# Patient Record
Sex: MALE | Race: WHITE | URBAN - METROPOLITAN AREA
[De-identification: names, ages, dates, MRNs, and addresses within clinical notes are randomized per-mention and may not be internally consistent; named-entity substitution may affect disease eponyms.]

---

## 2018-10-30 ENCOUNTER — HOSPITAL ENCOUNTER (EMERGENCY)
Age: 48
Discharge: HOME | End: 2018-10-30
Attending: EMERGENCY MEDICINE
Payer: COMMERCIAL

## 2018-10-30 ENCOUNTER — APPOINTMENT (OUTPATIENT)
Dept: GENERAL RADIOLOGY | Age: 48
End: 2018-10-30
Payer: COMMERCIAL

## 2018-10-30 VITALS
TEMPERATURE: 98.6 F | SYSTOLIC BLOOD PRESSURE: 173 MMHG | OXYGEN SATURATION: 98 % | DIASTOLIC BLOOD PRESSURE: 96 MMHG | HEART RATE: 78 BPM | RESPIRATION RATE: 20 BRPM

## 2018-10-30 DIAGNOSIS — R07.9 CHEST PAIN, UNSPECIFIED TYPE: Primary | ICD-10-CM

## 2018-10-30 DIAGNOSIS — R03.0 ELEVATED BLOOD PRESSURE READING: ICD-10-CM

## 2018-10-30 LAB
A/G RATIO: 1.4 (ref 1.1–2.2)
ALBUMIN SERPL-MCNC: 4 G/DL (ref 3.4–5)
ALP BLD-CCNC: 66 U/L (ref 40–129)
ALT SERPL-CCNC: 7 U/L (ref 10–40)
ANION GAP SERPL CALCULATED.3IONS-SCNC: 10 MMOL/L (ref 3–16)
APTT: 30 SEC (ref 26–36)
AST SERPL-CCNC: 13 U/L (ref 15–37)
BASOPHILS ABSOLUTE: 0.1 K/UL (ref 0–0.2)
BASOPHILS RELATIVE PERCENT: 0.9 %
BILIRUB SERPL-MCNC: <0.2 MG/DL (ref 0–1)
BUN BLDV-MCNC: 14 MG/DL (ref 7–20)
CALCIUM SERPL-MCNC: 9.4 MG/DL (ref 8.3–10.6)
CHLORIDE BLD-SCNC: 105 MMOL/L (ref 99–110)
CO2: 25 MMOL/L (ref 21–32)
CREAT SERPL-MCNC: 1.1 MG/DL (ref 0.9–1.3)
EKG ATRIAL RATE: 78 BPM
EKG ATRIAL RATE: 89 BPM
EKG DIAGNOSIS: NORMAL
EKG DIAGNOSIS: NORMAL
EKG P AXIS: 46 DEGREES
EKG P AXIS: 56 DEGREES
EKG P-R INTERVAL: 162 MS
EKG P-R INTERVAL: 172 MS
EKG Q-T INTERVAL: 366 MS
EKG Q-T INTERVAL: 392 MS
EKG QRS DURATION: 112 MS
EKG QRS DURATION: 112 MS
EKG QTC CALCULATION (BAZETT): 445 MS
EKG QTC CALCULATION (BAZETT): 446 MS
EKG R AXIS: 43 DEGREES
EKG R AXIS: 47 DEGREES
EKG T AXIS: 57 DEGREES
EKG T AXIS: 60 DEGREES
EKG VENTRICULAR RATE: 78 BPM
EKG VENTRICULAR RATE: 89 BPM
EOSINOPHILS ABSOLUTE: 0.1 K/UL (ref 0–0.6)
EOSINOPHILS RELATIVE PERCENT: 1.3 %
GFR AFRICAN AMERICAN: >60
GFR NON-AFRICAN AMERICAN: >60
GLOBULIN: 2.9 G/DL
GLUCOSE BLD-MCNC: 103 MG/DL (ref 70–99)
HCT VFR BLD CALC: 38.9 % (ref 40.5–52.5)
HEMOGLOBIN: 12.9 G/DL (ref 13.5–17.5)
INR BLD: 0.9 (ref 0.86–1.14)
LYMPHOCYTES ABSOLUTE: 2.8 K/UL (ref 1–5.1)
LYMPHOCYTES RELATIVE PERCENT: 35.6 %
MCH RBC QN AUTO: 28.6 PG (ref 26–34)
MCHC RBC AUTO-ENTMCNC: 33.1 G/DL (ref 31–36)
MCV RBC AUTO: 86.5 FL (ref 80–100)
MONOCYTES ABSOLUTE: 0.9 K/UL (ref 0–1.3)
MONOCYTES RELATIVE PERCENT: 11.4 %
NEUTROPHILS ABSOLUTE: 4 K/UL (ref 1.7–7.7)
NEUTROPHILS RELATIVE PERCENT: 50.8 %
PDW BLD-RTO: 15.2 % (ref 12.4–15.4)
PLATELET # BLD: 216 K/UL (ref 135–450)
PMV BLD AUTO: 8.9 FL (ref 5–10.5)
POTASSIUM SERPL-SCNC: 3.7 MMOL/L (ref 3.5–5.1)
PROTHROMBIN TIME: 10.3 SEC (ref 9.8–13)
RBC # BLD: 4.5 M/UL (ref 4.2–5.9)
SODIUM BLD-SCNC: 140 MMOL/L (ref 136–145)
TOTAL PROTEIN: 6.9 G/DL (ref 6.4–8.2)
TROPONIN: <0.01 NG/ML
TROPONIN: <0.01 NG/ML
WBC # BLD: 7.8 K/UL (ref 4–11)

## 2018-10-30 PROCEDURE — 85730 THROMBOPLASTIN TIME PARTIAL: CPT

## 2018-10-30 PROCEDURE — 6370000000 HC RX 637 (ALT 250 FOR IP): Performed by: PHYSICIAN ASSISTANT

## 2018-10-30 PROCEDURE — 6370000000 HC RX 637 (ALT 250 FOR IP): Performed by: EMERGENCY MEDICINE

## 2018-10-30 PROCEDURE — 94640 AIRWAY INHALATION TREATMENT: CPT

## 2018-10-30 PROCEDURE — 80053 COMPREHEN METABOLIC PANEL: CPT

## 2018-10-30 PROCEDURE — 93010 ELECTROCARDIOGRAM REPORT: CPT | Performed by: INTERNAL MEDICINE

## 2018-10-30 PROCEDURE — 71046 X-RAY EXAM CHEST 2 VIEWS: CPT

## 2018-10-30 PROCEDURE — 93005 ELECTROCARDIOGRAM TRACING: CPT | Performed by: EMERGENCY MEDICINE

## 2018-10-30 PROCEDURE — 94760 N-INVAS EAR/PLS OXIMETRY 1: CPT

## 2018-10-30 PROCEDURE — 85610 PROTHROMBIN TIME: CPT

## 2018-10-30 PROCEDURE — 85025 COMPLETE CBC W/AUTO DIFF WBC: CPT

## 2018-10-30 PROCEDURE — 99285 EMERGENCY DEPT VISIT HI MDM: CPT

## 2018-10-30 PROCEDURE — 94664 DEMO&/EVAL PT USE INHALER: CPT

## 2018-10-30 PROCEDURE — 84484 ASSAY OF TROPONIN QUANT: CPT

## 2018-10-30 RX ORDER — PRAZOSIN HYDROCHLORIDE 1 MG/1
2 CAPSULE ORAL ONCE
Status: COMPLETED | OUTPATIENT
Start: 2018-10-30 | End: 2018-10-30

## 2018-10-30 RX ORDER — LORATADINE 10 MG/1
10 TABLET ORAL
COMMUNITY

## 2018-10-30 RX ORDER — ISOSORBIDE MONONITRATE 30 MG/1
30 TABLET, EXTENDED RELEASE ORAL ONCE
Status: COMPLETED | OUTPATIENT
Start: 2018-10-30 | End: 2018-10-30

## 2018-10-30 RX ORDER — ASPIRIN 81 MG/1
81 TABLET, CHEWABLE ORAL
COMMUNITY
Start: 2018-04-25 | End: 2019-04-25

## 2018-10-30 RX ORDER — NICOTINE 21 MG/24HR
1 PATCH, TRANSDERMAL 24 HOURS TRANSDERMAL
COMMUNITY

## 2018-10-30 RX ORDER — CLOPIDOGREL BISULFATE 75 MG/1
TABLET ORAL
COMMUNITY
Start: 2018-09-10

## 2018-10-30 RX ORDER — ALBUTEROL SULFATE 90 UG/1
2 AEROSOL, METERED RESPIRATORY (INHALATION)
COMMUNITY
Start: 2018-02-18

## 2018-10-30 RX ORDER — CYCLOBENZAPRINE HCL 10 MG
10 TABLET ORAL 3 TIMES DAILY PRN
Qty: 15 TABLET | Refills: 0 | Status: SHIPPED | OUTPATIENT
Start: 2018-10-30

## 2018-10-30 RX ORDER — IPRATROPIUM BROMIDE AND ALBUTEROL SULFATE 2.5; .5 MG/3ML; MG/3ML
1 SOLUTION RESPIRATORY (INHALATION) ONCE
Status: COMPLETED | OUTPATIENT
Start: 2018-10-30 | End: 2018-10-30

## 2018-10-30 RX ORDER — ATORVASTATIN CALCIUM 40 MG/1
TABLET, FILM COATED ORAL
COMMUNITY
Start: 2018-09-10

## 2018-10-30 RX ORDER — NITROGLYCERIN 0.4 MG/1
0.4 TABLET SUBLINGUAL
COMMUNITY

## 2018-10-30 RX ORDER — LEVOTHYROXINE SODIUM 0.1 MG/1
100 TABLET ORAL
COMMUNITY
Start: 2018-04-16

## 2018-10-30 RX ORDER — ISOSORBIDE MONONITRATE 30 MG/1
TABLET, EXTENDED RELEASE ORAL
COMMUNITY
Start: 2018-09-10

## 2018-10-30 RX ORDER — PRAZOSIN HYDROCHLORIDE 2 MG/1
2 CAPSULE ORAL
COMMUNITY
Start: 2018-05-14

## 2018-10-30 RX ORDER — CYCLOBENZAPRINE HCL 10 MG
10 TABLET ORAL ONCE
Status: COMPLETED | OUTPATIENT
Start: 2018-10-30 | End: 2018-10-30

## 2018-10-30 RX ORDER — PALIPERIDONE 6 MG/1
6 TABLET, EXTENDED RELEASE ORAL
COMMUNITY
Start: 2018-05-14

## 2018-10-30 RX ORDER — ACETAMINOPHEN 325 MG/1
650 TABLET ORAL ONCE
Status: COMPLETED | OUTPATIENT
Start: 2018-10-30 | End: 2018-10-30

## 2018-10-30 RX ADMIN — CYCLOBENZAPRINE HYDROCHLORIDE 10 MG: 10 TABLET, FILM COATED ORAL at 05:51

## 2018-10-30 RX ADMIN — METOPROLOL TARTRATE 25 MG: 25 TABLET ORAL at 05:50

## 2018-10-30 RX ADMIN — ISOSORBIDE MONONITRATE 30 MG: 30 TABLET, EXTENDED RELEASE ORAL at 05:50

## 2018-10-30 RX ADMIN — ACETAMINOPHEN 650 MG: 325 TABLET, FILM COATED ORAL at 03:48

## 2018-10-30 RX ADMIN — IPRATROPIUM BROMIDE AND ALBUTEROL SULFATE 1 AMPULE: .5; 3 SOLUTION RESPIRATORY (INHALATION) at 03:22

## 2018-10-30 RX ADMIN — PRAZOSIN HYDROCHLORIDE 2 MG: 1 CAPSULE ORAL at 05:52

## 2018-10-30 ASSESSMENT — ENCOUNTER SYMPTOMS
NAUSEA: 0
VOMITING: 0
SHORTNESS OF BREATH: 0
COUGH: 0
ABDOMINAL PAIN: 0
RHINORRHEA: 0
DIARRHEA: 0

## 2018-10-30 ASSESSMENT — PAIN DESCRIPTION - LOCATION: LOCATION: CHEST

## 2018-10-30 ASSESSMENT — PAIN SCALES - GENERAL
PAINLEVEL_OUTOF10: 3
PAINLEVEL_OUTOF10: 9
PAINLEVEL_OUTOF10: 9

## 2018-10-30 ASSESSMENT — PAIN DESCRIPTION - PAIN TYPE: TYPE: ACUTE PAIN

## 2018-10-30 ASSESSMENT — PAIN DESCRIPTION - PROGRESSION: CLINICAL_PROGRESSION: GRADUALLY IMPROVING

## 2018-10-30 NOTE — ED PROVIDER NOTES
[address] [facility]  eMERGENCY dEPARTMENT eNCOUnter        Pt Name: Rafiq Rodney  MRN: [id number]  Reneegfkelli 1970  Date of evaluation: 10/30/2018  Provider: Jayro Colon PA-C  PCP: No primary care provider on file. ED Attending: Brandt Figueroa, [address] [place] [address]       Chief Complaint   Patient presents with    Chest Pain     began around 30 minutes ago while trying to go to sleep, feels squeezing in nature, hx of stent placement last being in feb 2018       HISTORY OF PRESENT ILLNESS   (Location/Symptom, Timing/Onset, Context/Setting, Quality, Duration, Modifying Factors, Severity)  Note limiting factors. Rafiq Rodney is a 56 y.o. male who presents to the emergency department today for evaluation for chest pain. The patient states that the pain started to the left side of her chest, radiated to his left shoulder and up into his left neck. This started a partially 30 minutes before arriving to the ED. The patient states that his artery taken 4 baby aspirin and he is are retaken 4 nitro at home without any improvement. He states that his pain is sharp, constant and is currently rated as a 9/10 although and of the room he is lying in bed comfortably watching TV. No known alleviating factors. Patient states that he does have history of coronary artery disease and he states he does have stents in last replaced in February 2018. Patient states that he has had intermittent pain since that time. Patient denies any fever or cough. He denies any abdominal pain, nausea, vomiting or diarrhea. No urinary symptoms. He denies any recent travels immobilizations or surgeries. No history of DVT or PE. No lower leg pain or swelling. He continues to smoke marijuana as well as a half pack of cigarettes per day. Positive family history.   He has history of hypertension no history of diabetes or hyperlipidemia    Although the patient states that he has not really had a pain like this before and review the patient's chart it appears that he has been to the ER multiple times for chest pain, was last seen on 10/27/2018 at [facility], cardiac workup was negative at that time. Last stress test was on 4/16/2018 and was normal at that time. Nursing Notes were all reviewed and agreed with or any disagreements were addressed  in the HPI. REVIEW OF SYSTEMS    (2-9 systems for level 4, 10 or more for level 5)     Review of Systems   Constitutional: Negative for activity change, appetite change, chills and fever. HENT: Negative for congestion and rhinorrhea. Respiratory: Negative for cough and shortness of breath. Cardiovascular: Positive for chest pain. Gastrointestinal: Negative for abdominal pain, diarrhea, nausea and vomiting. Genitourinary: Negative for difficulty urinating, dysuria and hematuria. Positives and Pertinent negatives as per HPI. Except as noted abovein the ROS, all other systems were reviewed and negative. PAST MEDICAL HISTORY   History reviewed. No pertinent past medical history. SURGICAL HISTORY   History reviewed. No pertinent surgical history.       CURRENTMEDICATIONS       Previous Medications    ALBUTEROL SULFATE  (90 BASE) MCG/ACT INHALER    Inhale 2 puffs into the lungs    ASPIRIN 81 MG CHEWABLE TABLET    Take 81 mg by mouth    ATORVASTATIN (LIPITOR) 40 MG TABLET    TAKE 1 TABLET BY MOUTH ONCE DAILY    CLOPIDOGREL (PLAVIX) 75 MG TABLET    TAKE 1 TABLET BY MOUTH ONCE DAILY    ISOSORBIDE MONONITRATE (IMDUR) 30 MG EXTENDED RELEASE TABLET    TAKE 1 TABLET BY MOUTH ONCE DAILY    LEVOTHYROXINE (SYNTHROID) 100 MCG TABLET    Take 100 mcg by mouth    LORATADINE (CLARITIN) 10 MG TABLET    Take 10 mg by mouth    METOPROLOL TARTRATE (LOPRESSOR) 25 MG TABLET    TAKE 1 TABLET BY MOUTH TWICE DAILY    NICOTINE (NICODERM CQ) 21 MG/24HR    Place 1 patch onto the skin    NITROGLYCERIN (NITROSTAT) 0.4 MG SL TABLET    Place 0.4 mg under the tongue    PALIPERIDONE (INVEGA) 6 MG EXTENDED RELEASE TABLET    Take 6 mg by mouth    PRAZOSIN (MINIPRESS) 2 MG CAPSULE    Take 2 mg by mouth         ALLERGIES     Latex; Ziprasidone hcl; Acetaminophen; Bupropion; Ketorolac tromethamine; Lithium; Olanzapine; Risperidone; Tramadol; Aspirin; and Ibuprofen    FAMILYHISTORY     History reviewed. No pertinent family history. SOCIAL HISTORY       Social History     Social History    Marital status: Single     Spouse name: N/A    Number of children: N/A    Years of education: N/A     Social History Main Topics    Smoking status: Current Every Day Smoker    Smokeless tobacco: Never Used    Alcohol use No    Drug use: Yes     Types: Marijuana    Sexual activity: Not Asked     Other Topics Concern    None     Social History Narrative    None       SCREENINGS             PHYSICAL EXAM    (up to 7 for level 4, 8 or more for level 5)     ED Triage Vitals [10/30/18 0118]   BP Temp Temp src Pulse Resp SpO2 Height Weight   (!) 155/98 98.6 °F (37 °C) -- 91 16 97 % -- --       Physical Exam   Constitutional: He is oriented to person, place, and time. He appears well-developed and well-nourished. HENT:   Head: Normocephalic and atraumatic. Right Ear: External ear normal.   Left Ear: External ear normal.   Nose: Nose normal.   Eyes: Right eye exhibits no discharge. Left eye exhibits no discharge. Neck: Normal range of motion. Neck supple. No tracheal deviation present. Cardiovascular: Normal rate, regular rhythm and normal heart sounds. No murmur heard. Pulmonary/Chest: Effort normal. No respiratory distress. He has wheezes. Scattered end expiratory wheezing throughout   Abdominal: Soft. Bowel sounds are normal. He exhibits no distension and no mass. There is no tenderness. There is no guarding. Musculoskeletal: Normal range of motion. Neurological: He is alert and oriented to person, place, and time. Skin: Skin is warm and dry.  He is not

## 2023-04-13 PROBLEM — M51.37 DDD (DEGENERATIVE DISC DISEASE), LUMBOSACRAL: Status: ACTIVE | Noted: 2023-04-13

## 2023-04-13 PROBLEM — M43.16 SPONDYLOLISTHESIS AT L4-L5 LEVEL: Status: ACTIVE | Noted: 2023-04-13

## 2023-04-13 PROBLEM — M51.379 DDD (DEGENERATIVE DISC DISEASE), LUMBOSACRAL: Status: ACTIVE | Noted: 2023-04-13

## 2023-04-13 PROBLEM — M79.605 LUMBAR PAIN WITH RADIATION DOWN BOTH LEGS: Status: ACTIVE | Noted: 2023-04-13

## 2023-04-13 PROBLEM — M79.604 LUMBAR PAIN WITH RADIATION DOWN BOTH LEGS: Status: ACTIVE | Noted: 2023-04-13

## 2023-04-13 PROBLEM — N39.42 URINARY INCONTINENCE WITHOUT SENSORY AWARENESS: Status: ACTIVE | Noted: 2023-04-13

## 2023-04-13 PROBLEM — M54.50 LUMBAR PAIN WITH RADIATION DOWN BOTH LEGS: Status: ACTIVE | Noted: 2023-04-13

## 2023-08-18 ENCOUNTER — HOSPITAL ENCOUNTER (INPATIENT)
Age: 53
LOS: 1 days | Discharge: HOME | DRG: 684 | End: 2023-08-19
Attending: INTERNAL MEDICINE | Admitting: INTERNAL MEDICINE
Payer: COMMERCIAL

## 2023-08-18 ENCOUNTER — APPOINTMENT (OUTPATIENT)
Dept: GENERAL RADIOLOGY | Age: 53
DRG: 684 | End: 2023-08-18
Payer: COMMERCIAL

## 2023-08-18 DIAGNOSIS — F33.1 MODERATE EPISODE OF RECURRENT MAJOR DEPRESSIVE DISORDER (HCC): ICD-10-CM

## 2023-08-18 DIAGNOSIS — R07.9 CHEST PAIN, UNSPECIFIED TYPE: Primary | ICD-10-CM

## 2023-08-18 DIAGNOSIS — R06.02 SHORTNESS OF BREATH: ICD-10-CM

## 2023-08-18 DIAGNOSIS — N17.9 AKI (ACUTE KIDNEY INJURY) (HCC): ICD-10-CM

## 2023-08-18 DIAGNOSIS — R77.8 ELEVATED TROPONIN: ICD-10-CM

## 2023-08-18 LAB
ALBUMIN SERPL-MCNC: 4.7 G/DL (ref 3.4–5)
ALBUMIN/GLOB SERPL: 1.7 {RATIO} (ref 1.1–2.2)
ALP SERPL-CCNC: 55 U/L (ref 40–129)
ALT SERPL-CCNC: 15 U/L (ref 10–40)
ANION GAP SERPL CALCULATED.3IONS-SCNC: 14 MMOL/L (ref 3–16)
AST SERPL-CCNC: 62 U/L (ref 15–37)
BASOPHILS # BLD: 0.1 K/UL (ref 0–0.2)
BASOPHILS NFR BLD: 1.6 %
BILIRUB SERPL-MCNC: 0.3 MG/DL (ref 0–1)
BUN SERPL-MCNC: 15 MG/DL (ref 7–20)
CALCIUM SERPL-MCNC: 10.8 MG/DL (ref 8.3–10.6)
CHLORIDE SERPL-SCNC: 104 MMOL/L (ref 99–110)
CO2 SERPL-SCNC: 21 MMOL/L (ref 21–32)
CREAT SERPL-MCNC: 1.9 MG/DL (ref 0.9–1.3)
DEPRECATED RDW RBC AUTO: 14.6 % (ref 12.4–15.4)
EOSINOPHIL # BLD: 0.1 K/UL (ref 0–0.6)
EOSINOPHIL NFR BLD: 0.9 %
GFR SERPLBLD CREATININE-BSD FMLA CKD-EPI: 42 ML/MIN/{1.73_M2}
GLUCOSE SERPL-MCNC: 88 MG/DL (ref 70–99)
HCT VFR BLD AUTO: 40.5 % (ref 40.5–52.5)
HGB BLD-MCNC: 13.9 G/DL (ref 13.5–17.5)
LYMPHOCYTES # BLD: 1.9 K/UL (ref 1–5.1)
LYMPHOCYTES NFR BLD: 27.6 %
MCH RBC QN AUTO: 31.1 PG (ref 26–34)
MCHC RBC AUTO-ENTMCNC: 34.3 G/DL (ref 31–36)
MCV RBC AUTO: 90.5 FL (ref 80–100)
MONOCYTES # BLD: 0.5 K/UL (ref 0–1.3)
MONOCYTES NFR BLD: 7.8 %
NEUTROPHILS # BLD: 4.3 K/UL (ref 1.7–7.7)
NEUTROPHILS NFR BLD: 62.1 %
NT-PROBNP SERPL-MCNC: 385 PG/ML (ref 0–124)
PLATELET # BLD AUTO: 207 K/UL (ref 135–450)
PMV BLD AUTO: 10.1 FL (ref 5–10.5)
POTASSIUM SERPL-SCNC: 4.1 MMOL/L (ref 3.5–5.1)
PROT SERPL-MCNC: 7.5 G/DL (ref 6.4–8.2)
RBC # BLD AUTO: 4.48 M/UL (ref 4.2–5.9)
REASON FOR REJECTION: NORMAL
REJECTED TEST: NORMAL
SODIUM SERPL-SCNC: 139 MMOL/L (ref 136–145)
TROPONIN, HIGH SENSITIVITY: 27 NG/L (ref 0–22)
TROPONIN, HIGH SENSITIVITY: 28 NG/L (ref 0–22)
WBC # BLD AUTO: 7 K/UL (ref 4–11)

## 2023-08-18 PROCEDURE — 6360000002 HC RX W HCPCS: Performed by: NURSE PRACTITIONER

## 2023-08-18 PROCEDURE — 6360000002 HC RX W HCPCS: Performed by: PHYSICIAN ASSISTANT

## 2023-08-18 PROCEDURE — 6370000000 HC RX 637 (ALT 250 FOR IP): Performed by: INTERNAL MEDICINE

## 2023-08-18 PROCEDURE — 96374 THER/PROPH/DIAG INJ IV PUSH: CPT

## 2023-08-18 PROCEDURE — 36415 COLL VENOUS BLD VENIPUNCTURE: CPT

## 2023-08-18 PROCEDURE — 84484 ASSAY OF TROPONIN QUANT: CPT

## 2023-08-18 PROCEDURE — 71045 X-RAY EXAM CHEST 1 VIEW: CPT

## 2023-08-18 PROCEDURE — 80053 COMPREHEN METABOLIC PANEL: CPT

## 2023-08-18 PROCEDURE — 2580000003 HC RX 258: Performed by: INTERNAL MEDICINE

## 2023-08-18 PROCEDURE — 93005 ELECTROCARDIOGRAM TRACING: CPT | Performed by: INTERNAL MEDICINE

## 2023-08-18 PROCEDURE — 6370000000 HC RX 637 (ALT 250 FOR IP): Performed by: PHYSICIAN ASSISTANT

## 2023-08-18 PROCEDURE — 96375 TX/PRO/DX INJ NEW DRUG ADDON: CPT

## 2023-08-18 PROCEDURE — 1200000000 HC SEMI PRIVATE

## 2023-08-18 PROCEDURE — 85025 COMPLETE CBC W/AUTO DIFF WBC: CPT

## 2023-08-18 PROCEDURE — 99285 EMERGENCY DEPT VISIT HI MDM: CPT

## 2023-08-18 PROCEDURE — 83880 ASSAY OF NATRIURETIC PEPTIDE: CPT

## 2023-08-18 RX ORDER — SODIUM CHLORIDE 0.9 % (FLUSH) 0.9 %
5-40 SYRINGE (ML) INJECTION EVERY 12 HOURS SCHEDULED
Status: DISCONTINUED | OUTPATIENT
Start: 2023-08-18 | End: 2023-08-19 | Stop reason: HOSPADM

## 2023-08-18 RX ORDER — MORPHINE SULFATE 4 MG/ML
4 INJECTION, SOLUTION INTRAMUSCULAR; INTRAVENOUS EVERY 4 HOURS PRN
Status: DISCONTINUED | OUTPATIENT
Start: 2023-08-18 | End: 2023-08-19

## 2023-08-18 RX ORDER — MIRTAZAPINE 15 MG/1
15 TABLET, FILM COATED ORAL NIGHTLY
COMMUNITY

## 2023-08-18 RX ORDER — ONDANSETRON 4 MG/1
4 TABLET, ORALLY DISINTEGRATING ORAL EVERY 8 HOURS PRN
Status: DISCONTINUED | OUTPATIENT
Start: 2023-08-18 | End: 2023-08-18

## 2023-08-18 RX ORDER — REGADENOSON 0.08 MG/ML
0.4 INJECTION, SOLUTION INTRAVENOUS
Status: COMPLETED | OUTPATIENT
Start: 2023-08-18 | End: 2023-08-19

## 2023-08-18 RX ORDER — ONDANSETRON 2 MG/ML
4 INJECTION INTRAMUSCULAR; INTRAVENOUS ONCE
Status: COMPLETED | OUTPATIENT
Start: 2023-08-18 | End: 2023-08-18

## 2023-08-18 RX ORDER — RISPERIDONE 1 MG/1
1 TABLET ORAL 2 TIMES DAILY
COMMUNITY

## 2023-08-18 RX ORDER — ASPIRIN 81 MG/1
81 TABLET ORAL DAILY
COMMUNITY

## 2023-08-18 RX ORDER — LEVOTHYROXINE SODIUM 0.07 MG/1
75 TABLET ORAL DAILY
Status: DISCONTINUED | OUTPATIENT
Start: 2023-08-19 | End: 2023-08-19 | Stop reason: HOSPADM

## 2023-08-18 RX ORDER — RANOLAZINE 500 MG/1
500 TABLET, EXTENDED RELEASE ORAL 2 TIMES DAILY
COMMUNITY

## 2023-08-18 RX ORDER — SODIUM CHLORIDE 0.9 % (FLUSH) 0.9 %
5-40 SYRINGE (ML) INJECTION PRN
Status: DISCONTINUED | OUTPATIENT
Start: 2023-08-18 | End: 2023-08-19 | Stop reason: HOSPADM

## 2023-08-18 RX ORDER — ENOXAPARIN SODIUM 100 MG/ML
40 INJECTION SUBCUTANEOUS DAILY
Status: DISCONTINUED | OUTPATIENT
Start: 2023-08-19 | End: 2023-08-19 | Stop reason: HOSPADM

## 2023-08-18 RX ORDER — ASPIRIN 81 MG/1
81 TABLET, CHEWABLE ORAL DAILY
Status: DISCONTINUED | OUTPATIENT
Start: 2023-08-19 | End: 2023-08-19 | Stop reason: HOSPADM

## 2023-08-18 RX ORDER — NICOTINE 21 MG/24HR
1 PATCH, TRANSDERMAL 24 HOURS TRANSDERMAL DAILY PRN
Status: DISCONTINUED | OUTPATIENT
Start: 2023-08-18 | End: 2023-08-19 | Stop reason: HOSPADM

## 2023-08-18 RX ORDER — LEVOTHYROXINE SODIUM 0.1 MG/1
100 TABLET ORAL DAILY
Status: DISCONTINUED | OUTPATIENT
Start: 2023-08-19 | End: 2023-08-18

## 2023-08-18 RX ORDER — ONDANSETRON 2 MG/ML
4 INJECTION INTRAMUSCULAR; INTRAVENOUS EVERY 6 HOURS PRN
Status: DISCONTINUED | OUTPATIENT
Start: 2023-08-18 | End: 2023-08-18

## 2023-08-18 RX ORDER — POLYETHYLENE GLYCOL 3350 17 G/17G
17 POWDER, FOR SOLUTION ORAL DAILY PRN
Status: DISCONTINUED | OUTPATIENT
Start: 2023-08-18 | End: 2023-08-19 | Stop reason: HOSPADM

## 2023-08-18 RX ORDER — ATORVASTATIN CALCIUM 40 MG/1
40 TABLET, FILM COATED ORAL NIGHTLY
Status: DISCONTINUED | OUTPATIENT
Start: 2023-08-18 | End: 2023-08-18 | Stop reason: SDUPTHER

## 2023-08-18 RX ORDER — NITROGLYCERIN 0.4 MG/1
0.4 TABLET SUBLINGUAL EVERY 5 MIN PRN
Status: DISCONTINUED | OUTPATIENT
Start: 2023-08-18 | End: 2023-08-18 | Stop reason: SDUPTHER

## 2023-08-18 RX ORDER — NITROGLYCERIN 0.4 MG/1
0.4 TABLET SUBLINGUAL EVERY 5 MIN PRN
Status: DISCONTINUED | OUTPATIENT
Start: 2023-08-18 | End: 2023-08-19 | Stop reason: HOSPADM

## 2023-08-18 RX ORDER — ATORVASTATIN CALCIUM 40 MG/1
40 TABLET, FILM COATED ORAL DAILY
Status: DISCONTINUED | OUTPATIENT
Start: 2023-08-19 | End: 2023-08-19 | Stop reason: HOSPADM

## 2023-08-18 RX ORDER — ASPIRIN 81 MG/1
243 TABLET, CHEWABLE ORAL ONCE
Status: COMPLETED | OUTPATIENT
Start: 2023-08-18 | End: 2023-08-18

## 2023-08-18 RX ORDER — ASPIRIN 81 MG/1
81 TABLET, CHEWABLE ORAL DAILY
Status: DISCONTINUED | OUTPATIENT
Start: 2023-08-19 | End: 2023-08-18 | Stop reason: SDUPTHER

## 2023-08-18 RX ORDER — FAMOTIDINE 20 MG/1
20 TABLET, FILM COATED ORAL 2 TIMES DAILY
Status: DISCONTINUED | OUTPATIENT
Start: 2023-08-19 | End: 2023-08-19 | Stop reason: HOSPADM

## 2023-08-18 RX ORDER — CLOPIDOGREL BISULFATE 75 MG/1
75 TABLET ORAL DAILY
Status: DISCONTINUED | OUTPATIENT
Start: 2023-08-19 | End: 2023-08-19 | Stop reason: HOSPADM

## 2023-08-18 RX ORDER — ISOSORBIDE MONONITRATE 30 MG/1
30 TABLET, EXTENDED RELEASE ORAL DAILY
Status: DISCONTINUED | OUTPATIENT
Start: 2023-08-19 | End: 2023-08-19 | Stop reason: HOSPADM

## 2023-08-18 RX ORDER — MORPHINE SULFATE 4 MG/ML
4 INJECTION, SOLUTION INTRAMUSCULAR; INTRAVENOUS ONCE
Status: COMPLETED | OUTPATIENT
Start: 2023-08-18 | End: 2023-08-18

## 2023-08-18 RX ORDER — SODIUM CHLORIDE 9 MG/ML
INJECTION, SOLUTION INTRAVENOUS PRN
Status: DISCONTINUED | OUTPATIENT
Start: 2023-08-18 | End: 2023-08-19 | Stop reason: HOSPADM

## 2023-08-18 RX ADMIN — NITROGLYCERIN 0.4 MG: 0.4 TABLET, ORALLY DISINTEGRATING SUBLINGUAL at 15:48

## 2023-08-18 RX ADMIN — METOPROLOL TARTRATE 25 MG: 25 TABLET, FILM COATED ORAL at 21:55

## 2023-08-18 RX ADMIN — ASPIRIN 243 MG: 81 TABLET, CHEWABLE ORAL at 15:47

## 2023-08-18 RX ADMIN — NITROGLYCERIN 0.4 MG: 0.4 TABLET, ORALLY DISINTEGRATING SUBLINGUAL at 16:07

## 2023-08-18 RX ADMIN — NITROGLYCERIN 0.4 MG: 0.4 TABLET, ORALLY DISINTEGRATING SUBLINGUAL at 16:00

## 2023-08-18 RX ADMIN — MORPHINE SULFATE 4 MG: 4 INJECTION, SOLUTION INTRAMUSCULAR; INTRAVENOUS at 16:50

## 2023-08-18 RX ADMIN — Medication 25 MG: at 22:56

## 2023-08-18 RX ADMIN — MORPHINE SULFATE 4 MG: 4 INJECTION, SOLUTION INTRAMUSCULAR; INTRAVENOUS at 21:57

## 2023-08-18 RX ADMIN — Medication 10 ML: at 21:56

## 2023-08-18 RX ADMIN — ONDANSETRON 4 MG: 2 INJECTION INTRAMUSCULAR; INTRAVENOUS at 16:49

## 2023-08-18 ASSESSMENT — PAIN - FUNCTIONAL ASSESSMENT
PAIN_FUNCTIONAL_ASSESSMENT: ACTIVITIES ARE NOT PREVENTED
PAIN_FUNCTIONAL_ASSESSMENT: 0-10
PAIN_FUNCTIONAL_ASSESSMENT: ACTIVITIES ARE NOT PREVENTED

## 2023-08-18 ASSESSMENT — ENCOUNTER SYMPTOMS
WHEEZING: 0
NAUSEA: 0
CHEST TIGHTNESS: 0
SHORTNESS OF BREATH: 1
COUGH: 1
ABDOMINAL PAIN: 0
DIARRHEA: 0
VOMITING: 0

## 2023-08-18 ASSESSMENT — PAIN SCALES - GENERAL
PAINLEVEL_OUTOF10: 9
PAINLEVEL_OUTOF10: 4
PAINLEVEL_OUTOF10: 9
PAINLEVEL_OUTOF10: 4

## 2023-08-18 ASSESSMENT — HEART SCORE
ECG: 1

## 2023-08-18 ASSESSMENT — PAIN DESCRIPTION - ONSET: ONSET: GRADUAL

## 2023-08-18 ASSESSMENT — PAIN DESCRIPTION - DESCRIPTORS
DESCRIPTORS: DISCOMFORT
DESCRIPTORS: ACHING
DESCRIPTORS: ACHING

## 2023-08-18 ASSESSMENT — PAIN DESCRIPTION - LOCATION
LOCATION: CHEST

## 2023-08-18 ASSESSMENT — PAIN DESCRIPTION - PAIN TYPE: TYPE: ACUTE PAIN

## 2023-08-18 ASSESSMENT — PAIN DESCRIPTION - FREQUENCY: FREQUENCY: INTERMITTENT

## 2023-08-18 NOTE — H&P
V2.0  History and Physical      Name:  Chaya Puga /Age/Sex: 1970  (47 y.o. male)   MRN & CSN:  [id number] & [id number] Encounter Date/Time: 2023 6:17 PM EDT   Location:  [address] [place] [address] PCP: No primary care provider on file. Hospital Day: 1    Assessment and Plan:   Chaya Puga is a 47 y.o. male with a Significant PMH as below who presented to ED with c/o chest pain    LA on CKD stage IIIb with baseline serum creatinine in  around 1.5 on review from Care Everywhere  Chest pain, rule out ACS  Polysubstance abuse/cocaine  Noncompliant with follow-up  Three-vessel CAD s/p stent placement 2019, JEAN PAUL to RCA and LAD, PCI mid LCx and possible OM1 3/2016  Hypothyroidism  Hypertension  Tobacco abuse/smoker  Marijuana abuse    Plan:     [x]Admit to Inpatient with expected LOS greater than two midnights due to medical therapy on telemetry. []Placed in Observation sta  Will Send Urine Lytes, eosinophils  calculate FeNa/Fe urea  IVF--NS @ 100 cc/hr  Will hold nephrotoxic agents  Nephrology consult  Serial Troponin's and EKG's will be obtained. Will provide/continue Aspirin, Beta-blockers and statins. Will use as needed morphine and nitroglycerine for pain relief. ECHO will be ordered to check for LV function and valvular integrity. Nuclear Stress test ordered  Cardiology consulted  Further Cardiac testing depends upon the eval by Cardiology services  Resume other home medication for comorbidities  supplemental O2 to keep SaO2> 92 %  Supportive care  Counseled for smoking cessation, will provide him with nicotine patch  Labs in AM            Comment: Please note this report has been produced using speech recognition software and may contain errors related to that system including errors in grammar, punctuation, and spelling, as well as words and phrases that may be inappropriate. If there are any questions or concerns please feel free to contact the dictating provider for clarification. Disposition:   Current Living situation: Home  Expected Disposition: ECF  Estimated D/C: in 2-3 days    Diet No diet orders on file   DVT Prophylaxis [x] Lovenox, []  Heparin, [] SCDs, [] Ambulation,  [] Eliquis, [] Xarelto, [] Coumadin   Code Status Full code   Surrogate Decision Maker/ POA      Personally reviewed Lab Studies and Imaging     Discussed management of the case with the attending  EKG interpreted personally and results normal sinus rhythm at the rate of 73 bpm    Imaging that was interpreted personally includes chest x-ray which shows normal cardiac size without any infiltrate            History from:     patient    History of Present Illness:     Chief Complaint: Chest pain  Lazarus Dillard is a 47 y.o. male with significant past medical history of hypertension, CAD, hyperlipidemia, hypothyroidism, cocaine abuse, marijuana abuse/dependence, noncompliance with follow-up and medication, CKD 3B who presented to the ED complaining of left precordial chest pain which started around noon. He describes it as sharp in nature radiating to left arm as well as to the back and lasting for hours. It was associated with some shortness of breath as well as lightheadedness and nausea but denies any diaphoresis or trauma or fever or chills or abdominal pain or hematemesis or melena or dysuria or hematuria     Review of Systems:        Pertinent positives and negatives discussed in HPI     Objective:   No intake or output data in the 24 hours ending 08/18/23 1817   Vitals:   Vitals:    08/18/23 1645 08/18/23 1700 08/18/23 1715 08/18/23 1730   BP: 131/82 (!) 136/96 128/87 (!) 106/57   Pulse: 56 89 65 75   Resp: 16 20 16 19   Temp:       TempSrc:       SpO2: 100% 98% 96% 97%   Weight:       Height:           Medications Prior to Admission     Prior to Admission medications    Medication Sig Start Date End Date Taking?  Authorizing Provider   aspirin 81 MG chewable tablet Take 81 mg by mouth 4/25/18 4/25/19

## 2023-08-19 VITALS
SYSTOLIC BLOOD PRESSURE: 119 MMHG | HEIGHT: 71 IN | WEIGHT: 181.88 LBS | BODY MASS INDEX: 25.46 KG/M2 | RESPIRATION RATE: 16 BRPM | OXYGEN SATURATION: 96 % | TEMPERATURE: 98.5 F | DIASTOLIC BLOOD PRESSURE: 79 MMHG | HEART RATE: 62 BPM

## 2023-08-19 LAB
ALBUMIN SERPL-MCNC: 4.1 G/DL (ref 3.4–5)
ALBUMIN/GLOB SERPL: 2 {RATIO} (ref 1.1–2.2)
ALP SERPL-CCNC: 51 U/L (ref 40–129)
ALT SERPL-CCNC: 10 U/L (ref 10–40)
AMPHETAMINES UR QL SCN>1000 NG/ML: ABNORMAL
ANION GAP SERPL CALCULATED.3IONS-SCNC: 9 MMOL/L (ref 3–16)
AST SERPL-CCNC: 39 U/L (ref 15–37)
BACTERIA URNS QL MICRO: NORMAL /HPF
BARBITURATES UR QL SCN>200 NG/ML: ABNORMAL
BASOPHILS # BLD: 0.2 K/UL (ref 0–0.2)
BASOPHILS NFR BLD: 2.4 %
BENZODIAZ UR QL SCN>200 NG/ML: ABNORMAL
BILIRUB SERPL-MCNC: <0.2 MG/DL (ref 0–1)
BILIRUB UR QL STRIP.AUTO: NEGATIVE
BUN SERPL-MCNC: 16 MG/DL (ref 7–20)
CALCIUM SERPL-MCNC: 10.1 MG/DL (ref 8.3–10.6)
CANNABINOIDS UR QL SCN>50 NG/ML: POSITIVE
CHLORIDE SERPL-SCNC: 106 MMOL/L (ref 99–110)
CHOLEST SERPL-MCNC: 161 MG/DL (ref 0–199)
CLARITY UR: ABNORMAL
CO2 SERPL-SCNC: 25 MMOL/L (ref 21–32)
COCAINE UR QL SCN: POSITIVE
COLOR UR: YELLOW
CREAT SERPL-MCNC: 1.8 MG/DL (ref 0.9–1.3)
CREAT UR-MCNC: 223 MG/DL (ref 39–259)
DEPRECATED RDW RBC AUTO: 14.9 % (ref 12.4–15.4)
DRUG SCREEN COMMENT UR-IMP: ABNORMAL
EKG ATRIAL RATE: 73 BPM
EKG DIAGNOSIS: NORMAL
EKG P AXIS: 70 DEGREES
EKG P-R INTERVAL: 168 MS
EKG Q-T INTERVAL: 410 MS
EKG QRS DURATION: 114 MS
EKG QTC CALCULATION (BAZETT): 451 MS
EKG R AXIS: 77 DEGREES
EKG T AXIS: 80 DEGREES
EKG VENTRICULAR RATE: 73 BPM
EOSINOPHIL # BLD: 0.1 K/UL (ref 0–0.6)
EOSINOPHIL NFR BLD: 1.7 %
EOSINOPHIL URNS QL MICRO: NORMAL
EPI CELLS #/AREA URNS AUTO: 0 /HPF (ref 0–5)
FENTANYL SCREEN, URINE: ABNORMAL
GFR SERPLBLD CREATININE-BSD FMLA CKD-EPI: 45 ML/MIN/{1.73_M2}
GLUCOSE SERPL-MCNC: 100 MG/DL (ref 70–99)
GLUCOSE UR STRIP.AUTO-MCNC: NEGATIVE MG/DL
HCT VFR BLD AUTO: 36.7 % (ref 40.5–52.5)
HDLC SERPL-MCNC: 69 MG/DL (ref 40–60)
HGB BLD-MCNC: 12.5 G/DL (ref 13.5–17.5)
HGB UR QL STRIP.AUTO: NEGATIVE
HYALINE CASTS #/AREA URNS AUTO: 1 /LPF (ref 0–8)
KETONES UR STRIP.AUTO-MCNC: NEGATIVE MG/DL
LDLC SERPL CALC-MCNC: 77 MG/DL
LEUKOCYTE ESTERASE UR QL STRIP.AUTO: NEGATIVE
LV EF: 47 %
LVEF MODALITY: NORMAL
LYMPHOCYTES # BLD: 2.3 K/UL (ref 1–5.1)
LYMPHOCYTES NFR BLD: 31.2 %
MCH RBC QN AUTO: 30.6 PG (ref 26–34)
MCHC RBC AUTO-ENTMCNC: 33.9 G/DL (ref 31–36)
MCV RBC AUTO: 90.2 FL (ref 80–100)
METHADONE UR QL SCN>300 NG/ML: ABNORMAL
MONOCYTES # BLD: 0.6 K/UL (ref 0–1.3)
MONOCYTES NFR BLD: 7.9 %
NEUTROPHILS # BLD: 4.1 K/UL (ref 1.7–7.7)
NEUTROPHILS NFR BLD: 56.8 %
NITRITE UR QL STRIP.AUTO: NEGATIVE
OPIATES UR QL SCN>300 NG/ML: POSITIVE
OXYCODONE UR QL SCN: ABNORMAL
PCP UR QL SCN>25 NG/ML: ABNORMAL
PH UR STRIP.AUTO: 5 [PH] (ref 5–8)
PH UR STRIP: 5 [PH]
PLATELET # BLD AUTO: 188 K/UL (ref 135–450)
PMV BLD AUTO: 10.3 FL (ref 5–10.5)
POTASSIUM SERPL-SCNC: 3.7 MMOL/L (ref 3.5–5.1)
PROT SERPL-MCNC: 6.2 G/DL (ref 6.4–8.2)
PROT UR STRIP.AUTO-MCNC: NEGATIVE MG/DL
RBC # BLD AUTO: 4.07 M/UL (ref 4.2–5.9)
RBC CLUMPS #/AREA URNS AUTO: 0 /HPF (ref 0–4)
SODIUM SERPL-SCNC: 140 MMOL/L (ref 136–145)
SODIUM UR-SCNC: 76 MMOL/L
SP GR UR STRIP.AUTO: 1.02 (ref 1–1.03)
TRIGL SERPL-MCNC: 77 MG/DL (ref 0–150)
UA COMPLETE W REFLEX CULTURE PNL UR: ABNORMAL
UA DIPSTICK W REFLEX MICRO PNL UR: YES
URN SPEC COLLECT METH UR: ABNORMAL
UROBILINOGEN UR STRIP-ACNC: 0.2 E.U./DL
VLDLC SERPL CALC-MCNC: 15 MG/DL
WBC # BLD AUTO: 7.2 K/UL (ref 4–11)
WBC #/AREA URNS AUTO: 1 /HPF (ref 0–5)

## 2023-08-19 PROCEDURE — 80053 COMPREHEN METABOLIC PANEL: CPT

## 2023-08-19 PROCEDURE — 6360000002 HC RX W HCPCS: Performed by: INTERNAL MEDICINE

## 2023-08-19 PROCEDURE — 87205 SMEAR GRAM STAIN: CPT

## 2023-08-19 PROCEDURE — A9502 TC99M TETROFOSMIN: HCPCS | Performed by: INTERNAL MEDICINE

## 2023-08-19 PROCEDURE — 84300 ASSAY OF URINE SODIUM: CPT

## 2023-08-19 PROCEDURE — 6370000000 HC RX 637 (ALT 250 FOR IP): Performed by: INTERNAL MEDICINE

## 2023-08-19 PROCEDURE — 85025 COMPLETE CBC W/AUTO DIFF WBC: CPT

## 2023-08-19 PROCEDURE — 80061 LIPID PANEL: CPT

## 2023-08-19 PROCEDURE — 78452 HT MUSCLE IMAGE SPECT MULT: CPT

## 2023-08-19 PROCEDURE — 93017 CV STRESS TEST TRACING ONLY: CPT

## 2023-08-19 PROCEDURE — 3430000000 HC RX DIAGNOSTIC RADIOPHARMACEUTICAL: Performed by: INTERNAL MEDICINE

## 2023-08-19 PROCEDURE — 2580000003 HC RX 258: Performed by: INTERNAL MEDICINE

## 2023-08-19 PROCEDURE — 82570 ASSAY OF URINE CREATININE: CPT

## 2023-08-19 PROCEDURE — 99222 1ST HOSP IP/OBS MODERATE 55: CPT | Performed by: INTERNAL MEDICINE

## 2023-08-19 PROCEDURE — 36415 COLL VENOUS BLD VENIPUNCTURE: CPT

## 2023-08-19 PROCEDURE — 81001 URINALYSIS AUTO W/SCOPE: CPT

## 2023-08-19 PROCEDURE — 80307 DRUG TEST PRSMV CHEM ANLYZR: CPT

## 2023-08-19 PROCEDURE — 94760 N-INVAS EAR/PLS OXIMETRY 1: CPT

## 2023-08-19 RX ORDER — LIDOCAINE 4 G/G
1 PATCH TOPICAL DAILY
Status: DISCONTINUED | OUTPATIENT
Start: 2023-08-19 | End: 2023-08-19 | Stop reason: HOSPADM

## 2023-08-19 RX ORDER — MORPHINE SULFATE 2 MG/ML
2 INJECTION, SOLUTION INTRAMUSCULAR; INTRAVENOUS EVERY 6 HOURS PRN
Status: DISCONTINUED | OUTPATIENT
Start: 2023-08-19 | End: 2023-08-19 | Stop reason: HOSPADM

## 2023-08-19 RX ORDER — ISOSORBIDE MONONITRATE 30 MG/1
30 TABLET, EXTENDED RELEASE ORAL DAILY
Qty: 30 TABLET | Refills: 0 | Status: SHIPPED | OUTPATIENT
Start: 2023-08-19

## 2023-08-19 RX ORDER — TAMSULOSIN HYDROCHLORIDE 0.4 MG/1
0.4 CAPSULE ORAL ONCE
Status: COMPLETED | OUTPATIENT
Start: 2023-08-19 | End: 2023-08-19

## 2023-08-19 RX ORDER — LEVOTHYROXINE SODIUM 0.07 MG/1
75 TABLET ORAL DAILY
Qty: 30 TABLET | Refills: 0 | Status: SHIPPED | OUTPATIENT
Start: 2023-08-19

## 2023-08-19 RX ADMIN — TAMSULOSIN HYDROCHLORIDE 0.4 MG: 0.4 CAPSULE ORAL at 15:52

## 2023-08-19 RX ADMIN — ISOSORBIDE MONONITRATE 30 MG: 30 TABLET, EXTENDED RELEASE ORAL at 13:23

## 2023-08-19 RX ADMIN — MORPHINE SULFATE 2 MG: 2 INJECTION, SOLUTION INTRAMUSCULAR; INTRAVENOUS at 09:51

## 2023-08-19 RX ADMIN — CLOPIDOGREL BISULFATE 75 MG: 75 TABLET ORAL at 13:23

## 2023-08-19 RX ADMIN — FAMOTIDINE 20 MG: 20 TABLET, FILM COATED ORAL at 13:23

## 2023-08-19 RX ADMIN — REGADENOSON 0.4 MG: 0.08 INJECTION, SOLUTION INTRAVENOUS at 11:03

## 2023-08-19 RX ADMIN — TETROFOSMIN 10 MILLICURIE: 1.38 INJECTION, POWDER, LYOPHILIZED, FOR SOLUTION INTRAVENOUS at 08:01

## 2023-08-19 RX ADMIN — ASPIRIN 81 MG: 81 TABLET, CHEWABLE ORAL at 13:23

## 2023-08-19 RX ADMIN — Medication 10 ML: at 09:51

## 2023-08-19 RX ADMIN — ATORVASTATIN CALCIUM 40 MG: 40 TABLET, FILM COATED ORAL at 13:23

## 2023-08-19 RX ADMIN — TETROFOSMIN 30 MILLICURIE: 1.38 INJECTION, POWDER, LYOPHILIZED, FOR SOLUTION INTRAVENOUS at 11:29

## 2023-08-19 ASSESSMENT — PAIN SCALES - GENERAL: PAINLEVEL_OUTOF10: 9

## 2023-08-19 NOTE — CONSULTS
[address] [facility]   [facility]  [phone]  Nephrology Consult Note          Patient ID: Cierra Becker  Referring/ Physician: Anu Larios MD      HPI/Summary:   Cierra Becker is being seen by nephrology for LA. This is a 50-year-old male with past medical history significant for hypertension, coronary disease, hyperlipidemia, hypothyroidism presented to hospital with chest pain. His UDS was positive for cocaine marijuana and other drugs. His cardio work-up was negative for acute coronary syndrome. He still complains of chest pain. No edema currently but said he had edema few days ago. Does not look like he sees a nephrologist.  Says he has to urinate but feels like he cannot empty his bladder otherwise. This been going on for a few days and has not happened to him before. His creatinine in June was 1.46. He has multiple emergency room encounters for chest pain. Blood pressures been labile  Initially on admission was 172/105  Yesterday night he was 96/58  Now is 136/75  Satting 100% on room air  He had a stress test done that showed no acute reversible ischemia. He does have a moderate-sized severe fixed inferior inferior apical defect consistent with prior MI. Plan:   - check PVR before discharge since he complained of difficulty voiding,   - counseled to avoid cocaine.   - follow up outpatient if no urine retention issues  - urine bland so further work up of LA not indicated right now. Acute on chronic kidney disease  His baseline creatinine seems to be somewhere around 1.5  His creatinine at time of consult was 1.9  Bladder scan to rule out retention  UDS is positive for cocaine which can cause acute kidney injury  His UA was overall bland    HTN  Exacerbated by cocaine use. Labile. On imdur, metop     Electrolytes   No acute issues.      Chest pain  Cardiology saw   No reversible defect on stress          [facility] would like to thank you for the opportunity to serve this patient. Please call with any questions or concerns. Lexy Bernstein MD  [facility]  [address] [facility], [address]  Fax: [phone]  Office: [phone]         CC/Reason for consult:   Reason for consult:   Chief Complaint   Patient presents with    Chest Pain     Onset noon. Radiating down left arm. Hx MI           Review of Systems:   [address] All other remaining systems are negative. Constitutional:  fever, chills, weakness, weight change, fatigue,      Skin:  rash, pruritus, hair loss, bruising, dry skin, petechiae. Head, Face, Neck   headaches, swelling,  cervical adenopathy. Respiratory: shortness of breath, cough, or wheezing  Cardiovascular: chest pain, palpitations, dizzy, edema  Gastrointestinal: nausea, vomiting, diarrhea, constipation,belly pain    Yellow skin, blood in stool  Musculoskeletal:  back pain, muscle weakness, gait problems,       joint pain or swelling. Genitourinary:  dysuria, poor urine flow, flank pain, blood in urine  Neurologic:  vertigo, TIA'S, syncope, seizures, focal weakness  Psychosocial:  insomnia, anxiety, or depression. Additional positive findings: -     PMH/SH/FH:    Medical Hx: reviewed and updated as appropriate  History reviewed. No pertinent past medical history. Surgical Hx: reviewed and updated as appropriate   has no past surgical history on file. Social Hx: reviewed and updated as appropriate  Social History     Tobacco Use    Smoking status: Every Day    Smokeless tobacco: Never   Substance Use Topics    Alcohol use: No        Family hx: reviewed and updated as appropriate  family history is not on file.     Medications:   metoprolol tartrate, 25 mg, BID  atorvastatin, 40 mg, Daily  clopidogrel, 75 mg, Daily  isosorbide mononitrate, 30 mg, Daily  sodium chloride flush, 5-40 mL, 2 times per day  famotidine, 20 mg, BID  aspirin, 81 mg, Daily  enoxaparin, 40 mg, Daily  levothyroxine, 75

## 2023-08-19 NOTE — DISCHARGE INSTRUCTIONS
Communication from Nephrology: You were seen by the kidney doctor for kidney injury   Please call to make an appointment with nephrology in 4 weeks.      [facility]  [address], [address] [address]  Phone: [phone]  Fax: [phone]

## 2023-08-19 NOTE — PROGRESS NOTES
Patient assisted to main entrance lobby via wheelchair to wait for lyft with all belongings, No IV in place, discharge paperwork provided.

## 2023-08-19 NOTE — PROGRESS NOTES
Patient running marycruz on telemetry (38) but is not sustained. Patient asleep but easily arousable and is on room air with saturation at 95%. RR 18 with chest rising and falling and no distress noted. Dr Lopez [place] informed.

## 2023-08-19 NOTE — PROGRESS NOTES
For bedside precaution orders were placed per myself based on information that I received from the inpatient admitting RN reporting that patient was scoring high on the suicide assessment. However after much communication she is now reporting to me that the patient does not have a plan and is scoring low on the suicide assessment. Therefore I discontinued the suicide precautions.

## 2023-08-19 NOTE — PLAN OF CARE
Problem: Discharge Planning  Goal: Discharge to home or other facility with appropriate resources  Outcome: Progressing     Problem: Self Harm/Suicidality  Goal: Will have no self-injury during hospital stay  Description: INTERVENTIONS:  1. Ensure constant observer at bedside with Q15M safety checks  2. Maintain a safe environment  3. Secure patient belongings  4. Ensure family/visitors adhere to safety recommendations  5. Ensure safety tray has been added to patient's diet order  6.   Every shift and PRN: Re-assess suicidal risk via Frequent Screener    Outcome: Progressing     Problem: Pain  Goal: Verbalizes/displays adequate comfort level or baseline comfort level  Outcome: Progressing

## 2023-08-19 NOTE — PROGRESS NOTES
patient scoring low as a suicide risk on assessment but denies having attempted or having any plans of attempting suicide at the moment or any time. he states that, \" im severely depressed and that is why i have slept hoping not to wake up. \" I explained to him about the suicide precautions as per protocol here including having a sitter in the room he declined saying he does not need all that but if he has any suicidal thoughts he will let us know. Charge RN notified. Hospitalist on call Crescencio Larson notified via secure message.  Orders for suicide precautions received

## 2023-08-19 NOTE — PROGRESS NOTES
New admission from ED with complaints of chest pain. Patient settled in bed, oriented to the room and the call light use. Patient AO x4, on room air. VSS. Snacks on floor given. Patient admission assessment done. Bed kept in low position with wheels locked. Call light and side table within reach.

## 2023-08-19 NOTE — CARE COORDINATION
Case Management Assessment  Initial Evaluation    Date/Time of Evaluation: 8/19/2023 4:18 PM  Assessment Completed by: Waqar Ayers RN    If patient is discharged prior to next notation, then this note serves as note for discharge by case management. Patient Name: Bessy Raygoza                   YOB: 1970  Diagnosis: Shortness of breath [R06.02]  Elevated troponin [R77.8]  LA (acute kidney injury) ([address]) [N17.9]  Moderate episode of recurrent major depressive disorder ([address]) [F33.1]  Chest pain, unspecified type [R07.9]                   Date / Time: 8/18/2023  1:20 PM    Patient Admission Status: Inpatient   Readmission Risk (Low < 19, Mod (19-27), High > 27): Readmission Risk Score: 10    Current PCP: No primary care provider on file. PCP verified by CM? Yes (couldn't remeber the name)    Chart Reviewed: Yes      History Provided by: Patient  Patient Orientation: Alert and Oriented    Patient Cognition: Alert    Hospitalization in the last 30 days (Readmission):  No    If yes, Readmission Assessment in CM Navigator will be completed. Advance Directives:      Code Status: Full Code   Patient's Primary Decision Maker is: Legal Next of Kin    Primary Decision Maker: Shy Nova - [facility] - [phone]    Secondary Decision Maker: Lili Almonte - Brother/Sister    Supplemental (Other) Decision Maker: Rayne Mcardle - Brother/Sister    Discharge Planning:    Patient lives with: Alone Type of Home: Apartment  Primary Care Giver: Self  Patient Support Systems include: Family Members   Current Financial resources: Medicare  Current community resources: None  Current services prior to admission: None            Current DME:  walker            Type of Home Care services:  None    ADLS  Prior functional level: Independent in ADLs/IADLs  Current functional level: Independent in ADLs/IADLs    No current PT/OT orders    Family can provide assistance at DC:  Yes  Would you like Case Discharge Plan?  Yes    Narendra Lemus RN  Case Management Department  Ph: [phone]

## 2023-08-19 NOTE — ACP (ADVANCE CARE PLANNING)
Advance Care Planning     Advance Care Planning Inpatient Note  [facility] Department    Today's Date: 8/19/2023  Unit: WS 4W MED SURG    Received request from IDT Member. Upon review of chart and communication with care team, patient's decision making abilities are not in question. . Patient was/were present in the room during visit. Goals of ACP Conversation:  Discuss advance care planning documents    Health Care Decision Makers:       Primary Decision Maker: Kevin Garg - Parent - [phone]    Secondary Decision Maker: uN Hansen - Brother/Sister    Supplemental (Other) Decision Maker: Bianka Saleem - Brother/Sister  Summary:  Documented Next of Kin, per patient report    Advance Care Planning Documents (Patient Wishes):  None     Assessment:  Patient lying in bed, agitated because his mother is not returning his calls. Patient is not , no children; so his mother is his next of kin healthcare decision maker, followed by his brothers. Patient did not know anyone's address or phone number, so we were unable to complete advance directives. Patient lives with a friend who is in hospice care per RN.     Interventions:  Discussed and provided education on state decision maker hierarchy  Reviewed but did not complete ACP document    Care Preferences Communicated:   No    Outcomes/Plan:  ACP Discussion: Postponed    Electronically signed by Cortney Washington, [address] [place] [address] on 8/19/2023 at 3:34 PM

## 2023-08-19 NOTE — PLAN OF CARE
Problem: Discharge Planning  Goal: Discharge to home or other facility with appropriate resources  8/19/2023 0915 by Julisa Bullock RN  Outcome: Progressing  3/00/7438 4317 by Jane Carreno RN  Outcome: Progressing     Problem: Self Harm/Suicidality  Goal: Will have no self-injury during hospital stay  Description: INTERVENTIONS:  1. Ensure constant observer at bedside with Q15M safety checks  2. Maintain a safe environment  3. Secure patient belongings  4. Ensure family/visitors adhere to safety recommendations  5. Ensure safety tray has been added to patient's diet order  6.   Every shift and PRN: Re-assess suicidal risk via Frequent Screener    8/19/2023 0915 by Julisa Bullock RN  Outcome: Progressing  1/85/4547 2428 by Jane Carreno RN  Outcome: Progressing     Problem: Pain  Goal: Verbalizes/displays adequate comfort level or baseline comfort level  8/19/2023 0915 by Julisa Bullock RN  Outcome: Progressing  8/90/4838 8079 by Jane Carreno RN  Outcome: Progressing

## 2023-08-19 NOTE — PROGRESS NOTES
4 Eyes Skin Assessment     NAME:  Jennifer Leslie  YOB: 1970  MEDICAL RECORD NUMBER:  [id number]    The patient is being assessed for  Admission    I agree that at least one RN has performed a thorough Head to Toe Skin Assessment on the patient. ALL assessment sites listed below have been assessed. Areas assessed by both nurses:    Head, Face, Ears, Shoulders, Back, Chest, Arms, Elbows, Hands, Sacrum. Buttock, Coccyx, Ischium, Legs. Feet and Heels, and Under Medical Devices         Does the Patient have a Wound?  No noted wound(s)       Jovanny Prevention initiated by RN: Yes  Wound Care Orders initiated by RN: No    Pressure Injury (Stage 3,4, Unstageable, DTI, NWPT, and Complex wounds) if present, place Wound referral order by RN under : No    New Ostomies, if present place, Ostomy referral order under : No     Nurse 1 eSignature: Electronically signed by Anel Barber RN on 1/15/35 at 3:12 AM EDT    **SHARE this note so that the co-signing nurse can place an eSignature**    Nurse 2 eSignature: Electronically signed by Tori Wade RN on 8/19/23 at 4:55 AM EDT

## 2023-08-19 NOTE — PROGRESS NOTES
Pt discharged. Discharge instructions read and given to pt. Pt wants to eat dinner before leaving. Will be leaving via lyft.

## 2023-08-21 LAB
EKG ATRIAL RATE: 73 BPM
EKG DIAGNOSIS: NORMAL
EKG P AXIS: 70 DEGREES
EKG P-R INTERVAL: 168 MS
EKG Q-T INTERVAL: 410 MS
EKG QRS DURATION: 114 MS
EKG QTC CALCULATION (BAZETT): 451 MS
EKG R AXIS: 77 DEGREES
EKG T AXIS: 80 DEGREES
EKG VENTRICULAR RATE: 73 BPM

## 2023-08-21 PROCEDURE — 93010 ELECTROCARDIOGRAM REPORT: CPT | Performed by: INTERNAL MEDICINE

## 2023-09-04 ENCOUNTER — APPOINTMENT (OUTPATIENT)
Dept: CT IMAGING | Facility: HOSPITAL | Age: 53
DRG: 552 | End: 2023-09-04
Payer: MEDICARE

## 2023-09-04 ENCOUNTER — HOSPITAL ENCOUNTER (INPATIENT)
Facility: HOSPITAL | Age: 53
LOS: 2 days | Discharge: HOME | DRG: 552 | End: 2023-09-07
Attending: EMERGENCY MEDICINE | Admitting: INTERNAL MEDICINE
Payer: MEDICARE

## 2023-09-04 ENCOUNTER — APPOINTMENT (OUTPATIENT)
Dept: GENERAL RADIOLOGY | Facility: HOSPITAL | Age: 53
DRG: 552 | End: 2023-09-04
Payer: MEDICARE

## 2023-09-04 DIAGNOSIS — R77.8 ELEVATED TROPONIN: ICD-10-CM

## 2023-09-04 DIAGNOSIS — M54.50 CHRONIC LOW BACK PAIN WITHOUT SCIATICA, UNSPECIFIED BACK PAIN LATERALITY: ICD-10-CM

## 2023-09-04 DIAGNOSIS — F19.11 HISTORY OF SUBSTANCE ABUSE: ICD-10-CM

## 2023-09-04 DIAGNOSIS — G89.29 CHRONIC LOW BACK PAIN WITHOUT SCIATICA, UNSPECIFIED BACK PAIN LATERALITY: ICD-10-CM

## 2023-09-04 DIAGNOSIS — R06.09 EXERTIONAL DYSPNEA: Primary | ICD-10-CM

## 2023-09-04 DIAGNOSIS — R29.810 FACIAL DROOP: ICD-10-CM

## 2023-09-04 LAB
BASOPHILS # BLD AUTO: 0.08 10*3/MM3 (ref 0–0.2)
BASOPHILS NFR BLD AUTO: 1.2 % (ref 0–1.5)
DEPRECATED RDW RBC AUTO: 47.9 FL (ref 37–54)
EOSINOPHIL # BLD AUTO: 0.11 10*3/MM3 (ref 0–0.4)
EOSINOPHIL NFR BLD AUTO: 1.7 % (ref 0.3–6.2)
ERYTHROCYTE [DISTWIDTH] IN BLOOD BY AUTOMATED COUNT: 13.9 % (ref 12.3–15.4)
HCT VFR BLD AUTO: 35.2 % (ref 37.5–51)
HGB BLD-MCNC: 11.4 G/DL (ref 13–17.7)
IMM GRANULOCYTES # BLD AUTO: 0.04 10*3/MM3 (ref 0–0.05)
IMM GRANULOCYTES NFR BLD AUTO: 0.6 % (ref 0–0.5)
LYMPHOCYTES # BLD AUTO: 2.29 10*3/MM3 (ref 0.7–3.1)
LYMPHOCYTES NFR BLD AUTO: 35.6 % (ref 19.6–45.3)
MCH RBC QN AUTO: 30.3 PG (ref 26.6–33)
MCHC RBC AUTO-ENTMCNC: 32.4 G/DL (ref 31.5–35.7)
MCV RBC AUTO: 93.6 FL (ref 79–97)
MONOCYTES # BLD AUTO: 0.53 10*3/MM3 (ref 0.1–0.9)
MONOCYTES NFR BLD AUTO: 8.2 % (ref 5–12)
NEUTROPHILS NFR BLD AUTO: 3.38 10*3/MM3 (ref 1.7–7)
NEUTROPHILS NFR BLD AUTO: 52.7 % (ref 42.7–76)
NRBC BLD AUTO-RTO: 0 /100 WBC (ref 0–0.2)
PLATELET # BLD AUTO: 215 10*3/MM3 (ref 140–450)
PMV BLD AUTO: 11.5 FL (ref 6–12)
RBC # BLD AUTO: 3.76 10*6/MM3 (ref 4.14–5.8)
WBC NRBC COR # BLD: 6.43 10*3/MM3 (ref 3.4–10.8)

## 2023-09-04 PROCEDURE — 83880 ASSAY OF NATRIURETIC PEPTIDE: CPT | Performed by: EMERGENCY MEDICINE

## 2023-09-04 PROCEDURE — 99285 EMERGENCY DEPT VISIT HI MDM: CPT

## 2023-09-04 PROCEDURE — 80053 COMPREHEN METABOLIC PANEL: CPT | Performed by: EMERGENCY MEDICINE

## 2023-09-04 PROCEDURE — 70450 CT HEAD/BRAIN W/O DYE: CPT

## 2023-09-04 PROCEDURE — 93010 ELECTROCARDIOGRAM REPORT: CPT | Performed by: INTERNAL MEDICINE

## 2023-09-04 PROCEDURE — 85025 COMPLETE CBC W/AUTO DIFF WBC: CPT | Performed by: EMERGENCY MEDICINE

## 2023-09-04 PROCEDURE — 84484 ASSAY OF TROPONIN QUANT: CPT | Performed by: EMERGENCY MEDICINE

## 2023-09-04 PROCEDURE — 71045 X-RAY EXAM CHEST 1 VIEW: CPT

## 2023-09-04 PROCEDURE — 93005 ELECTROCARDIOGRAM TRACING: CPT | Performed by: EMERGENCY MEDICINE

## 2023-09-04 RX ORDER — SODIUM CHLORIDE 0.9 % (FLUSH) 0.9 %
10 SYRINGE (ML) INJECTION AS NEEDED
Status: DISCONTINUED | OUTPATIENT
Start: 2023-09-04 | End: 2023-09-07 | Stop reason: HOSPADM

## 2023-09-05 ENCOUNTER — APPOINTMENT (OUTPATIENT)
Dept: MRI IMAGING | Facility: HOSPITAL | Age: 53
DRG: 552 | End: 2023-09-05
Payer: MEDICARE

## 2023-09-05 ENCOUNTER — APPOINTMENT (OUTPATIENT)
Dept: CARDIOLOGY | Facility: HOSPITAL | Age: 53
DRG: 552 | End: 2023-09-05
Payer: MEDICARE

## 2023-09-05 PROBLEM — M43.16 SPONDYLOLISTHESIS AT L4-L5 LEVEL: Status: ACTIVE | Noted: 2023-04-13

## 2023-09-05 PROBLEM — G89.29 CHRONIC PAIN: Status: ACTIVE | Noted: 2017-01-14

## 2023-09-05 PROBLEM — I25.10 ARTERIOSCLEROSIS OF CORONARY ARTERY: Chronic | Status: ACTIVE | Noted: 2017-01-14

## 2023-09-05 PROBLEM — F25.9 SCHIZOAFFECTIVE DISORDER: Status: ACTIVE | Noted: 2023-09-05

## 2023-09-05 PROBLEM — M51.37 DDD (DEGENERATIVE DISC DISEASE), LUMBOSACRAL: Status: ACTIVE | Noted: 2023-04-13

## 2023-09-05 PROBLEM — F60.2 ANTISOCIAL PERSONALITY DISORDER: Status: ACTIVE | Noted: 2017-01-13

## 2023-09-05 PROBLEM — R53.1 WEAKNESS: Status: ACTIVE | Noted: 2019-09-19

## 2023-09-05 PROBLEM — R06.09 EXERTIONAL DYSPNEA: Status: ACTIVE | Noted: 2023-09-05

## 2023-09-05 PROBLEM — I10 BENIGN ESSENTIAL HYPERTENSION: Chronic | Status: ACTIVE | Noted: 2017-01-14

## 2023-09-05 PROBLEM — F12.10 CANNABIS ABUSE: Status: ACTIVE | Noted: 2017-01-14

## 2023-09-05 PROBLEM — F19.94 SUBSTANCE INDUCED MOOD DISORDER: Status: ACTIVE | Noted: 2017-01-13

## 2023-09-05 PROBLEM — M79.604 LUMBAR PAIN WITH RADIATION DOWN BOTH LEGS: Status: ACTIVE | Noted: 2023-04-13

## 2023-09-05 PROBLEM — N39.42 URINARY INCONTINENCE WITHOUT SENSORY AWARENESS: Status: ACTIVE | Noted: 2023-04-13

## 2023-09-05 PROBLEM — I16.0 HYPERTENSIVE URGENCY: Status: ACTIVE | Noted: 2020-07-11

## 2023-09-05 PROBLEM — N17.9 AKI (ACUTE KIDNEY INJURY): Status: ACTIVE | Noted: 2023-08-18

## 2023-09-05 PROBLEM — M54.50 LUMBAR PAIN WITH RADIATION DOWN BOTH LEGS: Status: ACTIVE | Noted: 2023-04-13

## 2023-09-05 PROBLEM — I25.10 CAD S/P PERCUTANEOUS CORONARY ANGIOPLASTY: Chronic | Status: ACTIVE | Noted: 2020-02-26

## 2023-09-05 PROBLEM — Z98.61 CAD S/P PERCUTANEOUS CORONARY ANGIOPLASTY: Chronic | Status: ACTIVE | Noted: 2020-02-26

## 2023-09-05 PROBLEM — B18.2 HEP C W/O COMA, CHRONIC: Chronic | Status: ACTIVE | Noted: 2020-08-11

## 2023-09-05 PROBLEM — M79.605 LUMBAR PAIN WITH RADIATION DOWN BOTH LEGS: Status: ACTIVE | Noted: 2023-04-13

## 2023-09-05 PROBLEM — F17.213 CIGARETTE NICOTINE DEPENDENCE WITH WITHDRAWAL: Chronic | Status: ACTIVE | Noted: 2020-08-16

## 2023-09-05 PROBLEM — E03.9 HYPOTHYROIDISM: Status: ACTIVE | Noted: 2017-01-14

## 2023-09-05 PROBLEM — N18.30 CKD (CHRONIC KIDNEY DISEASE) STAGE 3, GFR 30-59 ML/MIN: Chronic | Status: ACTIVE | Noted: 2020-08-11

## 2023-09-05 PROBLEM — E78.2 MIXED HYPERLIPIDEMIA: Status: ACTIVE | Noted: 2018-04-25

## 2023-09-05 PROBLEM — R07.9 ACUTE CHEST PAIN: Status: ACTIVE | Noted: 2019-09-03

## 2023-09-05 LAB
ALBUMIN SERPL-MCNC: 4.4 G/DL (ref 3.5–5.2)
ALBUMIN/GLOB SERPL: 1.9 G/DL
ALP SERPL-CCNC: 49 U/L (ref 39–117)
ALT SERPL W P-5'-P-CCNC: 15 U/L (ref 1–41)
ANION GAP SERPL CALCULATED.3IONS-SCNC: 9.5 MMOL/L (ref 5–15)
ANION GAP SERPL CALCULATED.3IONS-SCNC: 9.8 MMOL/L (ref 5–15)
AORTIC ARCH: 3.7 CM
AORTIC DIMENSIONLESS INDEX: 0.4 (DI)
AST SERPL-CCNC: 28 U/L (ref 1–40)
BH CV ECHO LEFT VENTRICLE GLOBAL LONGITUDINAL STRAIN: -9.8 %
BH CV ECHO MEAS - AI P1/2T: 551.5 MSEC
BH CV ECHO MEAS - AO MAX PG: 12.4 MMHG
BH CV ECHO MEAS - AO MEAN PG: 7 MMHG
BH CV ECHO MEAS - AO V2 MAX: 175.9 CM/SEC
BH CV ECHO MEAS - AO V2 VTI: 32.6 CM
BH CV ECHO MEAS - AVA(I,D): 1.97 CM2
BH CV ECHO MEAS - EDV(CUBED): 126.4 ML
BH CV ECHO MEAS - EDV(MOD-SP2): 90 ML
BH CV ECHO MEAS - EDV(MOD-SP4): 86 ML
BH CV ECHO MEAS - EF(MOD-BP): 50.1 %
BH CV ECHO MEAS - EF(MOD-SP2): 45.6 %
BH CV ECHO MEAS - EF(MOD-SP4): 53.5 %
BH CV ECHO MEAS - ESV(CUBED): 39.4 ML
BH CV ECHO MEAS - ESV(MOD-SP2): 49 ML
BH CV ECHO MEAS - ESV(MOD-SP4): 40 ML
BH CV ECHO MEAS - FS: 32.2 %
BH CV ECHO MEAS - IVS/LVPW: 0.94 CM
BH CV ECHO MEAS - IVSD: 1.11 CM
BH CV ECHO MEAS - LAT PEAK E' VEL: 7.8 CM/SEC
BH CV ECHO MEAS - LV DIASTOLIC VOL/BSA (35-75): 41.7 CM2
BH CV ECHO MEAS - LV MASS(C)D: 222.3 GRAMS
BH CV ECHO MEAS - LV MAX PG: 2.6 MMHG
BH CV ECHO MEAS - LV MEAN PG: 1.53 MMHG
BH CV ECHO MEAS - LV SYSTOLIC VOL/BSA (12-30): 19.4 CM2
BH CV ECHO MEAS - LV V1 MAX: 81 CM/SEC
BH CV ECHO MEAS - LV V1 VTI: 14.6 CM
BH CV ECHO MEAS - LVIDD: 5 CM
BH CV ECHO MEAS - LVIDS: 3.4 CM
BH CV ECHO MEAS - LVOT AREA: 4.4 CM2
BH CV ECHO MEAS - LVOT DIAM: 2.37 CM
BH CV ECHO MEAS - LVPWD: 1.19 CM
BH CV ECHO MEAS - MED PEAK E' VEL: 5.2 CM/SEC
BH CV ECHO MEAS - MR MAX PG: 83.2 MMHG
BH CV ECHO MEAS - MR MAX VEL: 456.1 CM/SEC
BH CV ECHO MEAS - MV A DUR: 0.13 SEC
BH CV ECHO MEAS - MV A MAX VEL: 86.1 CM/SEC
BH CV ECHO MEAS - MV DEC SLOPE: 278.4 CM/SEC2
BH CV ECHO MEAS - MV DEC TIME: 0.21 MSEC
BH CV ECHO MEAS - MV E MAX VEL: 86.5 CM/SEC
BH CV ECHO MEAS - MV E/A: 1
BH CV ECHO MEAS - MV MAX PG: 3.3 MMHG
BH CV ECHO MEAS - MV MEAN PG: 1.87 MMHG
BH CV ECHO MEAS - MV P1/2T: 87.8 MSEC
BH CV ECHO MEAS - MV V2 VTI: 20.5 CM
BH CV ECHO MEAS - MVA(P1/2T): 2.5 CM2
BH CV ECHO MEAS - MVA(VTI): 3.1 CM2
BH CV ECHO MEAS - PA V2 MAX: 93 CM/SEC
BH CV ECHO MEAS - RV MAX PG: 1.81 MMHG
BH CV ECHO MEAS - RV V1 MAX: 67.3 CM/SEC
BH CV ECHO MEAS - RV V1 VTI: 12.6 CM
BH CV ECHO MEAS - SI(MOD-SP2): 19.9 ML/M2
BH CV ECHO MEAS - SI(MOD-SP4): 22.3 ML/M2
BH CV ECHO MEAS - SV(LVOT): 64.3 ML
BH CV ECHO MEAS - SV(MOD-SP2): 41 ML
BH CV ECHO MEAS - SV(MOD-SP4): 46 ML
BH CV ECHO MEAS - TAPSE (>1.6): 2.1 CM
BH CV ECHO MEASUREMENTS AVERAGE E/E' RATIO: 13.31
BH CV XLRA - RV BASE: 2.4 CM
BH CV XLRA - RV LENGTH: 6.5 CM
BH CV XLRA - RV MID: 1.98 CM
BH CV XLRA - TDI S': 9.6 CM/SEC
BH CV XLRA MEAS LEFT DIST CCA EDV: -14.1 CM/SEC
BH CV XLRA MEAS LEFT DIST CCA PSV: -38.4 CM/SEC
BH CV XLRA MEAS LEFT DIST ICA EDV: -20.6 CM/SEC
BH CV XLRA MEAS LEFT DIST ICA PSV: -49.2 CM/SEC
BH CV XLRA MEAS LEFT ICA/CCA RATIO: 3.16
BH CV XLRA MEAS LEFT MID ICA EDV: 23.7 CM/SEC
BH CV XLRA MEAS LEFT MID ICA PSV: 57.1 CM/SEC
BH CV XLRA MEAS LEFT PROX CCA EDV: 13.2 CM/SEC
BH CV XLRA MEAS LEFT PROX CCA PSV: 57.6 CM/SEC
BH CV XLRA MEAS LEFT PROX ECA EDV: 26.7 CM/SEC
BH CV XLRA MEAS LEFT PROX ECA PSV: 97.5 CM/SEC
BH CV XLRA MEAS LEFT PROX ICA EDV: -52 CM/SEC
BH CV XLRA MEAS LEFT PROX ICA PSV: -120.4 CM/SEC
BH CV XLRA MEAS LEFT PROX SCLA PSV: 142.7 CM/SEC
BH CV XLRA MEAS LEFT VERTEBRAL A EDV: 12.6 CM/SEC
BH CV XLRA MEAS LEFT VERTEBRAL A PSV: 34 CM/SEC
BH CV XLRA MEAS RIGHT DIST CCA EDV: 16.5 CM/SEC
BH CV XLRA MEAS RIGHT DIST CCA PSV: 50.2 CM/SEC
BH CV XLRA MEAS RIGHT DIST ICA EDV: -30.2 CM/SEC
BH CV XLRA MEAS RIGHT DIST ICA PSV: -66.6 CM/SEC
BH CV XLRA MEAS RIGHT ICA/CCA RATIO: 1.32
BH CV XLRA MEAS RIGHT MID ICA EDV: -24.7 CM/SEC
BH CV XLRA MEAS RIGHT MID ICA PSV: -57.6 CM/SEC
BH CV XLRA MEAS RIGHT PROX CCA EDV: -16.2 CM/SEC
BH CV XLRA MEAS RIGHT PROX CCA PSV: -55.8 CM/SEC
BH CV XLRA MEAS RIGHT PROX ECA EDV: -12.2 CM/SEC
BH CV XLRA MEAS RIGHT PROX ECA PSV: -62.3 CM/SEC
BH CV XLRA MEAS RIGHT PROX ICA EDV: -25.1 CM/SEC
BH CV XLRA MEAS RIGHT PROX ICA PSV: -56.8 CM/SEC
BH CV XLRA MEAS RIGHT PROX SCLA PSV: 89.7 CM/SEC
BH CV XLRA MEAS RIGHT VERTEBRAL A EDV: -7 CM/SEC
BH CV XLRA MEAS RIGHT VERTEBRAL A PSV: -29 CM/SEC
BILIRUB SERPL-MCNC: <0.2 MG/DL (ref 0–1.2)
BUN SERPL-MCNC: 19 MG/DL (ref 6–20)
BUN SERPL-MCNC: 20 MG/DL (ref 6–20)
BUN/CREAT SERPL: 13.2 (ref 7–25)
BUN/CREAT SERPL: 14.6 (ref 7–25)
CALCIUM SPEC-SCNC: 8.8 MG/DL (ref 8.6–10.5)
CALCIUM SPEC-SCNC: 9.7 MG/DL (ref 8.6–10.5)
CHLORIDE SERPL-SCNC: 105 MMOL/L (ref 98–107)
CHLORIDE SERPL-SCNC: 107 MMOL/L (ref 98–107)
CO2 SERPL-SCNC: 22.2 MMOL/L (ref 22–29)
CO2 SERPL-SCNC: 26.5 MMOL/L (ref 22–29)
CREAT SERPL-MCNC: 1.3 MG/DL (ref 0.76–1.27)
CREAT SERPL-MCNC: 1.52 MG/DL (ref 0.76–1.27)
DEPRECATED RDW RBC AUTO: 47.2 FL (ref 37–54)
EGFRCR SERPLBLD CKD-EPI 2021: 55.1 ML/MIN/1.73
EGFRCR SERPLBLD CKD-EPI 2021: 66.5 ML/MIN/1.73
ERYTHROCYTE [DISTWIDTH] IN BLOOD BY AUTOMATED COUNT: 13.8 % (ref 12.3–15.4)
GEN 5 2HR TROPONIN T REFLEX: 18 NG/L
GLOBULIN UR ELPH-MCNC: 2.3 GM/DL
GLUCOSE SERPL-MCNC: 115 MG/DL (ref 65–99)
GLUCOSE SERPL-MCNC: 119 MG/DL (ref 65–99)
HCT VFR BLD AUTO: 37.7 % (ref 37.5–51)
HGB BLD-MCNC: 12.2 G/DL (ref 13–17.7)
LEFT ATRIUM VOLUME INDEX: 26.1 ML/M2
MCH RBC QN AUTO: 30.1 PG (ref 26.6–33)
MCHC RBC AUTO-ENTMCNC: 32.4 G/DL (ref 31.5–35.7)
MCV RBC AUTO: 93.1 FL (ref 79–97)
NT-PROBNP SERPL-MCNC: 169 PG/ML (ref 0–900)
PLATELET # BLD AUTO: 236 10*3/MM3 (ref 140–450)
PMV BLD AUTO: 11.3 FL (ref 6–12)
POTASSIUM SERPL-SCNC: 3.6 MMOL/L (ref 3.5–5.2)
POTASSIUM SERPL-SCNC: 3.7 MMOL/L (ref 3.5–5.2)
PROT SERPL-MCNC: 6.7 G/DL (ref 6–8.5)
RBC # BLD AUTO: 4.05 10*6/MM3 (ref 4.14–5.8)
SINUS: 3 CM
SODIUM SERPL-SCNC: 139 MMOL/L (ref 136–145)
SODIUM SERPL-SCNC: 141 MMOL/L (ref 136–145)
STJ: 2.8 CM
TROPONIN T DELTA: -1 NG/L
TROPONIN T SERPL HS-MCNC: 19 NG/L
WBC NRBC COR # BLD: 6.49 10*3/MM3 (ref 3.4–10.8)

## 2023-09-05 PROCEDURE — 99221 1ST HOSP IP/OBS SF/LOW 40: CPT | Performed by: NURSE PRACTITIONER

## 2023-09-05 PROCEDURE — 85027 COMPLETE CBC AUTOMATED: CPT | Performed by: NURSE PRACTITIONER

## 2023-09-05 PROCEDURE — 93880 EXTRACRANIAL BILAT STUDY: CPT

## 2023-09-05 PROCEDURE — 93306 TTE W/DOPPLER COMPLETE: CPT | Performed by: INTERNAL MEDICINE

## 2023-09-05 PROCEDURE — A9577 INJ MULTIHANCE: HCPCS | Performed by: INTERNAL MEDICINE

## 2023-09-05 PROCEDURE — 84484 ASSAY OF TROPONIN QUANT: CPT | Performed by: EMERGENCY MEDICINE

## 2023-09-05 PROCEDURE — 72158 MRI LUMBAR SPINE W/O & W/DYE: CPT

## 2023-09-05 PROCEDURE — 36415 COLL VENOUS BLD VENIPUNCTURE: CPT | Performed by: NURSE PRACTITIONER

## 2023-09-05 PROCEDURE — 87040 BLOOD CULTURE FOR BACTERIA: CPT | Performed by: INTERNAL MEDICINE

## 2023-09-05 PROCEDURE — 93306 TTE W/DOPPLER COMPLETE: CPT

## 2023-09-05 PROCEDURE — 80048 BASIC METABOLIC PNL TOTAL CA: CPT | Performed by: NURSE PRACTITIONER

## 2023-09-05 PROCEDURE — 0 GADOBENATE DIMEGLUMINE 529 MG/ML SOLUTION: Performed by: INTERNAL MEDICINE

## 2023-09-05 PROCEDURE — 70551 MRI BRAIN STEM W/O DYE: CPT

## 2023-09-05 RX ORDER — ACETAMINOPHEN 500 MG
1000 TABLET ORAL ONCE
Status: COMPLETED | OUTPATIENT
Start: 2023-09-05 | End: 2023-09-05

## 2023-09-05 RX ORDER — ONDANSETRON 2 MG/ML
4 INJECTION INTRAMUSCULAR; INTRAVENOUS EVERY 6 HOURS PRN
Status: DISCONTINUED | OUTPATIENT
Start: 2023-09-05 | End: 2023-09-07 | Stop reason: HOSPADM

## 2023-09-05 RX ORDER — LEVOTHYROXINE SODIUM 0.07 MG/1
75 TABLET ORAL DAILY
Status: DISCONTINUED | OUTPATIENT
Start: 2023-09-05 | End: 2023-09-07 | Stop reason: HOSPADM

## 2023-09-05 RX ORDER — NITROGLYCERIN 0.4 MG/1
0.4 TABLET SUBLINGUAL
Status: DISCONTINUED | OUTPATIENT
Start: 2023-09-05 | End: 2023-09-07 | Stop reason: HOSPADM

## 2023-09-05 RX ORDER — AMOXICILLIN 250 MG
2 CAPSULE ORAL 2 TIMES DAILY
Status: DISCONTINUED | OUTPATIENT
Start: 2023-09-05 | End: 2023-09-07 | Stop reason: HOSPADM

## 2023-09-05 RX ORDER — ONDANSETRON 4 MG/1
4 TABLET, FILM COATED ORAL EVERY 6 HOURS PRN
Status: DISCONTINUED | OUTPATIENT
Start: 2023-09-05 | End: 2023-09-07 | Stop reason: HOSPADM

## 2023-09-05 RX ORDER — SODIUM CHLORIDE 9 MG/ML
100 INJECTION, SOLUTION INTRAVENOUS CONTINUOUS
Status: DISCONTINUED | OUTPATIENT
Start: 2023-09-05 | End: 2023-09-07 | Stop reason: HOSPADM

## 2023-09-05 RX ORDER — CALCIUM CARBONATE 500 MG/1
2 TABLET, CHEWABLE ORAL 2 TIMES DAILY PRN
Status: DISCONTINUED | OUTPATIENT
Start: 2023-09-05 | End: 2023-09-07 | Stop reason: HOSPADM

## 2023-09-05 RX ORDER — BISACODYL 5 MG/1
5 TABLET, DELAYED RELEASE ORAL DAILY PRN
Status: DISCONTINUED | OUTPATIENT
Start: 2023-09-05 | End: 2023-09-07 | Stop reason: HOSPADM

## 2023-09-05 RX ORDER — NICOTINE 21 MG/24HR
1 PATCH, TRANSDERMAL 24 HOURS TRANSDERMAL
Status: ON HOLD | COMMUNITY

## 2023-09-05 RX ORDER — MIRTAZAPINE 15 MG/1
1 TABLET, FILM COATED ORAL NIGHTLY
Status: ON HOLD | COMMUNITY

## 2023-09-05 RX ORDER — HYDRALAZINE HYDROCHLORIDE 25 MG/1
25 TABLET, FILM COATED ORAL EVERY 6 HOURS PRN
Status: DISCONTINUED | OUTPATIENT
Start: 2023-09-05 | End: 2023-09-07 | Stop reason: HOSPADM

## 2023-09-05 RX ORDER — HYDROCODONE BITARTRATE AND ACETAMINOPHEN 5; 325 MG/1; MG/1
1 TABLET ORAL EVERY 6 HOURS PRN
Status: DISCONTINUED | OUTPATIENT
Start: 2023-09-05 | End: 2023-09-07 | Stop reason: HOSPADM

## 2023-09-05 RX ORDER — NICOTINE 21 MG/24HR
1 PATCH, TRANSDERMAL 24 HOURS TRANSDERMAL
Status: DISCONTINUED | OUTPATIENT
Start: 2023-09-05 | End: 2023-09-07 | Stop reason: HOSPADM

## 2023-09-05 RX ORDER — ACETAMINOPHEN 650 MG/1
650 SUPPOSITORY RECTAL EVERY 4 HOURS PRN
Status: DISCONTINUED | OUTPATIENT
Start: 2023-09-05 | End: 2023-09-07 | Stop reason: HOSPADM

## 2023-09-05 RX ORDER — ASPIRIN 81 MG/1
1 TABLET ORAL DAILY
Status: ON HOLD | COMMUNITY

## 2023-09-05 RX ORDER — RANOLAZINE 500 MG/1
1 TABLET, EXTENDED RELEASE ORAL 2 TIMES DAILY
Status: ON HOLD | COMMUNITY

## 2023-09-05 RX ORDER — ACETAMINOPHEN 325 MG/1
650 TABLET ORAL EVERY 4 HOURS PRN
Status: DISCONTINUED | OUTPATIENT
Start: 2023-09-05 | End: 2023-09-07 | Stop reason: HOSPADM

## 2023-09-05 RX ORDER — MIRTAZAPINE 15 MG/1
15 TABLET, FILM COATED ORAL NIGHTLY
Status: DISCONTINUED | OUTPATIENT
Start: 2023-09-05 | End: 2023-09-07 | Stop reason: HOSPADM

## 2023-09-05 RX ORDER — RANOLAZINE 500 MG/1
500 TABLET, EXTENDED RELEASE ORAL 2 TIMES DAILY
Status: DISCONTINUED | OUTPATIENT
Start: 2023-09-05 | End: 2023-09-07 | Stop reason: HOSPADM

## 2023-09-05 RX ORDER — RISPERIDONE 1 MG/1
1 TABLET ORAL 2 TIMES DAILY
Status: ON HOLD | COMMUNITY
End: 2023-09-18

## 2023-09-05 RX ORDER — NITROGLYCERIN 0.4 MG/1
0.4 TABLET SUBLINGUAL
Status: ON HOLD | COMMUNITY

## 2023-09-05 RX ORDER — SODIUM CHLORIDE 0.9 % (FLUSH) 0.9 %
10 SYRINGE (ML) INJECTION EVERY 12 HOURS SCHEDULED
Status: DISCONTINUED | OUTPATIENT
Start: 2023-09-05 | End: 2023-09-07 | Stop reason: HOSPADM

## 2023-09-05 RX ORDER — LORATADINE 10 MG/1
10 TABLET ORAL
Status: ON HOLD | COMMUNITY

## 2023-09-05 RX ORDER — LEVOTHYROXINE SODIUM 0.07 MG/1
1 TABLET ORAL DAILY
Status: ON HOLD | COMMUNITY
Start: 2023-08-19

## 2023-09-05 RX ORDER — RISPERIDONE 1 MG/1
1 TABLET ORAL 2 TIMES DAILY
Status: DISCONTINUED | OUTPATIENT
Start: 2023-09-05 | End: 2023-09-07 | Stop reason: HOSPADM

## 2023-09-05 RX ORDER — SODIUM CHLORIDE 9 MG/ML
40 INJECTION, SOLUTION INTRAVENOUS AS NEEDED
Status: DISCONTINUED | OUTPATIENT
Start: 2023-09-05 | End: 2023-09-07 | Stop reason: HOSPADM

## 2023-09-05 RX ORDER — SODIUM CHLORIDE 0.9 % (FLUSH) 0.9 %
10 SYRINGE (ML) INJECTION AS NEEDED
Status: DISCONTINUED | OUTPATIENT
Start: 2023-09-05 | End: 2023-09-07 | Stop reason: HOSPADM

## 2023-09-05 RX ORDER — POLYETHYLENE GLYCOL 3350 17 G/17G
17 POWDER, FOR SOLUTION ORAL DAILY PRN
Status: DISCONTINUED | OUTPATIENT
Start: 2023-09-05 | End: 2023-09-07 | Stop reason: HOSPADM

## 2023-09-05 RX ORDER — BISACODYL 10 MG
10 SUPPOSITORY, RECTAL RECTAL DAILY PRN
Status: DISCONTINUED | OUTPATIENT
Start: 2023-09-05 | End: 2023-09-07 | Stop reason: HOSPADM

## 2023-09-05 RX ORDER — HYDROCODONE BITARTRATE AND ACETAMINOPHEN 5; 325 MG/1; MG/1
1 TABLET ORAL ONCE
Status: COMPLETED | OUTPATIENT
Start: 2023-09-05 | End: 2023-09-05

## 2023-09-05 RX ORDER — ACETAMINOPHEN 160 MG/5ML
650 SOLUTION ORAL EVERY 4 HOURS PRN
Status: DISCONTINUED | OUTPATIENT
Start: 2023-09-05 | End: 2023-09-07 | Stop reason: HOSPADM

## 2023-09-05 RX ORDER — ISOSORBIDE MONONITRATE 30 MG/1
30 TABLET, EXTENDED RELEASE ORAL DAILY
Status: DISCONTINUED | OUTPATIENT
Start: 2023-09-05 | End: 2023-09-07 | Stop reason: HOSPADM

## 2023-09-05 RX ORDER — ISOSORBIDE MONONITRATE 30 MG/1
1 TABLET, EXTENDED RELEASE ORAL DAILY
Status: ON HOLD | COMMUNITY
Start: 2023-08-19

## 2023-09-05 RX ADMIN — NICOTINE 1 PATCH: 21 PATCH, EXTENDED RELEASE TRANSDERMAL at 08:54

## 2023-09-05 RX ADMIN — ACETAMINOPHEN 1000 MG: 500 TABLET ORAL at 01:48

## 2023-09-05 RX ADMIN — SODIUM CHLORIDE 100 ML/HR: 9 INJECTION, SOLUTION INTRAVENOUS at 15:04

## 2023-09-05 RX ADMIN — SENNOSIDES AND DOCUSATE SODIUM 2 TABLET: 50; 8.6 TABLET ORAL at 08:53

## 2023-09-05 RX ADMIN — HYDROCODONE BITARTRATE AND ACETAMINOPHEN 1 TABLET: 5; 325 TABLET ORAL at 21:20

## 2023-09-05 RX ADMIN — RISPERIDONE 1 MG: 1 TABLET, FILM COATED ORAL at 21:21

## 2023-09-05 RX ADMIN — HYDROCODONE BITARTRATE AND ACETAMINOPHEN 1 TABLET: 5; 325 TABLET ORAL at 03:25

## 2023-09-05 RX ADMIN — HYDROCODONE BITARTRATE AND ACETAMINOPHEN 1 TABLET: 5; 325 TABLET ORAL at 14:06

## 2023-09-05 RX ADMIN — LEVOTHYROXINE SODIUM 75 MCG: 0.07 TABLET ORAL at 14:06

## 2023-09-05 RX ADMIN — Medication 10 ML: at 01:58

## 2023-09-05 RX ADMIN — SODIUM CHLORIDE 100 ML/HR: 9 INJECTION, SOLUTION INTRAVENOUS at 02:40

## 2023-09-05 RX ADMIN — RANOLAZINE 500 MG: 500 TABLET, FILM COATED, EXTENDED RELEASE ORAL at 21:20

## 2023-09-05 RX ADMIN — Medication 10 ML: at 08:54

## 2023-09-05 RX ADMIN — RISPERIDONE 1 MG: 1 TABLET, FILM COATED ORAL at 14:07

## 2023-09-05 RX ADMIN — ACETAMINOPHEN 650 MG: 325 TABLET, FILM COATED ORAL at 08:54

## 2023-09-05 RX ADMIN — GADOBENATE DIMEGLUMINE 18 ML: 529 INJECTION, SOLUTION INTRAVENOUS at 22:43

## 2023-09-05 RX ADMIN — RANOLAZINE 500 MG: 500 TABLET, FILM COATED, EXTENDED RELEASE ORAL at 14:06

## 2023-09-05 RX ADMIN — MIRTAZAPINE 15 MG: 15 TABLET, FILM COATED ORAL at 21:20

## 2023-09-05 RX ADMIN — METOPROLOL TARTRATE 25 MG: 25 TABLET, FILM COATED ORAL at 14:06

## 2023-09-05 RX ADMIN — ISOSORBIDE MONONITRATE 30 MG: 30 TABLET, EXTENDED RELEASE ORAL at 14:06

## 2023-09-05 NOTE — ED NOTES
"While MD Carvalho re-evaluating pt, pt reported to MD Carvalho of pain. MD Carvalho reported that he had to awaken the patient to discussed results and re-evaluate and ordered Tylenol for the pt. When this RN attempted to give ordered Tylenol, pt refused stating \"This is fucking bullshit.\" This RN asked the patient to refrain from cursing. Pt became increasingly agitated and then was initially refusing repeat blood draw for troponin. Pt de-escalated and agreeable to repeat troponin and admission.   "

## 2023-09-05 NOTE — PLAN OF CARE
Goal Outcome Evaluation:  Plan of Care Reviewed With: (P) patient        Progress: (P) no change  Outcome Evaluation: (P) Patient admitted on 9/4 for lumbar pain. Echo and carotid ultrasound done today, see results. Waiting on MRI of brain, spine, and lumbar to be completed. Neurosurgery consulted today. IV fluids continued. Urine sample obtained. No complaints at this time.

## 2023-09-05 NOTE — ED NOTES
Nursing report ED to floor  Lorenzo James  52 y.o.  male    HPI :   Chief Complaint   Patient presents with    Shortness of Breath       Admitting doctor:   Willis Canseco MD    Admitting diagnosis:   The primary encounter diagnosis was Exertional dyspnea. Diagnoses of Facial droop, Chronic low back pain without sciatica, unspecified back pain laterality, Elevated troponin, and History of substance abuse were also pertinent to this visit.    Code status:   Current Code Status       Date Active Code Status Order ID Comments User Context       9/5/2023 0142 CPR (Attempt to Resuscitate) [id number]  Comfort Holly APRN ED        Question Answer    Code Status (Patient has no pulse and is not breathing) CPR (Attempt to Resuscitate)    Medical Interventions (Patient has pulse or is breathing) Full Support                    Allergies:   Griseofulvin ultramicrosize [griseofulvin], Toradol [ketorolac tromethamine], and Tramadol    Isolation:   No active isolations    Intake and Output  No intake or output data in the 24 hours ending 09/05/23 0156    Weight:       09/04/23 2323   Weight: 86.2 kg (190 lb)       Most recent vitals:   Vitals:    09/04/23 2327 09/04/23 2331 09/05/23 0031 09/05/23 0045   BP: 155/87 157/89 163/87    Pulse:   73 80   Resp:       Temp:       TempSrc:       SpO2:   96% 96%   Weight:       Height:           Active LDAs/IV Access:   Lines, Drains & Airways       Active LDAs       Name Placement date Placement time Site Days    Peripheral IV 09/04/23 2333 Anterior;Right;Upper Arm 09/04/23 2333  Arm  less than 1                    Labs (abnormal labs have a star):   Labs Reviewed   COMPREHENSIVE METABOLIC PANEL - Abnormal; Notable for the following components:       Result Value    Glucose 115 (*)     Creatinine 1.52 (*)     eGFR 55.1 (*)     All other components within normal limits    Narrative:     GFR Normal >60  Chronic Kidney Disease <60  Kidney Failure <15     TROPONIN - Abnormal;  Notable for the following components:    HS Troponin T 19 (*)     All other components within normal limits    Narrative:     High Sensitive Troponin T Reference Range:  <10.0 ng/L- Negative Female for AMI  <15.0 ng/L- Negative Male for AMI  >=10 - Abnormal Female indicating possible myocardial injury.  >=15 - Abnormal Male indicating possible myocardial injury.   Clinicians would have to utilize clinical acumen, EKG, Troponin, and serial changes to determine if it is an Acute Myocardial Infarction or myocardial injury due to an underlying chronic condition.        CBC WITH AUTO DIFFERENTIAL - Abnormal; Notable for the following components:    RBC 3.76 (*)     Hemoglobin 11.4 (*)     Hematocrit 35.2 (*)     Immature Grans % 0.6 (*)     All other components within normal limits   BNP (IN-HOUSE) - Normal    Narrative:     Among patients with dyspnea, NT-proBNP is highly sensitive for the detection of acute congestive heart failure. In addition NT-proBNP of <300 pg/ml effectively rules out acute congestive heart failure with 99% negative predictive value.     HIGH SENSITIVITIY TROPONIN T 2HR   BASIC METABOLIC PANEL   CBC (NO DIFF)   CBC AND DIFFERENTIAL    Narrative:     The following orders were created for panel order CBC & Differential.  Procedure                               Abnormality         Status                     ---------                               -----------         ------                     CBC Auto Differential[[id number]]        Abnormal            Final result                 Please view results for these tests on the individual orders.       EKG:   ECG 12 Lead Dyspnea   Preliminary Result   HEART RATE= 82  bpm   RR Interval= 732  ms   MS Interval= 166  ms   P Horizontal Axis= 10  deg   P Front Axis= 56  deg   QRSD Interval= 116  ms   QT Interval= 395  ms   QTcB= 462  ms   QRS Axis= 61  deg   T Wave Axis= 61  deg   - ABNORMAL ECG -   Sinus rhythm   Nonspecific intraventricular conduction delay    Probable anteroseptal infarct, old   Baseline wander in lead(s) V1,V2   Electronically Signed By:    Date and Time of Study: 2023-09-04 23:29:43          Meds given in ED:   Medications   sodium chloride 0.9 % flush 10 mL (has no administration in time range)   acetaminophen (TYLENOL) tablet 1,000 mg (1,000 mg Oral Not Given 9/5/23 0148)   sodium chloride 0.9 % flush 10 mL (has no administration in time range)   sodium chloride 0.9 % flush 10 mL (has no administration in time range)   sodium chloride 0.9 % infusion 40 mL (has no administration in time range)   sennosides-docusate (PERICOLACE) 8.6-50 MG per tablet 2 tablet (has no administration in time range)     And   polyethylene glycol (MIRALAX) packet 17 g (has no administration in time range)     And   bisacodyl (DULCOLAX) EC tablet 5 mg (has no administration in time range)     And   bisacodyl (DULCOLAX) suppository 10 mg (has no administration in time range)   nitroglycerin (NITROSTAT) SL tablet 0.4 mg (has no administration in time range)   sodium chloride 0.9 % infusion (has no administration in time range)   acetaminophen (TYLENOL) tablet 650 mg (has no administration in time range)     Or   acetaminophen (TYLENOL) 160 MG/5ML solution 650 mg (has no administration in time range)     Or   acetaminophen (TYLENOL) suppository 650 mg (has no administration in time range)   ondansetron (ZOFRAN) tablet 4 mg (has no administration in time range)     Or   ondansetron (ZOFRAN) injection 4 mg (has no administration in time range)   calcium carbonate (TUMS) chewable tablet 500 mg (200 mg elemental) (has no administration in time range)       Imaging results:  CT Head Without Contrast    Result Date: 9/5/2023  No acute intracranial findings.  Radiation dose reduction techniques were utilized, including automated exposure control and exposure modulation based on body size.   This report was finalized on 9/5/2023 12:13 AM by Dr. Esperanza Graves M.D.      XR Chest 1  View    Result Date: 9/5/2023  No acute findings.  This report was finalized on 9/5/2023 12:11 AM by Dr. Esperanza Graves M.D.       Ambulatory status:   - assist x1    Social issues:   Social History     Socioeconomic History    Marital status: Single       NIH Stroke Scale:       Comfort Cui RN  09/05/23 01:56 EDT

## 2023-09-05 NOTE — CONSULTS
[facility] NEUROSURGERY CONSULT NOTE    Patient name: Lorenzo James  Referring Provider: PJ  Reason for Consultation: Back pain with right radiculopathy    Patient Care Team:  Provider, No Known as PCP - General    Chief complaint: Back pain with right lower extremity radiculopathy.    Subjective .     History of present illness:    Patient is a 52 y.o. male with complaints of acute on chronic back pain associated with right lower extremity radiculopathy.  We were consulted for further management.  Patient quite lethargic on our exam today, history not the best obtained.  He reports pain in his lower back that radiates down his posterior right thigh.  Denies loss of bowel and bladder function.    Review of Systems  Review of Systems   Genitourinary:  Negative for difficulty urinating.   Musculoskeletal:  Positive for back pain and gait problem.   Neurological:  Negative for weakness and numbness.     History  PAST MEDICAL HISTORY  History reviewed. No pertinent past medical history.    PAST SURGICAL HISTORY  History reviewed. No pertinent surgical history.    FAMILY HISTORY  History reviewed. No pertinent family history.    SOCIAL HISTORY   History of drug abuse      Allergies:  Griseofulvin ultramicrosize [griseofulvin], Toradol [ketorolac tromethamine], and Tramadol    MEDICATIONS:  Medications Prior to Admission   Medication Sig Dispense Refill Last Dose    aspirin 81 MG EC tablet Take 1 tablet by mouth Daily.   Past Month    isosorbide mononitrate (IMDUR) 30 MG 24 hr tablet Take 1 tablet by mouth Daily.   Past Month    levothyroxine (SYNTHROID, LEVOTHROID) 75 MCG tablet Take 1 tablet by mouth Daily.   Past Month    loratadine (CLARITIN) 10 MG tablet Take 1 tablet by mouth.   Past Month    metoprolol tartrate (LOPRESSOR) 25 MG tablet Take 1 tablet by mouth 2 (Two) Times a Day.   Past Month    mirtazapine (REMERON) 15 MG tablet Take 1 tablet by mouth Every Night.   Past Month    nicotine (NICODERM CQ) 21 MG/24HR  patch Place 1 patch on the skin as directed by provider.   Past Month    nitroglycerin (NITROSTAT) 0.4 MG SL tablet Place 1 tablet under the tongue.   Past Month    ranolazine (RANEXA) 500 MG 12 hr tablet Take 1 tablet by mouth 2 (Two) Times a Day.   Past Month    risperiDONE (risperDAL) 1 MG tablet Take 1 tablet by mouth 2 (Two) Times a Day.   Past Month       Objective     Results Review:  LABS:    Admission on 09/04/2023   Component Date Value Ref Range Status    QT Interval 09/04/2023 395  ms Preliminary    QTC Interval 09/04/2023 462  ms Preliminary    Glucose 09/04/2023 115 (H)  65 - 99 mg/dL Final    BUN 09/04/2023 20  6 - 20 mg/dL Final    Creatinine 09/04/2023 1.52 (H)  0.76 - 1.27 mg/dL Final    Sodium 09/04/2023 141  136 - 145 mmol/L Final    Potassium 09/04/2023 3.6  3.5 - 5.2 mmol/L Final    Chloride 09/04/2023 105  98 - 107 mmol/L Final    CO2 09/04/2023 26.5  22.0 - 29.0 mmol/L Final    Calcium 09/04/2023 9.7  8.6 - 10.5 mg/dL Final    Total Protein 09/04/2023 6.7  6.0 - 8.5 g/dL Final    Albumin 09/04/2023 4.4  3.5 - 5.2 g/dL Final    ALT (SGPT) 09/04/2023 15  1 - 41 U/L Final    AST (SGOT) 09/04/2023 28  1 - 40 U/L Final    Alkaline Phosphatase 09/04/2023 49  39 - 117 U/L Final    Total Bilirubin 09/04/2023 <0.2  0.0 - 1.2 mg/dL Final    Globulin 09/04/2023 2.3  gm/dL Final    A/G Ratio 09/04/2023 1.9  g/dL Final    BUN/Creatinine Ratio 09/04/2023 13.2  7.0 - 25.0 Final    Anion Gap 09/04/2023 9.5  5.0 - 15.0 mmol/L Final    eGFR 09/04/2023 55.1 (L)  >60.0 mL/min/1.73 Final    HS Troponin T 09/04/2023 19 (H)  <15 ng/L Final    proBNP 09/04/2023 169.0  0.0 - 900.0 pg/mL Final    WBC 09/04/2023 6.43  3.40 - 10.80 10*3/mm3 Final    RBC 09/04/2023 3.76 (L)  4.14 - 5.80 10*6/mm3 Final    Hemoglobin 09/04/2023 11.4 (L)  13.0 - 17.7 g/dL Final    Hematocrit 09/04/2023 35.2 (L)  37.5 - 51.0 % Final    MCV 09/04/2023 93.6  79.0 - 97.0 fL Final    MCH 09/04/2023 30.3  26.6 - 33.0 pg Final    MCHC 09/04/2023  32.4  31.5 - 35.7 g/dL Final    RDW 09/04/2023 13.9  12.3 - 15.4 % Final    RDW-SD 09/04/2023 47.9  37.0 - 54.0 fl Final    MPV 09/04/2023 11.5  6.0 - 12.0 fL Final    Platelets 09/04/2023 215  140 - 450 10*3/mm3 Final    Neutrophil % 09/04/2023 52.7  42.7 - 76.0 % Final    Lymphocyte % 09/04/2023 35.6  19.6 - 45.3 % Final    Monocyte % 09/04/2023 8.2  5.0 - 12.0 % Final    Eosinophil % 09/04/2023 1.7  0.3 - 6.2 % Final    Basophil % 09/04/2023 1.2  0.0 - 1.5 % Final    Immature Grans % 09/04/2023 0.6 (H)  0.0 - 0.5 % Final    Neutrophils, Absolute 09/04/2023 3.38  1.70 - 7.00 10*3/mm3 Final    Lymphocytes, Absolute 09/04/2023 2.29  0.70 - 3.10 10*3/mm3 Final    Monocytes, Absolute 09/04/2023 0.53  0.10 - 0.90 10*3/mm3 Final    Eosinophils, Absolute 09/04/2023 0.11  0.00 - 0.40 10*3/mm3 Final    Basophils, Absolute 09/04/2023 0.08  0.00 - 0.20 10*3/mm3 Final    Immature Grans, Absolute 09/04/2023 0.04  0.00 - 0.05 10*3/mm3 Final    nRBC 09/04/2023 0.0  0.0 - 0.2 /100 WBC Final    HS Troponin T 09/05/2023 18 (H)  <15 ng/L Final    Troponin T Delta 09/05/2023 -1  >=-4 - <+4 ng/L Final    Glucose 09/05/2023 119 (H)  65 - 99 mg/dL Final    BUN 09/05/2023 19  6 - 20 mg/dL Final    Creatinine 09/05/2023 1.30 (H)  0.76 - 1.27 mg/dL Final    Sodium 09/05/2023 139  136 - 145 mmol/L Final    Potassium 09/05/2023 3.7  3.5 - 5.2 mmol/L Final    Chloride 09/05/2023 107  98 - 107 mmol/L Final    CO2 09/05/2023 22.2  22.0 - 29.0 mmol/L Final    Calcium 09/05/2023 8.8  8.6 - 10.5 mg/dL Final    BUN/Creatinine Ratio 09/05/2023 14.6  7.0 - 25.0 Final    Anion Gap 09/05/2023 9.8  5.0 - 15.0 mmol/L Final    eGFR 09/05/2023 66.5  >60.0 mL/min/1.73 Final    WBC 09/05/2023 6.49  3.40 - 10.80 10*3/mm3 Final    RBC 09/05/2023 4.05 (L)  4.14 - 5.80 10*6/mm3 Final    Hemoglobin 09/05/2023 12.2 (L)  13.0 - 17.7 g/dL Final    Hematocrit 09/05/2023 37.7  37.5 - 51.0 % Final    MCV 09/05/2023 93.1  79.0 - 97.0 fL Final    MCH 09/05/2023 30.1   26.6 - 33.0 pg Final    MCHC 09/05/2023 32.4  31.5 - 35.7 g/dL Final    RDW 09/05/2023 13.8  12.3 - 15.4 % Final    RDW-SD 09/05/2023 47.2  37.0 - 54.0 fl Final    MPV 09/05/2023 11.3  6.0 - 12.0 fL Final    Platelets 09/05/2023 236  140 - 450 10*3/mm3 Final    EF(MOD-bp) 09/05/2023 50.1  % In process    LV GLOBAL STRAIN  09/05/2023 -9.8  % In process    LVIDd 09/05/2023 5.0  cm In process    LVIDs 09/05/2023 3.4  cm In process    IVSd 09/05/2023 1.11  cm In process    LVPWd 09/05/2023 1.19  cm In process    FS 09/05/2023 32.2  % In process    IVS/LVPW 09/05/2023 0.94  cm In process    ESV(cubed) 09/05/2023 39.4  ml In process    LV Sys Vol (BSA corrected) 09/05/2023 19.4  cm2 In process    EDV(cubed) 09/05/2023 126.4  ml In process    LV Ellison Vol (BSA corrected) 09/05/2023 41.7  cm2 In process    LVOT area 09/05/2023 4.4  cm2 In process    LV mass(C)d 09/05/2023 222.3  grams In process    LVOT diam 09/05/2023 2.37  cm In process    EDV(MOD-sp2) 09/05/2023 90.0  ml In process    EDV(MOD-sp4) 09/05/2023 86.0  ml In process    ESV(MOD-sp2) 09/05/2023 49.0  ml In process    ESV(MOD-sp4) 09/05/2023 40.0  ml In process    SV(MOD-sp2) 09/05/2023 41.0  ml In process    SV(MOD-sp4) 09/05/2023 46.0  ml In process    SI(MOD-sp2) 09/05/2023 19.9  ml/m2 In process    SI(MOD-sp4) 09/05/2023 22.3  ml/m2 In process    EF(MOD-sp2) 09/05/2023 45.6  % In process    EF(MOD-sp4) 09/05/2023 53.5  % In process    MV E max jose 09/05/2023 86.5  cm/sec In process    MV A max jose 09/05/2023 86.1  cm/sec In process    MV dec time 09/05/2023 0.21  msec In process    MV E/A 09/05/2023 1.00   In process    MV A dur 09/05/2023 0.13  sec In process    LA ESV Index (BP) 09/05/2023 26.1  ml/m2 In process    Med Peak E' Jose 09/05/2023 5.2  cm/sec In process    Lat Peak E' Jose 09/05/2023 7.8  cm/sec In process    Avg E/e' ratio 09/05/2023 13.31   In process    SV(LVOT) 09/05/2023 64.3  ml In process    RV Base 09/05/2023 2.40  cm In process    RV Mid  09/05/2023 1.98  cm In process    RV Length 09/05/2023 6.5  cm In process    RV S' 09/05/2023 9.6  cm/sec In process    LV V1 max 09/05/2023 81.0  cm/sec In process    LV V1 max PG 09/05/2023 2.6  mmHg In process    LV V1 mean PG 09/05/2023 1.53  mmHg In process    LV V1 VTI 09/05/2023 14.6  cm In process    Ao pk ngoc 09/05/2023 175.9  cm/sec In process    Ao max PG 09/05/2023 12.4  mmHg In process    Ao mean PG 09/05/2023 7.0  mmHg In process    Ao V2 VTI 09/05/2023 32.6  cm In process    WENDY(I,D) 09/05/2023 1.97  cm2 In process    AI P1/2t 09/05/2023 551.5  msec In process    MV max PG 09/05/2023 3.3  mmHg In process    MV mean PG 09/05/2023 1.87  mmHg In process    MV V2 VTI 09/05/2023 20.5  cm In process    MV P1/2t 09/05/2023 87.8  msec In process    MVA(P1/2t) 09/05/2023 2.5  cm2 In process    MVA(VTI) 09/05/2023 3.1  cm2 In process    MV dec slope 09/05/2023 278.4  cm/sec2 In process    MR max ngoc 09/05/2023 456.1  cm/sec In process    MR max PG 09/05/2023 83.2  mmHg In process    RV V1 max PG 09/05/2023 1.81  mmHg In process    RV V1 max 09/05/2023 67.3  cm/sec In process    RV V1 VTI 09/05/2023 12.6  cm In process    PA V2 max 09/05/2023 93.0  cm/sec In process    Sinus 09/05/2023 3.0  cm In process    STJ 09/05/2023 2.8  cm In process    TAPSE (>1.6) 09/05/2023 2.10  cm In process    Dimensionless Index 09/05/2023 0.40  (DI) In process    Aortic arch 09/05/2023 3.7  cm In process    Prox CCA PSV 09/05/2023 -55.8  cm/sec In process    Prox CCA EDV 09/05/2023 -16.2  cm/sec In process    Dist CCA PSV 09/05/2023 50.2  cm/sec In process    Dist CCA EDV 09/05/2023 16.5  cm/sec In process    Prox ICA PSV 09/05/2023 -56.8  cm/sec In process    Prox ICA EDV 09/05/2023 -25.1  cm/sec In process    Mid ICA PSV 09/05/2023 -57.6  cm/sec In process    Mid ICA EDV 09/05/2023 -24.7  cm/sec In process    Dist ICA PSV 09/05/2023 -66.6  cm/sec In process    Dist ICA EDV 09/05/2023 -30.2  cm/sec In process    Prox ECA PSV  09/05/2023 -62.3  cm/sec In process    Prox ECA EDV 09/05/2023 -12.2  cm/sec In process    Vertebral A PSV 09/05/2023 -29.0  cm/sec In process    Vertebral A EDV 09/05/2023 -7.0  cm/sec In process    Prox SCLA PSV 09/05/2023 89.7  cm/sec In process    Prox CCA PSV 09/05/2023 57.6  cm/sec In process    Prox CCA EDV 09/05/2023 13.2  cm/sec In process    Dist CCA PSV 09/05/2023 -38.4  cm/sec In process    Dist CCA EDV 09/05/2023 -14.1  cm/sec In process    Prox ICA PSV 09/05/2023 -120.4  cm/sec In process    Prox ICA EDV 09/05/2023 -52.0  cm/sec In process    Mid ICA PSV 09/05/2023 57.1  cm/sec In process    Mid ICA EDV 09/05/2023 23.7  cm/sec In process    Dist ICA PSV 09/05/2023 -49.2  cm/sec In process    Dist ICA EDV 09/05/2023 -20.6  cm/sec In process    Prox ECA PSV 09/05/2023 97.5  cm/sec In process    Prox ECA EDV 09/05/2023 26.7  cm/sec In process    Vertebral A PSV 09/05/2023 34.0  cm/sec In process    Vertebral A EDV 09/05/2023 12.6  cm/sec In process    Prox SCLA PSV 09/05/2023 142.7  cm/sec In process    ICA/CCA ratio 09/05/2023 1.32   In process    ICA/CCA ratio 09/05/2023 3.16   In process       DIAGNOSTICS:  MRI lumbar pending    Results Review:   I reviewed the patient's new clinical results.  I personally viewed and interpreted the patient's chart    Vital Signs   Temp:  [97.9 °F (36.6 °C)-98.2 °F (36.8 °C)] 97.9 °F (36.6 °C)  Heart Rate:  [73-95] 78  Resp:  [18-20] 18  BP: (142-180)/() 175/113        Assessment & Plan       Lumbar pain with radiation down both legs    Exertional dyspnea    Benign essential hypertension    CAD S/P percutaneous coronary angioplasty    CKD (chronic kidney disease) stage 3, GFR 30-59 ml/min    Urinary incontinence without sensory awareness      52-year-old male with a history of acute on chronic back pain associated with right lower extremity radiculopathy.    PLAN:     Await MRI results  Okay for physical therapy    I discussed the patient's findings and my  "recommendations with patient    During patient visit, I utilized appropriate personal protective equipment including gloves and mask.  Mask used was standard procedure mask. Appropriate PPE was worn during the entire visit.  Hand hygiene was completed before and after.     Carla Damian, APRN  09/05/23  13:02 EDT    \"Dictated utilizing Dragon dictation\".      "

## 2023-09-05 NOTE — ED NOTES
Patient arrived to ED via wheelchair.  Patient reports that two days ago he thinks he had a stroke because he had difficulty opening his right eye and his mouth drooped.  Patient says the symptoms have since resolved but he is having shortness of breath with exertion, pt also complains of nausea and a headache.   Patient AA&Ox4 at triage.

## 2023-09-05 NOTE — H&P
Patient Name:  Lorenzo James  YOB: 1970  MRN:  [id number]  Admit Date:  9/4/2023  Patient Care Team:  Provider, No Known as PCP - General      Subjective   History Present Illness     Chief Complaint   Patient presents with    Shortness of Breath       Lorenzo James is a 52 y.o. male with history of coronary artery disease, polysubstance abuse, chronic lower back pain. He was admitted to a hospital in [facility] 8/2023 and had Underwent work-up including cardiac stress test which showed a fixed scar but no reversible scar or ischemia. Seen in house by cardiology, no acute testing commended for now. Also seen in house by nephrology for mild increase in kidney function. No acute interventions recommended at this time. He has been given refills for his metoprolol and isosorbide mononitrate. He was medically stable for discharge, follow-up with PCP and cardiology as scheduled.     He has come to [place] in the past week.  He comes with a couple of complaints.  1 is that he had some difficulty in opening his right eye a couple of days ago and concern for potential right-sided facial droop.  The symptoms have subsequently resolved.    Additionally he has had increasing lower back pain with weakness of his legs as well as urinary and fecal incontinence.  He does state that he has chronic lower back pain and has had previous MRI but and acute change of his symptoms over the past week.    History of Present Illness  Review of Systems   Constitutional:  Positive for activity change. Negative for fever.   HENT: Negative.     Eyes: Negative.    Respiratory:  Positive for shortness of breath (chronic).    Cardiovascular: Negative.  Negative for chest pain.   Gastrointestinal: Negative.    Endocrine: Negative.    Genitourinary:  Positive for difficulty urinating. Negative for dysuria.   Musculoskeletal:  Positive for back pain.   Skin: Negative.    Allergic/Immunologic: Negative.    Neurological:   Positive for weakness.   Hematological: Negative.    Psychiatric/Behavioral:  Positive for decreased concentration.       Personal History     History reviewed. No pertinent past medical history.  History reviewed. No pertinent surgical history.  History reviewed. No pertinent family history.     Medications Prior to Admission   Medication Sig Dispense Refill Last Dose    aspirin 81 MG EC tablet Take 1 tablet by mouth Daily.   Past Month    isosorbide mononitrate (IMDUR) 30 MG 24 hr tablet Take 1 tablet by mouth Daily.   Past Month    levothyroxine (SYNTHROID, LEVOTHROID) 75 MCG tablet Take 1 tablet by mouth Daily.   Past Month    loratadine (CLARITIN) 10 MG tablet Take 1 tablet by mouth.   Past Month    metoprolol tartrate (LOPRESSOR) 25 MG tablet Take 1 tablet by mouth 2 (Two) Times a Day.   Past Month    mirtazapine (REMERON) 15 MG tablet Take 1 tablet by mouth Every Night.   Past Month    nicotine (NICODERM CQ) 21 MG/24HR patch Place 1 patch on the skin as directed by provider.   Past Month    nitroglycerin (NITROSTAT) 0.4 MG SL tablet Place 1 tablet under the tongue.   Past Month    ranolazine (RANEXA) 500 MG 12 hr tablet Take 1 tablet by mouth 2 (Two) Times a Day.   Past Month    risperiDONE (risperDAL) 1 MG tablet Take 1 tablet by mouth 2 (Two) Times a Day.   Past Month     Allergies:    Allergies   Allergen Reactions    Griseofulvin Ultramicrosize [Griseofulvin] Itching    Toradol [Ketorolac Tromethamine] Itching    Tramadol Itching       Objective    Objective     Vital Signs  Temp:  [97.9 °F (36.6 °C)-98.2 °F (36.8 °C)] 97.9 °F (36.6 °C)  Heart Rate:  [73-95] 95  Resp:  [18-20] 18  BP: (142-180)/() 180/106  SpO2:  [96 %-98 %] 97 %  on   ;   Device (Oxygen Therapy): room air  Body mass index is 26.5 kg/m².    Physical Exam  Vitals and nursing note reviewed.   Constitutional:       General: He is not in acute distress.     Appearance: He is well-developed. He is ill-appearing (chronically). He is not  diaphoretic.   HENT:      Head: Normocephalic and atraumatic.   Eyes:      General: No scleral icterus.     Conjunctiva/sclera: Conjunctivae normal.   Neck:      Vascular: No JVD.   Cardiovascular:      Rate and Rhythm: Normal rate and regular rhythm.      Heart sounds: Normal heart sounds. No murmur heard.  Pulmonary:      Effort: Pulmonary effort is normal. No respiratory distress.      Breath sounds: Normal breath sounds.   Abdominal:      General: Bowel sounds are normal.      Palpations: Abdomen is soft.      Tenderness: There is no abdominal tenderness.   Musculoskeletal:         General: Normal range of motion.      Cervical back: Normal range of motion and neck supple.   Skin:     General: Skin is warm and dry.   Neurological:      Mental Status: He is alert and oriented to person, place, and time.      Cranial Nerves: No cranial nerve deficit.      Motor: Weakness (in legs) present.   Psychiatric:         Behavior: Behavior normal.         Thought Content: Thought content normal.       Results Review:  I reviewed the patient's new clinical results.      Lab Results (last 24 hours)       Procedure Component Value Units Date/Time    CBC & Differential [[id number]]  (Abnormal) Collected: 09/04/23 2341    Specimen: Blood Updated: 09/04/23 2350    Narrative:      The following orders were created for panel order CBC & Differential.  Procedure                               Abnormality         Status                     ---------                               -----------         ------                     CBC Auto Differential[[id number]]        Abnormal            Final result                 Please view results for these tests on the individual orders.    Comprehensive Metabolic Panel [[id number]]  (Abnormal) Collected: 09/04/23 2341    Specimen: Blood Updated: 09/05/23 0009     Glucose 115 mg/dL      BUN 20 mg/dL      Creatinine 1.52 mg/dL      Sodium 141 mmol/L      Potassium 3.6 mmol/L      Chloride 105 mmol/L       CO2 26.5 mmol/L      Calcium 9.7 mg/dL      Total Protein 6.7 g/dL      Albumin 4.4 g/dL      ALT (SGPT) 15 U/L      AST (SGOT) 28 U/L      Alkaline Phosphatase 49 U/L      Total Bilirubin <0.2 mg/dL      Globulin 2.3 gm/dL      A/G Ratio 1.9 g/dL      BUN/Creatinine Ratio 13.2     Anion Gap 9.5 mmol/L      eGFR 55.1 mL/min/1.73     Narrative:      GFR Normal >60  Chronic Kidney Disease <60  Kidney Failure <15      High Sensitivity Troponin T [[id number]]  (Abnormal) Collected: 09/04/23 2341    Specimen: Blood Updated: 09/05/23 0009     HS Troponin T 19 ng/L     Narrative:      High Sensitive Troponin T Reference Range:  <10.0 ng/L- Negative Female for AMI  <15.0 ng/L- Negative Male for AMI  >=10 - Abnormal Female indicating possible myocardial injury.  >=15 - Abnormal Male indicating possible myocardial injury.   Clinicians would have to utilize clinical acumen, EKG, Troponin, and serial changes to determine if it is an Acute Myocardial Infarction or myocardial injury due to an underlying chronic condition.         BNP [[id number]]  (Normal) Collected: 09/04/23 2341    Specimen: Blood Updated: 09/05/23 0008     proBNP 169.0 pg/mL     Narrative:      Among patients with dyspnea, NT-proBNP is highly sensitive for the detection of acute congestive heart failure. In addition NT-proBNP of <300 pg/ml effectively rules out acute congestive heart failure with 99% negative predictive value.      CBC Auto Differential [[id number]]  (Abnormal) Collected: 09/04/23 2341    Specimen: Blood Updated: 09/04/23 2350     WBC 6.43 10*3/mm3      RBC 3.76 10*6/mm3      Hemoglobin 11.4 g/dL      Hematocrit 35.2 %      MCV 93.6 fL      MCH 30.3 pg      MCHC 32.4 g/dL      RDW 13.9 %      RDW-SD 47.9 fl      MPV 11.5 fL      Platelets 215 10*3/mm3      Neutrophil % 52.7 %      Lymphocyte % 35.6 %      Monocyte % 8.2 %      Eosinophil % 1.7 %      Basophil % 1.2 %      Immature Grans % 0.6 %      Neutrophils, Absolute 3.38 10*3/mm3       Lymphocytes, Absolute 2.29 10*3/mm3      Monocytes, Absolute 0.53 10*3/mm3      Eosinophils, Absolute 0.11 10*3/mm3      Basophils, Absolute 0.08 10*3/mm3      Immature Grans, Absolute 0.04 10*3/mm3      nRBC 0.0 /100 WBC     High Sensitivity Troponin T 2Hr [[id number]]  (Abnormal) Collected: 09/05/23 0156    Specimen: Blood Updated: 09/05/23 0223     HS Troponin T 18 ng/L      Troponin T Delta -1 ng/L     Narrative:      High Sensitive Troponin T Reference Range:  <10.0 ng/L- Negative Female for AMI  <15.0 ng/L- Negative Male for AMI  >=10 - Abnormal Female indicating possible myocardial injury.  >=15 - Abnormal Male indicating possible myocardial injury.   Clinicians would have to utilize clinical acumen, EKG, Troponin, and serial changes to determine if it is an Acute Myocardial Infarction or myocardial injury due to an underlying chronic condition.                 Imaging Results (Last 24 Hours)       Procedure Component Value Units Date/Time    CT Head Without Contrast [[id number]] Collected: 09/05/23 0011     Updated: 09/05/23 0016    Narrative:      CT OF THE HEAD WITHOUT CONTRAST     HISTORY: Right-sided facial droop.     COMPARISON: None available.     TECHNIQUE: Axial CT imaging was obtained through the brain. No IV  contrast was administered.     FINDINGS:  No acute intracranial hemorrhage is seen. There is mild atrophy. There  there is periventricular and deep white matter microangiopathic change.  There is no midline shift or mass effect. Intracranial vascular  calcifications are noted.       Impression:      No acute intracranial findings.     Radiation dose reduction techniques were utilized, including automated  exposure control and exposure modulation based on body size.        This report was finalized on 9/5/2023 12:13 AM by Dr. Esperanza Graves M.D.       XR Chest 1 View [[id number]] Collected: 09/05/23 0010     Updated: 09/05/23 0014    Narrative:      SINGLE VIEW OF THE CHEST     HISTORY:  Shortness of air     COMPARISON: None available.     FINDINGS:  Heart size is within normal limits. There is some calcification of the  aorta. No pneumothorax, pleural effusion, or acute infiltrate is seen.       Impression:      No acute findings.     This report was finalized on 9/5/2023 12:11 AM by Dr. Esperanza Graves M.D.                   ECG 12 Lead Dyspnea   Preliminary Result   HEART RATE= 82  bpm   RR Interval= 732  ms   HI Interval= 166  ms   P Horizontal Axis= 10  deg   P Front Axis= 56  deg   QRSD Interval= 116  ms   QT Interval= 395  ms   QTcB= 462  ms   QRS Axis= 61  deg   T Wave Axis= 61  deg   - ABNORMAL ECG -   Sinus rhythm   Nonspecific intraventricular conduction delay   Probable anteroseptal infarct, old   Baseline wander in lead(s) V1,V2   Electronically Signed By:    Date and Time of Study: 2023-09-04 23:29:43      SCANNED - TELEMETRY     Final Result           Assessment/Plan     Active Hospital Problems    Diagnosis  POA    **Lumbar pain with radiation down both legs [M54.50, M79.604, M79.605]  Yes    Exertional dyspnea [R06.09]  Yes    Urinary incontinence without sensory awareness [N39.42]  Yes    CKD (chronic kidney disease) stage 3, GFR 30-59 ml/min [N18.30]  Yes    CAD S/P percutaneous coronary angioplasty [I25.10, Z98.61]  Not Applicable    Benign essential hypertension [I10]  Yes         Will obtain MRI L spine and have [facility] consultation with some red flags on his history  Obtain blood cultures with his drug use.   Obtain MRI brain with possible facial droop a couple of days ago. Will obtain carotid doppler as well and 2D ECHO  Restart home agents for CAD though hold asa today  Monitor labs  I discussed the patients findings and my recommendations with patient and nursing staff.    VTE Prophylaxis - SCDs.  Code Status - Full code.       Montana Collins MD  [facility]  09/05/23  10:19 EDT

## 2023-09-05 NOTE — PAYOR COMM NOTE
"Lorenzo James (52 y.o. Male)      FORM MIGHT BE THE LAST PAGE OF REQUEST      PLEASE SEE ATTACHED FOR INPT AUTH.     PLEASE CALL   OR  [phone] WITH INPT AUTH.    THANK YOU    KALEIGH SOLANO LPN CCP    FORM MIGHT BE THE LAST PAGE OF REQUEST      DX:   **Lumbar pain with radiation down both legs [M54.50, M79.604, M79.605]       Exertional dyspnea [R06.09]       Urinary incontinence without sensory awareness [N39.42]         [facility]-( [facility])---  [address] [place] [address]       NPI   [id number]  TAX ID:  [id number]      DR MON[place] HUI- [address] [address]   PHONE   [phone]   [phone]  PROVIDER NPI [id number]     TAXONOMY CODE - SER [id number]                Date of Birth   1970    Social Security Number       Address   [place] [address]    Home Phone   [phone]    MRN   [id number]       Rastafari   None    Marital Status   Single                            Admission Date   9/4/23    Admission Type   Emergency    Admitting Provider   Montana Hui MD    Attending Provider   Montana Hui MD    Department, Room/Bed   [address], [address]/1       Discharge Date       Discharge Disposition       Discharge Destination                                 Attending Provider: Montana Hui MD    Allergies: Griseofulvin Ultramicrosize [Griseofulvin], Toradol [Ketorolac Tromethamine], Tramadol    Isolation: None   Infection: None   Code Status: CPR    Ht: 180.3 cm (71\")   Wt: 86.2 kg (190 lb)    Admission Cmt: None   Principal Problem: Lumbar pain with radiation down both legs [M54.50,M79.604,M79.605]                   Active Insurance as of 9/4/2023       Primary Coverage       Payor Plan Insurance Group Employer/Plan Group    MEDICARE MEDICARE A & B        Payor Plan Address Payor Plan Phone Number Payor Plan Fax Number Effective Dates    [address] [phone]  7/1/2020 - None Entered    [facility] [address]         Subscriber Name Subscriber Birth Date Member ID       VIOLA JAMES 1970 [id number]               Secondary Coverage       Payor Plan Insurance Group Employer/Plan Group    AUDREY [employer]        Payor Plan Address Payor Plan Phone Number Payor Plan Fax Number Effective Dates    [address]   9/4/2023 - None Entered    [facility] [id number]         Subscriber Name Subscriber Birth Date Member ID       VIOLA JAMES 1970 [id number]                     Emergency Contacts            No emergency contacts on file.              [place]: [facility] [id number]  Tax ID [id number]     History & Physical        Montana Collins MD at 09/05/23 1019              Patient Name:  Viola James  YOB: 1970  MRN:  [id number]  Admit Date:  9/4/2023  Patient Care Team:  Provider, No Known as PCP - General      Subjective  History Present Illness     Chief Complaint   Patient presents with    Shortness of Breath       Viola James is a 52 y.o. male with history of coronary artery disease, polysubstance abuse, chronic lower back pain. He was admitted to a hospital in [facility] 8/2023 and had Underwent work-up including cardiac stress test which showed a fixed scar but no reversible scar or ischemia. Seen in house by cardiology, no acute testing commended for now. Also seen in house by nephrology for mild increase in kidney function. No acute interventions recommended at this time. He has been given refills for his metoprolol and isosorbide mononitrate. He was medically stable for discharge, follow-up with PCP and cardiology as scheduled.     He has come to [place] in the past week.  He comes with a couple of complaints.  1 is that he had some difficulty in opening his right eye a couple of days ago and concern for potential right-sided facial droop.  The symptoms have subsequently  resolved.    Additionally he has had increasing lower back pain with weakness of his legs as well as urinary and fecal incontinence.  He does state that he has chronic lower back pain and has had previous MRI but and acute change of his symptoms over the past week.    History of Present Illness  Review of Systems   Constitutional:  Positive for activity change. Negative for fever.   HENT: Negative.     Eyes: Negative.    Respiratory:  Positive for shortness of breath (chronic).    Cardiovascular: Negative.  Negative for chest pain.   Gastrointestinal: Negative.    Endocrine: Negative.    Genitourinary:  Positive for difficulty urinating. Negative for dysuria.   Musculoskeletal:  Positive for back pain.   Skin: Negative.    Allergic/Immunologic: Negative.    Neurological:  Positive for weakness.   Hematological: Negative.    Psychiatric/Behavioral:  Positive for decreased concentration.       Personal History     History reviewed. No pertinent past medical history.  History reviewed. No pertinent surgical history.  History reviewed. No pertinent family history.     Medications Prior to Admission   Medication Sig Dispense Refill Last Dose    aspirin 81 MG EC tablet Take 1 tablet by mouth Daily.   Past Month    isosorbide mononitrate (IMDUR) 30 MG 24 hr tablet Take 1 tablet by mouth Daily.   Past Month    levothyroxine (SYNTHROID, LEVOTHROID) 75 MCG tablet Take 1 tablet by mouth Daily.   Past Month    loratadine (CLARITIN) 10 MG tablet Take 1 tablet by mouth.   Past Month    metoprolol tartrate (LOPRESSOR) 25 MG tablet Take 1 tablet by mouth 2 (Two) Times a Day.   Past Month    mirtazapine (REMERON) 15 MG tablet Take 1 tablet by mouth Every Night.   Past Month    nicotine (NICODERM CQ) 21 MG/24HR patch Place 1 patch on the skin as directed by provider.   Past Month    nitroglycerin (NITROSTAT) 0.4 MG SL tablet Place 1 tablet under the tongue.   Past Month    ranolazine (RANEXA) 500 MG 12 hr tablet Take 1 tablet by  mouth 2 (Two) Times a Day.   Past Month    risperiDONE (risperDAL) 1 MG tablet Take 1 tablet by mouth 2 (Two) Times a Day.   Past Month     Allergies:    Allergies   Allergen Reactions    Griseofulvin Ultramicrosize [Griseofulvin] Itching    Toradol [Ketorolac Tromethamine] Itching    Tramadol Itching       Objective   Objective     Vital Signs  Temp:  [97.9 °F (36.6 °C)-98.2 °F (36.8 °C)] 97.9 °F (36.6 °C)  Heart Rate:  [73-95] 95  Resp:  [18-20] 18  BP: (142-180)/() 180/106  SpO2:  [96 %-98 %] 97 %  on   ;   Device (Oxygen Therapy): room air  Body mass index is 26.5 kg/m².    Physical Exam  Vitals and nursing note reviewed.   Constitutional:       General: He is not in acute distress.     Appearance: He is well-developed. He is ill-appearing (chronically). He is not diaphoretic.   HENT:      Head: Normocephalic and atraumatic.   Eyes:      General: No scleral icterus.     Conjunctiva/sclera: Conjunctivae normal.   Neck:      Vascular: No JVD.   Cardiovascular:      Rate and Rhythm: Normal rate and regular rhythm.      Heart sounds: Normal heart sounds. No murmur heard.  Pulmonary:      Effort: Pulmonary effort is normal. No respiratory distress.      Breath sounds: Normal breath sounds.   Abdominal:      General: Bowel sounds are normal.      Palpations: Abdomen is soft.      Tenderness: There is no abdominal tenderness.   Musculoskeletal:         General: Normal range of motion.      Cervical back: Normal range of motion and neck supple.   Skin:     General: Skin is warm and dry.   Neurological:      Mental Status: He is alert and oriented to person, place, and time.      Cranial Nerves: No cranial nerve deficit.      Motor: Weakness (in legs) present.   Psychiatric:         Behavior: Behavior normal.         Thought Content: Thought content normal.       Results Review:  I reviewed the patient's new clinical results.      Lab Results (last 24 hours)       Procedure Component Value Units Date/Time    CBC &  Differential [[id number]]  (Abnormal) Collected: 09/04/23 2341    Specimen: Blood Updated: 09/04/23 2350    Narrative:      The following orders were created for panel order CBC & Differential.  Procedure                               Abnormality         Status                     ---------                               -----------         ------                     CBC Auto Differential[[id number]]        Abnormal            Final result                 Please view results for these tests on the individual orders.    Comprehensive Metabolic Panel [[id number]]  (Abnormal) Collected: 09/04/23 2341    Specimen: Blood Updated: 09/05/23 0009     Glucose 115 mg/dL      BUN 20 mg/dL      Creatinine 1.52 mg/dL      Sodium 141 mmol/L      Potassium 3.6 mmol/L      Chloride 105 mmol/L      CO2 26.5 mmol/L      Calcium 9.7 mg/dL      Total Protein 6.7 g/dL      Albumin 4.4 g/dL      ALT (SGPT) 15 U/L      AST (SGOT) 28 U/L      Alkaline Phosphatase 49 U/L      Total Bilirubin <0.2 mg/dL      Globulin 2.3 gm/dL      A/G Ratio 1.9 g/dL      BUN/Creatinine Ratio 13.2     Anion Gap 9.5 mmol/L      eGFR 55.1 mL/min/1.73     Narrative:      GFR Normal >60  Chronic Kidney Disease <60  Kidney Failure <15      High Sensitivity Troponin T [[id number]]  (Abnormal) Collected: 09/04/23 2341    Specimen: Blood Updated: 09/05/23 0009     HS Troponin T 19 ng/L     Narrative:      High Sensitive Troponin T Reference Range:  <10.0 ng/L- Negative Female for AMI  <15.0 ng/L- Negative Male for AMI  >=10 - Abnormal Female indicating possible myocardial injury.  >=15 - Abnormal Male indicating possible myocardial injury.   Clinicians would have to utilize clinical acumen, EKG, Troponin, and serial changes to determine if it is an Acute Myocardial Infarction or myocardial injury due to an underlying chronic condition.         BNP [[id number]]  (Normal) Collected: 09/04/23 2341    Specimen: Blood Updated: 09/05/23 0008     proBNP 169.0 pg/mL      Narrative:      Among patients with dyspnea, NT-proBNP is highly sensitive for the detection of acute congestive heart failure. In addition NT-proBNP of <300 pg/ml effectively rules out acute congestive heart failure with 99% negative predictive value.      CBC Auto Differential [[id number]]  (Abnormal) Collected: 09/04/23 2341    Specimen: Blood Updated: 09/04/23 2350     WBC 6.43 10*3/mm3      RBC 3.76 10*6/mm3      Hemoglobin 11.4 g/dL      Hematocrit 35.2 %      MCV 93.6 fL      MCH 30.3 pg      MCHC 32.4 g/dL      RDW 13.9 %      RDW-SD 47.9 fl      MPV 11.5 fL      Platelets 215 10*3/mm3      Neutrophil % 52.7 %      Lymphocyte % 35.6 %      Monocyte % 8.2 %      Eosinophil % 1.7 %      Basophil % 1.2 %      Immature Grans % 0.6 %      Neutrophils, Absolute 3.38 10*3/mm3      Lymphocytes, Absolute 2.29 10*3/mm3      Monocytes, Absolute 0.53 10*3/mm3      Eosinophils, Absolute 0.11 10*3/mm3      Basophils, Absolute 0.08 10*3/mm3      Immature Grans, Absolute 0.04 10*3/mm3      nRBC 0.0 /100 WBC     High Sensitivity Troponin T 2Hr [[id number]]  (Abnormal) Collected: 09/05/23 0156    Specimen: Blood Updated: 09/05/23 0223     HS Troponin T 18 ng/L      Troponin T Delta -1 ng/L     Narrative:      High Sensitive Troponin T Reference Range:  <10.0 ng/L- Negative Female for AMI  <15.0 ng/L- Negative Male for AMI  >=10 - Abnormal Female indicating possible myocardial injury.  >=15 - Abnormal Male indicating possible myocardial injury.   Clinicians would have to utilize clinical acumen, EKG, Troponin, and serial changes to determine if it is an Acute Myocardial Infarction or myocardial injury due to an underlying chronic condition.                 Imaging Results (Last 24 Hours)       Procedure Component Value Units Date/Time    CT Head Without Contrast [[id number]] Collected: 09/05/23 0011     Updated: 09/05/23 0016    Narrative:      CT OF THE HEAD WITHOUT CONTRAST     HISTORY: Right-sided facial droop.     COMPARISON:  None available.     TECHNIQUE: Axial CT imaging was obtained through the brain. No IV  contrast was administered.     FINDINGS:  No acute intracranial hemorrhage is seen. There is mild atrophy. There  there is periventricular and deep white matter microangiopathic change.  There is no midline shift or mass effect. Intracranial vascular  calcifications are noted.       Impression:      No acute intracranial findings.     Radiation dose reduction techniques were utilized, including automated  exposure control and exposure modulation based on body size.        This report was finalized on 9/5/2023 12:13 AM by Dr. Esperanza Graves M.D.       XR Chest 1 View [[id number]] Collected: 09/05/23 0010     Updated: 09/05/23 0014    Narrative:      SINGLE VIEW OF THE CHEST     HISTORY: Shortness of air     COMPARISON: None available.     FINDINGS:  Heart size is within normal limits. There is some calcification of the  aorta. No pneumothorax, pleural effusion, or acute infiltrate is seen.       Impression:      No acute findings.     This report was finalized on 9/5/2023 12:11 AM by Dr. Esperanza Graves M.D.                   ECG 12 Lead Dyspnea   Preliminary Result   HEART RATE= 82  bpm   RR Interval= 732  ms   KY Interval= 166  ms   P Horizontal Axis= 10  deg   P Front Axis= 56  deg   QRSD Interval= 116  ms   QT Interval= 395  ms   QTcB= 462  ms   QRS Axis= 61  deg   T Wave Axis= 61  deg   - ABNORMAL ECG -   Sinus rhythm   Nonspecific intraventricular conduction delay   Probable anteroseptal infarct, old   Baseline wander in lead(s) V1,V2   Electronically Signed By:    Date and Time of Study: 2023-09-04 23:29:43      SCANNED - TELEMETRY     Final Result           Assessment/Plan     Active Hospital Problems    Diagnosis  POA    **Lumbar pain with radiation down both legs [M54.50, M79.604, M79.605]  Yes    Exertional dyspnea [R06.09]  Yes    Urinary incontinence without sensory awareness [N39.42]  Yes    CKD (chronic kidney  "disease) stage 3, GFR 30-59 ml/min [N18.30]  Yes    CAD S/P percutaneous coronary angioplasty [I25.10, Z98.61]  Not Applicable    Benign essential hypertension [I10]  Yes         Will obtain MRI L spine and have CARA consultation with some red flags on his history  Obtain blood cultures with his drug use.   Obtain MRI brain with possible facial droop a couple of days ago. Will obtain carotid doppler as well and 2D ECHO  Restart home agents for CAD though hold asa today  Monitor labs  I discussed the patients findings and my recommendations with patient and nursing staff.    VTE Prophylaxis - SCDs.  Code Status - Full code.       Montana Collins MD  [facility]  09/05/23  10:19 EDT      Electronically signed by Montana Collins MD at 09/05/23 1025          Emergency Department Notes        Willard Gan, RN at 09/05/23 0150          While MD Carvalho re-evaluating pt, pt reported to MD Carvalho of pain. MD Carvalho reported that he had to awaken the patient to discussed results and re-evaluate and ordered Tylenol for the pt. When this RN attempted to give ordered Tylenol, pt refused stating \"This is fucking bullshit.\" This RN asked the patient to refrain from cursing. Pt became increasingly agitated and then was initially refusing repeat blood draw for troponin. Pt de-escalated and agreeable to repeat troponin and admission.     Electronically signed by Willard Gan RN at 09/05/23 0203       Comfort Cui RN at 09/05/23 0145              Srinath Carvalho MD at 09/04/23 2325           EMERGENCY DEPARTMENT ENCOUNTER    Room Number:  09/09  PCP: Provider, No Known  Historian: Patient      HPI:  Chief Complaint: Shortness of breath  A complete HPI/ROS/PMH/PSH/SH/FH are unobtainable due to: None  Context: Lorenzo James is a 52 y.o. male who presents to the ED c/o shortness of breath that is made worse by exertion that has been present now for over the past 1 week.  He " denies any associated chest pain or nausea/vomiting.  He also reports that he was having some right-sided facial drooping approximately 1 week ago that has since resolved.  He does have chronic back pain which he reports has worsened over the last 1 week as well.  He denies leg weakness, urinary retention, fecal incontinence, or groin numbness.  He reports a previous history of hypertension, coronary artery disease, as well as substance abuse and is noncompliant with medication.            PAST MEDICAL HISTORY  Active Ambulatory Problems     Diagnosis Date Noted    No Active Ambulatory Problems     Resolved Ambulatory Problems     Diagnosis Date Noted    No Resolved Ambulatory Problems     No Additional Past Medical History         PAST SURGICAL HISTORY  History reviewed. No pertinent surgical history.      FAMILY HISTORY  History reviewed. No pertinent family history.      SOCIAL HISTORY  Social History     Socioeconomic History    Marital status: Single         ALLERGIES  Griseofulvin ultramicrosize [griseofulvin], Toradol [ketorolac tromethamine], and Tramadol        REVIEW OF SYSTEMS  Review of Systems   Constitutional:  Negative for activity change, appetite change and fever.   HENT:  Negative for congestion and sore throat.    Eyes: Negative.    Respiratory:  Positive for shortness of breath. Negative for cough.    Cardiovascular:  Negative for chest pain and leg swelling.   Gastrointestinal:  Negative for abdominal pain, diarrhea and vomiting.   Endocrine: Negative.    Genitourinary:  Negative for decreased urine volume and dysuria.   Musculoskeletal:  Positive for back pain. Negative for neck pain.   Skin:  Negative for rash and wound.   Allergic/Immunologic: Negative.    Neurological:  Positive for facial asymmetry. Negative for weakness, numbness and headaches.   Hematological: Negative.    Psychiatric/Behavioral: Negative.     All other systems reviewed and are negative.         PHYSICAL EXAM  ED Triage  Vitals [09/04/23 2323]   Temp Heart Rate Resp BP SpO2   98 °F (36.7 °C) 88 20 -- 98 %      Temp src Heart Rate Source Patient Position BP Location FiO2 (%)   Tympanic Monitor -- -- --       Physical Exam  Constitutional:       General: He is not in acute distress.     Appearance: Normal appearance. He is not ill-appearing or toxic-appearing.   HENT:      Head: Normocephalic and atraumatic.   Eyes:      Extraocular Movements: Extraocular movements intact.      Pupils: Pupils are equal, round, and reactive to light.   Cardiovascular:      Rate and Rhythm: Normal rate and regular rhythm.      Heart sounds: No murmur heard.    No friction rub. No gallop.   Pulmonary:      Effort: Pulmonary effort is normal.      Breath sounds: Normal breath sounds.   Abdominal:      General: Abdomen is flat. There is no distension.      Palpations: Abdomen is soft.      Tenderness: There is no abdominal tenderness.   Musculoskeletal:         General: No swelling or tenderness. Normal range of motion.      Cervical back: Normal range of motion and neck supple.   Skin:     General: Skin is warm and dry.   Neurological:      General: No focal deficit present.      Mental Status: He is alert and oriented to person, place, and time.      Sensory: No sensory deficit.      Motor: No weakness.   Psychiatric:         Mood and Affect: Mood normal.         Behavior: Behavior normal.         Vital signs and nursing notes reviewed.          LAB RESULTS  Recent Results (from the past 24 hour(s))   ECG 12 Lead Dyspnea    Collection Time: 09/04/23 11:29 PM   Result Value Ref Range    QT Interval 395 ms    QTC Interval 462 ms   Comprehensive Metabolic Panel    Collection Time: 09/04/23 11:41 PM    Specimen: Blood   Result Value Ref Range    Glucose 115 (H) 65 - 99 mg/dL    BUN 20 6 - 20 mg/dL    Creatinine 1.52 (H) 0.76 - 1.27 mg/dL    Sodium 141 136 - 145 mmol/L    Potassium 3.6 3.5 - 5.2 mmol/L    Chloride 105 98 - 107 mmol/L    CO2 26.5 22.0 - 29.0  mmol/L    Calcium 9.7 8.6 - 10.5 mg/dL    Total Protein 6.7 6.0 - 8.5 g/dL    Albumin 4.4 3.5 - 5.2 g/dL    ALT (SGPT) 15 1 - 41 U/L    AST (SGOT) 28 1 - 40 U/L    Alkaline Phosphatase 49 39 - 117 U/L    Total Bilirubin <0.2 0.0 - 1.2 mg/dL    Globulin 2.3 gm/dL    A/G Ratio 1.9 g/dL    BUN/Creatinine Ratio 13.2 7.0 - 25.0    Anion Gap 9.5 5.0 - 15.0 mmol/L    eGFR 55.1 (L) >60.0 mL/min/1.73   High Sensitivity Troponin T    Collection Time: 09/04/23 11:41 PM    Specimen: Blood   Result Value Ref Range    HS Troponin T 19 (H) <15 ng/L   BNP    Collection Time: 09/04/23 11:41 PM    Specimen: Blood   Result Value Ref Range    proBNP 169.0 0.0 - 900.0 pg/mL   CBC Auto Differential    Collection Time: 09/04/23 11:41 PM    Specimen: Blood   Result Value Ref Range    WBC 6.43 3.40 - 10.80 10*3/mm3    RBC 3.76 (L) 4.14 - 5.80 10*6/mm3    Hemoglobin 11.4 (L) 13.0 - 17.7 g/dL    Hematocrit 35.2 (L) 37.5 - 51.0 %    MCV 93.6 79.0 - 97.0 fL    MCH 30.3 26.6 - 33.0 pg    MCHC 32.4 31.5 - 35.7 g/dL    RDW 13.9 12.3 - 15.4 %    RDW-SD 47.9 37.0 - 54.0 fl    MPV 11.5 6.0 - 12.0 fL    Platelets 215 140 - 450 10*3/mm3    Neutrophil % 52.7 42.7 - 76.0 %    Lymphocyte % 35.6 19.6 - 45.3 %    Monocyte % 8.2 5.0 - 12.0 %    Eosinophil % 1.7 0.3 - 6.2 %    Basophil % 1.2 0.0 - 1.5 %    Immature Grans % 0.6 (H) 0.0 - 0.5 %    Neutrophils, Absolute 3.38 1.70 - 7.00 10*3/mm3    Lymphocytes, Absolute 2.29 0.70 - 3.10 10*3/mm3    Monocytes, Absolute 0.53 0.10 - 0.90 10*3/mm3    Eosinophils, Absolute 0.11 0.00 - 0.40 10*3/mm3    Basophils, Absolute 0.08 0.00 - 0.20 10*3/mm3    Immature Grans, Absolute 0.04 0.00 - 0.05 10*3/mm3    nRBC 0.0 0.0 - 0.2 /100 WBC       Ordered the above labs and reviewed the results.        RADIOLOGY  CT Head Without Contrast    Result Date: 9/5/2023  CT OF THE HEAD WITHOUT CONTRAST  HISTORY: Right-sided facial droop.  COMPARISON: None available.  TECHNIQUE: Axial CT imaging was obtained through the brain. No IV  contrast was administered.  FINDINGS: No acute intracranial hemorrhage is seen. There is mild atrophy. There there is periventricular and deep white matter microangiopathic change. There is no midline shift or mass effect. Intracranial vascular calcifications are noted.      No acute intracranial findings.  Radiation dose reduction techniques were utilized, including automated exposure control and exposure modulation based on body size.   This report was finalized on 9/5/2023 12:13 AM by Dr. Esperanza Graves M.D.      XR Chest 1 View    Result Date: 9/5/2023  SINGLE VIEW OF THE CHEST  HISTORY: Shortness of air  COMPARISON: None available.  FINDINGS: Heart size is within normal limits. There is some calcification of the aorta. No pneumothorax, pleural effusion, or acute infiltrate is seen.      No acute findings.  This report was finalized on 9/5/2023 12:11 AM by Dr. Esperanza Graves M.D.       Ordered the above noted radiological studies. Reviewed by me in PACS.            PROCEDURES  Procedures    EKG independently interpreted by myself as follows:    EKG          EKG time: 2329  Rhythm/Rate: NSR, 82  P waves and ID: nml  QRS, axis: nml, nml   ST and T waves: nml     Interpreted Contemporaneously by me, independently viewed  No previous EKG available for comparison            MEDICATIONS GIVEN IN ER  Medications   sodium chloride 0.9 % flush 10 mL (has no administration in time range)                   MEDICAL DECISION MAKING, PROGRESS, and CONSULTS    All labs have been independently reviewed by me.  All radiology studies have been reviewed by me and I have also reviewed the radiology report.   EKG's independently viewed and interpreted by me.  Discussion below represents my analysis of pertinent findings related to patient's condition, differential diagnosis, treatment plan and final disposition.      Additional sources:  - Discussed/ obtained information from independent historians: History obtained from the  patient himself at bedside    - External (non-ED) record review: There are no previous inpatient or outpatient records currently available for ED review    - Chronic or social conditions impacting care: Hypertension, coronary artery disease, substance abuse    - Shared decision making: Admission decision based on shared conversations have between myself, the patient at bedside, as well as SYDNEY Schaefer for [facility].      Orders placed during this visit:  Orders Placed This Encounter   Procedures    XR Chest 1 View    CT Head Without Contrast    Comprehensive Metabolic Panel    High Sensitivity Troponin T    BNP    CBC Auto Differential    High Sensitivity Troponin T 2Hr    [facility] (on-call MD unless specified) Details    ECG 12 Lead Dyspnea    Insert Peripheral IV    Inpatient Admission    CBC & Differential             Differential diagnosis includes but is not limited to:    Differential diagnosis includes but is not limited to acute coronary syndrome, GERD/gastritis, esophagitis, COPD with an acute exacerbation, CHF with an acute exacerbation, pneumonia, pneumothorax, pulmonary embolism, TIA, acute CVA, or Bell's palsy.      Independent interpretation of labs, radiology studies, and discussions with consultants:    Head CT was independently interpreted by myself with my interpretation showing no area of intracranial hemorrhage, acute ischemia/infarct, or mass effect.        ED Course as of 09/05/23 0137   Tue Sep 05, 2023   0115 On reevaluation, the patient is resting comfortably with stable vital signs and without acute complaint.  I informed him that his work-up so far is unremarkable other than a mild elevation in his troponin.  Given his history and reported symptoms, we will admit him to the hospital today for further evaluation and treatment.  The patient is in agreement with that plan and all questions have been answered. [BM]   0133 The patient's presentation, work-up, as well as diagnosis and treatment  plan was discussed at length with SYDNEY Schaefer for [facility].  She agrees to admit the patient to the hospital today for Dr. Canseco. [BM]      ED Course User Index  [BM] Srinath Carvalho MD             DIAGNOSIS  Final diagnoses:   Exertional dyspnea   Facial droop   Chronic low back pain without sciatica, unspecified back pain laterality   Elevated troponin   History of substance abuse         DISPOSITION  ADMISSION    Discussed treatment plan and reason for admission with pt/family and admitting physician.  Pt/family voiced understanding of the plan for admission for further testing/treatment as needed.               Latest Documented Vital Signs:  As of 01:37 EDT  BP- 163/87 HR- 80 Temp- 98 °F (36.7 °C) (Tympanic) O2 sat- 96%              --    Please note that portions of this were completed with a voice recognition program.       Note Disclaimer: At [facility], we believe that sharing information builds trust and better relationships. You are receiving this note because you are receiving care at [facility] or recently visited. It is possible you will see health information before a provider has talked with you about it. This kind of information can be easy to misunderstand. To help you fully understand what it means for your health, we urge you to discuss this note with your provider.             Srinath Carvalho MD  09/05/23 0137      Electronically signed by Srinath Carvalho MD at 09/05/23 0137       Yudelka Mcintyre, RN at 09/04/23 2317          Patient arrived to ED via wheelchair.  Patient reports that two days ago he thinks he had a stroke because he had difficulty opening his right eye and his mouth drooped.  Patient says the symptoms have since resolved but he is having shortness of breath with exertion, pt also complains of nausea and a headache.   Patient AA&Ox4 at triage.     Electronically signed by Yudelka Mcintyre, RN at 09/04/23 2319       Oxygen Therapy (since  admission)       Date/Time SpO2 Device (Oxygen Therapy) Flow (L/min) Oxygen Concentration (%) ETCO2 (mmHg)    09/05/23 0731 97 room air -- -- --    09/05/23 0240 -- room air -- -- --    09/05/23 0045 96 -- -- -- --    09/05/23 0031 96 -- -- -- --    09/04/23 2323 98 room air -- -- --          Intake & Output (last 2 days)         09/03 0701 09/04 0700 09/04 0701 09/05 0700 09/05 0701 09/06 0700    Urine (mL/kg/hr)   900 (1.4)    Total Output   900    Net   -900                 Lines, Drains & Airways       Active LDAs       Name Placement date Placement time Site Days    Peripheral IV 09/04/23 2333 Anterior;Right;Upper Arm 09/04/23 2333  Arm  less than 1                  Medication Administration Report for Lorenzo James as of 09/05/23 7974     Legend:    Given Hold Not Given Due Canceled Entry Other Actions    Time Time (Time) Time Time-Action         Discontinued     Completed     Future     MAR Hold     Linked             Medications 09/04/23 09/05/23      acetaminophen (TYLENOL) tablet 650 mg  Dose: 650 mg  Freq: Every 4 Hours PRN Route: PO  PRN Reason: Mild Pain  Start: 09/05/23 0135   Admin Instructions:   If given for fever, use fever parameter: fever greater than 100.4 °F  Based on patient request - if ordered for moderate or severe pain, provider allows for administration of a medication prescribed for a lower pain scale.    Do not exceed 4 grams of acetaminophen in a 24 hr period. Max dose of 2gm for AST/ALT greater than 120 units/L.    If given for pain, use the following pain scale:   Mild Pain = Pain Score of 1-3, CPOT 1-2  Moderate Pain = Pain Score of 4-6, CPOT 3-4  Severe Pain = Pain Score of 7-10, CPOT 5-8     0854-Given              Or  acetaminophen (TYLENOL) 160 MG/5ML solution 650 mg  Dose: 650 mg  Freq: Every 4 Hours PRN Route: PO  PRN Reason: Mild Pain  Start: 09/05/23 0135   Admin Instructions:   If given for fever, use fever parameter: fever greater than 100.4 °F  Based on patient  request - if ordered for moderate or severe pain, provider allows for administration of a medication prescribed for a lower pain scale.    Do not exceed 4 grams of acetaminophen in a 24 hr period. Max dose of 2gm for AST/ALT greater than 120 units/L.    If given for pain, use the following pain scale:   Mild Pain = Pain Score of 1-3, CPOT 1-2  Moderate Pain = Pain Score of 4-6, CPOT 3-4  Severe Pain = Pain Score of 7-10, CPOT 5-8     0854-Not Given:  See Alt              Or  acetaminophen (TYLENOL) suppository 650 mg  Dose: 650 mg  Freq: Every 4 Hours PRN Route: RE  PRN Reason: Mild Pain  Start: 09/05/23 0135   Admin Instructions:   If given for fever, use fever parameter: fever greater than 100.4 °F  Based on patient request - if ordered for moderate or severe pain, provider allows for administration of a medication prescribed for a lower pain scale.    Do not exceed 4 grams of acetaminophen in a 24 hr period. Max dose of 2gm for AST/ALT greater than 120 units/L.    If given for pain, use the following pain scale:   Mild Pain = Pain Score of 1-3, CPOT 1-2  Moderate Pain = Pain Score of 4-6, CPOT 3-4  Severe Pain = Pain Score of 7-10, CPOT 5-8     0854-Not Given:  See Alt               sennosides-docusate (PERICOLACE) 8.6-50 MG per tablet 2 tablet  Dose: 2 tablet  Freq: 2 Times Daily Route: PO  Start: 09/05/23 0900   Admin Instructions:   HOLD MEDICATION IF PATIENT HAS HAD BOWEL MOVEMENT. Start bowel management regimen if patient has not had a bowel movement after 12 hours.     0853-Given     2100             And  polyethylene glycol (MIRALAX) packet 17 g  Dose: 17 g  Freq: Daily PRN Route: PO  PRN Reason: Constipation  PRN Comment: Use if senna-docusate is ineffective  Start: 09/05/23 0134   Admin Instructions:   Use if no bowel movement after 12 hours. Mix in 6-8 ounces of water.  Use 4-8 ounces of water, tea, or juice for each 17 gram dose.        And  bisacodyl (DULCOLAX) EC tablet 5 mg  Dose: 5 mg  Freq: Daily  PRN Route: PO  PRN Reason: Constipation  PRN Comment: Use if polyethylene glycol is ineffective  Start: 09/05/23 0134   Admin Instructions:   Use if no bowel movement after 12 hours.  Swallow whole. Do not crush, split, or chew tablet.        And  bisacodyl (DULCOLAX) suppository 10 mg  Dose: 10 mg  Freq: Daily PRN Route: RE  PRN Reason: Constipation  PRN Comment: Use if bisacodyl oral is ineffective  Start: 09/05/23 0134   Admin Instructions:   Use if no bowel movement after 12 hours.  Hold for diarrhea         calcium carbonate (TUMS) chewable tablet 500 mg (200 mg elemental)  Dose: 2 tablet  Freq: 2 Times Daily PRN Route: PO  PRN Reason: Heartburn  Start: 09/05/23 0136   Admin Instructions:   One tablet contains 200 mg elemental calcium.  Take with food.         hydrALAZINE (APRESOLINE) tablet 25 mg  Dose: 25 mg  Freq: Every 6 Hours PRN Route: PO  PRN Reason: High Blood Pressure  PRN Comment: SBP over 180 or DBP over 100  Start: 09/05/23 1343   Admin Instructions:   Caution: Look alike/sound alike drug alert         HYDROcodone-acetaminophen (NORCO) 5-325 MG per tablet 1 tablet  Dose: 1 tablet  Freq: Every 6 Hours PRN Route: PO  PRN Reason: Moderate Pain  Start: 09/05/23 1344   End: 09/12/23 1343   Admin Instructions:   Based on patient request - if ordered for moderate or severe pain, provider allows for administration of a medication prescribed for a lower pain scale.  [JOE]    Do not exceed 4 grams of acetaminophen in a 24 hr period. Max dose of 2gm for AST/ALT greater than 120 units/L        If given for pain, use the following pain scale:   Mild Pain = Pain Score of 1-3, CPOT 1-2  Moderate Pain = Pain Score of 4-6, CPOT 3-4  Severe Pain = Pain Score of 7-10, CPOT 5-8     1406-Given               isosorbide mononitrate (IMDUR) 24 hr tablet 30 mg  Dose: 30 mg  Freq: Daily Route: PO  Start: 09/05/23 1130   Admin Instructions:   Do not crush, or chew.     1406-Given               levothyroxine (SYNTHROID,  LEVOTHROID) tablet 75 mcg  Dose: 75 mcg  Freq: Daily Route: PO  Start: 09/05/23 1130   Admin Instructions:   Take on empty stomach.     1406-Given               metoprolol tartrate (LOPRESSOR) tablet 25 mg  Dose: 25 mg  Freq: 2 Times Daily Route: PO  Start: 09/05/23 1130   Admin Instructions:   Hold for SBP less than 100, DBP less than 60, or heart rate less than 50     1406-Given     2100              nicotine (NICODERM CQ) 21 MG/24HR patch 1 patch  Dose: 1 patch  Freq: Every 24 Hours Scheduled Route: TD  Start: 09/05/23 0900   Admin Instructions:   Apply to clean, dry, nonhairy area of skin (typically upper arm or shoulder)  Dispose of nicotine replacement therapies and their wrappers in non-hazardous pharmaceutical waste or in regular trash.     0854-Medication Applied               nitroglycerin (NITROSTAT) SL tablet 0.4 mg  Dose: 0.4 mg  Freq: Every 5 Minutes PRN Route: SL  PRN Reason: Chest Pain  PRN Comment: Only if SBP Greater Than 100  Start: 09/05/23 0135   Admin Instructions:   If Pain Unrelieved After 3 Doses Notify MD         ondansetron (ZOFRAN) tablet 4 mg  Dose: 4 mg  Freq: Every 6 Hours PRN Route: PO  PRN Reasons: Nausea,Vomiting  Start: 09/05/23 0136   Admin Instructions:   If BOTH ondansetron (ZOFRAN) and promethazine (PHENERGAN) are ordered use ondansetron first and THEN promethazine IF ondansetron is ineffective.        Or  ondansetron (ZOFRAN) injection 4 mg  Dose: 4 mg  Freq: Every 6 Hours PRN Route: IV  PRN Reasons: Nausea,Vomiting  Start: 09/05/23 0136   Admin Instructions:   If BOTH ondansetron (ZOFRAN) and promethazine (PHENERGAN) are ordered use ondansetron first and THEN promethazine IF ondansetron is ineffective.         ranolazine (RANEXA) 12 hr tablet 500 mg  Dose: 500 mg  Freq: 2 Times Daily Route: PO  Start: 09/05/23 1130   Admin Instructions:   Swallow whole; do not crush, split, or chew.     1406-Given     2100              risperiDONE (risperDAL) tablet 1 mg  Dose: 1 mg  Freq: 2  Times Daily Route: PO  Start: 09/05/23 1130   Admin Instructions:   Caution: Look alike/sound alike drug alert     1407-Given     2100              sodium chloride 0.9 % flush 10 mL  Dose: 10 mL  Freq: As Needed Route: IV  PRN Reason: Line Care  Start: 09/05/23 0134         sodium chloride 0.9 % flush 10 mL  Dose: 10 mL  Freq: Every 12 Hours Scheduled Route: IV  Start: 09/05/23 0158     0158-Given     0854-Given     2100             sodium chloride 0.9 % flush 10 mL  Dose: 10 mL  Freq: As Needed Route: IV  PRN Reason: Line Care  Start: 09/04/23 2336         sodium chloride 0.9 % infusion  Rate: 100 mL/hr Dose: 100 mL/hr  Freq: Continuous Route: IV  Start: 09/05/23 0158     0240-New Bag               sodium chloride 0.9 % infusion 40 mL  Dose: 40 mL  Freq: As Needed Route: IV  PRN Reason: Line Care  Start: 09/05/23 0134   Admin Instructions:   Following administration of an IV intermittent medication, flush line with 40mL NS at 100mL/hr.        Future Medications  Medications 09/04/23 09/05/23       mirtazapine (REMERON) tablet 15 mg  Dose: 15 mg  Freq: Nightly Route: PO  Start: 09/05/23 2100 2100              Completed Medications  Medications 09/04/23 09/05/23       acetaminophen (TYLENOL) tablet 1,000 mg  Dose: 1,000 mg  Freq: Once Route: PO  Start: 09/05/23 0155   End: 09/05/23 0148   Admin Instructions:   If given for fever, use fever parameter: fever greater than 100.4 °F  Based on patient request - if ordered for moderate or severe pain, provider allows for administration of a medication prescribed for a lower pain scale.    Do not exceed 4 grams of acetaminophen in a 24 hr period. Max dose of 2gm for AST/ALT greater than 120 units/L.    If given for pain, use the following pain scale:   Mild Pain = Pain Score of 1-3, CPOT 1-2  Moderate Pain = Pain Score of 4-6, CPOT 3-4  Severe Pain = Pain Score of 7-10, CPOT 5-8     0148-Given               HYDROcodone-acetaminophen (NORCO) 5-325 MG per tablet 1  tablet  Dose: 1 tablet  Freq: Once Route: PO  Start: 09/05/23 0415   End: 09/05/23 0325   Admin Instructions:   Based on patient request - if ordered for moderate or severe pain, provider allows for administration of a medication prescribed for a lower pain scale.  [JOE]    Do not exceed 4 grams of acetaminophen in a 24 hr period. Max dose of 2gm for AST/ALT greater than 120 units/L        If given for pain, use the following pain scale:   Mild Pain = Pain Score of 1-3, CPOT 1-2  Moderate Pain = Pain Score of 4-6, CPOT 3-4  Severe Pain = Pain Score of 7-10, CPOT 5-8     0325-Given                               Back Pain RRG Inpatient Care by Subha Layton RN       Indications Met: Reviewed on 9/5/2023 by Subha Layton RN       Created Using Review Status Review Entered   [facility] Primary Completed 9/5/2023 1348       Created By   Subha Layton RN       Criteria Set Name - Subset   Back Pain RRG Inpatient Care      Criteria Review   Back Pain RRG Inpatient Care     Overall Determination: Indications Met     Criteria:  [×] Admission is indicated for  1 or more  of the following :      [×] Acute neurologic deficit (eg, cauda equina or conus medullaris syndrome), as indicated by  1 or more  of the following :          [×] Bowel dysfunction          [×] Bladder dysfunction

## 2023-09-05 NOTE — ED PROVIDER NOTES
EMERGENCY DEPARTMENT ENCOUNTER    Room Number:  09/09  PCP: Provider, No Known  Historian: Patient      HPI:  Chief Complaint: Shortness of breath  A complete HPI/ROS/PMH/PSH/SH/FH are unobtainable due to: None  Context: Lorenzo James is a 52 y.o. male who presents to the ED c/o shortness of breath that is made worse by exertion that has been present now for over the past 1 week.  He denies any associated chest pain or nausea/vomiting.  He also reports that he was having some right-sided facial drooping approximately 1 week ago that has since resolved.  He does have chronic back pain which he reports has worsened over the last 1 week as well.  He denies leg weakness, urinary retention, fecal incontinence, or groin numbness.  He reports a previous history of hypertension, coronary artery disease, as well as substance abuse and is noncompliant with medication.            PAST MEDICAL HISTORY  Active Ambulatory Problems     Diagnosis Date Noted    No Active Ambulatory Problems     Resolved Ambulatory Problems     Diagnosis Date Noted    No Resolved Ambulatory Problems     No Additional Past Medical History         PAST SURGICAL HISTORY  History reviewed. No pertinent surgical history.      FAMILY HISTORY  History reviewed. No pertinent family history.      SOCIAL HISTORY  Social History     Socioeconomic History    Marital status: Single         ALLERGIES  Griseofulvin ultramicrosize [griseofulvin], Toradol [ketorolac tromethamine], and Tramadol        REVIEW OF SYSTEMS  Review of Systems   Constitutional:  Negative for activity change, appetite change and fever.   HENT:  Negative for congestion and sore throat.    Eyes: Negative.    Respiratory:  Positive for shortness of breath. Negative for cough.    Cardiovascular:  Negative for chest pain and leg swelling.   Gastrointestinal:  Negative for abdominal pain, diarrhea and vomiting.   Endocrine: Negative.    Genitourinary:  Negative for decreased urine volume and  dysuria.   Musculoskeletal:  Positive for back pain. Negative for neck pain.   Skin:  Negative for rash and wound.   Allergic/Immunologic: Negative.    Neurological:  Positive for facial asymmetry. Negative for weakness, numbness and headaches.   Hematological: Negative.    Psychiatric/Behavioral: Negative.     All other systems reviewed and are negative.         PHYSICAL EXAM  ED Triage Vitals [09/04/23 2323]   Temp Heart Rate Resp BP SpO2   98 °F (36.7 °C) 88 20 -- 98 %      Temp src Heart Rate Source Patient Position BP Location FiO2 (%)   Tympanic Monitor -- -- --       Physical Exam  Constitutional:       General: He is not in acute distress.     Appearance: Normal appearance. He is not ill-appearing or toxic-appearing.   HENT:      Head: Normocephalic and atraumatic.   Eyes:      Extraocular Movements: Extraocular movements intact.      Pupils: Pupils are equal, round, and reactive to light.   Cardiovascular:      Rate and Rhythm: Normal rate and regular rhythm.      Heart sounds: No murmur heard.    No friction rub. No gallop.   Pulmonary:      Effort: Pulmonary effort is normal.      Breath sounds: Normal breath sounds.   Abdominal:      General: Abdomen is flat. There is no distension.      Palpations: Abdomen is soft.      Tenderness: There is no abdominal tenderness.   Musculoskeletal:         General: No swelling or tenderness. Normal range of motion.      Cervical back: Normal range of motion and neck supple.   Skin:     General: Skin is warm and dry.   Neurological:      General: No focal deficit present.      Mental Status: He is alert and oriented to person, place, and time.      Sensory: No sensory deficit.      Motor: No weakness.   Psychiatric:         Mood and Affect: Mood normal.         Behavior: Behavior normal.         Vital signs and nursing notes reviewed.          LAB RESULTS  Recent Results (from the past 24 hour(s))   ECG 12 Lead Dyspnea    Collection Time: 09/04/23 11:29 PM   Result  Value Ref Range    QT Interval 395 ms    QTC Interval 462 ms   Comprehensive Metabolic Panel    Collection Time: 09/04/23 11:41 PM    Specimen: Blood   Result Value Ref Range    Glucose 115 (H) 65 - 99 mg/dL    BUN 20 6 - 20 mg/dL    Creatinine 1.52 (H) 0.76 - 1.27 mg/dL    Sodium 141 136 - 145 mmol/L    Potassium 3.6 3.5 - 5.2 mmol/L    Chloride 105 98 - 107 mmol/L    CO2 26.5 22.0 - 29.0 mmol/L    Calcium 9.7 8.6 - 10.5 mg/dL    Total Protein 6.7 6.0 - 8.5 g/dL    Albumin 4.4 3.5 - 5.2 g/dL    ALT (SGPT) 15 1 - 41 U/L    AST (SGOT) 28 1 - 40 U/L    Alkaline Phosphatase 49 39 - 117 U/L    Total Bilirubin <0.2 0.0 - 1.2 mg/dL    Globulin 2.3 gm/dL    A/G Ratio 1.9 g/dL    BUN/Creatinine Ratio 13.2 7.0 - 25.0    Anion Gap 9.5 5.0 - 15.0 mmol/L    eGFR 55.1 (L) >60.0 mL/min/1.73   High Sensitivity Troponin T    Collection Time: 09/04/23 11:41 PM    Specimen: Blood   Result Value Ref Range    HS Troponin T 19 (H) <15 ng/L   BNP    Collection Time: 09/04/23 11:41 PM    Specimen: Blood   Result Value Ref Range    proBNP 169.0 0.0 - 900.0 pg/mL   CBC Auto Differential    Collection Time: 09/04/23 11:41 PM    Specimen: Blood   Result Value Ref Range    WBC 6.43 3.40 - 10.80 10*3/mm3    RBC 3.76 (L) 4.14 - 5.80 10*6/mm3    Hemoglobin 11.4 (L) 13.0 - 17.7 g/dL    Hematocrit 35.2 (L) 37.5 - 51.0 %    MCV 93.6 79.0 - 97.0 fL    MCH 30.3 26.6 - 33.0 pg    MCHC 32.4 31.5 - 35.7 g/dL    RDW 13.9 12.3 - 15.4 %    RDW-SD 47.9 37.0 - 54.0 fl    MPV 11.5 6.0 - 12.0 fL    Platelets 215 140 - 450 10*3/mm3    Neutrophil % 52.7 42.7 - 76.0 %    Lymphocyte % 35.6 19.6 - 45.3 %    Monocyte % 8.2 5.0 - 12.0 %    Eosinophil % 1.7 0.3 - 6.2 %    Basophil % 1.2 0.0 - 1.5 %    Immature Grans % 0.6 (H) 0.0 - 0.5 %    Neutrophils, Absolute 3.38 1.70 - 7.00 10*3/mm3    Lymphocytes, Absolute 2.29 0.70 - 3.10 10*3/mm3    Monocytes, Absolute 0.53 0.10 - 0.90 10*3/mm3    Eosinophils, Absolute 0.11 0.00 - 0.40 10*3/mm3    Basophils, Absolute 0.08 0.00  - 0.20 10*3/mm3    Immature Grans, Absolute 0.04 0.00 - 0.05 10*3/mm3    nRBC 0.0 0.0 - 0.2 /100 WBC       Ordered the above labs and reviewed the results.        RADIOLOGY  CT Head Without Contrast    Result Date: 9/5/2023  CT OF THE HEAD WITHOUT CONTRAST  HISTORY: Right-sided facial droop.  COMPARISON: None available.  TECHNIQUE: Axial CT imaging was obtained through the brain. No IV contrast was administered.  FINDINGS: No acute intracranial hemorrhage is seen. There is mild atrophy. There there is periventricular and deep white matter microangiopathic change. There is no midline shift or mass effect. Intracranial vascular calcifications are noted.      No acute intracranial findings.  Radiation dose reduction techniques were utilized, including automated exposure control and exposure modulation based on body size.   This report was finalized on 9/5/2023 12:13 AM by Dr. Esperanza Graves M.D.      XR Chest 1 View    Result Date: 9/5/2023  SINGLE VIEW OF THE CHEST  HISTORY: Shortness of air  COMPARISON: None available.  FINDINGS: Heart size is within normal limits. There is some calcification of the aorta. No pneumothorax, pleural effusion, or acute infiltrate is seen.      No acute findings.  This report was finalized on 9/5/2023 12:11 AM by Dr. Esperanza Graves M.D.       Ordered the above noted radiological studies. Reviewed by me in PACS.            PROCEDURES  Procedures    EKG independently interpreted by myself as follows:    EKG          EKG time: 2329  Rhythm/Rate: NSR, 82  P waves and KY: nml  QRS, axis: nml, nml   ST and T waves: nml     Interpreted Contemporaneously by me, independently viewed  No previous EKG available for comparison            MEDICATIONS GIVEN IN ER  Medications   sodium chloride 0.9 % flush 10 mL (has no administration in time range)                   MEDICAL DECISION MAKING, PROGRESS, and CONSULTS    All labs have been independently reviewed by me.  All radiology studies have  been reviewed by me and I have also reviewed the radiology report.   EKG's independently viewed and interpreted by me.  Discussion below represents my analysis of pertinent findings related to patient's condition, differential diagnosis, treatment plan and final disposition.      Additional sources:  - Discussed/ obtained information from independent historians: History obtained from the patient himself at bedside    - External (non-ED) record review: There are no previous inpatient or outpatient records currently available for ED review    - Chronic or social conditions impacting care: Hypertension, coronary artery disease, substance abuse    - Shared decision making: Admission decision based on shared conversations have between myself, the patient at bedside, as well as SYDNEY Schaefer for [facility].      Orders placed during this visit:  Orders Placed This Encounter   Procedures    XR Chest 1 View    CT Head Without Contrast    Comprehensive Metabolic Panel    High Sensitivity Troponin T    BNP    CBC Auto Differential    High Sensitivity Troponin T 2Hr    [facility] (on-call MD unless specified) Details    ECG 12 Lead Dyspnea    Insert Peripheral IV    Inpatient Admission    CBC & Differential             Differential diagnosis includes but is not limited to:    Differential diagnosis includes but is not limited to acute coronary syndrome, GERD/gastritis, esophagitis, COPD with an acute exacerbation, CHF with an acute exacerbation, pneumonia, pneumothorax, pulmonary embolism, TIA, acute CVA, or Bell's palsy.      Independent interpretation of labs, radiology studies, and discussions with consultants:    Head CT was independently interpreted by myself with my interpretation showing no area of intracranial hemorrhage, acute ischemia/infarct, or mass effect.        ED Course as of 09/05/23 0137   Tue Sep 05, 2023   0115 On reevaluation, the patient is resting comfortably with stable vital signs and without acute  complaint.  I informed him that his work-up so far is unremarkable other than a mild elevation in his troponin.  Given his history and reported symptoms, we will admit him to the hospital today for further evaluation and treatment.  The patient is in agreement with that plan and all questions have been answered. [BM]   0135 The patient's presentation, work-up, as well as diagnosis and treatment plan was discussed at length with SYDNEY Schaefer for [facility].  She agrees to admit the patient to the hospital today for Dr. Canseco. [BM]      ED Course User Index  [BM] Srinath Carvalho MD             DIAGNOSIS  Final diagnoses:   Exertional dyspnea   Facial droop   Chronic low back pain without sciatica, unspecified back pain laterality   Elevated troponin   History of substance abuse         DISPOSITION  ADMISSION    Discussed treatment plan and reason for admission with pt/family and admitting physician.  Pt/family voiced understanding of the plan for admission for further testing/treatment as needed.               Latest Documented Vital Signs:  As of 01:37 EDT  BP- 163/87 HR- 80 Temp- 98 °F (36.7 °C) (Tympanic) O2 sat- 96%              --    Please note that portions of this were completed with a voice recognition program.       Note Disclaimer: At [facility], we believe that sharing information builds trust and better relationships. You are receiving this note because you are receiving care at [facility] or recently visited. It is possible you will see health information before a provider has talked with you about it. This kind of information can be easy to misunderstand. To help you fully understand what it means for your health, we urge you to discuss this note with your provider.             Srinath Carvalho MD  09/05/23 0134

## 2023-09-05 NOTE — CASE MANAGEMENT/SOCIAL WORK
Discharge Planning Assessment  [facility]     Patient Name: Lorenzo James  MRN: [id number]  Today's Date: 9/5/2023    Admit Date: 9/4/2023    Plan: plans back to [facility] at d/c   Discharge Needs Assessment       Row Name 09/05/23 1519       Living Environment    People in Home friend(s)    Current Living Arrangements hotel/motel    Potentially Unsafe Housing Conditions none    Primary Care Provided by self    Provides Primary Care For no one    Quality of Family Relationships helpful;involved    Able to Return to Prior Arrangements yes       Resource/Environmental Concerns    Resource/Environmental Concerns financial;reliable transportation       Transportation Needs    In the past 12 months, has lack of transportation kept you from medical appointments or from getting medications? yes       Transition Planning    Patient/Family Anticipates Transition to home  hotel    Patient/Family Anticipated Services at Transition none    Transportation Anticipated public transportation;health plan transportation       Discharge Needs Assessment    Equipment Currently Used at Home wheelchair    Concerns to be Addressed denies needs/concerns at this time    Equipment Needed After Discharge none    Provided Post Acute Provider List? N/A                   Discharge Plan       Row Name 09/05/23 1524       Plan    Plan plans back to [facility] at d/c    Patient/Family in Agreement with Plan yes    Plan Comments Spoke with pt. Confirmed facesheet correct. Explained role of CCP. Pt reports he recently moved to [place] from [facility] with his friend. He has a room at the [facility] ([facility]) off [address]. Pt reports he gets SSI and his mother sends him money so he will continue to stay at the [facility]. Pt has support from his friend. Pt uses a wheelchair and reports he will need transportation arranged at d/c. He has no HH or SNF history. Pt is agreeable to meds to beds. Pt agreeable to go to [facility] if needed at d/c.  CCP to follow for d/c needs.                  Continued Care and Services - Admitted Since 9/4/2023    Coordination has not been started for this encounter.       Expected Discharge Date and Time       Expected Discharge Date Expected Discharge Time    Sep 6, 2023            Demographic Summary    No documentation.                  Functional Status    No documentation.                  Psychosocial    No documentation.                  Abuse/Neglect    No documentation.                  Legal    No documentation.                  Substance Abuse    No documentation.                  Patient Forms    No documentation.                     CRISPIN Rodriges

## 2023-09-06 LAB
QT INTERVAL: 395 MS
QTC INTERVAL: 462 MS

## 2023-09-06 PROCEDURE — 99232 SBSQ HOSP IP/OBS MODERATE 35: CPT | Performed by: NURSE PRACTITIONER

## 2023-09-06 PROCEDURE — 0 HYDROCORTISONE SOD SUC (PF) 250 MG RECONSTITUTED SOLUTION: Performed by: NURSE PRACTITIONER

## 2023-09-06 RX ORDER — FAMOTIDINE 10 MG/ML
20 INJECTION, SOLUTION INTRAVENOUS EVERY 12 HOURS SCHEDULED
Status: DISCONTINUED | OUTPATIENT
Start: 2023-09-06 | End: 2023-09-07 | Stop reason: HOSPADM

## 2023-09-06 RX ORDER — ATORVASTATIN CALCIUM 20 MG/1
20 TABLET, FILM COATED ORAL NIGHTLY
Status: DISCONTINUED | OUTPATIENT
Start: 2023-09-06 | End: 2023-09-07 | Stop reason: HOSPADM

## 2023-09-06 RX ADMIN — HYDROCORTISONE SODIUM SUCCINATE 250 MG: 250 INJECTION, POWDER, FOR SOLUTION INTRAMUSCULAR; INTRAVENOUS at 17:54

## 2023-09-06 RX ADMIN — FAMOTIDINE 20 MG: 10 INJECTION INTRAVENOUS at 20:53

## 2023-09-06 RX ADMIN — SODIUM CHLORIDE 100 ML/HR: 9 INJECTION, SOLUTION INTRAVENOUS at 10:35

## 2023-09-06 RX ADMIN — RISPERIDONE 1 MG: 1 TABLET, FILM COATED ORAL at 20:33

## 2023-09-06 RX ADMIN — MIRTAZAPINE 15 MG: 15 TABLET, FILM COATED ORAL at 20:33

## 2023-09-06 RX ADMIN — LEVOTHYROXINE SODIUM 75 MCG: 0.07 TABLET ORAL at 09:19

## 2023-09-06 RX ADMIN — ISOSORBIDE MONONITRATE 30 MG: 30 TABLET, EXTENDED RELEASE ORAL at 09:19

## 2023-09-06 RX ADMIN — RANOLAZINE 500 MG: 500 TABLET, FILM COATED, EXTENDED RELEASE ORAL at 09:19

## 2023-09-06 RX ADMIN — FAMOTIDINE 20 MG: 10 INJECTION INTRAVENOUS at 12:23

## 2023-09-06 RX ADMIN — NICOTINE 1 PATCH: 21 PATCH, EXTENDED RELEASE TRANSDERMAL at 09:20

## 2023-09-06 RX ADMIN — METOPROLOL TARTRATE 25 MG: 25 TABLET, FILM COATED ORAL at 09:18

## 2023-09-06 RX ADMIN — RISPERIDONE 1 MG: 1 TABLET, FILM COATED ORAL at 09:18

## 2023-09-06 RX ADMIN — HYDROCODONE BITARTRATE AND ACETAMINOPHEN 1 TABLET: 5; 325 TABLET ORAL at 09:18

## 2023-09-06 RX ADMIN — SENNOSIDES AND DOCUSATE SODIUM 2 TABLET: 50; 8.6 TABLET ORAL at 12:24

## 2023-09-06 RX ADMIN — ATORVASTATIN CALCIUM 20 MG: 20 TABLET, FILM COATED ORAL at 20:33

## 2023-09-06 RX ADMIN — HYDROCODONE BITARTRATE AND ACETAMINOPHEN 1 TABLET: 5; 325 TABLET ORAL at 17:59

## 2023-09-06 RX ADMIN — Medication 10 ML: at 09:18

## 2023-09-06 RX ADMIN — RANOLAZINE 500 MG: 500 TABLET, FILM COATED, EXTENDED RELEASE ORAL at 20:33

## 2023-09-06 RX ADMIN — HYDROCORTISONE SODIUM SUCCINATE 250 MG: 250 INJECTION, POWDER, FOR SOLUTION INTRAMUSCULAR; INTRAVENOUS at 12:23

## 2023-09-06 RX ADMIN — Medication 10 ML: at 20:53

## 2023-09-06 NOTE — PROGRESS NOTES
LOS: 1 day   Patient Care Team:  Provider, No Known as PCP - General    Chief Complaint: Lumbar pain with right lower extremity radiculopathy    Subjective       Interval History: Denies any episodes of incontinence since admission. Moving arms and legs without difficulty. PT/OT evaluations pending. Reports pain in the R low back and down the posterior lateral aspect of the thigh. Denies any distal leg pain or calf pain.  Denies numbness or tingling.    History taken from: patient chart    Objective          Vital Signs  Temp:  [97.2 °F (36.2 °C)-97.7 °F (36.5 °C)] 97.5 °F (36.4 °C)  Heart Rate:  [64-81] 76  Resp:  [18] 18  BP: ()/() 168/96    Physical Exam:    AA&O x 3.   Follows commands readily in all four extremities.   Tender to palpation across the lumbar spine.   No motor or sensory deficits bilaterally.   DTR's 1+. Negative Hoffmans'; negative clonus bilaterally.   Negative SLR bilaterally.   Sensation intact bilaterally to light touch.      Results Review:     I reviewed the patient's new clinical results.  I reviewed the patient's new imaging results and agree with the interpretation.  Discussed with Dr. Gibbons and the patient.    LUMBAR SPINE MRI WITHOUT AND WITH GADOLINIUM 9/05/2023    I have independently reviewed the MRI images of the lumbar spine with without contrast.  The study reveals no fracture or subluxation however there are significant degenerative changes with mild 3 mm L4 and L5 anterolisthesis.  There is severe disc space narrowing at L4-5 about L5-S1.  Increased signal of the endplate toward the right at L4 without height loss.  Conus terminates at L1.  Diffuse disc bulging at L2-3 and L3-4 there is also diffuse disc bulging which is much more significant at L4-5 contributing to moderate canal stenosis and facet hypertrophy.  There is severe right neuroforaminal narrowing at L4-5 and severe bilateral neuroforaminal narrowing at L5-S1.    .  Results from last 7 days   Lab  "Units 09/05/23  1046 09/04/23  2341   WBC 10*3/mm3 6.49 6.43   HEMOGLOBIN g/dL 12.2* 11.4*   HEMATOCRIT % 37.7 35.2*   PLATELETS 10*3/mm3 236 215       Assessment & Plan       Lumbar pain with radiation down both legs    Exertional dyspnea    Benign essential hypertension    CAD S/P percutaneous coronary angioplasty    CKD (chronic kidney disease) stage 3, GFR 30-59 ml/min    Urinary incontinence without sensory awareness    Lumbar pain with palpation of the arthritic lumbar facet joints.   No findings of radiculopathy on exam. No compression of cauda equina on MRI.   Discussed the option of epidural injection with the patient who stated \"my momma told me never to have one of those.\"   Agreeable to IV steroids.   If doing better tomorrow, walking ok, recommend transitioning to oral steroid taper with outpatient PT to be provided by admitting service.   No f/u necessary with Neurosurgery unless he develops severe, intractable leg pain and leg weakness.   Call [facility] for any further questions or concerns.     Ese Soliman, SYDNEY  09/06/23  09:20 EDT        "

## 2023-09-06 NOTE — PROGRESS NOTES
Name: Lorenzo James ADMIT: 2023   : 1970  PCP: Provider, No Known    MRN: [id number] LOS: 1 days   AGE/SEX: 52 y.o. male  ROOM: [facility]/   Subjective   Chief Complaint   Patient presents with    Shortness of Breath     Had MRI  Had U/S  Had echo  Still with weakness  States has been using a wheelchair more for the past week or so         Objective   Vital Signs  Temp:  [97.2 °F (36.2 °C)-97.7 °F (36.5 °C)] 97.5 °F (36.4 °C)  Heart Rate:  [64-81] 76  Resp:  [18] 18  BP: ()/(49-96) 168/96  SpO2:  [98 %-100 %] 100 %  on   ;   Device (Oxygen Therapy): room air  Body mass index is 26.5 kg/m².    Physical Exam  HENT:      Head: Normocephalic and atraumatic.   Eyes:      General: No scleral icterus.  Cardiovascular:      Rate and Rhythm: Normal rate and regular rhythm.      Heart sounds: Normal heart sounds.   Pulmonary:      Effort: Pulmonary effort is normal. No respiratory distress.      Breath sounds: Normal breath sounds.   Abdominal:      General: There is no distension.      Palpations: Abdomen is soft.      Tenderness: There is no abdominal tenderness.   Musculoskeletal:      Cervical back: Neck supple.   Neurological:      Mental Status: He is alert.   Psychiatric:         Behavior: Behavior normal.   Chronically ill     Results Review:       I reviewed the patient's new clinical results.  Results from last 7 days   Lab Units 23  1046 23  2341   WBC 10*3/mm3 6.49 6.43   HEMOGLOBIN g/dL 12.2* 11.4*   PLATELETS 10*3/mm3 236 215     Results from last 7 days   Lab Units 23  1046 23  2341   SODIUM mmol/L 139 141   POTASSIUM mmol/L 3.7 3.6   CHLORIDE mmol/L 107 105   CO2 mmol/L 22.2 26.5   BUN mg/dL 19 20   CREATININE mg/dL 1.30* 1.52*   GLUCOSE mg/dL 119* 115*   Estimated Creatinine Clearance: 82 mL/min (A) (by C-G formula based on SCr of 1.3 mg/dL (H)).  Results from last 7 days   Lab Units 23  2341   ALBUMIN g/dL 4.4   BILIRUBIN mg/dL <0.2   ALK PHOS U/L 49   AST  (SGOT) U/L 28   ALT (SGPT) U/L 15     Results from last 7 days   Lab Units 09/05/23  1046 09/04/23  2341   CALCIUM mg/dL 8.8 9.7   ALBUMIN g/dL  --  4.4         Coag     HbA1C No results found for: HGBA1C  Infection   Results from last 7 days   Lab Units 09/05/23  1052 09/05/23  1046   BLOODCX  No growth at 24 hours No growth at 24 hours     Radiology(recent) CT Head Without Contrast    Result Date: 9/5/2023  No acute intracranial findings.  Radiation dose reduction techniques were utilized, including automated exposure control and exposure modulation based on body size.   This report was finalized on 9/5/2023 12:13 AM by Dr. Esperanza Graves M.D.      MRI Brain Without Contrast    Result Date: 9/5/2023  1. No acute intracranial abnormality.  This report was finalized on 9/5/2023 11:38 PM by Dr. Esperanza Graves M.D.      XR Chest 1 View    Result Date: 9/5/2023  No acute findings.  This report was finalized on 9/5/2023 12:11 AM by Dr. Esperanza Graves M.D.      MRI Lumbar Spine With & Without Contrast    Result Date: 9/5/2023  1. No acute fracture or subluxation identified. 2. Mild marrow abnormality involving the superior endplate of L4 is nonspecific. Modic type I change would be a consideration. 3. Degenerative changes, as noted above.  This report was finalized on 9/5/2023 11:58 PM by Dr. Esperanza Graves M.D.     HS Troponin T   Date Value Ref Range Status   09/05/2023 18 (H) <15 ng/L Final   09/04/2023 19 (H) <15 ng/L Final     No components found for: TSH;2    atorvastatin, 20 mg, Oral, Nightly  famotidine, 20 mg, Intravenous, Q12H  hydrocortisone sodium succinate, 250 mg, Intravenous, Q6H  isosorbide mononitrate, 30 mg, Oral, Daily  levothyroxine, 75 mcg, Oral, Daily  metoprolol tartrate, 25 mg, Oral, BID  mirtazapine, 15 mg, Oral, Nightly  nicotine, 1 patch, Transdermal, Q24H  ranolazine, 500 mg, Oral, BID  risperiDONE, 1 mg, Oral, BID  senna-docusate sodium, 2 tablet, Oral, BID  sodium chloride, 10  mL, Intravenous, Q12H      sodium chloride, 100 mL/hr, Last Rate: 100 mL/hr (09/06/23 1035)    Diet: Cardiac Diets; Healthy Heart (2-3 Na+); Texture: Regular Texture (IDDSI 7); Fluid Consistency: Thin (IDDSI 0)      Assessment & Plan      Active Hospital Problems    Diagnosis  POA    **Lumbar pain with radiation down both legs [M54.50, M79.604, M79.605]  Yes    Exertional dyspnea [R06.09]  Yes    Urinary incontinence without sensory awareness [N39.42]  Yes    CKD (chronic kidney disease) stage 3, GFR 30-59 ml/min [N18.30]  Yes    CAD S/P percutaneous coronary angioplasty [I25.10, Z98.61]  Not Applicable    Benign essential hypertension [I10]  Yes      Resolved Hospital Problems   No resolved problems to display.       Will obtain MRI L spine complete. [facility] following and have ordered IV Steroids  blood cultures NGTD  MRI brain negative (with possible facial droop a couple of days ago). Will obtain carotid doppler complete. 2D ECHO complete.   Restarted home agents for CAD though hold asa pending [facility] plans   Monitor labs  I discussed the patients findings and my recommendations with patient and nursing staff.     VTE Prophylaxis - SCDs.  Code Status - Full code.      DW RN      Montana Collins MD  [facility]  09/06/23  12:17 EDT

## 2023-09-06 NOTE — SIGNIFICANT NOTE
09/06/23 1323   OTHER   Discipline occupational therapist   Rehab Time/Intention   Session Not Performed other (see comments)  (pt sitting EOB , completes hi ssocks on his own and changing out his pants on his own. appears to be independent and no OT needs. pt reports no therapy needs. OT signing off. [phone])

## 2023-09-06 NOTE — SIGNIFICANT NOTE
09/06/23 0971   OTHER   Discipline physical therapist;other (see comments)  (PT spoke w RN. see OT note. will defer eval.  please re-order should mobility deficits arise.)   Therapy Assessment/Plan (PT)   Criteria for Skilled Interventions Met (PT) no problems identified which require skilled intervention

## 2023-09-07 ENCOUNTER — READMISSION MANAGEMENT (OUTPATIENT)
Dept: CALL CENTER | Facility: HOSPITAL | Age: 53
End: 2023-09-07
Payer: MEDICARE

## 2023-09-07 VITALS
OXYGEN SATURATION: 99 % | RESPIRATION RATE: 16 BRPM | HEIGHT: 71 IN | DIASTOLIC BLOOD PRESSURE: 97 MMHG | WEIGHT: 190 LBS | TEMPERATURE: 97.5 F | BODY MASS INDEX: 26.6 KG/M2 | SYSTOLIC BLOOD PRESSURE: 168 MMHG | HEART RATE: 84 BPM

## 2023-09-07 PROCEDURE — 0 HYDROCORTISONE SOD SUC (PF) 250 MG RECONSTITUTED SOLUTION: Performed by: NURSE PRACTITIONER

## 2023-09-07 RX ORDER — METHYLPREDNISOLONE 4 MG/1
TABLET ORAL
Qty: 21 TABLET | Refills: 0 | Status: ON HOLD | OUTPATIENT
Start: 2023-09-07

## 2023-09-07 RX ORDER — ATORVASTATIN CALCIUM 20 MG/1
20 TABLET, FILM COATED ORAL NIGHTLY
Qty: 30 TABLET | Refills: 0 | Status: ON HOLD | OUTPATIENT
Start: 2023-09-07

## 2023-09-07 RX ORDER — TIZANIDINE HYDROCHLORIDE 2 MG/1
2 CAPSULE, GELATIN COATED ORAL 3 TIMES DAILY PRN
Qty: 9 CAPSULE | Refills: 0 | Status: ON HOLD | OUTPATIENT
Start: 2023-09-07

## 2023-09-07 RX ADMIN — LEVOTHYROXINE SODIUM 75 MCG: 0.07 TABLET ORAL at 08:21

## 2023-09-07 RX ADMIN — METOPROLOL TARTRATE 25 MG: 25 TABLET, FILM COATED ORAL at 08:26

## 2023-09-07 RX ADMIN — HYDROCORTISONE SODIUM SUCCINATE 250 MG: 250 INJECTION, POWDER, FOR SOLUTION INTRAMUSCULAR; INTRAVENOUS at 05:00

## 2023-09-07 RX ADMIN — RANOLAZINE 500 MG: 500 TABLET, FILM COATED, EXTENDED RELEASE ORAL at 08:20

## 2023-09-07 RX ADMIN — Medication 10 ML: at 08:20

## 2023-09-07 RX ADMIN — RISPERIDONE 1 MG: 1 TABLET, FILM COATED ORAL at 08:19

## 2023-09-07 RX ADMIN — NICOTINE 1 PATCH: 21 PATCH, EXTENDED RELEASE TRANSDERMAL at 08:22

## 2023-09-07 RX ADMIN — ISOSORBIDE MONONITRATE 30 MG: 30 TABLET, EXTENDED RELEASE ORAL at 08:21

## 2023-09-07 RX ADMIN — HYDROCODONE BITARTRATE AND ACETAMINOPHEN 1 TABLET: 5; 325 TABLET ORAL at 06:00

## 2023-09-07 RX ADMIN — HYDROCORTISONE SODIUM SUCCINATE 250 MG: 250 INJECTION, POWDER, FOR SOLUTION INTRAMUSCULAR; INTRAVENOUS at 00:35

## 2023-09-07 RX ADMIN — HYDROCODONE BITARTRATE AND ACETAMINOPHEN 1 TABLET: 5; 325 TABLET ORAL at 00:35

## 2023-09-07 RX ADMIN — FAMOTIDINE 20 MG: 10 INJECTION INTRAVENOUS at 08:19

## 2023-09-07 NOTE — PLAN OF CARE
Goal Outcome Evaluation:  Plan of Care Reviewed With: patient        Progress: no change  Outcome Evaluation: pt says norco helps low back pain and he is not sure if new steroid is helping yet. Pt refused his heart monitor.

## 2023-09-07 NOTE — PLAN OF CARE
Problem: Adult Inpatient Plan of Care  Goal: Absence of Hospital-Acquired Illness or Injury  Intervention: Identify and Manage Fall Risk  Description: Perform standard risk assessment on admission using a validated tool or comprehensive approach appropriate to the patient; reassess fall risk frequently, with change in status or transfer to another level of care.  Communicate fall injury risk to interprofessional healthcare team.  Determine need for increased observation, equipment and environmental modification, such as low bed, signage and supportive, nonskid footwear.  Adjust safety measures to individual developmental age, stage and identified risk factors.  Reinforce the importance of safety and physical activity with patient and family.  Perform regular intentional rounding to assess need for position change, pain assessment and personal needs, including assistance with toileting.  Intervention: Prevent Skin Injury  Description: Perform a screening for skin injury risk, such as pressure or moisture associated skin damage on admission and at regular intervals throughout hospital stay.  Keep all areas of skin (especially folds) clean and dry.  Maintain adequate skin hydration.  Relieve and redistribute pressure and protect bony prominences; implement measures based on patient-specific risk factors.  Match turning and repositioning schedule to clinical condition.  Encourage weight shift frequently; assist with reposition if unable to complete independently.  Float heels off bed; avoid pressure on the Achilles tendon.  Keep skin free from extended contact with medical devices.  Encourage functional activity and mobility, as early as tolerated.  Use aids (e.g., slide boards, mechanical lift) during transfer.  Intervention: Prevent and Manage VTE (Venous Thromboembolism) Risk  Description: Assess for VTE (venous thromboembolism) risk.  Encourage and assist with early ambulation.  Initiate and maintain compression  or other therapy, as indicated, based on identified risk in accordance with organizational protocol and provider order.  Encourage both active and passive leg exercises while in bed, if unable to ambulate.  Intervention: Prevent Infection  Description: Maintain skin and mucous membrane integrity; promote hand, oral and pulmonary hygiene.  Optimize fluid balance, nutrition, sleep and glycemic control to maximize infection resistance.  Identify potential sources of infection early to prevent or mitigate progression of infection (e.g., wound, lines, devices).  Evaluate ongoing need for invasive devices; remove promptly when no longer indicated.  Goal: Optimal Comfort and Wellbeing  Intervention: Monitor Pain and Promote Comfort  Description: Assess pain level, treatment efficacy and patient response at regular intervals using a consistent pain scale.  Consider the presence and impact of preexisting chronic pain.  Encourage patient and caregiver involvement in pain assessment, interventions and safety measures.  Intervention: Provide Person-Centered Care  Description: Use a family-focused approach to care.  Develop trust and rapport by proactively providing information, encouraging questions, addressing concerns and offering reassurance.  Acknowledge emotional response to hospitalization.  Recognize and utilize personal coping strategies.  Honor spiritual and cultural preferences.   Goal Outcome Evaluation:

## 2023-09-07 NOTE — CASE MANAGEMENT/SOCIAL WORK
Continued Stay Note  [facility]     Patient Name: Lorenzo James  MRN: [id number]  Today's Date: 9/7/2023    Admit Date: 9/4/2023    Plan: Return to [facility] via [facility] w/c van cab   Discharge Plan       Row Name 09/07/23 1050       Plan    Plan Return to [facility] via [facility] w/c van cab    Plan Comments D/c order noted. [facility] provided w/c van cab voucher to RN. Per RN, patient cannot afford his medications. CCP spoke with CCP manager Alexandra HAN who states CCP can cover cost of meds this admission; pharmacy updated. STEPHAN ABEL                   Discharge Codes    No documentation.                 Expected Discharge Date and Time       Expected Discharge Date Expected Discharge Time    Sep 7, 2023               STEPHAN Jurado

## 2023-09-07 NOTE — DISCHARGE SUMMARY
NAME: Lorenzo James ADMIT: 2023   : 1970  PCP: Provider, No Known    MRN: [id number] LOS: 2 days   AGE/SEX: 52 y.o. male  ROOM: N442/1     Date of Admission:  2023  Date of Discharge:  2023    PCP: Provider, No Known    CHIEF COMPLAINT  Shortness of Breath      DISCHARGE DIAGNOSIS  Active Hospital Problems    Diagnosis  POA    **Lumbar pain with radiation down both legs [M54.50, M79.604, M79.605]  Yes    Exertional dyspnea [R06.09]  Yes    Urinary incontinence without sensory awareness [N39.42]  Yes    CKD (chronic kidney disease) stage 3, GFR 30-59 ml/min [N18.30]  Yes    CAD S/P percutaneous coronary angioplasty [I25.10, Z98.61]  Not Applicable    Benign essential hypertension [I10]  Yes      Resolved Hospital Problems   No resolved problems to display.       SECONDARY DIAGNOSES  History reviewed. No pertinent past medical history.    CONSULTS       HOSPITAL COURSE  Lorenzo James is a 52 y.o. male with history of coronary artery disease, polysubstance abuse, chronic lower back pain. He was admitted to a hospital in [facility] 2023 and had Underwent work-up including cardiac stress test which showed a fixed scar but no reversible scar or ischemia. Seen in house by cardiology, no acute testing commended for now. Also seen in house by nephrology for mild increase in kidney function. No acute interventions recommended at this time. He has been given refills for his metoprolol and isosorbide mononitrate. He was medically stable for discharge, follow-up with PCP and cardiology as scheduled. He has come to [facility] in the past week.  He comes with a couple of complaints.  1 is that he had some difficulty in opening his right eye a couple of days ago and concern for potential right-sided facial droop.  The symptoms have subsequently resolved. Additionally he has had increasing lower back pain with weakness of his legs. He does state that he has chronic lower back pain and has had  "previous MRI but and acute change of his symptoms over the past week.    He had MRI which was negative for acute stroke. Had MRI lumbar spine. Per [facility]:  Lumbar pain with palpation of the arthritic lumbar facet joints.   No findings of radiculopathy on exam. No compression of cauda equina on MRI.   Discussed the option of epidural injection with the patient who stated \"my momma told me never to have one of those.\"   Agreeable to IV steroids.   If doing better , walking ok, recommend transitioning to oral steroid taper with outpatient PT to be provided by admitting service.   No f/u necessary with Neurosurgery unless he develops severe, intractable leg pain and leg weakness.     He will be discharged on steroid taper and outpatient follow up. I told him I cannot prescribe him narcotics, will give 3 days of a muscle relaxer at low dose.       DIAGNOSTICS     MRI Brain Without Contrast [[id number]] Jb as Reviewed   Order Status: Completed Collected: 09/05/23 2328    Updated: 09/05/23 2341   Narrative:     BRAIN MRI WITHOUT CONTRAST     HISTORY: stroke rule out; R06.09-Other forms of dyspnea; R29.810-Facial  weakness; M54.50-Low back pain, unspecified; G89.29-Other chronic pain;  R77.8-Other specified abnormalities of plasma proteins; F19.11-Other  psychoactive substance abuse, in remission     COMPARISON: None.     FINDINGS:  Multiplanar images of the head were obtained without  gadolinium. No areas of restricted diffusion are seen to suggest acute  infarct. The ventricles are normal in size. There is no midline shift or  mass effect. There is some periventricular and deep white matter  microangiopathic change. The intracranial flow voids appear intact. Old  lacunar infarct is noted within the right centrum semiovale/corona  radiata. No abnormality is seen on gradient echo imaging. There is some  mucosal thickening noted within the ethmoid sinuses.      Impression:     1. No acute intracranial abnormality.     This " report was finalized on 9/5/2023 11:38 PM by Dr. Esperanza Graves M.D.       MRI Lumbar Spine With & Without Contrast [[id number]] Jb as Reviewed   Order Status: Completed Collected: 09/05/23 2347    Updated: 09/06/23 0001   Narrative:     LUMBAR SPINE MRI WITHOUT AND WITH GADOLINIUM     HISTORY: lower back pain with incontinence and weakness; R06.09-Other  forms of dyspnea; R29.810-Facial weakness; M54.50-Low back pain,  unspecified; G89.29-Other chronic pain; R77.8-Other specified  abnormalities of plasma proteins; F19.11-Other psychoactive substance  abuse, in remission     COMPARISON:  None.     FINDINGS:  Multiplanar images of the lumbar obtained without and with  gadolinium.  Axial images obtained from T12-S1.     No acute fracture or subluxation of the lumbar spine is seen. There is  mild anterolisthesis of L4 on L5 5, by approximately 3 mm. There is also  some minimal retrolisthesis of L5 on S1. Intervertebral disc space  narrowing is most significant at L4-L5 and L5-S1. There is some  increased signal intensity noted within the right superior endplate of  L4 on T2 weighted imaging, with perhaps some minimal decreased intensity  on the T1-weighted imaging. No accompanying height loss is seen. This  may represent some Modic type change. The remainder of the marrow signal  appears unremarkable. The conus terminates at L1. There is a simple  appearing left renal cyst. No additional follow-up is necessary.     T12-L1: There is no canal stenosis or neuroforaminal narrowing.  L1-L2: There is no canal stenosis or neuroforaminal narrowing.  L2-L3: There is diffuse disc bulge, without significant canal stenosis  or neuroforaminal narrowing.  L3-L4: There is diffuse disc bulge, which flattens the anterior aspect  of the thecal sac. There is no canal narrowing. There is no significant  neural foraminal narrowing.  L4-L5: There is diffuse disc bulge, although more significant on the  right. There is moderate canal  stenosis, which is exacerbated by facet  hypertrophy. There is severe neuroforaminal narrowing on the right and  mild to moderate narrowing on the left.  L5-S1: There is diffuse disc bulge. There is no significant canal  stenosis. There is severe bilateral neural foraminal narrowing.:      Impression:     1. No acute fracture or subluxation identified.  2. Mild marrow abnormality involving the superior endplate of L4 is  nonspecific. Modic type I change would be a consideration.  3. Degenerative changes, as noted above.     This report was finalized on 9/5/2023 11:58 PM by Dr. Esperanza Graves M.D.       Duplex Carotid Ultrasound CAR [[id number]] Jb as Reviewed   Order Status: Completed Resulted: 09/05/23 1316    Updated: 09/05/23 1317    Prox CCA PSV -55.8 cm/sec     Prox CCA EDV -16.2 cm/sec     Dist CCA PSV 50.2 cm/sec     Dist CCA EDV 16.5 cm/sec     Prox ICA PSV -56.8 cm/sec     Prox ICA EDV -25.1 cm/sec     Mid ICA PSV -57.6 cm/sec     Mid ICA EDV -24.7 cm/sec     Dist ICA PSV -66.6 cm/sec     Dist ICA EDV -30.2 cm/sec     Prox ECA PSV -62.3 cm/sec     Prox ECA EDV -12.2 cm/sec     Vertebral A PSV -29.0 cm/sec     Vertebral A EDV -7.0 cm/sec     Prox SCLA PSV 89.7 cm/sec     Prox CCA PSV 57.6 cm/sec     Prox CCA EDV 13.2 cm/sec     Dist CCA PSV -38.4 cm/sec     Dist CCA EDV -14.1 cm/sec     Prox ICA PSV -120.4 cm/sec     Prox ICA EDV -52.0 cm/sec     Mid ICA PSV 57.1 cm/sec     Mid ICA EDV 23.7 cm/sec     Dist ICA PSV -49.2 cm/sec     Dist ICA EDV -20.6 cm/sec     Prox ECA PSV 97.5 cm/sec     Prox ECA EDV 26.7 cm/sec     Vertebral A PSV 34.0 cm/sec     Vertebral A EDV 12.6 cm/sec     Prox SCLA .7 cm/sec     ICA/CCA ratio 1.32    ICA/CCA ratio 3.16   Narrative:       Right internal carotid artery demonstrates a less than 50% stenosis.    Left internal carotid artery demonstrates a less than 50% stenosis.    CT Head Without Contrast [[id number]] Jb as Reviewed   Order Status: Completed Collected:  09/05/23 0011    Updated: 09/05/23 0016   Narrative:     CT OF THE HEAD WITHOUT CONTRAST     HISTORY: Right-sided facial droop.     COMPARISON: None available.     TECHNIQUE: Axial CT imaging was obtained through the brain. No IV  contrast was administered.     FINDINGS:  No acute intracranial hemorrhage is seen. There is mild atrophy. There  there is periventricular and deep white matter microangiopathic change.  There is no midline shift or mass effect. Intracranial vascular  calcifications are noted.      Impression:     No acute intracranial findings.     Radiation dose reduction techniques were utilized, including automated  exposure control and exposure modulation based on body size.        This report was finalized on 9/5/2023 12:13 AM by Dr. Esperanza Graves M.D.       XR Chest 1 View [[id number]] Jb as Reviewed   Order Status: Completed Collected: 09/05/23 0010    Updated: 09/05/23 0014   Narrative:     SINGLE VIEW OF THE CHEST     HISTORY: Shortness of air     COMPARISON: None available.     FINDINGS:  Heart size is within normal limits. There is some calcification of the  aorta. No pneumothorax, pleural effusion, or acute infiltrate is seen.      Impression:     No acute findings.     This report was finalized on 9/5/2023 12:11 AM by Dr. Esperanza Graves M.D.      NM Cardiac Stress Test Nuclear Imaging [[id number]] Review Not Required   Order Status: Completed Collected: 08/19/23 1039    Updated: 09/05/23 0016   Narrative:     This result has an attachment that is not available.Cardiac Perfusion Imaging Demographics Patient Name       SELMA ROD Date of Study      08/19/2023         Gender              Male Patient Number     [id number]         Date of Birth       1970 Visit Number       [id number]          Age                 52 year(s) Accession Number   [id number]         Room Number         [address] [employer] [address]       [id number]           NM Technician       Jennifer Jones,                                                            [facility]   Nurse              Lizbeth Abrams,   Interpreting        [facility]                    RN                 Physician           Ankit Lopez MD Ordering Physician Alexandre Bolton   The procedure was explained in detail to the patient. Risks, complications and alternative treatments were reviewed. Written consent was obtained.ProcedureProcedure Type: Nuclear Stress Test:NM MYOCARDIAL SPECT REST EXERCISE OR RX Study location: [facility] - Nuclear Medicine Indications: Chest pain.            Hospital Status: Inpatient.Height: 71 inches Weight: 181 pounds Risk Factors The patient risk factors include:prior PCI;physical activity, Current - Every day tobacco use, treated hypercholesterolemia, treated hypertension, orally-treated diabetes mellitus, chronic lung disease, dyslipidemia and prior MI . Conclusions Summary moderate size severe fixed inferior, inferior apical defect consistent with prior MI. No evidence of ischemia Recommendation Aggressive medical therapy for coronary artery disease.Stress Protocols Resting ECG Normal sinus rhythm. Resting HR:48 bpm  Resting BP:143/96 mmHg Pre-stress physical exam: Baseline chest discomfort 8/10 noted. Heart and lung sounds unremarkable. Adult Plus BP cuff used. O2 sat 99% on room air. Hs Troponin 27/28. To proceed with Lexiscan Myoview stress test.Stress Protocol:Pharmacologic - Lexiscan's Peak HR:90 bpm                   HR/BP product:98587 Peak BP:144/87 mmHg Predicted HR: 169 bpm % of predicted HR: 53 Test duration: 1 min and 40 sec Reason for termination:Completed ECG Findings Normal response to lexiscan . Arrhythmias None Symptoms Lexiscan 0.4 mg IVP given. Baseline chest discomfort of 8/10 increased to 9/10. O2 sat 100% on room air at peak injection. Patient had a brief episode of shortness of breath that resolved in recovery. Complications Procedure  complication was none. Stress Interpretation Normal EKG response.Procedure Medications  - Lexiscan I.V. 0.4 mg.10 sec Imaging Protocols - One Day Rest                   Stress Isotope:Myoview        Isotope: Myoview Isotope dose:14.6 mCi  Isotope dose:29 mCi Date:08/19/2023 07:42  Date:08/19/2023 11:05                        Technique:   GatedPerfusion ImagesRest and Stress:Segments: 1 -Normal 2 -Fixed 3 -Reversible 4 -Partialy reversible 5 -Scar 6-Defect+-------+---------+--------+----+---------+---------+!Segment!Perfusion!Severity!Wall!Artifact !Artreason!+-------+---------+--------+----+---------+---------+Rest:Segments: 1 -Normal 2 -Fixed 3 -Reversible 4 -Partialy reversible 5 -Scar 6-Defect+-------+---------+--------+----+---------+---------+!Segment!Perfusion!Severity!Wall!Artifact !Artreason!+-------+---------+--------+----+---------+---------+Stress:Segments: 1 -Normal 2 -Fixed 3 -Reversible 4 -Partialy reversible 5 -Scar 6-Defect+-------+---------+--------+----+---------+---------+!Segment!Perfusion!Severity!Wall!Artifact !Artreason!+-------+---------+--------+----+---------+---------+Imaging Results Applied corrections - Attenuation correction applied   Summed scores   - Summed stress score: 0   - Summed rest score: 3   - Summed difference score:   0   Stress ejection   Ejection fraction:47 %   EDV :152 ml   ESV :80 ml   Stroke volume :72 ml   LV mass :171 grMedical History Signatures ------------------------------------------------------------------ Electronically signed by Ankit Lopez MD (Interpreting physician) on 08/19/2023 at 12:26 ------------------------------------------------------------------   XR CHEST PORTABLE [[id number]] Review Not Required   Order Status: Completed Collected: 08/18/23 1543    Updated: 09/05/23 0016   Narrative:     EXAMINATION:  ONE XRAY VIEW OF THE CHEST    8/18/2023 2:38 pm    COMPARISON:  None.    HISTORY:  ORDERING SYSTEM PROVIDED HISTORY: cp  TECHNOLOGIST PROVIDED  "HISTORY:  Reason for exam:->cp  Reason for Exam: chest pain    FINDINGS:  Cardiomediastinal silhouette is within normal limits of size.  Coronary  artery stents are present.  No focal consolidation, pleural effusion or  pneumothorax.  Degenerative changes of the spine.   Impression:     No acute cardiopulmonary disease     9/05/2023 1052 09/06/2023 1116 Blood Culture - Blood, Arm, Right [[id number]]   Blood from Arm, Right    Preliminary result Component Value   Blood Culture No growth at 24 hours P             09/05/2023 1046 09/05/2023 1130 Basic Metabolic Panel [[id number]]    (Abnormal)   Blood    Final result Component Value Units   Glucose 119 High  mg/dL   BUN 19 mg/dL   Creatinine 1.30 High  mg/dL   Sodium 139 mmol/L   Potassium 3.7 mmol/L   Chloride 107 mmol/L   CO2 22.2 mmol/L   Calcium 8.8 mg/dL   BUN/Creatinine Ratio 14.6    Anion Gap 9.8 mmol/L   eGFR 66.5 mL/min/1.73          09/05/2023 1046 09/05/2023 1111 CBC (No Diff) [[id number]]   (Abnormal)   Blood    Final result Component Value Units   WBC 6.49 10*3/mm3   RBC 4.05 Low  10*6/mm3   Hemoglobin 12.2 Low  g/dL   Hematocrit 37.7 %   MCV 93.1 fL   MCH 30.1 pg   MCHC 32.4 g/dL   RDW 13.8 %   RDW-SD 47.2 fl   MPV 11.3 fL   Platelets 236 10*3/mm3          09/05/2023 1046 09/06/2023 1116 Blood Culture - Blood, Arm, Left [[id number]]   Blood from Arm, Left    Preliminary result Component Value   Blood Culture No growth at 24 hours P        PHYSICAL EXAM  Objective:  Vital signs: (most recent): Blood pressure 168/97, pulse 84, temperature 97.5 °F (36.4 °C), temperature source Oral, resp. rate 16, height 180.3 cm (71\"), weight 86.2 kg (190 lb), SpO2 99 %.              Alert  nad  No resp distress  Chronically ill     CONDITION ON DISCHARGE  Stable.      DISCHARGE DISPOSITION   Home or Self Care      DISCHARGE MEDICATIONS       Your medication list        START taking these medications        Instructions Last Dose Given Next Dose Due   atorvastatin 20 MG " tablet  Commonly known as: LIPITOR      Take 1 tablet by mouth Every Night.       methylPREDNISolone 4 MG dose pack  Commonly known as: MEDROL      Take as directed on package instructions.       TiZANidine 2 MG capsule  Commonly known as: Zanaflex      Take 1 capsule by mouth 3 (Three) Times a Day As Needed for Muscle Spasms.              CONTINUE taking these medications        Instructions Last Dose Given Next Dose Due   aspirin 81 MG EC tablet      Take 1 tablet by mouth Daily.       isosorbide mononitrate 30 MG 24 hr tablet  Commonly known as: IMDUR      Take 1 tablet by mouth Daily.       levothyroxine 75 MCG tablet  Commonly known as: SYNTHROID, LEVOTHROID      Take 1 tablet by mouth Daily.       loratadine 10 MG tablet  Commonly known as: CLARITIN      Take 1 tablet by mouth.       metoprolol tartrate 25 MG tablet  Commonly known as: LOPRESSOR      Take 1 tablet by mouth 2 (Two) Times a Day.       mirtazapine 15 MG tablet  Commonly known as: REMERON      Take 1 tablet by mouth Every Night.       nicotine 21 MG/24HR patch  Commonly known as: NICODERM CQ      Place 1 patch on the skin as directed by provider.       nitroglycerin 0.4 MG SL tablet  Commonly known as: NITROSTAT      Place 1 tablet under the tongue.       ranolazine 500 MG 12 hr tablet  Commonly known as: RANEXA      Take 1 tablet by mouth 2 (Two) Times a Day.       risperiDONE 1 MG tablet  Commonly known as: risperDAL      Take 1 tablet by mouth 2 (Two) Times a Day.                 Where to Get Your Medications        These medications were sent to [facility] [place] [id number]      Hours: Monday to Friday 7 AM to 6 PM, Saturday & Sunday 8 AM to 4:30 PM (Closed 12 PM to 12:30 PM) Phone: [phone]   atorvastatin 20 MG tablet  methylPREDNISolone 4 MG dose pack  TiZANidine 2 MG capsule        No future appointments.  Additional Instructions for the Follow-ups that You Need to Schedule       Discharge  Follow-up with Specialty: PCP in 1 week, Neurosurgery as follow up, Cardiologist as follow up.   As directed      Specialty: PCP in 1 week, Neurosurgery as follow up, Cardiologist as follow up.               Follow-up Information       Provider, No Known .    Contact information:  [facility] [id number] [phone]                             TEST  RESULTS PENDING AT DISCHARGE  Pending Labs       Order Current Status    Blood Culture - Blood, Arm, Left Preliminary result    Blood Culture - Blood, Arm, Right Preliminary result               Montana Colilns MD  [facility]  09/07/23  09:48 EDT      Time: greater than 32 minutes on discharge  It was a pleasure taking care of this patient while in the hospital.

## 2023-09-07 NOTE — PAYOR COMM NOTE
"Viola James (52 y.o. Male)        Please see attached dc summary    REF#[id number]     THANK YOU    KALEIGH SOLANO LPN CCP   Date of Birth   1970    Social Security Number       Address   [place] [address]    Home Phone   [phone]    MRN   [id number]       Jainism   None    Marital Status   Single                            Admission Date   9/4/23    Admission Type   Emergency    Admitting Provider   Montana Collins MD    Attending Provider       Department, Room/Bed   [address], [address]       Discharge Date   9/7/2023    Discharge Disposition   Home or Self Care    Discharge Destination                                 Attending Provider: (none)   Allergies: Griseofulvin Ultramicrosize [Griseofulvin], Toradol [Ketorolac Tromethamine], Tramadol    Isolation: None   Infection: None   Code Status: CPR    Ht: 180.3 cm (71\")   Wt: 86.2 kg (190 lb)    Admission Cmt: None   Principal Problem: Lumbar pain with radiation down both legs [M54.50,M79.604,M79.605]                   Active Insurance as of 9/4/2023       Primary Coverage       Payor Plan Insurance Group Employer/Plan Group    MISC MEDICARE REPLACEMENT MISC MCARE REPLACEMENT        Coverage Address Coverage Phone Number Coverage Fax Number Effective Dates    [address] [phone]  9/1/2023 - None Entered    [facility] [id number]         Subscriber Name Subscriber Birth Date Member ID       VIOLA JAMES 1970 [id number]               Secondary Coverage       Payor Plan Insurance Group Employer/Plan Group    MEDICAID OUT OF STATE MISC MEDICAID OUT OF STATE        Coverage Address Coverage Phone Number Coverage Fax Number Effective Dates    [address] [address] [phone]  9/1/2023 - None Entered    [facility] [id number]         Subscriber Name Subscriber Birth Date Member ID       VIOLA JAMES 1970 [id number]                     Emergency Contacts        (Rel.) Home " Phone Work Phone Mobile Phone    Julia Saey (Mother) [phone] -- --                 Discharge Summary        Montana Collins MD at 23 0948                 NAME: Lorenzo James ADMIT: 2023   : 1970  PCP: Provider, No Known    MRN: [id number] LOS: 2 days   AGE/SEX: 52 y.o. male  ROOM: [facility]/     Date of Admission:  2023  Date of Discharge:  2023    PCP: Provider, No Known    CHIEF COMPLAINT  Shortness of Breath      DISCHARGE DIAGNOSIS  Active Hospital Problems    Diagnosis  POA    **Lumbar pain with radiation down both legs [M54.50, M79.604, M79.605]  Yes    Exertional dyspnea [R06.09]  Yes    Urinary incontinence without sensory awareness [N39.42]  Yes    CKD (chronic kidney disease) stage 3, GFR 30-59 ml/min [N18.30]  Yes    CAD S/P percutaneous coronary angioplasty [I25.10, Z98.61]  Not Applicable    Benign essential hypertension [I10]  Yes      Resolved Hospital Problems   No resolved problems to display.       SECONDARY DIAGNOSES  History reviewed. No pertinent past medical history.    CONSULTS       HOSPITAL COURSE  Lorenzo James is a 52 y.o. male with history of coronary artery disease, polysubstance abuse, chronic lower back pain. He was admitted to a hospital in [facility] 2023 and had Underwent work-up including cardiac stress test which showed a fixed scar but no reversible scar or ischemia. Seen in house by cardiology, no acute testing commended for now. Also seen in house by nephrology for mild increase in kidney function. No acute interventions recommended at this time. He has been given refills for his metoprolol and isosorbide mononitrate. He was medically stable for discharge, follow-up with PCP and cardiology as scheduled. He has come to [place] in the past week.  He comes with a couple of complaints.  1 is that he had some difficulty in opening his right eye a couple of days ago and concern for potential right-sided facial droop.  The symptoms have  "subsequently resolved. Additionally he has had increasing lower back pain with weakness of his legs. He does state that he has chronic lower back pain and has had previous MRI but and acute change of his symptoms over the past week.    He had MRI which was negative for acute stroke. Had MRI lumbar spine. Per [facility]:  Lumbar pain with palpation of the arthritic lumbar facet joints.   No findings of radiculopathy on exam. No compression of cauda equina on MRI.   Discussed the option of epidural injection with the patient who stated \"my momma told me never to have one of those.\"   Agreeable to IV steroids.   If doing better , walking ok, recommend transitioning to oral steroid taper with outpatient PT to be provided by admitting service.   No f/u necessary with Neurosurgery unless he develops severe, intractable leg pain and leg weakness.     He will be discharged on steroid taper and outpatient follow up. I told him I cannot prescribe him narcotics, will give 3 days of a muscle relaxer at low dose.       DIAGNOSTICS     MRI Brain Without Contrast [[id number]] Jb as Reviewed   Order Status: Completed Collected: 09/05/23 2328    Updated: 09/05/23 2341   Narrative:     BRAIN MRI WITHOUT CONTRAST     HISTORY: stroke rule out; R06.09-Other forms of dyspnea; R29.810-Facial  weakness; M54.50-Low back pain, unspecified; G89.29-Other chronic pain;  R77.8-Other specified abnormalities of plasma proteins; F19.11-Other  psychoactive substance abuse, in remission     COMPARISON: None.     FINDINGS:  Multiplanar images of the head were obtained without  gadolinium. No areas of restricted diffusion are seen to suggest acute  infarct. The ventricles are normal in size. There is no midline shift or  mass effect. There is some periventricular and deep white matter  microangiopathic change. The intracranial flow voids appear intact. Old  lacunar infarct is noted within the right centrum semiovale/corona  radiata. No abnormality is seen on " gradient echo imaging. There is some  mucosal thickening noted within the ethmoid sinuses.      Impression:     1. No acute intracranial abnormality.     This report was finalized on 9/5/2023 11:38 PM by Dr. Esperanza Graves M.D.       MRI Lumbar Spine With & Without Contrast [[id number]] Jb as Reviewed   Order Status: Completed Collected: 09/05/23 2347    Updated: 09/06/23 0001   Narrative:     LUMBAR SPINE MRI WITHOUT AND WITH GADOLINIUM     HISTORY: lower back pain with incontinence and weakness; R06.09-Other  forms of dyspnea; R29.810-Facial weakness; M54.50-Low back pain,  unspecified; G89.29-Other chronic pain; R77.8-Other specified  abnormalities of plasma proteins; F19.11-Other psychoactive substance  abuse, in remission     COMPARISON:  None.     FINDINGS:  Multiplanar images of the lumbar obtained without and with  gadolinium.  Axial images obtained from T12-S1.     No acute fracture or subluxation of the lumbar spine is seen. There is  mild anterolisthesis of L4 on L5 5, by approximately 3 mm. There is also  some minimal retrolisthesis of L5 on S1. Intervertebral disc space  narrowing is most significant at L4-L5 and L5-S1. There is some  increased signal intensity noted within the right superior endplate of  L4 on T2 weighted imaging, with perhaps some minimal decreased intensity  on the T1-weighted imaging. No accompanying height loss is seen. This  may represent some Modic type change. The remainder of the marrow signal  appears unremarkable. The conus terminates at L1. There is a simple  appearing left renal cyst. No additional follow-up is necessary.     T12-L1: There is no canal stenosis or neuroforaminal narrowing.  L1-L2: There is no canal stenosis or neuroforaminal narrowing.  L2-L3: There is diffuse disc bulge, without significant canal stenosis  or neuroforaminal narrowing.  L3-L4: There is diffuse disc bulge, which flattens the anterior aspect  of the thecal sac. There is no canal  narrowing. There is no significant  neural foraminal narrowing.  L4-L5: There is diffuse disc bulge, although more significant on the  right. There is moderate canal stenosis, which is exacerbated by facet  hypertrophy. There is severe neuroforaminal narrowing on the right and  mild to moderate narrowing on the left.  L5-S1: There is diffuse disc bulge. There is no significant canal  stenosis. There is severe bilateral neural foraminal narrowing.:      Impression:     1. No acute fracture or subluxation identified.  2. Mild marrow abnormality involving the superior endplate of L4 is  nonspecific. Modic type I change would be a consideration.  3. Degenerative changes, as noted above.     This report was finalized on 9/5/2023 11:58 PM by Dr. Esperanza Graves M.D.       Duplex Carotid Ultrasound CAR [[id number]] Jb as Reviewed   Order Status: Completed Resulted: 09/05/23 1316    Updated: 09/05/23 1317    Prox CCA PSV -55.8 cm/sec     Prox CCA EDV -16.2 cm/sec     Dist CCA PSV 50.2 cm/sec     Dist CCA EDV 16.5 cm/sec     Prox ICA PSV -56.8 cm/sec     Prox ICA EDV -25.1 cm/sec     Mid ICA PSV -57.6 cm/sec     Mid ICA EDV -24.7 cm/sec     Dist ICA PSV -66.6 cm/sec     Dist ICA EDV -30.2 cm/sec     Prox ECA PSV -62.3 cm/sec     Prox ECA EDV -12.2 cm/sec     Vertebral A PSV -29.0 cm/sec     Vertebral A EDV -7.0 cm/sec     Prox SCLA PSV 89.7 cm/sec     Prox CCA PSV 57.6 cm/sec     Prox CCA EDV 13.2 cm/sec     Dist CCA PSV -38.4 cm/sec     Dist CCA EDV -14.1 cm/sec     Prox ICA PSV -120.4 cm/sec     Prox ICA EDV -52.0 cm/sec     Mid ICA PSV 57.1 cm/sec     Mid ICA EDV 23.7 cm/sec     Dist ICA PSV -49.2 cm/sec     Dist ICA EDV -20.6 cm/sec     Prox ECA PSV 97.5 cm/sec     Prox ECA EDV 26.7 cm/sec     Vertebral A PSV 34.0 cm/sec     Vertebral A EDV 12.6 cm/sec     Prox SCLA .7 cm/sec     ICA/CCA ratio 1.32    ICA/CCA ratio 3.16   Narrative:       Right internal carotid artery demonstrates a less than 50% stenosis.     Left internal carotid artery demonstrates a less than 50% stenosis.    CT Head Without Contrast [[id number]] Jb as Reviewed   Order Status: Completed Collected: 09/05/23 0011    Updated: 09/05/23 0016   Narrative:     CT OF THE HEAD WITHOUT CONTRAST     HISTORY: Right-sided facial droop.     COMPARISON: None available.     TECHNIQUE: Axial CT imaging was obtained through the brain. No IV  contrast was administered.     FINDINGS:  No acute intracranial hemorrhage is seen. There is mild atrophy. There  there is periventricular and deep white matter microangiopathic change.  There is no midline shift or mass effect. Intracranial vascular  calcifications are noted.      Impression:     No acute intracranial findings.     Radiation dose reduction techniques were utilized, including automated  exposure control and exposure modulation based on body size.        This report was finalized on 9/5/2023 12:13 AM by Dr. Esperanza Graves M.D.       XR Chest 1 View [[id number]] Jb as Reviewed   Order Status: Completed Collected: 09/05/23 0010    Updated: 09/05/23 0014   Narrative:     SINGLE VIEW OF THE CHEST     HISTORY: Shortness of air     COMPARISON: None available.     FINDINGS:  Heart size is within normal limits. There is some calcification of the  aorta. No pneumothorax, pleural effusion, or acute infiltrate is seen.      Impression:     No acute findings.     This report was finalized on 9/5/2023 12:11 AM by Dr. Esperanza Graves M.D.      NM Cardiac Stress Test Nuclear Imaging [[id number]] Review Not Required   Order Status: Completed Collected: 08/19/23 1039    Updated: 09/05/23 0016   Narrative:     This result has an attachment that is not available.Cardiac Perfusion Imaging Demographics Patient Name       SELMA ROD Date of Study      08/19/2023         Gender              Male Patient Number     [id number]         Date of Birth       1970 Visit Number       [id number]          Age                 52  year(s) Accession Number   [id number]         Room Number         4[address] [employer] ID       [id number]           NM Technician       Jennifer Jones,                                                           [facility]   Nurse              Lizbeth Abrams,   Interpreting        [facility]                    RN                 Physician           Ankit Lopez MD Ordering Physician Alexandre Bolton   The procedure was explained in detail to the patient. Risks, complications and alternative treatments were reviewed. Written consent was obtained.ProcedureProcedure Type: Nuclear Stress Test:NM MYOCARDIAL SPECT REST EXERCISE OR RX Study location: [facility] - Nuclear Medicine Indications: Chest pain.            Hospital Status: Inpatient.Height: 71 inches Weight: 181 pounds Risk Factors The patient risk factors include:prior PCI;physical activity, Current - Every day tobacco use, treated hypercholesterolemia, treated hypertension, orally-treated diabetes mellitus, chronic lung disease, dyslipidemia and prior MI . Conclusions Summary moderate size severe fixed inferior, inferior apical defect consistent with prior MI. No evidence of ischemia Recommendation Aggressive medical therapy for coronary artery disease.Stress Protocols Resting ECG Normal sinus rhythm. Resting HR:48 bpm  Resting BP:143/96 mmHg Pre-stress physical exam: Baseline chest discomfort 8/10 noted. Heart and lung sounds unremarkable. Adult Plus BP cuff used. O2 sat 99% on room air. Hs Troponin 27/28. To proceed with Lexiscan Myoview stress test.Stress Protocol:Pharmacologic - Lexiscan's Peak HR:90 bpm                   HR/BP product:76949 Peak BP:144/87 mmHg Predicted HR: 169 bpm % of predicted HR: 53 Test duration: 1 min and 40 sec Reason for termination:Completed ECG Findings Normal response to lexiscan . Arrhythmias None Symptoms Lexiscan 0.4 mg IVP given. Baseline chest discomfort of 8/10  increased to 9/10. O2 sat 100% on room air at peak injection. Patient had a brief episode of shortness of breath that resolved in recovery. Complications Procedure complication was none. Stress Interpretation Normal EKG response.Procedure Medications  - Lexiscan I.V. 0.4 mg.10 sec Imaging Protocols - One Day Rest                   Stress Isotope:Myoview        Isotope: Myoview Isotope dose:14.6 mCi  Isotope dose:29 mCi Date:08/19/2023 07:42  Date:08/19/2023 11:05                        Technique:   GatedPerfusion ImagesRest and Stress:Segments: 1 -Normal 2 -Fixed 3 -Reversible 4 -Partialy reversible 5 -Scar 6-Defect+-------+---------+--------+----+---------+---------+!Segment!Perfusion!Severity!Wall!Artifact !Artreason!+-------+---------+--------+----+---------+---------+Rest:Segments: 1 -Normal 2 -Fixed 3 -Reversible 4 -Partialy reversible 5 -Scar 6-Defect+-------+---------+--------+----+---------+---------+!Segment!Perfusion!Severity!Wall!Artifact !Artreason!+-------+---------+--------+----+---------+---------+Stress:Segments: 1 -Normal 2 -Fixed 3 -Reversible 4 -Partialy reversible 5 -Scar 6-Defect+-------+---------+--------+----+---------+---------+!Segment!Perfusion!Severity!Wall!Artifact !Artreason!+-------+---------+--------+----+---------+---------+Imaging Results Applied corrections - Attenuation correction applied   Summed scores   - Summed stress score: 0   - Summed rest score: 3   - Summed difference score:   0   Stress ejection   Ejection fraction:47 %   EDV :152 ml   ESV :80 ml   Stroke volume :72 ml   LV mass :171 grMedical History Signatures ------------------------------------------------------------------ Electronically signed by Ankit Lopez MD (Interpreting physician) on 08/19/2023 at 12:26 ------------------------------------------------------------------   XR CHEST PORTABLE [[id number]] Review Not Required   Order Status: Completed Collected: 08/18/23 1543    Updated: 09/05/23 0016  "  Narrative:     EXAMINATION:  ONE XRAY VIEW OF THE CHEST    8/18/2023 2:38 pm    COMPARISON:  None.    HISTORY:  ORDERING SYSTEM PROVIDED HISTORY: cp  TECHNOLOGIST PROVIDED HISTORY:  Reason for exam:->cp  Reason for Exam: chest pain    FINDINGS:  Cardiomediastinal silhouette is within normal limits of size.  Coronary  artery stents are present.  No focal consolidation, pleural effusion or  pneumothorax.  Degenerative changes of the spine.   Impression:     No acute cardiopulmonary disease     9/05/2023 1052 09/06/2023 1116 Blood Culture - Blood, Arm, Right [[id number]]   Blood from Arm, Right    Preliminary result Component Value   Blood Culture No growth at 24 hours P             09/05/2023 1046 09/05/2023 1130 Basic Metabolic Panel [[id number]]    (Abnormal)   Blood    Final result Component Value Units   Glucose 119 High  mg/dL   BUN 19 mg/dL   Creatinine 1.30 High  mg/dL   Sodium 139 mmol/L   Potassium 3.7 mmol/L   Chloride 107 mmol/L   CO2 22.2 mmol/L   Calcium 8.8 mg/dL   BUN/Creatinine Ratio 14.6    Anion Gap 9.8 mmol/L   eGFR 66.5 mL/min/1.73          09/05/2023 1046 09/05/2023 1111 CBC (No Diff) [[id number]]   (Abnormal)   Blood    Final result Component Value Units   WBC 6.49 10*3/mm3   RBC 4.05 Low  10*6/mm3   Hemoglobin 12.2 Low  g/dL   Hematocrit 37.7 %   MCV 93.1 fL   MCH 30.1 pg   MCHC 32.4 g/dL   RDW 13.8 %   RDW-SD 47.2 fl   MPV 11.3 fL   Platelets 236 10*3/mm3          09/05/2023 1046 09/06/2023 1116 Blood Culture - Blood, Arm, Left [[id number]]   Blood from Arm, Left    Preliminary result Component Value   Blood Culture No growth at 24 hours P        PHYSICAL EXAM  Objective:  Vital signs: (most recent): Blood pressure 168/97, pulse 84, temperature 97.5 °F (36.4 °C), temperature source Oral, resp. rate 16, height 180.3 cm (71\"), weight 86.2 kg (190 lb), SpO2 99 %.              Alert  nad  No resp distress  Chronically ill     CONDITION ON DISCHARGE  Stable.      DISCHARGE DISPOSITION   Home or Self " Care      DISCHARGE MEDICATIONS       Your medication list        START taking these medications        Instructions Last Dose Given Next Dose Due   atorvastatin 20 MG tablet  Commonly known as: LIPITOR      Take 1 tablet by mouth Every Night.       methylPREDNISolone 4 MG dose pack  Commonly known as: MEDROL      Take as directed on package instructions.       TiZANidine 2 MG capsule  Commonly known as: Zanaflex      Take 1 capsule by mouth 3 (Three) Times a Day As Needed for Muscle Spasms.              CONTINUE taking these medications        Instructions Last Dose Given Next Dose Due   aspirin 81 MG EC tablet      Take 1 tablet by mouth Daily.       isosorbide mononitrate 30 MG 24 hr tablet  Commonly known as: IMDUR      Take 1 tablet by mouth Daily.       levothyroxine 75 MCG tablet  Commonly known as: SYNTHROID, LEVOTHROID      Take 1 tablet by mouth Daily.       loratadine 10 MG tablet  Commonly known as: CLARITIN      Take 1 tablet by mouth.       metoprolol tartrate 25 MG tablet  Commonly known as: LOPRESSOR      Take 1 tablet by mouth 2 (Two) Times a Day.       mirtazapine 15 MG tablet  Commonly known as: REMERON      Take 1 tablet by mouth Every Night.       nicotine 21 MG/24HR patch  Commonly known as: NICODERM CQ      Place 1 patch on the skin as directed by provider.       nitroglycerin 0.4 MG SL tablet  Commonly known as: NITROSTAT      Place 1 tablet under the tongue.       ranolazine 500 MG 12 hr tablet  Commonly known as: RANEXA      Take 1 tablet by mouth 2 (Two) Times a Day.       risperiDONE 1 MG tablet  Commonly known as: risperDAL      Take 1 tablet by mouth 2 (Two) Times a Day.                 Where to Get Your Medications        These medications were sent to [place] [address]      Hours: Monday to Friday 7 AM to 6 PM, Saturday & Sunday 8 AM to 4:30 PM (Closed 12 PM to 12:30 PM) Phone: [phone]   atorvastatin 20 MG  tablet  methylPREDNISolone 4 MG dose pack  TiZANidine 2 MG capsule        No future appointments.  Additional Instructions for the Follow-ups that You Need to Schedule       Discharge Follow-up with Specialty: PCP in 1 week, Neurosurgery as follow up, Cardiologist as follow up.   As directed      Specialty: PCP in 1 week, Neurosurgery as follow up, Cardiologist as follow up.               Follow-up Information       Provider, No Known .    Contact information:  [facility] [address]  [phone]                             TEST  RESULTS PENDING AT DISCHARGE  Pending Labs       Order Current Status    Blood Culture - Blood, Arm, Left Preliminary result    Blood Culture - Blood, Arm, Right Preliminary result               Montana Collins MD  [facility]  09/07/23  09:48 EDT      Time: greater than 32 minutes on discharge  It was a pleasure taking care of this patient while in the hospital.       Electronically signed by Montana Collins MD at 09/07/23 0952       Discharge Order (From admission, onward)       Start     Ordered    09/07/23 0833  Discharge patient  Once        Expected Discharge Date: 09/07/23   Discharge Disposition: Home or Self Care   Physician of Record for Attribution - Please select from Treatment Team: MONTANA COLLINS [[id number]]   Review needed by CMO to determine Physician of Record: No      Question Answer Comment   Physician of Record for Attribution - Please select from Treatment Team MONTANA COLLINS    Review needed by CMO to determine Physician of Record No        09/07/23 0834

## 2023-09-08 NOTE — CASE MANAGEMENT/SOCIAL WORK
Case Management Discharge Note      Final Note: Return to [facility] via ztrip w/c van cab    Provided Post Acute Provider List?: N/A    Selected Continued Care - Discharged on 9/7/2023 Admission date: 9/4/2023 - Discharge disposition: Home or Self Care      Destination    No services have been selected for the patient.                Durable Medical Equipment    No services have been selected for the patient.                Dialysis/Infusion    No services have been selected for the patient.                Home Medical Care    No services have been selected for the patient.                Therapy    No services have been selected for the patient.                Community Resources    No services have been selected for the patient.                Community & DME    No services have been selected for the patient.                    Transportation Services  Taxi: [facility]    Final Discharge Disposition Code: 01 - home or self-care

## 2023-09-08 NOTE — OUTREACH NOTE
Prep Survey      Flowsheet Row Responses   [facility] facility patient discharged from? [place]   Is LACE score < 7 ? No   Eligibility Readm Mgmt   Discharge diagnosis Lumbar pain with radiation down both legs-Urinary incontinence without sensory awareness   Does the patient have one of the following disease processes/diagnoses(primary or secondary)? Other   Does the patient have Home health ordered? No   Is there a DME ordered? No   Prep survey completed? Yes            AYAD DE LEÓN - Registered Nurse

## 2023-09-10 LAB
BACTERIA SPEC AEROBE CULT: NORMAL
BACTERIA SPEC AEROBE CULT: NORMAL

## 2023-09-12 ENCOUNTER — READMISSION MANAGEMENT (OUTPATIENT)
Dept: CALL CENTER | Facility: HOSPITAL | Age: 53
End: 2023-09-12
Payer: MEDICARE

## 2023-09-12 NOTE — OUTREACH NOTE
Medical Week 1 Survey      Flowsheet Row Responses   [facility] patient discharged from? [place]   Does the patient have one of the following disease processes/diagnoses(primary or secondary)? Other   Week 1 attempt successful? No   Unsuccessful attempts Attempt 1            Wendy Bowman Registered Nurse

## 2023-09-15 ENCOUNTER — READMISSION MANAGEMENT (OUTPATIENT)
Dept: CALL CENTER | Facility: HOSPITAL | Age: 53
End: 2023-09-15
Payer: MEDICARE

## 2023-09-15 NOTE — OUTREACH NOTE
Medical Week 1 Survey      Flowsheet Row Responses   [facility] patient discharged from? [place]   Does the patient have one of the following disease processes/diagnoses(primary or secondary)? Other   Week 1 attempt successful? No   Unsuccessful attempts Attempt 2            Peg GRIMES - Registered Nurse

## 2023-09-18 ENCOUNTER — APPOINTMENT (OUTPATIENT)
Dept: CT IMAGING | Facility: HOSPITAL | Age: 53
End: 2023-09-18
Payer: MEDICARE

## 2023-09-18 ENCOUNTER — HOSPITAL ENCOUNTER (OUTPATIENT)
Facility: HOSPITAL | Age: 53
Setting detail: OBSERVATION
Discharge: HOME | End: 2023-09-20
Attending: EMERGENCY MEDICINE | Admitting: STUDENT IN AN ORGANIZED HEALTH CARE EDUCATION/TRAINING PROGRAM
Payer: MEDICARE

## 2023-09-18 ENCOUNTER — APPOINTMENT (OUTPATIENT)
Dept: GENERAL RADIOLOGY | Facility: HOSPITAL | Age: 53
End: 2023-09-18
Payer: MEDICARE

## 2023-09-18 DIAGNOSIS — F15.10 METHAMPHETAMINE ABUSE: ICD-10-CM

## 2023-09-18 DIAGNOSIS — F17.200 SMOKER: ICD-10-CM

## 2023-09-18 DIAGNOSIS — R45.851 SUICIDAL IDEATION: ICD-10-CM

## 2023-09-18 DIAGNOSIS — N17.9 AKI (ACUTE KIDNEY INJURY): ICD-10-CM

## 2023-09-18 DIAGNOSIS — I25.118 CORONARY ARTERY DISEASE INVOLVING NATIVE CORONARY ARTERY OF NATIVE HEART WITH OTHER FORM OF ANGINA PECTORIS: ICD-10-CM

## 2023-09-18 DIAGNOSIS — E87.6 HYPOKALEMIA: ICD-10-CM

## 2023-09-18 DIAGNOSIS — I10 ESSENTIAL HYPERTENSION: ICD-10-CM

## 2023-09-18 DIAGNOSIS — E78.00 HYPERCHOLESTEREMIA: ICD-10-CM

## 2023-09-18 DIAGNOSIS — R07.9 CHEST PAIN, UNSPECIFIED TYPE: Primary | ICD-10-CM

## 2023-09-18 PROBLEM — N18.9 ACUTE ON CHRONIC RENAL FAILURE: Status: ACTIVE | Noted: 2023-08-18

## 2023-09-18 PROBLEM — Z91.199 NONCOMPLIANCE: Status: ACTIVE | Noted: 2023-09-18

## 2023-09-18 PROBLEM — I16.0 HYPERTENSIVE URGENCY: Status: RESOLVED | Noted: 2020-07-11 | Resolved: 2023-09-18

## 2023-09-18 PROBLEM — F19.10 POLYSUBSTANCE ABUSE: Status: ACTIVE | Noted: 2023-09-18

## 2023-09-18 LAB
ALBUMIN SERPL-MCNC: 4.6 G/DL (ref 3.5–5.2)
ALBUMIN/GLOB SERPL: 1.7 G/DL
ALP SERPL-CCNC: 63 U/L (ref 39–117)
ALT SERPL W P-5'-P-CCNC: 17 U/L (ref 1–41)
AMPHET+METHAMPHET UR QL: POSITIVE
ANION GAP SERPL CALCULATED.3IONS-SCNC: 12.7 MMOL/L (ref 5–15)
AST SERPL-CCNC: 68 U/L (ref 1–40)
BARBITURATES UR QL SCN: NEGATIVE
BASOPHILS # BLD AUTO: 0.06 10*3/MM3 (ref 0–0.2)
BASOPHILS NFR BLD AUTO: 0.8 % (ref 0–1.5)
BENZODIAZ UR QL SCN: NEGATIVE
BILIRUB SERPL-MCNC: 0.4 MG/DL (ref 0–1.2)
BUN SERPL-MCNC: 36 MG/DL (ref 6–20)
BUN/CREAT SERPL: 16.9 (ref 7–25)
CALCIUM SPEC-SCNC: 9.6 MG/DL (ref 8.6–10.5)
CANNABINOIDS SERPL QL: POSITIVE
CHLORIDE SERPL-SCNC: 98 MMOL/L (ref 98–107)
CO2 SERPL-SCNC: 23.3 MMOL/L (ref 22–29)
COCAINE UR QL: NEGATIVE
CREAT SERPL-MCNC: 2.13 MG/DL (ref 0.76–1.27)
DEPRECATED RDW RBC AUTO: 42.7 FL (ref 37–54)
EGFRCR SERPLBLD CKD-EPI 2021: 36.8 ML/MIN/1.73
EOSINOPHIL # BLD AUTO: 0.08 10*3/MM3 (ref 0–0.4)
EOSINOPHIL NFR BLD AUTO: 1 % (ref 0.3–6.2)
ERYTHROCYTE [DISTWIDTH] IN BLOOD BY AUTOMATED COUNT: 13.1 % (ref 12.3–15.4)
ETHANOL BLD-MCNC: <10 MG/DL (ref 0–10)
ETHANOL UR QL: <0.01 %
FENTANYL UR-MCNC: NEGATIVE NG/ML
GEN 5 2HR TROPONIN T REFLEX: 25 NG/L
GLOBULIN UR ELPH-MCNC: 2.7 GM/DL
GLUCOSE BLDC GLUCOMTR-MCNC: 106 MG/DL (ref 70–130)
GLUCOSE SERPL-MCNC: 98 MG/DL (ref 65–99)
HCT VFR BLD AUTO: 35.3 % (ref 37.5–51)
HGB BLD-MCNC: 11.7 G/DL (ref 13–17.7)
HOLD SPECIMEN: NORMAL
HOLD SPECIMEN: NORMAL
IMM GRANULOCYTES # BLD AUTO: 0.03 10*3/MM3 (ref 0–0.05)
IMM GRANULOCYTES NFR BLD AUTO: 0.4 % (ref 0–0.5)
LYMPHOCYTES # BLD AUTO: 2.08 10*3/MM3 (ref 0.7–3.1)
LYMPHOCYTES NFR BLD AUTO: 26.8 % (ref 19.6–45.3)
MCH RBC QN AUTO: 29.5 PG (ref 26.6–33)
MCHC RBC AUTO-ENTMCNC: 33.1 G/DL (ref 31.5–35.7)
MCV RBC AUTO: 88.9 FL (ref 79–97)
METHADONE UR QL SCN: NEGATIVE
MONOCYTES # BLD AUTO: 0.69 10*3/MM3 (ref 0.1–0.9)
MONOCYTES NFR BLD AUTO: 8.9 % (ref 5–12)
NEUTROPHILS NFR BLD AUTO: 4.82 10*3/MM3 (ref 1.7–7)
NEUTROPHILS NFR BLD AUTO: 62.1 % (ref 42.7–76)
NRBC BLD AUTO-RTO: 0 /100 WBC (ref 0–0.2)
OPIATES UR QL: NEGATIVE
OXYCODONE UR QL SCN: NEGATIVE
PLATELET # BLD AUTO: 211 10*3/MM3 (ref 140–450)
PMV BLD AUTO: 11.3 FL (ref 6–12)
POTASSIUM SERPL-SCNC: 3.1 MMOL/L (ref 3.5–5.2)
PROT SERPL-MCNC: 7.3 G/DL (ref 6–8.5)
QT INTERVAL: 441 MS
QTC INTERVAL: 445 MS
RBC # BLD AUTO: 3.97 10*6/MM3 (ref 4.14–5.8)
SODIUM SERPL-SCNC: 134 MMOL/L (ref 136–145)
TROPONIN T DELTA: -3 NG/L
TROPONIN T SERPL HS-MCNC: 21 NG/L
TROPONIN T SERPL HS-MCNC: 28 NG/L
WBC NRBC COR # BLD: 7.76 10*3/MM3 (ref 3.4–10.8)
WHOLE BLOOD HOLD COAG: NORMAL
WHOLE BLOOD HOLD SPECIMEN: NORMAL

## 2023-09-18 PROCEDURE — 99285 EMERGENCY DEPT VISIT HI MDM: CPT

## 2023-09-18 PROCEDURE — 84484 ASSAY OF TROPONIN QUANT: CPT | Performed by: STUDENT IN AN ORGANIZED HEALTH CARE EDUCATION/TRAINING PROGRAM

## 2023-09-18 PROCEDURE — 80307 DRUG TEST PRSMV CHEM ANLYZR: CPT | Performed by: PHYSICIAN ASSISTANT

## 2023-09-18 PROCEDURE — 80053 COMPREHEN METABOLIC PANEL: CPT | Performed by: EMERGENCY MEDICINE

## 2023-09-18 PROCEDURE — G0378 HOSPITAL OBSERVATION PER HR: HCPCS

## 2023-09-18 PROCEDURE — 36415 COLL VENOUS BLD VENIPUNCTURE: CPT

## 2023-09-18 PROCEDURE — 84484 ASSAY OF TROPONIN QUANT: CPT | Performed by: EMERGENCY MEDICINE

## 2023-09-18 PROCEDURE — 96372 THER/PROPH/DIAG INJ SC/IM: CPT

## 2023-09-18 PROCEDURE — 71045 X-RAY EXAM CHEST 1 VIEW: CPT

## 2023-09-18 PROCEDURE — 85025 COMPLETE CBC W/AUTO DIFF WBC: CPT | Performed by: EMERGENCY MEDICINE

## 2023-09-18 PROCEDURE — 99204 OFFICE O/P NEW MOD 45 MIN: CPT | Performed by: INTERNAL MEDICINE

## 2023-09-18 PROCEDURE — 93010 ELECTROCARDIOGRAM REPORT: CPT | Performed by: INTERNAL MEDICINE

## 2023-09-18 PROCEDURE — 70450 CT HEAD/BRAIN W/O DYE: CPT

## 2023-09-18 PROCEDURE — 93005 ELECTROCARDIOGRAM TRACING: CPT

## 2023-09-18 PROCEDURE — 82077 ASSAY SPEC XCP UR&BREATH IA: CPT | Performed by: PHYSICIAN ASSISTANT

## 2023-09-18 PROCEDURE — 25010000002 ENOXAPARIN PER 10 MG: Performed by: NURSE PRACTITIONER

## 2023-09-18 PROCEDURE — 94799 UNLISTED PULMONARY SVC/PX: CPT

## 2023-09-18 PROCEDURE — 93005 ELECTROCARDIOGRAM TRACING: CPT | Performed by: STUDENT IN AN ORGANIZED HEALTH CARE EDUCATION/TRAINING PROGRAM

## 2023-09-18 PROCEDURE — 82948 REAGENT STRIP/BLOOD GLUCOSE: CPT

## 2023-09-18 PROCEDURE — 93005 ELECTROCARDIOGRAM TRACING: CPT | Performed by: EMERGENCY MEDICINE

## 2023-09-18 RX ORDER — SODIUM CHLORIDE 0.9 % (FLUSH) 0.9 %
10 SYRINGE (ML) INJECTION AS NEEDED
Status: DISCONTINUED | OUTPATIENT
Start: 2023-09-18 | End: 2023-09-20 | Stop reason: HOSPADM

## 2023-09-18 RX ORDER — NITROGLYCERIN 0.4 MG/1
0.4 TABLET SUBLINGUAL
Status: DISCONTINUED | OUTPATIENT
Start: 2023-09-18 | End: 2023-09-20 | Stop reason: HOSPADM

## 2023-09-18 RX ORDER — ASPIRIN 81 MG/1
81 TABLET ORAL DAILY
Status: DISCONTINUED | OUTPATIENT
Start: 2023-09-18 | End: 2023-09-20 | Stop reason: HOSPADM

## 2023-09-18 RX ORDER — LEVOTHYROXINE SODIUM 0.07 MG/1
75 TABLET ORAL DAILY
Status: DISCONTINUED | OUTPATIENT
Start: 2023-09-18 | End: 2023-09-20 | Stop reason: HOSPADM

## 2023-09-18 RX ORDER — ONDANSETRON 2 MG/ML
4 INJECTION INTRAMUSCULAR; INTRAVENOUS EVERY 6 HOURS PRN
Status: DISCONTINUED | OUTPATIENT
Start: 2023-09-18 | End: 2023-09-20 | Stop reason: HOSPADM

## 2023-09-18 RX ORDER — ATORVASTATIN CALCIUM 20 MG/1
20 TABLET, FILM COATED ORAL NIGHTLY
Status: DISCONTINUED | OUTPATIENT
Start: 2023-09-18 | End: 2023-09-20 | Stop reason: HOSPADM

## 2023-09-18 RX ORDER — HYDROXYZINE HYDROCHLORIDE 10 MG/1
10 TABLET, FILM COATED ORAL 3 TIMES DAILY PRN
Status: DISCONTINUED | OUTPATIENT
Start: 2023-09-18 | End: 2023-09-20 | Stop reason: HOSPADM

## 2023-09-18 RX ORDER — SODIUM CHLORIDE 0.9 % (FLUSH) 0.9 %
10 SYRINGE (ML) INJECTION EVERY 12 HOURS SCHEDULED
Status: DISCONTINUED | OUTPATIENT
Start: 2023-09-18 | End: 2023-09-20 | Stop reason: HOSPADM

## 2023-09-18 RX ORDER — POLYETHYLENE GLYCOL 3350 17 G/17G
17 POWDER, FOR SOLUTION ORAL DAILY PRN
Status: DISCONTINUED | OUTPATIENT
Start: 2023-09-18 | End: 2023-09-20 | Stop reason: HOSPADM

## 2023-09-18 RX ORDER — MIRTAZAPINE 15 MG/1
15 TABLET, FILM COATED ORAL NIGHTLY
Status: DISCONTINUED | OUTPATIENT
Start: 2023-09-18 | End: 2023-09-20 | Stop reason: HOSPADM

## 2023-09-18 RX ORDER — BISACODYL 10 MG
10 SUPPOSITORY, RECTAL RECTAL DAILY PRN
Status: DISCONTINUED | OUTPATIENT
Start: 2023-09-18 | End: 2023-09-20 | Stop reason: HOSPADM

## 2023-09-18 RX ORDER — ISOSORBIDE MONONITRATE 30 MG/1
30 TABLET, EXTENDED RELEASE ORAL DAILY
Status: DISCONTINUED | OUTPATIENT
Start: 2023-09-18 | End: 2023-09-20 | Stop reason: HOSPADM

## 2023-09-18 RX ORDER — SODIUM CHLORIDE 9 MG/ML
40 INJECTION, SOLUTION INTRAVENOUS AS NEEDED
Status: DISCONTINUED | OUTPATIENT
Start: 2023-09-18 | End: 2023-09-20 | Stop reason: HOSPADM

## 2023-09-18 RX ORDER — NICOTINE 21 MG/24HR
1 PATCH, TRANSDERMAL 24 HOURS TRANSDERMAL EVERY 24 HOURS
Status: DISCONTINUED | OUTPATIENT
Start: 2023-09-18 | End: 2023-09-20 | Stop reason: HOSPADM

## 2023-09-18 RX ORDER — AMOXICILLIN 250 MG
2 CAPSULE ORAL 2 TIMES DAILY
Status: DISCONTINUED | OUTPATIENT
Start: 2023-09-18 | End: 2023-09-20 | Stop reason: HOSPADM

## 2023-09-18 RX ORDER — ACETAMINOPHEN 325 MG/1
650 TABLET ORAL EVERY 4 HOURS PRN
Status: DISCONTINUED | OUTPATIENT
Start: 2023-09-18 | End: 2023-09-20 | Stop reason: HOSPADM

## 2023-09-18 RX ORDER — RISPERIDONE 1 MG/1
1 TABLET ORAL 2 TIMES DAILY
Status: DISCONTINUED | OUTPATIENT
Start: 2023-09-18 | End: 2023-09-18

## 2023-09-18 RX ORDER — SODIUM CHLORIDE 9 MG/ML
100 INJECTION, SOLUTION INTRAVENOUS CONTINUOUS
Status: DISCONTINUED | OUTPATIENT
Start: 2023-09-18 | End: 2023-09-20

## 2023-09-18 RX ORDER — ASPIRIN 325 MG
325 TABLET ORAL ONCE
Status: COMPLETED | OUTPATIENT
Start: 2023-09-18 | End: 2023-09-18

## 2023-09-18 RX ORDER — BISACODYL 5 MG/1
5 TABLET, DELAYED RELEASE ORAL DAILY PRN
Status: DISCONTINUED | OUTPATIENT
Start: 2023-09-18 | End: 2023-09-20 | Stop reason: HOSPADM

## 2023-09-18 RX ORDER — FAMOTIDINE 20 MG/1
20 TABLET, FILM COATED ORAL
Status: DISCONTINUED | OUTPATIENT
Start: 2023-09-18 | End: 2023-09-20 | Stop reason: HOSPADM

## 2023-09-18 RX ORDER — POTASSIUM CHLORIDE 750 MG/1
40 TABLET, FILM COATED, EXTENDED RELEASE ORAL ONCE
Status: COMPLETED | OUTPATIENT
Start: 2023-09-18 | End: 2023-09-18

## 2023-09-18 RX ORDER — PRAZOSIN HYDROCHLORIDE 5 MG/1
5 CAPSULE ORAL NIGHTLY
COMMUNITY
End: 2023-09-20 | Stop reason: HOSPADM

## 2023-09-18 RX ORDER — RANOLAZINE 500 MG/1
500 TABLET, EXTENDED RELEASE ORAL 2 TIMES DAILY
Status: DISCONTINUED | OUTPATIENT
Start: 2023-09-18 | End: 2023-09-20 | Stop reason: HOSPADM

## 2023-09-18 RX ORDER — HYDROXYZINE HYDROCHLORIDE 25 MG/1
25 TABLET, FILM COATED ORAL 3 TIMES DAILY PRN
Status: DISCONTINUED | OUTPATIENT
Start: 2023-09-18 | End: 2023-09-18

## 2023-09-18 RX ORDER — ENOXAPARIN SODIUM 100 MG/ML
40 INJECTION SUBCUTANEOUS EVERY 24 HOURS
Status: DISCONTINUED | OUTPATIENT
Start: 2023-09-18 | End: 2023-09-20 | Stop reason: HOSPADM

## 2023-09-18 RX ORDER — ACETAMINOPHEN 160 MG/5ML
650 SOLUTION ORAL EVERY 4 HOURS PRN
Status: DISCONTINUED | OUTPATIENT
Start: 2023-09-18 | End: 2023-09-20 | Stop reason: HOSPADM

## 2023-09-18 RX ORDER — ACETAMINOPHEN 650 MG/1
650 SUPPOSITORY RECTAL EVERY 4 HOURS PRN
Status: DISCONTINUED | OUTPATIENT
Start: 2023-09-18 | End: 2023-09-20 | Stop reason: HOSPADM

## 2023-09-18 RX ADMIN — SODIUM CHLORIDE 1000 ML: 9 INJECTION, SOLUTION INTRAVENOUS at 07:31

## 2023-09-18 RX ADMIN — SODIUM CHLORIDE 100 ML/HR: 9 INJECTION, SOLUTION INTRAVENOUS at 13:12

## 2023-09-18 RX ADMIN — METOPROLOL TARTRATE 25 MG: 25 TABLET, FILM COATED ORAL at 20:15

## 2023-09-18 RX ADMIN — ASPIRIN 81 MG: 81 TABLET, COATED ORAL at 15:23

## 2023-09-18 RX ADMIN — Medication 10 ML: at 13:11

## 2023-09-18 RX ADMIN — LEVOTHYROXINE SODIUM 75 MCG: 0.07 TABLET ORAL at 15:22

## 2023-09-18 RX ADMIN — NITROGLYCERIN 0.4 MG: 0.4 TABLET SUBLINGUAL at 17:36

## 2023-09-18 RX ADMIN — RANOLAZINE 500 MG: 500 TABLET, FILM COATED, EXTENDED RELEASE ORAL at 20:15

## 2023-09-18 RX ADMIN — RANOLAZINE 500 MG: 500 TABLET, FILM COATED, EXTENDED RELEASE ORAL at 15:21

## 2023-09-18 RX ADMIN — ISOSORBIDE MONONITRATE 30 MG: 30 TABLET, EXTENDED RELEASE ORAL at 15:21

## 2023-09-18 RX ADMIN — NITROGLYCERIN 0.4 MG: 0.4 TABLET SUBLINGUAL at 17:45

## 2023-09-18 RX ADMIN — POTASSIUM CHLORIDE 40 MEQ: 750 TABLET, EXTENDED RELEASE ORAL at 07:29

## 2023-09-18 RX ADMIN — SENNOSIDES AND DOCUSATE SODIUM 2 TABLET: 50; 8.6 TABLET ORAL at 20:15

## 2023-09-18 RX ADMIN — ENOXAPARIN SODIUM 40 MG: 100 INJECTION SUBCUTANEOUS at 15:26

## 2023-09-18 RX ADMIN — MIRTAZAPINE 15 MG: 15 TABLET, FILM COATED ORAL at 20:15

## 2023-09-18 RX ADMIN — FAMOTIDINE 20 MG: 20 TABLET, FILM COATED ORAL at 17:32

## 2023-09-18 RX ADMIN — ASPIRIN 325 MG: 325 TABLET ORAL at 06:31

## 2023-09-18 RX ADMIN — NICOTINE 1 PATCH: 21 PATCH, EXTENDED RELEASE TRANSDERMAL at 15:24

## 2023-09-18 RX ADMIN — HYDROXYZINE HYDROCHLORIDE 10 MG: 10 TABLET ORAL at 23:30

## 2023-09-18 RX ADMIN — ATORVASTATIN CALCIUM 20 MG: 20 TABLET, FILM COATED ORAL at 20:15

## 2023-09-18 NOTE — ED NOTES
"Pt stated he has had SI for about 4 weeks. Pt denies HI. Pt stated he only \"said those things\" because he was mad. Pt stated he did not want to harm his \"old roommate and his girlfriend\". Pt states he has had SI since he has been around/living with them. Pt has changed his story on his suicidal ideation several time. Pt is not a great historian about home medications and health history.       "

## 2023-09-18 NOTE — ED PROVIDER NOTES
EMERGENCY DEPARTMENT ENCOUNTER    Room Number:  S521/1  Date of encounter:  9/18/2023  PCP: Provider, No Known  Historian: Patient  Chronic or social conditions impacting care (social determinants of health): Homelessness    HPI:  Chief Complaint: Chest pain, suicidal ideation  A complete HPI/ROS/PMH/PSH/SH/FH are unobtainable due to: Nothing    Context: Lorenzo James is a 52 y.o. male who presents to the ED c/o multiple complaints of chest pain, as well as suicidal ideation.  Patient states he has had chest pain since this morning.  His pain at this point has resolved.  He does have a history of of coronary artery disease status post stent in his RCA.  He admits he is noncompliant with medications and cardiac follow-up.  He denies any associated shortness of breath, nausea, vomiting.    In addition patient is requesting psychiatric evaluation.  He reports being recently homicidal and is suicidal this morning.  He states he has not slept in several days.  He used methamphetamines last night.    Review of prior external notes (non-ED):   Reviewed an admission from 8/18/2023.  Patient admitted to hospital in [place].  Patient had a stress test which showed no evidence of ischemia.  They recommended no further evaluation at this time.    Review of prior external test results outside of this encounter:  Reviewed a BMP from 9/5/2023.  Creatinine 1.30, potassium 3.7    PAST MEDICAL HISTORY  Active Ambulatory Problems     Diagnosis Date Noted    Cannabis abuse 01/14/2017    Chronic pain 01/14/2017    Cigarette nicotine dependence with withdrawal 08/16/2020    CKD (chronic kidney disease) stage 3, GFR 30-59 ml/min 08/11/2020    DDD (degenerative disc disease), lumbosacral 04/13/2023    Hep C w/o coma, chronic 08/11/2020    Hypothyroidism 01/14/2017    Lumbar pain with radiation down both legs 04/13/2023    Mixed hyperlipidemia 04/25/2018    Schizoaffective disorder 09/05/2023    Spondylolisthesis at L4-L5 level  04/13/2023    Substance induced mood disorder 01/13/2017    Weakness 09/19/2019    Urinary incontinence without sensory awareness 04/13/2023    Acute renal failure superimposed on chronic kidney disease 08/18/2023    Antisocial personality disorder 01/13/2017    Arteriosclerosis of coronary artery 01/14/2017     Resolved Ambulatory Problems     Diagnosis Date Noted    Hypertensive urgency 07/11/2020     Past Medical History:   Diagnosis Date    Coronary artery disease     Hypertension     Polysubstance abuse          PAST SURGICAL HISTORY  History reviewed. No pertinent surgical history.      FAMILY HISTORY  History reviewed. No pertinent family history.      SOCIAL HISTORY  Social History     Socioeconomic History    Marital status: Single         ALLERGIES  Bupropion, Griseofulvin ultramicrosize [griseofulvin], Risperidone, Toradol [ketorolac tromethamine], Tramadol, and Buspirone        REVIEW OF SYSTEMS  All systems reviewed and negative except for those discussed in HPI.       PHYSICAL EXAM    I have reviewed the triage vital signs and nursing notes.    ED Triage Vitals   Temp Heart Rate Resp BP SpO2   09/18/23 0556 09/18/23 0545 09/18/23 0546 09/18/23 0545 09/18/23 0545   98 °F (36.7 °C) 66 16 (!) 191/110 95 %      Temp src Heart Rate Source Patient Position BP Location FiO2 (%)   09/18/23 0556 09/18/23 0545 09/18/23 0557 09/18/23 0557 --   Tympanic Monitor Lying Left arm        Physical Exam  GENERAL: Alert, oriented, disheveled, not distressed  HENT: head atraumatic, no nuchal rigidity  EYES: no scleral icterus, EOMI  CV: regular rhythm, regular rate, no murmur  RESPIRATORY: normal effort, CTA  ABDOMEN: soft, nontender  MUSCULOSKELETAL: no deformity, FROM, no calf swelling or tenderness  NEURO: alert, moves all extremities, follows commands  PSYCH: Flat affect  SKIN: warm, dry        LAB RESULTS  Recent Results (from the past 24 hour(s))   ECG 12 Lead ED Triage Standing Order; Chest Pain    Collection Time:  09/18/23  5:54 AM   Result Value Ref Range    QT Interval 441 ms    QTC Interval 445 ms   Comprehensive Metabolic Panel    Collection Time: 09/18/23  6:25 AM    Specimen: Blood   Result Value Ref Range    Glucose 98 65 - 99 mg/dL    BUN 36 (H) 6 - 20 mg/dL    Creatinine 2.13 (H) 0.76 - 1.27 mg/dL    Sodium 134 (L) 136 - 145 mmol/L    Potassium 3.1 (L) 3.5 - 5.2 mmol/L    Chloride 98 98 - 107 mmol/L    CO2 23.3 22.0 - 29.0 mmol/L    Calcium 9.6 8.6 - 10.5 mg/dL    Total Protein 7.3 6.0 - 8.5 g/dL    Albumin 4.6 3.5 - 5.2 g/dL    ALT (SGPT) 17 1 - 41 U/L    AST (SGOT) 68 (H) 1 - 40 U/L    Alkaline Phosphatase 63 39 - 117 U/L    Total Bilirubin 0.4 0.0 - 1.2 mg/dL    Globulin 2.7 gm/dL    A/G Ratio 1.7 g/dL    BUN/Creatinine Ratio 16.9 7.0 - 25.0    Anion Gap 12.7 5.0 - 15.0 mmol/L    eGFR 36.8 (L) >60.0 mL/min/1.73   High Sensitivity Troponin T    Collection Time: 09/18/23  6:25 AM    Specimen: Blood   Result Value Ref Range    HS Troponin T 28 (H) <15 ng/L   Green Top (Gel)    Collection Time: 09/18/23  6:25 AM   Result Value Ref Range    Extra Tube Hold for add-ons.    Lavender Top    Collection Time: 09/18/23  6:25 AM   Result Value Ref Range    Extra Tube hold for add-on    Gold Top - SST    Collection Time: 09/18/23  6:25 AM   Result Value Ref Range    Extra Tube Hold for add-ons.    Light Blue Top    Collection Time: 09/18/23  6:25 AM   Result Value Ref Range    Extra Tube Hold for add-ons.    CBC Auto Differential    Collection Time: 09/18/23  6:25 AM    Specimen: Blood   Result Value Ref Range    WBC 7.76 3.40 - 10.80 10*3/mm3    RBC 3.97 (L) 4.14 - 5.80 10*6/mm3    Hemoglobin 11.7 (L) 13.0 - 17.7 g/dL    Hematocrit 35.3 (L) 37.5 - 51.0 %    MCV 88.9 79.0 - 97.0 fL    MCH 29.5 26.6 - 33.0 pg    MCHC 33.1 31.5 - 35.7 g/dL    RDW 13.1 12.3 - 15.4 %    RDW-SD 42.7 37.0 - 54.0 fl    MPV 11.3 6.0 - 12.0 fL    Platelets 211 140 - 450 10*3/mm3    Neutrophil % 62.1 42.7 - 76.0 %    Lymphocyte % 26.8 19.6 - 45.3 %     Monocyte % 8.9 5.0 - 12.0 %    Eosinophil % 1.0 0.3 - 6.2 %    Basophil % 0.8 0.0 - 1.5 %    Immature Grans % 0.4 0.0 - 0.5 %    Neutrophils, Absolute 4.82 1.70 - 7.00 10*3/mm3    Lymphocytes, Absolute 2.08 0.70 - 3.10 10*3/mm3    Monocytes, Absolute 0.69 0.10 - 0.90 10*3/mm3    Eosinophils, Absolute 0.08 0.00 - 0.40 10*3/mm3    Basophils, Absolute 0.06 0.00 - 0.20 10*3/mm3    Immature Grans, Absolute 0.03 0.00 - 0.05 10*3/mm3    nRBC 0.0 0.0 - 0.2 /100 WBC   Ethanol    Collection Time: 09/18/23  6:25 AM    Specimen: Blood   Result Value Ref Range    Ethanol <10 0 - 10 mg/dL    Ethanol % <0.010 %   Urine Drug Screen - Urine, Clean Catch    Collection Time: 09/18/23  7:56 AM    Specimen: Urine, Clean Catch   Result Value Ref Range    Amphet/Methamphet, Screen Positive (A) Negative    Barbiturates Screen, Urine Negative Negative    Benzodiazepine Screen, Urine Negative Negative    Cocaine Screen, Urine Negative Negative    Opiate Screen Negative Negative    THC, Screen, Urine Positive (A) Negative    Methadone Screen, Urine Negative Negative    Oxycodone Screen, Urine Negative Negative    Fentanyl, Urine Negative Negative   High Sensitivity Troponin T 2Hr    Collection Time: 09/18/23  8:47 AM    Specimen: Blood   Result Value Ref Range    HS Troponin T 25 (H) <15 ng/L    Troponin T Delta -3 >=-4 - <+4 ng/L       Ordered the above labs and independently reviewed the results.        RADIOLOGY  XR Chest 1 View    Result Date: 9/18/2023  Chest pain, anterior x3 days, methamphetamine use 9/17/2023  COMPARISON 9/4/2023  FINDINGS: Cardiac size within normal limits. No mediastinal or hilar abnormality. Lungs clear. No effusion or edema.  CONCLUSION: No active disease of the chest  This report was finalized on 9/18/2023 6:58 AM by Dr. Anuj Brewer M.D.       I ordered the above noted radiological studies. Reviewed by me and discussed with radiologist.  See dictation for official radiology interpretation.      MEDICATIONS  GIVEN IN ER    Medications   sodium chloride 0.9 % flush 10 mL (has no administration in time range)   sodium chloride 0.9 % flush 10 mL (10 mL Intravenous Given 9/18/23 1311)   sodium chloride 0.9 % flush 10 mL (has no administration in time range)   sodium chloride 0.9 % infusion 40 mL (has no administration in time range)   sennosides-docusate (PERICOLACE) 8.6-50 MG per tablet 2 tablet (2 tablets Oral Not Given 9/18/23 1313)     And   polyethylene glycol (MIRALAX) packet 17 g (has no administration in time range)     And   bisacodyl (DULCOLAX) EC tablet 5 mg (has no administration in time range)     And   bisacodyl (DULCOLAX) suppository 10 mg (has no administration in time range)   nitroglycerin (NITROSTAT) SL tablet 0.4 mg (has no administration in time range)   sodium chloride 0.9 % infusion (100 mL/hr Intravenous New Bag 9/18/23 1312)   acetaminophen (TYLENOL) tablet 650 mg (has no administration in time range)     Or   acetaminophen (TYLENOL) 160 MG/5ML oral solution 650 mg (has no administration in time range)     Or   acetaminophen (TYLENOL) suppository 650 mg (has no administration in time range)   ondansetron (ZOFRAN) injection 4 mg (has no administration in time range)   aspirin EC tablet 81 mg (has no administration in time range)   atorvastatin (LIPITOR) tablet 20 mg (has no administration in time range)   isosorbide mononitrate (IMDUR) 24 hr tablet 30 mg (has no administration in time range)   levothyroxine (SYNTHROID, LEVOTHROID) tablet 75 mcg (has no administration in time range)   metoprolol tartrate (LOPRESSOR) tablet 25 mg (has no administration in time range)   mirtazapine (REMERON) tablet 15 mg (has no administration in time range)   nicotine (NICODERM CQ) 21 MG/24HR patch 1 patch (has no administration in time range)   ranolazine (RANEXA) 12 hr tablet 500 mg (has no administration in time range)   Pharmacy to Dose enoxaparin (LOVENOX) (has no administration in time range)   famotidine  (PEPCID) tablet 20 mg (has no administration in time range)   Enoxaparin Sodium (LOVENOX) syringe 40 mg (has no administration in time range)   hydrOXYzine (ATARAX) tablet 25 mg (has no administration in time range)   aspirin tablet 325 mg (325 mg Oral Given 9/18/23 0631)   potassium chloride (K-DUR,KLOR-CON) ER tablet 40 mEq (40 mEq Oral Given 9/18/23 0729)   sodium chloride 0.9 % bolus 1,000 mL (0 mL Intravenous Stopped 9/18/23 1314)         ADDITIONAL ORDERS CONSIDERED BUT NOT ORDERED:  Nothing      PROGRESS, DATA ANALYSIS, CONSULTS, AND MEDICAL DECISION MAKING    All labs have been independently interpreted by myself.  All radiology studies have been independently interpreted by myself and discussed with radiologist dictating the report.   EKG's independently interpreted by myself.  Discussion below represents my analysis of pertinent findings related to patient's condition, differential diagnosis, treatment plan and final disposition.    I have discussed case with Dr. Ahn, emergency room physician.  He has performed his own bedside examination and agrees with treatment plan.    ED Course as of 09/18/23 1515   Mon Sep 18, 2023   0645 Patient presents with multiple issues including chest pain and suicidal ideation.  Patient has known history of drug use.  He is pain-free at this time.  He does have a history of coronary artery disease. [EE]   0650 EKG independently interpreted myself.  Time 0554.  Sinus rhythm, 61 bpm.  Normal P/LIEN.  QRS normal with left bundle branch block.  No significant ST abnormalities.  Similar to prior EKG from 9/4/2023. [EE]   0650 Chest x-ray interpreted myself shows no acute infiltrate or pneumothorax. [EE]   0703 Creatinine(!): 2.13  This was 1.3, 13 days ago [EE]   0703 Potassium(!): 3.1  Potassium ordered. [EE]   0703 HS Troponin T(!): 28  This was 18, 13 days ago [EE]   0823 BP: 153/87 [EE]   0856 Patient with multiple ongoing issues of chest pain, JA, SI, HI.    I discussed  the patient with PJ Hernandez.  He agrees to admit.  He asks that we consult cardiology as well.    I discussed the patient with Raven Powell nurse.  She will follow the patient upstairs. [EE]      ED Course User Index  [EE] Otilio Wallace PA       AS OF 15:15 EDT VITALS:    BP - 153/91  HR - 65  TEMP - 97.7 °F (36.5 °C) (Oral)  O2 SATS - 96%        DIAGNOSIS  Final diagnoses:   Chest pain, unspecified type   Coronary artery disease involving native coronary artery of native heart with other form of angina pectoris   Essential hypertension   Smoker   Hypercholesteremia   JA (acute kidney injury)   Hypokalemia   Suicidal ideation   Methamphetamine abuse         DISPOSITION  Admitted      Dictated utilizing Dragon dictation     Otilio Wallace PA  09/18/23 2984

## 2023-09-18 NOTE — ED PROVIDER NOTES
MD ATTESTATION NOTE    The CLAUDETTE and I have discussed this patient's history, physical exam, and treatment plan.  I have reviewed the documentation and personally had a face to face interaction with the patient. I affirm the documentation and agree with the treatment and plan.  The attached note describes my personal findings.      I provided a substantive portion of the care of the patient.  I personally performed the physical exam in its entirety, and below are my findings.      Brief HPI: Patient had an episode of chest pain earlier today.  Pain is gone now.  Pain did not radiate.  Denies nausea, vomiting, shortness of breath, or sweating.  Patient also complains of suicidal ideation and depression.  He has not slept much in the past few days.  He admits to using methamphetamines last night.    PHYSICAL EXAM  ED Triage Vitals   Temp Heart Rate Resp BP SpO2   09/18/23 0556 09/18/23 0545 09/18/23 0546 09/18/23 0545 09/18/23 0545   98 °F (36.7 °C) 66 16 (!) 191/110 95 %      Temp src Heart Rate Source Patient Position BP Location FiO2 (%)   09/18/23 0556 09/18/23 0545 09/18/23 0557 09/18/23 0557 --   Tympanic Monitor Lying Left arm          GENERAL: Sleeping but easily awakened.  Disheveled male.  Appears older than stated age.  Resting comfortably in no acute distress  HENT: nares patent  EYES: no scleral icterus  CV: regular rhythm, normal rate  RESPIRATORY: normal effort, clear to auscultation bilateral  ABDOMEN: soft, nontender  MUSCULOSKELETAL: Extremities are nontender  NEURO: alert, moves all extremities, follows commands  PSYCH:  calm, cooperative, admits to suicidal ideation  SKIN: warm, dry    Vital signs and nursing notes reviewed.        Plan: Obtain labs, chest x-ray, and EKG.  Patient will need to be seen by [facility] after he is medically cleared    I personally reviewed the patient's EKG and chest x-ray and agree with Otilio's interpretation.    Patient's creatinine and troponin are elevated.  He is mildly  hypokalemic and has been given oral potassium.  Patient will be admitted to the hospitalist.    ED Course as of 09/18/23 1040   Mon Sep 18, 2023   0645 Patient presents with multiple issues including chest pain and suicidal ideation.  Patient has known history of drug use.  He is pain-free at this time.  He does have a history of coronary artery disease. [EE]   0650 EKG independently interpreted myself.  Time 0554.  Sinus rhythm, 61 bpm.  Normal P/LIEN.  QRS normal with left bundle branch block.  No significant ST abnormalities.  Similar to prior EKG from 9/4/2023. [EE]   0650 Chest x-ray interpreted myself shows no acute infiltrate or pneumothorax. [EE]   0703 Creatinine(!): 2.13  This was 1.3, 13 days ago [EE]   0703 Potassium(!): 3.1  Potassium ordered. [EE]   0703 HS Troponin T(!): 28  This was 18, 13 days ago [EE]   0823 BP: 153/87 [EE]   0856 Patient with multiple ongoing issues of chest pain, JA, SI, HI.    I discussed the patient with PJ Hernandez.  He agrees to admit.  He asks that we consult cardiology as well.    I discussed the patient with Raven Powell.  She will follow the patient upstairs. [EE]      ED Course User Index  [EE] Otilio Wallace PA Holland, William D, MD  09/18/23 1040

## 2023-09-18 NOTE — ED NOTES
..Nursing report ED to floor  Lorenzo James  52 y.o.  male    HPI :   Chief Complaint   Patient presents with    Chest Pain       Admitting doctor:   Rex Solis DO    Admitting diagnosis:   The primary encounter diagnosis was Chest pain, unspecified type. Diagnoses of Coronary artery disease involving native coronary artery of native heart with other form of angina pectoris, Essential hypertension, Smoker, Hypercholesteremia, JA (acute kidney injury), Hypokalemia, Suicidal ideation, and Methamphetamine abuse were also pertinent to this visit.    Code status:   Current Code Status       Date Active Code Status Order ID Comments User Context       Prior            Allergies:   Griseofulvin ultramicrosize [griseofulvin], Toradol [ketorolac tromethamine], and Tramadol    Isolation:   No active isolations    Intake and Output  No intake or output data in the 24 hours ending 09/18/23 0912    Weight:       09/18/23  0545   Weight: 86.2 kg (190 lb)       Most recent vitals:   Vitals:    09/18/23 0731 09/18/23 0806 09/18/23 0831 09/18/23 0901   BP: (!) 189/114 153/87 123/70 130/73   BP Location:  Left arm  Left arm   Patient Position:  Lying  Lying   Pulse: 65  56    Resp:       Temp:       TempSrc:       SpO2: 94%      Weight:       Height:           Active LDAs/IV Access:   Lines, Drains & Airways       Active LDAs       Name Placement date Placement time Site Days    Peripheral IV 09/18/23 0703 Left Antecubital 09/18/23  0703  Antecubital  less than 1                    Labs (abnormal labs have a star):   Labs Reviewed   COMPREHENSIVE METABOLIC PANEL - Abnormal; Notable for the following components:       Result Value    BUN 36 (*)     Creatinine 2.13 (*)     Sodium 134 (*)     Potassium 3.1 (*)     AST (SGOT) 68 (*)     eGFR 36.8 (*)     All other components within normal limits    Narrative:     GFR Normal >60  Chronic Kidney Disease <60  Kidney Failure <15     TROPONIN - Abnormal; Notable for the following  components:    HS Troponin T 28 (*)     All other components within normal limits    Narrative:     High Sensitive Troponin T Reference Range:  <10.0 ng/L- Negative Female for AMI  <15.0 ng/L- Negative Male for AMI  >=10 - Abnormal Female indicating possible myocardial injury.  >=15 - Abnormal Male indicating possible myocardial injury.   Clinicians would have to utilize clinical acumen, EKG, Troponin, and serial changes to determine if it is an Acute Myocardial Infarction or myocardial injury due to an underlying chronic condition.        CBC WITH AUTO DIFFERENTIAL - Abnormal; Notable for the following components:    RBC 3.97 (*)     Hemoglobin 11.7 (*)     Hematocrit 35.3 (*)     All other components within normal limits   URINE DRUG SCREEN - Abnormal; Notable for the following components:    Amphet/Methamphet, Screen Positive (*)     THC, Screen, Urine Positive (*)     All other components within normal limits    Narrative:     Negative Thresholds Per Drugs Screened:    Amphetamines                 500 ng/ml  Barbiturates                 200 ng/ml  Benzodiazepines              100 ng/ml  Cocaine                      300 ng/ml  Methadone                    300 ng/ml  Opiates                      300 ng/ml  Oxycodone                    100 ng/ml  THC                           50 ng/ml  Fentanyl                       5 ng/ml      The Normal Value for all drugs tested is negative. This report includes final unconfirmed screening results to be used for medical treatment purposes only. Unconfirmed results must not be used for non-medical purposes such as employment or legal testing. Clinical consideration should be applied to any drug of abuse test, particularly when unconfirmed results are used.           RAINBOW DRAW    Narrative:     The following orders were created for panel order [place] Draw.  Procedure                               Abnormality         Status                     ---------                                -----------         ------                     Green Top (Gel)[[id number]]                                  Final result               Lavender Top[[id number]]                                     Final result               Gold Top - SST[[id number]]                                   Final result               Light Blue Top[[id number]]                                   Final result                 Please view results for these tests on the individual orders.   ETHANOL   HIGH SENSITIVITIY TROPONIN T 2HR   CBC AND DIFFERENTIAL    Narrative:     The following orders were created for panel order CBC & Differential.  Procedure                               Abnormality         Status                     ---------                               -----------         ------                     CBC Auto Differential[[id number]]        Abnormal            Final result                 Please view results for these tests on the individual orders.   GREEN TOP   LAVENDER TOP   GOLD TOP - SST   LIGHT BLUE TOP       EKG:   ECG 12 Lead ED Triage Standing Order; Chest Pain   Final Result   HEART RATE= 61  bpm   RR Interval= 984  ms   IN Interval= 156  ms   P Horizontal Axis= 30  deg   P Front Axis= 44  deg   QRSD Interval= 127  ms   QT Interval= 441  ms   QTcB= 445  ms   QRS Axis= 51  deg   T Wave Axis= 64  deg   - ABNORMAL ECG -   Sinus rhythm   Intraventricular conduction delay   When compared with ECG of 04-Sep-2023 23:29:43,   Significant rate decrease otherwise no change   Electronically Signed By: Yousif Castro ([facility]) 18-Sep-2023 07:40:27   Date and Time of Study: 2023-09-18 05:54:30          Meds given in ED:   Medications   sodium chloride 0.9 % flush 10 mL (has no administration in time range)   aspirin tablet 325 mg (325 mg Oral Given 9/18/23 0631)   potassium chloride (K-DUR,KLOR-CON) ER tablet 40 mEq (40 mEq Oral Given 9/18/23 0729)   sodium chloride 0.9 % bolus 1,000 mL (1,000 mL Intravenous New Bag 9/18/23 0731)        Imaging results:  No radiology results for the last day    Ambulatory status:   - pt able to ambulate     Social issues:   Social History     Socioeconomic History    Marital status: Single       NIH Stroke Scale:       Janie Richmond RN  09/18/23 09:12 EDT

## 2023-09-18 NOTE — H&P
Patient Name:  Lorenzo James  YOB: 1970  MRN:  [id number]  Admit Date:  9/18/2023  Patient Care Team:  Provider, No Known as PCP - General      Subjective   History Present Illness     Chief Complaint   Patient presents with    Chest Pain     History of Present Illness  Mr. James is a 52 y.o. smoker with a history of polysubstance abuse, CKD 3, CAD, hypothyroidism, hypertension, chronic low back pain and noncompliance that presents to [facility] complaining of chest pain.  The patient is currently a rather poor historian.  He is currently drowsy and difficult to keep awake on exam.  When he does wake up and speak to me, he is not making sense.  He was able to tell me his name, but then quickly falls back to sleep.   Per ED notes, the patient presented with a 3-day history of anterior chest wall pain after not taking his blood pressure medication.  He apparently reported smoking meth yesterday.  There was also some reports of depression with ongoing SI in the ED. He did have some agitation as well and was apparently seeing things.   From review of prior records, he was admitted to the hospital in [place] on 8/18 to 8/19/2023.  He presented to the hospital at that time as with chest pain and reported recent use of cocaine and marijuana.  He apparently had been without his metoprolol and nitrate in a few days.  He had a stress test which showed a fixed scar but no reversible scar or ischemia.  He was seen by cardiology as well as nephrology given some increase in baseline kidney function.  He was given refills for his medications and discharged.  He presented to this facility 9/4 for symptoms of low back pain as well as occulta and opening his right eye with possible right-sided facial droop.  He underwent an MRI of the brain that was negative for any acute stroke and had an MRI lumbar spine showed degenerative changes, but nothing acute.  He was offered an epidural but  ultimately discharged with a steroid taper as needed muscle relaxants.   ED, he was afebrile and notably hypertensive at 191/110.  He was not hypoxic.  Lab work revealed a WBC of 7.76, hemoglobin 11.7, high-sensitivity troponin 28, creatinine 2.13, sodium 134 potassium 3.1.  UDS was positive for meth and THC.  EKG was no change from prior and subsequent troponin lower than the initial.  Chest x-ray was negative for any active disease.  Cardiology was contacted in the emergency room and sitter placed with plans for access follow-up.    Review of Systems   Unable to perform ROS: Mental status change      Personal History     Past Medical History:   Diagnosis Date    Chronic pain 01/14/2017    CKD (chronic kidney disease) stage 3, GFR 30-59 ml/min 08/11/2020    Coronary artery disease     DDD (degenerative disc disease), lumbosacral 04/13/2023    Hypertension     Hypothyroidism     Polysubstance abuse      History reviewed. No pertinent surgical history.  History reviewed. No pertinent family history.     Medications Prior to Admission   Medication Sig Dispense Refill Last Dose    aspirin 81 MG EC tablet Take 1 tablet by mouth Daily.       atorvastatin (LIPITOR) 20 MG tablet Take 1 tablet by mouth Every Night. 30 tablet 0     isosorbide mononitrate (IMDUR) 30 MG 24 hr tablet Take 1 tablet by mouth Daily.       levothyroxine (SYNTHROID, LEVOTHROID) 75 MCG tablet Take 1 tablet by mouth Daily.       loratadine (CLARITIN) 10 MG tablet Take 1 tablet by mouth.       methylPREDNISolone (MEDROL) 4 MG dose pack Take as directed on package instructions. 21 tablet 0     metoprolol tartrate (LOPRESSOR) 25 MG tablet Take 1 tablet by mouth 2 (Two) Times a Day. 60 tablet 0     mirtazapine (REMERON) 15 MG tablet Take 1 tablet by mouth Every Night.       nicotine (NICODERM CQ) 21 MG/24HR patch Place 1 patch on the skin as directed by provider.       nitroglycerin (NITROSTAT) 0.4 MG SL tablet Place 1 tablet under the tongue.        "ranolazine (RANEXA) 500 MG 12 hr tablet Take 1 tablet by mouth 2 (Two) Times a Day.       risperiDONE (risperDAL) 1 MG tablet Take 1 tablet by mouth 2 (Two) Times a Day.       TiZANidine (Zanaflex) 2 MG capsule Take 1 capsule by mouth 3 (Three) Times a Day As Needed for Muscle Spasms. 9 capsule 0      Allergies:    Allergies   Allergen Reactions    Bupropion Swelling    Griseofulvin Ultramicrosize [Griseofulvin] Itching    Toradol [Ketorolac Tromethamine] Itching    Tramadol Itching    Buspirone Unknown - Low Severity, Rash and Swelling     Other reaction(s): Other - comment required   Pt denies any adverse reaction to buspar.  Pt states \"it was the aspirin, I was taking with it\". Pt recently prescribed buspar Feb 10,2012 by Dr Gillig, with no adverse side effects    Pt denies any adverse reaction to buspar.  Pt states \"it was the aspirin, I was taking with it\". Pt recently prescribed buspar Feb 10,2012 by Dr Gillig, with no adverse side effects       Objective    Objective     Vital Signs  Temp:  [97.7 °F (36.5 °C)-98 °F (36.7 °C)] 97.7 °F (36.5 °C)  Heart Rate:  [50-66] 64  Resp:  [16-18] 18  BP: (118-191)/() 139/90  SpO2:  [93 %-96 %] 96 %  on   ;   Device (Oxygen Therapy): room air  Body mass index is 26.5 kg/m².    Physical Exam  Vitals and nursing note reviewed.   Constitutional:       Appearance: He is ill-appearing. He is not toxic-appearing.   HENT:      Head: Normocephalic and atraumatic.      Right Ear: External ear normal.      Left Ear: External ear normal.      Nose: Nose normal.   Cardiovascular:      Rate and Rhythm: Normal rate and regular rhythm.      Pulses: Normal pulses.   Pulmonary:      Effort: Pulmonary effort is normal. No respiratory distress.      Comments: Diminished d/t poor inspiratory effort. On RA  Abdominal:      General: Bowel sounds are normal. There is no distension.      Palpations: Abdomen is soft.      Tenderness: There is no abdominal tenderness.   Musculoskeletal:       "   General: No swelling. Normal range of motion.      Cervical back: Normal range of motion and neck supple.   Skin:     General: Skin is warm and dry.      Findings: No bruising.      Comments: Scattered scabs/healing scabs.   Neurological:      Comments: Unable to fully assess. Overall drowsy and arouses but falls back to sleep quickly.   Psychiatric:      Comments: Withdrawn. Not cooperative, but calm.       Results Review:  I reviewed the patient's new clinical results.  I reviewed the patient's new imaging results and agree with the interpretation.  I reviewed the patient's other test results and agree with the interpretation  I personally viewed and interpreted the patient's EKG/Telemetry data    Lab Results (last 24 hours)       Procedure Component Value Units Date/Time    CBC & Differential [[id number]]  (Abnormal) Collected: 09/18/23 0625    Specimen: Blood Updated: 09/18/23 0643    Narrative:      The following orders were created for panel order CBC & Differential.  Procedure                               Abnormality         Status                     ---------                               -----------         ------                     CBC Auto Differential[[id number]]        Abnormal            Final result                 Please view results for these tests on the individual orders.    Comprehensive Metabolic Panel [[id number]]  (Abnormal) Collected: 09/18/23 0625    Specimen: Blood Updated: 09/18/23 0701     Glucose 98 mg/dL      BUN 36 mg/dL      Creatinine 2.13 mg/dL      Sodium 134 mmol/L      Potassium 3.1 mmol/L      Chloride 98 mmol/L      CO2 23.3 mmol/L      Calcium 9.6 mg/dL      Total Protein 7.3 g/dL      Albumin 4.6 g/dL      ALT (SGPT) 17 U/L      AST (SGOT) 68 U/L      Alkaline Phosphatase 63 U/L      Total Bilirubin 0.4 mg/dL      Globulin 2.7 gm/dL      A/G Ratio 1.7 g/dL      BUN/Creatinine Ratio 16.9     Anion Gap 12.7 mmol/L      eGFR 36.8 mL/min/1.73     Narrative:      GFR Normal  >60  Chronic Kidney Disease <60  Kidney Failure <15      High Sensitivity Troponin T [[id number]]  (Abnormal) Collected: 09/18/23 0625    Specimen: Blood Updated: 09/18/23 0701     HS Troponin T 28 ng/L     Narrative:      High Sensitive Troponin T Reference Range:  <10.0 ng/L- Negative Female for AMI  <15.0 ng/L- Negative Male for AMI  >=10 - Abnormal Female indicating possible myocardial injury.  >=15 - Abnormal Male indicating possible myocardial injury.   Clinicians would have to utilize clinical acumen, EKG, Troponin, and serial changes to determine if it is an Acute Myocardial Infarction or myocardial injury due to an underlying chronic condition.         CBC Auto Differential [[id number]]  (Abnormal) Collected: 09/18/23 0625    Specimen: Blood Updated: 09/18/23 0643     WBC 7.76 10*3/mm3      RBC 3.97 10*6/mm3      Hemoglobin 11.7 g/dL      Hematocrit 35.3 %      MCV 88.9 fL      MCH 29.5 pg      MCHC 33.1 g/dL      RDW 13.1 %      RDW-SD 42.7 fl      MPV 11.3 fL      Platelets 211 10*3/mm3      Neutrophil % 62.1 %      Lymphocyte % 26.8 %      Monocyte % 8.9 %      Eosinophil % 1.0 %      Basophil % 0.8 %      Immature Grans % 0.4 %      Neutrophils, Absolute 4.82 10*3/mm3      Lymphocytes, Absolute 2.08 10*3/mm3      Monocytes, Absolute 0.69 10*3/mm3      Eosinophils, Absolute 0.08 10*3/mm3      Basophils, Absolute 0.06 10*3/mm3      Immature Grans, Absolute 0.03 10*3/mm3      nRBC 0.0 /100 WBC     Ethanol [[id number]] Collected: 09/18/23 0625    Specimen: Blood Updated: 09/18/23 0710     Ethanol <10 mg/dL      Ethanol % <0.010 %     Urine Drug Screen - Urine, Clean Catch [[id number]]  (Abnormal) Collected: 09/18/23 0756    Specimen: Urine, Clean Catch Updated: 09/18/23 0906     Amphet/Methamphet, Screen Positive     Barbiturates Screen, Urine Negative     Benzodiazepine Screen, Urine Negative     Cocaine Screen, Urine Negative     Opiate Screen Negative     THC, Screen, Urine Positive     Methadone Screen,  Urine Negative     Oxycodone Screen, Urine Negative     Fentanyl, Urine Negative    Narrative:      Negative Thresholds Per Drugs Screened:    Amphetamines                 500 ng/ml  Barbiturates                 200 ng/ml  Benzodiazepines              100 ng/ml  Cocaine                      300 ng/ml  Methadone                    300 ng/ml  Opiates                      300 ng/ml  Oxycodone                    100 ng/ml  THC                           50 ng/ml  Fentanyl                       5 ng/ml      The Normal Value for all drugs tested is negative. This report includes final unconfirmed screening results to be used for medical treatment purposes only. Unconfirmed results must not be used for non-medical purposes such as employment or legal testing. Clinical consideration should be applied to any drug of abuse test, particularly when unconfirmed results are used.            High Sensitivity Troponin T 2Hr [[id number]]  (Abnormal) Collected: 09/18/23 0847    Specimen: Blood Updated: 09/18/23 0945     HS Troponin T 25 ng/L      Troponin T Delta -3 ng/L     Narrative:      High Sensitive Troponin T Reference Range:  <10.0 ng/L- Negative Female for AMI  <15.0 ng/L- Negative Male for AMI  >=10 - Abnormal Female indicating possible myocardial injury.  >=15 - Abnormal Male indicating possible myocardial injury.   Clinicians would have to utilize clinical acumen, EKG, Troponin, and serial changes to determine if it is an Acute Myocardial Infarction or myocardial injury due to an underlying chronic condition.                 Imaging Results (Last 24 Hours)       Procedure Component Value Units Date/Time    XR Chest 1 View [[id number]] Collected: 09/18/23 0656     Updated: 09/18/23 0701    Narrative:      Chest pain, anterior x3 days, methamphetamine use 9/17/2023     COMPARISON 9/4/2023     FINDINGS: Cardiac size within normal limits. No mediastinal or hilar  abnormality. Lungs clear. No effusion or edema.     CONCLUSION: No  active disease of the chest     This report was finalized on 9/18/2023 6:58 AM by Dr. Anuj Brewer M.D.               Results for orders placed during the hospital encounter of 09/04/23    Adult Transthoracic Echo Complete W/ Cont if Necessary Per Protocol    Interpretation Summary    Left ventricular systolic function is normal. Calculated left ventricular EF = 50.1%    Left ventricular wall thickness is consistent with mild concentric hypertrophy.    Left ventricular diastolic function is consistent with (grade I) impaired relaxation.    There is moderate calcification of the aortic valve.    There are myxomatous changes of the mitral valve apparatus present.    Mild to moderate mitral valve regurgitation is present.    Saline test results are negative.      ECG 12 Lead ED Triage Standing Order; Chest Pain   Final Result   HEART RATE= 61  bpm   RR Interval= 984  ms   PA Interval= 156  ms   P Horizontal Axis= 30  deg   P Front Axis= 44  deg   QRSD Interval= 127  ms   QT Interval= 441  ms   QTcB= 445  ms   QRS Axis= 51  deg   T Wave Axis= 64  deg   - ABNORMAL ECG -   Sinus rhythm   Intraventricular conduction delay   When compared with ECG of 04-Sep-2023 23:29:43,   Significant rate decrease otherwise no change   Electronically Signed By: Yousif Castro ([facility]) 18-Sep-2023 07:40:27   Date and Time of Study: 2023-09-18 05:54:30           Assessment/Plan     Active Hospital Problems    Diagnosis  POA    **Chest pain [R07.9]  Yes    Polysubstance abuse [F19.10]  Yes    Suicidal ideations [R45.851]  Not Applicable    Hypokalemia [E87.6]  Yes    Noncompliance [Z91.199]  Not Applicable    Schizoaffective disorder [F25.9]  Yes    Acute renal failure superimposed on chronic kidney disease [N17.9, N18.9]  Yes    DDD (degenerative disc disease), lumbosacral [M51.37]  Yes    Hep C w/o coma, chronic [B18.2]  Yes    CKD (chronic kidney disease) stage 3, GFR 30-59 ml/min [N18.30]  Yes    Mixed hyperlipidemia [E78.2]  Yes     Arteriosclerosis of coronary artery [I25.10]  Yes    Hypothyroidism [E03.9]  Yes    Substance induced mood disorder [F19.94]  Yes     Mr. James is a 52 y.o. male that presented to the hospital with complaints of chest pain after not taking his blood pressure medications for several days.  He also reported some depression and feelings of SI.  He has a known history of polysubstance abuse and noncompliance.    Chest pain  Known CAD with prior stenting  -Cardiology consulted in the ED.  -Appreciate their recommendations.  -Recent stress testing was negative.    -Will resume back regimen that he was to be taking at discharge here last admission.  This includes Lopressor, aspirin, statin, Imdur and Ranexa.    JA on CKD 3  Hypokalemia  -Suspect prerenal, but will obtain bladder scan to rule out any retention.  -Baseline creatinine most likely 1.3-1.5.  Currently 2.13.  -Given fluids in the emergency room and will continue for now.   -Ask nephrology to see. Given potassium 40 mEq x 1.    Suicidal ideations  Schizoaffective disorder  Polysubstance abuse  -We will continue suicide precautions with one-to-one monitoring for now.  -Consult access and psych to see.  -Resume his Risperdal he was taking on prior admission for now.  -Ongoing drug abuse remains an issue.    Degenerative disc disease  -Recently evaluated by neurosurgery.  -Given steroids and muscle relaxers at discharge.  Unclear if he completed steroids, but will hold for now.    Hypothyroidism  -Resume prior dosing.    Noncompliance  -Main issue at play and likely causing recurrent hospital stays in addition to ongoing drug abuse.    I discussed the patients findings and my recommendations with patient, nursing staff, and Dr. Solis .    VTE Prophylaxis - Lovenox 40 mg SC daily.  Code Status - Full code.       SYDNEY Singleton  [facility]  09/18/23  12:30 EDT

## 2023-09-18 NOTE — ED NOTES
"Pt stating he is seeing a \"blond boy making faces at him\" from the door. Pt is agitated.  This tech ensured there was nobody there and pt told this tech to \"stop playing games with him.\"   "

## 2023-09-18 NOTE — CONSULTS
Date of Hospital Visit: 23  Encounter Provider: Manan Hall MD  Place of Service: [facility]  Patient Name: Lorenzo James  :1970  Referral Provider: Dr Solis    Chief complaint: chest pain/suicidal ideation     History of Present Illness     Lorenzo James is a 52 year old man with a known history of coronary disease, tobacco use, noncompliance, and polysubstance abuse.    He has undergone stenting to the LAD and RCA in the past.  Details were not available.  He underwent coronary angiography in 2020 that showed patent stents in the LAD and right coronary.  He is reported to have severe diffuse disease of the circumflex, which is a small vessel and not amenable to intervention.    He was hospitalized at another hospital less than one month ago with chest pain and had a stress test which reported an inferior myocardial infarction with no evidence of ischemia.  He just had an echo here 13 days ago which showed normal left ventricular size and systolic function.    He has reportedly moved to [place].  The patient is an extremely difficult historian, falling asleep during the exam, and when waking up, unable to always give me appropriate history.  He has reportedly been hallucinating and reported suicidal ideation to the emergency room physician.  He was reportedly using methamphetamine last night and his talk screen is positive for THC and methamphetamine.    His EKG is not acute.  His first troponin was 28 and his second troponin was 25.  His blood pressure was 190/110 upon arrival.    ECHO 23      Left ventricular systolic function is normal. Calculated left ventricular EF = 50.1%    Left ventricular wall thickness is consistent with mild concentric hypertrophy.    Left ventricular diastolic function is consistent with (grade I) impaired relaxation.    There is moderate calcification of the aortic valve.    There are myxomatous changes of  the mitral valve apparatus present.    Mild to moderate mitral valve regurgitation is present.    Saline test results are negative.      Past Medical History:   Diagnosis Date    Chronic pain 01/14/2017    CKD (chronic kidney disease) stage 3, GFR 30-59 ml/min 08/11/2020    Coronary artery disease     DDD (degenerative disc disease), lumbosacral 04/13/2023    Hypertension     Hypothyroidism     Polysubstance abuse        History reviewed. No pertinent surgical history.    Prior to Admission medications    Medication Sig Start Date End Date Taking? Authorizing Provider   aspirin 81 MG EC tablet Take 1 tablet by mouth Daily.    Marce Ibrahim MD   atorvastatin (LIPITOR) 20 MG tablet Take 1 tablet by mouth Every Night. 9/7/23   Montana Collins MD   isosorbide mononitrate (IMDUR) 30 MG 24 hr tablet Take 1 tablet by mouth Daily. 8/19/23   Marce Ibrahim MD   levothyroxine (SYNTHROID, LEVOTHROID) 75 MCG tablet Take 1 tablet by mouth Daily. 8/19/23   Marce Ibrahim MD   loratadine (CLARITIN) 10 MG tablet Take 1 tablet by mouth.    Marce Ibrahim MD   methylPREDNISolone (MEDROL) 4 MG dose pack Take as directed on package instructions. 9/7/23   Montana Collins MD   metoprolol tartrate (LOPRESSOR) 25 MG tablet Take 1 tablet by mouth 2 (Two) Times a Day. 9/7/23   Montana Collins MD   mirtazapine (REMERON) 15 MG tablet Take 1 tablet by mouth Every Night.    Marce Ibrahim MD   nicotine (NICODERM CQ) 21 MG/24HR patch Place 1 patch on the skin as directed by provider.    Marce Ibrahim MD   nitroglycerin (NITROSTAT) 0.4 MG SL tablet Place 1 tablet under the tongue.    Marce Ibrahim MD   ranolazine (RANEXA) 500 MG 12 hr tablet Take 1 tablet by mouth 2 (Two) Times a Day.    Marce Ibrahim MD   risperiDONE (risperDAL) 1 MG tablet Take 1 tablet by mouth 2 (Two) Times a Day.    Marce Ibrahim MD   TiZANidine (Zanaflex) 2 MG capsule Take 1 capsule by mouth 3  "(Three) Times a Day As Needed for Muscle Spasms. 9/7/23   Montana Collins MD       Social History     Socioeconomic History    Marital status: Single       History reviewed. No pertinent family history.    Review of Systems   Unable to perform ROS: Mental status change      Objective:     Vitals:    09/18/23 0806 09/18/23 0831 09/18/23 0901 09/18/23 0931   BP: 153/87 123/70 130/73 118/65   BP Location: Left arm  Left arm    Patient Position: Lying  Lying    Pulse:  56  50   Resp:       Temp:       TempSrc:       SpO2:    93%   Weight:       Height:         Body mass index is 26.5 kg/m².  Flowsheet Rows      Flowsheet Row First Filed Value   Admission Height 180.3 cm (71\") Documented at 09/18/2023 0545   Admission Weight 86.2 kg (190 lb) Documented at 09/18/2023 0545            Physical Exam  Constitutional:       Comments: Lying flat, sleeping, rousable but confused, NAD   HENT:      Head: Normocephalic.      Nose: Nose normal.   Cardiovascular:      Rate and Rhythm: Normal rate and regular rhythm.      Pulses: Normal pulses.      Heart sounds: Normal heart sounds.      Comments: + TTP when palpating anterior chest  Pulmonary:      Effort: Pulmonary effort is normal.      Breath sounds: Normal breath sounds.   Abdominal:      Palpations: Abdomen is soft.   Skin:     General: Skin is warm and dry.               Lab Review:                Results from last 7 days   Lab Units 09/18/23  0625   SODIUM mmol/L 134*   POTASSIUM mmol/L 3.1*   CHLORIDE mmol/L 98   CO2 mmol/L 23.3   BUN mg/dL 36*   CREATININE mg/dL 2.13*   GLUCOSE mg/dL 98   CALCIUM mg/dL 9.6     Results from last 7 days   Lab Units 09/18/23  0847 09/18/23  0625   HSTROP T ng/L 25* 28*     Results from last 7 days   Lab Units 09/18/23  0625   WBC 10*3/mm3 7.76   HEMOGLOBIN g/dL 11.7*   HEMATOCRIT % 35.3*   PLATELETS 10*3/mm3 211                                       I personally viewed and interpreted the patient's EKG/Telemetry data    Assessment/Plan: "     1. Chest pain  2. JA on CKD3  3. Polysubstance abuse  4. Schizoaffective disorder  5. Suicidal ideation and hallucinations    He is an extremely poor historian.  However, he had visible significant discomfort to chest palpation during my exam.  His EKG is nonischemic.  His troponin is minimally above baseline.  It is in the indeterminate range and it is trending down.  He just had a stress test recently that was nonischemic.  He presented with severe hypertension in the setting of noncompliance with his medications.  He is known to be noncompliant with all of his usual medications and with cardiac follow-up according to review of previous notes.  For this reason, he is not a candidate for any further work-up, and honestly, I do not feel that any is indicated right now.    I recommend resuming the medications that were prescribed at discharge just a few days ago.  Otherwise, cardiology will be available to see him on an as-needed basis.

## 2023-09-18 NOTE — ED NOTES
"Pt arrives from home via EMS.    EMS states \"Pt has anterior chest wall pain x3 days after not taking his Blood pressure medication for there last three days. Pt has also smoked meth yesterday.\"    "

## 2023-09-18 NOTE — PROGRESS NOTES
"[facility] Clinical Pharmacy Services: Enoxaparin Consult    Lorenzo James has a pharmacy consult to dose prophylactic enoxaparin per SYDNEY Whitt's request.     Indication: VTE Prophylaxis  Home Anticoagulation: none    Relevant clinical data and objective history reviewed:  52 y.o. male 180.3 cm (71\") 86.2 kg (190 lb)   Body mass index is 26.5 kg/m².   Results from last 7 days   Lab Units 09/18/23  0625   PLATELETS 10*3/mm3 211     Estimated Creatinine Clearance: 50 mL/min (A) (by C-G formula based on SCr of 2.13 mg/dL (H)).    Assessment/Plan    Will start patient on 40mg subcutaneous every 24 hours, adjusted for renal function. Consult order will be discontinued but pharmacy will continue to follow.     Tamara Manjarrez, [facility]  Clinical Pharmacist    "

## 2023-09-18 NOTE — NURSING NOTE
"1420: Patient agitated towards staff, verbally aggressive. Security called. MD contacted in regards to patient increased agitation and anxiety. PRN hydroxyzine ordered.   1734: Patient complaining of \"9\"/10 chest pain. PO nitro given x 3. MD notified. STAT EKG and troponin ordered.   1740: EKG showed ST elevation, notified Dr. Solis. Recommended STAT call to cardio.   1756: Spoke with cardiologist, stated she noted no significant changes between EKG readings and did not note any new onset ST elevation.  1811: Patient complaining of R side facial numbness with slight facial droop. Mr. Solis notified. STAT CT head ordered. RRT called. Vitals remain stable and patient status otherwise unchanged.  "

## 2023-09-18 NOTE — CODE DOCUMENTATION
Patient Name:  Lorenzo James  YOB: 1970  MRN:  [id number]  Admit Date:  9/18/2023    Visit Diagnoses:     ICD-10-CM ICD-9-CM   1. Chest pain, unspecified type  R07.9 786.50   2. Coronary artery disease involving native coronary artery of native heart with other form of angina pectoris  I25.118 414.01     413.9   3. Essential hypertension  I10 401.9   4. Smoker  F17.200 305.1   5. Hypercholesteremia  E78.00 272.0   6. JA (acute kidney injury)  N17.9 584.9   7. Hypokalemia  E87.6 276.8   8. Suicidal ideation  R45.851 V62.84   9. Methamphetamine abuse  F15.10 305.70       Reason For Rapid:   c/o left sided facial numbness, left sided facial droop; chest pain despite nitro tab admin x3    RN Communicated With:   primary RN spoke with Dr. Cook and Dr. Solis    Rapid Outcome:   stay on unit    Communication From Rapid Team:    Rapid response called for neuro change - left sided facial droop that doesn't persist w/ movement, right sided facial numbness that starts at back of neck and goes up to top of head towards right eye. No focal movement deficit or visual deficits. Also called for chest pain/pressure in middle of chest that is persisting despite nitro x3. EKG 12 lead done, same picture as this AM. No concern for ongoing ischemia at this time. Vitals stable. Primary MD ordered CT head, plain. Therefore we will defer case to primary team. Pt to stay on unit.       Most Recent Vital Signs  Temp:  [97.7 °F (36.5 °C)-98 °F (36.7 °C)] 97.7 °F (36.5 °C)  Heart Rate:  [50-66] 65  Resp:  [16-18] 18  BP: (118-191)/() 153/91  SpO2:  [93 %-96 %] 96 %  on   ;   Device (Oxygen Therapy): room air    Labs:      Glucose   Date/Time Value Ref Range Status   09/18/2023 1811 106 70 - 130 mg/dL Final     No results found for: SITE, ALLENTEST, PHART, WYV2VWH, PO2ART, JNP9VYW, BASEEXCESS, B2WTDIIW, HGBBG, HCTABG, OXYHEMOGLOBI, METHHGBN, CARBOXYHGB, CO2CT, BAROMETRIC, MODALITY, FIO2  Results from last 7 days   Lab  Units 09/18/23  0625   WBC 10*3/mm3 7.76   HEMOGLOBIN g/dL 11.7*   PLATELETS 10*3/mm3 211     Results from last 7 days   Lab Units 09/18/23  0625   SODIUM mmol/L 134*   POTASSIUM mmol/L 3.1*   CHLORIDE mmol/L 98   CO2 mmol/L 23.3   BUN mg/dL 36*   CREATININE mg/dL 2.13*   GLUCOSE mg/dL 98   ALBUMIN g/dL 4.6   BILIRUBIN mg/dL 0.4   ALK PHOS U/L 63   AST (SGOT) U/L 68*   ALT (SGPT) U/L 17   Estimated Creatinine Clearance: 50 mL/min (A) (by C-G formula based on SCr of 2.13 mg/dL (H)).  Results from last 7 days   Lab Units 09/18/23  0847 09/18/23  0625   HSTROP T ng/L 25* 28*         No results found for: STREPPNEUAG LEGANTIGENUR        NIH Stroke Scale:     1a. Level of Consciousness: 0-->Alert, keenly responsive  1b. LOC Questions: 0-->Answers both questions correctly  1c. LOC Commands: 0-->Performs both tasks correctly  2. Best Gaze: 0-->Normal     4. Facial Palsy: 0-->Normal symmetrical movements  5a. Motor Arm, Left: 0-->No drift, limb holds 90 (or 45) degrees for full 10 secs  5b. Motor Arm, Right: 0-->No drift, limb holds 90 (or 45) degrees for full 10 secs  6a. Motor Leg, Left: 0-->No drift, leg holds 30 degree position for full 5 secs  6b. Motor Leg, Right: 0-->No drift, leg holds 30 degree position for full 5 secs  7. Limb Ataxia: 0-->Absent  8. Sensory: 0-->Normal, no sensory loss  9. Best Language: 0-->No aphasia, normal  10. Dysarthria: 0-->Normal  11. Extinction and Inattention (formerly Neglect): 0-->No abnormality         Please refer to full rapid documentation on summary page under Index / Code Timeline

## 2023-09-18 NOTE — CONSULTS
[facility]                                                                                             Nephrology Initial Consult Note    Patient Identification:  Name: Lorenzo James MRN: [id number]  Age: 52 y.o. : 1970  Sex: male  Date:2023    Requesting Physician: As per consult order.  Reason for Consultation: JA/CKD  Information from:patient/ family/ chart      History of Present Illness: This is a 52 y.o. year old male who is a very poor historian.  Multiple medical problems with unfortunate noncompliance, history of schizoaffective disorder, substance abuse and likely some underlying chronic kidney disease.  He awakens but does not give any meaningful history.  Very difficult to keep him awake long enough to get any meaningful history.  Sitter at bedside currently.  Recent admission at the [facility] for chest pain.  He has a history of cocaine and marijuana use and also tested positive for methamphetamines.  Recently discharged and presented to this facility.  He had hypertensive urgency on admission with a BP of 191/110.  Noncompliant with his home blood pressure medications and came here complaining of chest pain.  Chest x-ray was negative.  Recent creatinine was noted to be 1.3 on  and now up to 2.1.    The following medical history and medications personally reviewed by me:    Problem List:     Chest pain    CKD (chronic kidney disease) stage 3, GFR 30-59 ml/min    DDD (degenerative disc disease), lumbosacral    Hep C w/o coma, chronic    Hypothyroidism    Mixed hyperlipidemia    Schizoaffective disorder    Substance induced mood disorder    Acute renal failure superimposed on chronic kidney disease    Arteriosclerosis of coronary artery    Polysubstance abuse    Suicidal ideations    Hypokalemia    Noncompliance      Past Medical History:  Past Medical History:   Diagnosis  Date    Chronic pain 01/14/2017    CKD (chronic kidney disease) stage 3, GFR 30-59 ml/min 08/11/2020    Coronary artery disease     DDD (degenerative disc disease), lumbosacral 04/13/2023    Hypertension     Hypothyroidism     Polysubstance abuse        Past Surgical History:  History reviewed. No pertinent surgical history.     Home Meds:   Medications Prior to Admission   Medication Sig Dispense Refill Last Dose    aspirin 81 MG EC tablet Take 1 tablet by mouth Daily.       atorvastatin (LIPITOR) 20 MG tablet Take 1 tablet by mouth Every Night. 30 tablet 0     isosorbide mononitrate (IMDUR) 30 MG 24 hr tablet Take 1 tablet by mouth Daily.       levothyroxine (SYNTHROID, LEVOTHROID) 75 MCG tablet Take 1 tablet by mouth Daily.       loratadine (CLARITIN) 10 MG tablet Take 1 tablet by mouth.       methylPREDNISolone (MEDROL) 4 MG dose pack Take as directed on package instructions. 21 tablet 0     metoprolol tartrate (LOPRESSOR) 25 MG tablet Take 1 tablet by mouth 2 (Two) Times a Day. 60 tablet 0     mirtazapine (REMERON) 15 MG tablet Take 1 tablet by mouth Every Night.       nicotine (NICODERM CQ) 21 MG/24HR patch Place 1 patch on the skin as directed by provider.       nitroglycerin (NITROSTAT) 0.4 MG SL tablet Place 1 tablet under the tongue.       ranolazine (RANEXA) 500 MG 12 hr tablet Take 1 tablet by mouth 2 (Two) Times a Day.       risperiDONE (risperDAL) 1 MG tablet Take 1 tablet by mouth 2 (Two) Times a Day.       TiZANidine (Zanaflex) 2 MG capsule Take 1 capsule by mouth 3 (Three) Times a Day As Needed for Muscle Spasms. 9 capsule 0        Current Meds:   Current Facility-Administered Medications   Medication Dose Route Frequency Provider Last Rate Last Admin    acetaminophen (TYLENOL) tablet 650 mg  650 mg Oral Q4H PRN Bibi Whitt APRN        Or    acetaminophen (TYLENOL) 160 MG/5ML oral solution 650 mg  650 mg Oral Q4H PRN Bibi Whitt APRN        Or    acetaminophen (TYLENOL) suppository 650 mg   650 mg Rectal Q4H PRN Bibi Whitt APRN        aspirin EC tablet 81 mg  81 mg Oral Daily Bibi Whitt APRN        atorvastatin (LIPITOR) tablet 20 mg  20 mg Oral Nightly Bibi Whitt APRN        sennosides-docusate (PERICOLACE) 8.6-50 MG per tablet 2 tablet  2 tablet Oral BID Bibi Whitt APRN        And    polyethylene glycol (MIRALAX) packet 17 g  17 g Oral Daily PRN Bibi Whitt APRN        And    bisacodyl (DULCOLAX) EC tablet 5 mg  5 mg Oral Daily PRN Bibi Whitt APRN        And    bisacodyl (DULCOLAX) suppository 10 mg  10 mg Rectal Daily PRN Bibi Whitt APRN        Enoxaparin Sodium (LOVENOX) syringe 40 mg  40 mg Subcutaneous Q24H Bibi Whitt APRN        famotidine (PEPCID) tablet 20 mg  20 mg Oral BID AC Bibi Whitt APRN        isosorbide mononitrate (IMDUR) 24 hr tablet 30 mg  30 mg Oral Daily Bibi Whitt APRN        levothyroxine (SYNTHROID, LEVOTHROID) tablet 75 mcg  75 mcg Oral Daily Bibi Whitt APRN        metoprolol tartrate (LOPRESSOR) tablet 25 mg  25 mg Oral BID Bibi Whitt APRN        mirtazapine (REMERON) tablet 15 mg  15 mg Oral Nightly Bibi Whitt APRN        nicotine (NICODERM CQ) 21 MG/24HR patch 1 patch  1 patch Transdermal Q24H Bibi Whitt APRN        nitroglycerin (NITROSTAT) SL tablet 0.4 mg  0.4 mg Sublingual Q5 Min PRN Bibi Whitt APRN        ondansetron (ZOFRAN) injection 4 mg  4 mg Intravenous Q6H PRN Bibi Whitt APRN        Pharmacy to Dose enoxaparin (LOVENOX)   Does not apply Continuous PRN Bibi Whitt APRN        ranolazine (RANEXA) 12 hr tablet 500 mg  500 mg Oral BID Bibi Whitt APRN        risperiDONE (risperDAL) tablet 1 mg  1 mg Oral BID Bibi Whitt APRN        sodium chloride 0.9 % flush 10 mL  10 mL Intravenous PRN Lucio Ahn MD        sodium chloride 0.9 % flush 10 mL  10 mL Intravenous Q12H Bibi Whitt, SYDNEY        sodium chloride 0.9 % flush 10 mL  10 mL Intravenous PRN Bibi Whitt, APRN  "       sodium chloride 0.9 % infusion 40 mL  40 mL Intravenous PRN Bibi Whitt APRN        sodium chloride 0.9 % infusion  100 mL/hr Intravenous Continuous Bibi Whitt APRN           Allergies:  Allergies   Allergen Reactions    Bupropion Swelling    Griseofulvin Ultramicrosize [Griseofulvin] Itching    Toradol [Ketorolac Tromethamine] Itching    Tramadol Itching    Buspirone Unknown - Low Severity, Rash and Swelling     Other reaction(s): Other - comment required   Pt denies any adverse reaction to buspar.  Pt states \"it was the aspirin, I was taking with it\". Pt recently prescribed buspar Feb 10,2012 by Dr Gillig, with no adverse side effects    Pt denies any adverse reaction to buspar.  Pt states \"it was the aspirin, I was taking with it\". Pt recently prescribed buspar Feb 10,2012 by Dr Gillig, with no adverse side effects       Social History:   Social History     Socioeconomic History    Marital status: Single        Family History:  History reviewed. No pertinent family history.     Review of Systems: as per HPI, in addition:    Unable to obtain any meaningful review of systems due to altered mental status    Physical Exam:  Vitals:   Temp (24hrs), Av.9 °F (36.6 °C), Min:97.7 °F (36.5 °C), Max:98 °F (36.7 °C)    /90 (BP Location: Left arm, Patient Position: Lying)   Pulse 64   Temp 97.7 °F (36.5 °C) (Oral)   Resp 18   Ht 180.3 cm (71\")   Wt 86.2 kg (190 lb)   SpO2 96%   BMI 26.50 kg/m²   Intake/Output:   No intake or output data in the 24 hours ending 23 1249     Wt Readings from Last 1 Encounters:   23 0545 86.2 kg (190 lb)       Exam:    General Appearance:  Awake, alert, oriented x3, no acute distress  Chronically ill-appearing   Head and Face:  Normocephalic, atraumatic, mucus membranes moist, oropharynx clear   Eyes:  No icterus, pupils equal round and reactive to light, extraocular movements intact    ENMT: Moist mucosa, tongue symmetric    Neck: Supple  no jugular " venous distention  no thyromegaly   Pulmonary:  Respiratory effort: Normal  Auscultation of lungs: Clear bilaterally  No wheezes  No rhonchi  Good air movement, good expansion   Chest wall:  No tenderness or deformity   Cardiovascular:  Auscultation of the heart: Normal rhythm, no murmurs  No significant edema of bilateral lower extremities   Abdomen:  Abdomen: soft, non-tender, normal bowel sounds all four quadrants, no masses   Liver and spleen: no hepatosplenomegaly   Musculoskeletal: Digits and nails: normal  Normal range of motion  No joint swelling or gross deformities    Skin: Skin inspection: color normal, no visible rashes or lesions  Skin palpation: texture, turgor normal, no palpable lesions   Lymphatic:  no cervical lymphadenopathy    Psychiatric: Judgement and insight: Could not assess, confused       DATA:  Radiology and Labs:  The following labs independently reviewed by me, additional AM labs ordered  Old records independently reviewed showing CKD  The following radiologic studies independently viewed by me, findings chest x-ray no active disease  Interval notes, chart personally reviewed by me.  I have reviewed and summarized old records as detailed above  Plan of care discussed with patient and sitter at bedside who confirms that has been confused most of the day.  Seems to be corroborated by most of the other physician and nursing exams  New problems include JA on CKD, abdominal status, hypokalemia      Risk/ complexity of medical care/ medical decision making: High risk, new JA on CKD  Chronic illness with severe exacerbation or progression      Labs:   Recent Results (from the past 24 hour(s))   ECG 12 Lead ED Triage Standing Order; Chest Pain    Collection Time: 09/18/23  5:54 AM   Result Value Ref Range    QT Interval 441 ms    QTC Interval 445 ms   Comprehensive Metabolic Panel    Collection Time: 09/18/23  6:25 AM    Specimen: Blood   Result Value Ref Range    Glucose 98 65 - 99 mg/dL     BUN 36 (H) 6 - 20 mg/dL    Creatinine 2.13 (H) 0.76 - 1.27 mg/dL    Sodium 134 (L) 136 - 145 mmol/L    Potassium 3.1 (L) 3.5 - 5.2 mmol/L    Chloride 98 98 - 107 mmol/L    CO2 23.3 22.0 - 29.0 mmol/L    Calcium 9.6 8.6 - 10.5 mg/dL    Total Protein 7.3 6.0 - 8.5 g/dL    Albumin 4.6 3.5 - 5.2 g/dL    ALT (SGPT) 17 1 - 41 U/L    AST (SGOT) 68 (H) 1 - 40 U/L    Alkaline Phosphatase 63 39 - 117 U/L    Total Bilirubin 0.4 0.0 - 1.2 mg/dL    Globulin 2.7 gm/dL    A/G Ratio 1.7 g/dL    BUN/Creatinine Ratio 16.9 7.0 - 25.0    Anion Gap 12.7 5.0 - 15.0 mmol/L    eGFR 36.8 (L) >60.0 mL/min/1.73   High Sensitivity Troponin T    Collection Time: 09/18/23  6:25 AM    Specimen: Blood   Result Value Ref Range    HS Troponin T 28 (H) <15 ng/L   Green Top (Gel)    Collection Time: 09/18/23  6:25 AM   Result Value Ref Range    Extra Tube Hold for add-ons.    Lavender Top    Collection Time: 09/18/23  6:25 AM   Result Value Ref Range    Extra Tube hold for add-on    Gold Top - SST    Collection Time: 09/18/23  6:25 AM   Result Value Ref Range    Extra Tube Hold for add-ons.    Light Blue Top    Collection Time: 09/18/23  6:25 AM   Result Value Ref Range    Extra Tube Hold for add-ons.    CBC Auto Differential    Collection Time: 09/18/23  6:25 AM    Specimen: Blood   Result Value Ref Range    WBC 7.76 3.40 - 10.80 10*3/mm3    RBC 3.97 (L) 4.14 - 5.80 10*6/mm3    Hemoglobin 11.7 (L) 13.0 - 17.7 g/dL    Hematocrit 35.3 (L) 37.5 - 51.0 %    MCV 88.9 79.0 - 97.0 fL    MCH 29.5 26.6 - 33.0 pg    MCHC 33.1 31.5 - 35.7 g/dL    RDW 13.1 12.3 - 15.4 %    RDW-SD 42.7 37.0 - 54.0 fl    MPV 11.3 6.0 - 12.0 fL    Platelets 211 140 - 450 10*3/mm3    Neutrophil % 62.1 42.7 - 76.0 %    Lymphocyte % 26.8 19.6 - 45.3 %    Monocyte % 8.9 5.0 - 12.0 %    Eosinophil % 1.0 0.3 - 6.2 %    Basophil % 0.8 0.0 - 1.5 %    Immature Grans % 0.4 0.0 - 0.5 %    Neutrophils, Absolute 4.82 1.70 - 7.00 10*3/mm3    Lymphocytes, Absolute 2.08 0.70 - 3.10 10*3/mm3     Monocytes, Absolute 0.69 0.10 - 0.90 10*3/mm3    Eosinophils, Absolute 0.08 0.00 - 0.40 10*3/mm3    Basophils, Absolute 0.06 0.00 - 0.20 10*3/mm3    Immature Grans, Absolute 0.03 0.00 - 0.05 10*3/mm3    nRBC 0.0 0.0 - 0.2 /100 WBC   Ethanol    Collection Time: 09/18/23  6:25 AM    Specimen: Blood   Result Value Ref Range    Ethanol <10 0 - 10 mg/dL    Ethanol % <0.010 %   Urine Drug Screen - Urine, Clean Catch    Collection Time: 09/18/23  7:56 AM    Specimen: Urine, Clean Catch   Result Value Ref Range    Amphet/Methamphet, Screen Positive (A) Negative    Barbiturates Screen, Urine Negative Negative    Benzodiazepine Screen, Urine Negative Negative    Cocaine Screen, Urine Negative Negative    Opiate Screen Negative Negative    THC, Screen, Urine Positive (A) Negative    Methadone Screen, Urine Negative Negative    Oxycodone Screen, Urine Negative Negative    Fentanyl, Urine Negative Negative   High Sensitivity Troponin T 2Hr    Collection Time: 09/18/23  8:47 AM    Specimen: Blood   Result Value Ref Range    HS Troponin T 25 (H) <15 ng/L    Troponin T Delta -3 >=-4 - <+4 ng/L       Radiology:  Imaging Results (Last 24 Hours)       Procedure Component Value Units Date/Time    XR Chest 1 View [[id number]] Collected: 09/18/23 0656     Updated: 09/18/23 0701    Narrative:      Chest pain, anterior x3 days, methamphetamine use 9/17/2023     COMPARISON 9/4/2023     FINDINGS: Cardiac size within normal limits. No mediastinal or hilar  abnormality. Lungs clear. No effusion or edema.     CONCLUSION: No active disease of the chest     This report was finalized on 9/18/2023 6:58 AM by Dr. Anuj Brewer M.D.                    ASSESSMENT:   JA, multiple possible etiologies including prerenal related to his altered mental status, illicit drug use with hypertensive urgency likely contributing as well.  Does appear to have underlying CKD which is likely due to hypertension and substance abuse  CKD stage IIIa    Chest pain, no  plans for intervention at this time due to noncompliance    CKD (chronic kidney disease) stage 3, GFR 30-59 ml/min    DDD (degenerative disc disease), lumbosacral  Hepatitis C    Hypothyroidism    Mixed hyperlipidemia    Schizoaffective disorder    Substance induced mood disorder    Acute renal failure superimposed on chronic kidney disease    Arteriosclerosis of coronary artery    Polysubstance abuse    Suicidal ideations    Hypokalemia    Noncompliance        DISCUSSION/PLAN:   Agree with gentle IV fluids  Not sure to be compliant enough at this time for renal ultrasound but obstruction seems less likely  We will check UA and urine electrolytes  Resume home BP meds  Replace potassium orally and check magnesium in a.m.  Will follow-up a.m. labs    Continue to monitor electrolytes and volume closely, avoid IV contrast and nephrotoxic medications     I appreciate the consult request  Please send me a secure chat message if any questions regarding patient care.      Murtaza Campos MD  [facility]  Office phone number: [phone]  Answering service phone number: [phone]      9/18/2023        Dictation via Dragon dictation software

## 2023-09-19 ENCOUNTER — READMISSION MANAGEMENT (OUTPATIENT)
Dept: CALL CENTER | Facility: HOSPITAL | Age: 53
End: 2023-09-19
Payer: MEDICARE

## 2023-09-19 PROBLEM — R20.0 RIGHT ARM NUMBNESS: Status: ACTIVE | Noted: 2023-09-19

## 2023-09-19 PROBLEM — Q67.0 FACIAL ASYMMETRY: Status: ACTIVE | Noted: 2023-09-19

## 2023-09-19 LAB
ANION GAP SERPL CALCULATED.3IONS-SCNC: 10.4 MMOL/L (ref 5–15)
BASOPHILS # BLD AUTO: 0.06 10*3/MM3 (ref 0–0.2)
BASOPHILS NFR BLD AUTO: 1 % (ref 0–1.5)
BUN SERPL-MCNC: 22 MG/DL (ref 6–20)
BUN/CREAT SERPL: 14.8 (ref 7–25)
CALCIUM SPEC-SCNC: 8.7 MG/DL (ref 8.6–10.5)
CHLORIDE SERPL-SCNC: 108 MMOL/L (ref 98–107)
CO2 SERPL-SCNC: 23.6 MMOL/L (ref 22–29)
CREAT SERPL-MCNC: 1.49 MG/DL (ref 0.76–1.27)
DEPRECATED RDW RBC AUTO: 42.2 FL (ref 37–54)
EGFRCR SERPLBLD CKD-EPI 2021: 56.5 ML/MIN/1.73
EOSINOPHIL # BLD AUTO: 0.11 10*3/MM3 (ref 0–0.4)
EOSINOPHIL NFR BLD AUTO: 1.8 % (ref 0.3–6.2)
ERYTHROCYTE [DISTWIDTH] IN BLOOD BY AUTOMATED COUNT: 13 % (ref 12.3–15.4)
GLUCOSE SERPL-MCNC: 113 MG/DL (ref 65–99)
HCT VFR BLD AUTO: 34.9 % (ref 37.5–51)
HGB BLD-MCNC: 11.5 G/DL (ref 13–17.7)
IMM GRANULOCYTES # BLD AUTO: 0.02 10*3/MM3 (ref 0–0.05)
IMM GRANULOCYTES NFR BLD AUTO: 0.3 % (ref 0–0.5)
LYMPHOCYTES # BLD AUTO: 2.82 10*3/MM3 (ref 0.7–3.1)
LYMPHOCYTES NFR BLD AUTO: 47.4 % (ref 19.6–45.3)
MAGNESIUM SERPL-MCNC: 2.4 MG/DL (ref 1.6–2.6)
MCH RBC QN AUTO: 29.3 PG (ref 26.6–33)
MCHC RBC AUTO-ENTMCNC: 33 G/DL (ref 31.5–35.7)
MCV RBC AUTO: 88.8 FL (ref 79–97)
MONOCYTES # BLD AUTO: 0.54 10*3/MM3 (ref 0.1–0.9)
MONOCYTES NFR BLD AUTO: 9.1 % (ref 5–12)
NEUTROPHILS NFR BLD AUTO: 2.4 10*3/MM3 (ref 1.7–7)
NEUTROPHILS NFR BLD AUTO: 40.4 % (ref 42.7–76)
NRBC BLD AUTO-RTO: 0 /100 WBC (ref 0–0.2)
PHOSPHATE SERPL-MCNC: 2.2 MG/DL (ref 2.5–4.5)
PLATELET # BLD AUTO: 197 10*3/MM3 (ref 140–450)
PMV BLD AUTO: 11.4 FL (ref 6–12)
POTASSIUM SERPL-SCNC: 3.5 MMOL/L (ref 3.5–5.2)
QT INTERVAL: 464 MS
QTC INTERVAL: 483 MS
RBC # BLD AUTO: 3.93 10*6/MM3 (ref 4.14–5.8)
SODIUM SERPL-SCNC: 142 MMOL/L (ref 136–145)
WBC NRBC COR # BLD: 5.95 10*3/MM3 (ref 3.4–10.8)

## 2023-09-19 PROCEDURE — 94799 UNLISTED PULMONARY SVC/PX: CPT

## 2023-09-19 PROCEDURE — 97530 THERAPEUTIC ACTIVITIES: CPT

## 2023-09-19 PROCEDURE — G0378 HOSPITAL OBSERVATION PER HR: HCPCS

## 2023-09-19 PROCEDURE — 96372 THER/PROPH/DIAG INJ SC/IM: CPT

## 2023-09-19 PROCEDURE — 25010000002 ENOXAPARIN PER 10 MG: Performed by: NURSE PRACTITIONER

## 2023-09-19 PROCEDURE — 80048 BASIC METABOLIC PNL TOTAL CA: CPT | Performed by: STUDENT IN AN ORGANIZED HEALTH CARE EDUCATION/TRAINING PROGRAM

## 2023-09-19 PROCEDURE — 84100 ASSAY OF PHOSPHORUS: CPT | Performed by: STUDENT IN AN ORGANIZED HEALTH CARE EDUCATION/TRAINING PROGRAM

## 2023-09-19 PROCEDURE — 97165 OT EVAL LOW COMPLEX 30 MIN: CPT

## 2023-09-19 PROCEDURE — 85025 COMPLETE CBC W/AUTO DIFF WBC: CPT | Performed by: STUDENT IN AN ORGANIZED HEALTH CARE EDUCATION/TRAINING PROGRAM

## 2023-09-19 PROCEDURE — 90791 PSYCH DIAGNOSTIC EVALUATION: CPT | Performed by: SOCIAL WORKER

## 2023-09-19 PROCEDURE — 83735 ASSAY OF MAGNESIUM: CPT | Performed by: STUDENT IN AN ORGANIZED HEALTH CARE EDUCATION/TRAINING PROGRAM

## 2023-09-19 RX ORDER — POTASSIUM CHLORIDE 750 MG/1
40 TABLET, FILM COATED, EXTENDED RELEASE ORAL ONCE
Status: COMPLETED | OUTPATIENT
Start: 2023-09-19 | End: 2023-09-19

## 2023-09-19 RX ORDER — ALBUTEROL SULFATE 2.5 MG/3ML
2.5 SOLUTION RESPIRATORY (INHALATION) EVERY 6 HOURS PRN
Status: DISCONTINUED | OUTPATIENT
Start: 2023-09-19 | End: 2023-09-20 | Stop reason: HOSPADM

## 2023-09-19 RX ORDER — ALBUTEROL SULFATE 90 UG/1
2 AEROSOL, METERED RESPIRATORY (INHALATION) EVERY 4 HOURS PRN
COMMUNITY

## 2023-09-19 RX ADMIN — HYDROXYZINE HYDROCHLORIDE 10 MG: 10 TABLET ORAL at 12:27

## 2023-09-19 RX ADMIN — RANOLAZINE 500 MG: 500 TABLET, FILM COATED, EXTENDED RELEASE ORAL at 08:09

## 2023-09-19 RX ADMIN — METOPROLOL TARTRATE 25 MG: 25 TABLET, FILM COATED ORAL at 08:10

## 2023-09-19 RX ADMIN — ATORVASTATIN CALCIUM 20 MG: 20 TABLET, FILM COATED ORAL at 20:22

## 2023-09-19 RX ADMIN — SENNOSIDES AND DOCUSATE SODIUM 2 TABLET: 50; 8.6 TABLET ORAL at 08:10

## 2023-09-19 RX ADMIN — SENNOSIDES AND DOCUSATE SODIUM 2 TABLET: 50; 8.6 TABLET ORAL at 20:23

## 2023-09-19 RX ADMIN — ALBUTEROL SULFATE 2.5 MG: 2.5 SOLUTION RESPIRATORY (INHALATION) at 21:38

## 2023-09-19 RX ADMIN — ISOSORBIDE MONONITRATE 30 MG: 30 TABLET, EXTENDED RELEASE ORAL at 08:10

## 2023-09-19 RX ADMIN — Medication 10 ML: at 20:23

## 2023-09-19 RX ADMIN — ACETAMINOPHEN 650 MG: 325 TABLET, FILM COATED ORAL at 07:26

## 2023-09-19 RX ADMIN — MIRTAZAPINE 15 MG: 15 TABLET, FILM COATED ORAL at 20:22

## 2023-09-19 RX ADMIN — Medication 10 ML: at 08:11

## 2023-09-19 RX ADMIN — DIBASIC SODIUM PHOSPHATE, MONOBASIC POTASSIUM PHOSPHATE AND MONOBASIC SODIUM PHOSPHATE 2 TABLET: 852; 155; 130 TABLET ORAL at 17:04

## 2023-09-19 RX ADMIN — NICOTINE 1 PATCH: 21 PATCH, EXTENDED RELEASE TRANSDERMAL at 12:28

## 2023-09-19 RX ADMIN — DIBASIC SODIUM PHOSPHATE, MONOBASIC POTASSIUM PHOSPHATE AND MONOBASIC SODIUM PHOSPHATE 2 TABLET: 852; 155; 130 TABLET ORAL at 20:22

## 2023-09-19 RX ADMIN — ASPIRIN 81 MG: 81 TABLET, COATED ORAL at 08:09

## 2023-09-19 RX ADMIN — LEVOTHYROXINE SODIUM 75 MCG: 0.07 TABLET ORAL at 08:09

## 2023-09-19 RX ADMIN — RANOLAZINE 500 MG: 500 TABLET, FILM COATED, EXTENDED RELEASE ORAL at 20:22

## 2023-09-19 RX ADMIN — METOPROLOL TARTRATE 25 MG: 25 TABLET, FILM COATED ORAL at 20:23

## 2023-09-19 RX ADMIN — ENOXAPARIN SODIUM 40 MG: 100 INJECTION SUBCUTANEOUS at 12:30

## 2023-09-19 RX ADMIN — ACETAMINOPHEN 650 MG: 325 TABLET, FILM COATED ORAL at 12:33

## 2023-09-19 RX ADMIN — FAMOTIDINE 20 MG: 20 TABLET, FILM COATED ORAL at 08:10

## 2023-09-19 RX ADMIN — FAMOTIDINE 20 MG: 20 TABLET, FILM COATED ORAL at 17:04

## 2023-09-19 RX ADMIN — POTASSIUM CHLORIDE 40 MEQ: 750 TABLET, EXTENDED RELEASE ORAL at 17:04
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
## 2023-09-19 NOTE — PLAN OF CARE
Goal Outcome Evaluation:  Plan of Care Reviewed With: patient           Outcome Evaluation: Pt is a 53 yo M admitted with chest pain. Pt is homeless but has been staying at a hotel which he no longer has access to stay at. Inconsistent reports on PLOF, initially stating he is unable to walk and requires a WC for mobility but later stating he walks multiple miles a day. Today he requires spv for fxl mobility around room and nsg station using RW, spv for safety. Independent with LB dressing. Pt is easily distracted and tangential but appears to be at baseline fxn. No acute OT needs identified, will sign off.

## 2023-09-19 NOTE — THERAPY EVALUATION
Patient Name: Lorenzo James  : 1970    MRN: [id number]                              Today's Date: 2023       Admit Date: 2023    Visit Dx:     ICD-10-CM ICD-9-CM   1. Chest pain, unspecified type  R07.9 786.50   2. Coronary artery disease involving native coronary artery of native heart with other form of angina pectoris  I25.118 414.01     413.9   3. Essential hypertension  I10 401.9   4. Smoker  F17.200 305.1   5. Hypercholesteremia  E78.00 272.0   6. JA (acute kidney injury)  N17.9 584.9   7. Hypokalemia  E87.6 276.8   8. Suicidal ideation  R45.851 V62.84   9. Methamphetamine abuse  F15.10 305.70     Patient Active Problem List   Diagnosis    Cannabis abuse    Chronic pain    Cigarette nicotine dependence with withdrawal    CKD (chronic kidney disease) stage 3, GFR 30-59 ml/min    DDD (degenerative disc disease), lumbosacral    Hep C w/o coma, chronic    Hypothyroidism    Lumbar pain with radiation down both legs    Mixed hyperlipidemia    Schizoaffective disorder    Spondylolisthesis at L4-L5 level    Substance induced mood disorder    Weakness    Urinary incontinence without sensory awareness    Acute renal failure superimposed on chronic kidney disease    Antisocial personality disorder    Arteriosclerosis of coronary artery    Chest pain    Polysubstance abuse    Suicidal ideations    Hypokalemia    Noncompliance    Facial asymmetry    Right arm numbness     Past Medical History:   Diagnosis Date    Chronic pain 2017    CKD (chronic kidney disease) stage 3, GFR 30-59 ml/min 2020    Coronary artery disease     DDD (degenerative disc disease), lumbosacral 2023    Hypertension     Hypothyroidism     Polysubstance abuse      History reviewed. No pertinent surgical history.   General Information       Row Name 23 1441          OT Time and Intention    Document Type discharge evaluation/summary  -JW     Mode of Treatment occupational therapy  -JW       Row Name  09/19/23 1441          General Information    Patient Profile Reviewed yes  -     Prior Level of Function independent:;ADL's;all household mobility  intermittent use of a WC he borrowed from a friend. No longer has access  -     Existing Precautions/Restrictions fall  -     Barriers to Rehab none identified  -       Row Name 09/19/23 1441          Living Environment    People in Home alone  has been staying at a hotel  -[facility] Name 09/19/23 1441          Cognition    Orientation Status (Cognition) oriented to;person;place  -               User Key  (r) = Recorded By, (t) = Taken By, (c) = Cosigned By      Initials Name Provider Type    Debbi Robertson OT Occupational Therapist                     Mobility/ADL's       Row Name 09/19/23 1442          Bed Mobility    Bed Mobility supine-sit;sit-supine  -     Supine-Sit [place] (Bed Mobility) independent  -     Sit-Supine [place] (Bed Mobility) independent  -[facility] Name 09/19/23 1442          Transfers    Transfers sit-stand transfer  -JW       Row Name 09/19/23 1442          Sit-Stand Transfer    Sit-Stand [place] (Transfers) supervision  -     Assistive Device (Sit-Stand Transfers) walker, front-wheeled  -     Comment, (Sit-Stand Transfer) spv for safety  -       Row Name 09/19/23 1442          Functional Mobility    Functional Mobility- Ind. Level supervision required  -     Functional Mobility- Device walker, front-wheeled  -     Functional Mobility- Comment fxl ambulation around the room and nsg unit using RW with spv for safety  -[facility] Name 09/19/23 1442          Activities of Daily Living    BADL Assessment/Intervention lower body dressing  -[facility] Name 09/19/23 1442          Lower Body Dressing Assessment/Training    [place] Level (Lower Body Dressing) don;doff;socks;independent  -     Position (Lower Body Dressing) edge of bed sitting  -               User Key  (r) = Recorded By, (t) =  "Taken By, (c) = Cosigned By      Initials Name Provider Type    Debbi Robertson OT Occupational Therapist                   Obj/Interventions       Row Name 09/19/23 1443          Sensory Interventions    Comment, Sensory Intervention reports baseline numbness in R hand  -[facility] Name 09/19/23 1443          Vision Assessment/Intervention    Visual Impairment/Limitations WNL  -JW       Row Name 09/19/23 1443          Range of Motion Comprehensive    General Range of Motion no range of motion deficits identified  -[facility] Name 09/19/23 1443          Strength Comprehensive (MMT)    General Manual Muscle Testing (MMT) Assessment no strength deficits identified  -[facility] Name 09/19/23 1443          Balance    Comment, Balance no LOB with ADLs and fxl mobility using RW  -               User Key  (r) = Recorded By, (t) = Taken By, (c) = Cosigned By      Initials Name Provider Type    Debbi Robertson OT Occupational Therapist                   Goals/Plan       Row Name 09/19/23 1449          Transfer Goal 1 (OT)    Activity/Assistive Device (Transfer Goal 1, OT) transfers, all  -     [place] Level/Cues Needed (Transfer Goal 1, OT) supervision required  -     Time Frame (Transfer Goal 1, OT) short term goal (STG);2 weeks  -     Progress/Outcome (Transfer Goal 1, OT) goal met  -               User Key  (r) = Recorded By, (t) = Taken By, (c) = Cosigned By      Initials Name Provider Type    Debbi Robertson OT Occupational Therapist                   Clinical Impression       [facility] Name 09/19/23 1444          Pain Assessment    Pretreatment Pain Rating 10/10  -     Posttreatment Pain Rating 10/10  -     Pain Location - back  -     Pre/Posttreatment Pain Comment reports 10/10 back pain \"all the time\"  -       Row Name 09/19/23 1444          Plan of Care Review    Plan of Care Reviewed With patient  -     Outcome Evaluation Pt is a 51 yo M admitted with chest pain. Pt is homeless but has been " staying at a hotel which he no longer has access to stay at. Inconsistent reports on PLOF, initially stating he is unable to walk and requires a WC for mobility but later stating he walks multiple miles a day. Today he requires spv for fxl mobility around room and nsg station using RW, spv for safety. Independent with LB dressing. Pt is easily distracted and tangential but appears to be at baseline fxn. No acute OT needs identified, will sign off.  -       Row Name 09/19/23 1444          Therapy Assessment/Plan (OT)    Criteria for Skilled Therapeutic Interventions Met (OT) no problems identified which require skilled intervention  -     Therapy Frequency (OT) evaluation only  -       Row Name 09/19/23 1444          Therapy Plan Review/Discharge Plan (OT)    Anticipated Discharge Disposition (OT) --  homeless shelter  -       Row Name 09/19/23 1444          Positioning and Restraints    Pre-Treatment Position in bed  -JW     Post Treatment Position bed  -JW     In Bed notified nsg;fowlers;call light within reach;encouraged to call for assist;exit alarm on  -               User Key  (r) = Recorded By, (t) = Taken By, (c) = Cosigned By      Initials Name Provider Type    Debbi Robertson, [place] Occupational Therapist                   Outcome Measures       Row Name 09/19/23 1449          How much help from another is currently needed...    Putting on and taking off regular lower body clothing? 4  -JW     Bathing (including washing, rinsing, and drying) 4  -JW     Toileting (which includes using toilet bed pan or urinal) 4  -JW     Putting on and taking off regular upper body clothing 4  -JW     Taking care of personal grooming (such as brushing teeth) 4  -JW     Eating meals 4  -JW     AM-PAC 6 Clicks Score (OT) 24  -       Row Name 09/19/23 1449          Functional Assessment    Outcome Measure Options AM-PAC 6 Clicks Daily Activity (OT)  -               User Key  (r) = Recorded By, (t) = Taken By, (c) =  Cosigned By      Initials Name Provider Type     Debbi Hester OT Occupational Therapist                    Occupational Therapy Education       Title: PT OT SLP Therapies (In Progress)       Topic: Occupational Therapy (In Progress)       Point: ADL training (Done)       Description:   Instruct learner(s) on proper safety adaptation and remediation techniques during self care or transfers.   Instruct in proper use of assistive devices.                  Learning Progress Summary             Patient Acceptance, E, VU,NR by  at 9/19/2023 1449    Comment: role of OT, plan of care, safety                         Point: Home exercise program (Not Started)       Description:   Instruct learner(s) on appropriate technique for monitoring, assisting and/or progressing therapeutic exercises/activities.                  Learner Progress:  Not documented in this visit.              Point: Precautions (Done)       Description:   Instruct learner(s) on prescribed precautions during self-care and functional transfers.                  Learning Progress Summary             Patient Acceptance, E, VU,NR by  at 9/19/2023 1449    Comment: role of OT, plan of care, safety                         Point: Body mechanics (Done)       Description:   Instruct learner(s) on proper positioning and spine alignment during self-care, functional mobility activities and/or exercises.                  Learning Progress Summary             Patient Acceptance, E, VU,NR by  at 9/19/2023 1449    Comment: role of OT, plan of care, safety                                         User Key       Initials Effective Dates Name Provider Type [facility] 06/10/21 -  Debbi Hester OT Occupational Therapist OT                  OT Recommendation and Plan  Therapy Frequency (OT): evaluation only  Plan of Care Review  Plan of Care Reviewed With: patient  Outcome Evaluation: Pt is a 53 yo M admitted with chest pain. Pt is homeless but has been staying at a  hotel which he no longer has access to stay at. Inconsistent reports on PLOF, initially stating he is unable to walk and requires a WC for mobility but later stating he walks multiple miles a day. Today he requires spv for fxl mobility around room and nsg station using RW, spv for safety. Independent with LB dressing. Pt is easily distracted and tangential but appears to be at baseline fxn. No acute OT needs identified, will sign off.     Time Calculation:   Evaluation Complexity (OT)  Review Occupational Profile/Medical/Therapy History Complexity: brief/low complexity  Assessment, Occupational Performance/Identification of Deficit Complexity: 1-3 performance deficits  Clinical Decision Making Complexity (OT): problem focused assessment/low complexity  Overall Complexity of Evaluation (OT): low complexity     Time Calculation- OT       Row Name 09/19/23 1450             Time Calculation- OT    OT Start Time 1422  -JW      OT Stop Time 1439  -JW      OT Time Calculation (min) 17 min  -JW      Total Timed Code Minutes- OT 10 minute(s)  -JW      OT Received On 09/19/23  -         Timed Charges    17182 - OT Therapeutic Activity Minutes 10  -JW         Untimed Charges    OT Eval/Re-eval Minutes 7  -JW         Total Minutes    Timed Charges Total Minutes 10  -JW      Untimed Charges Total Minutes 7  -JW       Total Minutes 17  -JW                User Key  (r) = Recorded By, (t) = Taken By, (c) = Cosigned By      Initials Name Provider Type    Debbi Robertson OT Occupational Therapist                  Therapy Charges for Today       Code Description Service Date Service Provider Modifiers Qty    00168223514  OT THERAPEUTIC ACT EA 15 MIN 9/19/2023 Debbi Hester OT GO 1    74581241325 HC OT EVAL LOW COMPLEXITY 3 9/19/2023 Debbi Hester OT GO 1                 Debbi Hester OT  9/19/2023

## 2023-09-19 NOTE — PROGRESS NOTES
"   LOS: 0 days     Chief Complaint/ Reason for encounter: JA    Subjective   09/19/23 : No new complaints today.  Much more awake alert and conversant  Denies any chest pain shortness of breath nausea or vomiting  States improved oral intake      Medical history reviewed:  History of Present Illness    Subjective    History taken from: Patient and chart    Vital Signs  Temp:  [97.3 °F (36.3 °C)-98.1 °F (36.7 °C)] 97.7 °F (36.5 °C)  Heart Rate:  [54-67] 60  Resp:  [16] 16  BP: (107-143)/(56-94) 143/94       Wt Readings from Last 1 Encounters:   09/19/23 1230 83.7 kg (184 lb 8.4 oz)   09/18/23 0545 86.2 kg (190 lb)       Objective:  Vital signs: (most recent): Blood pressure 143/94, pulse 60, temperature 97.7 °F (36.5 °C), temperature source Oral, resp. rate 16, height 180.3 cm (71\"), weight 83.7 kg (184 lb 8.4 oz), SpO2 100 %.              Objective:  General Appearance:  Comfortable, chronically ill -appearing, in no acute distress and not in pain.  Awake, alert, oriented  HEENT: Mucous membranes moist, no injury, oropharynx clear  Lungs:  Normal effort and normal respiratory rate.  Breath sounds clear to auscultation.  No  respiratory distress.  No rales, decreased breath sounds or rhonchi.    Heart: Normal rate.  Regular rhythm.  S1, S2 normal.  No murmur.   Abdomen: Abdomen is soft.  Bowel sounds are normal, no abdominal tenderness.  There is no rebound or guarding  Extremities: Trace edema of bilateral lower extremities  Neurological: No focal motor or sensory deficits, pupils reactive  Skin:  Warm and dry.  No rash or cyanosis.       Results Review:    Intake/Output:     Intake/Output Summary (Last 24 hours) at 9/19/2023 1419  Last data filed at 9/18/2023 2330  Gross per 24 hour   Intake 1030.01 ml   Output 700 ml   Net 330.01 ml         DATA:  Radiology and Labs:  The following labs independently reviewed by me. Additional labs ordered for tomorrow a.m.  Interval notes, chart personally reviewed by me.   Old " records independently reviewed showing history of CKD  The following radiologic studies independently viewed by me, findings chest x-ray no active disease  New problems include hypokalemia, hypophosphatemia  Discussed with patient    Risk/ complexity of medical care/ medical decision making moderate complexity, renal failure and electrolyte management      Labs:   Recent Results (from the past 24 hour(s))   ECG 12 Lead Chest Pain    Collection Time: 09/18/23  5:46 PM   Result Value Ref Range    QT Interval 464 ms    QTC Interval 483 ms   High Sensitivity Troponin T    Collection Time: 09/18/23  5:52 PM    Specimen: Blood   Result Value Ref Range    HS Troponin T 21 (H) <15 ng/L   POC Glucose Once    Collection Time: 09/18/23  6:11 PM    Specimen: Blood   Result Value Ref Range    Glucose 106 70 - 130 mg/dL   Basic Metabolic Panel    Collection Time: 09/19/23  6:27 AM    Specimen: Blood   Result Value Ref Range    Glucose 113 (H) 65 - 99 mg/dL    BUN 22 (H) 6 - 20 mg/dL    Creatinine 1.49 (H) 0.76 - 1.27 mg/dL    Sodium 142 136 - 145 mmol/L    Potassium 3.5 3.5 - 5.2 mmol/L    Chloride 108 (H) 98 - 107 mmol/L    CO2 23.6 22.0 - 29.0 mmol/L    Calcium 8.7 8.6 - 10.5 mg/dL    BUN/Creatinine Ratio 14.8 7.0 - 25.0    Anion Gap 10.4 5.0 - 15.0 mmol/L    eGFR 56.5 (L) >60.0 mL/min/1.73   Phosphorus    Collection Time: 09/19/23  6:27 AM    Specimen: Blood   Result Value Ref Range    Phosphorus 2.2 (L) 2.5 - 4.5 mg/dL   Magnesium    Collection Time: 09/19/23  6:27 AM    Specimen: Blood   Result Value Ref Range    Magnesium 2.4 1.6 - 2.6 mg/dL   CBC Auto Differential    Collection Time: 09/19/23  6:27 AM    Specimen: Blood   Result Value Ref Range    WBC 5.95 3.40 - 10.80 10*3/mm3    RBC 3.93 (L) 4.14 - 5.80 10*6/mm3    Hemoglobin 11.5 (L) 13.0 - 17.7 g/dL    Hematocrit 34.9 (L) 37.5 - 51.0 %    MCV 88.8 79.0 - 97.0 fL    MCH 29.3 26.6 - 33.0 pg    MCHC 33.0 31.5 - 35.7 g/dL    RDW 13.0 12.3 - 15.4 %    RDW-SD 42.2 37.0 -  54.0 fl    MPV 11.4 6.0 - 12.0 fL    Platelets 197 140 - 450 10*3/mm3    Neutrophil % 40.4 (L) 42.7 - 76.0 %    Lymphocyte % 47.4 (H) 19.6 - 45.3 %    Monocyte % 9.1 5.0 - 12.0 %    Eosinophil % 1.8 0.3 - 6.2 %    Basophil % 1.0 0.0 - 1.5 %    Immature Grans % 0.3 0.0 - 0.5 %    Neutrophils, Absolute 2.40 1.70 - 7.00 10*3/mm3    Lymphocytes, Absolute 2.82 0.70 - 3.10 10*3/mm3    Monocytes, Absolute 0.54 0.10 - 0.90 10*3/mm3    Eosinophils, Absolute 0.11 0.00 - 0.40 10*3/mm3    Basophils, Absolute 0.06 0.00 - 0.20 10*3/mm3    Immature Grans, Absolute 0.02 0.00 - 0.05 10*3/mm3    nRBC 0.0 0.0 - 0.2 /100 WBC       Radiology:  Pertinent radiology studies were reviewed as described above      Medications have been reviewed separately in chart overview      ASSESSMENT:  JA, significant improvement overnight suggest prerenal cause  CKD stage IIIa    Chest pain, no plans for intervention at this time due to noncompliance    CKD (chronic kidney disease) stage 3, GFR 30-59 ml/min    DDD (degenerative disc disease), lumbosacral  Hepatitis C    Hypothyroidism    Mixed hyperlipidemia    Schizoaffective disorder    Substance induced mood disorder    Acute renal failure superimposed on chronic kidney disease    Arteriosclerosis of coronary artery    Polysubstance abuse    Suicidal ideations    Hypokalemia    Noncompliance           DISCUSSION/PLAN:   Renal function continues to improve, nearing baseline  We will continue IV fluids another 24 hours then discontinue  Encourage oral fluid intake  Awaiting urine studies  We will check UA and urine electrolytes  BP improved, stable on metoprolol  Replace potassium and phosphorus orally.  Will follow-up a.m. labs     Continue to monitor electrolytes and volume closely, avoid IV contrast and nephrotoxic medications   Please send me a secure chat message if any questions regarding patient care.    Murtaza Campos MD  [facility]   Office phone number:  [phone]  Answering service phone number: [phone]    09/19/23  14:19 EDT    Dictation performed using Dragon dictation EVOFEM

## 2023-09-19 NOTE — CONSULTS
Visited patient after consulting with nurses. Patient was tired so I did not stay long. Expressed the purpose of chaplains and gave an AD form to consider based on brief discussion of family.
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
## 2023-09-19 NOTE — CONSULTS
"Nutrition Services    Patient Name:  Lorenzo James  YOB: 1970  MRN: [id number]  Admit Date:  9/18/2023    Assessment Date:  09/19/23    Summary: Consult per RN Admit Screen for wt loss  Consulted per RN Admit Screen for weight loss. Pt has actually had some weight gain recently but weight has remained stable. Pt states his weight loss is usually due to lack of access to food. Pt currently tolerating Regular diet well with 100% po intake of meals and has good appetite. Noted plans for d/c to homeless shelter in [place]. Will follow per protocol.    CLINICAL NUTRITION ASSESSMENT      Reason for Assessment MST score 2+, Nurse Admission Screen     Diagnosis/Problem   Chest pain, suicidal ideations, polysubstance abuse, hypokalemia, schizoaffective d/o, ARF on CKD, DDD, Hep C, HLD, medical noncompliance   Medical/Surgical History Past Medical History:   Diagnosis Date    Chronic pain 01/14/2017    CKD (chronic kidney disease) stage 3, GFR 30-59 ml/min 08/11/2020    Coronary artery disease     DDD (degenerative disc disease), lumbosacral 04/13/2023    Hypertension     Hypothyroidism     Polysubstance abuse        History reviewed. No pertinent surgical history.     Anthropometrics        Current Height  Current Weight  BMI kg/m2 Height: 180.3 cm (71\")  Weight: 83.7 kg (184 lb 8.4 oz) (09/19/23 1230)  Body mass index is 25.74 kg/m².   Adjusted BMI (if applicable)    BMI Category Normal/Healthy (18.4 - 24.9)   Ideal Body Weight (IBW) 166 lb   Usual Body Weight (UBW) 190 lb   Weight Trend Stable   Weight History Wt Readings from Last 30 Encounters:   09/19/23 1230 83.7 kg (184 lb 8.4 oz)   09/18/23 0545 86.2 kg (190 lb)   09/05/23 1159 86.2 kg (190 lb)   09/04/23 2323 86.2 kg (190 lb)      --  Labs       Pertinent Labs    Results from last 7 days   Lab Units 09/19/23  0627 09/18/23  0625   SODIUM mmol/L 142 134*   POTASSIUM mmol/L 3.5 3.1*   CHLORIDE mmol/L 108* 98   CO2 mmol/L 23.6 23.3   BUN mg/dL " 22* 36*   CREATININE mg/dL 1.49* 2.13*   CALCIUM mg/dL 8.7 9.6   BILIRUBIN mg/dL  --  0.4   ALK PHOS U/L  --  63   ALT (SGPT) U/L  --  17   AST (SGOT) U/L  --  68*   GLUCOSE mg/dL 113* 98     Results from last 7 days   Lab Units 09/19/23  0627 09/18/23  0625   MAGNESIUM mg/dL 2.4  --    PHOSPHORUS mg/dL 2.2*  --    HEMOGLOBIN g/dL 11.5* 11.7*   HEMATOCRIT % 34.9* 35.3*   WBC 10*3/mm3 5.95 7.76   ALBUMIN g/dL  --  4.6     Results from last 7 days   Lab Units 09/19/23  0627 09/18/23  0625   PLATELETS 10*3/mm3 197 211     SARS-CoV-2, MATTHEW   Date Value Ref Range Status   08/15/2020 Negative Negative Final     No results found for: HGBA1C       Medications           Scheduled Medications aspirin, 81 mg, Oral, Daily  atorvastatin, 20 mg, Oral, Nightly  enoxaparin, 40 mg, Subcutaneous, Q24H  famotidine, 20 mg, Oral, BID AC  isosorbide mononitrate, 30 mg, Oral, Daily  levothyroxine, 75 mcg, Oral, Daily  metoprolol tartrate, 25 mg, Oral, BID  mirtazapine, 15 mg, Oral, Nightly  nicotine, 1 patch, Transdermal, Q24H  ranolazine, 500 mg, Oral, BID  senna-docusate sodium, 2 tablet, Oral, BID  sodium chloride, 10 mL, Intravenous, Q12H       Infusions Pharmacy to Dose enoxaparin (LOVENOX),   sodium chloride, 100 mL/hr, Last Rate: 100 mL/hr (09/18/23 2330)       PRN Medications   acetaminophen **OR** acetaminophen **OR** acetaminophen    senna-docusate sodium **AND** polyethylene glycol **AND** bisacodyl **AND** bisacodyl    hydrOXYzine    nitroglycerin    ondansetron    Pharmacy to Dose enoxaparin (LOVENOX)    sodium chloride    sodium chloride    sodium chloride     Physical Findings          General Findings agitated, alert, anxious, flat affect, oriented, room air   Oral/Mouth Cavity edentulous   Edema  lower extremity , 1+ (trace)   Gastrointestinal non-distended , normoactive   Skin  skin intact, bruising   Tubes/Drains/Lines none   NFPE Not indicated at this time   --  Current Nutrition Orders & Evaluation of Intake       Oral  Nutrition     Food Allergies NKFA   Current PO Diet Diet: Regular/House Diet; Texture: Regular Texture (IDDSI 7); Fluid Consistency: Thin (IDDSI 0)   Supplement n/a   PO Evaluation     % PO Intake 100%    Factors Affecting Intake: social/economic status   --  PES STATEMENT / NUTRITION DIAGNOSIS      Nutrition Dx Problem  Problem: Predicted Suboptimal Intake  Etiology: Factors Affecting Nutrition - social/economic status    Signs/Symptoms: Report of Minimal PO Intake no access to food at times     NUTRITION INTERVENTION / PLAN OF CARE      Intervention Goal(s) Maintain nutrition status, Meet estimated needs, Disease management/therapy, Maintain intake, and No significant weight loss         RD Intervention/Action Continue to monitor and Care plan reviewed   --      Prescription/Orders:       PO Diet       Supplements       Enteral Nutrition       Parenteral Nutrition    New Prescription Ordered? No changes at this time   --      Monitor/Evaluation Per protocol   Discharge Plan/Needs Pending clinical course   --    RD to follow per protocol.      Electronically signed by:  Jackelyn Nieto RD  09/19/23 14:02 EDT

## 2023-09-19 NOTE — PLAN OF CARE
Goal Outcome Evaluation:  Plan of Care Reviewed With: patient        Progress: no change  Outcome Evaluation: AOx4. Sitter at bedside overnight. IV fluids maintained. Remains on room air. Turns self. Suicide precautions maintained. Tylenol given for pain per pt request.

## 2023-09-19 NOTE — PLAN OF CARE
Goal Outcome Evaluation:  Plan of Care Reviewed With: patient        Progress: declining  Outcome Evaluation: No complaints of chest pain or new onset CVA symptoms throughout the day. Psych signed off, DCed suicide precautions. Patient neither shows nor vocalizes any further suicidal intentions. Nephrology recommending increase in water intake per patient report. K+ and phosporus replacement started. Plan to be evaluated by neurology tomorrow and will likely DC afterwards. VSS. Will continue to monitor.

## 2023-09-19 NOTE — SIGNIFICANT NOTE
09/19/23 0756   OTHER   Discipline physical therapist   Therapy Assessment/Plan (PT)   Criteria for Skilled Interventions Met (PT) no problems identified which require skilled intervention  (up sba per nsg notes, no prior functional disability per adult patient profile, and no new neuromusc problems. No current indication for acute skilled PT.  Continue to provide opportunities for supervised mobility.)

## 2023-09-19 NOTE — CASE MANAGEMENT/SOCIAL WORK
Continued Stay Note  [facility]     Patient Name: Lorenzo James  MRN: [id number]  Today's Date: 9/19/2023    Admit Date: 9/18/2023    Plan: homeless shelter at d/c   Discharge Plan       Row Name 09/19/23 1419       Plan    Plan homeless shelter at d/c    Patient/Family in Agreement with Plan yes    Plan Comments Spoke with pt bedside. Pt reports he no longer has a room  at the hotel he was staying in. Pt reports he wants to get back to the [place] area however has no family that can come get him. [facility] has no way to offer transportation that far away. Provided pt with Street tips, food resources along with housing resources. Offered to arrange homeless shelter at d/c and pt is agreeable. [facility] will need to call Homeless bed reservation line [phone] at 10:00am the morning of d/c to reserve shelter bed. Pt will need cab to shelter.                   Discharge Codes    No documentation.                       CRISPIN Rodriges

## 2023-09-19 NOTE — OUTREACH NOTE
Medical Week 1 Survey      Flowsheet Row Responses   [facility] patient discharged from? [place]   Does the patient have one of the following disease processes/diagnoses(primary or secondary)? Other   Week 1 attempt successful? No   Unsuccessful attempts Attempt 1   Revoke Readmitted            Melva BARBOSA - Registered Nurse

## 2023-09-19 NOTE — PROGRESS NOTES
Name: Lorenzo James ADMIT: 2023   : 1970  PCP: Provider, No Known    MRN: [id number] LOS: 0 days   AGE/SEX: 52 y.o. male  ROOM: [facility]     Subjective   Subjective   Resting in bed.  Sitter at bedside.  He states the psychiatrist was in this morning and states that he has situational depression.  Patient states that he is always depressed and has a history of PTSD.  He denies any current SI, but does report recent SI.  He denies any current chest pain or trouble breathing, but did have chest pain overnight during rapid response.  EKG unchanged and cardiology signed off yesterday.  He also reported some left-sided facial droop as well as right-sided facial numbness stat CT ordered and needed for acute findings.     Objective   Objective   Vital Signs  Temp:  [97.3 °F (36.3 °C)-98.1 °F (36.7 °C)] 97.7 °F (36.5 °C)  Heart Rate:  [54-67] 60  Resp:  [16-18] 16  BP: (107-153)/(56-94) 143/94  SpO2:  [96 %-100 %] 100 %  on   ;   Device (Oxygen Therapy): room air  Body mass index is 25.74 kg/m².    Physical Exam  Vitals and nursing note reviewed.   Constitutional:       Appearance: He is ill-appearing. He is not toxic-appearing.   HENT:      Head: Normocephalic and atraumatic-subtle right facial asymmetry     Right Ear: External ear normal.      Left Ear: External ear normal.      Nose: Nose normal.   Cardiovascular:      Rate and Rhythm: Normal rate and regular rhythm.      Pulses: Normal pulses.   Pulmonary:      Effort: Pulmonary effort is normal. No respiratory distress.      Comments: Diminished d/t poor inspiratory effort. On RA.  Scant wheezing in the right lower lobe anteriorly  Abdominal:      General: Bowel sounds are normal. There is no distension.      Palpations: Abdomen is soft.      Tenderness: There is no abdominal tenderness.   Musculoskeletal:         General: No swelling. Normal range of motion.      Cervical back: Normal range of motion and neck supple.   Skin:     General: Skin is  warm and dry.      Findings: No bruising.      Comments: Scattered scabs/healing scabs.   Neurological:      Comments: Alert and oriented x4, moving extremities equally on both sides.  Reports diminished sensation in the right arm  Psychiatric:      Comments: Calm and cooperative.    Results Review:       I reviewed the patient's new clinical results.  Results from last 7 days   Lab Units 09/19/23 0627 09/18/23  0625   WBC 10*3/mm3 5.95 7.76   HEMOGLOBIN g/dL 11.5* 11.7*   PLATELETS 10*3/mm3 197 211     Results from last 7 days   Lab Units 09/19/23  0627 09/18/23  0625   SODIUM mmol/L 142 134*   POTASSIUM mmol/L 3.5 3.1*   CHLORIDE mmol/L 108* 98   CO2 mmol/L 23.6 23.3   BUN mg/dL 22* 36*   CREATININE mg/dL 1.49* 2.13*   GLUCOSE mg/dL 113* 98   Estimated Creatinine Clearance: 69.4 mL/min (A) (by C-G formula based on SCr of 1.49 mg/dL (H)).  Results from last 7 days   Lab Units 09/18/23  0625   ALBUMIN g/dL 4.6   BILIRUBIN mg/dL 0.4   ALK PHOS U/L 63   AST (SGOT) U/L 68*   ALT (SGPT) U/L 17     Results from last 7 days   Lab Units 09/19/23 0627 09/18/23  0625   CALCIUM mg/dL 8.7 9.6   ALBUMIN g/dL  --  4.6   MAGNESIUM mg/dL 2.4  --    PHOSPHORUS mg/dL 2.2*  --        Glucose   Date/Time Value Ref Range Status   09/18/2023 1811 106 70 - 130 mg/dL Final       aspirin, 81 mg, Oral, Daily  atorvastatin, 20 mg, Oral, Nightly  enoxaparin, 40 mg, Subcutaneous, Q24H  famotidine, 20 mg, Oral, BID AC  isosorbide mononitrate, 30 mg, Oral, Daily  levothyroxine, 75 mcg, Oral, Daily  metoprolol tartrate, 25 mg, Oral, BID  mirtazapine, 15 mg, Oral, Nightly  nicotine, 1 patch, Transdermal, Q24H  ranolazine, 500 mg, Oral, BID  senna-docusate sodium, 2 tablet, Oral, BID  sodium chloride, 10 mL, Intravenous, Q12H      Pharmacy to Dose enoxaparin (LOVENOX),   sodium chloride, 100 mL/hr, Last Rate: 100 mL/hr (09/18/23 2330)    Diet: Regular/House Diet; Texture: Regular Texture (IDDSI 7); Fluid Consistency: Thin (IDDSI 0)        Assessment/Plan     Active Hospital Problems    Diagnosis  POA    **Chest pain [R07.9]  Yes    Facial asymmetry [Q67.0]  Not Applicable    Right arm numbness [R20.0]  Yes    Polysubstance abuse [F19.10]  Yes    Suicidal ideations [R45.851]  Not Applicable    Hypokalemia [E87.6]  Yes    Noncompliance [Z91.199]  Not Applicable    Schizoaffective disorder [F25.9]  Yes    Acute renal failure superimposed on chronic kidney disease [N17.9, N18.9]  Yes    DDD (degenerative disc disease), lumbosacral [M51.37]  Yes    Hep C w/o coma, chronic [B18.2]  Yes    CKD (chronic kidney disease) stage 3, GFR 30-59 ml/min [N18.30]  Yes    Mixed hyperlipidemia [E78.2]  Yes    Arteriosclerosis of coronary artery [I25.10]  Yes    Hypothyroidism [E03.9]  Yes    Substance induced mood disorder [F19.94]  Yes      Resolved Hospital Problems   No resolved problems to display.     Mr. James is a 52 y.o. male that presented to the hospital with complaints of chest pain after not taking his blood pressure medications for several days.  He also reported some depression and feelings of SI.  He has a known history of polysubstance abuse and noncompliance.     Chest pain  Known CAD with prior stenting  -Cardiology consulted.  -Appreciate their recommendations.  -Recent stress testing was negative.  They recommended resuming his prior medications and otherwise signed off as they do not feel that further cardiac work-up is indicated.  His hypertension and noncompliance likely contributing to presenting symptoms.  -Lopressor, aspirin, statin, Imdur and Ranexa all resumed.     JA on CKD 3  Hypokalemia  -Baseline creatinine most likely 1.3-1.5.   2.13 on admission.  -Nephrology consulted and agrees with gentle IV fluids.   -UA ordered but not yet obtained.  -Renal function improved overnight with fluids and creatinine currently 1.49.  Electrolytes stable.     Suicidal ideations  Schizoaffective disorder  Polysubstance abuse  -Initially on one-to-one  "precautions with suicide protocol.  -Psychiatry and access were consulted.  -Psychiatry feels that his precautions can be discontinued and he can be discharged at any time.  -[facility] to attempt to assist to get him back to [place] as he was here temporarily to \"help out a friend\"     Degenerative disc disease  -Recently evaluated by neurosurgery.  -Given steroids and muscle relaxers at discharge.  States that he completed steroids.     Hypothyroidism  -Continue prior dosing.     Noncompliance  -Main issue at play and likely causing recurrent hospital stays in addition to ongoing drug abuse.    Facial asymmetry  Right arm numbness  -CTH overnight with nothing acute. Symptoms seem to change daily and recent MRI negative.  -He states that his current right arm numbness and facial asymmetry new for the last week. For this reason, will ask neurology opinion, but suspicion for stroke low given recent normal imaging.   -Possible that he does have DDD in the cervical spine, but no red flags on exam at this point.  -Await neuro input and recommend outpatient follow up with providers in [place].      I discussed the patients findings and my recommendations with patient, nurse and Dr. Solis.     VTE Prophylaxis - Lovenox 40 mg SC daily.  Code Status - Full code.  Disposition- Home tomorrow if cleared by all. [facility] to hopefully assist with getting him back to [place] (has family there)- currently homeless it appears.       SYDNEY Singleton  [facility]  09/19/23  13:01 EDT  "

## 2023-09-19 NOTE — PAYOR COMM NOTE
"ErikaViola cochran (52 y.o. Male)     PLEASE SEE ATTACHED FOR OBSERVATION   AUTH    SIGIFREDO SERRANO RN/ DEPT  [facility]  [phone]  FAX  [phone]        Date of Birth   1970    Social Security Number       Address   [place] [address]    Home Phone   [phone]    MRN   [id number]       Yazidism   Other    Marital Status   Single                            Admission Date   9/18/23    Admission Type   Emergency    Admitting Provider   Rex Solis DO    Attending Provider   Rex Solis DO    Department, Room/Bed   [facility], S521/1       Discharge Date       Discharge Disposition       Discharge Destination                                 Attending Provider: Rex Solis DO    Allergies: Bupropion, Griseofulvin Ultramicrosize [Griseofulvin], Risperidone, Toradol [Ketorolac Tromethamine], Tramadol, Buspirone    Isolation: None   Infection: None   Code Status: CPR    Ht: 180.3 cm (71\")   Wt: 86.2 kg (190 lb)    Admission Cmt: None   Principal Problem: Chest pain [R07.9]                   Active Insurance as of 9/18/2023       Primary Coverage       Payor Plan Insurance Group Employer/Plan Group    MISC MEDICARE REPLACEMENT MISC MCARE REPLACEMENT        Coverage Address Coverage Phone Number Coverage Fax Number Effective Dates    [address] [address] [phone]  9/1/2023 - None Entered    [facility] [id number]         Subscriber Name Subscriber Birth Date Member ID       VIOLA HAHN 1970 [id number]               Secondary Coverage       Payor Plan Insurance Group Employer/Plan Group    MEDICAID OUT OF STATE MISC MEDICAID OUT OF STATE        Coverage Address Coverage Phone Number Coverage Fax Number Effective Dates    [address] [address] [phone]  9/1/2023 - None Entered    [facility] [id number]         Subscriber Name Subscriber Birth Date Member ID       VIOLA HAHN 1970 [id number]                     Emergency Contacts  "       (Rel.) Home Phone Work Phone Mobile Phone    Julia Seay (Mother) [phone] -- --              [place] [id number]  Tax ID [id number]     History & Physical        Bibi Whitt APRN at 09/18/23 1102       Attestation signed by Rex Solis DO at 09/18/23 1952      Brief Attending Admission Attestation   I have seen and examined the patient, performing an independent face-to-face diagnostic evaluation with plan of care reviewed and developed with SYDNEY Child.  The majority of medical decision making (>50%) for this encounter was completed by myself and discussed with this APRN.  Brief Summary Statement/HPI  52 y.o. male coronary artery disease, hypertension, CKD stage III, hypothyroidism, polysubstance abuse who presents to [facility] with complaints on new onset chest pain.  At time of examination, patient is sleeping, difficult to arouse and disoriented, unable to provide further information surrounding HPI.  History obtained via collateral from ED provider and chart review.  Discussed with Otilio Wallace, patient presenting with complaint of chest pain of 1 day duration, midsternal, constant and progressive.  Also, endorsing suicidal ideation.  Of note, patient found to have elevated troponin on admission, coupled with JA on CKD and electrolyte abnormalities.  He has history of RCA stent in the past, recently underwent a stress test at a hospital in [place] during recent admission in August 2023, which was negative for ischemia.  Cardiology and access/psychiatry consulted.  Patient is being admitted for further evaluation and management.  Remainder of detailed HPI is as noted above and has been reviewed by me for completeness.  Attending Physical Exam  Temp:  [97.7 °F (36.5 °C)-98 °F (36.7 °C)] 97.7 °F (36.5 °C)  Heart Rate:  [50-66] 66  Resp:  [16-18] 16  BP: (107-191)/() 107/56  Const: ill appearing, somnolent, disoriented  CV: Regular rate, regular  rhythm  RESP: FIO2 21, clear to auscultation B/L, normal effort  GI: soft, non-tender, non-distended  MSK: No gross deformities appreciated, no tenderness, no edema  Skin: Warm, dry. No rashes  Neuro: Limited in setting of patient's mental status and inability to cooperate with examination, spontaneously moves all extremities  Results Review:  I reviewed the patient's new clinical results.  I reviewed the patient's new imaging results and agree with the interpretation.  I reviewed the patient's other test results and agree with the interpretation  I personally viewed and interpreted the patient's EKG/Telemetry data  Assessment/Plan  52 y.o. male coronary artery disease, hypertension, CKD stage III, hypothyroidism, polysubstance abuse who presents to [facility] with complaints on new onset chest pain.      Chest pain  CAD   - s/p prior RCA stent placement, recent stress test in August 2023 negative for ischemia.  - Troponin elevated and trending upward. EKG showing sinus rhythm and IVCD.  Chest x-ray negative for acute cardiopulmonary abnormalities.  - Continue aspirin, statin, metoprolol, Imdur and Ranexa.  - Consult cardiology.  Follow their plans and recommendations, greatly appreciate their help.  - Monitor on telemetry.    Polysubstance abuse  Suicidal ideation  Schizoaffective disorder  - UDS positive for amphetamine/methamphetamine, THC.  - Order 1:1 sitter, consult access center and psychiatry. Closely monitor.  - Was going to resume home Risperdal, patient states this medication previously stopped due to allergy, so discontinuing.  - PRN medications for symptom control.    JA on CKD stage 3A  - Etiology likely multifactorial in setting of hypovolemia, substance abuse, uncontrolled hypertension.  - Creatinine 2.13 on admission, baseline appears to be around 1.3 to 1.5 on average.  - Order IVF for volume resuscitation.  - Consult Nephrology, Follow their plans and recommendations, greatly  appreciate their help.    Hypokalemia  - Potassium low on initial labs, replete per electrolyte protocol. Follow up repeat testing to guide further management.     Anemia  - Hemoglobin low on most recent labs. No evidence of overt blood loss. No indications for acute intervention at this time.    - Order repeat CBC in AM for reassessment. Continue to monitor, transfuse for hemoglobin <7.      Active Hospital Problems    Diagnosis  POA    **Chest pain [R07.9]  Yes    Polysubstance abuse [F19.10]  Yes    Suicidal ideations [R45.851]  Not Applicable    Hypokalemia [E87.6]  Yes    Noncompliance [Z91.199]  Not Applicable    Schizoaffective disorder [F25.9]  Yes    Acute renal failure superimposed on chronic kidney disease [N17.9, N18.9]  Yes    DDD (degenerative disc disease), lumbosacral [M51.37]  Yes    Hep C w/o coma, chronic [B18.2]  Yes    CKD (chronic kidney disease) stage 3, GFR 30-59 ml/min [N18.30]  Yes    Mixed hyperlipidemia [E78.2]  Yes    Arteriosclerosis of coronary artery [I25.10]  Yes    Hypothyroidism [E03.9]  Yes    Substance induced mood disorder [F19.94]  Yes      Resolved Hospital Problems   No resolved problems to display.     See above section for further detailed assessment and plan developed with APRN which I have reviewed.  Electronically signed by Rex Solis DO, 9/18/2023                        Patient Name:  Lorenzo James  YOB: 1970  MRN:  [id number]  Admit Date:  9/18/2023  Patient Care Team:  Provider, No Known as PCP - General      Subjective   History Present Illness     Chief Complaint   Patient presents with    Chest Pain     History of Present Illness  Mr. James is a 52 y.o. smoker with a history of polysubstance abuse, CKD 3, CAD, hypothyroidism, hypertension, chronic low back pain and noncompliance that presents to [facility] complaining of chest pain.  The patient is currently a rather poor historian.  He is currently drowsy and difficult to  keep awake on exam.  When he does wake up and speak to me, he is not making sense.  He was able to tell me his name, but then quickly falls back to sleep.   Per ED notes, the patient presented with a 3-day history of anterior chest wall pain after not taking his blood pressure medication.  He apparently reported smoking meth yesterday.  There was also some reports of depression with ongoing SI in the ED. He did have some agitation as well and was apparently seeing things.   From review of prior records, he was admitted to the hospital in [place] on 8/18 to 8/19/2023.  He presented to the hospital at that time as with chest pain and reported recent use of cocaine and marijuana.  He apparently had been without his metoprolol and nitrate in a few days.  He had a stress test which showed a fixed scar but no reversible scar or ischemia.  He was seen by cardiology as well as nephrology given some increase in baseline kidney function.  He was given refills for his medications and discharged.  He presented to this facility 9/4 for symptoms of low back pain as well as occulta and opening his right eye with possible right-sided facial droop.  He underwent an MRI of the brain that was negative for any acute stroke and had an MRI lumbar spine showed degenerative changes, but nothing acute.  He was offered an epidural but ultimately discharged with a steroid taper as needed muscle relaxants.   ED, he was afebrile and notably hypertensive at 191/110.  He was not hypoxic.  Lab work revealed a WBC of 7.76, hemoglobin 11.7, high-sensitivity troponin 28, creatinine 2.13, sodium 134 potassium 3.1.  UDS was positive for meth and THC.  EKG was no change from prior and subsequent troponin lower than the initial.  Chest x-ray was negative for any active disease.  Cardiology was contacted in the emergency room and sitter placed with plans for access follow-up.    Review of Systems   Unable to perform ROS: Mental status change      Personal  History     Past Medical History:   Diagnosis Date    Chronic pain 01/14/2017    CKD (chronic kidney disease) stage 3, GFR 30-59 ml/min 08/11/2020    Coronary artery disease     DDD (degenerative disc disease), lumbosacral 04/13/2023    Hypertension     Hypothyroidism     Polysubstance abuse      History reviewed. No pertinent surgical history.  History reviewed. No pertinent family history.     Medications Prior to Admission   Medication Sig Dispense Refill Last Dose    aspirin 81 MG EC tablet Take 1 tablet by mouth Daily.       atorvastatin (LIPITOR) 20 MG tablet Take 1 tablet by mouth Every Night. 30 tablet 0     isosorbide mononitrate (IMDUR) 30 MG 24 hr tablet Take 1 tablet by mouth Daily.       levothyroxine (SYNTHROID, LEVOTHROID) 75 MCG tablet Take 1 tablet by mouth Daily.       loratadine (CLARITIN) 10 MG tablet Take 1 tablet by mouth.       methylPREDNISolone (MEDROL) 4 MG dose pack Take as directed on package instructions. 21 tablet 0     metoprolol tartrate (LOPRESSOR) 25 MG tablet Take 1 tablet by mouth 2 (Two) Times a Day. 60 tablet 0     mirtazapine (REMERON) 15 MG tablet Take 1 tablet by mouth Every Night.       nicotine (NICODERM CQ) 21 MG/24HR patch Place 1 patch on the skin as directed by provider.       nitroglycerin (NITROSTAT) 0.4 MG SL tablet Place 1 tablet under the tongue.       ranolazine (RANEXA) 500 MG 12 hr tablet Take 1 tablet by mouth 2 (Two) Times a Day.       risperiDONE (risperDAL) 1 MG tablet Take 1 tablet by mouth 2 (Two) Times a Day.       TiZANidine (Zanaflex) 2 MG capsule Take 1 capsule by mouth 3 (Three) Times a Day As Needed for Muscle Spasms. 9 capsule 0      Allergies:    Allergies   Allergen Reactions    Bupropion Swelling    Griseofulvin Ultramicrosize [Griseofulvin] Itching    Toradol [Ketorolac Tromethamine] Itching    Tramadol Itching    Buspirone Unknown - Low Severity, Rash and Swelling     Other reaction(s): Other - comment required   Pt denies any adverse reaction  "to buspar.  Pt states \"it was the aspirin, I was taking with it\". Pt recently prescribed buspar Feb 10,2012 by Dr Gillig, with no adverse side effects    Pt denies any adverse reaction to buspar.  Pt states \"it was the aspirin, I was taking with it\". Pt recently prescribed buspar Feb 10,2012 by Dr Gillig, with no adverse side effects       Objective    Objective     Vital Signs  Temp:  [97.7 °F (36.5 °C)-98 °F (36.7 °C)] 97.7 °F (36.5 °C)  Heart Rate:  [50-66] 64  Resp:  [16-18] 18  BP: (118-191)/() 139/90  SpO2:  [93 %-96 %] 96 %  on   ;   Device (Oxygen Therapy): room air  Body mass index is 26.5 kg/m².    Physical Exam  Vitals and nursing note reviewed.   Constitutional:       Appearance: He is ill-appearing. He is not toxic-appearing.   HENT:      Head: Normocephalic and atraumatic.      Right Ear: External ear normal.      Left Ear: External ear normal.      Nose: Nose normal.   Cardiovascular:      Rate and Rhythm: Normal rate and regular rhythm.      Pulses: Normal pulses.   Pulmonary:      Effort: Pulmonary effort is normal. No respiratory distress.      Comments: Diminished d/t poor inspiratory effort. On RA  Abdominal:      General: Bowel sounds are normal. There is no distension.      Palpations: Abdomen is soft.      Tenderness: There is no abdominal tenderness.   Musculoskeletal:         General: No swelling. Normal range of motion.      Cervical back: Normal range of motion and neck supple.   Skin:     General: Skin is warm and dry.      Findings: No bruising.      Comments: Scattered scabs/healing scabs.   Neurological:      Comments: Unable to fully assess. Overall drowsy and arouses but falls back to sleep quickly.   Psychiatric:      Comments: Withdrawn. Not cooperative, but calm.       Results Review:  I reviewed the patient's new clinical results.  I reviewed the patient's new imaging results and agree with the interpretation.  I reviewed the patient's other test results and agree with the " interpretation  I personally viewed and interpreted the patient's EKG/Telemetry data    Lab Results (last 24 hours)       Procedure Component Value Units Date/Time    CBC & Differential [[id number]]  (Abnormal) Collected: 09/18/23 0625    Specimen: Blood Updated: 09/18/23 0643    Narrative:      The following orders were created for panel order CBC & Differential.  Procedure                               Abnormality         Status                     ---------                               -----------         ------                     CBC Auto Differential[[id number]]        Abnormal            Final result                 Please view results for these tests on the individual orders.    Comprehensive Metabolic Panel [[id number]]  (Abnormal) Collected: 09/18/23 0625    Specimen: Blood Updated: 09/18/23 0701     Glucose 98 mg/dL      BUN 36 mg/dL      Creatinine 2.13 mg/dL      Sodium 134 mmol/L      Potassium 3.1 mmol/L      Chloride 98 mmol/L      CO2 23.3 mmol/L      Calcium 9.6 mg/dL      Total Protein 7.3 g/dL      Albumin 4.6 g/dL      ALT (SGPT) 17 U/L      AST (SGOT) 68 U/L      Alkaline Phosphatase 63 U/L      Total Bilirubin 0.4 mg/dL      Globulin 2.7 gm/dL      A/G Ratio 1.7 g/dL      BUN/Creatinine Ratio 16.9     Anion Gap 12.7 mmol/L      eGFR 36.8 mL/min/1.73     Narrative:      GFR Normal >60  Chronic Kidney Disease <60  Kidney Failure <15      High Sensitivity Troponin T [[id number]]  (Abnormal) Collected: 09/18/23 0625    Specimen: Blood Updated: 09/18/23 0701     HS Troponin T 28 ng/L     Narrative:      High Sensitive Troponin T Reference Range:  <10.0 ng/L- Negative Female for AMI  <15.0 ng/L- Negative Male for AMI  >=10 - Abnormal Female indicating possible myocardial injury.  >=15 - Abnormal Male indicating possible myocardial injury.   Clinicians would have to utilize clinical acumen, EKG, Troponin, and serial changes to determine if it is an Acute Myocardial Infarction or myocardial injury due to  an underlying chronic condition.         CBC Auto Differential [[id number]]  (Abnormal) Collected: 09/18/23 0625    Specimen: Blood Updated: 09/18/23 0643     WBC 7.76 10*3/mm3      RBC 3.97 10*6/mm3      Hemoglobin 11.7 g/dL      Hematocrit 35.3 %      MCV 88.9 fL      MCH 29.5 pg      MCHC 33.1 g/dL      RDW 13.1 %      RDW-SD 42.7 fl      MPV 11.3 fL      Platelets 211 10*3/mm3      Neutrophil % 62.1 %      Lymphocyte % 26.8 %      Monocyte % 8.9 %      Eosinophil % 1.0 %      Basophil % 0.8 %      Immature Grans % 0.4 %      Neutrophils, Absolute 4.82 10*3/mm3      Lymphocytes, Absolute 2.08 10*3/mm3      Monocytes, Absolute 0.69 10*3/mm3      Eosinophils, Absolute 0.08 10*3/mm3      Basophils, Absolute 0.06 10*3/mm3      Immature Grans, Absolute 0.03 10*3/mm3      nRBC 0.0 /100 WBC     Ethanol [[id number]] Collected: 09/18/23 0625    Specimen: Blood Updated: 09/18/23 0710     Ethanol <10 mg/dL      Ethanol % <0.010 %     Urine Drug Screen - Urine, Clean Catch [[id number]]  (Abnormal) Collected: 09/18/23 0756    Specimen: Urine, Clean Catch Updated: 09/18/23 0906     Amphet/Methamphet, Screen Positive     Barbiturates Screen, Urine Negative     Benzodiazepine Screen, Urine Negative     Cocaine Screen, Urine Negative     Opiate Screen Negative     THC, Screen, Urine Positive     Methadone Screen, Urine Negative     Oxycodone Screen, Urine Negative     Fentanyl, Urine Negative    Narrative:      Negative Thresholds Per Drugs Screened:    Amphetamines                 500 ng/ml  Barbiturates                 200 ng/ml  Benzodiazepines              100 ng/ml  Cocaine                      300 ng/ml  Methadone                    300 ng/ml  Opiates                      300 ng/ml  Oxycodone                    100 ng/ml  THC                           50 ng/ml  Fentanyl                       5 ng/ml      The Normal Value for all drugs tested is negative. This report includes final unconfirmed screening results to be used for  medical treatment purposes only. Unconfirmed results must not be used for non-medical purposes such as employment or legal testing. Clinical consideration should be applied to any drug of abuse test, particularly when unconfirmed results are used.            High Sensitivity Troponin T 2Hr [[id number]]  (Abnormal) Collected: 09/18/23 0847    Specimen: Blood Updated: 09/18/23 0945     HS Troponin T 25 ng/L      Troponin T Delta -3 ng/L     Narrative:      High Sensitive Troponin T Reference Range:  <10.0 ng/L- Negative Female for AMI  <15.0 ng/L- Negative Male for AMI  >=10 - Abnormal Female indicating possible myocardial injury.  >=15 - Abnormal Male indicating possible myocardial injury.   Clinicians would have to utilize clinical acumen, EKG, Troponin, and serial changes to determine if it is an Acute Myocardial Infarction or myocardial injury due to an underlying chronic condition.                 Imaging Results (Last 24 Hours)       Procedure Component Value Units Date/Time    XR Chest 1 View [[id number]] Collected: 09/18/23 0656     Updated: 09/18/23 0701    Narrative:      Chest pain, anterior x3 days, methamphetamine use 9/17/2023     COMPARISON 9/4/2023     FINDINGS: Cardiac size within normal limits. No mediastinal or hilar  abnormality. Lungs clear. No effusion or edema.     CONCLUSION: No active disease of the chest     This report was finalized on 9/18/2023 6:58 AM by Dr. Anuj Brewer M.D.               Results for orders placed during the hospital encounter of 09/04/23    Adult Transthoracic Echo Complete W/ Cont if Necessary Per Protocol    Interpretation Summary    Left ventricular systolic function is normal. Calculated left ventricular EF = 50.1%    Left ventricular wall thickness is consistent with mild concentric hypertrophy.    Left ventricular diastolic function is consistent with (grade I) impaired relaxation.    There is moderate calcification of the aortic valve.    There are myxomatous  changes of the mitral valve apparatus present.    Mild to moderate mitral valve regurgitation is present.    Saline test results are negative.      ECG 12 Lead ED Triage Standing Order; Chest Pain   Final Result   HEART RATE= 61  bpm   RR Interval= 984  ms   ME Interval= 156  ms   P Horizontal Axis= 30  deg   P Front Axis= 44  deg   QRSD Interval= 127  ms   QT Interval= 441  ms   QTcB= 445  ms   QRS Axis= 51  deg   T Wave Axis= 64  deg   - ABNORMAL ECG -   Sinus rhythm   Intraventricular conduction delay   When compared with ECG of 04-Sep-2023 23:29:43,   Significant rate decrease otherwise no change   Electronically Signed By: Yousif Castor ([facility]) 18-Sep-2023 07:40:27   Date and Time of Study: 2023-09-18 05:54:30           Assessment/Plan     Active Hospital Problems    Diagnosis  POA    **Chest pain [R07.9]  Yes    Polysubstance abuse [F19.10]  Yes    Suicidal ideations [R45.851]  Not Applicable    Hypokalemia [E87.6]  Yes    Noncompliance [Z91.199]  Not Applicable    Schizoaffective disorder [F25.9]  Yes    Acute renal failure superimposed on chronic kidney disease [N17.9, N18.9]  Yes    DDD (degenerative disc disease), lumbosacral [M51.37]  Yes    Hep C w/o coma, chronic [B18.2]  Yes    CKD (chronic kidney disease) stage 3, GFR 30-59 ml/min [N18.30]  Yes    Mixed hyperlipidemia [E78.2]  Yes    Arteriosclerosis of coronary artery [I25.10]  Yes    Hypothyroidism [E03.9]  Yes    Substance induced mood disorder [F19.94]  Yes     Mr. James is a 52 y.o. male that presented to the hospital with complaints of chest pain after not taking his blood pressure medications for several days.  He also reported some depression and feelings of SI.  He has a known history of polysubstance abuse and noncompliance.    Chest pain  Known CAD with prior stenting  -Cardiology consulted in the ED.  -Appreciate their recommendations.  -Recent stress testing was negative.    -Will resume back regimen that he was to be taking at  discharge here last admission.  This includes Lopressor, aspirin, statin, Imdur and Ranexa.    JA on CKD 3  Hypokalemia  -Suspect prerenal, but will obtain bladder scan to rule out any retention.  -Baseline creatinine most likely 1.3-1.5.  Currently 2.13.  -Given fluids in the emergency room and will continue for now.   -Ask nephrology to see. Given potassium 40 mEq x 1.    Suicidal ideations  Schizoaffective disorder  Polysubstance abuse  -We will continue suicide precautions with one-to-one monitoring for now.  -Consult access and psych to see.  -Resume his Risperdal he was taking on prior admission for now.  -Ongoing drug abuse remains an issue.    Degenerative disc disease  -Recently evaluated by neurosurgery.  -Given steroids and muscle relaxers at discharge.  Unclear if he completed steroids, but will hold for now.    Hypothyroidism  -Resume prior dosing.    Noncompliance  -Main issue at play and likely causing recurrent hospital stays in addition to ongoing drug abuse.    I discussed the patients findings and my recommendations with patient, nursing staff, and Dr. Solis .    VTE Prophylaxis - Lovenox 40 mg SC daily.  Code Status - Full code.       SYDNEY Singleton  [facility]  09/18/23  12:30 EDT      Electronically signed by Rex Solis DO at 09/18/23 1952          Emergency Department Notes        Janie Richmond RN at 09/18/23 0912          ..Nursing report ED to floor  Lorenzo James  52 y.o.  male    HPI :   Chief Complaint   Patient presents with    Chest Pain       Admitting doctor:   Rex Solis DO    Admitting diagnosis:   The primary encounter diagnosis was Chest pain, unspecified type. Diagnoses of Coronary artery disease involving native coronary artery of native heart with other form of angina pectoris, Essential hypertension, Smoker, Hypercholesteremia, JA (acute kidney injury), Hypokalemia, Suicidal ideation, and Methamphetamine abuse were also pertinent to  this visit.    Code status:   Current Code Status       Date Active Code Status Order ID Comments User Context       Prior            Allergies:   Griseofulvin ultramicrosize [griseofulvin], Toradol [ketorolac tromethamine], and Tramadol    Isolation:   No active isolations    Intake and Output  No intake or output data in the 24 hours ending 09/18/23 0912    Weight:       09/18/23  0545   Weight: 86.2 kg (190 lb)       Most recent vitals:   Vitals:    09/18/23 0731 09/18/23 0806 09/18/23 0831 09/18/23 0901   BP: (!) 189/114 153/87 123/70 130/73   BP Location:  Left arm  Left arm   Patient Position:  Lying  Lying   Pulse: 66  57    Resp:       Temp:       TempSrc:       SpO2: 94%      Weight:       Height:           Active LDAs/IV Access:   Lines, Drains & Airways       Active LDAs       Name Placement date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 alcohol.  Patient states he would smoke marijuana every day if he could.  Patient states he tried meth the other day but did not like it and he has also tried heroin once and did not like it.  Patient states his sleep is \"erratic\".  The patient states he sees things but also states \"I have bad eyes and sometimes I think I see things that are not there because of that.\"  Patient stated \"I am clairvoyant\" and I hear my father's " voice inside my head.  Patient's appetite is good.  Patient states he is hoping to go back to [place] and possibly live with his older brother.  Patient states if he cannot live there he would stay at a shelter as he feels they have a good system of shelters in [place].  Patient states his mom and older brother are his support system.  Patient does not have any children.    Patient will be seen by the psychiatrist today and Access will follow.    Electronically signed by Andre Kerr LCSW at 23 0931       Murtaza Campos MD at 23 1248        Consult Orders    1. Inpatient Nephrology Consult [[id number]] ordered by Bibi Whitt APRN at 23 1229                                                                                                                 [facility]                                                                                             Nephrology Initial Consult Note    Patient Identification:  Name: Lorenzo James MRN: [id number]  Age: 52 y.o. : 1970  Sex: male  Date:2023    Requesting Physician: As per consult order.  Reason for Consultation: JA/CKD  Information from:patient/ family/ chart      History of Present Illness: This is a 52 y.o. year old male who is a very poor historian.  Multiple medical problems with unfortunate noncompliance, history of schizoaffective disorder, substance abuse and likely some underlying chronic kidney disease.  He awakens but does not give any meaningful history.  Very difficult to keep him awake long enough to get any meaningful history.  Sitter at bedside currently.  Recent admission at the [facility] for chest pain.  He has a history of cocaine and marijuana use and also tested positive for methamphetamines.  Recently discharged and presented to this facility.  He had hypertensive urgency on admission with a BP of 191/110.  Noncompliant with his home blood pressure medications and  came here complaining of chest pain.  Chest x-ray was negative.  Recent creatinine was noted to be 1.3 on 9/5 and now up to 2.1.    The following medical history and medications personally reviewed by me:    Problem List:     Chest pain    CKD (chronic kidney disease) stage 3, GFR 30-59 ml/min    DDD (degenerative disc disease), lumbosacral    Hep C w/o coma, chronic    Hypothyroidism    Mixed hyperlipidemia    Schizoaffective disorder    Substance induced mood disorder    Acute renal failure superimposed on chronic kidney disease    Arteriosclerosis of coronary artery    Polysubstance abuse    Suicidal ideations    Hypokalemia    Noncompliance      Past Medical History:  Past Medical History:   Diagnosis Date    Chronic pain 01/14/2017    CKD (chronic kidney disease) stage 3, GFR 30-59 ml/min 08/11/2020    Coronary artery disease     DDD (degenerative disc disease), lumbosacral 04/13/2023    Hypertension     Hypothyroidism     Polysubstance abuse        Past Surgical History:  History reviewed. No pertinent surgical history.     Home Meds:   Medications Prior to Admission   Medication Sig Dispense Refill Last Dose    aspirin 81 MG EC tablet Take 1 tablet by mouth Daily.       atorvastatin (LIPITOR) 20 MG tablet Take 1 tablet by mouth Every Night. 30 tablet 0     isosorbide mononitrate (IMDUR) 30 MG 24 hr tablet Take 1 tablet by mouth Daily.       levothyroxine (SYNTHROID, LEVOTHROID) 75 MCG tablet Take 1 tablet by mouth Daily.       loratadine (CLARITIN) 10 MG tablet Take 1 tablet by mouth.       methylPREDNISolone (MEDROL) 4 MG dose pack Take as directed on package instructions. 21 tablet 0     metoprolol tartrate (LOPRESSOR) 25 MG tablet Take 1 tablet by mouth 2 (Two) Times a Day. 60 tablet 0     mirtazapine (REMERON) 15 MG tablet Take 1 tablet by mouth Every Night.       nicotine (NICODERM CQ) 21 MG/24HR patch Place 1 patch on the skin as directed by provider.       nitroglycerin (NITROSTAT) 0.4 MG SL tablet  Place 1 tablet under the tongue.       ranolazine (RANEXA) 500 MG 12 hr tablet Take 1 tablet by mouth 2 (Two) Times a Day.       risperiDONE (risperDAL) 1 MG tablet Take 1 tablet by mouth 2 (Two) Times a Day.       TiZANidine (Zanaflex) 2 MG capsule Take 1 capsule by mouth 3 (Three) Times a Day As Needed for Muscle Spasms. 9 capsule 0        Current Meds:   Current Facility-Administered Medications   Medication Dose Route Frequency Provider Last Rate Last Admin    acetaminophen (TYLENOL) tablet 650 mg  650 mg Oral Q4H PRN Bibi Whitt APRN        Or    acetaminophen (TYLENOL) 160 MG/5ML oral solution 650 mg  650 mg Oral Q4H PRN Bibi Whitt APRN        Or    acetaminophen (TYLENOL) suppository 650 mg  650 mg Rectal Q4H PRN Bibi Whitt APRN        aspirin EC tablet 81 mg  81 mg Oral Daily Bibi Whitt APRN        atorvastatin (LIPITOR) tablet 20 mg  20 mg Oral Nightly Bibi Whitt APRN        sennosides-docusate (PERICOLACE) 8.6-50 MG per tablet 2 tablet  2 tablet Oral BID Bibi Whitt APRN        And    polyethylene glycol (MIRALAX) packet 17 g  17 g Oral Daily PRN Bibi Whitt APRN        And    bisacodyl (DULCOLAX) EC tablet 5 mg  5 mg Oral Daily PRN Bibi Whitt APRN        And    bisacodyl (DULCOLAX) suppository 10 mg  10 mg Rectal Daily PRN Bibi Whitt APRN        Enoxaparin Sodium (LOVENOX) syringe 40 mg  40 mg Subcutaneous Q24H Bibi Whitt APRN        famotidine (PEPCID) tablet 20 mg  20 mg Oral BID AC Bibi Whitt APRN        isosorbide mononitrate (IMDUR) 24 hr tablet 30 mg  30 mg Oral Daily Bibi Whitt APRN        levothyroxine (SYNTHROID, LEVOTHROID) tablet 75 mcg  75 mcg Oral Daily Bibi Whitt APRN        metoprolol tartrate (LOPRESSOR) tablet 25 mg  25 mg Oral BID Bibi Whitt APRN        mirtazapine (REMERON) tablet 15 mg  15 mg Oral Nightly Bibi Whitt APRN        nicotine (NICODERM CQ) 21 MG/24HR patch 1 patch  1 patch Transdermal Q24H Bibi Whitt  "SYDNEY PADILLA        nitroglycerin (NITROSTAT) SL tablet 0.4 mg  0.4 mg Sublingual Q5 Min PRN Bibi Whitt APRN        ondansetron (ZOFRAN) injection 4 mg  4 mg Intravenous Q6H PRN Bibi Whitt APRN        Pharmacy to Dose enoxaparin (LOVENOX)   Does not apply Continuous PRN Bibi Whitt APRN        ranolazine (RANEXA) 12 hr tablet 500 mg  500 mg Oral BID Bibi Whitt APRN        risperiDONE (risperDAL) tablet 1 mg  1 mg Oral BID Bibi Whitt APRN        sodium chloride 0.9 % flush 10 mL  10 mL Intravenous PRN Lucio Ahn MD        sodium chloride 0.9 % flush 10 mL  10 mL Intravenous Q12H Bibi Whitt APRN        sodium chloride 0.9 % flush 10 mL  10 mL Intravenous PRN Bibi Whitt APRN        sodium chloride 0.9 % infusion 40 mL  40 mL Intravenous PRN Bibi Whitt APRN        sodium chloride 0.9 % infusion  100 mL/hr Intravenous Continuous Bibi Whitt APRN           Allergies:  Allergies   Allergen Reactions    Bupropion Swelling    Griseofulvin Ultramicrosize [Griseofulvin] Itching    Toradol [Ketorolac Tromethamine] Itching    Tramadol Itching    Buspirone Unknown - Low Severity, Rash and Swelling     Other reaction(s): Other - comment required   Pt denies any adverse reaction to buspar.  Pt states \"it was the aspirin, I was taking with it\". Pt recently prescribed buspar Feb 10,2012 by Dr Gillig, with no adverse side effects    Pt denies any adverse reaction to buspar.  Pt states \"it was the aspirin, I was taking with it\". Pt recently prescribed buspar Feb 10,2012 by Dr Gillig, with no adverse side effects       Social History:   Social History     Socioeconomic History    Marital status: Single        Family History:  History reviewed. No pertinent family history.     Review of Systems: as per HPI, in addition:    Unable to obtain any meaningful review of systems due to altered mental status    Physical Exam:  Vitals:   Temp (24hrs), Av.9 °F (36.6 °C), Min:97.7 °F (36.5 °C), " "Max:98 °F (36.7 °C)    /90 (BP Location: Left arm, Patient Position: Lying)   Pulse 64   Temp 97.7 °F (36.5 °C) (Oral)   Resp 18   Ht 180.3 cm (71\")   Wt 86.2 kg (190 lb)   SpO2 96%   BMI 26.50 kg/m²   Intake/Output:   No intake or output data in the 24 hours ending 09/18/23 1249     Wt Readings from Last 1 Encounters:   09/18/23 0545 86.2 kg (190 lb)       Exam:    General Appearance:  Awake, alert, oriented x3, no acute distress  Chronically ill-appearing   Head and Face:  Normocephalic, atraumatic, mucus membranes moist, oropharynx clear   Eyes:  No icterus, pupils equal round and reactive to light, extraocular movements intact    ENMT: Moist mucosa, tongue symmetric    Neck: Supple  no jugular venous distention  no thyromegaly   Pulmonary:  Respiratory effort: Normal  Auscultation of lungs: Clear bilaterally  No wheezes  No rhonchi  Good air movement, good expansion   Chest wall:  No tenderness or deformity   Cardiovascular:  Auscultation of the heart: Normal rhythm, no murmurs  No significant edema of bilateral lower extremities   Abdomen:  Abdomen: soft, non-tender, normal bowel sounds all four quadrants, no masses   Liver and spleen: no hepatosplenomegaly   Musculoskeletal: Digits and nails: normal  Normal range of motion  No joint swelling or gross deformities    Skin: Skin inspection: color normal, no visible rashes or lesions  Skin palpation: texture, turgor normal, no palpable lesions   Lymphatic:  no cervical lymphadenopathy    Psychiatric: Judgement and insight: Could not assess, confused       DATA:  Radiology and Labs:  The following labs independently reviewed by me, additional AM labs ordered  Old records independently reviewed showing CKD  The following radiologic studies independently viewed by me, findings chest x-ray no active disease  Interval notes, chart personally reviewed by me.  I have reviewed and summarized old records as detailed above  Plan of care discussed with patient " and sitter at bedside who confirms that has been confused most of the day.  Seems to be corroborated by most of the other physician and nursing exams  New problems include JA on CKD, abdominal status, hypokalemia      Risk/ complexity of medical care/ medical decision making: High risk, new JA on CKD  Chronic illness with severe exacerbation or progression      Labs:   Recent Results (from the past 24 hour(s))   ECG 12 Lead ED Triage Standing Order; Chest Pain    Collection Time: 09/18/23  5:54 AM   Result Value Ref Range    QT Interval 441 ms    QTC Interval 445 ms   Comprehensive Metabolic Panel    Collection Time: 09/18/23  6:25 AM    Specimen: Blood   Result Value Ref Range    Glucose 98 65 - 99 mg/dL    BUN 36 (H) 6 - 20 mg/dL    Creatinine 2.13 (H) 0.76 - 1.27 mg/dL    Sodium 134 (L) 136 - 145 mmol/L    Potassium 3.1 (L) 3.5 - 5.2 mmol/L    Chloride 98 98 - 107 mmol/L    CO2 23.3 22.0 - 29.0 mmol/L    Calcium 9.6 8.6 - 10.5 mg/dL    Total Protein 7.3 6.0 - 8.5 g/dL    Albumin 4.6 3.5 - 5.2 g/dL    ALT (SGPT) 17 1 - 41 U/L    AST (SGOT) 68 (H) 1 - 40 U/L    Alkaline Phosphatase 63 39 - 117 U/L    Total Bilirubin 0.4 0.0 - 1.2 mg/dL    Globulin 2.7 gm/dL    A/G Ratio 1.7 g/dL    BUN/Creatinine Ratio 16.9 7.0 - 25.0    Anion Gap 12.7 5.0 - 15.0 mmol/L    eGFR 36.8 (L) >60.0 mL/min/1.73   High Sensitivity Troponin T    Collection Time: 09/18/23  6:25 AM    Specimen: Blood   Result Value Ref Range    HS Troponin T 28 (H) <15 ng/L   Green Top (Gel)    Collection Time: 09/18/23  6:25 AM   Result Value Ref Range    Extra Tube Hold for add-ons.    Lavender Top    Collection Time: 09/18/23  6:25 AM   Result Value Ref Range    Extra Tube hold for add-on    Gold Top - SST    Collection Time: 09/18/23  6:25 AM   Result Value Ref Range    Extra Tube Hold for add-ons.    Light Blue Top    Collection Time: 09/18/23  6:25 AM   Result Value Ref Range    Extra Tube Hold for add-ons.    CBC Auto Differential    Collection Time:  09/18/23  6:25 AM    Specimen: Blood   Result Value Ref Range    WBC 7.76 3.40 - 10.80 10*3/mm3    RBC 3.97 (L) 4.14 - 5.80 10*6/mm3    Hemoglobin 11.7 (L) 13.0 - 17.7 g/dL    Hematocrit 35.3 (L) 37.5 - 51.0 %    MCV 88.9 79.0 - 97.0 fL    MCH 29.5 26.6 - 33.0 pg    MCHC 33.1 31.5 - 35.7 g/dL    RDW 13.1 12.3 - 15.4 %    RDW-SD 42.7 37.0 - 54.0 fl    MPV 11.3 6.0 - 12.0 fL    Platelets 211 140 - 450 10*3/mm3    Neutrophil % 62.1 42.7 - 76.0 %    Lymphocyte % 26.8 19.6 - 45.3 %    Monocyte % 8.9 5.0 - 12.0 %    Eosinophil % 1.0 0.3 - 6.2 %    Basophil % 0.8 0.0 - 1.5 %    Immature Grans % 0.4 0.0 - 0.5 %    Neutrophils, Absolute 4.82 1.70 - 7.00 10*3/mm3    Lymphocytes, Absolute 2.08 0.70 - 3.10 10*3/mm3    Monocytes, Absolute 0.69 0.10 - 0.90 10*3/mm3    Eosinophils, Absolute 0.08 0.00 - 0.40 10*3/mm3    Basophils, Absolute 0.06 0.00 - 0.20 10*3/mm3    Immature Grans, Absolute 0.03 0.00 - 0.05 10*3/mm3    nRBC 0.0 0.0 - 0.2 /100 WBC   Ethanol    Collection Time: 09/18/23  6:25 AM    Specimen: Blood   Result Value Ref Range    Ethanol <10 0 - 10 mg/dL    Ethanol % <0.010 %   Urine Drug Screen - Urine, Clean Catch    Collection Time: 09/18/23  7:56 AM    Specimen: Urine, Clean Catch   Result Value Ref Range    Amphet/Methamphet, Screen Positive (A) Negative    Barbiturates Screen, Urine Negative Negative    Benzodiazepine Screen, Urine Negative Negative    Cocaine Screen, Urine Negative Negative    Opiate Screen Negative Negative    THC, Screen, Urine Positive (A) Negative    Methadone Screen, Urine Negative Negative    Oxycodone Screen, Urine Negative Negative    Fentanyl, Urine Negative Negative   High Sensitivity Troponin T 2Hr    Collection Time: 09/18/23  8:47 AM    Specimen: Blood   Result Value Ref Range    HS Troponin T 25 (H) <15 ng/L    Troponin T Delta -3 >=-4 - <+4 ng/L       Radiology:  Imaging Results (Last 24 Hours)       Procedure Component Value Units Date/Time    XR Chest 1 View [[id number]]  Collected: 09/18/23 0656     Updated: 09/18/23 0701    Narrative:      Chest pain, anterior x3 days, methamphetamine use 9/17/2023     COMPARISON 9/4/2023     FINDINGS: Cardiac size within normal limits. No mediastinal or hilar  abnormality. Lungs clear. No effusion or edema.     CONCLUSION: No active disease of the chest     This report was finalized on 9/18/2023 6:58 AM by Dr. Anuj Brewer M.D.                    ASSESSMENT:   JA, multiple possible etiologies including prerenal related to his altered mental status, illicit drug use with hypertensive urgency likely contributing as well.  Does appear to have underlying CKD which is likely due to hypertension and substance abuse  CKD stage IIIa    Chest pain, no plans for intervention at this time due to noncompliance    CKD (chronic kidney disease) stage 3, GFR 30-59 ml/min    DDD (degenerative disc disease), lumbosacral  Hepatitis C    Hypothyroidism    Mixed hyperlipidemia    Schizoaffective disorder    Substance induced mood disorder    Acute renal failure superimposed on chronic kidney disease    Arteriosclerosis of coronary artery    Polysubstance abuse    Suicidal ideations    Hypokalemia    Noncompliance        DISCUSSION/PLAN:   Agree with gentle IV fluids  Not sure to be compliant enough at this time for renal ultrasound but obstruction seems less likely  We will check UA and urine electrolytes  Resume home BP meds  Replace potassium orally and check magnesium in a.m.  Will follow-up a.m. labs    Continue to monitor electrolytes and volume closely, avoid IV contrast and nephrotoxic medications     I appreciate the consult request  Please send me a secure chat message if any questions regarding patient care.      Murtaza Campos MD  [facility]  Office phone number: [phone]  Answering service phone number: [phone]      9/18/2023        Dictation via Dragon dictation software      Electronically signed by Murtaza Campos,  MD at 23 1255       Manan Hall MD at 23 0909        Consult Orders    1. Cardiology (on-call MD unless specified) [[id number]] ordered by Otilio Wallace PA at 23 0856                 Date of Hospital Visit: 23  Encounter Provider: Manan Hall MD  Place of Service: [facility] CARDIOLOGY  Patient Name: Lorenzo James  :1970  Referral Provider: Dr Solis    Chief complaint: chest pain/suicidal ideation     History of Present Illness     Lorenzo James is a 52 year old man with a known history of coronary disease, tobacco use, noncompliance, and polysubstance abuse.    He has undergone stenting to the LAD and RCA in the past.  Details were not available.  He underwent coronary angiography in 2020 that showed patent stents in the LAD and right coronary.  He is reported to have severe diffuse disease of the circumflex, which is a small vessel and not amenable to intervention.    He was hospitalized at another hospital less than one month ago with chest pain and had a stress test which reported an inferior myocardial infarction with no evidence of ischemia.  He just had an echo here 13 days ago which showed normal left ventricular size and systolic function.    He has reportedly moved to [place].  The patient is an extremely difficult historian, falling asleep during the exam, and when waking up, unable to always give me appropriate history.  He has reportedly been hallucinating and reported suicidal ideation to the emergency room physician.  He was reportedly using methamphetamine last night and his talk screen is positive for THC and methamphetamine.    His EKG is not acute.  His first troponin was 28 and his second troponin was 25.  His blood pressure was 190/110 upon arrival.    ECHO 23      Left ventricular systolic function is normal. Calculated left ventricular EF = 50.1%    Left ventricular wall thickness is consistent with mild  concentric hypertrophy.    Left ventricular diastolic function is consistent with (grade I) impaired relaxation.    There is moderate calcification of the aortic valve.    There are myxomatous changes of the mitral valve apparatus present.    Mild to moderate mitral valve regurgitation is present.    Saline test results are negative.      Past Medical History:   Diagnosis Date    Chronic pain 01/14/2017    CKD (chronic kidney disease) stage 3, GFR 30-59 ml/min 08/11/2020    Coronary artery disease     DDD (degenerative disc disease), lumbosacral 04/13/2023    Hypertension     Hypothyroidism     Polysubstance abuse        History reviewed. No pertinent surgical history.    Prior to Admission medications    Medication Sig Start Date End Date Taking? Authorizing Provider   aspirin 81 MG EC tablet Take 1 tablet by mouth Daily.    Marce Ibrahim MD   atorvastatin (LIPITOR) 20 MG tablet Take 1 tablet by mouth Every Night. 9/7/23   Montana Collins MD   isosorbide mononitrate (IMDUR) 30 MG 24 hr tablet Take 1 tablet by mouth Daily. 8/19/23   Marce Ibrahim MD   levothyroxine (SYNTHROID, LEVOTHROID) 75 MCG tablet Take 1 tablet by mouth Daily. 8/19/23   Marce Ibrahim MD   loratadine (CLARITIN) 10 MG tablet Take 1 tablet by mouth.    Marce Ibrahim MD   methylPREDNISolone (MEDROL) 4 MG dose pack Take as directed on package instructions. 9/7/23   Montana Collins MD   metoprolol tartrate (LOPRESSOR) 25 MG tablet Take 1 tablet by mouth 2 (Two) Times a Day. 9/7/23   Montana Collins MD   mirtazapine (REMERON) 15 MG tablet Take 1 tablet by mouth Every Night.    Marce Ibrahim MD   nicotine (NICODERM CQ) 21 MG/24HR patch Place 1 patch on the skin as directed by provider.    Marce Irbahim MD   nitroglycerin (NITROSTAT) 0.4 MG SL tablet Place 1 tablet under the tongue.    Marce Ibrahim MD   ranolazine (RANEXA) 500 MG 12 hr tablet Take 1 tablet by mouth 2 (Two) Times a  "Day.    Provider, MD Marce   risperiDONE (risperDAL) 1 MG tablet Take 1 tablet by mouth 2 (Two) Times a Day.    Provider, MD Marce   TiZANidine (Zanaflex) 2 MG capsule Take 1 capsule by mouth 3 (Three) Times a Day As Needed for Muscle Spasms. 9/7/23   Montana Collins MD       Social History     Socioeconomic History    Marital status: Single       History reviewed. No pertinent family history.    Review of Systems   Unable to perform ROS: Mental status change      Objective:     Vitals:    09/18/23 0806 09/18/23 0831 09/18/23 0901 09/18/23 0931   BP: 153/87 123/70 130/73 118/65   BP Location: Left arm  Left arm    Patient Position: Lying  Lying    Pulse:  56  50   Resp:       Temp:       TempSrc:       SpO2:    93%   Weight:       Height:         Body mass index is 26.5 kg/m².  Flowsheet Rows      Flowsheet Row First Filed Value   Admission Height 180.3 cm (71\") Documented at 09/18/2023 0545   Admission Weight 86.2 kg (190 lb) Documented at 09/18/2023 0545            Physical Exam  Constitutional:       Comments: Lying flat, sleeping, rousable but confused, NAD   HENT:      Head: Normocephalic.      Nose: Nose normal.   Cardiovascular:      Rate and Rhythm: Normal rate and regular rhythm.      Pulses: Normal pulses.      Heart sounds: Normal heart sounds.      Comments: + TTP when palpating anterior chest  Pulmonary:      Effort: Pulmonary effort is normal.      Breath sounds: Normal breath sounds.   Abdominal:      Palpations: Abdomen is soft.   Skin:     General: Skin is warm and dry.               Lab Review:                Results from last 7 days   Lab Units 09/18/23  0625   SODIUM mmol/L 134*   POTASSIUM mmol/L 3.1*   CHLORIDE mmol/L 98   CO2 mmol/L 23.3   BUN mg/dL 36*   CREATININE mg/dL 2.13*   GLUCOSE mg/dL 98   CALCIUM mg/dL 9.6     Results from last 7 days   Lab Units 09/18/23  0847 09/18/23  0625   HSTROP T ng/L 25* 28*     Results from last 7 days   Lab Units 09/18/23  0625   WBC " 10*3/mm3 7.76   HEMOGLOBIN g/dL 11.7*   HEMATOCRIT % 35.3*   PLATELETS 10*3/mm3 211                                       I personally viewed and interpreted the patient's EKG/Telemetry data    Assessment/Plan:     1. Chest pain  2. JA on CKD3  3. Polysubstance abuse  4. Schizoaffective disorder  5. Suicidal ideation and hallucinations    He is an extremely poor historian.  However, he had visible significant discomfort to chest palpation during my exam.  His EKG is nonischemic.  His troponin is minimally above baseline.  It is in the indeterminate range and it is trending down.  He just had a stress test recently that was nonischemic.  He presented with severe hypertension in the setting of noncompliance with his medications.  He is known to be noncompliant with all of his usual medications and with cardiac follow-up according to review of previous notes.  For this reason, he is not a candidate for any further work-up, and honestly, I do not feel that any is indicated right now.    I recommend resuming the medications that were prescribed at discharge just a few days ago.  Otherwise, cardiology will be available to see him on an as-needed basis.                  Electronically signed by Manan Hall MD at 09/18/23 7939

## 2023-09-20 VITALS
SYSTOLIC BLOOD PRESSURE: 162 MMHG | HEIGHT: 71 IN | OXYGEN SATURATION: 99 % | BODY MASS INDEX: 26.48 KG/M2 | WEIGHT: 189.15 LBS | RESPIRATION RATE: 16 BRPM | DIASTOLIC BLOOD PRESSURE: 92 MMHG | TEMPERATURE: 97.5 F | HEART RATE: 68 BPM

## 2023-09-20 LAB
ALBUMIN SERPL-MCNC: 3.8 G/DL (ref 3.5–5.2)
ANION GAP SERPL CALCULATED.3IONS-SCNC: 7.9 MMOL/L (ref 5–15)
BASOPHILS # BLD AUTO: 0.07 10*3/MM3 (ref 0–0.2)
BASOPHILS NFR BLD AUTO: 1.1 % (ref 0–1.5)
BUN SERPL-MCNC: 15 MG/DL (ref 6–20)
BUN/CREAT SERPL: 10.3 (ref 7–25)
CALCIUM SPEC-SCNC: 8.7 MG/DL (ref 8.6–10.5)
CHLORIDE SERPL-SCNC: 109 MMOL/L (ref 98–107)
CO2 SERPL-SCNC: 23.1 MMOL/L (ref 22–29)
CREAT SERPL-MCNC: 1.46 MG/DL (ref 0.76–1.27)
DEPRECATED RDW RBC AUTO: 45 FL (ref 37–54)
EGFRCR SERPLBLD CKD-EPI 2021: 57.9 ML/MIN/1.73
EOSINOPHIL # BLD AUTO: 0.08 10*3/MM3 (ref 0–0.4)
EOSINOPHIL NFR BLD AUTO: 1.3 % (ref 0.3–6.2)
ERYTHROCYTE [DISTWIDTH] IN BLOOD BY AUTOMATED COUNT: 13.4 % (ref 12.3–15.4)
GLUCOSE SERPL-MCNC: 103 MG/DL (ref 65–99)
HCT VFR BLD AUTO: 37.5 % (ref 37.5–51)
HGB BLD-MCNC: 12.1 G/DL (ref 13–17.7)
IMM GRANULOCYTES # BLD AUTO: 0.01 10*3/MM3 (ref 0–0.05)
IMM GRANULOCYTES NFR BLD AUTO: 0.2 % (ref 0–0.5)
LYMPHOCYTES # BLD AUTO: 2.5 10*3/MM3 (ref 0.7–3.1)
LYMPHOCYTES NFR BLD AUTO: 40.9 % (ref 19.6–45.3)
MAGNESIUM SERPL-MCNC: 2.1 MG/DL (ref 1.6–2.6)
MCH RBC QN AUTO: 29.4 PG (ref 26.6–33)
MCHC RBC AUTO-ENTMCNC: 32.3 G/DL (ref 31.5–35.7)
MCV RBC AUTO: 91.2 FL (ref 79–97)
MONOCYTES # BLD AUTO: 0.43 10*3/MM3 (ref 0.1–0.9)
MONOCYTES NFR BLD AUTO: 7 % (ref 5–12)
NEUTROPHILS NFR BLD AUTO: 3.02 10*3/MM3 (ref 1.7–7)
NEUTROPHILS NFR BLD AUTO: 49.5 % (ref 42.7–76)
NRBC BLD AUTO-RTO: 0 /100 WBC (ref 0–0.2)
PHOSPHATE SERPL-MCNC: 2.6 MG/DL (ref 2.5–4.5)
PLATELET # BLD AUTO: 208 10*3/MM3 (ref 140–450)
PMV BLD AUTO: 11.5 FL (ref 6–12)
POTASSIUM SERPL-SCNC: 3.5 MMOL/L (ref 3.5–5.2)
RBC # BLD AUTO: 4.11 10*6/MM3 (ref 4.14–5.8)
SODIUM SERPL-SCNC: 140 MMOL/L (ref 136–145)
WBC NRBC COR # BLD: 6.11 10*3/MM3 (ref 3.4–10.8)

## 2023-09-20 PROCEDURE — G0378 HOSPITAL OBSERVATION PER HR: HCPCS

## 2023-09-20 PROCEDURE — 83735 ASSAY OF MAGNESIUM: CPT | Performed by: STUDENT IN AN ORGANIZED HEALTH CARE EDUCATION/TRAINING PROGRAM

## 2023-09-20 PROCEDURE — 99204 OFFICE O/P NEW MOD 45 MIN: CPT | Performed by: PSYCHIATRY & NEUROLOGY

## 2023-09-20 PROCEDURE — 85025 COMPLETE CBC W/AUTO DIFF WBC: CPT | Performed by: STUDENT IN AN ORGANIZED HEALTH CARE EDUCATION/TRAINING PROGRAM

## 2023-09-20 PROCEDURE — 80069 RENAL FUNCTION PANEL: CPT | Performed by: INTERNAL MEDICINE

## 2023-09-20 RX ORDER — RANOLAZINE 500 MG/1
500 TABLET, EXTENDED RELEASE ORAL 2 TIMES DAILY
Qty: 30 TABLET | Refills: 0 | Status: SHIPPED | OUTPATIENT
Start: 2023-09-20

## 2023-09-20 RX ORDER — ISOSORBIDE MONONITRATE 30 MG/1
30 TABLET, EXTENDED RELEASE ORAL DAILY
Qty: 15 TABLET | Refills: 0 | Status: SHIPPED | OUTPATIENT
Start: 2023-09-20

## 2023-09-20 RX ORDER — LEVOTHYROXINE SODIUM 0.07 MG/1
75 TABLET ORAL DAILY
Qty: 15 TABLET | Refills: 0 | Status: SHIPPED | OUTPATIENT
Start: 2023-09-20

## 2023-09-20 RX ADMIN — FAMOTIDINE 20 MG: 20 TABLET, FILM COATED ORAL at 16:45

## 2023-09-20 RX ADMIN — DIBASIC SODIUM PHOSPHATE, MONOBASIC POTASSIUM PHOSPHATE AND MONOBASIC SODIUM PHOSPHATE 2 TABLET: 852; 155; 130 TABLET ORAL at 13:06

## 2023-09-20 RX ADMIN — HYDROXYZINE HYDROCHLORIDE 10 MG: 10 TABLET ORAL at 05:48

## 2023-09-20 RX ADMIN — RANOLAZINE 500 MG: 500 TABLET, FILM COATED, EXTENDED RELEASE ORAL at 09:49

## 2023-09-20 RX ADMIN — ASPIRIN 81 MG: 81 TABLET, COATED ORAL at 09:49

## 2023-09-20 RX ADMIN — DIBASIC SODIUM PHOSPHATE, MONOBASIC POTASSIUM PHOSPHATE AND MONOBASIC SODIUM PHOSPHATE 2 TABLET: 852; 155; 130 TABLET ORAL at 09:49

## 2023-09-20 RX ADMIN — LEVOTHYROXINE SODIUM 75 MCG: 0.07 TABLET ORAL at 09:49

## 2023-09-20 RX ADMIN — NICOTINE 1 PATCH: 21 PATCH, EXTENDED RELEASE TRANSDERMAL at 13:08

## 2023-09-20 RX ADMIN — METOPROLOL TARTRATE 25 MG: 25 TABLET, FILM COATED ORAL at 09:49

## 2023-09-20 RX ADMIN — ACETAMINOPHEN 650 MG: 325 TABLET, FILM COATED ORAL at 01:47

## 2023-09-20 RX ADMIN — ISOSORBIDE MONONITRATE 30 MG: 30 TABLET, EXTENDED RELEASE ORAL at 09:49

## 2023-09-20 RX ADMIN — ACETAMINOPHEN 650 MG: 325 TABLET, FILM COATED ORAL at 11:19

## 2023-09-20 RX ADMIN — Medication 10 ML: at 09:51

## 2023-09-20 RX ADMIN — FAMOTIDINE 20 MG: 20 TABLET, FILM COATED ORAL at 05:48

## 2023-09-20 NOTE — NURSING NOTE
"Patient refusing second set of vitals. Told PCA \"I'm not doing that, get out of my room\". This RN educated the patient on the purpose of obtaining more vital signs and explained the benefits of cooperating with care. Patient states, \"I don't care. I have the right to refuse\". This RN will continue to round per hospital policy.  "

## 2023-09-20 NOTE — PROGRESS NOTES
Discharge Planning Assessment  [facility]     Patient Name: Lorenzo James  MRN: [id number]  Today's Date: 9/20/2023    Admit Date: 9/18/2023    Plan: Home with friend   Discharge Needs Assessment    No documentation.                  Discharge Plan       Row Name 09/20/23 1204       Plan    Plan Home with friend    Final Note Patient has discharge orders today. Call placed to the shelter reservation line [phone] and was informed no beds available. [facility] and [facility] has a  first come first serve for a bed at 1500. Spoke with patient at bedside he stated he is now going to stay with his friend address [address] [place] [address]. ZTrip will be arranged. Jessica BAUER                  Continued Care and Services - Admitted Since 9/18/2023    Coordination has not been started for this encounter.       Expected Discharge Date and Time       Expected Discharge Date Expected Discharge Time    Sep 20, 2023            Demographic Summary    No documentation.                  Functional Status    No documentation.                  Psychosocial    No documentation.                  Abuse/Neglect    No documentation.                  Legal    No documentation.                  Substance Abuse    No documentation.                  Patient Forms    No documentation.                     Jeanie Nicole, RN
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
## 2023-09-20 NOTE — PLAN OF CARE
Goal Outcome Evaluation:           Progress: improving  Pt alert and oriented, VSS and his discharging home today.

## 2023-09-20 NOTE — CONSULTS
"Neurology Consult Note    Consult Date: 9/20/2023    Referring MD: Rex Solis DO    Reason for Consult I have been asked to see the patient in neurological consultation to render advice and opinion regarding facial droop, left-sided weakness    Lorenzo James is a 52 y.o. male with CKD, chronic pain, CAD, hypertension, drug abuse who came to the hospital with chest pain and complained of associated facial weakness and left-sided weakness.  The symptoms appear to have resolved.  Patient has had MRI of the brain for similar symptoms in the past which showed no acute changes.    Past Medical History:   Diagnosis Date    Chronic pain 01/14/2017    CKD (chronic kidney disease) stage 3, GFR 30-59 ml/min 08/11/2020    Coronary artery disease     DDD (degenerative disc disease), lumbosacral 04/13/2023    Hypertension     Hypothyroidism     Polysubstance abuse        Exam  BP (!) 172/126 (BP Location: Right arm, Patient Position: Lying)   Pulse 76   Temp 97.5 °F (36.4 °C) (Oral)   Resp 16   Ht 180.3 cm (71\")   Wt 85.8 kg (189 lb 2.5 oz)   SpO2 99%   BMI 26.38 kg/m²   Gen: NAD, vitals reviewed  MS: oriented x3, recent/remote memory intact, normal attention/concentration, language intact, no neglect.  CN: visual acuity grossly normal, PERRL, EOMI, no facial droop, no dysarthria  Motor: 5/5 throughout upper and lower extremities, normal tone    DATA:    Lab Results   Component Value Date    GLUCOSE 103 (H) 09/20/2023    CALCIUM 8.7 09/20/2023     09/20/2023    K 3.5 09/20/2023    CO2 23.1 09/20/2023     (H) 09/20/2023    BUN 15 09/20/2023    CREATININE 1.46 (H) 09/20/2023    BCR 10.3 09/20/2023    ANIONGAP 7.9 09/20/2023     Lab Results   Component Value Date    WBC 6.11 09/20/2023    HGB 12.1 (L) 09/20/2023    HCT 37.5 09/20/2023    MCV 91.2 09/20/2023     09/20/2023       Lab review: Creatinine 1.5, hemoglobin 12.1    Imaging review: I personally reviewed his head CT performed on admission as " well as his MRI brain performed 9/5.  Head CT showed no acute changes.  Recent brain MRI shows a chronic right frontal infarct.  Radiology reports reviewed.    Diagnoses:  Left sided weakness  Chronic stroke    Comment: Suspected residual neurologic symptoms from chronic infarct noted on MRI    PLAN:  No further inpatient neurologic workup needed. Ok for discharge from a neurology standpoint.

## 2023-09-20 NOTE — DISCHARGE SUMMARY
[facility]    Date of Admission: 9/18/2023  Date of Discharge:  9/20/2023    PCP: Provider, No Known    Discharge Diagnosis:   Active Hospital Problems    Diagnosis  POA    **Chest pain [R07.9]  Yes    Facial asymmetry [Q67.0]  Not Applicable    Right arm numbness [R20.0]  Yes    Polysubstance abuse [F19.10]  Yes    Suicidal ideations [R45.851]  Not Applicable    Hypokalemia [E87.6]  Yes    Noncompliance [Z91.199]  Not Applicable    Schizoaffective disorder [F25.9]  Yes    Acute renal failure superimposed on chronic kidney disease [N17.9, N18.9]  Yes    DDD (degenerative disc disease), lumbosacral [M51.37]  Yes    Hep C w/o coma, chronic [B18.2]  Yes    CKD (chronic kidney disease) stage 3, GFR 30-59 ml/min [N18.30]  Yes    Mixed hyperlipidemia [E78.2]  Yes    Arteriosclerosis of coronary artery [I25.10]  Yes    Hypothyroidism [E03.9]  Yes    Substance induced mood disorder [F19.94]  Yes      Resolved Hospital Problems   No resolved problems to display.     Procedures Performed  none     Consults  Inpatient consult to Neurology  Inpatient consult to Nephrology  Inpatient consult to Cardiology  Inpatient consult to Psychiatry    Hospital Course  Please see history and physical for details. Patient is a 52 y.o. male that presented to the hospital with complaints of chest pain after not taking his blood pressure medications for several days.  He also reported some depression and feelings of SI.  He has a known history of polysubstance abuse and noncompliance.    The patient was seen in consultation by cardiology given his reports of chest pain.  Recent stress testing was noted to be negative and they recommended resuming his prior medications, but otherwise signed off as they did not feel that further cardiac work-up was indicated at this time.  They felt that his hypertension and noncompliance were likely contributing to his presenting symptoms.  He was resumed on  his aspirin, statin, Lopressor, Imdur and Ranexa.  He did report some intermittent chest pains during the hospital stay, but repeat EKG were unchanged.  Again, cardiology has signed off and did not plan for any further work-up, but recommended compliance with his medications as well as BP control.  He was given a prescription for low pressure as well as statin on his last admission here and states that he is currently without refills for his Imdur and Ranexa.  We will send a 2-week course of these medications until he can further follow-up with a provider in [place] which is where his family is.  He should comply with his medications to do so any recurrent chest pain and/or symptoms.   He did have a baseline creatinine seemingly around 1.3-1.5.  His creatinine was 2.13 on admission.  Nephrology was consulted and agreed with IV fluids.  Renal function quickly improved and there was no signs of retention.  His electrolytes have been stable and creatinine back to baseline.   He does known substance abuse issues and apparently reported SI wearing this admission.  Psychiatry as well as access were consulted and he was placed on one-to-one precautions with suicide protocol.  Psychiatry felt that he was stable for discharge as he is currently denying any SI.  He was educated on the need to abstain from illicit drugs.   He did report some right arm numbness there were also some concerns of facial asymmetry.  CT head was negative for any acute findings.  He did have a recent MRI that showed a chronic right frontal infarct.  Neurology was consulted for these reasons and felt that his symptoms were residual from his old infarct, but did not plan for any further inpatient neurological work-up.  He should continue his aspirin and statin and continue blood pressure control.   On the day of discharge, he denies any current chest pain, dyspnea, cough, fever or chills.  Overall his blood pressure has been stable with occasional  isolated highs.  Compliance and illicit drug use seem to be his biggest issues at play as well as his current social issues.  Attempting to get back to [place] where his family is as he has more support there.  He will need to obtain a PCP as well as cardiologist when he gets stabilized from a housing standpoint.  He will need repeat BMP as well as blood pressure checks in the next 1 to 2 weeks.    I discussed the patient's findings and my recommendations with patient, nursing staff, and Dr. Solis .    Condition on Discharge: Improved.     Temp:  [97.5 °F (36.4 °C)-98.2 °F (36.8 °C)] 97.5 °F (36.4 °C)  Heart Rate:  [57-76] 76  Resp:  [16] 16  BP: (140-177)/() 172/126  Body mass index is 26.38 kg/m².    Physical Exam  Vitals and nursing note reviewed.   Constitutional:       Appearance: He is ill-appearing. He is not toxic-appearing.   Cardiovascular:      Rate and Rhythm: Normal rate and regular rhythm.      Pulses: Normal pulses.   Pulmonary:      Effort: Pulmonary effort is normal. No respiratory distress.      Comments: Diminished d/t poor inspiratory effort. On RA.   Abdominal:      General: Bowel sounds are normal. There is no distension.      Palpations: Abdomen is soft.      Tenderness: There is no abdominal tenderness.   Musculoskeletal:         General: No swelling. Normal range of motion.      Cervical back: Normal range of motion and neck supple.   Skin:     General: Skin is warm and dry.      Findings: No bruising.      Comments: Scattered scabs/healing scabs.   Neurological:      Comments: Alert and oriented x4, moving extremities equally on both sides.      Comments: Calm and cooperative.       Discharge Medications        Continue These Medications        Instructions Start Date   albuterol sulfate  (90 Base) MCG/ACT inhaler  Commonly known as: PROVENTIL HFA;VENTOLIN HFA;PROAIR HFA   2 puffs, Inhalation, Every 4 Hours PRN      aspirin 81 MG EC tablet   1 tablet, Oral, Daily       atorvastatin 20 MG tablet  Commonly known as: LIPITOR   20 mg, Oral, Nightly      isosorbide mononitrate 30 MG 24 hr tablet  Commonly known as: IMDUR   30 mg, Oral, Daily      levothyroxine 75 MCG tablet  Commonly known as: SYNTHROID, LEVOTHROID   75 mcg, Oral, Daily      metoprolol tartrate 25 MG tablet  Commonly known as: LOPRESSOR   25 mg, Oral, 2 Times Daily      mirtazapine 15 MG tablet  Commonly known as: REMERON   1 tablet, Oral, Nightly      nicotine 21 MG/24HR patch  Commonly known as: NICODERM CQ   1 patch, Transdermal      nitroglycerin 0.4 MG SL tablet  Commonly known as: NITROSTAT   0.4 mg, Sublingual      ranolazine 500 MG 12 hr tablet  Commonly known as: RANEXA   500 mg, Oral, 2 Times Daily             Stop These Medications      loratadine 10 MG tablet  Commonly known as: CLARITIN     methylPREDNISolone 4 MG dose pack  Commonly known as: MEDROL     prazosin 5 MG capsule  Commonly known as: MINIPRESS     TiZANidine 2 MG capsule  Commonly known as: Zanaflex             Diet Instructions       Diet: Regular/House Diet, Cardiac Diets; Healthy Heart (2-3 Na+); Regular Texture (IDDSI 7); Thin (IDDSI 0)      Discharge Diet:  Regular/House Diet  Cardiac Diets       Cardiac Diet: Healthy Heart (2-3 Na+)    Texture: Regular Texture (IDDSI 7)    Fluid Consistency: Thin (IDDSI 0)           Activity Instructions       Activity as Tolerated             Additional Instructions for the Follow-ups that You Need to Schedule       Discharge Follow-up with PCP   As directed       Currently Documented PCP:    Provider, No Known    PCP Phone Number:    [phone]     Follow Up Details: see in 2 weeks- need repeat labs (BMP) and BP check        Discharge Follow-up with Specified Provider: Cardiology; 2 Weeks   As directed      To: Cardiology   Follow Up: 2 Weeks   Follow Up Details: obtain and see in a couple weeks in [place]               Follow-up Information       Provider, No Known .    Why: see in 2 weeks- need  repeat labs (BMP) and BP check  Contact information:  [facility] [address]  [phone]                           Test Results Pending at Discharge     SYDNEY Singleton  09/20/23  14:20 EDT    Discharge time spent greater than 30 minutes.

## 2023-09-20 NOTE — PLAN OF CARE
Patient A&O x4. No c/o chest pain or neurological symptoms this shift. No expressions of SI. One dose of PRN Tylenol administered for chronic back pain. IVF continuously infusing. Call light within reach.    Goal Outcome Evaluation:  Plan of Care Reviewed With: patient        Progress: no change        Problem: Adult Inpatient Plan of Care  Goal: Plan of Care Review  Outcome: Ongoing, Progressing  Flowsheets (Taken 9/20/2023 0434)  Progress: no change  Plan of Care Reviewed With: patient  Goal: Patient-Specific Goal (Individualized)  Outcome: Ongoing, Progressing  Goal: Absence of Hospital-Acquired Illness or Injury  Outcome: Ongoing, Progressing  Intervention: Identify and Manage Fall Risk  Description: Perform standard risk assessment on admission using a validated tool or comprehensive approach appropriate to the patient; reassess fall risk frequently, with change in status or transfer to another level of care.  Communicate fall injury risk to interprofessional healthcare team.  Determine need for increased observation, equipment and environmental modification, such as low bed, signage and supportive, nonskid footwear.  Adjust safety measures to individual developmental age, stage and identified risk factors.  Reinforce the importance of safety and physical activity with patient and family.  Perform regular intentional rounding to assess need for position change, pain assessment and personal needs, including assistance with toileting.  Recent Flowsheet Documentation  Taken 9/20/2023 0400 by Shawna Tavares, RN  Safety Promotion/Fall Prevention: safety round/check completed  Taken 9/20/2023 0217 by Shawna Tavares, RN  Safety Promotion/Fall Prevention: safety round/check completed  Taken 9/20/2023 0036 by Shawna Tavares, RN  Safety Promotion/Fall Prevention:   activity supervised   assistive device/personal items within reach   clutter free environment maintained   gait belt   fall prevention program  maintained   lighting adjusted   muscle strengthening facilitated   nonskid shoes/slippers when out of bed   room organization consistent   safety round/check completed  Taken 9/19/2023 2224 by Shawna Tavares RN  Safety Promotion/Fall Prevention: safety round/check completed  Taken 9/19/2023 2015 by Shawna Tavares RN  Safety Promotion/Fall Prevention:   activity supervised   assistive device/personal items within reach   clutter free environment maintained   fall prevention program maintained   lighting adjusted   gait belt   muscle strengthening facilitated   nonskid shoes/slippers when out of bed   room organization consistent   safety round/check completed  Intervention: Prevent Skin Injury  Description: Perform a screening for skin injury risk, such as pressure or moisture associated skin damage on admission and at regular intervals throughout hospital stay.  Keep all areas of skin (especially folds) clean and dry.  Maintain adequate skin hydration.  Relieve and redistribute pressure and protect bony prominences; implement measures based on patient-specific risk factors.  Match turning and repositioning schedule to clinical condition.  Encourage weight shift frequently; assist with reposition if unable to complete independently.  Float heels off bed; avoid pressure on the Achilles tendon.  Keep skin free from extended contact with medical devices.  Encourage functional activity and mobility, as early as tolerated.  Use aids (e.g., slide boards, mechanical lift) during transfer.  Recent Flowsheet Documentation  Taken 9/20/2023 0400 by Shawna Tavares RN  Body Position: position changed independently  Taken 9/20/2023 0217 by Shawna Tavares RN  Body Position: position changed independently  Taken 9/20/2023 0036 by Shawna Tavares RN  Body Position: position changed independently  Skin Protection: adhesive use limited  Taken 9/19/2023 2224 by Shawna Tavares RN  Body Position: position changed  independently  Taken 9/19/2023 2015 by Shawna Tavares RN  Body Position: position changed independently  Skin Protection: adhesive use limited  Intervention: Prevent and Manage VTE (Venous Thromboembolism) Risk  Description: Assess for VTE (venous thromboembolism) risk.  Encourage and assist with early ambulation.  Initiate and maintain compression or other therapy, as indicated, based on identified risk in accordance with organizational protocol and provider order.  Encourage both active and passive leg exercises while in bed, if unable to ambulate.  Recent Flowsheet Documentation  Taken 9/20/2023 0400 by Shawna Tavares RN  Activity Management: activity encouraged  Taken 9/20/2023 0217 by Shawna Tavares RN  Activity Management: activity encouraged  Taken 9/20/2023 0036 by Shawna Tavares RN  Activity Management: activity encouraged  Range of Motion: active ROM (range of motion) encouraged  Taken 9/19/2023 2224 by Shawna Tavares RN  Activity Management: activity encouraged  Taken 9/19/2023 2015 by Shawna Tavares RN  Activity Management: activity encouraged  VTE Prevention/Management: (SQ lovenox) other (see comments)  Range of Motion: active ROM (range of motion) encouraged  Intervention: Prevent Infection  Description: Maintain skin and mucous membrane integrity; promote hand, oral and pulmonary hygiene.  Optimize fluid balance, nutrition, sleep and glycemic control to maximize infection resistance.  Identify potential sources of infection early to prevent or mitigate progression of infection (e.g., wound, lines, devices).  Evaluate ongoing need for invasive devices; remove promptly when no longer indicated.  Recent Flowsheet Documentation  Taken 9/20/2023 0400 by Shawna Tavares RN  Infection Prevention: rest/sleep promoted  Taken 9/20/2023 0217 by Shawna Tavares RN  Infection Prevention: rest/sleep promoted  Taken 9/20/2023 0036 by Shawna Tavares RN  Infection Prevention:   hand hygiene  promoted   rest/sleep promoted   single patient room provided   visitors restricted/screened  Taken 9/19/2023 2224 by Shawna Tavares RN  Infection Prevention: rest/sleep promoted  Taken 9/19/2023 2015 by Shawna Tavares RN  Infection Prevention:   hand hygiene promoted   rest/sleep promoted   single patient room provided   visitors restricted/screened  Goal: Optimal Comfort and Wellbeing  Outcome: Ongoing, Progressing  Intervention: Monitor Pain and Promote Comfort  Description: Assess pain level, treatment efficacy and patient response at regular intervals using a consistent pain scale.  Consider the presence and impact of preexisting chronic pain.  Encourage patient and caregiver involvement in pain assessment, interventions and safety measures.  Recent Flowsheet Documentation  Taken 9/20/2023 0143 by Shawna Tavares RN  Pain Management Interventions: see MAR  Intervention: Provide Person-Centered Care  Description: Use a family-focused approach to care.  Develop trust and rapport by proactively providing information, encouraging questions, addressing concerns and offering reassurance.  Acknowledge emotional response to hospitalization.  Recognize and utilize personal coping strategies.  Honor spiritual and cultural preferences.  Recent Flowsheet Documentation  Taken 9/20/2023 0036 by Shawna Tavares RN  Trust Relationship/Rapport:   care explained   choices provided  Taken 9/19/2023 2015 by Shawna Tavares RN  Trust Relationship/Rapport:   care explained   choices provided   emotional support provided   empathic listening provided   questions encouraged   questions answered   reassurance provided   thoughts/feelings acknowledged  Goal: Readiness for Transition of Care  Outcome: Ongoing, Progressing

## 2023-09-20 NOTE — PROGRESS NOTES
"RENAL/KCC:     LOS: 0 days     Chief Complaint/ Reason for encounter: JA    Subjective   Chart reviewed  Lying flat in bed  No complaints  Taking some PO    Vital Signs  Temp:  [97.7 °F (36.5 °C)-98.2 °F (36.8 °C)] 98.2 °F (36.8 °C)  Heart Rate:  [60-66] 64  Resp:  [16] 16  BP: (140-147)/(77-94) 147/92       Wt Readings from Last 1 Encounters:   09/20/23 0629 85.8 kg (189 lb 2.5 oz)   09/19/23 1230 83.7 kg (184 lb 8.4 oz)   09/18/23 0545 86.2 kg (190 lb)       Objective:  Vital signs: (most recent): Blood pressure 163/86, pulse 57, temperature 97.9 °F (36.6 °C), temperature source Oral, resp. rate 16, height 180.3 cm (71\"), weight 85.8 kg (189 lb 2.5 oz), SpO2 100 %.              Objective:  General Appearance:  Comfortable, chronically ill -appearing, in no acute distress and not in pain.  Awake, alert, oriented  HEENT: Mucous membranes moist, no injury, oropharynx clear  Lungs:  Normal effort and normal respiratory rate.  Breath sounds clear to auscultation.  No  respiratory distress.  No rales, decreased breath sounds or rhonchi.    Heart: Normal rate.  Regular rhythm.  S1, S2 normal.  No murmur.   Abdomen: Abdomen is soft.  Bowel sounds are normal, no abdominal tenderness.  There is no rebound or guarding  Extremities: Trace edema of bilateral lower extremities  Neurological: No focal motor or sensory deficits, pupils reactive  Skin:  Warm and dry.  No rash or cyanosis.       Results Review:    Intake/Output:     Intake/Output Summary (Last 24 hours) at 9/20/2023 0753  Last data filed at 9/20/2023 0400  Gross per 24 hour   Intake 1720 ml   Output 1760 ml   Net -40 ml         Labs:   Recent Results (from the past 24 hour(s))   Magnesium    Collection Time: 09/20/23  6:17 AM    Specimen: Blood   Result Value Ref Range    Magnesium 2.1 1.6 - 2.6 mg/dL   Renal Function Panel    Collection Time: 09/20/23  6:17 AM    Specimen: Blood   Result Value Ref Range    Glucose 103 (H) 65 - 99 mg/dL    BUN 15 6 - 20 mg/dL    " Creatinine 1.46 (H) 0.76 - 1.27 mg/dL    Sodium 140 136 - 145 mmol/L    Potassium 3.5 3.5 - 5.2 mmol/L    Chloride 109 (H) 98 - 107 mmol/L    CO2 23.1 22.0 - 29.0 mmol/L    Calcium 8.7 8.6 - 10.5 mg/dL    Albumin 3.8 3.5 - 5.2 g/dL    Phosphorus 2.6 2.5 - 4.5 mg/dL    Anion Gap 7.9 5.0 - 15.0 mmol/L    BUN/Creatinine Ratio 10.3 7.0 - 25.0    eGFR 57.9 (L) >60.0 mL/min/1.73   CBC Auto Differential    Collection Time: 09/20/23  6:17 AM    Specimen: Blood   Result Value Ref Range    WBC 6.11 3.40 - 10.80 10*3/mm3    RBC 4.11 (L) 4.14 - 5.80 10*6/mm3    Hemoglobin 12.1 (L) 13.0 - 17.7 g/dL    Hematocrit 37.5 37.5 - 51.0 %    MCV 91.2 79.0 - 97.0 fL    MCH 29.4 26.6 - 33.0 pg    MCHC 32.3 31.5 - 35.7 g/dL    RDW 13.4 12.3 - 15.4 %    RDW-SD 45.0 37.0 - 54.0 fl    MPV 11.5 6.0 - 12.0 fL    Platelets 208 140 - 450 10*3/mm3    Neutrophil % 49.5 42.7 - 76.0 %    Lymphocyte % 40.9 19.6 - 45.3 %    Monocyte % 7.0 5.0 - 12.0 %    Eosinophil % 1.3 0.3 - 6.2 %    Basophil % 1.1 0.0 - 1.5 %    Immature Grans % 0.2 0.0 - 0.5 %    Neutrophils, Absolute 3.02 1.70 - 7.00 10*3/mm3    Lymphocytes, Absolute 2.50 0.70 - 3.10 10*3/mm3    Monocytes, Absolute 0.43 0.10 - 0.90 10*3/mm3    Eosinophils, Absolute 0.08 0.00 - 0.40 10*3/mm3    Basophils, Absolute 0.07 0.00 - 0.20 10*3/mm3    Immature Grans, Absolute 0.01 0.00 - 0.05 10*3/mm3    nRBC 0.0 0.0 - 0.2 /100 WBC       Medications have been reviewed separately in chart overview      ASSESSMENT:  JA, significant improvement overnight suggest prerenal cause  CKD stage IIIa    Chest pain, no plans for intervention at this time due to noncompliance    CKD (chronic kidney disease) stage 3, GFR 30-59 ml/min    DDD (degenerative disc disease), lumbosacral  Hepatitis C    Hypothyroidism    Mixed hyperlipidemia    Schizoaffective disorder    Substance induced mood disorder    Acute renal failure superimposed on chronic kidney disease    Arteriosclerosis of coronary artery    Polysubstance abuse     Suicidal ideations    Hypokalemia    Noncompliance           DISCUSSION/PLAN:   Cr improved to 1.46 from 1.49 with baseline 1.3  D/C IVF  Encourage oral fluid intake  BP improved, continue metoprolol  Replace potassium and phosphorus orally as needed  Will follow-up a.m. labs     Continue to monitor electrolytes and volume closely, avoid IV contrast and nephrotoxic medications       Sylvester Little MD  [facility]   Office phone number: [phone]  Answering service phone number: [phone]  09/20/23  07:53 EDT

## 2023-09-21 ENCOUNTER — READMISSION MANAGEMENT (OUTPATIENT)
Dept: CALL CENTER | Facility: HOSPITAL | Age: 53
End: 2023-09-21
Payer: MEDICARE

## 2023-09-21 NOTE — PAYOR COMM NOTE
"Viola James (52 y.o. Male)     PLEASE SEE ATTACHED FOR DC NOTICE    PT ID     [id number]       THANK YOU  SIGIFREDO SERRANO RN/ DEPT  [facility]   [phone]  [phone]        Date of Birth   1970    Social Security Number       Address   [place] [address]    Home Phone   [phone]    MRN   [id number]       Judaism   Other    Marital Status   Single                            Admission Date   9/18/23  OBSERVATION     Admission Type   Emergency    Admitting Provider   Rex Solis DO    Attending Provider       Department, Room/Bed   [facility], S521/1       Discharge Date   9/20/2023    Discharge Disposition   Home or Self Care    Discharge Destination                                 Attending Provider: (none)   Allergies: Bupropion, Griseofulvin Ultramicrosize [Griseofulvin], Risperidone, Toradol [Ketorolac Tromethamine], Tramadol, Buspirone    Isolation: None   Infection: None   Code Status: Prior    Ht: 180.3 cm (71\")   Wt: 85.8 kg (189 lb 2.5 oz)    Admission Cmt: None   Principal Problem: Chest pain [R07.9]                   Active Insurance as of 9/18/2023       Primary Coverage       Payor Plan Insurance Group Employer/Plan Group    MISC MEDICARE REPLACEMENT MISC MCARE REPLACEMENT        Coverage Address Coverage Phone Number Coverage Fax Number Effective Dates    [address] [address] [phone]  9/1/2023 - None Entered    [facility] [id number]         Subscriber Name Subscriber Birth Date Member ID       VIOLA JAMES 1970 [id number]               Secondary Coverage       Payor Plan Insurance Group Employer/Plan Group    MEDICAID OUT OF STATE MISC MEDICAID OUT OF STATE        Coverage Address Coverage Phone Number Coverage Fax Number Effective Dates    [address] [address] [phone]  9/1/2023 - None Entered    [facility] [id number]         Subscriber Name Subscriber Birth Date Member ID       VIOLA JAMES 1970 " [id number]                     Emergency Contacts        (Rel.) Home Phone Work Phone Mobile Phone    Julia Seay (Mother) [phone] -- --              [place]: [facility] [id number]  Tax ID [id number]     Discharge Summary        Bibi Whitt, APRN at 09/20/23 1413       Attestation signed by Rex Solis DO at 09/20/23 2231    I have reviewed this documentation and agree.                                  [facility]    Date of Admission: 9/18/2023  Date of Discharge:  9/20/2023    PCP: Provider, No Known    Discharge Diagnosis:   Active Hospital Problems    Diagnosis  POA    **Chest pain [R07.9]  Yes    Facial asymmetry [Q67.0]  Not Applicable    Right arm numbness [R20.0]  Yes    Polysubstance abuse [F19.10]  Yes    Suicidal ideations [R45.851]  Not Applicable    Hypokalemia [E87.6]  Yes    Noncompliance [Z91.199]  Not Applicable    Schizoaffective disorder [F25.9]  Yes    Acute renal failure superimposed on chronic kidney disease [N17.9, N18.9]  Yes    DDD (degenerative disc disease), lumbosacral [M51.37]  Yes    Hep C w/o coma, chronic [B18.2]  Yes    CKD (chronic kidney disease) stage 3, GFR 30-59 ml/min [N18.30]  Yes    Mixed hyperlipidemia [E78.2]  Yes    Arteriosclerosis of coronary artery [I25.10]  Yes    Hypothyroidism [E03.9]  Yes    Substance induced mood disorder [F19.94]  Yes      Resolved Hospital Problems   No resolved problems to display.     Procedures Performed  none     Consults  Inpatient consult to Neurology  Inpatient consult to Nephrology  Inpatient consult to Cardiology  Inpatient consult to Psychiatry    Hospital Course  Please see history and physical for details. Patient is a 52 y.o. male that presented to the hospital with complaints of chest pain after not taking his blood pressure medications for several days.  He also reported some depression and feelings of SI.  He has a known history of polysubstance abuse and noncompliance.     The patient was seen in consultation by cardiology given his reports of chest pain.  Recent stress testing was noted to be negative and they recommended resuming his prior medications, but otherwise signed off as they did not feel that further cardiac work-up was indicated at this time.  They felt that his hypertension and noncompliance were likely contributing to his presenting symptoms.  He was resumed on his aspirin, statin, Lopressor, Imdur and Ranexa.  He did report some intermittent chest pains during the hospital stay, but repeat EKG were unchanged.  Again, cardiology has signed off and did not plan for any further work-up, but recommended compliance with his medications as well as BP control.  He was given a prescription for low pressure as well as statin on his last admission here and states that he is currently without refills for his Imdur and Ranexa.  We will send a 2-week course of these medications until he can further follow-up with a provider in [place] which is where his family is.  He should comply with his medications to do so any recurrent chest pain and/or symptoms.   He did have a baseline creatinine seemingly around 1.3-1.5.  His creatinine was 2.13 on admission.  Nephrology was consulted and agreed with IV fluids.  Renal function quickly improved and there was no signs of retention.  His electrolytes have been stable and creatinine back to baseline.   He does known substance abuse issues and apparently reported SI wearing this admission.  Psychiatry as well as access were consulted and he was placed on one-to-one precautions with suicide protocol.  Psychiatry felt that he was stable for discharge as he is currently denying any SI.  He was educated on the need to abstain from illicit drugs.   He did report some right arm numbness there were also some concerns of facial asymmetry.  CT head was negative for any acute findings.  He did have a recent MRI that showed a chronic right frontal  infarct.  Neurology was consulted for these reasons and felt that his symptoms were residual from his old infarct, but did not plan for any further inpatient neurological work-up.  He should continue his aspirin and statin and continue blood pressure control.   On the day of discharge, he denies any current chest pain, dyspnea, cough, fever or chills.  Overall his blood pressure has been stable with occasional isolated highs.  Compliance and illicit drug use seem to be his biggest issues at play as well as his current social issues.  Attempting to get back to [place] where his family is as he has more support there.  He will need to obtain a PCP as well as cardiologist when he gets stabilized from a housing standpoint.  He will need repeat BMP as well as blood pressure checks in the next 1 to 2 weeks.    I discussed the patient's findings and my recommendations with patient, nursing staff, and Dr. Solis .    Condition on Discharge: Improved.     Temp:  [97.5 °F (36.4 °C)-98.2 °F (36.8 °C)] 97.5 °F (36.4 °C)  Heart Rate:  [57-76] 76  Resp:  [16] 16  BP: (140-177)/() 172/126  Body mass index is 26.38 kg/m².    Physical Exam  Vitals and nursing note reviewed.   Constitutional:       Appearance: He is ill-appearing. He is not toxic-appearing.   Cardiovascular:      Rate and Rhythm: Normal rate and regular rhythm.      Pulses: Normal pulses.   Pulmonary:      Effort: Pulmonary effort is normal. No respiratory distress.      Comments: Diminished d/t poor inspiratory effort. On RA.   Abdominal:      General: Bowel sounds are normal. There is no distension.      Palpations: Abdomen is soft.      Tenderness: There is no abdominal tenderness.   Musculoskeletal:         General: No swelling. Normal range of motion.      Cervical back: Normal range of motion and neck supple.   Skin:     General: Skin is warm and dry.      Findings: No bruising.      Comments: Scattered scabs/healing scabs.   Neurological:       Comments: Alert and oriented x4, moving extremities equally on both sides.      Comments: Calm and cooperative.       Discharge Medications        Continue These Medications        Instructions Start Date   albuterol sulfate  (90 Base) MCG/ACT inhaler  Commonly known as: PROVENTIL HFA;VENTOLIN HFA;PROAIR HFA   2 puffs, Inhalation, Every 4 Hours PRN      aspirin 81 MG EC tablet   1 tablet, Oral, Daily      atorvastatin 20 MG tablet  Commonly known as: LIPITOR   20 mg, Oral, Nightly      isosorbide mononitrate 30 MG 24 hr tablet  Commonly known as: IMDUR   30 mg, Oral, Daily      levothyroxine 75 MCG tablet  Commonly known as: SYNTHROID, LEVOTHROID   75 mcg, Oral, Daily      metoprolol tartrate 25 MG tablet  Commonly known as: LOPRESSOR   25 mg, Oral, 2 Times Daily      mirtazapine 15 MG tablet  Commonly known as: REMERON   1 tablet, Oral, Nightly      nicotine 21 MG/24HR patch  Commonly known as: NICODERM CQ   1 patch, Transdermal      nitroglycerin 0.4 MG SL tablet  Commonly known as: NITROSTAT   0.4 mg, Sublingual      ranolazine 500 MG 12 hr tablet  Commonly known as: RANEXA   500 mg, Oral, 2 Times Daily             Stop These Medications      loratadine 10 MG tablet  Commonly known as: CLARITIN     methylPREDNISolone 4 MG dose pack  Commonly known as: MEDROL     prazosin 5 MG capsule  Commonly known as: MINIPRESS     TiZANidine 2 MG capsule  Commonly known as: Zanaflex             Diet Instructions       Diet: Regular/House Diet, Cardiac Diets; Healthy Heart (2-3 Na+); Regular Texture (IDDSI 7); Thin (IDDSI 0)      Discharge Diet:  Regular/House Diet  Cardiac Diets       Cardiac Diet: Healthy Heart (2-3 Na+)    Texture: Regular Texture (IDDSI 7)    Fluid Consistency: Thin (IDDSI 0)           Activity Instructions       Activity as Tolerated             Additional Instructions for the Follow-ups that You Need to Schedule       Discharge Follow-up with PCP   As directed       Currently Documented PCP:     Provider, No Known    PCP Phone Number:    [phone]     Follow Up Details: see in 2 weeks- need repeat labs (BMP) and BP check        Discharge Follow-up with Specified Provider: Cardiology; 2 Weeks   As directed      To: Cardiology   Follow Up: 2 Weeks   Follow Up Details: obtain and see in a couple weeks in [place]               Follow-up Information       Provider, No Known .    Why: see in 2 weeks- need repeat labs (BMP) and BP check  Contact information:  [place] [address]  [phone]                           Test Results Pending at Discharge     SYDNEY Singleton  09/20/23  14:20 EDT    Discharge time spent greater than 30 minutes.    Electronically signed by Rex Solis DO at 09/20/23 9257

## 2023-09-21 NOTE — OUTREACH NOTE
Prep Survey      Flowsheet Row Responses   [facility] facility patient discharged from? [place]   Is LACE score < 7 ? No   Eligibility Not Eligible   What are the reasons patient is not eligible? Behavioral Health   Does the patient have one of the following disease processes/diagnoses(primary or secondary)? Other   Prep survey completed? Yes            Aspen DE LEÓN - Registered Nurse

## 2023-09-30 ENCOUNTER — HOSPITAL ENCOUNTER (EMERGENCY)
Facility: HOSPITAL | Age: 53
Discharge: HOME | End: 2023-09-30
Attending: EMERGENCY MEDICINE
Payer: MEDICARE

## 2023-09-30 ENCOUNTER — APPOINTMENT (OUTPATIENT)
Dept: GENERAL RADIOLOGY | Facility: HOSPITAL | Age: 53
End: 2023-09-30
Payer: MEDICARE

## 2023-09-30 VITALS
OXYGEN SATURATION: 100 % | DIASTOLIC BLOOD PRESSURE: 110 MMHG | RESPIRATION RATE: 16 BRPM | WEIGHT: 190 LBS | SYSTOLIC BLOOD PRESSURE: 183 MMHG | BODY MASS INDEX: 26.6 KG/M2 | TEMPERATURE: 98.4 F | HEART RATE: 69 BPM | HEIGHT: 71 IN

## 2023-09-30 DIAGNOSIS — R07.89 ATYPICAL CHEST PAIN: Primary | ICD-10-CM

## 2023-09-30 DIAGNOSIS — Z76.0 MEDICATION REFILL: ICD-10-CM

## 2023-09-30 DIAGNOSIS — F19.90 POLYSUBSTANCE USE DISORDER: ICD-10-CM

## 2023-09-30 LAB
ALBUMIN SERPL-MCNC: 4.1 G/DL (ref 3.5–5.2)
ALBUMIN/GLOB SERPL: 1.7 G/DL
ALP SERPL-CCNC: 63 U/L (ref 39–117)
ALT SERPL W P-5'-P-CCNC: 6 U/L (ref 1–41)
AMPHET+METHAMPHET UR QL: POSITIVE
ANION GAP SERPL CALCULATED.3IONS-SCNC: 9 MMOL/L (ref 5–15)
AST SERPL-CCNC: 19 U/L (ref 1–40)
BARBITURATES UR QL SCN: NEGATIVE
BASOPHILS # BLD AUTO: 0.07 10*3/MM3 (ref 0–0.2)
BASOPHILS NFR BLD AUTO: 1 % (ref 0–1.5)
BENZODIAZ UR QL SCN: NEGATIVE
BILIRUB SERPL-MCNC: 0.2 MG/DL (ref 0–1.2)
BUN SERPL-MCNC: 18 MG/DL (ref 6–20)
BUN/CREAT SERPL: 14.4 (ref 7–25)
CALCIUM SPEC-SCNC: 9.7 MG/DL (ref 8.6–10.5)
CANNABINOIDS SERPL QL: POSITIVE
CHLORIDE SERPL-SCNC: 104 MMOL/L (ref 98–107)
CO2 SERPL-SCNC: 27 MMOL/L (ref 22–29)
COCAINE UR QL: NEGATIVE
CREAT SERPL-MCNC: 1.25 MG/DL (ref 0.76–1.27)
DEPRECATED RDW RBC AUTO: 42.2 FL (ref 37–54)
EGFRCR SERPLBLD CKD-EPI 2021: 69.7 ML/MIN/1.73
EOSINOPHIL # BLD AUTO: 0.06 10*3/MM3 (ref 0–0.4)
EOSINOPHIL NFR BLD AUTO: 0.8 % (ref 0.3–6.2)
ERYTHROCYTE [DISTWIDTH] IN BLOOD BY AUTOMATED COUNT: 13 % (ref 12.3–15.4)
FENTANYL UR-MCNC: NEGATIVE NG/ML
GEN 5 2HR TROPONIN T REFLEX: 14 NG/L
GLOBULIN UR ELPH-MCNC: 2.4 GM/DL
GLUCOSE SERPL-MCNC: 87 MG/DL (ref 65–99)
HCT VFR BLD AUTO: 36.4 % (ref 37.5–51)
HGB BLD-MCNC: 11.8 G/DL (ref 13–17.7)
IMM GRANULOCYTES # BLD AUTO: 0.03 10*3/MM3 (ref 0–0.05)
IMM GRANULOCYTES NFR BLD AUTO: 0.4 % (ref 0–0.5)
LYMPHOCYTES # BLD AUTO: 1.79 10*3/MM3 (ref 0.7–3.1)
LYMPHOCYTES NFR BLD AUTO: 24.5 % (ref 19.6–45.3)
MCH RBC QN AUTO: 29 PG (ref 26.6–33)
MCHC RBC AUTO-ENTMCNC: 32.4 G/DL (ref 31.5–35.7)
MCV RBC AUTO: 89.4 FL (ref 79–97)
METHADONE UR QL SCN: NEGATIVE
MONOCYTES # BLD AUTO: 0.61 10*3/MM3 (ref 0.1–0.9)
MONOCYTES NFR BLD AUTO: 8.4 % (ref 5–12)
NEUTROPHILS NFR BLD AUTO: 4.74 10*3/MM3 (ref 1.7–7)
NEUTROPHILS NFR BLD AUTO: 64.9 % (ref 42.7–76)
NRBC BLD AUTO-RTO: 0 /100 WBC (ref 0–0.2)
OPIATES UR QL: NEGATIVE
OXYCODONE UR QL SCN: NEGATIVE
PLATELET # BLD AUTO: 287 10*3/MM3 (ref 140–450)
PMV BLD AUTO: 10.8 FL (ref 6–12)
POTASSIUM SERPL-SCNC: 3.8 MMOL/L (ref 3.5–5.2)
PROT SERPL-MCNC: 6.5 G/DL (ref 6–8.5)
RBC # BLD AUTO: 4.07 10*6/MM3 (ref 4.14–5.8)
SODIUM SERPL-SCNC: 140 MMOL/L (ref 136–145)
TROPONIN T DELTA: -1 NG/L
TROPONIN T SERPL HS-MCNC: 15 NG/L
WBC NRBC COR # BLD: 7.3 10*3/MM3 (ref 3.4–10.8)

## 2023-09-30 PROCEDURE — 71045 X-RAY EXAM CHEST 1 VIEW: CPT

## 2023-09-30 PROCEDURE — 80307 DRUG TEST PRSMV CHEM ANLYZR: CPT | Performed by: NURSE PRACTITIONER

## 2023-09-30 PROCEDURE — 93005 ELECTROCARDIOGRAM TRACING: CPT

## 2023-09-30 PROCEDURE — 36415 COLL VENOUS BLD VENIPUNCTURE: CPT

## 2023-09-30 PROCEDURE — 85025 COMPLETE CBC W/AUTO DIFF WBC: CPT | Performed by: NURSE PRACTITIONER

## 2023-09-30 PROCEDURE — 80053 COMPREHEN METABOLIC PANEL: CPT | Performed by: NURSE PRACTITIONER

## 2023-09-30 PROCEDURE — 99284 EMERGENCY DEPT VISIT MOD MDM: CPT

## 2023-09-30 PROCEDURE — 84484 ASSAY OF TROPONIN QUANT: CPT | Performed by: NURSE PRACTITIONER

## 2023-09-30 PROCEDURE — 93005 ELECTROCARDIOGRAM TRACING: CPT | Performed by: EMERGENCY MEDICINE

## 2023-09-30 RX ORDER — NITROGLYCERIN 0.4 MG/1
0.4 TABLET SUBLINGUAL
Qty: 25 TABLET | Refills: 0 | Status: SHIPPED | OUTPATIENT
Start: 2023-09-30

## 2023-09-30 RX ORDER — RANOLAZINE 500 MG/1
500 TABLET, EXTENDED RELEASE ORAL 2 TIMES DAILY
Qty: 30 TABLET | Refills: 0 | Status: SHIPPED | OUTPATIENT
Start: 2023-09-30

## 2023-09-30 RX ORDER — RANOLAZINE 500 MG/1
500 TABLET, EXTENDED RELEASE ORAL ONCE
Status: COMPLETED | OUTPATIENT
Start: 2023-09-30 | End: 2023-09-30

## 2023-09-30 RX ORDER — ISOSORBIDE MONONITRATE 30 MG/1
30 TABLET, EXTENDED RELEASE ORAL ONCE
Status: COMPLETED | OUTPATIENT
Start: 2023-09-30 | End: 2023-09-30

## 2023-09-30 RX ORDER — ISOSORBIDE MONONITRATE 30 MG/1
30 TABLET, EXTENDED RELEASE ORAL DAILY
Qty: 15 TABLET | Refills: 0 | Status: SHIPPED | OUTPATIENT
Start: 2023-09-30

## 2023-09-30 RX ORDER — ATORVASTATIN CALCIUM 20 MG/1
20 TABLET, FILM COATED ORAL NIGHTLY
Qty: 30 TABLET | Refills: 0 | Status: SHIPPED | OUTPATIENT
Start: 2023-09-30

## 2023-09-30 RX ORDER — SODIUM CHLORIDE 0.9 % (FLUSH) 0.9 %
10 SYRINGE (ML) INJECTION AS NEEDED
Status: DISCONTINUED | OUTPATIENT
Start: 2023-09-30 | End: 2023-09-30 | Stop reason: HOSPADM

## 2023-09-30 RX ORDER — ASPIRIN 81 MG/1
324 TABLET, CHEWABLE ORAL ONCE
Status: DISCONTINUED | OUTPATIENT
Start: 2023-09-30 | End: 2023-09-30

## 2023-09-30 RX ORDER — ACETAMINOPHEN 500 MG
1000 TABLET ORAL ONCE
Status: COMPLETED | OUTPATIENT
Start: 2023-09-30 | End: 2023-09-30

## 2023-09-30 RX ORDER — LEVOTHYROXINE SODIUM 0.07 MG/1
75 TABLET ORAL DAILY
Qty: 15 TABLET | Refills: 0 | Status: SHIPPED | OUTPATIENT
Start: 2023-09-30

## 2023-09-30 RX ADMIN — ISOSORBIDE MONONITRATE 30 MG: 30 TABLET, EXTENDED RELEASE ORAL at 11:37

## 2023-09-30 RX ADMIN — METOPROLOL TARTRATE 25 MG: 25 TABLET, FILM COATED ORAL at 11:37

## 2023-09-30 RX ADMIN — RANOLAZINE 500 MG: 500 TABLET, FILM COATED, EXTENDED RELEASE ORAL at 11:37

## 2023-09-30 RX ADMIN — ACETAMINOPHEN 1000 MG: 500 TABLET ORAL at 11:37

## 2023-09-30 NOTE — ED TRIAGE NOTES
"Pt to ED via EMS from [place] for chest pain which started this morning. Pressure like, pain radiating to left shoulder \"shooting\" down arm. 9/10-- given 324mg en route and nitro x2 (no improvement in pain)  "
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
## 2023-10-02 LAB
QT INTERVAL: 415 MS
QTC INTERVAL: 488 MS

## 2023-11-19 ENCOUNTER — APPOINTMENT (OUTPATIENT)
Dept: CT IMAGING | Age: 53
End: 2023-11-19
Payer: MEDICARE

## 2023-11-19 ENCOUNTER — HOSPITAL ENCOUNTER (OUTPATIENT)
Age: 53
Setting detail: OBSERVATION
Discharge: HOME | End: 2023-11-20
Attending: PSYCHIATRY & NEUROLOGY | Admitting: PSYCHIATRY & NEUROLOGY
Payer: MEDICARE

## 2023-11-19 ENCOUNTER — APPOINTMENT (OUTPATIENT)
Dept: GENERAL RADIOLOGY | Age: 53
End: 2023-11-19
Payer: MEDICARE

## 2023-11-19 ENCOUNTER — HOSPITAL ENCOUNTER (EMERGENCY)
Age: 53
Discharge: PSYCHIATRIC OR SUBSTANCE USE FACILITY | End: 2023-11-19
Attending: EMERGENCY MEDICINE
Payer: MEDICARE

## 2023-11-19 VITALS
TEMPERATURE: 97.9 F | SYSTOLIC BLOOD PRESSURE: 116 MMHG | RESPIRATION RATE: 16 BRPM | WEIGHT: 186.73 LBS | HEIGHT: 71 IN | BODY MASS INDEX: 26.14 KG/M2 | OXYGEN SATURATION: 97 % | HEART RATE: 85 BPM | DIASTOLIC BLOOD PRESSURE: 75 MMHG

## 2023-11-19 DIAGNOSIS — R07.9 CHEST PAIN, UNSPECIFIED TYPE: Primary | ICD-10-CM

## 2023-11-19 DIAGNOSIS — R45.851 SUICIDAL IDEATION: ICD-10-CM

## 2023-11-19 DIAGNOSIS — F39 MOOD DISORDER (HCC): ICD-10-CM

## 2023-11-19 PROBLEM — F32.A DEPRESSION, UNSPECIFIED: Status: ACTIVE | Noted: 2023-11-19

## 2023-11-19 LAB
ALBUMIN SERPL-MCNC: 4.3 G/DL (ref 3.4–5)
ALBUMIN/GLOB SERPL: 1.7 {RATIO} (ref 1.1–2.2)
ALP SERPL-CCNC: 58 U/L (ref 40–129)
ALT SERPL-CCNC: 34 U/L (ref 10–40)
AMPHETAMINES UR QL SCN>1000 NG/ML: ABNORMAL
ANION GAP SERPL CALCULATED.3IONS-SCNC: 10 MMOL/L (ref 3–16)
APAP SERPL-MCNC: <5 UG/ML (ref 10–30)
AST SERPL-CCNC: 51 U/L (ref 15–37)
BARBITURATES UR QL SCN>200 NG/ML: ABNORMAL
BASOPHILS # BLD: 0.2 K/UL (ref 0–0.2)
BASOPHILS NFR BLD: 2.4 %
BENZODIAZ UR QL SCN>200 NG/ML: ABNORMAL
BILIRUB SERPL-MCNC: <0.2 MG/DL (ref 0–1)
BUN SERPL-MCNC: 20 MG/DL (ref 7–20)
CALCIUM SERPL-MCNC: 10.2 MG/DL (ref 8.3–10.6)
CANNABINOIDS UR QL SCN>50 NG/ML: POSITIVE
CHLORIDE SERPL-SCNC: 103 MMOL/L (ref 99–110)
CO2 SERPL-SCNC: 24 MMOL/L (ref 21–32)
COCAINE UR QL SCN: ABNORMAL
CREAT SERPL-MCNC: 1.5 MG/DL (ref 0.9–1.3)
DEPRECATED RDW RBC AUTO: 14.5 % (ref 12.4–15.4)
DRUG SCREEN COMMENT UR-IMP: ABNORMAL
EKG ATRIAL RATE: 77 BPM
EKG DIAGNOSIS: NORMAL
EKG P AXIS: 62 DEGREES
EKG P-R INTERVAL: 162 MS
EKG Q-T INTERVAL: 408 MS
EKG QRS DURATION: 112 MS
EKG QTC CALCULATION (BAZETT): 461 MS
EKG R AXIS: 64 DEGREES
EKG T AXIS: 84 DEGREES
EKG VENTRICULAR RATE: 77 BPM
EOSINOPHIL # BLD: 0.1 K/UL (ref 0–0.6)
EOSINOPHIL NFR BLD: 1.5 %
ETHANOLAMINE SERPL-MCNC: NORMAL MG/DL (ref 0–0.08)
FENTANYL SCREEN, URINE: ABNORMAL
GFR SERPLBLD CREATININE-BSD FMLA CKD-EPI: 56 ML/MIN/{1.73_M2}
GLUCOSE SERPL-MCNC: 110 MG/DL (ref 70–99)
HCT VFR BLD AUTO: 34.9 % (ref 40.5–52.5)
HGB BLD-MCNC: 11.7 G/DL (ref 13.5–17.5)
LIPASE SERPL-CCNC: 62 U/L (ref 13–60)
LYMPHOCYTES # BLD: 2.8 K/UL (ref 1–5.1)
LYMPHOCYTES NFR BLD: 37.1 %
MCH RBC QN AUTO: 29.2 PG (ref 26–34)
MCHC RBC AUTO-ENTMCNC: 33.5 G/DL (ref 31–36)
MCV RBC AUTO: 87 FL (ref 80–100)
METHADONE UR QL SCN>300 NG/ML: ABNORMAL
MONOCYTES # BLD: 0.5 K/UL (ref 0–1.3)
MONOCYTES NFR BLD: 7 %
NEUTROPHILS # BLD: 3.9 K/UL (ref 1.7–7.7)
NEUTROPHILS NFR BLD: 52 %
NT-PROBNP SERPL-MCNC: 56 PG/ML (ref 0–124)
OPIATES UR QL SCN>300 NG/ML: ABNORMAL
OXYCODONE UR QL SCN: ABNORMAL
PCP UR QL SCN>25 NG/ML: ABNORMAL
PH UR STRIP: 6 [PH]
PLATELET # BLD AUTO: 243 K/UL (ref 135–450)
PMV BLD AUTO: 9.7 FL (ref 5–10.5)
POTASSIUM SERPL-SCNC: 4 MMOL/L (ref 3.5–5.1)
PROT SERPL-MCNC: 6.8 G/DL (ref 6.4–8.2)
RBC # BLD AUTO: 4.01 M/UL (ref 4.2–5.9)
SALICYLATES SERPL-MCNC: <0.3 MG/DL (ref 15–30)
SODIUM SERPL-SCNC: 137 MMOL/L (ref 136–145)
TROPONIN, HIGH SENSITIVITY: 15 NG/L (ref 0–22)
TROPONIN, HIGH SENSITIVITY: 15 NG/L (ref 0–22)
WBC # BLD AUTO: 7.5 K/UL (ref 4–11)

## 2023-11-19 PROCEDURE — 80307 DRUG TEST PRSMV CHEM ANLYZR: CPT

## 2023-11-19 PROCEDURE — 6360000002 HC RX W HCPCS: Performed by: EMERGENCY MEDICINE

## 2023-11-19 PROCEDURE — 83880 ASSAY OF NATRIURETIC PEPTIDE: CPT

## 2023-11-19 PROCEDURE — 93010 ELECTROCARDIOGRAM REPORT: CPT | Performed by: INTERNAL MEDICINE

## 2023-11-19 PROCEDURE — 71275 CT ANGIOGRAPHY CHEST: CPT

## 2023-11-19 PROCEDURE — 80053 COMPREHEN METABOLIC PANEL: CPT

## 2023-11-19 PROCEDURE — 83690 ASSAY OF LIPASE: CPT

## 2023-11-19 PROCEDURE — 82077 ASSAY SPEC XCP UR&BREATH IA: CPT

## 2023-11-19 PROCEDURE — 71045 X-RAY EXAM CHEST 1 VIEW: CPT

## 2023-11-19 PROCEDURE — 96374 THER/PROPH/DIAG INJ IV PUSH: CPT

## 2023-11-19 PROCEDURE — 6360000004 HC RX CONTRAST MEDICATION: Performed by: EMERGENCY MEDICINE

## 2023-11-19 PROCEDURE — 6370000000 HC RX 637 (ALT 250 FOR IP)

## 2023-11-19 PROCEDURE — 2580000003 HC RX 258: Performed by: EMERGENCY MEDICINE

## 2023-11-19 PROCEDURE — 74177 CT ABD & PELVIS W/CONTRAST: CPT

## 2023-11-19 PROCEDURE — 85025 COMPLETE CBC W/AUTO DIFF WBC: CPT

## 2023-11-19 PROCEDURE — 99285 EMERGENCY DEPT VISIT HI MDM: CPT

## 2023-11-19 PROCEDURE — 80143 DRUG ASSAY ACETAMINOPHEN: CPT

## 2023-11-19 PROCEDURE — 80179 DRUG ASSAY SALICYLATE: CPT

## 2023-11-19 PROCEDURE — 93005 ELECTROCARDIOGRAM TRACING: CPT | Performed by: EMERGENCY MEDICINE

## 2023-11-19 PROCEDURE — 90792 PSYCH DIAG EVAL W/MED SRVCS: CPT | Performed by: NURSE PRACTITIONER

## 2023-11-19 PROCEDURE — 96361 HYDRATE IV INFUSION ADD-ON: CPT

## 2023-11-19 PROCEDURE — 1240000000 HC EMOTIONAL WELLNESS R&B

## 2023-11-19 PROCEDURE — 84484 ASSAY OF TROPONIN QUANT: CPT

## 2023-11-19 RX ORDER — 0.9 % SODIUM CHLORIDE 0.9 %
1000 INTRAVENOUS SOLUTION INTRAVENOUS ONCE
Status: COMPLETED | OUTPATIENT
Start: 2023-11-19 | End: 2023-11-19

## 2023-11-19 RX ORDER — HALOPERIDOL 5 MG/ML
5 INJECTION INTRAMUSCULAR EVERY 4 HOURS PRN
Status: DISCONTINUED | OUTPATIENT
Start: 2023-11-19 | End: 2023-11-20 | Stop reason: HOSPADM

## 2023-11-19 RX ORDER — TRAZODONE HYDROCHLORIDE 50 MG/1
50 TABLET ORAL NIGHTLY PRN
Status: DISCONTINUED | OUTPATIENT
Start: 2023-11-19 | End: 2023-11-19

## 2023-11-19 RX ORDER — HYDROXYZINE 50 MG/1
50 TABLET, FILM COATED ORAL 3 TIMES DAILY PRN
Status: DISCONTINUED | OUTPATIENT
Start: 2023-11-19 | End: 2023-11-20 | Stop reason: HOSPADM

## 2023-11-19 RX ORDER — MORPHINE SULFATE 4 MG/ML
4 INJECTION, SOLUTION INTRAMUSCULAR; INTRAVENOUS ONCE
Status: COMPLETED | OUTPATIENT
Start: 2023-11-19 | End: 2023-11-19

## 2023-11-19 RX ORDER — DIPHENHYDRAMINE HYDROCHLORIDE 50 MG/ML
50 INJECTION INTRAMUSCULAR; INTRAVENOUS EVERY 4 HOURS PRN
Status: DISCONTINUED | OUTPATIENT
Start: 2023-11-19 | End: 2023-11-20 | Stop reason: HOSPADM

## 2023-11-19 RX ORDER — HALOPERIDOL 5 MG/1
5 TABLET ORAL EVERY 4 HOURS PRN
Status: DISCONTINUED | OUTPATIENT
Start: 2023-11-19 | End: 2023-11-20 | Stop reason: HOSPADM

## 2023-11-19 RX ORDER — MECOBALAMIN 5000 MCG
5 TABLET,DISINTEGRATING ORAL NIGHTLY
Status: DISCONTINUED | OUTPATIENT
Start: 2023-11-19 | End: 2023-11-20 | Stop reason: HOSPADM

## 2023-11-19 RX ORDER — MORPHINE SULFATE 4 MG/ML
INJECTION INTRAVENOUS
Status: DISCONTINUED
Start: 2023-11-19 | End: 2023-11-19

## 2023-11-19 RX ORDER — MAGNESIUM HYDROXIDE/ALUMINUM HYDROXICE/SIMETHICONE 120; 1200; 1200 MG/30ML; MG/30ML; MG/30ML
30 SUSPENSION ORAL EVERY 6 HOURS PRN
Status: DISCONTINUED | OUTPATIENT
Start: 2023-11-19 | End: 2023-11-20

## 2023-11-19 RX ORDER — POLYETHYLENE GLYCOL 3350 17 G
2 POWDER IN PACKET (EA) ORAL
Status: DISCONTINUED | OUTPATIENT
Start: 2023-11-19 | End: 2023-11-20

## 2023-11-19 RX ORDER — IBUPROFEN 400 MG/1
400 TABLET ORAL EVERY 6 HOURS PRN
Status: DISCONTINUED | OUTPATIENT
Start: 2023-11-19 | End: 2023-11-20 | Stop reason: HOSPADM

## 2023-11-19 RX ADMIN — HALOPERIDOL 5 MG: 5 TABLET ORAL at 20:21

## 2023-11-19 RX ADMIN — IBUPROFEN 400 MG: 400 TABLET, FILM COATED ORAL at 20:20

## 2023-11-19 RX ADMIN — IOPAMIDOL 75 ML: 755 INJECTION, SOLUTION INTRAVENOUS at 03:10

## 2023-11-19 RX ADMIN — HYDROXYZINE HYDROCHLORIDE 50 MG: 50 TABLET, FILM COATED ORAL at 20:21

## 2023-11-19 RX ADMIN — MORPHINE SULFATE 4 MG: 4 INJECTION, SOLUTION INTRAMUSCULAR; INTRAVENOUS at 03:27

## 2023-11-19 RX ADMIN — Medication 5 MG: at 20:20

## 2023-11-19 RX ADMIN — SODIUM CHLORIDE 1000 ML: 9 INJECTION, SOLUTION INTRAVENOUS at 12:39

## 2023-11-19 ASSESSMENT — PATIENT HEALTH QUESTIONNAIRE - PHQ9
SUM OF ALL RESPONSES TO PHQ QUESTIONS 1-9: 19
7. TROUBLE CONCENTRATING ON THINGS, SUCH AS READING THE NEWSPAPER OR WATCHING TELEVISION: 3
4. FEELING TIRED OR HAVING LITTLE ENERGY: 3
5. POOR APPETITE OR OVEREATING: 0
SUM OF ALL RESPONSES TO PHQ QUESTIONS 1-9: 18
6. FEELING BAD ABOUT YOURSELF - OR THAT YOU ARE A FAILURE OR HAVE LET YOURSELF OR YOUR FAMILY DOWN: 3
9. THOUGHTS THAT YOU WOULD BE BETTER OFF DEAD, OR OF HURTING YOURSELF: 1
2. FEELING DOWN, DEPRESSED OR HOPELESS: 3
10. IF YOU CHECKED OFF ANY PROBLEMS, HOW DIFFICULT HAVE THESE PROBLEMS MADE IT FOR YOU TO DO YOUR WORK, TAKE CARE OF THINGS AT HOME, OR GET ALONG WITH OTHER PEOPLE: 3
1. LITTLE INTEREST OR PLEASURE IN DOING THINGS: 3
8. MOVING OR SPEAKING SO SLOWLY THAT OTHER PEOPLE COULD HAVE NOTICED. OR THE OPPOSITE, BEING SO FIGETY OR RESTLESS THAT YOU HAVE BEEN MOVING AROUND A LOT MORE THAN USUAL: 3
SUM OF ALL RESPONSES TO PHQ QUESTIONS 1-9: 19
SUM OF ALL RESPONSES TO PHQ9 QUESTIONS 1 & 2: 6
SUM OF ALL RESPONSES TO PHQ QUESTIONS 1-9: 19

## 2023-11-19 ASSESSMENT — PAIN DESCRIPTION - ORIENTATION
ORIENTATION: LEFT
ORIENTATION: LEFT

## 2023-11-19 ASSESSMENT — PAIN SCALES - GENERAL
PAINLEVEL_OUTOF10: 5
PAINLEVEL_OUTOF10: 10
PAINLEVEL_OUTOF10: 0
PAINLEVEL_OUTOF10: 10
PAINLEVEL_OUTOF10: 3
PAINLEVEL_OUTOF10: 5
PAINLEVEL_OUTOF10: 7

## 2023-11-19 ASSESSMENT — PAIN DESCRIPTION - ONSET: ONSET: ON-GOING

## 2023-11-19 ASSESSMENT — SLEEP AND FATIGUE QUESTIONNAIRES
AVERAGE NUMBER OF SLEEP HOURS: 6
DO YOU HAVE DIFFICULTY SLEEPING: NO
DO YOU USE A SLEEP AID: YES

## 2023-11-19 ASSESSMENT — PAIN DESCRIPTION - FREQUENCY
FREQUENCY: CONTINUOUS
FREQUENCY: CONTINUOUS

## 2023-11-19 ASSESSMENT — PAIN DESCRIPTION - LOCATION
LOCATION: CHEST
LOCATION: BACK
LOCATION: CHEST
LOCATION: GENERALIZED
LOCATION: GENERALIZED

## 2023-11-19 ASSESSMENT — PAIN DESCRIPTION - PAIN TYPE: TYPE: ACUTE PAIN

## 2023-11-19 ASSESSMENT — PAIN - FUNCTIONAL ASSESSMENT
PAIN_FUNCTIONAL_ASSESSMENT: ACTIVITIES ARE NOT PREVENTED
PAIN_FUNCTIONAL_ASSESSMENT: ACTIVITIES ARE NOT PREVENTED
PAIN_FUNCTIONAL_ASSESSMENT: 0-10
PAIN_FUNCTIONAL_ASSESSMENT: ACTIVITIES ARE NOT PREVENTED

## 2023-11-19 ASSESSMENT — LIFESTYLE VARIABLES
HOW OFTEN DO YOU HAVE A DRINK CONTAINING ALCOHOL: NEVER
HOW MANY STANDARD DRINKS CONTAINING ALCOHOL DO YOU HAVE ON A TYPICAL DAY: PATIENT DOES NOT DRINK
HOW MANY STANDARD DRINKS CONTAINING ALCOHOL DO YOU HAVE ON A TYPICAL DAY: PATIENT DOES NOT DRINK
HOW OFTEN DO YOU HAVE A DRINK CONTAINING ALCOHOL: NEVER

## 2023-11-19 ASSESSMENT — PAIN DESCRIPTION - DESCRIPTORS
DESCRIPTORS: ACHING
DESCRIPTORS: ACHING;DISCOMFORT
DESCRIPTORS: ACHING
DESCRIPTORS: ACHING

## 2023-11-19 NOTE — VIRTUAL HEALTH
NP. Consult entered and message sent to provider via tidy. Lazarus Sanzgirish, was evaluated through a synchronous (real-time) audio-video encounter. The patient (and/or guardian if applicable) is aware that this is a billable service, which includes applicable co-pays. This virtual visit was conducted with patient's (and/or legal guardian's) consent. Patient identification was verified, and a caregiver was present when appropriate. The patient was located at [facility] ([address] [place] [address]): [address] [place] [address] Pl: [phone]  The provider was located at Home ([facility]/State): [address] [place] [employer]  Consult performed by: Carey Carbone LCSW  Consult ordered by: Jim Regalado MD           Total time spent on this encounter: Not billed by time    --Carey Carbone LCSW on 11/19/2023 at 7:12 AM    An electronic signature was used to authenticate this note.
yourself? No   6) Have you ever done anything, started to do anything, or prepared to do anything to end your life? No        Impression:   MDD  Suicidal Ideation    Psychiatric management: Recommend inpatient mental health admission, medication initiation and titration, safe and therapeutic environment. Plan:    Recommend inpatient psychiatric admission at appropriate care level facility, once medically cleared and stable. Recommend Ativan 2 mg Q 6 hours PRN, and Bendaryl 50 mg Q 6 hours PRN for agitation. May give IM if patient refuses. Recommend that patient have appropriate precautions in place in accordance with facility policy. Recommend suicide precautions, elopement precautions, and sitter/1:1 level of care until patient is transferred. Legal Status: Voluntary-At this time, the patient is agreeable to voluntary admission. If the patient should change his/her mind, he/she does meet criteria for involuntary commitment due to (danger to self)and should not be allowed to sign out AMA. Re-consult psychiatry if patient requests AMA discharge. R  Recommend monitoring for withdrawal with COWS/CIWA. Medical records, labs, diagnostic tests reviewed  Medical co-morbidities: Management per medical providers. Safety plan: Completed per . Patient had an opportunity to ask questions and address concerns. The patient verbalized understanding and agreed with the treatment plan as outlined above  Thank you for this consult. Please reach out via [employer] for any questions or concerns. Total time spent on this encounter: Not billed by time      --MALCOM Aguirre CNP on 11/19/2023 at 8:09 AM    An electronic signature was used to authenticate this note.

## 2023-11-19 NOTE — DISCHARGE INSTRUCTIONS
Please follow up with your cardiologist for further evaluation and treatment. If persistent or worsening symptoms, return to ED.

## 2023-11-19 NOTE — ED NOTES
Patient has activated call light requesting pain mediation. Patient describes \"sharp\" left anterior pain that he rates a \"10\".      Elston Severe, RN  11/19/23 1661

## 2023-11-19 NOTE — ED NOTES
Adilene Butler report called to [employer]. All questions answered.      Arianna Hilton RN  11/19/23 8883

## 2023-11-19 NOTE — ED NOTES
Pt called out to this RN stating that he would like to lift his voluntary hold. States that he was under the impression per telepsych that he had a voluntary hold and that he would like to go home and talk to  tomorrow. Dr Mariann Wellsuse aware that pt is requesting to leave and is going to discuss hold and POC with pt. Security called d/t pt reaction to hold this morning.       Mamadou Rodney RN  11/19/23 7372

## 2023-11-19 NOTE — ED NOTES
Pt given g.  Crackers, pudding and drink per request. Attempting to call dietary for breakfast tray but going to v mail     Golden Child, RN  11/19/23 0715

## 2023-11-19 NOTE — ED NOTES
Transport here to take patient to [facility]. Report given and belongings given to transport team. Pt in safety gown.      Mainor Olguin RN  11/19/23 8799

## 2023-11-19 NOTE — ED NOTES
Saba Estrella from tele psych states patient would benefit from voluntary admission. Pt is agreeable to this nurse for the plan. Constant observer at bedside.       Jarrell Tanner RN  11/19/23 8758

## 2023-11-19 NOTE — ED NOTES
Came upon patient with Arturo Powell RN while in room. Patient agitated and pulling monitor off. Nurse staff calming patient down and educating him on POC and why we need patients on the monitor. Denies SI or HI. States \"my dad is gone and my mom just had surgery and the holidays are coming up and I am just depressed. \" Requesting pain medication for chest pain      Oneida Loya RN  11/19/23 9663

## 2023-11-19 NOTE — ED NOTES
Pt taking all monitor leads off and throwing in the floor. Requesting to PIV to be taken out.        Mandie Gaming RN  11/19/23 0898

## 2023-11-19 NOTE — ED NOTES
Pt ripping off chest leads and BP cuff. Pt stating that he wishes to leave. Pt informed that it is in his best interest and safety to speak to telepsych about current feelings of depression.  Pt having increased agitation that \"he does not want to harm himself\"     Karie Bob RN  11/19/23 8371

## 2023-11-19 NOTE — ED NOTES
Emelina Lantigua from psych [facility] attempting to reach Dr Anthony Menjivar via perfect serve but unable to connect. Phone number given to Dr Anthony Menjivar. Psych consult in.       Letty Duncan RN  11/19/23 8390

## 2023-11-19 NOTE — ED NOTES
Pt BP slowly trending down. Pt BP cuff readjusted and placed in trendelenburg with no improvement on BP. Dr Mayo Ruelas notified. Verbal order for 1 liter bolus of NS.      Nacho Bal RN  11/19/23 0879

## 2023-11-19 NOTE — PLAN OF CARE
Problem: Behavior  Goal: Pt/Family maintain appropriate behavior and adhere to behavioral management agreement, if implemented  Description: INTERVENTIONS:  1. Assess patient/family's coping skills and  non-compliant behavior (including use of illegal substances)  2. Notify security of behavior or suspected illegal substances which indicate the need for search of the family and/or belongings  3. Encourage verbalization of thoughts and concerns in a socially appropriate manner  4. Utilize positive, consistent limit setting strategies supporting safety of patient, staff and others  5. Encourage participation in the decision making process about the behavioral management agreement  6. If a visitor's behavior poses a threat to safety call refer to organization policy. 7. Initiate consult with , Psychosocial CNS, Spiritual Care as appropriate  Outcome: Progressing     Problem: Depression  Goal: Will be euthymic at discharge  Description: INTERVENTIONS:  1. Administer medication as ordered  2. Provide emotional support via 1:1 interaction with staff  3. Encourage involvement in milieu/groups/activities  4. Monitor for social isolation  Outcome: Progressing     Problem: Anxiety  Goal: Will report anxiety at manageable levels  Description: INTERVENTIONS:  1. Administer medication as ordered  2. Teach and rehearse alternative coping skills  3.  Provide emotional support with 1:1 interaction with staff  Outcome: Progressing

## 2023-11-19 NOTE — ED TRIAGE NOTES
Pt into ED via EMS from home with c/o 10/10 L sided chest pain that radiated to L shoulder with SOB x1wk. Pt took 4 ASA, and plavix before arrival to ED.  States hx of previous MI, does not have cardiologist. A/ox4

## 2023-11-19 NOTE — ED NOTES
Pt stated to this RN and EDMD that he feels \"as if he wants to give up\" pt states that he does not have active thoughts or a plan.       Keisha Choudhury RN  11/19/23 5666

## 2023-11-20 VITALS
SYSTOLIC BLOOD PRESSURE: 107 MMHG | RESPIRATION RATE: 16 BRPM | TEMPERATURE: 97.1 F | HEART RATE: 70 BPM | WEIGHT: 186 LBS | DIASTOLIC BLOOD PRESSURE: 80 MMHG | BODY MASS INDEX: 26.04 KG/M2 | OXYGEN SATURATION: 96 % | HEIGHT: 71 IN

## 2023-11-20 PROBLEM — F11.11 OPIOID USE DISORDER, MILD, IN EARLY REMISSION (HCC): Status: ACTIVE | Noted: 2023-11-20

## 2023-11-20 PROBLEM — Z72.0 TOBACCO USE: Status: ACTIVE | Noted: 2023-11-20

## 2023-11-20 PROBLEM — F14.11 COCAINE USE DISORDER, MILD, IN EARLY REMISSION (HCC): Status: ACTIVE | Noted: 2023-11-20

## 2023-11-20 PROBLEM — F43.10 PTSD (POST-TRAUMATIC STRESS DISORDER): Status: ACTIVE | Noted: 2023-11-20

## 2023-11-20 PROBLEM — F32.A DEPRESSION, UNSPECIFIED: Status: RESOLVED | Noted: 2023-11-19 | Resolved: 2023-11-20

## 2023-11-20 PROBLEM — J44.9 CHRONIC OBSTRUCTIVE PULMONARY DISEASE (HCC): Status: ACTIVE | Noted: 2023-11-20

## 2023-11-20 PROBLEM — F33.9 MAJOR DEPRESSION, RECURRENT (HCC): Status: ACTIVE | Noted: 2023-11-20

## 2023-11-20 PROBLEM — I25.10 CORONARY ARTERY DISEASE INVOLVING NATIVE HEART WITHOUT ANGINA PECTORIS: Status: ACTIVE | Noted: 2023-11-20

## 2023-11-20 PROBLEM — F32.2 CURRENT SEVERE EPISODE OF MAJOR DEPRESSIVE DISORDER WITHOUT PSYCHOTIC FEATURES (HCC): Status: ACTIVE | Noted: 2023-11-20

## 2023-11-20 PROBLEM — F14.91 COCAINE USE DISORDER IN REMISSION: Status: ACTIVE | Noted: 2023-11-20

## 2023-11-20 PROBLEM — F33.1 MODERATE EPISODE OF RECURRENT MAJOR DEPRESSIVE DISORDER (HCC): Status: ACTIVE | Noted: 2023-11-20

## 2023-11-20 PROCEDURE — 99222 1ST HOSP IP/OBS MODERATE 55: CPT

## 2023-11-20 PROCEDURE — 6370000000 HC RX 637 (ALT 250 FOR IP): Performed by: PSYCHIATRY & NEUROLOGY

## 2023-11-20 PROCEDURE — G0378 HOSPITAL OBSERVATION PER HR: HCPCS

## 2023-11-20 PROCEDURE — 5130000000 HC BRIDGE APPOINTMENT

## 2023-11-20 PROCEDURE — 99223 1ST HOSP IP/OBS HIGH 75: CPT | Performed by: PSYCHIATRY & NEUROLOGY

## 2023-11-20 RX ORDER — RANOLAZINE 500 MG/1
500 TABLET, EXTENDED RELEASE ORAL 2 TIMES DAILY
Status: DISCONTINUED | OUTPATIENT
Start: 2023-11-20 | End: 2023-11-20 | Stop reason: HOSPADM

## 2023-11-20 RX ORDER — CLOPIDOGREL BISULFATE 75 MG/1
75 TABLET ORAL DAILY
Status: DISCONTINUED | OUTPATIENT
Start: 2023-11-20 | End: 2023-11-20 | Stop reason: HOSPADM

## 2023-11-20 RX ORDER — ISOSORBIDE MONONITRATE 30 MG/1
30 TABLET, EXTENDED RELEASE ORAL DAILY
Status: DISCONTINUED | OUTPATIENT
Start: 2023-11-20 | End: 2023-11-20 | Stop reason: HOSPADM

## 2023-11-20 RX ORDER — BENZTROPINE MESYLATE 1 MG/ML
2 INJECTION INTRAMUSCULAR; INTRAVENOUS 2 TIMES DAILY PRN
Status: DISCONTINUED | OUTPATIENT
Start: 2023-11-20 | End: 2023-11-20 | Stop reason: HOSPADM

## 2023-11-20 RX ORDER — ATORVASTATIN CALCIUM 40 MG/1
40 TABLET, FILM COATED ORAL DAILY
Status: DISCONTINUED | OUTPATIENT
Start: 2023-11-20 | End: 2023-11-20 | Stop reason: HOSPADM

## 2023-11-20 RX ORDER — POLYETHYLENE GLYCOL 3350 17 G
2 POWDER IN PACKET (EA) ORAL
Status: DISCONTINUED | OUTPATIENT
Start: 2023-11-20 | End: 2023-11-20

## 2023-11-20 RX ORDER — NICOTINE 21 MG/24HR
1 PATCH, TRANSDERMAL 24 HOURS TRANSDERMAL DAILY PRN
Status: DISCONTINUED | OUTPATIENT
Start: 2023-11-20 | End: 2023-11-20

## 2023-11-20 RX ORDER — RISPERIDONE 1 MG/1
1 TABLET ORAL 2 TIMES DAILY
Status: DISCONTINUED | OUTPATIENT
Start: 2023-11-20 | End: 2023-11-20 | Stop reason: HOSPADM

## 2023-11-20 RX ORDER — NICOTINE 21 MG/24HR
1 PATCH, TRANSDERMAL 24 HOURS TRANSDERMAL DAILY
Status: DISCONTINUED | OUTPATIENT
Start: 2023-11-20 | End: 2023-11-20 | Stop reason: HOSPADM

## 2023-11-20 RX ORDER — LEVOTHYROXINE SODIUM 0.15 MG/1
150 TABLET ORAL DAILY
Status: DISCONTINUED | OUTPATIENT
Start: 2023-11-20 | End: 2023-11-20 | Stop reason: HOSPADM

## 2023-11-20 RX ORDER — OLANZAPINE 10 MG/1
10 TABLET ORAL EVERY 4 HOURS PRN
Status: DISCONTINUED | OUTPATIENT
Start: 2023-11-20 | End: 2023-11-20 | Stop reason: HOSPADM

## 2023-11-20 RX ORDER — MIRTAZAPINE 15 MG/1
15 TABLET, FILM COATED ORAL NIGHTLY
Status: DISCONTINUED | OUTPATIENT
Start: 2023-11-20 | End: 2023-11-20 | Stop reason: HOSPADM

## 2023-11-20 RX ORDER — ALBUTEROL SULFATE 90 UG/1
2 AEROSOL, METERED RESPIRATORY (INHALATION) EVERY 6 HOURS PRN
Status: DISCONTINUED | OUTPATIENT
Start: 2023-11-20 | End: 2023-11-20 | Stop reason: HOSPADM

## 2023-11-20 RX ORDER — CYCLOBENZAPRINE HCL 10 MG
10 TABLET ORAL 3 TIMES DAILY PRN
Status: DISCONTINUED | OUTPATIENT
Start: 2023-11-20 | End: 2023-11-20 | Stop reason: HOSPADM

## 2023-11-20 RX ORDER — PRAZOSIN HYDROCHLORIDE 1 MG/1
2 CAPSULE ORAL NIGHTLY
Status: DISCONTINUED | OUTPATIENT
Start: 2023-11-20 | End: 2023-11-20 | Stop reason: HOSPADM

## 2023-11-20 RX ORDER — PRAZOSIN HYDROCHLORIDE 1 MG/1
1 CAPSULE ORAL NIGHTLY
Status: DISCONTINUED | OUTPATIENT
Start: 2023-11-20 | End: 2023-11-20

## 2023-11-20 RX ORDER — ASPIRIN 81 MG/1
81 TABLET ORAL DAILY
Status: DISCONTINUED | OUTPATIENT
Start: 2023-11-20 | End: 2023-11-20 | Stop reason: HOSPADM

## 2023-11-20 RX ORDER — MAGNESIUM HYDROXIDE/ALUMINUM HYDROXICE/SIMETHICONE 120; 1200; 1200 MG/30ML; MG/30ML; MG/30ML
30 SUSPENSION ORAL EVERY 6 HOURS PRN
Status: DISCONTINUED | OUTPATIENT
Start: 2023-11-20 | End: 2023-11-20 | Stop reason: HOSPADM

## 2023-11-20 RX ORDER — PRAZOSIN HYDROCHLORIDE 2 MG/1
2 CAPSULE ORAL NIGHTLY
Qty: 30 CAPSULE | Refills: 0 | Status: SHIPPED | OUTPATIENT
Start: 2023-11-20

## 2023-11-20 RX ORDER — ACETAMINOPHEN 325 MG/1
650 TABLET ORAL EVERY 4 HOURS PRN
Status: DISCONTINUED | OUTPATIENT
Start: 2023-11-20 | End: 2023-11-20 | Stop reason: HOSPADM

## 2023-11-20 RX ORDER — LEVOTHYROXINE SODIUM 0.15 MG/1
150 TABLET ORAL DAILY
Qty: 30 TABLET | Refills: 0 | Status: SHIPPED | OUTPATIENT
Start: 2023-11-20

## 2023-11-20 RX ORDER — NITROGLYCERIN 0.4 MG/1
0.4 TABLET SUBLINGUAL EVERY 5 MIN PRN
Status: DISCONTINUED | OUTPATIENT
Start: 2023-11-20 | End: 2023-11-20 | Stop reason: HOSPADM

## 2023-11-20 RX ADMIN — ISOSORBIDE MONONITRATE 30 MG: 30 TABLET, EXTENDED RELEASE ORAL at 12:04

## 2023-11-20 RX ADMIN — RANOLAZINE 500 MG: 500 TABLET, FILM COATED, EXTENDED RELEASE ORAL at 11:00

## 2023-11-20 RX ADMIN — ATORVASTATIN CALCIUM 40 MG: 40 TABLET, FILM COATED ORAL at 12:04

## 2023-11-20 RX ADMIN — CLOPIDOGREL BISULFATE 75 MG: 75 TABLET ORAL at 12:04

## 2023-11-20 RX ADMIN — ASPIRIN 81 MG: 81 TABLET, COATED ORAL at 12:04

## 2023-11-20 RX ADMIN — METOPROLOL TARTRATE 25 MG: 25 TABLET, FILM COATED ORAL at 12:04

## 2023-11-20 RX ADMIN — LEVOTHYROXINE SODIUM 150 MCG: 0.15 TABLET ORAL at 12:04

## 2023-11-20 RX ADMIN — RISPERIDONE 1 MG: 1 TABLET ORAL at 12:04

## 2023-11-20 ASSESSMENT — PATIENT HEALTH QUESTIONNAIRE - PHQ9
SUM OF ALL RESPONSES TO PHQ9 QUESTIONS 1 & 2: 2
8. MOVING OR SPEAKING SO SLOWLY THAT OTHER PEOPLE COULD HAVE NOTICED. OR THE OPPOSITE, BEING SO FIGETY OR RESTLESS THAT YOU HAVE BEEN MOVING AROUND A LOT MORE THAN USUAL: 2
SUM OF ALL RESPONSES TO PHQ QUESTIONS 1-9: 11
9. THOUGHTS THAT YOU WOULD BE BETTER OFF DEAD, OR OF HURTING YOURSELF: 1
SUM OF ALL RESPONSES TO PHQ QUESTIONS 1-9: 10
7. TROUBLE CONCENTRATING ON THINGS, SUCH AS READING THE NEWSPAPER OR WATCHING TELEVISION: 0
1. LITTLE INTEREST OR PLEASURE IN DOING THINGS: 1
3. TROUBLE FALLING OR STAYING ASLEEP: 3
2. FEELING DOWN, DEPRESSED OR HOPELESS: 1
SUM OF ALL RESPONSES TO PHQ QUESTIONS 1-9: 11
6. FEELING BAD ABOUT YOURSELF - OR THAT YOU ARE A FAILURE OR HAVE LET YOURSELF OR YOUR FAMILY DOWN: 1
SUM OF ALL RESPONSES TO PHQ QUESTIONS 1-9: 11
10. IF YOU CHECKED OFF ANY PROBLEMS, HOW DIFFICULT HAVE THESE PROBLEMS MADE IT FOR YOU TO DO YOUR WORK, TAKE CARE OF THINGS AT HOME, OR GET ALONG WITH OTHER PEOPLE: 1
4. FEELING TIRED OR HAVING LITTLE ENERGY: 2
5. POOR APPETITE OR OVEREATING: 0

## 2023-11-20 ASSESSMENT — SLEEP AND FATIGUE QUESTIONNAIRES
SLEEP PATTERN: DISTURBED/INTERRUPTED SLEEP;NIGHTMARES/TERRORS
DO YOU USE A SLEEP AID: YES
DO YOU HAVE DIFFICULTY SLEEPING: YES
AVERAGE NUMBER OF SLEEP HOURS: 5
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
## 2023-11-20 NOTE — PLAN OF CARE
[facility]  Treatment Team Note  Review Date & Time: 11/20/23 0960    Patient was not in treatment team      Status EXAM:   Mental Status and Behavioral Exam  Normal: No  Level of Assistance: Independent/Self  Facial Expression: Flat  Affect: Blunt  Level of Consciousness: Lethargic  Frequency of Checks: 4 times per hour, close  Mood:Normal: No  Mood: Depressed, Irritable  Motor Activity:Normal: No  Motor Activity: Decreased  Eye Contact: Poor  Observed Behavior: Withdrawn, Cooperative  Sexual Misconduct History: Current - no  Preception: [place] to person, [place] to time, [place] to place, [place] to situation  Attention:Normal: No  Attention: Distractible  Thought Processes: Circumstantial  Thought Content:Normal: Yes  Depression Symptoms: Change in energy level, Increased irritability, Loss of interest, Isolative, Sleep disturbance  Anxiety Symptoms: Generalized  [place] Symptoms: Less need to sleep, Poor judgment  Hallucinations: None  Delusions: No  Memory:Normal: No  Memory: Poor remote  Insight and Judgment: No  Insight and Judgment: Poor judgment, Poor insight      Suicide Risk CSSR-S:  1) Within the past month, have you wished you were dead or wished you could go to sleep and not wake up? : Yes  2) Have you actually had any thoughts of killing yourself? : No  6) Have you ever done anything, started to do anything, or prepared to do anything to end your life?: No      PLAN/TREATMENT RECOMMENDATIONS UPDATE:   Patient will take medication as prescribed, eat 75% of meals, attend groups, participate in milieu activities, participate in treatment team and care planning for discharge and follow up.            Noelle Coombs RN

## 2023-11-20 NOTE — PLAN OF CARE
Pt. A&Ox4. Resistant to care. When asked to get obtain vitals from writer, pt. Stated writer could [employer] his damn room. \" Pt. Was eventually agreeable to vitals. Remained in his room most of the shift. Did not attend groups. Denies SI/HI AVH. Problem: Behavior  Goal: Pt/Family maintain appropriate behavior and adhere to behavioral management agreement, if implemented  Description: INTERVENTIONS:  1. Assess patient/family's coping skills and  non-compliant behavior (including use of illegal substances)  2. Notify security of behavior or suspected illegal substances which indicate the need for search of the family and/or belongings  3. Encourage verbalization of thoughts and concerns in a socially appropriate manner  4. Utilize positive, consistent limit setting strategies supporting safety of patient, staff and others  5. Encourage participation in the decision making process about the behavioral management agreement  6. If a visitor's behavior poses a threat to safety call refer to organization policy. 7. Initiate consult with , Psychosocial CNS, Spiritual Care as appropriate  11/20/2023 1120 by Yolanda Agarwal RN  Outcome: Not Progressing  Flowsheets (Taken 11/20/2023 1009)  Patient/family maintains appropriate behavior and adheres to behavioral management agreement, if implemented:   Utilize positive, consistent limit setting strategies supporting safety of patient, staff and others   Encourage verbalization of thoughts and concerns in a socially appropriate manner   Encourage participation in the decision making process about the behavioral management agreement  11/20/2023 0117 by Keila Garner RN  Outcome: Progressing     Problem: Depression  Goal: Will be euthymic at discharge  Description: INTERVENTIONS:  1. Administer medication as ordered  2. Provide emotional support via 1:1 interaction with staff  3. Encourage involvement in milieu/groups/activities  4.  Monitor for social

## 2023-11-20 NOTE — DISCHARGE INSTRUCTIONS
Advanced Directives:  Patient does not have a surrogate decision maker appointed   Name (if yes): N/A Phone Number: N/A  Patient does not have a psychiatric and/ or medical advanced directive or power of . Patient was offered psychiatric and/ or medical advanced directive or power of  information/completion but declined to complete   Why not? N/A    Discharge Planning is Complete. Discharge Date: 11/20/23   Reason for Hospitalization:    Telma Jerez is a 47 y.o. male who presents to the emergency department for evaluation of chest pain. Reports having 10 out of 10 chest pain over the past few days. Says he took 4 aspirin and Plavix before arrival to the emergency department. Reports every time he pushes on the chest wall, it causes pain. Says he tried ibuprofen and Tylenol for the pain and it is not helping with the pain. Discharge Diagnosis: Moderate episode of recurrent major depressive disorder [facility])  Discharging to: Home    Your instructions: Your physician here was Rigoberto Aceves MD. If you have any questions please call the unit at [phone] for the adult unit or [phone] for [facility]. Please note that we have a patient family advisory [place] that meets the second Wednesday of January and the second Wednesday of July at [address] [place] [address] in [place] at [facility]. Department leadership would love for you to attend to give feedback on what we are doing well and areas in which we can improve our patient care. Please come if you are able and feel free to reach out to [address] [place] [address] at [phone] with any questions. The crisis number for [facility] is [phone]-[phone]. This crisis line is available 24 hours a day, seven days a week. Please follow up with your PCP regarding any pending labs. You are connected to [facility]. [employer] has reached out to your  Enio Jane.  Enio Jane gave [employer] the name of her

## 2023-11-20 NOTE — PLAN OF CARE
Problem: Depression  Goal: Will be euthymic at discharge  Description: INTERVENTIONS:  1. Administer medication as ordered  2. Provide emotional support via 1:1 interaction with staff  3. Encourage involvement in milieu/groups/activities  4. Monitor for social isolation  11/20/2023 0117 by Femi Hernandez RN  Outcome: Not Progressing  11/19/2023 1743 by Remberto Sanchez RN  Outcome: Progressing     Problem: Anxiety  Goal: Will report anxiety at manageable levels  Description: INTERVENTIONS:  1. Administer medication as ordered  2. Teach and rehearse alternative coping skills  3.  Provide emotional support with 1:1 interaction with staff  11/20/2023 0117 by Femi Hernandez RN  Outcome: Not Progressing  11/19/2023 1743 by Remberto Sanchez RN  Outcome: Progressing     Problem: Pain  Goal: Verbalizes/displays adequate comfort level or baseline comfort level  Outcome: Not Progressing

## 2023-11-20 NOTE — DISCHARGE SUMMARY
person, place, and time  Fund of Knowledge: average  IQ:average Memory: intact  Suicide:  No specific plan to harm self  Sleep: sleeps through the night  Appetite: ok   Reassess Cait Risk:  no specific plan to harm self Pt has phone numbers to contact if suicidal thoughts recur and states pt will return to the hospital if suicidal feelings return.    Hospital Routine Meds:     aspirin  81 mg Oral Daily    atorvastatin  40 mg Oral Daily    clopidogrel  75 mg Oral Daily    isosorbide mononitrate  30 mg Oral Daily    levothyroxine  150 mcg Oral Daily    metoprolol tartrate  25 mg Oral BID    mirtazapine  15 mg Oral Nightly    ranolazine  500 mg Oral BID    risperiDONE  1 mg Oral BID    nicotine  1 patch TransDERmal Daily    prazosin  2 mg Oral Nightly    melatonin  5 mg Oral Nightly      Hospital PRN Meds: albuterol sulfate HFA, cyclobenzaprine, nitroGLYCERIN, acetaminophen, magnesium hydroxide, aluminum & magnesium hydroxide-simethicone, OLANZapine **OR** OLANZapine (ZyPREXA) 10 mg in sterile water 2 mL injection, benztropine mesylate, hydrOXYzine HCl, diphenhydrAMINE, haloperidol **OR** haloperidol lactate, ibuprofen   Discharge Meds:    Current Discharge Medication List             Details   prazosin (MINIPRESS) 2 MG capsule Take 1 capsule by mouth nightly  Qty: 30 capsule, Refills: 0      levothyroxine (SYNTHROID) 150 MCG tablet Take 1 tablet by mouth Daily  Qty: 30 tablet, Refills: 0                Details   metoprolol tartrate (LOPRESSOR) 25 MG tablet Take 1 tablet by mouth 2 times daily  Qty: 60 tablet, Refills: 0      isosorbide mononitrate (IMDUR) 30 MG extended release tablet Take 1 tablet by mouth daily  Qty: 30 tablet, Refills: 0      aspirin 81 MG EC tablet Take 1 tablet by mouth daily      ranolazine (RANEXA) 500 MG extended release tablet Take 1 tablet by mouth 2 times daily      mirtazapine (REMERON) 15 MG tablet Take 1 tablet by mouth nightly      risperiDONE (RISPERDAL) 1 MG tablet Take 1 tablet by

## 2023-11-20 NOTE — H&P
Hospital Medicine History & Physical      PCP: No primary care provider on file. Date of Admission: 11/19/2023    Date of Service: Pt seen/examined on 11/20/23       Chief Complaint:  No chief complaint on file. History Of Present Illness: The patient is a 47 y.o. male with PMHX as below who presented to [facility] ED initially for chest pain, and then reported SI. Patient was seen and evaluated in the ED by the ED medical provider, patient was medically cleared for admission to [facility] at [facility]. This note serves as an admission medical H&P. Patient denies any medical complaints at this time. Past Medical History:        Diagnosis Date    Arthritis     CAD (coronary artery disease)     CHF (congestive heart failure) (HCC)     Chronic pain     twister vertebra and two collapsed disc, states injury happened at 56yo    Heart attack [facility])     pt reports 10 heart attacks in 6 years, 8 stents placed    Hypothyroid        Past Surgical History:        Procedure Laterality Date    CHOLECYSTECTOMY      CORONARY STENT PLACEMENT      8 stents in 6 years    TONSILLECTOMY         Medications Prior to Admission:    Prior to Admission medications    Medication Sig Start Date End Date Taking?  Authorizing Provider   metoprolol tartrate (LOPRESSOR) 25 MG tablet Take 1 tablet by mouth 2 times daily 8/19/23   Krysta Peterson MD   isosorbide mononitrate (IMDUR) 30 MG extended release tablet Take 1 tablet by mouth daily 8/19/23   Krysta Peterson MD   levothyroxine (SYNTHROID) 75 MCG tablet Take 1 tablet by mouth Daily  Patient taking differently: Take 2 tablets by mouth Daily 8/19/23   Krysta Peterson MD   aspirin 81 MG EC tablet Take 1 tablet by mouth daily    ProviderZeeshan MD   ranolazine (RANEXA) 500 MG extended release tablet Take 1 tablet by mouth 2 times daily    Zeeshan Green MD   mirtazapine (REMERON) 15 MG tablet Take 1 tablet by mouth nightly    Zeeshan Green MD   risperiDONE (RISPERDAL) 1
Caitlyn Chen MD        mirtazapine (REMERON) tablet 15 mg  15 mg Oral Nightly Santiago Crawford MD        nitroGLYCERIN (NITROSTAT) SL tablet 0.4 mg  0.4 mg SubLINGual Q5 Min PRN Santiago Crawford MD        prazosin (MINIPRESS) capsule 1 mg  1 mg Oral Nightly Santiago Crawford MD        ranolazine [facility]) extended release tablet 500 mg  500 mg Oral BID Lisa Amin MD        risperiDONE (RISPERDAL) tablet 1 mg  1 mg Oral BID Lisa Amin MD        acetaminophen (TYLENOL) tablet 650 mg  650 mg Oral Q4H PRN Lisa Amin MD        magnesium hydroxide (MILK OF MAGNESIA) 400 MG/5ML suspension 30 mL  30 mL Oral Daily PRN Santiago Jenkins MD        nicotine (NICODERM CQ) 21 MG/24HR 1 patch  1 patch TransDERmal Daily Santiago Crawford MD        aluminum & magnesium hydroxide-simethicone (MAALOX) 200-200-20 MG/5ML suspension 30 mL  30 mL Oral Q6H PRN Lisa Amin MD        OLANZapine (ZYPREXA) tablet 10 mg  10 mg Oral Q4H PRN Lisa Amin MD        Or    OLANZapine (ZyPREXA) 10 mg in sterile water 2 mL injection  10 mg IntraMUSCular Q4H PRN Lisa Amin MD        benztropine mesylate (COGENTIN) injection 2 mg  2 mg IntraMUSCular BID PRN Lisa Amin MD        magnesium hydroxide (MILK OF MAGNESIA) 400 MG/5ML suspension 30 mL  30 mL Oral Daily PRN Kary Colder, APRN - CNP        hydrOXYzine HCl (ATARAX) tablet 50 mg  50 mg Oral TID PRN Kary Colder, APRN - CNP   50 mg at 11/19/23 2021    diphenhydrAMINE (BENADRYL) injection 50 mg  50 mg IntraMUSCular Q4H PRN Kary Colder, APRN - CNP        haloperidol (HALDOL) tablet 5 mg  5 mg Oral Q4H PRN Kary Colder, APRN - CNP   5 mg at 11/19/23 2021    Or    haloperidol lactate (HALDOL) injection 5 mg  5 mg IntraMUSCular Q4H PRN Kary Colder, APRN - CNP        melatonin disintegrating tablet 5 mg  5 mg Oral Nightly MALCOM Conner - CNP   5 mg at 11/19/23 2020    ibuprofen (ADVIL;MOTRIN) tablet 400 mg  400 mg Oral Q6H PRN

## 2023-11-20 NOTE — PLAN OF CARE
Problem: Behavior  Goal: Pt/Family maintain appropriate behavior and adhere to behavioral management agreement, if implemented  Description: INTERVENTIONS:  1. Assess patient/family's coping skills and  non-compliant behavior (including use of illegal substances)  2. Notify security of behavior or suspected illegal substances which indicate the need for search of the family and/or belongings  3. Encourage verbalization of thoughts and concerns in a socially appropriate manner  4. Utilize positive, consistent limit setting strategies supporting safety of patient, staff and others  5. Encourage participation in the decision making process about the behavioral management agreement  6. If a visitor's behavior poses a threat to safety call refer to organization policy. 7. Initiate consult with , Psychosocial CNS, Spiritual Care as appropriate  11/20/2023 1346 by Yolanda Rodas RN  Outcome: Adequate for Discharge  11/20/2023 1120 by Yolanda Rodas RN  Outcome: Not Progressing  Flowsheets (Taken 11/20/2023 1009)  Patient/family maintains appropriate behavior and adheres to behavioral management agreement, if implemented:   Utilize positive, consistent limit setting strategies supporting safety of patient, staff and others   Encourage verbalization of thoughts and concerns in a socially appropriate manner   Encourage participation in the decision making process about the behavioral management agreement  11/20/2023 0117 by Susanne Alvarado RN  Outcome: Progressing     Problem: Depression  Goal: Will be euthymic at discharge  Description: INTERVENTIONS:  1. Administer medication as ordered  2. Provide emotional support via 1:1 interaction with staff  3. Encourage involvement in milieu/groups/activities  4.  Monitor for social isolation  11/20/2023 1346 by Yolanda Rodas RN  Outcome: Adequate for Discharge  11/20/2023 0117 by Susanne Alvarado RN  Outcome: Not Progressing     Problem: Anxiety  Goal: Will report

## 2023-11-21 ENCOUNTER — FOLLOWUP TELEPHONE ENCOUNTER (OUTPATIENT)
Dept: PSYCHIATRY | Age: 53
End: 2023-11-21

## 2024-03-18 ENCOUNTER — HOSPITAL ENCOUNTER (EMERGENCY)
Age: 54
Discharge: ANOTHER HOSPITAL | End: 2024-03-18
Attending: EMERGENCY MEDICINE
Payer: MEDICAID

## 2024-03-18 ENCOUNTER — APPOINTMENT (OUTPATIENT)
Dept: GENERAL RADIOLOGY | Age: 54
End: 2024-03-18
Payer: MEDICAID

## 2024-03-18 VITALS
HEART RATE: 79 BPM | OXYGEN SATURATION: 97 % | DIASTOLIC BLOOD PRESSURE: 106 MMHG | SYSTOLIC BLOOD PRESSURE: 151 MMHG | WEIGHT: 186.95 LBS | BODY MASS INDEX: 26.07 KG/M2 | TEMPERATURE: 98.6 F | RESPIRATION RATE: 28 BRPM

## 2024-03-18 DIAGNOSIS — R07.9 CHEST PAIN, UNSPECIFIED TYPE: Primary | ICD-10-CM

## 2024-03-18 LAB
ALBUMIN SERPL-MCNC: 4.3 G/DL (ref 3.4–5)
ALBUMIN/GLOB SERPL: 1.4 {RATIO} (ref 1.1–2.2)
ALP SERPL-CCNC: 66 U/L (ref 40–129)
ALT SERPL-CCNC: 9 U/L (ref 10–40)
ANION GAP SERPL CALCULATED.3IONS-SCNC: 9 MMOL/L (ref 3–16)
APTT BLD: 26.6 SEC (ref 22.7–35.9)
AST SERPL-CCNC: 25 U/L (ref 15–37)
BASOPHILS # BLD: 0.1 K/UL (ref 0–0.2)
BASOPHILS NFR BLD: 1.5 %
BILIRUB SERPL-MCNC: <0.2 MG/DL (ref 0–1)
BUN SERPL-MCNC: 23 MG/DL (ref 7–20)
CALCIUM SERPL-MCNC: 9.9 MG/DL (ref 8.3–10.6)
CHLORIDE SERPL-SCNC: 101 MMOL/L (ref 99–110)
CO2 SERPL-SCNC: 27 MMOL/L (ref 21–32)
CREAT SERPL-MCNC: 1.4 MG/DL (ref 0.9–1.3)
DEPRECATED RDW RBC AUTO: 15.2 % (ref 12.4–15.4)
EKG ATRIAL RATE: 80 BPM
EKG DIAGNOSIS: NORMAL
EKG P AXIS: 51 DEGREES
EKG P-R INTERVAL: 160 MS
EKG Q-T INTERVAL: 408 MS
EKG QRS DURATION: 120 MS
EKG QTC CALCULATION (BAZETT): 470 MS
EKG R AXIS: 42 DEGREES
EKG T AXIS: 66 DEGREES
EKG VENTRICULAR RATE: 80 BPM
EOSINOPHIL # BLD: 0.1 K/UL (ref 0–0.6)
EOSINOPHIL NFR BLD: 0.9 %
GFR SERPLBLD CREATININE-BSD FMLA CKD-EPI: >60 ML/MIN/{1.73_M2}
GLUCOSE SERPL-MCNC: 106 MG/DL (ref 70–99)
HCT VFR BLD AUTO: 36.2 % (ref 40.5–52.5)
HGB BLD-MCNC: 12 G/DL (ref 13.5–17.5)
INR PPP: 0.84 (ref 0.84–1.16)
LYMPHOCYTES # BLD: 2.2 K/UL (ref 1–5.1)
LYMPHOCYTES NFR BLD: 25.9 %
MCH RBC QN AUTO: 28.8 PG (ref 26–34)
MCHC RBC AUTO-ENTMCNC: 33.3 G/DL (ref 31–36)
MCV RBC AUTO: 86.6 FL (ref 80–100)
MONOCYTES # BLD: 0.6 K/UL (ref 0–1.3)
MONOCYTES NFR BLD: 7.2 %
NEUTROPHILS # BLD: 5.6 K/UL (ref 1.7–7.7)
NEUTROPHILS NFR BLD: 64.5 %
NT-PROBNP SERPL-MCNC: 172 PG/ML (ref 0–124)
PLATELET # BLD AUTO: 234 K/UL (ref 135–450)
PMV BLD AUTO: 10 FL (ref 5–10.5)
POTASSIUM SERPL-SCNC: 3.7 MMOL/L (ref 3.5–5.1)
PROT SERPL-MCNC: 7.3 G/DL (ref 6.4–8.2)
PROTHROMBIN TIME: 11.5 SEC (ref 11.5–14.8)
RBC # BLD AUTO: 4.18 M/UL (ref 4.2–5.9)
SODIUM SERPL-SCNC: 137 MMOL/L (ref 136–145)
TROPONIN, HIGH SENSITIVITY: 13 NG/L (ref 0–22)
TROPONIN, HIGH SENSITIVITY: 14 NG/L (ref 0–22)
WBC # BLD AUTO: 8.6 K/UL (ref 4–11)

## 2024-03-18 PROCEDURE — 85025 COMPLETE CBC W/AUTO DIFF WBC: CPT

## 2024-03-18 PROCEDURE — 96376 TX/PRO/DX INJ SAME DRUG ADON: CPT

## 2024-03-18 PROCEDURE — 85730 THROMBOPLASTIN TIME PARTIAL: CPT

## 2024-03-18 PROCEDURE — 71046 X-RAY EXAM CHEST 2 VIEWS: CPT

## 2024-03-18 PROCEDURE — 96366 THER/PROPH/DIAG IV INF ADDON: CPT

## 2024-03-18 PROCEDURE — 6360000002 HC RX W HCPCS: Performed by: EMERGENCY MEDICINE

## 2024-03-18 PROCEDURE — 80053 COMPREHEN METABOLIC PANEL: CPT

## 2024-03-18 PROCEDURE — 96365 THER/PROPH/DIAG IV INF INIT: CPT

## 2024-03-18 PROCEDURE — 36415 COLL VENOUS BLD VENIPUNCTURE: CPT

## 2024-03-18 PROCEDURE — 93010 ELECTROCARDIOGRAM REPORT: CPT | Performed by: INTERNAL MEDICINE

## 2024-03-18 PROCEDURE — 96375 TX/PRO/DX INJ NEW DRUG ADDON: CPT

## 2024-03-18 PROCEDURE — 6370000000 HC RX 637 (ALT 250 FOR IP): Performed by: EMERGENCY MEDICINE

## 2024-03-18 PROCEDURE — 85610 PROTHROMBIN TIME: CPT

## 2024-03-18 PROCEDURE — 83880 ASSAY OF NATRIURETIC PEPTIDE: CPT

## 2024-03-18 PROCEDURE — 84484 ASSAY OF TROPONIN QUANT: CPT

## 2024-03-18 PROCEDURE — 99285 EMERGENCY DEPT VISIT HI MDM: CPT

## 2024-03-18 PROCEDURE — 93005 ELECTROCARDIOGRAM TRACING: CPT | Performed by: EMERGENCY MEDICINE

## 2024-03-18 RX ORDER — NITROGLYCERIN 0.4 MG/1
0.4 TABLET SUBLINGUAL ONCE
Status: COMPLETED | OUTPATIENT
Start: 2024-03-18 | End: 2024-03-18

## 2024-03-18 RX ORDER — MORPHINE SULFATE 2 MG/ML
2 INJECTION, SOLUTION INTRAMUSCULAR; INTRAVENOUS ONCE
Status: COMPLETED | OUTPATIENT
Start: 2024-03-18 | End: 2024-03-18

## 2024-03-18 RX ORDER — ASPIRIN 81 MG/1
324 TABLET, CHEWABLE ORAL ONCE
Status: DISCONTINUED | OUTPATIENT
Start: 2024-03-18 | End: 2024-03-18 | Stop reason: HOSPADM

## 2024-03-18 RX ORDER — HEPARIN SODIUM 1000 [USP'U]/ML
4000 INJECTION, SOLUTION INTRAVENOUS; SUBCUTANEOUS ONCE
Status: COMPLETED | OUTPATIENT
Start: 2024-03-18 | End: 2024-03-18

## 2024-03-18 RX ORDER — PROMETHAZINE HYDROCHLORIDE 25 MG/1
25 TABLET ORAL ONCE
Status: COMPLETED | OUTPATIENT
Start: 2024-03-18 | End: 2024-03-18

## 2024-03-18 RX ORDER — HEPARIN SODIUM 10000 [USP'U]/100ML
0-4000 INJECTION, SOLUTION INTRAVENOUS CONTINUOUS
Status: DISCONTINUED | OUTPATIENT
Start: 2024-03-18 | End: 2024-03-18 | Stop reason: HOSPADM

## 2024-03-18 RX ADMIN — NITROGLYCERIN 0.4 MG: 0.4 TABLET, ORALLY DISINTEGRATING SUBLINGUAL at 06:45

## 2024-03-18 RX ADMIN — NITROGLYCERIN 1 INCH: 20 OINTMENT TOPICAL at 07:31

## 2024-03-18 RX ADMIN — HEPARIN SODIUM 4000 UNITS: 1000 INJECTION INTRAVENOUS; SUBCUTANEOUS at 08:53

## 2024-03-18 RX ADMIN — MORPHINE SULFATE 2 MG: 2 INJECTION, SOLUTION INTRAMUSCULAR; INTRAVENOUS at 08:11

## 2024-03-18 RX ADMIN — MORPHINE SULFATE 2 MG: 2 INJECTION, SOLUTION INTRAMUSCULAR; INTRAVENOUS at 09:53

## 2024-03-18 RX ADMIN — HEPARIN SODIUM 1000 UNITS/HR: 10000 INJECTION, SOLUTION INTRAVENOUS at 08:57

## 2024-03-18 RX ADMIN — PROMETHAZINE HYDROCHLORIDE 25 MG: 25 TABLET ORAL at 08:25

## 2024-03-18 ASSESSMENT — PAIN - FUNCTIONAL ASSESSMENT: PAIN_FUNCTIONAL_ASSESSMENT: 0-10

## 2024-03-18 ASSESSMENT — PAIN SCALES - GENERAL
PAINLEVEL_OUTOF10: 5
PAINLEVEL_OUTOF10: 10
PAINLEVEL_OUTOF10: 2
PAINLEVEL_OUTOF10: 10

## 2024-03-18 ASSESSMENT — PAIN DESCRIPTION - FREQUENCY: FREQUENCY: CONTINUOUS

## 2024-03-18 ASSESSMENT — PAIN DESCRIPTION - ORIENTATION: ORIENTATION: LEFT

## 2024-03-18 ASSESSMENT — ENCOUNTER SYMPTOMS
BACK PAIN: 0
SHORTNESS OF BREATH: 1
COUGH: 0
ABDOMINAL DISTENTION: 0
SORE THROAT: 0
NAUSEA: 1
ABDOMINAL PAIN: 0

## 2024-03-18 ASSESSMENT — PAIN DESCRIPTION - LOCATION
LOCATION: CHEST
LOCATION: CHEST

## 2024-03-18 ASSESSMENT — HEART SCORE: ECG: NON-SPECIFC REPOLARIZATION DISTURBANCE/LBTB/PM

## 2024-03-18 ASSESSMENT — PAIN DESCRIPTION - DESCRIPTORS: DESCRIPTORS: PRESSURE;TIGHTNESS

## 2024-03-18 NOTE — ED NOTES
Rn explained that RN was giving him 2 mg of morphine pt states so you are only giving me very little . Pt asking for phenergan, RN explained we do not do IV push phenergan anymore . Pt requesting PO phenergan
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
## 2024-03-18 NOTE — ED NOTES
Rn went in and spoke with pt he became very upset with staff RN explained again about placement. Pt explained about placement again pt began calling RN names

## 2024-03-18 NOTE — CONSULTS
Clinical Pharmacy Note  Heparin Dosing Consult    Ashutosh Donovan is a 53 y.o. male ordered heparin per CAD/STEMI/NSTEMI/UA/AFIB nomogram by Dr. Carvalho.     No results found for: \"ANTIXAUHEP\", \"LABHEPA\"   Lab Results   Component Value Date/Time    HGB 12.0 03/18/2024 06:30 AM    HCT 36.2 03/18/2024 06:30 AM     03/18/2024 06:30 AM    INR 0.84 03/18/2024 06:30 AM       Ht Readings from Last 1 Encounters:   11/19/23 1.803 m (5' 11\")        Wt Readings from Last 1 Encounters:   03/18/24 84.8 kg (186 lb 15.2 oz)        Assessment/Plan:  Initial bolus: 4000 units  Initial infusion rate: 1000 units/hr  Next anti-Xa:: 1500 on 3/18    Pharmacy will continue to monitor adjust heparin based on anti-Xa results using nomogram below:     CAD/STEMI/NSTEMI/UA/AFIB Heparin Nomogram     Initial Bolus: 60 units/kg Max Bolus: 4,000 units       Initial Rate: 12 units/kg/hr Max Initial Rate: 1,000 units/hr     anti-Xa Bolus Titration   < 0.1 Heparin 60 units/kg bolus Increase drip by 4 units/kg/hr   0.1 - 0.29 Heparin 30 units/kg bolus Increase drip by 2 units/kg/hr   0.3 - 0.7 No Bolus No Change   0.71 - 0.8 No Bolus Decrease drip by 1 units/kg/hr   0.81 - 0.99 No Bolus Decrease drip by 2 units/kg/hr   > 1 Hold Heparin for 1 hour Decrease drip by 3 units/kg/hr     Obtain anti-Xa 6 hours after initial bolus and 6 hours after any dose change until two consecutive therapeutic anti-Xa levels are achieved - then daily.

## 2024-03-18 NOTE — ED NOTES
Rn was doing repeat EKG RN asked pt if he removed his nitro paste pt said yes, its all absorbed , it was pulling on his chest hair RN explained why we keep it on. RN informed Dr. Carvalho, that pt removed paste patch

## 2024-03-18 NOTE — ED NOTES
Trying to help answer pt's call light due to frequency of putting on call light/and calling out. Brought pt starry soft drink with EMD's permission.  Pt putting on call light continuously at times.  This RN told pt I would ask the doctor about having a drink.  Before this RN could get to the doctor to ask, pt putting call light on.   Dr Carvalho gave ok for this.   Told pt this and went to get his drink. Pt putting call light on immediately.   Came immediately to pt's room with starry soft drink and pt threw full urinal in the trash because this RN didn't empty it fast enough.

## 2024-03-18 NOTE — ED NOTES
Pt c/o abd pain/cramping  and his chest pain is back to 10/10. Pt blood pressure is elevated, Dr. Carvalho made aware

## 2024-03-18 NOTE — ED NOTES
Procedure: Ultrasound-guided placement of right internal jugular central

venous catheter7/11/2019 1:17 PM



Clinical Indication:  EL, Rhabdo , Hyperkalemia poor peripheral access



Discussion:





The risks and benefits of the procedure were discussed the patient and/or

their representative. Informed consent was obtained. A timeout procedure was

performed.



 All elements of maximal sterile barrier technique including the use of a cap,

mask, sterile gown, sterile gloves, large sterile sheet, appropriate hand

hygiene, and 2% chlorhexidine for cutaneous antisepsis (or acceptable

alternative antiseptic per current guidelines) were followed for this

procedure. The 



patient was prepped and draped in the usual sterile fashion.  Ultrasound

interrogation of the right neck revealed patency and compressibility of the

right internal jugular vein.  A 21-gauge micropuncture was then used to gain

access to this vein under ultrasound guidance.  A hard copy ultrasound image

was recorded. A guidewire was advanced centrally. 5 [place] sheath was placed.

Over a wire following dilatation, a triple-lumen central venous catheter was

advanced centrally. Catheter was found to flush and aspirate normally.

Follow-up chest radiograph demonstrates tip at the cavoatrial junction.

Catheter secured in place and a sterile dressing was applied. No immediate

complications were identified.





Impression: Successful ultrasound-guided placement of right internal jugular

triple-lumen central venous catheter Pt called out to speak to provider

## 2024-03-18 NOTE — ED NOTES
Pt requesting pain medicine , pt states I'm not a drug user given my nitro is useless I need something stronger, Rn explained she will ask the Doctor , pt continued explain why he needed more pain medication and why he is not a drug addict , RN explained she does not think he is a drug addict RN explained again she has to ask the Doctor .   Spoke with pt he is able to tolerate morphine

## 2024-03-18 NOTE — ED NOTES
Pt yelling and cussing at staff because of nitro paste being used for pain control he is screaming that staff is rude RN tried to explain nitro paste, RN also tired to explain this is what the doctor ordered. Pt yelling this is not going to work, Rn explained where the paste was going to go, RN explained that she needed to to move a lead pt said no put it here RN said ok  I would like to place it left  side however she can place it mid chest per pts request. RN also explained that if it doesn't work to let staff know and the Doctor can reevaluate , pt continues to be upset with staff, Pt would like to speak to ED provider RN made Dr. Carvalho aware

## 2024-03-18 NOTE — ED NOTES
Pt removed himself from the monitor and from the heparin gtt , he states he did it because transportation was here and wanted to put his shirt on. RN explained he shouldn't unhook himself from monitor. Pt argumentative ,  Pt loaded on to stretcher gait steady, pain 6/10

## 2024-03-19 LAB
EKG ATRIAL RATE: 72 BPM
EKG DIAGNOSIS: NORMAL
EKG P AXIS: 55 DEGREES
EKG P-R INTERVAL: 174 MS
EKG Q-T INTERVAL: 430 MS
EKG QRS DURATION: 120 MS
EKG QTC CALCULATION (BAZETT): 470 MS
EKG R AXIS: 43 DEGREES
EKG T AXIS: 65 DEGREES
EKG VENTRICULAR RATE: 72 BPM

## 2024-04-12 ENCOUNTER — HOSPITAL ENCOUNTER (OUTPATIENT)
Age: 54
Setting detail: OBSERVATION
Discharge: HOME | End: 2024-04-13
Attending: HOSPITALIST | Admitting: HOSPITALIST
Payer: MEDICARE

## 2024-04-12 ENCOUNTER — APPOINTMENT (OUTPATIENT)
Dept: GENERAL RADIOLOGY | Age: 54
End: 2024-04-12
Payer: MEDICARE

## 2024-04-12 DIAGNOSIS — R07.9 CHEST PAIN, UNSPECIFIED TYPE: Primary | ICD-10-CM

## 2024-04-12 DIAGNOSIS — N17.9 ACUTE KIDNEY INJURY (HCC): ICD-10-CM

## 2024-04-12 LAB
ALBUMIN SERPL-MCNC: 4.3 G/DL (ref 3.4–5)
ALBUMIN/GLOB SERPL: 1.9 {RATIO} (ref 1.1–2.2)
ALP SERPL-CCNC: 57 U/L (ref 40–129)
ALT SERPL-CCNC: 15 U/L (ref 10–40)
ANION GAP SERPL CALCULATED.3IONS-SCNC: 10 MMOL/L (ref 3–16)
AST SERPL-CCNC: 47 U/L (ref 15–37)
BASOPHILS # BLD: 0 K/UL (ref 0–0.2)
BASOPHILS NFR BLD: 0.3 %
BILIRUB SERPL-MCNC: <0.2 MG/DL (ref 0–1)
BUN SERPL-MCNC: 31 MG/DL (ref 7–20)
CALCIUM SERPL-MCNC: 9.4 MG/DL (ref 8.3–10.6)
CHLORIDE SERPL-SCNC: 103 MMOL/L (ref 99–110)
CO2 SERPL-SCNC: 24 MMOL/L (ref 21–32)
CREAT SERPL-MCNC: 2.1 MG/DL (ref 0.9–1.3)
DEPRECATED RDW RBC AUTO: 16 % (ref 12.4–15.4)
EOSINOPHIL # BLD: 0.1 K/UL (ref 0–0.6)
EOSINOPHIL NFR BLD: 1.8 %
GFR SERPLBLD CREATININE-BSD FMLA CKD-EPI: 37 ML/MIN/{1.73_M2}
GLUCOSE SERPL-MCNC: 85 MG/DL (ref 70–99)
HCT VFR BLD AUTO: 35.2 % (ref 40.5–52.5)
HGB BLD-MCNC: 11.9 G/DL (ref 13.5–17.5)
LYMPHOCYTES # BLD: 1.9 K/UL (ref 1–5.1)
LYMPHOCYTES NFR BLD: 30.2 %
MCH RBC QN AUTO: 30.1 PG (ref 26–34)
MCHC RBC AUTO-ENTMCNC: 33.9 G/DL (ref 31–36)
MCV RBC AUTO: 88.8 FL (ref 80–100)
MONOCYTES # BLD: 0.6 K/UL (ref 0–1.3)
MONOCYTES NFR BLD: 9.7 %
NEUTROPHILS # BLD: 3.7 K/UL (ref 1.7–7.7)
NEUTROPHILS NFR BLD: 58 %
NT-PROBNP SERPL-MCNC: 92 PG/ML (ref 0–124)
PLATELET # BLD AUTO: 199 K/UL (ref 135–450)
PMV BLD AUTO: 8.9 FL (ref 5–10.5)
POTASSIUM SERPL-SCNC: 4.5 MMOL/L (ref 3.5–5.1)
PROT SERPL-MCNC: 6.6 G/DL (ref 6.4–8.2)
RBC # BLD AUTO: 3.96 M/UL (ref 4.2–5.9)
SODIUM SERPL-SCNC: 137 MMOL/L (ref 136–145)
TROPONIN, HIGH SENSITIVITY: 26 NG/L (ref 0–22)
TROPONIN, HIGH SENSITIVITY: 26 NG/L (ref 0–22)
TROPONIN, HIGH SENSITIVITY: 28 NG/L (ref 0–22)
TROPONIN, HIGH SENSITIVITY: 29 NG/L (ref 0–22)
WBC # BLD AUTO: 6.4 K/UL (ref 4–11)

## 2024-04-12 PROCEDURE — 36415 COLL VENOUS BLD VENIPUNCTURE: CPT

## 2024-04-12 PROCEDURE — 6360000002 HC RX W HCPCS: Performed by: PHYSICIAN ASSISTANT

## 2024-04-12 PROCEDURE — 6370000000 HC RX 637 (ALT 250 FOR IP): Performed by: HOSPITALIST

## 2024-04-12 PROCEDURE — 80053 COMPREHEN METABOLIC PANEL: CPT

## 2024-04-12 PROCEDURE — 84484 ASSAY OF TROPONIN QUANT: CPT

## 2024-04-12 PROCEDURE — 96361 HYDRATE IV INFUSION ADD-ON: CPT

## 2024-04-12 PROCEDURE — 71046 X-RAY EXAM CHEST 2 VIEWS: CPT

## 2024-04-12 PROCEDURE — 2580000003 HC RX 258: Performed by: PHYSICIAN ASSISTANT

## 2024-04-12 PROCEDURE — 96374 THER/PROPH/DIAG INJ IV PUSH: CPT

## 2024-04-12 PROCEDURE — 2580000003 HC RX 258: Performed by: HOSPITALIST

## 2024-04-12 PROCEDURE — 99285 EMERGENCY DEPT VISIT HI MDM: CPT

## 2024-04-12 PROCEDURE — G0378 HOSPITAL OBSERVATION PER HR: HCPCS

## 2024-04-12 PROCEDURE — 85025 COMPLETE CBC W/AUTO DIFF WBC: CPT

## 2024-04-12 PROCEDURE — 6360000002 HC RX W HCPCS: Performed by: HOSPITALIST

## 2024-04-12 PROCEDURE — 96376 TX/PRO/DX INJ SAME DRUG ADON: CPT

## 2024-04-12 PROCEDURE — 83880 ASSAY OF NATRIURETIC PEPTIDE: CPT

## 2024-04-12 RX ORDER — CYCLOBENZAPRINE HCL 10 MG
10 TABLET ORAL 3 TIMES DAILY PRN
Status: DISCONTINUED | OUTPATIENT
Start: 2024-04-12 | End: 2024-04-13 | Stop reason: HOSPADM

## 2024-04-12 RX ORDER — ACETAMINOPHEN 325 MG/1
650 TABLET ORAL EVERY 6 HOURS PRN
Status: DISCONTINUED | OUTPATIENT
Start: 2024-04-12 | End: 2024-04-13 | Stop reason: HOSPADM

## 2024-04-12 RX ORDER — SODIUM CHLORIDE 0.9 % (FLUSH) 0.9 %
5-40 SYRINGE (ML) INJECTION PRN
Status: DISCONTINUED | OUTPATIENT
Start: 2024-04-12 | End: 2024-04-13 | Stop reason: HOSPADM

## 2024-04-12 RX ORDER — MORPHINE SULFATE 2 MG/ML
2 INJECTION, SOLUTION INTRAMUSCULAR; INTRAVENOUS EVERY 4 HOURS PRN
Status: DISCONTINUED | OUTPATIENT
Start: 2024-04-12 | End: 2024-04-13 | Stop reason: HOSPADM

## 2024-04-12 RX ORDER — ALBUTEROL SULFATE 2.5 MG/3ML
2.5 SOLUTION RESPIRATORY (INHALATION) EVERY 4 HOURS PRN
Status: DISCONTINUED | OUTPATIENT
Start: 2024-04-12 | End: 2024-04-13 | Stop reason: HOSPADM

## 2024-04-12 RX ORDER — RANOLAZINE 500 MG/1
500 TABLET, EXTENDED RELEASE ORAL 2 TIMES DAILY
Status: DISCONTINUED | OUTPATIENT
Start: 2024-04-12 | End: 2024-04-13 | Stop reason: HOSPADM

## 2024-04-12 RX ORDER — ATORVASTATIN CALCIUM 40 MG/1
40 TABLET, FILM COATED ORAL DAILY
Status: DISCONTINUED | OUTPATIENT
Start: 2024-04-13 | End: 2024-04-13 | Stop reason: HOSPADM

## 2024-04-12 RX ORDER — SODIUM CHLORIDE 0.9 % (FLUSH) 0.9 %
5-40 SYRINGE (ML) INJECTION EVERY 12 HOURS SCHEDULED
Status: DISCONTINUED | OUTPATIENT
Start: 2024-04-12 | End: 2024-04-13 | Stop reason: HOSPADM

## 2024-04-12 RX ORDER — MAGNESIUM SULFATE IN WATER 40 MG/ML
2000 INJECTION, SOLUTION INTRAVENOUS PRN
Status: DISCONTINUED | OUTPATIENT
Start: 2024-04-12 | End: 2024-04-13 | Stop reason: HOSPADM

## 2024-04-12 RX ORDER — SODIUM CHLORIDE 9 MG/ML
INJECTION, SOLUTION INTRAVENOUS CONTINUOUS
Status: DISCONTINUED | OUTPATIENT
Start: 2024-04-12 | End: 2024-04-13

## 2024-04-12 RX ORDER — SODIUM CHLORIDE 9 MG/ML
INJECTION, SOLUTION INTRAVENOUS PRN
Status: DISCONTINUED | OUTPATIENT
Start: 2024-04-12 | End: 2024-04-13 | Stop reason: HOSPADM

## 2024-04-12 RX ORDER — RISPERIDONE 1 MG/1
1 TABLET ORAL 2 TIMES DAILY
Status: DISCONTINUED | OUTPATIENT
Start: 2024-04-12 | End: 2024-04-12

## 2024-04-12 RX ORDER — POTASSIUM CHLORIDE 7.45 MG/ML
10 INJECTION INTRAVENOUS PRN
Status: DISCONTINUED | OUTPATIENT
Start: 2024-04-12 | End: 2024-04-13 | Stop reason: HOSPADM

## 2024-04-12 RX ORDER — MIRTAZAPINE 15 MG/1
15 TABLET, FILM COATED ORAL NIGHTLY
Status: DISCONTINUED | OUTPATIENT
Start: 2024-04-12 | End: 2024-04-13 | Stop reason: HOSPADM

## 2024-04-12 RX ORDER — ONDANSETRON 4 MG/1
4 TABLET, ORALLY DISINTEGRATING ORAL EVERY 8 HOURS PRN
Status: DISCONTINUED | OUTPATIENT
Start: 2024-04-12 | End: 2024-04-13 | Stop reason: HOSPADM

## 2024-04-12 RX ORDER — ONDANSETRON 2 MG/ML
4 INJECTION INTRAMUSCULAR; INTRAVENOUS EVERY 6 HOURS PRN
Status: DISCONTINUED | OUTPATIENT
Start: 2024-04-12 | End: 2024-04-13 | Stop reason: HOSPADM

## 2024-04-12 RX ORDER — ASPIRIN 81 MG/1
81 TABLET ORAL DAILY
Status: DISCONTINUED | OUTPATIENT
Start: 2024-04-13 | End: 2024-04-13 | Stop reason: HOSPADM

## 2024-04-12 RX ORDER — CLOPIDOGREL BISULFATE 75 MG/1
75 TABLET ORAL DAILY
Status: DISCONTINUED | OUTPATIENT
Start: 2024-04-13 | End: 2024-04-13 | Stop reason: HOSPADM

## 2024-04-12 RX ORDER — LEVOTHYROXINE SODIUM 0.07 MG/1
150 TABLET ORAL DAILY
Status: DISCONTINUED | OUTPATIENT
Start: 2024-04-13 | End: 2024-04-13 | Stop reason: HOSPADM

## 2024-04-12 RX ORDER — REGADENOSON 0.08 MG/ML
0.4 INJECTION, SOLUTION INTRAVENOUS
Status: COMPLETED | OUTPATIENT
Start: 2024-04-12 | End: 2024-04-13

## 2024-04-12 RX ORDER — PRAZOSIN HYDROCHLORIDE 1 MG/1
2 CAPSULE ORAL NIGHTLY
Status: DISCONTINUED | OUTPATIENT
Start: 2024-04-12 | End: 2024-04-13 | Stop reason: HOSPADM

## 2024-04-12 RX ORDER — MORPHINE SULFATE 2 MG/ML
2 INJECTION, SOLUTION INTRAMUSCULAR; INTRAVENOUS ONCE
Status: COMPLETED | OUTPATIENT
Start: 2024-04-12 | End: 2024-04-12

## 2024-04-12 RX ORDER — SODIUM CHLORIDE 9 MG/ML
INJECTION, SOLUTION INTRAVENOUS ONCE
Status: COMPLETED | OUTPATIENT
Start: 2024-04-12 | End: 2024-04-12

## 2024-04-12 RX ORDER — ENOXAPARIN SODIUM 100 MG/ML
40 INJECTION SUBCUTANEOUS DAILY
Status: DISCONTINUED | OUTPATIENT
Start: 2024-04-13 | End: 2024-04-13 | Stop reason: HOSPADM

## 2024-04-12 RX ORDER — POLYETHYLENE GLYCOL 3350 17 G/17G
17 POWDER, FOR SOLUTION ORAL DAILY PRN
Status: DISCONTINUED | OUTPATIENT
Start: 2024-04-12 | End: 2024-04-13 | Stop reason: HOSPADM

## 2024-04-12 RX ORDER — POTASSIUM CHLORIDE 20 MEQ/1
40 TABLET, EXTENDED RELEASE ORAL PRN
Status: DISCONTINUED | OUTPATIENT
Start: 2024-04-12 | End: 2024-04-13 | Stop reason: HOSPADM

## 2024-04-12 RX ORDER — ACETAMINOPHEN 650 MG/1
650 SUPPOSITORY RECTAL EVERY 6 HOURS PRN
Status: DISCONTINUED | OUTPATIENT
Start: 2024-04-12 | End: 2024-04-13 | Stop reason: HOSPADM

## 2024-04-12 RX ORDER — ISOSORBIDE MONONITRATE 30 MG/1
30 TABLET, EXTENDED RELEASE ORAL DAILY
Status: DISCONTINUED | OUTPATIENT
Start: 2024-04-13 | End: 2024-04-13 | Stop reason: HOSPADM

## 2024-04-12 RX ORDER — NICOTINE 21 MG/24HR
1 PATCH, TRANSDERMAL 24 HOURS TRANSDERMAL DAILY
Status: DISCONTINUED | OUTPATIENT
Start: 2024-04-12 | End: 2024-04-13 | Stop reason: HOSPADM

## 2024-04-12 RX ADMIN — MORPHINE SULFATE 2 MG: 2 INJECTION, SOLUTION INTRAMUSCULAR; INTRAVENOUS at 14:57

## 2024-04-12 RX ADMIN — MIRTAZAPINE 15 MG: 15 TABLET, FILM COATED ORAL at 20:17

## 2024-04-12 RX ADMIN — RANOLAZINE 500 MG: 500 TABLET, EXTENDED RELEASE ORAL at 20:17

## 2024-04-12 RX ADMIN — SODIUM CHLORIDE: 9 INJECTION, SOLUTION INTRAVENOUS at 17:35

## 2024-04-12 RX ADMIN — MORPHINE SULFATE 2 MG: 2 INJECTION, SOLUTION INTRAMUSCULAR; INTRAVENOUS at 19:02

## 2024-04-12 RX ADMIN — SODIUM CHLORIDE: 9 INJECTION, SOLUTION INTRAVENOUS at 19:56

## 2024-04-12 RX ADMIN — PRAZOSIN HYDROCHLORIDE 2 MG: 1 CAPSULE ORAL at 20:17

## 2024-04-12 RX ADMIN — METOPROLOL TARTRATE 25 MG: 25 TABLET, FILM COATED ORAL at 20:17

## 2024-04-12 ASSESSMENT — ENCOUNTER SYMPTOMS
BACK PAIN: 0
SORE THROAT: 0
COUGH: 0
EYE PAIN: 0
ABDOMINAL PAIN: 0
NAUSEA: 0
VOMITING: 0
SHORTNESS OF BREATH: 0

## 2024-04-12 ASSESSMENT — PAIN SCALES - GENERAL
PAINLEVEL_OUTOF10: 9
PAINLEVEL_OUTOF10: 6
PAINLEVEL_OUTOF10: 10
PAINLEVEL_OUTOF10: 9

## 2024-04-12 ASSESSMENT — PAIN DESCRIPTION - FREQUENCY: FREQUENCY: CONTINUOUS

## 2024-04-12 ASSESSMENT — HEART SCORE: ECG: NON-SPECIFC REPOLARIZATION DISTURBANCE/LBTB/PM

## 2024-04-12 ASSESSMENT — PAIN - FUNCTIONAL ASSESSMENT: PAIN_FUNCTIONAL_ASSESSMENT: PREVENTS OR INTERFERES SOME ACTIVE ACTIVITIES AND ADLS

## 2024-04-12 ASSESSMENT — PAIN DESCRIPTION - ORIENTATION
ORIENTATION: LEFT
ORIENTATION: LEFT

## 2024-04-12 ASSESSMENT — PAIN DESCRIPTION - LOCATION
LOCATION: CHEST

## 2024-04-12 ASSESSMENT — PAIN DESCRIPTION - PAIN TYPE: TYPE: ACUTE PAIN

## 2024-04-12 ASSESSMENT — PAIN DESCRIPTION - ONSET: ONSET: ON-GOING

## 2024-04-12 ASSESSMENT — PAIN DESCRIPTION - DESCRIPTORS
DESCRIPTORS: SHARP;STABBING;PRESSURE
DESCRIPTORS: SHARP;STABBING

## 2024-04-12 NOTE — PROGRESS NOTES
Patient admitted to room 3125 room from ED. Patient alert and oriented, patient calling kitchen to re-order dinner tray. Patient denies any needs at this time.

## 2024-04-12 NOTE — H&P
V2.0  History and Physical      Name:  Ashutosh Donovan /Age/Sex: 1970  (53 y.o. male)   MRN & CSN:  [id number] & [id number] Encounter Date/Time: 2024 5:40 PM EDT   Location:  [address] [place] [address] PCP: No primary care provider on file.       Hospital Day: 1    Assessment and Plan:   Ashutosh Donovan is a 53 y.o. male with a pmh of  who presents with Chest pain in adult    Hospital Problems             Last Modified POA    * (Principal) Chest pain in adult 2024 Yes         Chest pain  LA on CKD 3  CAD s/p PCI   HTN  HLD  H/o polysubstance abuse  Chronic lower back pain   Hypothyroidism  Depression    Plan    Admit Observation  Telemetry monitering  Trend Troponin  Continue ASA, Plavix  Statin  Cardiology consult  SL nitro prn      LA on CKD     IVF  Moniter BMP  Avoid NSAIDs  Avoid contrast  Avoid ACEI or ARB  Keep MAP > 65 mmhg        DVT PPX : lovenox    Diet : cardiac diet  Npo past midnight    Chronic conditions :    DLP : cont statin  CAD : cont ASA, plavix , renexa, imdur  Depression : cont  risperidone    Code status   Full code          Disposition:   Current Living situation: home  Expected Disposition: home  Estimated D/C: 2 days    Diet No diet orders on file   DVT Prophylaxis [x] Lovenox, []  Heparin, [] SCDs, [] Ambulation,  [] Eliquis, [] Xarelto, [] Coumadin   Code Status Prior   Surrogate Decision Maker/ POA      Personally reviewed Lab Studies and Imaging     Discussed management of the case with  ED who recommended hospital admission    EKG interpreted personally and results : No ST elevation    Imaging that was interpreted personally includes  CXR   and results no acute pathology    Drugs that require monitoring for toxicity include  and the method of monitoring was         History from:     patient    History of Present Illness:     Chief Complaint:   Ashutosh Donovan is a 53 y.o. male with pmh of  CAD  s/p PCI , HTN , chronic back pain , who presents with c/o chest

## 2024-04-12 NOTE — ED PROVIDER NOTES
neck pain.   Skin:  Negative for rash and wound.   Neurological:  Negative for dizziness and light-headedness.       \"Positives and Pertinent negatives as per HPI\"    Physical Exam:  Physical Exam  Vitals and nursing note reviewed.   Constitutional:       Appearance: He is well-developed. He is not diaphoretic.   HENT:      Head: Normocephalic and atraumatic.      Nose: Nose normal.   Eyes:      General:         Right eye: No discharge.         Left eye: No discharge.   Cardiovascular:      Rate and Rhythm: Normal rate and regular rhythm.      Heart sounds: Normal heart sounds. No murmur heard.     No friction rub. No gallop.   Pulmonary:      Effort: Pulmonary effort is normal. No respiratory distress.      Breath sounds: Normal breath sounds. No wheezing or rales.   Chest:      Chest wall: Tenderness present.   Abdominal:      General: Bowel sounds are normal. There is no distension.      Palpations: Abdomen is soft. There is no mass.      Tenderness: There is no abdominal tenderness. There is no guarding or rebound.   Musculoskeletal:         General: Normal range of motion.      Cervical back: Normal range of motion and neck supple.   Skin:     General: Skin is warm and dry.   Neurological:      General: No focal deficit present.      Mental Status: He is alert and oriented to person, place, and time.   Psychiatric:         Behavior: Behavior normal.         MEDICAL DECISION MAKING    Vitals:    Vitals:    04/12/24 1515 04/12/24 1530 04/12/24 1545 04/12/24 1600   BP: 132/79 133/89 125/83 135/88   Pulse: 79 65 67 74   Resp: 20 16 16 19   Temp:       TempSrc:       SpO2: 97% 98% 97% 98%   Weight:           LABS:  Labs Reviewed   CBC WITH AUTO DIFFERENTIAL - Abnormal; Notable for the following components:       Result Value    RBC 3.96 (*)     Hemoglobin 11.9 (*)     Hematocrit 35.2 (*)     RDW 16.0 (*)     All other components within normal limits   COMPREHENSIVE METABOLIC PANEL - Abnormal; Notable for the  following components:    BUN 31 (*)     Creatinine 2.1 (*)     Est, Glom Filt Rate 37 (*)     AST 47 (*)     All other components within normal limits   TROPONIN - Abnormal; Notable for the following components:    Troponin, High Sensitivity 26 (*)     All other components within normal limits   TROPONIN - Abnormal; Notable for the following components:    Troponin, High Sensitivity 26 (*)     All other components within normal limits   BRAIN NATRIURETIC PEPTIDE        Remainder of labs reviewed and were negative at this time or not returned at the time of this note.    RADIOLOGY:   Non-plain film images such as CT, Ultrasound and MRI are read by the radiologist. I, Darrin Fernandez PA-C have directly visualized the radiologic plain film image(s) with the below findings:      Interpretation per the Radiologist below, if available at the time of this note:    XR CHEST (2 VW)   Final Result   No acute process.           No results found.   No results found.    MEDICAL DECISION MAKING / ED COURSE:      PROCEDURES:   Procedures    Patient was given:  Medications   morphine (PF) injection 2 mg (2 mg IntraVENous Given 4/12/24 1457)       CONSULTS: (Who and What was discussed)  None      Chronic Conditions affecting care:    has a past medical history of Arthritis, CAD (coronary artery disease), CHF (congestive heart failure) (HCC), Chronic pain, Heart attack (HCC), and Hypothyroid.     Records Reviewed (External and Source)   I personally reviewed patient medical record    CC/HPI Summary, DDx, ED Course, and Reassessment:   Emergency room course: See HPI physical exam above  Patient on exam throat is clear.  Nonerythematous no exudate.  Pupils are equal round and reactive to light extraocular movement is intact.  Cardiovascular regular rhythm, lungs are clear.  No wheeze rales or rhonchi noted.  Mild mid left upper chest wall tenderness with palpation.  Abdomen is soft nontender.  Bilateral extremities show no edema.  Full

## 2024-04-12 NOTE — ED TRIAGE NOTES
Patient presents to ED via [facility] for c/o chest pain. Patient states he was at the [employer] around noon when the chest pain started, patient took 2 nitro which helped relieve his pain from a 10/10 to a 9/10. Patient was given 324 of ASA by EMS en route. Patient reports significant cardiac hx with multiple stents and MI's. Patient reports the chest pain feels similar to his heart attacks in the past. Patient currently c/o 9/10 chest pain.

## 2024-04-13 VITALS
DIASTOLIC BLOOD PRESSURE: 81 MMHG | SYSTOLIC BLOOD PRESSURE: 128 MMHG | BODY MASS INDEX: 28.38 KG/M2 | OXYGEN SATURATION: 98 % | TEMPERATURE: 97.5 F | RESPIRATION RATE: 16 BRPM | HEART RATE: 66 BPM | WEIGHT: 203.48 LBS

## 2024-04-13 PROBLEM — E78.5 DYSLIPIDEMIA: Status: ACTIVE | Noted: 2024-04-13

## 2024-04-13 PROBLEM — I25.118 CORONARY ARTERY DISEASE OF NATIVE ARTERY OF NATIVE HEART WITH STABLE ANGINA PECTORIS (HCC): Status: ACTIVE | Noted: 2023-11-20

## 2024-04-13 PROBLEM — R07.9 CHEST PAIN: Status: RESOLVED | Noted: 2024-04-12 | Resolved: 2024-04-13

## 2024-04-13 LAB
ANION GAP SERPL CALCULATED.3IONS-SCNC: 8 MMOL/L (ref 3–16)
BASOPHILS # BLD: 0.1 K/UL (ref 0–0.2)
BASOPHILS NFR BLD: 2.2 %
BUN SERPL-MCNC: 30 MG/DL (ref 7–20)
CALCIUM SERPL-MCNC: 8.9 MG/DL (ref 8.3–10.6)
CHLORIDE SERPL-SCNC: 108 MMOL/L (ref 99–110)
CHOLEST SERPL-MCNC: 289 MG/DL (ref 0–199)
CO2 SERPL-SCNC: 24 MMOL/L (ref 21–32)
CREAT SERPL-MCNC: 2 MG/DL (ref 0.9–1.3)
DEPRECATED RDW RBC AUTO: 16 % (ref 12.4–15.4)
EOSINOPHIL # BLD: 0.1 K/UL (ref 0–0.6)
EOSINOPHIL NFR BLD: 2 %
GFR SERPLBLD CREATININE-BSD FMLA CKD-EPI: 39 ML/MIN/{1.73_M2}
GLUCOSE SERPL-MCNC: 90 MG/DL (ref 70–99)
HCT VFR BLD AUTO: 35.8 % (ref 40.5–52.5)
HDLC SERPL-MCNC: 61 MG/DL (ref 40–60)
HGB BLD-MCNC: 12.1 G/DL (ref 13.5–17.5)
LDLC SERPL CALC-MCNC: 191 MG/DL
LYMPHOCYTES # BLD: 2.4 K/UL (ref 1–5.1)
LYMPHOCYTES NFR BLD: 42.6 %
MCH RBC QN AUTO: 29.4 PG (ref 26–34)
MCHC RBC AUTO-ENTMCNC: 33.7 G/DL (ref 31–36)
MCV RBC AUTO: 87.4 FL (ref 80–100)
MONOCYTES # BLD: 0.5 K/UL (ref 0–1.3)
MONOCYTES NFR BLD: 8.2 %
NEUTROPHILS # BLD: 2.6 K/UL (ref 1.7–7.7)
NEUTROPHILS NFR BLD: 45 %
PLATELET # BLD AUTO: 220 K/UL (ref 135–450)
PMV BLD AUTO: 9.4 FL (ref 5–10.5)
POTASSIUM SERPL-SCNC: 4.5 MMOL/L (ref 3.5–5.1)
RBC # BLD AUTO: 4.1 M/UL (ref 4.2–5.9)
SODIUM SERPL-SCNC: 140 MMOL/L (ref 136–145)
TRIGL SERPL-MCNC: 183 MG/DL (ref 0–150)
VLDLC SERPL CALC-MCNC: 37 MG/DL
WBC # BLD AUTO: 5.7 K/UL (ref 4–11)

## 2024-04-13 PROCEDURE — 78452 HT MUSCLE IMAGE SPECT MULT: CPT

## 2024-04-13 PROCEDURE — 96376 TX/PRO/DX INJ SAME DRUG ADON: CPT

## 2024-04-13 PROCEDURE — 85025 COMPLETE CBC W/AUTO DIFF WBC: CPT

## 2024-04-13 PROCEDURE — A9502 TC99M TETROFOSMIN: HCPCS | Performed by: INTERNAL MEDICINE

## 2024-04-13 PROCEDURE — 6370000000 HC RX 637 (ALT 250 FOR IP): Performed by: HOSPITALIST

## 2024-04-13 PROCEDURE — 96372 THER/PROPH/DIAG INJ SC/IM: CPT

## 2024-04-13 PROCEDURE — 6360000002 HC RX W HCPCS: Performed by: HOSPITALIST

## 2024-04-13 PROCEDURE — 2580000003 HC RX 258: Performed by: HOSPITALIST

## 2024-04-13 PROCEDURE — 99223 1ST HOSP IP/OBS HIGH 75: CPT | Performed by: NURSE PRACTITIONER

## 2024-04-13 PROCEDURE — 80061 LIPID PANEL: CPT

## 2024-04-13 PROCEDURE — 93017 CV STRESS TEST TRACING ONLY: CPT

## 2024-04-13 PROCEDURE — 80048 BASIC METABOLIC PNL TOTAL CA: CPT

## 2024-04-13 PROCEDURE — 36415 COLL VENOUS BLD VENIPUNCTURE: CPT

## 2024-04-13 PROCEDURE — G0378 HOSPITAL OBSERVATION PER HR: HCPCS

## 2024-04-13 PROCEDURE — 3430000000 HC RX DIAGNOSTIC RADIOPHARMACEUTICAL: Performed by: INTERNAL MEDICINE

## 2024-04-13 PROCEDURE — 94760 N-INVAS EAR/PLS OXIMETRY 1: CPT

## 2024-04-13 PROCEDURE — 6360000002 HC RX W HCPCS: Performed by: INTERNAL MEDICINE

## 2024-04-13 PROCEDURE — 6370000000 HC RX 637 (ALT 250 FOR IP): Performed by: INTERNAL MEDICINE

## 2024-04-13 RX ORDER — MIRTAZAPINE 15 MG/1
15 TABLET, FILM COATED ORAL NIGHTLY
Qty: 60 TABLET | Refills: 3 | Status: SHIPPED | OUTPATIENT
Start: 2024-04-13

## 2024-04-13 RX ORDER — PANTOPRAZOLE SODIUM 40 MG/1
40 TABLET, DELAYED RELEASE ORAL
Qty: 60 TABLET | Refills: 0 | Status: SHIPPED | OUTPATIENT
Start: 2024-04-13

## 2024-04-13 RX ORDER — PRAZOSIN HYDROCHLORIDE 2 MG/1
2 CAPSULE ORAL NIGHTLY
Qty: 60 CAPSULE | Refills: 3 | Status: SHIPPED | OUTPATIENT
Start: 2024-04-13

## 2024-04-13 RX ORDER — RANOLAZINE 500 MG/1
500 TABLET, EXTENDED RELEASE ORAL 2 TIMES DAILY
Qty: 60 TABLET | Refills: 3 | Status: SHIPPED | OUTPATIENT
Start: 2024-04-13

## 2024-04-13 RX ORDER — ISOSORBIDE MONONITRATE 30 MG/1
30 TABLET, EXTENDED RELEASE ORAL DAILY
Qty: 30 TABLET | Refills: 3 | Status: SHIPPED | OUTPATIENT
Start: 2024-04-13

## 2024-04-13 RX ORDER — ALBUTEROL SULFATE 90 UG/1
2 AEROSOL, METERED RESPIRATORY (INHALATION) EVERY 6 HOURS PRN
Qty: 18 G | Refills: 3 | Status: SHIPPED | OUTPATIENT
Start: 2024-04-13

## 2024-04-13 RX ORDER — ASPIRIN 81 MG/1
81 TABLET ORAL DAILY
Qty: 30 TABLET | Refills: 3 | Status: SHIPPED | OUTPATIENT
Start: 2024-04-13

## 2024-04-13 RX ORDER — CLOPIDOGREL BISULFATE 75 MG/1
75 TABLET ORAL DAILY
Qty: 30 TABLET | Refills: 3 | Status: SHIPPED | OUTPATIENT
Start: 2024-04-13

## 2024-04-13 RX ORDER — NITROGLYCERIN 0.4 MG/1
0.4 TABLET SUBLINGUAL EVERY 5 MIN PRN
Qty: 25 TABLET | Refills: 3 | Status: SHIPPED | OUTPATIENT
Start: 2024-04-13

## 2024-04-13 RX ORDER — LEVOTHYROXINE SODIUM 0.15 MG/1
150 TABLET ORAL DAILY
Qty: 60 TABLET | Refills: 3 | Status: SHIPPED | OUTPATIENT
Start: 2024-04-13

## 2024-04-13 RX ORDER — CYCLOBENZAPRINE HCL 10 MG
10 TABLET ORAL 3 TIMES DAILY PRN
Qty: 30 TABLET | Refills: 1 | Status: SHIPPED | OUTPATIENT
Start: 2024-04-13

## 2024-04-13 RX ORDER — ATORVASTATIN CALCIUM 40 MG/1
40 TABLET, FILM COATED ORAL DAILY
Qty: 30 TABLET | Refills: 3 | Status: SHIPPED | OUTPATIENT
Start: 2024-04-13

## 2024-04-13 RX ORDER — CALCIUM CARBONATE 500 MG/1
1000 TABLET, CHEWABLE ORAL 3 TIMES DAILY PRN
Qty: 90 TABLET | Refills: 3 | Status: SHIPPED | OUTPATIENT
Start: 2024-04-13 | End: 2024-05-13

## 2024-04-13 RX ORDER — CALCIUM CARBONATE 500 MG/1
1000 TABLET, CHEWABLE ORAL 3 TIMES DAILY PRN
Status: DISCONTINUED | OUTPATIENT
Start: 2024-04-13 | End: 2024-04-13 | Stop reason: HOSPADM

## 2024-04-13 RX ADMIN — LEVOTHYROXINE SODIUM 150 MCG: 0.07 TABLET ORAL at 05:18

## 2024-04-13 RX ADMIN — MORPHINE SULFATE 2 MG: 2 INJECTION, SOLUTION INTRAMUSCULAR; INTRAVENOUS at 10:33

## 2024-04-13 RX ADMIN — METOPROLOL TARTRATE 25 MG: 25 TABLET, FILM COATED ORAL at 10:38

## 2024-04-13 RX ADMIN — ALUMINUM HYDROXIDE, MAGNESIUM HYDROXIDE, AND SIMETHICONE: 200; 200; 20 SUSPENSION ORAL at 17:39

## 2024-04-13 RX ADMIN — TETROFOSMIN 10 MILLICURIE: 1.38 INJECTION, POWDER, LYOPHILIZED, FOR SOLUTION INTRAVENOUS at 06:42

## 2024-04-13 RX ADMIN — RANOLAZINE 500 MG: 500 TABLET, EXTENDED RELEASE ORAL at 10:38

## 2024-04-13 RX ADMIN — REGADENOSON 0.4 MG: 0.08 INJECTION, SOLUTION INTRAVENOUS at 08:27

## 2024-04-13 RX ADMIN — MORPHINE SULFATE 2 MG: 2 INJECTION, SOLUTION INTRAMUSCULAR; INTRAVENOUS at 05:14

## 2024-04-13 RX ADMIN — ATORVASTATIN CALCIUM 40 MG: 40 TABLET, FILM COATED ORAL at 10:38

## 2024-04-13 RX ADMIN — ASPIRIN 81 MG: 81 TABLET, COATED ORAL at 10:38

## 2024-04-13 RX ADMIN — Medication 10 ML: at 10:35

## 2024-04-13 RX ADMIN — ISOSORBIDE MONONITRATE 30 MG: 30 TABLET, EXTENDED RELEASE ORAL at 10:38

## 2024-04-13 RX ADMIN — TETROFOSMIN 30 MILLICURIE: 1.38 INJECTION, POWDER, LYOPHILIZED, FOR SOLUTION INTRAVENOUS at 08:32

## 2024-04-13 RX ADMIN — MORPHINE SULFATE 2 MG: 2 INJECTION, SOLUTION INTRAMUSCULAR; INTRAVENOUS at 00:45

## 2024-04-13 RX ADMIN — ENOXAPARIN SODIUM 40 MG: 100 INJECTION SUBCUTANEOUS at 10:37

## 2024-04-13 RX ADMIN — CLOPIDOGREL BISULFATE 75 MG: 75 TABLET ORAL at 10:38

## 2024-04-13 RX ADMIN — MORPHINE SULFATE 2 MG: 2 INJECTION, SOLUTION INTRAMUSCULAR; INTRAVENOUS at 15:24

## 2024-04-13 ASSESSMENT — PAIN DESCRIPTION - DESCRIPTORS
DESCRIPTORS: STABBING
DESCRIPTORS: STABBING
DESCRIPTORS: HEAVINESS;STABBING
DESCRIPTORS: STABBING
DESCRIPTORS: STABBING;PRESSURE

## 2024-04-13 ASSESSMENT — PAIN SCALES - GENERAL
PAINLEVEL_OUTOF10: 8
PAINLEVEL_OUTOF10: 7
PAINLEVEL_OUTOF10: 4
PAINLEVEL_OUTOF10: 9
PAINLEVEL_OUTOF10: 5
PAINLEVEL_OUTOF10: 8
PAINLEVEL_OUTOF10: 9

## 2024-04-13 ASSESSMENT — PAIN DESCRIPTION - ONSET
ONSET: ON-GOING
ONSET: ON-GOING

## 2024-04-13 ASSESSMENT — PAIN DESCRIPTION - ORIENTATION
ORIENTATION: LEFT
ORIENTATION: MID
ORIENTATION: MID

## 2024-04-13 ASSESSMENT — PAIN DESCRIPTION - LOCATION
LOCATION: BACK;CHEST
LOCATION: CHEST
LOCATION: BACK;CHEST

## 2024-04-13 ASSESSMENT — PAIN DESCRIPTION - PAIN TYPE
TYPE: ACUTE PAIN
TYPE: ACUTE PAIN

## 2024-04-13 ASSESSMENT — PAIN - FUNCTIONAL ASSESSMENT
PAIN_FUNCTIONAL_ASSESSMENT: PREVENTS OR INTERFERES SOME ACTIVE ACTIVITIES AND ADLS
PAIN_FUNCTIONAL_ASSESSMENT: PREVENTS OR INTERFERES SOME ACTIVE ACTIVITIES AND ADLS

## 2024-04-13 ASSESSMENT — PAIN DESCRIPTION - FREQUENCY
FREQUENCY: CONTINUOUS
FREQUENCY: CONTINUOUS

## 2024-04-13 NOTE — PLAN OF CARE
Problem: Discharge Planning  Goal: Discharge to home or other facility with appropriate resources  Outcome: Progressing  Flowsheets (Taken 4/13/2024 1430)  Discharge to home or other facility with appropriate resources: Identify barriers to discharge with patient and caregiver     Problem: Pain  Goal: Verbalizes/displays adequate comfort level or baseline comfort level  Outcome: Progressing     Problem: Safety - Adult  Goal: Free from fall injury  Outcome: Progressing

## 2024-04-13 NOTE — PLAN OF CARE
Problem: Discharge Planning  Goal: Discharge to home or other facility with appropriate resources  Outcome: Progressing  Flowsheets (Taken 4/12/2024 2009)  Discharge to home or other facility with appropriate resources:   Identify barriers to discharge with patient and caregiver   Arrange for needed discharge resources and transportation as appropriate   Identify discharge learning needs (meds, wound care, etc)   Refer to discharge planning if patient needs post-hospital services based on physician order or complex needs related to functional status, cognitive ability or social support system     Problem: Pain  Goal: Verbalizes/displays adequate comfort level or baseline comfort level  Outcome: Progressing     Problem: Safety - Adult  Goal: Free from fall injury  Outcome: Progressing

## 2024-04-13 NOTE — PROGRESS NOTES
Pt discharged to home. Transported off unit via wheelchair. Transported in [employer] vehicle. Discharge instructions, Rx, and personal belongings given to pt. Explanation of discharge medications and instructions understood by verbal statement. No questions, comments or concerns at this time.

## 2024-04-13 NOTE — CONSULTS
[facility]  Cardiology Consult              Patient Name: Ashutosh Donovan  Date of admission: 4/12/2024  1:13 PM  Patient's age: 53 y.o., 1970  Admission Dx: Acute kidney injury (HCC) [N17.9]  Chest pain in adult [R07.9]  Chest pain, unspecified type [R07.9]    Reason for Consult:  chest pain   Requesting Physician: Adonis Cartagena MD  Code Status: Full Code   CHIEF COMPLAINT:  chest pain     History Obtained From:  patient, electronic medical record    HISTORY OF PRESENT ILLNESS:      The patient is a 53 y.o.  male who is admitted to the hospital for chest pain . Hans reports the pain is under his left ribs and worse when he get angry or upset however it did feel like when he had his previous MI.  Patient underwent stress testing today and test negative for ischemia.    He typically follows at [facility] and reports he has been compliant with medications although he is almost out of his medications.     Denytent with PMH significant for CAD s/p multivessel PCI, chronic angina, cocaine use, tobacco use, HTN, HLD, CKD stage IIIa     Past Medical History/Past Surgical History/Social History/Family History/Living Situation: Reviewed per record       Trop 26/26/28/29     Home Medications:    Prior to Admission medications    Medication Sig Start Date End Date Taking? Authorizing Provider   prazosin (MINIPRESS) 2 MG capsule Take 1 capsule by mouth nightly 11/20/23   Ash Crawford MD   levothyroxine (SYNTHROID) 150 MCG tablet Take 1 tablet by mouth Daily 11/20/23   Ash Crawford MD   metoprolol tartrate (LOPRESSOR) 25 MG tablet Take 1 tablet by mouth 2 times daily 8/19/23   Eliza Peterson MD   isosorbide mononitrate (IMDUR) 30 MG extended release tablet Take 1 tablet by mouth daily 8/19/23   Eliza Peterson MD   aspirin 81 MG EC tablet Take 1 tablet by mouth daily    ProviderZeeshan MD   ranolazine (RANEXA) 500 MG extended release tablet Take 1 tablet by mouth 2 times daily   bruising  Respiratory:   Normal excursion and expansion without use of accessory muscles   Resp Auscultation CTA  Cardiovascular:   Heart tones are crisp and normal. S1S2    No murmurs, rubs or gallops   Jugular venous pulsation Normal    Peripheral pulses are symmetrical and full  Gastrointestinal:   Soft, non-tender              No masses     Bowel sounds normal active x4  Musculoskeletal:  No muscle wasting or decreased range of motion  Extremities:   No Cyanosis or Clubbing   Lower extremity edema: No   Skin: Warm and dry  Neurological:   CN 3-12 grossly intact   No gross sensory deficits   No tremors   Psychiatric:   Alert oriented to person, place and time.   Normal insight and normal affect.      DATA:      Telemetry: SB    ECG: 3/18/24    Echo:   2023  GSH:  Summary:  Overall left ventricular ejection fraction is estimated to be 55-60%.  Left ventricular wall motion is normal.  The Aortic Valve is mildly calcified.  Trace aortic regurgitation.  Borderline concentric left ventricular hypertrophy.     Stress Testin24  Summary Normal myocardial perfusion study. Normal LV size and systolic function. Overall findings represent a low risk study        GSH:  LHC:  Cath 2023  IMPRESSION:  1) Multivessel CAD - today's culprit is large D1 prox lesion w clot.  2) LAD mid ISR lesion is DFR neg  3) Distal RCA ISR lesion is DFR neg  4) Mild ischemic CM (EF 45), EDP 20  5) Successful PCI JEAN PAUL prox D1         No intake/output data recorded.   Wt Readings from Last 7 Encounters:   24 92.3 kg (203 lb 7.8 oz)   24 84.8 kg (186 lb 15.2 oz)   23 84.7 kg (186 lb 11.7 oz)   23 82.5 kg (181 lb 14.1 oz)         IMPRESSION:        RECOMMENDATIONS:  Chest pain   Likely  non cardiac however pt also with known hx of chronic angina   Negative stress test  Troponin flat   ECG stable       2.  CAD   Hx of PCI 2023  On plavix, ASA, statin, Imdur, Lopressor, Ranexa   Continue risk factor

## 2024-04-13 NOTE — CARE COORDINATION
Discharge Need:Transport home  SW contacted by RN for assistance with transportation home.  Set up transport in roundtrip for 6:30pm .

## 2024-04-13 NOTE — DISCHARGE SUMMARY
Discharge Summary    Name:  Ashutosh Donovan /Age/Sex: 1970  (53 y.o. male)   MRN & CSN:  [id number] & [id number] Admission Date/Time: 2024  1:13 PM   Attending:  Jesus Manuel Chamberlain MD Discharging Physician: Jesus Manuel Chamberlain MD     Discharge diagnosis and plan:    Chest pain  LA on CKD 3  CAD s/p PCI   HTN  HLD  H/o polysubstance abuse  Chronic lower back pain   Hypothyroidism  Depression     Plan     Kept on observation with telemetry monitering. Trended Troponin -remained flat. Continued ASA, Plavix, Statin. Cardiology onboard - obtained stress test which was negative  for reversible perfusion defect. Cleared by cardiology team for discharge. Medication refills provided.         Discharge Exam  /81   Pulse 66   Temp 97.5 °F (36.4 °C) (Axillary)   Resp 16   Wt 92.3 kg (203 lb 7.8 oz)   SpO2 98%   BMI 28.38 kg/m²     Physical Exam  Constitutional:       General: He is not in acute distress.     Appearance: Normal appearance. He is normal weight. He is not ill-appearing, toxic-appearing or diaphoretic.   HENT:      Head: Normocephalic and atraumatic.      Right Ear: Tympanic membrane, ear canal and external ear normal. There is no impacted cerumen.      Left Ear: Tympanic membrane, ear canal and external ear normal. There is no impacted cerumen.      Nose: Nose normal. No congestion or rhinorrhea.      Mouth/Throat:      Mouth: Mucous membranes are moist.      Pharynx: No oropharyngeal exudate or posterior oropharyngeal erythema.   Eyes:      General: No scleral icterus.        Right eye: No discharge.         Left eye: No discharge.      Extraocular Movements: Extraocular movements intact.      Conjunctiva/sclera: Conjunctivae normal.      Pupils: Pupils are equal, round, and reactive to light.   Neck:      Vascular: No carotid bruit.   Cardiovascular:      Rate and Rhythm: Normal rate and regular rhythm.      Pulses: Normal pulses.      Heart sounds: Normal heart sounds. No murmur heard.

## 2024-04-13 NOTE — PROGRESS NOTES
In-Patient Progress Note    Patient:  Ashutosh Donovan 53 y.o. male MRN: [id number]     Date of Service: 4/13/2024    Hospital Day: 2      Chief complaint: had concerns including Chest Pain.      Assessment and Plan   Ashutosh Donovan, a 53 y.o. male, with a history of CAD s/p PCI , HTN , chronic back pain, was admitted on 4/12/2024 with complaints of had concerns including Chest Pain.    Assessment and plan    Chest pain  LA on CKD 3  CAD s/p PCI   HTN  HLD  H/o polysubstance abuse  Chronic lower back pain   Hypothyroidism  Depression     Plan     Continue to keep on observation  Telemetry monitering  Trend Troponin  Continue ASA, Plavix  Statin  Cardiology onboard  SL nitro prn  Stress test negative for reversible perfusion defect  Patient continues to complain of chest pain  Await cardiology recs.        LA on CKD      IVF  Moniter BMP  Avoid NSAIDs  Avoid contrast  Avoid ACEI or ARB  Keep MAP > 65 mmhg              Diet ADULT DIET; Regular   DVT Prophylaxis [] Lovenox, []  Heparin, [] SCDs, [] Ambulation,  [] Eliquis, [] Xarelto  [] Coumadin   Peptic ulcer prophylaxis -   Code Status Full Code   Disposition From / Current living situation : home  Expected Disposition: home  Estimated Date of Discharge: tomorrow bennett  Patient requires continued admission due to pending cardiac clearance   Surrogate Decision Maker/ POA -       Personally reviewed Lab Studies and Imaging       Subjective:     Chief Complaint: Chest Pain       Ashutosh Donovan is a 53 y.o. male who presents with chest pain    -continue to complain of 8-9/10 chest pain while appearing very comfortable       Review of System     Review of Systems  -negative    I have reviewed all pertinent PMHx, PSHx, FamHx, SocialHx, medications, and allergies and updated history as appropriate.    Physical Exam   VITAL SIGNS:  /81   Pulse 66   Temp 97.5 °F (36.4 °C) (Axillary)   Resp 18   Wt 92.3 kg (203 lb 7.8 oz)   SpO2 98%   BMI 28.38

## 2024-04-13 NOTE — PROGRESS NOTES
4 Eyes Skin Assessment     NAME:  Ashutosh Donovan  YOB: 1970  MEDICAL RECORD NUMBER:  [id number]    The patient is being assessed for  Admission    I agree that at least one RN has performed a thorough Head to Toe Skin Assessment on the patient. ALL assessment sites listed below have been assessed.      Areas assessed by both nurses:    Head, Face, Ears, Shoulders, Back, Chest, Arms, Elbows, Hands, Sacrum. Buttock, Coccyx, Ischium, Legs. Feet and Heels, and Under Medical Devices         Does the Patient have a Wound? No noted wound(s)       Jovanny Prevention initiated by RN: No  Wound Care Orders initiated by RN: No    Pressure Injury (Stage 3,4, Unstageable, DTI, NWPT, and Complex wounds) if present, place Wound referral order by RN under : No    New Ostomies, if present place, Ostomy referral order under : No     Nurse 1 eSignature: Electronically signed by Garrison Sheth RN on 4/13/24 at 1:02 AM EDT    **SHARE this note so that the co-signing nurse can place an eSignature**    Nurse 2 eSignature: Electronically signed by Jesusita Puga RN on 4/13/24 at 1:02 AM EDT

## 2024-05-06 ENCOUNTER — HOSPITAL ENCOUNTER (OUTPATIENT)
Age: 54
Setting detail: OBSERVATION
Discharge: HOME | End: 2024-05-07
Attending: STUDENT IN AN ORGANIZED HEALTH CARE EDUCATION/TRAINING PROGRAM | Admitting: INTERNAL MEDICINE
Payer: MEDICARE

## 2024-05-06 ENCOUNTER — APPOINTMENT (OUTPATIENT)
Dept: GENERAL RADIOLOGY | Age: 54
End: 2024-05-06
Payer: MEDICARE

## 2024-05-06 DIAGNOSIS — M43.16 SPONDYLOLISTHESIS AT L4-L5 LEVEL: ICD-10-CM

## 2024-05-06 DIAGNOSIS — I25.83 CORONARY ATHEROSCLEROSIS DUE TO LIPID RICH PLAQUE: ICD-10-CM

## 2024-05-06 DIAGNOSIS — M79.605 LUMBAR PAIN WITH RADIATION DOWN BOTH LEGS: ICD-10-CM

## 2024-05-06 DIAGNOSIS — M54.50 LUMBAR PAIN WITH RADIATION DOWN BOTH LEGS: ICD-10-CM

## 2024-05-06 DIAGNOSIS — R07.9 CHEST PAIN, UNSPECIFIED TYPE: Primary | ICD-10-CM

## 2024-05-06 DIAGNOSIS — I10 ESSENTIAL HYPERTENSION: ICD-10-CM

## 2024-05-06 DIAGNOSIS — M79.604 LUMBAR PAIN WITH RADIATION DOWN BOTH LEGS: ICD-10-CM

## 2024-05-06 DIAGNOSIS — I25.10 CORONARY ATHEROSCLEROSIS DUE TO LIPID RICH PLAQUE: ICD-10-CM

## 2024-05-06 DIAGNOSIS — M51.37 DDD (DEGENERATIVE DISC DISEASE), LUMBOSACRAL: ICD-10-CM

## 2024-05-06 PROBLEM — F10.931 ALCOHOL WITHDRAWAL DELIRIUM (HCC): Status: ACTIVE | Noted: 2024-05-06

## 2024-05-06 LAB
ALBUMIN SERPL-MCNC: 4.4 G/DL (ref 3.4–5)
ALBUMIN/GLOB SERPL: 1.8 {RATIO} (ref 1.1–2.2)
ALP SERPL-CCNC: 56 U/L (ref 40–129)
ALT SERPL-CCNC: 10 U/L (ref 10–40)
ANION GAP SERPL CALCULATED.3IONS-SCNC: 11 MMOL/L (ref 3–16)
AST SERPL-CCNC: 28 U/L (ref 15–37)
BASOPHILS # BLD: 0 K/UL (ref 0–0.2)
BASOPHILS NFR BLD: 0.7 %
BILIRUB SERPL-MCNC: <0.2 MG/DL (ref 0–1)
BUN SERPL-MCNC: 16 MG/DL (ref 7–20)
CALCIUM SERPL-MCNC: 9.7 MG/DL (ref 8.3–10.6)
CHLORIDE SERPL-SCNC: 105 MMOL/L (ref 99–110)
CO2 SERPL-SCNC: 23 MMOL/L (ref 21–32)
CREAT SERPL-MCNC: 1.6 MG/DL (ref 0.9–1.3)
DEPRECATED RDW RBC AUTO: 16 % (ref 12.4–15.4)
EOSINOPHIL # BLD: 0.1 K/UL (ref 0–0.6)
EOSINOPHIL NFR BLD: 1.2 %
GFR SERPLBLD CREATININE-BSD FMLA CKD-EPI: 51 ML/MIN/{1.73_M2}
GLUCOSE SERPL-MCNC: 78 MG/DL (ref 70–99)
HCT VFR BLD AUTO: 36.6 % (ref 40.5–52.5)
HGB BLD-MCNC: 12.3 G/DL (ref 13.5–17.5)
LYMPHOCYTES # BLD: 2.6 K/UL (ref 1–5.1)
LYMPHOCYTES NFR BLD: 41.7 %
MCH RBC QN AUTO: 29.8 PG (ref 26–34)
MCHC RBC AUTO-ENTMCNC: 33.7 G/DL (ref 31–36)
MCV RBC AUTO: 88.5 FL (ref 80–100)
MONOCYTES # BLD: 0.5 K/UL (ref 0–1.3)
MONOCYTES NFR BLD: 7.6 %
NEUTROPHILS # BLD: 3 K/UL (ref 1.7–7.7)
NEUTROPHILS NFR BLD: 48.8 %
NT-PROBNP SERPL-MCNC: 93 PG/ML (ref 0–124)
PLATELET # BLD AUTO: 182 K/UL (ref 135–450)
PMV BLD AUTO: 9.4 FL (ref 5–10.5)
POTASSIUM SERPL-SCNC: 3.9 MMOL/L (ref 3.5–5.1)
PROT SERPL-MCNC: 6.8 G/DL (ref 6.4–8.2)
RBC # BLD AUTO: 4.13 M/UL (ref 4.2–5.9)
SODIUM SERPL-SCNC: 139 MMOL/L (ref 136–145)
TROPONIN, HIGH SENSITIVITY: 28 NG/L (ref 0–22)
WBC # BLD AUTO: 6.1 K/UL (ref 4–11)

## 2024-05-06 PROCEDURE — 80053 COMPREHEN METABOLIC PANEL: CPT

## 2024-05-06 PROCEDURE — 96375 TX/PRO/DX INJ NEW DRUG ADDON: CPT

## 2024-05-06 PROCEDURE — 71046 X-RAY EXAM CHEST 2 VIEWS: CPT

## 2024-05-06 PROCEDURE — 83880 ASSAY OF NATRIURETIC PEPTIDE: CPT

## 2024-05-06 PROCEDURE — 99285 EMERGENCY DEPT VISIT HI MDM: CPT

## 2024-05-06 PROCEDURE — 85025 COMPLETE CBC W/AUTO DIFF WBC: CPT

## 2024-05-06 PROCEDURE — 84484 ASSAY OF TROPONIN QUANT: CPT

## 2024-05-06 PROCEDURE — 93005 ELECTROCARDIOGRAM TRACING: CPT | Performed by: STUDENT IN AN ORGANIZED HEALTH CARE EDUCATION/TRAINING PROGRAM

## 2024-05-06 PROCEDURE — 6360000002 HC RX W HCPCS: Performed by: STUDENT IN AN ORGANIZED HEALTH CARE EDUCATION/TRAINING PROGRAM

## 2024-05-06 PROCEDURE — 96374 THER/PROPH/DIAG INJ IV PUSH: CPT

## 2024-05-06 RX ORDER — MORPHINE SULFATE 4 MG/ML
4 INJECTION, SOLUTION INTRAMUSCULAR; INTRAVENOUS
Status: COMPLETED | OUTPATIENT
Start: 2024-05-06 | End: 2024-05-06

## 2024-05-06 RX ORDER — CLONIDINE HYDROCHLORIDE 0.1 MG/1
0.1 TABLET ORAL 3 TIMES DAILY PRN
Status: ON HOLD | COMMUNITY
Start: 2024-01-16 | End: 2024-05-07 | Stop reason: HOSPADM

## 2024-05-06 RX ORDER — METOCLOPRAMIDE HYDROCHLORIDE 5 MG/ML
10 INJECTION INTRAMUSCULAR; INTRAVENOUS ONCE
Status: COMPLETED | OUTPATIENT
Start: 2024-05-06 | End: 2024-05-06

## 2024-05-06 RX ORDER — PRAZOSIN HYDROCHLORIDE 1 MG/1
1 CAPSULE ORAL NIGHTLY
Status: ON HOLD | COMMUNITY
End: 2024-05-07 | Stop reason: HOSPADM

## 2024-05-06 RX ORDER — LISINOPRIL 20 MG/1
20 TABLET ORAL DAILY
Status: ON HOLD | COMMUNITY
Start: 2023-06-23 | End: 2024-05-07

## 2024-05-06 RX ADMIN — METOCLOPRAMIDE 10 MG: 5 INJECTION, SOLUTION INTRAMUSCULAR; INTRAVENOUS at 23:29

## 2024-05-06 RX ADMIN — MORPHINE SULFATE 4 MG: 4 INJECTION, SOLUTION INTRAMUSCULAR; INTRAVENOUS at 22:41

## 2024-05-06 ASSESSMENT — LIFESTYLE VARIABLES
HOW OFTEN DO YOU HAVE A DRINK CONTAINING ALCOHOL: NEVER
HOW MANY STANDARD DRINKS CONTAINING ALCOHOL DO YOU HAVE ON A TYPICAL DAY: PATIENT DOES NOT DRINK

## 2024-05-06 ASSESSMENT — PAIN SCALES - GENERAL
PAINLEVEL_OUTOF10: 10
PAINLEVEL_OUTOF10: 10

## 2024-05-06 ASSESSMENT — PAIN - FUNCTIONAL ASSESSMENT: PAIN_FUNCTIONAL_ASSESSMENT: 0-10

## 2024-05-07 ENCOUNTER — APPOINTMENT (OUTPATIENT)
Dept: MRI IMAGING | Age: 54
End: 2024-05-07
Payer: MEDICARE

## 2024-05-07 VITALS
SYSTOLIC BLOOD PRESSURE: 120 MMHG | WEIGHT: 198.41 LBS | TEMPERATURE: 98 F | RESPIRATION RATE: 16 BRPM | OXYGEN SATURATION: 98 % | HEIGHT: 71 IN | DIASTOLIC BLOOD PRESSURE: 72 MMHG | HEART RATE: 57 BPM | BODY MASS INDEX: 27.78 KG/M2

## 2024-05-07 PROBLEM — R07.9 CHEST PAIN: Status: ACTIVE | Noted: 2024-05-06

## 2024-05-07 LAB
DEPRECATED RDW RBC AUTO: 16.2 % (ref 12.4–15.4)
HCT VFR BLD AUTO: 39 % (ref 40.5–52.5)
HGB BLD-MCNC: 12.5 G/DL (ref 13.5–17.5)
MCH RBC QN AUTO: 29.1 PG (ref 26–34)
MCHC RBC AUTO-ENTMCNC: 32.1 G/DL (ref 31–36)
MCV RBC AUTO: 90.6 FL (ref 80–100)
PLATELET # BLD AUTO: 186 K/UL (ref 135–450)
PMV BLD AUTO: 10.3 FL (ref 5–10.5)
RBC # BLD AUTO: 4.31 M/UL (ref 4.2–5.9)
TROPONIN, HIGH SENSITIVITY: 25 NG/L (ref 0–22)
TROPONIN, HIGH SENSITIVITY: 27 NG/L (ref 0–22)
WBC # BLD AUTO: 8.1 K/UL (ref 4–11)

## 2024-05-07 PROCEDURE — 85027 COMPLETE CBC AUTOMATED: CPT

## 2024-05-07 PROCEDURE — 96372 THER/PROPH/DIAG INJ SC/IM: CPT

## 2024-05-07 PROCEDURE — 36415 COLL VENOUS BLD VENIPUNCTURE: CPT

## 2024-05-07 PROCEDURE — 6370000000 HC RX 637 (ALT 250 FOR IP): Performed by: INTERNAL MEDICINE

## 2024-05-07 PROCEDURE — 2580000003 HC RX 258: Performed by: INTERNAL MEDICINE

## 2024-05-07 PROCEDURE — G0378 HOSPITAL OBSERVATION PER HR: HCPCS

## 2024-05-07 PROCEDURE — 99223 1ST HOSP IP/OBS HIGH 75: CPT | Performed by: INTERNAL MEDICINE

## 2024-05-07 PROCEDURE — 93005 ELECTROCARDIOGRAM TRACING: CPT | Performed by: INTERNAL MEDICINE

## 2024-05-07 PROCEDURE — 97161 PT EVAL LOW COMPLEX 20 MIN: CPT

## 2024-05-07 PROCEDURE — 72148 MRI LUMBAR SPINE W/O DYE: CPT

## 2024-05-07 PROCEDURE — 96376 TX/PRO/DX INJ SAME DRUG ADON: CPT

## 2024-05-07 PROCEDURE — 6360000002 HC RX W HCPCS: Performed by: INTERNAL MEDICINE

## 2024-05-07 PROCEDURE — 84484 ASSAY OF TROPONIN QUANT: CPT

## 2024-05-07 RX ORDER — SODIUM CHLORIDE 0.9 % (FLUSH) 0.9 %
5-40 SYRINGE (ML) INJECTION PRN
Status: DISCONTINUED | OUTPATIENT
Start: 2024-05-07 | End: 2024-05-07 | Stop reason: HOSPADM

## 2024-05-07 RX ORDER — CYCLOBENZAPRINE HCL 10 MG
10 TABLET ORAL 3 TIMES DAILY PRN
Status: DISCONTINUED | OUTPATIENT
Start: 2024-05-07 | End: 2024-05-07

## 2024-05-07 RX ORDER — CLOPIDOGREL BISULFATE 75 MG/1
75 TABLET ORAL DAILY
Qty: 30 TABLET | Refills: 3 | Status: SHIPPED | OUTPATIENT
Start: 2024-05-07

## 2024-05-07 RX ORDER — ASPIRIN 81 MG/1
81 TABLET ORAL DAILY
Qty: 30 TABLET | Refills: 3 | Status: SHIPPED | OUTPATIENT
Start: 2024-05-07 | End: 2024-05-07

## 2024-05-07 RX ORDER — NITROGLYCERIN 0.4 MG/1
0.4 TABLET SUBLINGUAL EVERY 5 MIN PRN
Qty: 25 TABLET | Refills: 3 | Status: SHIPPED | OUTPATIENT
Start: 2024-05-07

## 2024-05-07 RX ORDER — ASPIRIN 81 MG/1
81 TABLET ORAL DAILY
Status: DISCONTINUED | OUTPATIENT
Start: 2024-05-07 | End: 2024-05-07 | Stop reason: HOSPADM

## 2024-05-07 RX ORDER — CYCLOBENZAPRINE HCL 10 MG
10 TABLET ORAL 3 TIMES DAILY PRN
Qty: 30 TABLET | Refills: 1 | Status: SHIPPED | OUTPATIENT
Start: 2024-05-07 | End: 2024-05-07

## 2024-05-07 RX ORDER — ASPIRIN 81 MG/1
81 TABLET, CHEWABLE ORAL DAILY
Status: DISCONTINUED | OUTPATIENT
Start: 2024-05-07 | End: 2024-05-07

## 2024-05-07 RX ORDER — ONDANSETRON 2 MG/ML
4 INJECTION INTRAMUSCULAR; INTRAVENOUS EVERY 6 HOURS PRN
Status: DISCONTINUED | OUTPATIENT
Start: 2024-05-07 | End: 2024-05-07

## 2024-05-07 RX ORDER — RANOLAZINE 500 MG/1
500 TABLET, EXTENDED RELEASE ORAL 2 TIMES DAILY
Qty: 60 TABLET | Refills: 3 | Status: SHIPPED | OUTPATIENT
Start: 2024-05-07 | End: 2024-05-07

## 2024-05-07 RX ORDER — ISOSORBIDE MONONITRATE 30 MG/1
30 TABLET, EXTENDED RELEASE ORAL DAILY
Qty: 30 TABLET | Refills: 3 | Status: SHIPPED | OUTPATIENT
Start: 2024-05-07

## 2024-05-07 RX ORDER — ATORVASTATIN CALCIUM 40 MG/1
40 TABLET, FILM COATED ORAL NIGHTLY
Status: DISCONTINUED | OUTPATIENT
Start: 2024-05-07 | End: 2024-05-07 | Stop reason: HOSPADM

## 2024-05-07 RX ORDER — ISOSORBIDE MONONITRATE 30 MG/1
30 TABLET, EXTENDED RELEASE ORAL DAILY
Status: DISCONTINUED | OUTPATIENT
Start: 2024-05-07 | End: 2024-05-07 | Stop reason: HOSPADM

## 2024-05-07 RX ORDER — ATORVASTATIN CALCIUM 40 MG/1
40 TABLET, FILM COATED ORAL DAILY
Qty: 30 TABLET | Refills: 3 | Status: SHIPPED | OUTPATIENT
Start: 2024-05-07 | End: 2024-05-07

## 2024-05-07 RX ORDER — NICOTINE 21 MG/24HR
1 PATCH, TRANSDERMAL 24 HOURS TRANSDERMAL DAILY PRN
Status: DISCONTINUED | OUTPATIENT
Start: 2024-05-07 | End: 2024-05-07 | Stop reason: HOSPADM

## 2024-05-07 RX ORDER — PRAZOSIN HYDROCHLORIDE 1 MG/1
2 CAPSULE ORAL NIGHTLY
Status: DISCONTINUED | OUTPATIENT
Start: 2024-05-07 | End: 2024-05-07

## 2024-05-07 RX ORDER — POLYETHYLENE GLYCOL 3350 17 G/17G
17 POWDER, FOR SOLUTION ORAL DAILY PRN
Status: DISCONTINUED | OUTPATIENT
Start: 2024-05-07 | End: 2024-05-07 | Stop reason: HOSPADM

## 2024-05-07 RX ORDER — LEVOTHYROXINE SODIUM 0.15 MG/1
150 TABLET ORAL DAILY
Qty: 60 TABLET | Refills: 3 | Status: SHIPPED | OUTPATIENT
Start: 2024-05-07 | End: 2024-05-07

## 2024-05-07 RX ORDER — LEVOTHYROXINE SODIUM 0.12 MG/1
125 TABLET ORAL DAILY
Status: ON HOLD | COMMUNITY
End: 2024-05-07 | Stop reason: HOSPADM

## 2024-05-07 RX ORDER — PRAZOSIN HYDROCHLORIDE 1 MG/1
1 CAPSULE ORAL NIGHTLY
Status: DISCONTINUED | OUTPATIENT
Start: 2024-05-07 | End: 2024-05-07 | Stop reason: HOSPADM

## 2024-05-07 RX ORDER — SODIUM CHLORIDE 0.9 % (FLUSH) 0.9 %
5-40 SYRINGE (ML) INJECTION EVERY 12 HOURS SCHEDULED
Status: DISCONTINUED | OUTPATIENT
Start: 2024-05-07 | End: 2024-05-07 | Stop reason: HOSPADM

## 2024-05-07 RX ORDER — ISOSORBIDE MONONITRATE 30 MG/1
30 TABLET, EXTENDED RELEASE ORAL DAILY
Status: DISCONTINUED | OUTPATIENT
Start: 2024-05-07 | End: 2024-05-07

## 2024-05-07 RX ORDER — NITROGLYCERIN 0.4 MG/1
0.4 TABLET SUBLINGUAL EVERY 5 MIN PRN
Qty: 25 TABLET | Refills: 3 | Status: SHIPPED | OUTPATIENT
Start: 2024-05-07 | End: 2024-05-07

## 2024-05-07 RX ORDER — CLOPIDOGREL BISULFATE 75 MG/1
75 TABLET ORAL DAILY
Status: DISCONTINUED | OUTPATIENT
Start: 2024-05-07 | End: 2024-05-07 | Stop reason: HOSPADM

## 2024-05-07 RX ORDER — CYCLOBENZAPRINE HCL 10 MG
10 TABLET ORAL 3 TIMES DAILY PRN
Qty: 30 TABLET | Refills: 1 | Status: SHIPPED | OUTPATIENT
Start: 2024-05-07

## 2024-05-07 RX ORDER — ALBUTEROL SULFATE 90 UG/1
2 AEROSOL, METERED RESPIRATORY (INHALATION) EVERY 6 HOURS PRN
Status: DISCONTINUED | OUTPATIENT
Start: 2024-05-07 | End: 2024-05-07

## 2024-05-07 RX ORDER — MIRTAZAPINE 15 MG/1
15 TABLET, FILM COATED ORAL NIGHTLY
Status: DISCONTINUED | OUTPATIENT
Start: 2024-05-07 | End: 2024-05-07

## 2024-05-07 RX ORDER — ATORVASTATIN CALCIUM 40 MG/1
40 TABLET, FILM COATED ORAL DAILY
Status: DISCONTINUED | OUTPATIENT
Start: 2024-05-07 | End: 2024-05-07

## 2024-05-07 RX ORDER — OXYCODONE HYDROCHLORIDE 5 MG/1
5 TABLET ORAL EVERY 8 HOURS PRN
Status: DISCONTINUED | OUTPATIENT
Start: 2024-05-07 | End: 2024-05-07 | Stop reason: HOSPADM

## 2024-05-07 RX ORDER — OXYCODONE HYDROCHLORIDE 5 MG/1
5 TABLET ORAL EVERY 8 HOURS PRN
Qty: 12 TABLET | Refills: 0 | Status: SHIPPED | OUTPATIENT
Start: 2024-05-07 | End: 2024-05-12

## 2024-05-07 RX ORDER — ACETAMINOPHEN 325 MG/1
650 TABLET ORAL EVERY 6 HOURS PRN
Status: DISCONTINUED | OUTPATIENT
Start: 2024-05-07 | End: 2024-05-07 | Stop reason: HOSPADM

## 2024-05-07 RX ORDER — RANOLAZINE 500 MG/1
500 TABLET, EXTENDED RELEASE ORAL 2 TIMES DAILY
Status: DISCONTINUED | OUTPATIENT
Start: 2024-05-07 | End: 2024-05-07

## 2024-05-07 RX ORDER — MORPHINE SULFATE 2 MG/ML
2 INJECTION, SOLUTION INTRAMUSCULAR; INTRAVENOUS EVERY 4 HOURS PRN
Status: DISCONTINUED | OUTPATIENT
Start: 2024-05-07 | End: 2024-05-07

## 2024-05-07 RX ORDER — ASPIRIN 81 MG/1
81 TABLET ORAL DAILY
Qty: 30 TABLET | Refills: 3 | Status: SHIPPED | OUTPATIENT
Start: 2024-05-07

## 2024-05-07 RX ORDER — ENOXAPARIN SODIUM 100 MG/ML
40 INJECTION SUBCUTANEOUS DAILY
Status: DISCONTINUED | OUTPATIENT
Start: 2024-05-07 | End: 2024-05-07 | Stop reason: HOSPADM

## 2024-05-07 RX ORDER — LISINOPRIL 20 MG/1
20 TABLET ORAL DAILY
Status: DISCONTINUED | OUTPATIENT
Start: 2024-05-07 | End: 2024-05-07 | Stop reason: HOSPADM

## 2024-05-07 RX ORDER — ATORVASTATIN CALCIUM 40 MG/1
40 TABLET, FILM COATED ORAL DAILY
Qty: 30 TABLET | Refills: 3 | Status: SHIPPED | OUTPATIENT
Start: 2024-05-07

## 2024-05-07 RX ORDER — LEVOTHYROXINE SODIUM 0.15 MG/1
150 TABLET ORAL DAILY
Qty: 60 TABLET | Refills: 3 | Status: SHIPPED | OUTPATIENT
Start: 2024-05-07

## 2024-05-07 RX ORDER — PRAZOSIN HYDROCHLORIDE 2 MG/1
2 CAPSULE ORAL NIGHTLY
Qty: 60 CAPSULE | Refills: 3 | Status: SHIPPED | OUTPATIENT
Start: 2024-05-07 | End: 2024-05-07

## 2024-05-07 RX ORDER — LISINOPRIL 20 MG/1
20 TABLET ORAL DAILY
Qty: 30 TABLET | Refills: 3 | Status: SHIPPED | OUTPATIENT
Start: 2024-05-07

## 2024-05-07 RX ORDER — ALBUTEROL SULFATE 90 UG/1
2 AEROSOL, METERED RESPIRATORY (INHALATION) EVERY 6 HOURS PRN
Qty: 18 G | Refills: 3 | Status: SHIPPED | OUTPATIENT
Start: 2024-05-07 | End: 2024-05-07

## 2024-05-07 RX ORDER — ACETAMINOPHEN 650 MG/1
650 SUPPOSITORY RECTAL EVERY 6 HOURS PRN
Status: DISCONTINUED | OUTPATIENT
Start: 2024-05-07 | End: 2024-05-07 | Stop reason: HOSPADM

## 2024-05-07 RX ORDER — RANOLAZINE 500 MG/1
500 TABLET, EXTENDED RELEASE ORAL 2 TIMES DAILY
Qty: 60 TABLET | Refills: 3 | Status: SHIPPED | OUTPATIENT
Start: 2024-05-07

## 2024-05-07 RX ORDER — PRAZOSIN HYDROCHLORIDE 2 MG/1
2 CAPSULE ORAL NIGHTLY
Qty: 60 CAPSULE | Refills: 3 | Status: SHIPPED | OUTPATIENT
Start: 2024-05-07

## 2024-05-07 RX ORDER — ISOSORBIDE MONONITRATE 30 MG/1
30 TABLET, EXTENDED RELEASE ORAL DAILY
Qty: 30 TABLET | Refills: 3 | Status: SHIPPED | OUTPATIENT
Start: 2024-05-07 | End: 2024-05-07

## 2024-05-07 RX ORDER — CLOPIDOGREL BISULFATE 75 MG/1
75 TABLET ORAL DAILY
Qty: 30 TABLET | Refills: 3 | Status: SHIPPED | OUTPATIENT
Start: 2024-05-07 | End: 2024-05-07

## 2024-05-07 RX ORDER — ALBUTEROL SULFATE 90 UG/1
2 AEROSOL, METERED RESPIRATORY (INHALATION) EVERY 6 HOURS PRN
Qty: 18 G | Refills: 3 | Status: SHIPPED | OUTPATIENT
Start: 2024-05-07

## 2024-05-07 RX ORDER — POTASSIUM CHLORIDE 7.45 MG/ML
10 INJECTION INTRAVENOUS PRN
Status: DISCONTINUED | OUTPATIENT
Start: 2024-05-07 | End: 2024-05-07 | Stop reason: HOSPADM

## 2024-05-07 RX ORDER — ONDANSETRON 4 MG/1
4 TABLET, ORALLY DISINTEGRATING ORAL EVERY 8 HOURS PRN
Status: DISCONTINUED | OUTPATIENT
Start: 2024-05-07 | End: 2024-05-07

## 2024-05-07 RX ORDER — MAGNESIUM SULFATE IN WATER 40 MG/ML
2000 INJECTION, SOLUTION INTRAVENOUS PRN
Status: DISCONTINUED | OUTPATIENT
Start: 2024-05-07 | End: 2024-05-07 | Stop reason: HOSPADM

## 2024-05-07 RX ORDER — POTASSIUM CHLORIDE 20 MEQ/1
40 TABLET, EXTENDED RELEASE ORAL PRN
Status: DISCONTINUED | OUTPATIENT
Start: 2024-05-07 | End: 2024-05-07 | Stop reason: HOSPADM

## 2024-05-07 RX ORDER — PANTOPRAZOLE SODIUM 40 MG/1
40 TABLET, DELAYED RELEASE ORAL
Qty: 60 TABLET | Refills: 0 | Status: SHIPPED | OUTPATIENT
Start: 2024-05-07 | End: 2024-05-07

## 2024-05-07 RX ORDER — PANTOPRAZOLE SODIUM 40 MG/1
40 TABLET, DELAYED RELEASE ORAL
Status: DISCONTINUED | OUTPATIENT
Start: 2024-05-07 | End: 2024-05-07

## 2024-05-07 RX ORDER — SODIUM CHLORIDE 9 MG/ML
INJECTION, SOLUTION INTRAVENOUS PRN
Status: DISCONTINUED | OUTPATIENT
Start: 2024-05-07 | End: 2024-05-07 | Stop reason: HOSPADM

## 2024-05-07 RX ORDER — PANTOPRAZOLE SODIUM 40 MG/1
40 TABLET, DELAYED RELEASE ORAL
Qty: 60 TABLET | Refills: 0 | Status: SHIPPED | OUTPATIENT
Start: 2024-05-07

## 2024-05-07 RX ORDER — LISINOPRIL 20 MG/1
20 TABLET ORAL DAILY
Qty: 30 TABLET | Refills: 3 | Status: SHIPPED | OUTPATIENT
Start: 2024-05-07 | End: 2024-05-07

## 2024-05-07 RX ADMIN — ENOXAPARIN SODIUM 40 MG: 100 INJECTION SUBCUTANEOUS at 09:19

## 2024-05-07 RX ADMIN — SODIUM CHLORIDE, PRESERVATIVE FREE 10 ML: 5 INJECTION INTRAVENOUS at 09:20

## 2024-05-07 RX ADMIN — CLOPIDOGREL BISULFATE 75 MG: 75 TABLET ORAL at 09:19

## 2024-05-07 RX ADMIN — PRAZOSIN HYDROCHLORIDE 1 MG: 1 CAPSULE ORAL at 02:41

## 2024-05-07 RX ADMIN — LEVOTHYROXINE SODIUM 150 MCG: 0.12 TABLET ORAL at 05:54

## 2024-05-07 RX ADMIN — MORPHINE SULFATE 2 MG: 2 INJECTION, SOLUTION INTRAMUSCULAR; INTRAVENOUS at 10:48

## 2024-05-07 RX ADMIN — ASPIRIN 81 MG: 81 TABLET, COATED ORAL at 09:19

## 2024-05-07 RX ADMIN — MORPHINE SULFATE 2 MG: 2 INJECTION, SOLUTION INTRAMUSCULAR; INTRAVENOUS at 05:54

## 2024-05-07 RX ADMIN — MORPHINE SULFATE 2 MG: 2 INJECTION, SOLUTION INTRAMUSCULAR; INTRAVENOUS at 01:53

## 2024-05-07 RX ADMIN — ATORVASTATIN CALCIUM 40 MG: 40 TABLET, FILM COATED ORAL at 01:53

## 2024-05-07 RX ADMIN — LISINOPRIL 20 MG: 20 TABLET ORAL at 09:18

## 2024-05-07 RX ADMIN — ISOSORBIDE MONONITRATE 30 MG: 30 TABLET, EXTENDED RELEASE ORAL at 09:19

## 2024-05-07 ASSESSMENT — PAIN SCALES - GENERAL
PAINLEVEL_OUTOF10: 8
PAINLEVEL_OUTOF10: 8
PAINLEVEL_OUTOF10: 10
PAINLEVEL_OUTOF10: 9
PAINLEVEL_OUTOF10: 8

## 2024-05-07 ASSESSMENT — PAIN DESCRIPTION - LOCATION
LOCATION: BACK;CHEST
LOCATION: CHEST
LOCATION: CHEST;BACK
LOCATION: CHEST
LOCATION: BACK

## 2024-05-07 ASSESSMENT — PAIN DESCRIPTION - ORIENTATION
ORIENTATION: ANTERIOR
ORIENTATION: ANTERIOR

## 2024-05-07 ASSESSMENT — PAIN DESCRIPTION - PAIN TYPE: TYPE: CHRONIC PAIN

## 2024-05-07 ASSESSMENT — PAIN DESCRIPTION - DESCRIPTORS
DESCRIPTORS: ACHING
DESCRIPTORS: DISCOMFORT

## 2024-05-07 NOTE — PLAN OF CARE
Problem: Discharge Planning  Goal: Discharge to home or other facility with appropriate resources  Outcome: Progressing    Discharge to home or other facility with appropriate resources:   Identify barriers to discharge with patient and caregiver   Identify discharge learning needs (meds, wound care, etc)   Refer to discharge planning if patient needs post-hospital services based on physician order or complex needs related to functional status, cognitive ability or social support system   Arrange for needed discharge resources and transportation as appropriate   Arrange for interpreters to assist at discharge as needed     Problem: Pain  Goal: Verbalizes/displays adequate comfort level or baseline comfort level  Outcome: Progressing  Verbalizes/displays adequate comfort level or baseline comfort level: Encourage patient to monitor pain and request assistance     Problem: Chronic Conditions and Co-morbidities  Goal: Patient's chronic conditions and co-morbidity symptoms are monitored and maintained or improved  Outcome: Progressing
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
## 2024-05-07 NOTE — CARE COORDINATION
Case Management Assessment  Initial Evaluation    Date/Time of Evaluation: 5/7/2024 2:37 PM  Assessment Completed by: Jemma Otero RN    If patient is discharged prior to next notation, then this note serves as note for discharge by case management.    Patient Name: Ashutosh Donovan                   YOB: 1970  Diagnosis: Alcohol withdrawal delirium (HCC) [F10.931]  Essential hypertension [I10]  Coronary atherosclerosis due to lipid rich plaque [I25.10, I25.83]  Chest pain, unspecified type [R07.9]                   Date / Time: 5/6/2024  9:30 PM    Patient Admission Status: Observation   Readmission Risk (Low < 19, Mod (19-27), High > 27): Readmission Risk Score: 10.7    Current PCP: No primary care provider on file.  PCP verified by CM? (P) No    Chart Reviewed: Yes      History Provided by: Patient  Patient Orientation: Alert and Oriented, Person, Place, Situation    Patient Cognition: Alert    Hospitalization in the last 30 days (Readmission):  No    If yes, Readmission Assessment in CM Navigator will be completed.    Advance Directives:      Code Status: Full Code   Patient's Primary Decision Maker is: Named in Scanned ACP Document    Primary Decision Maker: Zion RoweKaylin - [facility] - [phone]    Primary Decision Maker: Juanjose Donovan - Brother/Sister    Secondary Decision Maker: Sagar Donovan - Brother/Sister    Discharge Planning:    Patient lives with: (P) Family Members (brother) Type of Home: (P)  (motel)  Primary Care Giver: Self  Patient Support Systems include: Family Members   Current Financial resources: (P) Medicaid, Medicare  Current community resources: (P) None  Current services prior to admission: (P) None            Current DME:              Type of Home Care services:  (P) None    ADLS  Prior functional level: (P) Independent in ADLs/IADLs  Current functional level: (P) Assistance with the following:, Mobility (therapy recommends 4WW at DC)    PT AM-PAC: 23

## 2024-05-07 NOTE — H&P
Reported on 5/6/2024 4/13/24   Jesus Manuel Chamberlain MD   albuterol sulfate HFA (PROVENTIL;VENTOLIN;PROAIR) 108 (90 Base) MCG/ACT inhaler Inhale 2 puffs into the lungs every 6 hours as needed for Shortness of Breath or Wheezing  Patient not taking: Reported on 5/6/2024 4/13/24   Jesus Manuel Chamberlain MD   pantoprazole (PROTONIX) 40 MG tablet Take 1 tablet by mouth every morning (before breakfast)  Patient not taking: Reported on 5/6/2024 4/13/24   Jesus Manuel Chamberlain MD   calcium carbonate (TUMS) 500 MG chewable tablet Take 2 tablets by mouth 3 times daily as needed for Heartburn  Patient not taking: Reported on 5/6/2024 4/13/24 5/13/24  Jesus Manuel Chamberlain MD   nicotine (NICODERM CQ) 21 MG/24HR Place 1 patch onto the skin daily as needed    Provider, MD Zeeshan       Labs: Personally reviewed and interpreted for clinical significance.   Recent Labs     05/06/24 2217   WBC 6.1   HGB 12.3*   HCT 36.6*        Recent Labs     05/06/24  2217      K 3.9      CO2 23   BUN 16   CREATININE 1.6*   CALCIUM 9.7     Recent Labs     05/06/24  2217 05/06/24  2332 05/07/24  0155   PROBNP 93  --   --    TROPHS 28* 27* 25*     No results for input(s): \"LABA1C\" in the last 72 hours.  Recent Labs     05/06/24  2217   AST 28   ALT 10   BILITOT <0.2   ALKPHOS 56     No results for input(s): \"INR\", \"LACTA\", \"TSH\" in the last 72 hours.     Ivon White MD

## 2024-05-07 NOTE — CARE COORDINATION
Discharge Planning Assessment:     Patient admitted as Observation/OPIB with an anticipated short hospitalization length of stay. Chart reviewed and it appears that patient has minimal needs for discharge at this time. Discussed with patient’s nurse and requested that case management be notified if discharge needs are identified.     *Case management will continue to follow progress and update discharge plan as needed.
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
## 2024-05-07 NOTE — PROGRESS NOTES
Hospitalist Progress Note      PCP: No primary care provider on file.    Date of Admission: 5/6/2024    LOS: 0    Chief Complaint:   Chief Complaint   Patient presents with    Chest Pain     Pt brought by [facility] from home. Pt states that he has had 11 Mis and 9 stents in the past. Pt states CP in left chest into left arm and left jaw pain. Pt took 3 nitro and 324 aspirin at home today. EMS en route gave 1 more nitro        Case Summary:   53-year-old gentleman with history of CAD, hypertension, stage III CKD, hypothyroidism who is noncompliant with his cardiac medications and has not been taking them for over the last month presenting with some chest pain/pressure.  Patient was seen for atypical chest pain by cardiology and no further workup recommended.  He was initiated on home cardiac medication with symptom improvement.  Of note he had last left heart cath with nonobstructive disease 12/2023.  He had a normal stress test 4/2024 with a EF of 55%.  Patient had been out of his medication for approximately a month.      Active Hospital Problems    Diagnosis Date Noted    Chest pain [R07.9] 05/06/2024    Dyslipidemia [E78.5] 04/13/2024    Chronic obstructive pulmonary disease (HCC) [J44.9] 11/20/2023    Coronary artery disease of native artery of native heart with stable angina pectoris (HCC) [I25.118] 11/20/2023    Major depression, recurrent (HCC) [F33.9] 11/20/2023    DDD (degenerative disc disease), lumbosacral [M51.37] 04/13/2023         Principal Problem:    Chest pain  Active Problems:    DDD (degenerative disc disease), lumbosacral    Major depression, recurrent (HCC)    Coronary artery disease of native artery of native heart with stable angina pectoris (HCC)    Chronic obstructive pulmonary disease (HCC)    Dyslipidemia  Resolved Problems:    * No resolved hospital problems. *  Chest pain improved.  Troponin elevation not consistent with ACS as per cardiology but likely due to hypertension 
  [facility] - Inpatient Rehabilitation Department   Phone: [phone]    Physical Therapy    [x] Initial Evaluation            [] Daily Treatment Note         [x] Discharge Summary      Patient: Ashutosh Donovan   : 1970   MRN: [id number]   Date of Service:  2024  Admitting Diagnosis: Chest pain  Current Admission Summary: The pt was admitted with chest pain and increased BP.  Found to have ran out of his BP medications.  Cardiology ordered his medications to be restarted.  MRI of lumbar spine showed severe L4-5 foraminal narrowing on the R and moderate narrowing on the L at L5-S1.    Past Medical History:  has a past medical history of Arthritis, CAD (coronary artery disease), CHF (congestive heart failure) ([facility]), Chronic pain, Heart attack (HCC), and Hypothyroid.  Past Surgical History:  has a past surgical history that includes Coronary stent placement; Cholecystectomy; and Tonsillectomy.  Discharge Recommendations: Ashutosh Donovan scored a 23/24 on the AM-PAC short mobility form.  At this time, no further PT is recommended upon discharge due to the pt's wishes.  He would benefit from HHPT or OP PT but he wishes to follow-up with his spine surgeon first.  Recommend patient returns to prior setting with prior services.      DME Required For Discharge: rollator (4 wheel walker) -The pt requires frequent seated rest breaks due to severe lumbar stenosis which causes his legs to give out and him to fall.  He has a decreased standing and ambulation tolerance.   Precautions/Restrictions:  no fall risk in chart  Weight Bearing Restrictions: no restrictions      Pre-Admission Information   Lives With: family, brother and sister in law     Type of Home: hotel, The pt and his family lost their housing at the beginning of this month and moved into a hotel temporarily.  Home Layout: one level  Home Access: elevator  Bathroom Layout: tub/shower unit  Toilet Height: standard height  Home 
Assessment and VS complete. See flowsheet. Pt A&O. Pt c/o chest pain 10/10. EKG obtained and serial troponins ordered. Sent message to Hospitalist- see new order for Morphine. Pt states he does have numbness to BLE, which is intermittent and chronic. Denies pain elsewhere, except chronic back pain. POC discussed at length. Pt denies further needs. Call light explained and in reach.    
CLINICAL PHARMACY NOTE: MEDS TO BEDS    Total # of Prescriptions Filled:  14   The following medications were delivered to the patient:  PANTOPRAZOLE SODIUM 40MG TBEC  ASPIRIN LOW DOSE 81MG TBEC  NITROLOGYCERIN 0.4MG SUBL  CYCLOBENZAPRINE HCL 10MG TABS  ISOSORBIDE MONONITRATE ER 30 TB24  METOPROLOL TARTRATE 25MG TABS  PRAZOSIN HCL 2MG CAPS  LISINOPRIL 20MG TABS  LEVOTHYROXINE SODIUM 150MCG TABS  RANOLAZINE ER 500MG TB12  OXYCODONE HCL 5MG TABS  ALBUTEROL SULFATE  AERS  ATORVASTATIN CALCIUM 40MG TABS  CLOPIDOGREL BISULFATE 75MG TABS    Additional Documentation: All CASTELAN picked up=signed  [facility]  
(FLEXERIL) 10 MG tablet Take 1 tablet by mouth 3 times daily as needed for Muscle spasms (Patient not taking: Reported on 5/6/2024) 30 tablet 1    albuterol sulfate HFA (PROVENTIL;VENTOLIN;PROAIR) 108 (90 Base) MCG/ACT inhaler Inhale 2 puffs into the lungs every 6 hours as needed for Shortness of Breath or Wheezing (Patient not taking: Reported on 5/6/2024) 18 g 3    pantoprazole (PROTONIX) 40 MG tablet Take 1 tablet by mouth every morning (before breakfast) (Patient not taking: Reported on 5/6/2024) 60 tablet 0    calcium carbonate (TUMS) 500 MG chewable tablet Take 2 tablets by mouth 3 times daily as needed for Heartburn (Patient not taking: Reported on 5/6/2024) 90 tablet 3    nicotine (NICODERM CQ) 21 MG/24HR Place 1 patch onto the skin daily as needed         Patient is no longer taking albuterol, atorvastatin, calcium carbonate, Flexeril, Imdur, Synthroid, Lopressor, mirtazapine, pantoprazole, or ranolazine: patient has been out of all of these for about a month and is not able to get refills right now.    Of note, patient did take the prescriptions and aspirin he hasn't run out of yet today prior to ED arrival.    Timing of last doses updated.    Thank you,  Domonique Galeano, DANNYhT

## 2024-05-07 NOTE — ED NOTES
ED TO INPATIENT SBAR HANDOFF    Patient Name: Ashutosh Donovan   :  1970  53 y.o.   MRN:  [id number]  Preferred Name  Ashutosh  ED Room #:  ED-0006/06  Family/Caregiver Present no   Restraints no   Sitter no   Sepsis Risk Score Sepsis Risk Score: 0.92    Situation  Code Status: Prior No additional code details.    Allergies: Latex, Ziprasidone hcl, Zofran [ondansetron hcl], Acetaminophen, Bupropion, Ketorolac tromethamine, Lithium, Olanzapine, Risperidone, Tramadol, Aspirin, and Ibuprofen  Weight: Patient Vitals for the past 96 hrs (Last 3 readings):   Weight   24 2138 83.9 kg (185 lb)     Arrived from: home  Chief Complaint:   Chief Complaint   Patient presents with    Chest Pain     Pt brought by [facility] from home. Pt states that he has had 11 Mis and 9 stents in the past. Pt states CP in left chest into left arm and left jaw pain. Pt took 3 nitro and 324 aspirin at home today. EMS en route gave 1 more nitro      Hospital Problem/Diagnosis:  Active Problems:    * No active hospital problems. *  Resolved Problems:    * No resolved hospital problems. *    Imaging:   XR CHEST (2 VW)   Final Result   Stable chronic changes with no acute abnormality seen           Abnormal labs:   Abnormal Labs Reviewed   CBC WITH AUTO DIFFERENTIAL - Abnormal; Notable for the following components:       Result Value    RBC 4.13 (*)     Hemoglobin 12.3 (*)     Hematocrit 36.6 (*)     RDW 16.0 (*)     All other components within normal limits   COMPREHENSIVE METABOLIC PANEL W/ REFLEX TO MG FOR LOW K - Abnormal; Notable for the following components:    Creatinine 1.6 (*)     Est, Glom Filt Rate 51 (*)     All other components within normal limits   TROPONIN - Abnormal; Notable for the following components:    Troponin, High Sensitivity 28 (*)     All other components within normal limits     Critical values: no     Abnormal Assessment Findings: n/a    Background  History:   Past Medical History:   Diagnosis Date

## 2024-05-07 NOTE — ED NOTES
How does patient ambulate?   []Low Fall Risk (ambulates by themselves without support)  []Stand by assist   []Contact Guard   []Front wheel walker  []Wheelchair   []Steady  []Bed bound  []History of Lower Extremity Amputation  [x]Unknown, did not assess in the emergency department   How does patient take pills?  []Whole with Water  []Crushed in applesauce  []Crushed in pudding  []Other  [x]Unknown no oral medications were given in the ED  Is patient alert?   [x]Alert  []Drowsy but responds to voice  []Doesn't respond to voice but responds to painful stimuli  []Unresponsive  Is patient oriented?   [x]To person  [x]To place  [x]To time  [x]To situation  []Confused  []Agitated  [x]Follows commands  If patient is disoriented or from a Skill Nursing Facility has family been notified of admission?   []Yes   [x]No  Patient belongings?   []Cell phone  []Wallet   []Dentures  [x]Clothing  Any specific patient or family belongings/needs/dynamics?   N/a  Miscellaneous comments/pending orders?  N/a     If there are any additional questions please reach out to the Emergency Department.

## 2024-05-07 NOTE — CONSULTS
[facility]  Cardiology Note  [phone]      Chief Complaint   Patient presents with    Chest Pain     Pt brought by [facility] from home. Pt states that he has had 11 Mis and 9 stents in the past. Pt states CP in left chest into left arm and left jaw pain. Pt took 3 nitro and 324 aspirin at home today. EMS en route gave 1 more nitro         History of Present Illness:  Ashutosh Donovan is a 53 y.o. patient PMHx   CAD s/p PCI to mLAD (2 layers), D1, mLCx, mRCA,  of PDA with L-R collaterals, HTN, CKD-IIIa, tobacco abuse, EtOH abuse, Hx of opioid abuse who presented to the hospital with complaints of chest pain.    Patient admitted for same at [facility] 4/2024 where he underwent stress testing with normal perfusion    Patient reports stabbing chest pain for approximately 36 hours that has only been relieved by pain medications. He received nitro in the ED and he feels that was not helpful. He ran out of several medications approximately 1 month ago and feels like his CP has been recurring on and off since that time. He lives a predominantly sedentary lifestyle but does not feel his CP changes even when he exerts himself minimally.     Past Medical History:   has a past medical history of Arthritis, CAD (coronary artery disease), CHF (congestive heart failure) (HCC), Chronic pain, Heart attack (HCC), and Hypothyroid.    Surgical History:   has a past surgical history that includes Coronary stent placement; Cholecystectomy; and Tonsillectomy.     Social History:   reports that he has been smoking cigarettes. He has never used smokeless tobacco. He reports current drug use. Drug: Marijuana (Weed). He reports that he does not drink alcohol.     Family History:  History reviewed. No pertinent family history.    Home Medications:  Were reviewed and are listed in nursing record. and/or listed below  Prior to Admission medications    Medication Sig Start Date End Date Taking? Authorizing Provider

## 2024-05-08 LAB
EKG ATRIAL RATE: 55 BPM
EKG ATRIAL RATE: 64 BPM
EKG DIAGNOSIS: NORMAL
EKG DIAGNOSIS: NORMAL
EKG P AXIS: 53 DEGREES
EKG P AXIS: 74 DEGREES
EKG P-R INTERVAL: 178 MS
EKG P-R INTERVAL: 200 MS
EKG Q-T INTERVAL: 418 MS
EKG Q-T INTERVAL: 458 MS
EKG QRS DURATION: 120 MS
EKG QRS DURATION: 128 MS
EKG QTC CALCULATION (BAZETT): 431 MS
EKG QTC CALCULATION (BAZETT): 438 MS
EKG R AXIS: 43 DEGREES
EKG R AXIS: 76 DEGREES
EKG T AXIS: 62 DEGREES
EKG T AXIS: 73 DEGREES
EKG VENTRICULAR RATE: 55 BPM
EKG VENTRICULAR RATE: 64 BPM

## 2024-05-08 PROCEDURE — 93010 ELECTROCARDIOGRAM REPORT: CPT | Performed by: INTERNAL MEDICINE

## 2024-05-09 ENCOUNTER — APPOINTMENT (OUTPATIENT)
Dept: CT IMAGING | Age: 54
End: 2024-05-09
Payer: MEDICARE

## 2024-05-09 ENCOUNTER — HOSPITAL ENCOUNTER (OUTPATIENT)
Age: 54
Setting detail: OBSERVATION
Discharge: HOME | End: 2024-05-11
Attending: EMERGENCY MEDICINE | Admitting: INTERNAL MEDICINE
Payer: MEDICARE

## 2024-05-09 ENCOUNTER — APPOINTMENT (OUTPATIENT)
Dept: GENERAL RADIOLOGY | Age: 54
End: 2024-05-09
Payer: MEDICARE

## 2024-05-09 DIAGNOSIS — R51.9 HEADACHE, UNSPECIFIED HEADACHE TYPE: ICD-10-CM

## 2024-05-09 DIAGNOSIS — R07.89 CHEST DISCOMFORT: ICD-10-CM

## 2024-05-09 DIAGNOSIS — R20.0 BILATERAL HAND NUMBNESS: ICD-10-CM

## 2024-05-09 DIAGNOSIS — N18.9 CHRONIC KIDNEY DISEASE, UNSPECIFIED CKD STAGE: ICD-10-CM

## 2024-05-09 DIAGNOSIS — R79.89 ELEVATED TROPONIN: ICD-10-CM

## 2024-05-09 DIAGNOSIS — R20.0 RIGHT FACIAL NUMBNESS: Primary | ICD-10-CM

## 2024-05-09 DIAGNOSIS — I67.9 CEREBRAL VASCULAR DISEASE: ICD-10-CM

## 2024-05-09 PROBLEM — G45.9 TIA (TRANSIENT ISCHEMIC ATTACK): Status: ACTIVE | Noted: 2024-05-09

## 2024-05-09 LAB
ALBUMIN SERPL-MCNC: 4.5 G/DL (ref 3.4–5)
ALBUMIN/GLOB SERPL: 1.5 {RATIO} (ref 1.1–2.2)
ALP SERPL-CCNC: 62 U/L (ref 40–129)
ALT SERPL-CCNC: 11 U/L (ref 10–40)
ANION GAP SERPL CALCULATED.3IONS-SCNC: 13 MMOL/L (ref 3–16)
AST SERPL-CCNC: 31 U/L (ref 15–37)
BASOPHILS # BLD: 0.1 K/UL (ref 0–0.2)
BASOPHILS NFR BLD: 1.3 %
BILIRUB SERPL-MCNC: <0.2 MG/DL (ref 0–1)
BUN SERPL-MCNC: 13 MG/DL (ref 7–20)
CALCIUM SERPL-MCNC: 10.1 MG/DL (ref 8.3–10.6)
CHLORIDE SERPL-SCNC: 106 MMOL/L (ref 99–110)
CO2 SERPL-SCNC: 23 MMOL/L (ref 21–32)
CREAT SERPL-MCNC: 2 MG/DL (ref 0.9–1.3)
DEPRECATED RDW RBC AUTO: 16.1 % (ref 12.4–15.4)
EOSINOPHIL # BLD: 0.1 K/UL (ref 0–0.6)
EOSINOPHIL NFR BLD: 1.5 %
GFR SERPLBLD CREATININE-BSD FMLA CKD-EPI: 39 ML/MIN/{1.73_M2}
GLUCOSE BLD-MCNC: 92 MG/DL (ref 70–99)
GLUCOSE BLD-MCNC: 93 MG/DL (ref 70–99)
GLUCOSE SERPL-MCNC: 90 MG/DL (ref 70–99)
HCT VFR BLD AUTO: 40.5 % (ref 40.5–52.5)
HGB BLD-MCNC: 13.5 G/DL (ref 13.5–17.5)
INR PPP: 0.87 (ref 0.85–1.15)
LYMPHOCYTES # BLD: 2.1 K/UL (ref 1–5.1)
LYMPHOCYTES NFR BLD: 31 %
MCH RBC QN AUTO: 29.7 PG (ref 26–34)
MCHC RBC AUTO-ENTMCNC: 33.4 G/DL (ref 31–36)
MCV RBC AUTO: 88.8 FL (ref 80–100)
MONOCYTES # BLD: 0.6 K/UL (ref 0–1.3)
MONOCYTES NFR BLD: 8.4 %
NEUTROPHILS # BLD: 3.9 K/UL (ref 1.7–7.7)
NEUTROPHILS NFR BLD: 57.8 %
PERFORMED ON: NORMAL
PERFORMED ON: NORMAL
PLATELET # BLD AUTO: 200 K/UL (ref 135–450)
PMV BLD AUTO: 9.2 FL (ref 5–10.5)
POTASSIUM SERPL-SCNC: 4.4 MMOL/L (ref 3.5–5.1)
PROT SERPL-MCNC: 7.6 G/DL (ref 6.4–8.2)
PROTHROMBIN TIME: 12 SEC (ref 11.9–14.9)
RBC # BLD AUTO: 4.56 M/UL (ref 4.2–5.9)
SODIUM SERPL-SCNC: 142 MMOL/L (ref 136–145)
TROPONIN, HIGH SENSITIVITY: 39 NG/L (ref 0–22)
TROPONIN, HIGH SENSITIVITY: 39 NG/L (ref 0–22)
WBC # BLD AUTO: 6.8 K/UL (ref 4–11)

## 2024-05-09 PROCEDURE — 2580000003 HC RX 258: Performed by: INTERNAL MEDICINE

## 2024-05-09 PROCEDURE — 70498 CT ANGIOGRAPHY NECK: CPT

## 2024-05-09 PROCEDURE — 93005 ELECTROCARDIOGRAM TRACING: CPT | Performed by: EMERGENCY MEDICINE

## 2024-05-09 PROCEDURE — G0378 HOSPITAL OBSERVATION PER HR: HCPCS

## 2024-05-09 PROCEDURE — 6370000000 HC RX 637 (ALT 250 FOR IP): Performed by: INTERNAL MEDICINE

## 2024-05-09 PROCEDURE — 6370000000 HC RX 637 (ALT 250 FOR IP): Performed by: EMERGENCY MEDICINE

## 2024-05-09 PROCEDURE — 80053 COMPREHEN METABOLIC PANEL: CPT

## 2024-05-09 PROCEDURE — 36415 COLL VENOUS BLD VENIPUNCTURE: CPT

## 2024-05-09 PROCEDURE — 85610 PROTHROMBIN TIME: CPT

## 2024-05-09 PROCEDURE — 70450 CT HEAD/BRAIN W/O DYE: CPT

## 2024-05-09 PROCEDURE — 84484 ASSAY OF TROPONIN QUANT: CPT

## 2024-05-09 PROCEDURE — 99285 EMERGENCY DEPT VISIT HI MDM: CPT

## 2024-05-09 PROCEDURE — 71045 X-RAY EXAM CHEST 1 VIEW: CPT

## 2024-05-09 PROCEDURE — 85025 COMPLETE CBC W/AUTO DIFF WBC: CPT

## 2024-05-09 PROCEDURE — 6360000004 HC RX CONTRAST MEDICATION: Performed by: EMERGENCY MEDICINE

## 2024-05-09 RX ORDER — SODIUM CHLORIDE 0.9 % (FLUSH) 0.9 %
5-40 SYRINGE (ML) INJECTION EVERY 12 HOURS SCHEDULED
Status: DISCONTINUED | OUTPATIENT
Start: 2024-05-09 | End: 2024-05-11 | Stop reason: HOSPADM

## 2024-05-09 RX ORDER — POLYETHYLENE GLYCOL 3350 17 G/17G
17 POWDER, FOR SOLUTION ORAL DAILY PRN
Status: DISCONTINUED | OUTPATIENT
Start: 2024-05-09 | End: 2024-05-11 | Stop reason: HOSPADM

## 2024-05-09 RX ORDER — LEVOTHYROXINE SODIUM 0.15 MG/1
150 TABLET ORAL DAILY
Status: DISCONTINUED | OUTPATIENT
Start: 2024-05-10 | End: 2024-05-11 | Stop reason: HOSPADM

## 2024-05-09 RX ORDER — ENOXAPARIN SODIUM 100 MG/ML
40 INJECTION SUBCUTANEOUS DAILY
Status: DISCONTINUED | OUTPATIENT
Start: 2024-05-10 | End: 2024-05-11 | Stop reason: HOSPADM

## 2024-05-09 RX ORDER — ATORVASTATIN CALCIUM 40 MG/1
40 TABLET, FILM COATED ORAL DAILY
Status: DISCONTINUED | OUTPATIENT
Start: 2024-05-10 | End: 2024-05-11 | Stop reason: HOSPADM

## 2024-05-09 RX ORDER — CYCLOBENZAPRINE HCL 10 MG
10 TABLET ORAL 3 TIMES DAILY PRN
Status: DISCONTINUED | OUTPATIENT
Start: 2024-05-09 | End: 2024-05-11 | Stop reason: HOSPADM

## 2024-05-09 RX ORDER — PANTOPRAZOLE SODIUM 40 MG/1
40 TABLET, DELAYED RELEASE ORAL
Status: DISCONTINUED | OUTPATIENT
Start: 2024-05-10 | End: 2024-05-11 | Stop reason: HOSPADM

## 2024-05-09 RX ORDER — ASPIRIN 81 MG/1
81 TABLET ORAL DAILY
Status: DISCONTINUED | OUTPATIENT
Start: 2024-05-10 | End: 2024-05-11 | Stop reason: HOSPADM

## 2024-05-09 RX ORDER — OXYCODONE HYDROCHLORIDE AND ACETAMINOPHEN 5; 325 MG/1; MG/1
1 TABLET ORAL ONCE
Status: COMPLETED | OUTPATIENT
Start: 2024-05-09 | End: 2024-05-09

## 2024-05-09 RX ORDER — ONDANSETRON 2 MG/ML
4 INJECTION INTRAMUSCULAR; INTRAVENOUS EVERY 6 HOURS PRN
Status: DISCONTINUED | OUTPATIENT
Start: 2024-05-09 | End: 2024-05-09

## 2024-05-09 RX ORDER — ISOSORBIDE MONONITRATE 30 MG/1
30 TABLET, EXTENDED RELEASE ORAL DAILY
Status: DISCONTINUED | OUTPATIENT
Start: 2024-05-10 | End: 2024-05-11 | Stop reason: HOSPADM

## 2024-05-09 RX ORDER — SODIUM CHLORIDE 9 MG/ML
INJECTION, SOLUTION INTRAVENOUS PRN
Status: DISCONTINUED | OUTPATIENT
Start: 2024-05-09 | End: 2024-05-11 | Stop reason: HOSPADM

## 2024-05-09 RX ORDER — METOCLOPRAMIDE HYDROCHLORIDE 5 MG/ML
10 INJECTION INTRAMUSCULAR; INTRAVENOUS ONCE
Status: DISCONTINUED | OUTPATIENT
Start: 2024-05-09 | End: 2024-05-09

## 2024-05-09 RX ORDER — SODIUM CHLORIDE 0.9 % (FLUSH) 0.9 %
5-40 SYRINGE (ML) INJECTION PRN
Status: DISCONTINUED | OUTPATIENT
Start: 2024-05-09 | End: 2024-05-11 | Stop reason: HOSPADM

## 2024-05-09 RX ORDER — CLOPIDOGREL BISULFATE 75 MG/1
75 TABLET ORAL DAILY
Status: DISCONTINUED | OUTPATIENT
Start: 2024-05-10 | End: 2024-05-11 | Stop reason: HOSPADM

## 2024-05-09 RX ORDER — LISINOPRIL 20 MG/1
20 TABLET ORAL DAILY
Status: DISCONTINUED | OUTPATIENT
Start: 2024-05-10 | End: 2024-05-11 | Stop reason: HOSPADM

## 2024-05-09 RX ORDER — OXYCODONE HYDROCHLORIDE 5 MG/1
5 TABLET ORAL EVERY 8 HOURS PRN
Status: DISCONTINUED | OUTPATIENT
Start: 2024-05-09 | End: 2024-05-11 | Stop reason: HOSPADM

## 2024-05-09 RX ORDER — ONDANSETRON 4 MG/1
4 TABLET, ORALLY DISINTEGRATING ORAL EVERY 8 HOURS PRN
Status: DISCONTINUED | OUTPATIENT
Start: 2024-05-09 | End: 2024-05-09

## 2024-05-09 RX ORDER — PRAZOSIN HYDROCHLORIDE 1 MG/1
2 CAPSULE ORAL NIGHTLY
Status: DISCONTINUED | OUTPATIENT
Start: 2024-05-09 | End: 2024-05-11 | Stop reason: HOSPADM

## 2024-05-09 RX ADMIN — IOPAMIDOL 75 ML: 755 INJECTION, SOLUTION INTRAVENOUS at 14:46

## 2024-05-09 RX ADMIN — SODIUM CHLORIDE, PRESERVATIVE FREE 10 ML: 5 INJECTION INTRAVENOUS at 23:41

## 2024-05-09 RX ADMIN — CYCLOBENZAPRINE 10 MG: 10 TABLET, FILM COATED ORAL at 23:41

## 2024-05-09 RX ADMIN — PRAZOSIN HYDROCHLORIDE 2 MG: 1 CAPSULE ORAL at 23:41

## 2024-05-09 RX ADMIN — OXYCODONE HYDROCHLORIDE AND ACETAMINOPHEN 1 TABLET: 5; 325 TABLET ORAL at 17:11

## 2024-05-09 RX ADMIN — OXYCODONE HYDROCHLORIDE 5 MG: 5 TABLET ORAL at 23:41

## 2024-05-09 RX ADMIN — METOPROLOL TARTRATE 25 MG: 25 TABLET, FILM COATED ORAL at 23:41

## 2024-05-09 ASSESSMENT — PAIN DESCRIPTION - LOCATION
LOCATION: BACK;NECK
LOCATION: BACK;NECK

## 2024-05-09 ASSESSMENT — PAIN SCALES - GENERAL
PAINLEVEL_OUTOF10: 9
PAINLEVEL_OUTOF10: 10
PAINLEVEL_OUTOF10: 9
PAINLEVEL_OUTOF10: 3

## 2024-05-09 ASSESSMENT — PAIN DESCRIPTION - PAIN TYPE: TYPE: CHRONIC PAIN;ACUTE PAIN

## 2024-05-09 NOTE — ED PROVIDER NOTES
[facility] Emergency Department      Pt Name: Ashutosh Donovan  MRN: [id number]  Birthdate 1970  Date of evaluation: 5/9/2024  Provider: ISH CORTES MD  CHIEF COMPLAINT  Chief Complaint   Patient presents with    Numbness     Talking to  in car when both of his hands went numb then the rt side of his face, states the back of his head hurts, started around 2pm     HPI  Ashutosh Donovan is a 53 y.o. male who presents because of concern for a stroke.  He was talking with his  in the car when both of his hands started to feel numb.  Subsequently the right side of his face felt numb.  He has a headache involving the back right part of his head.  He denies any injury.  He denies any focal weakness.  He has never had symptoms like this before.  Symptoms started about 30 minutes prior to arrival.  His  came with him for the initial part of the assessment.   The patient also started developing a burning type left sided chest discomfort since the other symptoms started.  He does have a history of MI and cardiac stents.    REVIEW OF SYSTEMS:  No fever, chest pain, no abdominal pain Pertinent positives and negatives as per the HPI.  All other pertinent review of systems reviewed and negative.  Nursing notes reviewed.    PAST MEDICAL HISTORY  Past Medical History:   Diagnosis Date    Arthritis     CAD (coronary artery disease)     CHF (congestive heart failure) ([facility])     Chronic pain     twister vertebra and two collapsed disc, states injury happened at 49yo    Heart attack ([facility])     pt reports 10 heart attacks in 6 years, 8 stents placed    Hypothyroid      SURGICAL HISTORY  Past Surgical History:   Procedure Laterality Date    CHOLECYSTECTOMY      CORONARY STENT PLACEMENT      8 stents in 6 years    TONSILLECTOMY       MEDICATIONS:  No current facility-administered medications on file prior to encounter.     Current Outpatient Medications on File Prior to Encounter  despite my efforts to edit errors.)       Jaycee Osorio MD  05/10/24 0142

## 2024-05-09 NOTE — ED NOTES
ED TO INPATIENT SBAR HANDOFF    Patient Name: Ashutosh Donovan   :  1970  53 y.o.   MRN:  [id number]  Preferred Name  Adriel   ED Room #:  ED-0027/27  Family/Caregiver Present no   Restraints no   Sitter no   Sepsis Risk Score Sepsis Risk Score: 0.69    Situation  Code Status: Prior No additional code details.    Allergies: Latex, Ziprasidone hcl, Zofran [ondansetron hcl], Acetaminophen, Bupropion, Ketorolac tromethamine, Lithium, Olanzapine, Risperidone, Tramadol, Aspirin, and Ibuprofen  Weight: Patient Vitals for the past 96 hrs (Last 3 readings):   Weight   24 1426 93.4 kg (206 lb)     Arrived from: home  Chief Complaint:   Chief Complaint   Patient presents with    Numbness     Talking to  in car when both of his hands went numb then the rt side of his face, states the back of his head hurts, started around 2pm     Hospital Problem/Diagnosis:  Principal Problem:    TIA (transient ischemic attack)  Resolved Problems:    * No resolved hospital problems. *    Imaging:   XR CHEST PORTABLE   Final Result   No acute cardiopulmonary process.         CTA HEAD NECK W CONTRAST   Final Result   Occlusion of mid to distal V4 segment of the right vertebral artery.      80% stenosis in the mid V4 segment of the left vertebral artery.      Moderate stenosis in P3 segment of the left PCA.      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      60% stenosis at the origin of left internal carotid artery.      80% stenosis at the origin of the left vertebral artery.         CT HEAD WO CONTRAST   Final Result   1.  No evidence of acute intracranial hemorrhage.      2.  There is a small area of loss of gray-white matter differentiation in the   right anterior frontal lobe which appears new since  comparison, favored   nonacute.  Correlate with clinical symptoms.      Discussed with Dr. Jaycee Osorio by Dr. Chand at 2:59 pm on 2024           Abnormal labs:   Abnormal Labs  Reviewed   CBC WITH AUTO DIFFERENTIAL - Abnormal; Notable for the following components:       Result Value    RDW 16.1 (*)     All other components within normal limits   COMPREHENSIVE METABOLIC PANEL W/ REFLEX TO MG FOR LOW K - Abnormal; Notable for the following components:    Creatinine 2.0 (*)     Est, Glom Filt Rate 39 (*)     All other components within normal limits   TROPONIN - Abnormal; Notable for the following components:    Troponin, High Sensitivity 39 (*)     All other components within normal limits   TROPONIN - Abnormal; Notable for the following components:    Troponin, High Sensitivity 39 (*)     All other components within normal limits     Critical values: no     Abnormal Assessment Findings:     Background  History:   Past Medical History:   Diagnosis Date    Arthritis     CAD (coronary artery disease)     CHF (congestive heart failure) ([facility])     Chronic pain     twister vertebra and two collapsed disc, states injury happened at 47yo    Heart attack ([facility])     pt reports 10 heart attacks in 6 years, 8 stents placed    Hypothyroid        Assessment    Vitals/MEWS: MEWS Score: 1  Level of Consciousness: Alert (0)   Vitals:    05/09/24 1716 05/09/24 1729 05/09/24 1745 05/09/24 1814   BP: 132/84 130/79 (!) 107/57 (!) 115/53   Pulse: 63 58 60 62   Resp:       Temp:       TempSrc:       SpO2: 100% 99% 99% 98%   Weight:       Height:         FiO2 (%):   O2 Flow Rate: O2 Device: None (Room air)    Cardiac Rhythm:    Pain Assessment:  [] Verbal [] Haque Baker Scale  Pain Scale: Pain Assessment  Pain Level: 10  Last documented pain score (0-10 scale) Pain Level: 10  Last documented pain medication administered:   Mental Status: oriented, alert, coherent, logical, thought processes intact, and able to concentrate and follow conversation  Orientation Level:    NIH Score: Total: 2  C-SSRS:    Bedside swallow: 3 oz Water Swallow Screen: Pass  Mitchell Coma Scale (GCS): Mitchell Coma Scale  Eye Opening:

## 2024-05-09 NOTE — DISCHARGE SUMMARY
Hospital Medicine Discharge Summary    Patient ID: Ashutosh Donovan      Patient's PCP: No primary care provider on file.    Admit Date: 5/6/2024     Discharge Date: 5/7/2024      Admitting Provider: Ivon White MD     Discharge Provider: Lisa Culp MD     Discharge Diagnoses:       Active Hospital Problems    Diagnosis     Chest pain [R07.9]     Dyslipidemia [E78.5]     Chronic obstructive pulmonary disease (HCC) [J44.9]     Coronary artery disease of native artery of native heart with stable angina pectoris (HCC) [I25.118]     Major depression, recurrent (HCC) [F33.9]     DDD (degenerative disc disease), lumbosacral [M51.37]        The patient was seen and examined on day of discharge and this discharge summary is in conjunction with any daily progress note from day of discharge.    Hospital Course:   The patient is a 53-year-old gentleman with history of CAD, hypertension, stage III CKD, hypothyroidism who is noncompliant with his cardiac medications and has not been taking them for over the last month presenting with some chest pain/pressure. Patient was seen for atypical chest pain by cardiology and no further workup recommended. He was initiated on home cardiac medication with symptom improvement. Of note he had last left heart cath with nonobstructive disease 12/2023. He had a normal stress test 4/2024 with a EF of 55%. Patient had been out of his medication for approximately a month.     Chest pain improved.  Troponin elevation not consistent with ACS as per cardiology but likely due to hypertension due to medication noncompliance.  Patient ran out of several medications a month ago.  BP improved with reinstating his outpatient meds.  No cardiac workup recommended.  Will continue aspirin, Plavix, Lipitor, Toprol, lisinopril and Imdur.  Outpatient follow-up with PCP in 1 to 2 weeks      Physical Exam Performed:     /72   Pulse 57   Temp 98 °F (36.7 °C) (Temporal)

## 2024-05-09 NOTE — ED NOTES
Pt yelling out saying \"hello, hello\" while this RN was in another room with a pt. This RN went into pts room and kindly asked pt to not yell out and that  I was in another room with a patient at that moment. Pt stated he wanted to door all the open since he is claustrophobic, this RN explained that I shut the door for privacy as he was using the urinal at the time. This RN opening the door wide open per pts request.

## 2024-05-09 NOTE — H&P
Hospital Medicine History & Physical      PCP: No primary care provider on file.    Date of Admission: 5/9/2024    Date of Service: Pt seen/examined on 5/9/2024 and  Placed in Observation.    Chief Complaint:    Chief Complaint   Patient presents with    Numbness     Talking to  in car when both of his hands went numb then the rt side of his face, states the back of his head hurts, started around 2pm         History Of Present Illness:    The patient is a 53 y.o. male with history of hypertension, stage III CKD, hypothyroidism, history of CVA who is noncompliant with medication and was discharged 2 days ago following chest pressure found to have atypical chest pain with accelerated hypertension.  At the time he was evaluated by cardiology and no further workup indicated.  He resents today with headache and associated right facial numbness and associated dizziness with nausea but no vomiting.  He also reports numbness to bilateral hands.  CT head with no acute pathology  CT angiogram of the head and neck noted for left vertebral artery stenosis and left internal carotid artery stenosis  EKG sinus rhythm  Chest x-ray clear lung fields      Past Medical History:        Diagnosis Date    Arthritis     CAD (coronary artery disease)     CHF (congestive heart failure) (HCC)     Chronic pain     twister vertebra and two collapsed disc, states injury happened at 49yo    Heart attack ([facility])     pt reports 10 heart attacks in 6 years, 8 stents placed    Hypothyroid        Past Surgical History:        Procedure Laterality Date    CHOLECYSTECTOMY      CORONARY STENT PLACEMENT      8 stents in 6 years    TONSILLECTOMY         Medications Prior to Admission:    Prior to Admission medications    Medication Sig Start Date End Date Taking? Authorizing Provider   oxyCODONE (ROXICODONE) 5 MG immediate release tablet Take 1 tablet by mouth every 8 hours as needed for Pain for up to 5 days. Max Daily Amount: 15 mg

## 2024-05-09 NOTE — CARE COORDINATION
Discharge Planning Note:    CM received a call from this patient informing that he was told at CT that he would be getting a 4ww and it would be delivered to his home. CM completed Chart review to verify that therapy made recommendations for 4WW.  CM obtained DME order for the 4WW and forwarded to Juli at [facility].  Juli will contact pt at [phone] to schedule delivery of 4WW.       Electronically signed by Jemma Otero RN on 5/9/2024 at 1:17 PM

## 2024-05-10 ENCOUNTER — APPOINTMENT (OUTPATIENT)
Dept: MRI IMAGING | Age: 54
End: 2024-05-10
Payer: MEDICARE

## 2024-05-10 ENCOUNTER — APPOINTMENT (OUTPATIENT)
Age: 54
End: 2024-05-10
Attending: INTERNAL MEDICINE
Payer: MEDICARE

## 2024-05-10 LAB
CHOLEST SERPL-MCNC: 232 MG/DL (ref 0–199)
DEPRECATED RDW RBC AUTO: 16.1 % (ref 12.4–15.4)
ECHO AO ASC DIAM: 2.7 CM
ECHO AO ASCENDING AORTA INDEX: 1.28 CM/M2
ECHO AO ROOT DIAM: 2.9 CM
ECHO AO ROOT INDEX: 1.37 CM/M2
ECHO AV AREA PEAK VELOCITY: 1.9 CM2
ECHO AV AREA VTI: 2.3 CM2
ECHO AV AREA/BSA PEAK VELOCITY: 0.9 CM2/M2
ECHO AV AREA/BSA VTI: 1.1 CM2/M2
ECHO AV CUSP MM: 1.9 CM
ECHO AV MEAN GRADIENT: 9 MMHG
ECHO AV MEAN VELOCITY: 1.4 M/S
ECHO AV PEAK GRADIENT: 20 MMHG
ECHO AV PEAK VELOCITY: 2.2 M/S
ECHO AV VELOCITY RATIO: 0.5
ECHO AV VTI: 42.8 CM
ECHO BSA: 2.14 M2
ECHO IVC PROX: 1.7 CM
ECHO LA AREA 2C: 23 CM2
ECHO LA AREA 4C: 22.6 CM2
ECHO LA MAJOR AXIS: 5.8 CM
ECHO LA MINOR AXIS: 6 CM
ECHO LA VOL BP: 71 ML (ref 18–58)
ECHO LA VOL MOD A2C: 71 ML (ref 18–58)
ECHO LA VOL MOD A4C: 70 ML (ref 18–58)
ECHO LA VOL/BSA BIPLANE: 34 ML/M2 (ref 16–34)
ECHO LA VOLUME INDEX MOD A2C: 34 ML/M2 (ref 16–34)
ECHO LA VOLUME INDEX MOD A4C: 33 ML/M2 (ref 16–34)
ECHO LV E' LATERAL VELOCITY: 6 CM/S
ECHO LV E' SEPTAL VELOCITY: 5 CM/S
ECHO LV EDV A2C: 99 ML
ECHO LV EDV A4C: 81 ML
ECHO LV EDV INDEX A4C: 38 ML/M2
ECHO LV EDV NDEX A2C: 47 ML/M2
ECHO LV EJECTION FRACTION A2C: 60 %
ECHO LV EJECTION FRACTION A4C: 62 %
ECHO LV EJECTION FRACTION BIPLANE: 61 % (ref 55–100)
ECHO LV ESV A2C: 39 ML
ECHO LV ESV A4C: 31 ML
ECHO LV ESV INDEX A2C: 18 ML/M2
ECHO LV ESV INDEX A4C: 15 ML/M2
ECHO LV FRACTIONAL SHORTENING: 33 % (ref 28–44)
ECHO LV INTERNAL DIMENSION DIASTOLE INDEX: 2.18 CM/M2
ECHO LV INTERNAL DIMENSION DIASTOLIC: 4.6 CM (ref 4.2–5.9)
ECHO LV INTERNAL DIMENSION SYSTOLIC INDEX: 1.47 CM/M2
ECHO LV INTERNAL DIMENSION SYSTOLIC: 3.1 CM
ECHO LV IVSD: 1.5 CM (ref 0.6–1)
ECHO LV MASS 2D: 284.8 G (ref 88–224)
ECHO LV MASS INDEX 2D: 135 G/M2 (ref 49–115)
ECHO LV POSTERIOR WALL DIASTOLIC: 1.5 CM (ref 0.6–1)
ECHO LV RELATIVE WALL THICKNESS RATIO: 0.65
ECHO LVOT AREA: 3.8 CM2
ECHO LVOT AV VTI INDEX: 0.6
ECHO LVOT DIAM: 2.2 CM
ECHO LVOT MEAN GRADIENT: 3 MMHG
ECHO LVOT PEAK GRADIENT: 5 MMHG
ECHO LVOT PEAK VELOCITY: 1.1 M/S
ECHO LVOT STROKE VOLUME INDEX: 45.9 ML/M2
ECHO LVOT SV: 96.9 ML
ECHO LVOT VTI: 25.5 CM
ECHO MV A VELOCITY: 0.72 M/S
ECHO MV E VELOCITY: 1.43 M/S
ECHO MV E/A RATIO: 1.99
ECHO MV E/E' LATERAL: 23.83
ECHO MV E/E' RATIO (AVERAGED): 26.22
ECHO MV E/E' SEPTAL: 28.6
ECHO PV ACCELERATION TIME (AT): 162 MS
ECHO PV MAX VELOCITY: 0.9 M/S
ECHO PV PEAK GRADIENT: 3 MMHG
ECHO RA AREA 4C: 12 CM2
ECHO RA END SYSTOLIC VOLUME APICAL 4 CHAMBER INDEX BSA: 13 ML/M2
ECHO RA VOLUME: 27 ML
ECHO RV BASAL DIMENSION: 2.9 CM
ECHO RV FREE WALL PEAK S': 12 CM/S
ECHO RV LONGITUDINAL DIMENSION: 6.8 CM
ECHO RV MID DIMENSION: 2.4 CM
ECHO RV TAPSE: 2.5 CM (ref 1.7–?)
EKG ATRIAL RATE: 62 BPM
EKG DIAGNOSIS: NORMAL
EKG P AXIS: 54 DEGREES
EKG P-R INTERVAL: 192 MS
EKG Q-T INTERVAL: 436 MS
EKG QRS DURATION: 118 MS
EKG QTC CALCULATION (BAZETT): 442 MS
EKG R AXIS: 48 DEGREES
EKG T AXIS: 71 DEGREES
EKG VENTRICULAR RATE: 62 BPM
EST. AVERAGE GLUCOSE BLD GHB EST-MCNC: 119.8 MG/DL
HBA1C MFR BLD: 5.8 %
HCT VFR BLD AUTO: 38.1 % (ref 40.5–52.5)
HDLC SERPL-MCNC: 57 MG/DL (ref 40–60)
HGB BLD-MCNC: 12.4 G/DL (ref 13.5–17.5)
LDLC SERPL CALC-MCNC: 125 MG/DL
MCH RBC QN AUTO: 28.8 PG (ref 26–34)
MCHC RBC AUTO-ENTMCNC: 32.5 G/DL (ref 31–36)
MCV RBC AUTO: 88.5 FL (ref 80–100)
PLATELET # BLD AUTO: 178 K/UL (ref 135–450)
PMV BLD AUTO: 9.6 FL (ref 5–10.5)
RBC # BLD AUTO: 4.31 M/UL (ref 4.2–5.9)
TRIGL SERPL-MCNC: 251 MG/DL (ref 0–150)
VLDLC SERPL CALC-MCNC: 50 MG/DL
WBC # BLD AUTO: 5.4 K/UL (ref 4–11)

## 2024-05-10 PROCEDURE — 85027 COMPLETE CBC AUTOMATED: CPT

## 2024-05-10 PROCEDURE — 6370000000 HC RX 637 (ALT 250 FOR IP)

## 2024-05-10 PROCEDURE — 93005 ELECTROCARDIOGRAM TRACING: CPT | Performed by: INTERNAL MEDICINE

## 2024-05-10 PROCEDURE — 6370000000 HC RX 637 (ALT 250 FOR IP): Performed by: INTERNAL MEDICINE

## 2024-05-10 PROCEDURE — 97162 PT EVAL MOD COMPLEX 30 MIN: CPT

## 2024-05-10 PROCEDURE — 92526 ORAL FUNCTION THERAPY: CPT

## 2024-05-10 PROCEDURE — 70551 MRI BRAIN STEM W/O DYE: CPT

## 2024-05-10 PROCEDURE — G0378 HOSPITAL OBSERVATION PER HR: HCPCS

## 2024-05-10 PROCEDURE — 2580000003 HC RX 258: Performed by: INTERNAL MEDICINE

## 2024-05-10 PROCEDURE — 92610 EVALUATE SWALLOWING FUNCTION: CPT

## 2024-05-10 PROCEDURE — 96374 THER/PROPH/DIAG INJ IV PUSH: CPT

## 2024-05-10 PROCEDURE — 93010 ELECTROCARDIOGRAM REPORT: CPT | Performed by: INTERNAL MEDICINE

## 2024-05-10 PROCEDURE — 97116 GAIT TRAINING THERAPY: CPT

## 2024-05-10 PROCEDURE — 93306 TTE W/DOPPLER COMPLETE: CPT

## 2024-05-10 PROCEDURE — 80061 LIPID PANEL: CPT

## 2024-05-10 PROCEDURE — 93306 TTE W/DOPPLER COMPLETE: CPT | Performed by: INTERNAL MEDICINE

## 2024-05-10 PROCEDURE — 83036 HEMOGLOBIN GLYCOSYLATED A1C: CPT

## 2024-05-10 PROCEDURE — 36415 COLL VENOUS BLD VENIPUNCTURE: CPT

## 2024-05-10 PROCEDURE — 97165 OT EVAL LOW COMPLEX 30 MIN: CPT

## 2024-05-10 PROCEDURE — APPSS60 APP SPLIT SHARED TIME 46-60 MINUTES

## 2024-05-10 PROCEDURE — APPNB30 APP NON BILLABLE TIME 0-30 MINS

## 2024-05-10 PROCEDURE — 72141 MRI NECK SPINE W/O DYE: CPT

## 2024-05-10 PROCEDURE — 6360000002 HC RX W HCPCS: Performed by: NURSE PRACTITIONER

## 2024-05-10 RX ORDER — MORPHINE SULFATE 2 MG/ML
2 INJECTION, SOLUTION INTRAMUSCULAR; INTRAVENOUS ONCE
Status: COMPLETED | OUTPATIENT
Start: 2024-05-11 | End: 2024-05-10

## 2024-05-10 RX ORDER — NITROGLYCERIN 0.4 MG/1
TABLET SUBLINGUAL
Status: COMPLETED
Start: 2024-05-10 | End: 2024-05-10

## 2024-05-10 RX ORDER — NITROGLYCERIN 0.4 MG/1
0.4 TABLET SUBLINGUAL EVERY 5 MIN PRN
Status: CANCELLED | OUTPATIENT
Start: 2024-05-10

## 2024-05-10 RX ORDER — NITROGLYCERIN 0.4 MG/1
0.4 TABLET SUBLINGUAL EVERY 5 MIN PRN
Status: DISCONTINUED | OUTPATIENT
Start: 2024-05-10 | End: 2024-05-11 | Stop reason: HOSPADM

## 2024-05-10 RX ADMIN — OXYCODONE HYDROCHLORIDE 5 MG: 5 TABLET ORAL at 16:34

## 2024-05-10 RX ADMIN — LEVOTHYROXINE SODIUM 150 MCG: 0.15 TABLET ORAL at 05:33

## 2024-05-10 RX ADMIN — CLOPIDOGREL BISULFATE 75 MG: 75 TABLET ORAL at 08:35

## 2024-05-10 RX ADMIN — NITROGLYCERIN: 0.4 TABLET, ORALLY DISINTEGRATING SUBLINGUAL at 23:30

## 2024-05-10 RX ADMIN — CYCLOBENZAPRINE 10 MG: 10 TABLET, FILM COATED ORAL at 20:05

## 2024-05-10 RX ADMIN — METOPROLOL TARTRATE 25 MG: 25 TABLET, FILM COATED ORAL at 20:05

## 2024-05-10 RX ADMIN — ISOSORBIDE MONONITRATE 30 MG: 30 TABLET, EXTENDED RELEASE ORAL at 09:44

## 2024-05-10 RX ADMIN — LISINOPRIL 20 MG: 20 TABLET ORAL at 09:44

## 2024-05-10 RX ADMIN — ASPIRIN 81 MG: 81 TABLET, COATED ORAL at 08:35

## 2024-05-10 RX ADMIN — PANTOPRAZOLE SODIUM 40 MG: 40 TABLET, DELAYED RELEASE ORAL at 05:33

## 2024-05-10 RX ADMIN — OXYCODONE HYDROCHLORIDE 5 MG: 5 TABLET ORAL at 08:35

## 2024-05-10 RX ADMIN — SODIUM CHLORIDE, PRESERVATIVE FREE 10 ML: 5 INJECTION INTRAVENOUS at 20:05

## 2024-05-10 RX ADMIN — METOPROLOL TARTRATE 25 MG: 25 TABLET, FILM COATED ORAL at 09:44

## 2024-05-10 RX ADMIN — MORPHINE SULFATE 2 MG: 2 INJECTION, SOLUTION INTRAMUSCULAR; INTRAVENOUS at 23:39

## 2024-05-10 RX ADMIN — ATORVASTATIN CALCIUM 40 MG: 40 TABLET, FILM COATED ORAL at 08:35

## 2024-05-10 ASSESSMENT — PAIN DESCRIPTION - LOCATION
LOCATION: BACK
LOCATION: BACK
LOCATION: CHEST
LOCATION: BACK
LOCATION: BACK

## 2024-05-10 ASSESSMENT — PAIN SCALES - GENERAL
PAINLEVEL_OUTOF10: 7
PAINLEVEL_OUTOF10: 9
PAINLEVEL_OUTOF10: 9
PAINLEVEL_OUTOF10: 6
PAINLEVEL_OUTOF10: 10
PAINLEVEL_OUTOF10: 9
PAINLEVEL_OUTOF10: 8

## 2024-05-10 ASSESSMENT — PAIN DESCRIPTION - DESCRIPTORS
DESCRIPTORS: ACHING
DESCRIPTORS: ACHING

## 2024-05-10 ASSESSMENT — PAIN SCALES - WONG BAKER
WONGBAKER_NUMERICALRESPONSE: HURTS EVEN MORE
WONGBAKER_NUMERICALRESPONSE: HURTS EVEN MORE
WONGBAKER_NUMERICALRESPONSE: HURTS WHOLE LOT

## 2024-05-10 ASSESSMENT — PAIN DESCRIPTION - ORIENTATION
ORIENTATION: LEFT
ORIENTATION: LOWER

## 2024-05-10 NOTE — CARE COORDINATION
Case Management Note:    Patient admitted as Observation with an anticipated short hospitalization length of stay. Chart reviewed and it appears that patient has minimal needs for discharge at this time. Medical staff to inform case management team if discharge needs are identified.      Case management will continue to follow progress and update discharge plan as needed.      PT/OT evals still pending. Will await evals prior to discussing discharge plans with pt. Will follow up once therapy evals are complete and recommendations are rendered.        Electronically signed by JAYLEEN Lozoya on 5/10/2024 at 8:38 AM

## 2024-05-10 NOTE — PROGRESS NOTES
functionally tolerate recommended diet with no overt clinical s/s of aspiration   If clinical s/s of aspiration/penetration continue to be noted, Pt will participate in Modified Barium Swallow Study (MBS)     Dysphagia Therapeutic Intervention:  Diet Tolerance Monitoring , Patient/Family Education     Discharge Recommendations: Discharge recommendations to be determined pending ongoing follow-up during acute care stay    Patient Positioning: Upright in bed     Current Diet Level (prior to evaluation): Regular texture diet  Thin liquids      Respiratory Status:   [x]Room Air   []O2 via nasal cannula  []Previously intubated:   Total days intubated:  Date extubated:    []Other:    Dentition:  []Adequate  []Dentures   []Missing Many Teeth  [x]Edentulous  []Other:    Baseline Vocal Quality:  []Normal  []Dysphonic   []Aphonic   []Hoarse  []Wet  []Weak  [x]Other:harsh/gravely     Volitional Cough:  Not Elicited     Volitional Swallow:   []Absent   []Delayed     [x]Adequate     []Required use of drink     Oral Mechanism Exam:  [x]WFL []Mild   [] Moderate  []Severe  []To be assessed  Impaired:   []Left side      []Right side    []Labial ROM/Coordination    []Labial Symmetry   []Lingual ROM/Coordination   []Lingual Symmetry  []Gag  []Other:     Oral Phase: []WFL [x]Mild   [] Moderate  []Severe  []To be assessed   [x]Impaired/Prolonged Mastication:   []Oral Holding:   []Spillage Left:   []Spillage Right:  []Pocketing Left:   []Pocketing Right:   [x]Decreased Anterior to Posterior Transit:   []Suspected Premature Bolus Loss:   []Lingual/Palatal Residue:   []Other:     Pharyngeal Phase: []WFL [x]Mild   [] Moderate  []Severe  []To be assessed   [x]Delayed Swallow:   []Suspected Pharyngeal Pooling:   []Decreased Laryngeal Elevation:   []Absent Swallow:  []Wet Vocal Quality:   []Throat Clearing-Immediate:   []Throat Clearing-Delayed:   []Cough-Immediate:   []Cough-Delayed:  []Change in Vital Signs:  [x]Suspected Delayed Pharyngeal  Clearing:  [x]Other: use of 1-2 swallows     Eating Assistance:  [x]Independent  []Setup or clean-up assistance   [] Supervision or touching assistance   [] Partial or moderate assistance   [] Substantial or maximal assistance  [] Dependent     EDUCATION:   Provided education regarding role of SLP, results of assessment, recommendations and general speech pathology plan of care.   [x] Pt verbalized understanding and agreement   [] Pt requires ongoing learning   [] No evidence of comprehension     If patient discharges prior to next visit, this note will serve as discharge.     Treatment time:  Timed Code Treatment Minutes: 0  Total Treatment time: 23    Electronically signed by:   Luba Corona M.A., CCC-SLP [id number]  Speech-Language Pathologist

## 2024-05-10 NOTE — PROGRESS NOTES
This nurse alerted by monitor tech that tele leads were showing not connected to this pt. This nurse attempted to go in and change/replace leads. Pt was alerted to the situation and when this nurse touched pt's gown to visualize leads and stickers, pt swung his left arm out and said in a raised voice- \"don't touch me!\" This nurse asked the pt not to swing at staff, to which he replied that he could check his own leads, he knew how to do that. Pt was able to confirm that leads were, indeed, attached to his person. Pt let this nurse check the tele box and make sure the connection was tight as this was also a concern. Tele able to then be observed in working order and this nurse left patients room with no other issues.

## 2024-05-10 NOTE — PROGRESS NOTES
[facility] - Inpatient Rehabilitation Department   Phone: [phone]    Physical Therapy    [x] Initial Evaluation            [] Daily Treatment Note         [] Discharge Summary      Patient: Ashutosh Donovan   : 1970   MRN: [id number]   Date of Service:  5/10/2024  Admitting Diagnosis: TIA (transient ischemic attack)  Current Admission Summary:  53-year-old gentleman with history of hypertension, CAD, hypothyroidism, stage III CKD noncompliant with medications was recently discharged from hospital for atypical chest pain and returned with complaints of right facial numbness and bilateral hand numbness.  Patient was admitted for probable TIA.    CT of head -    1.No evidence of acute intracranial hemorrhage.   2.  There is a small area of loss of gray-white matter differentiation in the    right anterior frontal lobe which appears new since  comparison, favorednonacute.  Correlate with clinical symptoms.  MRI of Lumbar spine  from  (previous admission)   1. Severe spinal canal stenosis, severe right and mild left neural foraminal narrowing at L4-L5 secondary to grade 1 anterolisthesis, disc bulge, facet arthropathy and thickening of the ligamentum flavum.  2. Moderate left and mild right neural foraminal narrowing at L5-S1.  3. Mild spinal canal stenosis and mild right neural foraminal narrowing at  L3-L4.  4. Single enlarged nerve root within the ventral aspect of the thecal sac  from L2-L3 to L4-L5.  This is of uncertain etiology and may be related to the  severe spinal canal stenosis at L4-L5.  A nerve sheath tumor is considered  less likely.  Postcontrast MR imaging is recommended for further evaluation.    Past Medical History:  has a past medical history of Arthritis, CAD (coronary artery disease), CHF (congestive heart failure) (HCC), Chronic pain, Heart attack (HCC), and Hypothyroid.  Past Surgical History:  has a past surgical history that includes Coronary stent placement;

## 2024-05-10 NOTE — PROGRESS NOTES
Pt unable to verify all medications or verify when  his last dose was outside of this hospital. Pharmacy notified

## 2024-05-10 NOTE — PROGRESS NOTES
This nurse entered room and woke pt verbally, letting him know that I had the synthroid to give him, and that blood pressure would also need to be taken. Pt stated he did not want this nurse to touch him or take his blood pressure, and would like to speak to the charge nurse. Charge nurse called to room.

## 2024-05-10 NOTE — PROGRESS NOTES
Physical Therapy  Attempted to see Pt. Who was reports \"My head hurts to bad to do that.\"  Pt. Asking for pain medication from RN which this PT relayed to the RN.  Pt. Encouraged to participate but Pt. Became quickly frustrated and said, \"You can come back later\".  Will reattempt as time permits.  Rosie Zaidi, PT, [id number]

## 2024-05-10 NOTE — PROGRESS NOTES
[facility] - Inpatient Rehabilitation Department   Phone: [phone]    Occupational Therapy    [x] Initial Evaluation            [] Daily Treatment Note         [] Discharge Summary      Patient: Ashutosh Donovan   : 1970   MRN: [id number]   Date of Service:  5/10/2024    Admitting Diagnosis:  TIA (transient ischemic attack)  Current Admission Summary: 53-year-old gentleman with history of hypertension, CAD, hypothyroidism, stage III CKD noncompliant with medications was recently discharged from hospital for atypical chest pain and returned with complaints of right facial numbness and bilateral hand numbness.  Patient was admitted for probable TIA.    Past Medical History:  has a past medical history of Arthritis, CAD (coronary artery disease), CHF (congestive heart failure) (HCC), Chronic pain, Heart attack (HCC), and Hypothyroid.  Past Surgical History:  has a past surgical history that includes Coronary stent placement; Cholecystectomy; and Tonsillectomy.    Discharge Recommendations: Ashutosh Donovan scored a 21/ on the AM-PAC ADL Inpatient form.  At this time, no further OT is recommended upon discharge due to anticipate return to baseline at d/c.  Recommend patient returns to prior setting with prior services.       DME Required For Discharge:  FWW vs SPC    Precautions/Restrictions: no restrictions  Weight Bearing Restrictions: no restrictions  [] Right Upper Extremity  [] Left Upper Extremity [] Right Lower Extremity  [] Left Lower Extremity     Required Braces/Orthotics: no braces required   [] Right  [] Left  Positional Restrictions:no positional restrictions    Pre-Admission Information   Lives With:  brother and brother's girlfriend     Type of Home: hotel  Home Layout: one level  Home Access: level entry  Bathroom Layout: tub/shower unit  Bathroom Equipment:  none  Toilet Height: standard height  Home Equipment: no prior equipment  Transfer Assistance: Independent without  maintains balance at SBA/supervision without use of UE support  Dynamic Standing Balance: fair: maintains balance at CGA without use of UE support  Comments:    Other Therapeutic Interventions    Functional Outcomes  AM-PAC Inpatient Daily Activity Raw Score: 21                                    Cognition  Overall Cognitive Status: WFL  Orientation:    oriented to person  Command Following:   WFL     Education  Barriers To Learning: none  Patient Education: patient educated on OT role and benefits, plan of care, transfer training, discharge recommendations  Learning Assessment:  patient verbalizes understanding, would benefit from continued reinforcement    Assessment  Activity Tolerance: fair, pt reports dizziness seated EOB, /73, in stance /64, post ambulation /73  Impairments Requiring Therapeutic Intervention: decreased functional mobility, decreased ADL status, decreased balance  Prognosis: good  Clinical Assessment: The patient is a 53 y.o. male who presents below their baseline level of function due to above deficits, associated with TIA (transient ischemic attack). Typically, pt is IND with ADLs/IADLs and mobility. Currently, pt is SBA-CGA. Continued OT indicated in order to promote return to PLOF   Safety Interventions: patient left in bed, bed alarm in place, call light within reach, gait belt, and nurse notified    Plan  Frequency: 1-2 x/per week  Current Treatment Recommendations: strengthening, balance training, functional mobility training, transfer training, patient/caregiver education, and ADL/self-care training    Goals  Patient Goals: to return home   Short Term Goals:  Time Frame: discharge  Patient will complete lower body ADL at modified independent   Patient will complete toileting at modified independent   Patient will complete functional transfers at modified independent   Patient will complete functional mobility at modified independent     Above goals reviewed on

## 2024-05-10 NOTE — CONSULTS
In patient Neurology consult        [facility] Neurology      MD Ashutosh Ashley  1970    Date of Service: 5/10/2024    Referring Physician: Lisa Culp MD      Reason for the consult and CC: Acute right facial numbness and bilateral upper extremity numbness    HPI:   The patient is a 53 y.o.  years old male with past medical history of hypertension, hypothyroidism, schizoaffective disorder, remote stroke, CKD, noncompliance and other medical problems comes to the hospital with acute right facial numbness and bilateral upper extremity numbness.  Degree severe duration persistent.  Patient reports yesterday he started having posterior headache along with neck pain.  During this time patient also reported having some dizziness.  Patient came to the emergency room given concern for possible stroke.  In the emergency room CT head showed no acute intracranial abnormality.  CTA head and neck showed right vertebral artery occlusion, severe left vertebral artery stenosis, intracranial stenosis, moderate left ICA stenosis, and left PICA saccular aneurysm.  Stroke team was consulted and patient was deemed not a TNK candidate.  Patient is admitted to the hospital for further evaluation.  Today he reports bilateral hand numbness.  He also reports neck pain and posterior headache.  Chart review shows patient was recently seen few days ago for uncontrolled hypertension.  On last admission, patient had MRI lumbar spine which showed severe spinal canal stenosis.  Other review of systems unremarkable.       Constitutional:   Vitals:    05/10/24 0511 05/10/24 0830 05/10/24 0930 05/10/24 1138   BP:  (!) 105/57 121/75 (!) 100/58   Pulse:  77  66   Resp:  17     Temp:    97.6 °F (36.4 °C)   TempSrc:    Oral   SpO2:  95%  94%   Weight: 91.3 kg (201 lb 3.2 oz)      Height:             I personally reviewed and updated social history, past medical history, medications, allergy, surgical history, and  Major surgery/procedure with associated risk factors:    [] Prescription drug management  ----------------------------------------------------------------------  [x] High (any 2)  [] Moderate (any 1)    C. Data (any 2 for high and any 1 for moderate)  [] Discussed management of the case with:    [x] Imaging personally reviewed and interpreted, includes:    [x] Data Review (any 3)  [] Collateral history obtained from:    [x] All available Consultant notes from yesterday/today were reviewed  [x] All current labs were reviewed and interpreted for clinical significance   [x] Appropriate follow-up labs were ordered    Data: reviewed   LABS:   Lab Results   Component Value Date/Time     05/09/2024 02:39 PM    K 4.4 05/09/2024 02:39 PM     05/09/2024 02:39 PM    CO2 23 05/09/2024 02:39 PM    BUN 13 05/09/2024 02:39 PM    CREATININE 2.0 05/09/2024 02:39 PM    GFRAA >60 10/30/2018 03:05 AM    GFRAA >60 08/05/2011 08:45 AM    LABGLOM 39 05/09/2024 02:39 PM    LABGLOM 39 04/13/2024 05:50 AM    GLUCOSE 90 05/09/2024 02:39 PM    CALCIUM 10.1 05/09/2024 02:39 PM     Lab Results   Component Value Date/Time    WBC 5.4 05/10/2024 05:27 AM    RBC 4.31 05/10/2024 05:27 AM    HGB 12.4 05/10/2024 05:27 AM    HCT 38.1 05/10/2024 05:27 AM    MCV 88.5 05/10/2024 05:27 AM    RDW 16.1 05/10/2024 05:27 AM     05/10/2024 05:27 AM     Lab Results   Component Value Date    INR 0.87 05/09/2024    PROTIME 12.0 05/09/2024       Neuroimaging was independently reviewed by myself and discussed results with the patient  Reviewed notes from different physicians including H&P and ED notes.  Reviewed lab and blood testing    Impression:     Acute left facial numbness.  Rule out new TIA/CVA.  MRI brain ordered  Acute bilateral upper extremity weakness.  Will check MRI C-spine without contrast to rule out possibility of cervical myelopathy or radiculopathy patient recently had MRI L-spine which showed severe spinal canal

## 2024-05-10 NOTE — PROGRESS NOTES
Shift assessment completed. Routine vitals stable. Scheduled medications given. Patient is awake, alert and oriented. Respirations are easy and unlabored. Patient does not appear to be in distress, resting comfortably at this time. Call light within reach. Went over plan for the day with patient, MRI and Echo need completed and PT/OT eval. Therapy already tried to work with patient but he declined and told them to come back later. PRN pain medication given but patient would not specify where the pain was.  Patient refused Lovenox shot and head to toe assessment

## 2024-05-10 NOTE — PROGRESS NOTES
Hospitalist Progress Note      PCP: No primary care provider on file.    Date of Admission: 5/9/2024    LOS: 0    Chief Complaint:   Chief Complaint   Patient presents with    Numbness     Talking to  in car when both of his hands went numb then the rt side of his face, states the back of his head hurts, started around 2pm       Case Summary:   53-year-old gentleman with history of hypertension, CAD, hypothyroidism, stage III CKD noncompliant with medications was recently discharged from hospital for atypical chest pain and returned with complaints of right facial numbness and bilateral hand numbness.  Patient was admitted for probable TIA.        Active Hospital Problems    Diagnosis Date Noted    TIA (transient ischemic attack) [G45.9] 05/09/2024    Dyslipidemia [E78.5] 04/13/2024    Chronic obstructive pulmonary disease (HCC) [J44.9] 11/20/2023    Major depression, recurrent (HCC) [F33.9] 11/20/2023         Principal Problem:    TIA (transient ischemic attack): Patient with right facial numbness and bilateral hand numbness.  Symptoms incongruent.  This seems to be an element of chronic symptomatology.  Head imaging was unremarkable.  - Await echocardiogram  - MRI brain  - Neurology consult  - Continue aspirin and statin  - PT OT and speech pathology evaluation.  Discharge planning      Active Problems:    Major depression, recurrent (HCC): Stable    Chronic obstructive pulmonary disease (HCC): Without exacerbation.  Continue current inhalers    CAD: No chest pains or shortness of breath.  Continue antianginal therapy with aspirin, statin, clopidogrel, isosorbide, metoprolol and lisinopril    Hypertension: Stable on lisinopril, metoprolol, prazosin    Dyslipidemia: On statin        Medications:  Reviewed  Infusion Medications    sodium chloride       Scheduled Medications    aspirin  81 mg Oral Daily    atorvastatin  40 mg Oral Daily    clopidogrel  75 mg Oral Daily    isosorbide mononitrate  30

## 2024-05-10 NOTE — ED NOTES
Patient alert and oriented.  Patient transferred to floor by [facility].  Skin appropriate for ethnicity, dry and intact.  No signs of acute distress noted at this time. Regular respiratory pattern, normal respiratory depth, unlabored respirations.

## 2024-05-10 NOTE — PROGRESS NOTES
Pt requesting his synthroid. This nurse stated that it was a medication that was on his orders to be given by 7 am. Pt became upset and stated he needed to take the synthroid before he eats, and that his dr told him it was to be taken 4 hours before a meal. This nurse attempted to inform pt that we do not typically give it that early when pt stated \"You all just think you know more than me\". This nurse informed pt that an attempt would be evans to give it as early as I could do so.

## 2024-05-11 VITALS
BODY MASS INDEX: 28.4 KG/M2 | OXYGEN SATURATION: 96 % | HEART RATE: 67 BPM | TEMPERATURE: 98.1 F | SYSTOLIC BLOOD PRESSURE: 104 MMHG | WEIGHT: 202.82 LBS | DIASTOLIC BLOOD PRESSURE: 62 MMHG | HEIGHT: 71 IN | RESPIRATION RATE: 14 BRPM

## 2024-05-11 PROBLEM — R20.0 RIGHT FACIAL NUMBNESS: Status: ACTIVE | Noted: 2024-05-11

## 2024-05-11 LAB
ANION GAP SERPL CALCULATED.3IONS-SCNC: 10 MMOL/L (ref 3–16)
BUN SERPL-MCNC: 16 MG/DL (ref 7–20)
CALCIUM SERPL-MCNC: 9.7 MG/DL (ref 8.3–10.6)
CHLORIDE SERPL-SCNC: 105 MMOL/L (ref 99–110)
CO2 SERPL-SCNC: 23 MMOL/L (ref 21–32)
CREAT SERPL-MCNC: 1.8 MG/DL (ref 0.9–1.3)
EKG ATRIAL RATE: 60 BPM
EKG DIAGNOSIS: NORMAL
EKG P AXIS: 47 DEGREES
EKG P-R INTERVAL: 192 MS
EKG Q-T INTERVAL: 446 MS
EKG QRS DURATION: 124 MS
EKG QTC CALCULATION (BAZETT): 446 MS
EKG R AXIS: 38 DEGREES
EKG T AXIS: 58 DEGREES
EKG VENTRICULAR RATE: 60 BPM
GFR SERPLBLD CREATININE-BSD FMLA CKD-EPI: 45 ML/MIN/{1.73_M2}
GLUCOSE SERPL-MCNC: 96 MG/DL (ref 70–99)
POTASSIUM SERPL-SCNC: 4.1 MMOL/L (ref 3.5–5.1)
SODIUM SERPL-SCNC: 138 MMOL/L (ref 136–145)
TROPONIN, HIGH SENSITIVITY: 28 NG/L (ref 0–22)

## 2024-05-11 PROCEDURE — 96372 THER/PROPH/DIAG INJ SC/IM: CPT

## 2024-05-11 PROCEDURE — 6360000002 HC RX W HCPCS: Performed by: INTERNAL MEDICINE

## 2024-05-11 PROCEDURE — G0378 HOSPITAL OBSERVATION PER HR: HCPCS

## 2024-05-11 PROCEDURE — 6370000000 HC RX 637 (ALT 250 FOR IP): Performed by: INTERNAL MEDICINE

## 2024-05-11 PROCEDURE — 80048 BASIC METABOLIC PNL TOTAL CA: CPT

## 2024-05-11 PROCEDURE — 96375 TX/PRO/DX INJ NEW DRUG ADDON: CPT

## 2024-05-11 PROCEDURE — 2580000003 HC RX 258: Performed by: INTERNAL MEDICINE

## 2024-05-11 PROCEDURE — 6360000002 HC RX W HCPCS: Performed by: NURSE PRACTITIONER

## 2024-05-11 PROCEDURE — 93010 ELECTROCARDIOGRAM REPORT: CPT | Performed by: INTERNAL MEDICINE

## 2024-05-11 PROCEDURE — 84484 ASSAY OF TROPONIN QUANT: CPT

## 2024-05-11 PROCEDURE — 96376 TX/PRO/DX INJ SAME DRUG ADON: CPT

## 2024-05-11 PROCEDURE — 36415 COLL VENOUS BLD VENIPUNCTURE: CPT

## 2024-05-11 RX ORDER — PROCHLORPERAZINE EDISYLATE 5 MG/ML
10 INJECTION INTRAMUSCULAR; INTRAVENOUS EVERY 6 HOURS PRN
Status: DISCONTINUED | OUTPATIENT
Start: 2024-05-11 | End: 2024-05-11 | Stop reason: HOSPADM

## 2024-05-11 RX ADMIN — ISOSORBIDE MONONITRATE 30 MG: 30 TABLET, EXTENDED RELEASE ORAL at 09:14

## 2024-05-11 RX ADMIN — METOPROLOL TARTRATE 25 MG: 25 TABLET, FILM COATED ORAL at 09:14

## 2024-05-11 RX ADMIN — ASPIRIN 81 MG: 81 TABLET, COATED ORAL at 09:14

## 2024-05-11 RX ADMIN — ENOXAPARIN SODIUM 40 MG: 100 INJECTION SUBCUTANEOUS at 09:15

## 2024-05-11 RX ADMIN — ATORVASTATIN CALCIUM 40 MG: 40 TABLET, FILM COATED ORAL at 09:14

## 2024-05-11 RX ADMIN — OXYCODONE HYDROCHLORIDE 5 MG: 5 TABLET ORAL at 00:36

## 2024-05-11 RX ADMIN — OXYCODONE HYDROCHLORIDE 5 MG: 5 TABLET ORAL at 09:14

## 2024-05-11 RX ADMIN — CLOPIDOGREL BISULFATE 75 MG: 75 TABLET ORAL at 09:14

## 2024-05-11 RX ADMIN — PROCHLORPERAZINE EDISYLATE 10 MG: 5 INJECTION INTRAMUSCULAR; INTRAVENOUS at 09:14

## 2024-05-11 RX ADMIN — PRAZOSIN HYDROCHLORIDE 2 MG: 1 CAPSULE ORAL at 00:14

## 2024-05-11 RX ADMIN — LISINOPRIL 20 MG: 20 TABLET ORAL at 09:14

## 2024-05-11 RX ADMIN — SODIUM CHLORIDE, PRESERVATIVE FREE 10 ML: 5 INJECTION INTRAVENOUS at 09:15

## 2024-05-11 RX ADMIN — PROCHLORPERAZINE EDISYLATE 10 MG: 5 INJECTION INTRAMUSCULAR; INTRAVENOUS at 02:21

## 2024-05-11 RX ADMIN — LEVOTHYROXINE SODIUM 150 MCG: 0.15 TABLET ORAL at 06:33

## 2024-05-11 RX ADMIN — PANTOPRAZOLE SODIUM 40 MG: 40 TABLET, DELAYED RELEASE ORAL at 06:33

## 2024-05-11 ASSESSMENT — PAIN DESCRIPTION - ORIENTATION
ORIENTATION: LOWER
ORIENTATION: LOWER

## 2024-05-11 ASSESSMENT — PAIN SCALES - GENERAL
PAINLEVEL_OUTOF10: 8
PAINLEVEL_OUTOF10: 9
PAINLEVEL_OUTOF10: 10

## 2024-05-11 ASSESSMENT — PAIN DESCRIPTION - DESCRIPTORS
DESCRIPTORS: ACHING
DESCRIPTORS: ACHING

## 2024-05-11 ASSESSMENT — PAIN DESCRIPTION - LOCATION
LOCATION: BACK

## 2024-05-11 NOTE — CARE COORDINATION
Discharge Planning:     (CM) attempted to see pt to provide the IMM letter to the pt. The pt had already been d/c.      Electronically signed by JAYLEEN Gilmore on 5/11/2024 at 11:36 AM

## 2024-05-11 NOTE — PROGRESS NOTES
Ashutosh Donovan  Neurology Follow-up  [facility] Neurology    Date of Service: 5/11/2024    Subjective:   CC: Follow up today regarding:     Events noted. Chart and lab reviewed.       ROS : A 10-12 system review obtained and updated today and is unremarkable except as mentioned  in my interval history.     Past medical history, social history, medication and family history reviewed.       Objective:  Exam:   Constitutional:   Vitals:    05/11/24 0106 05/11/24 0632 05/11/24 0824 05/11/24 0845   BP:  117/71  104/62   Pulse:  64  67   Resp: 16 18  19   Temp:  98 °F (36.7 °C)  98.1 °F (36.7 °C)   TempSrc:  Oral  Oral   SpO2:  97%  96%   Weight:   92 kg (202 lb 13.2 oz)    Height:         General appearance:  Normal development and appear in no acute distress.   Mental Status:   Oriented to person, place, problem, and time.    Memory: Good immediate recall.  Intact remote memory  Normal attention span and concentration.  Language: intact naming, repeating and fluency   Good fund of Knowledge.   Cranial Nerves:   II:   Pupils: equal, round, reactive to light  III,IV,VI: Extra Ocular Movements are intact. No nystagmus  V: Facial sensation is intact  VII: Facial strength and movements: intact and symmetric  XII: Tongue movements are normal  Musculoskeletal: 5/5 in all 4 extremities.   Tone: Normal tone.   Reflexes: Symmetric 2+ in both arms and legs.  Coordination: no pronator drift, no dysmetria with FNF  Sensation: normal.  Gait/Posture: steady gait    MDM:      A. Problems (any 1)    High:    [] Acute/Chronic Illness/injury posing threat to life or bodily function:    [] Severe exacerbation of chronic illness:      Moderate:    []     1 or more chronic illness with exacerbation, progression or side effect of treatment or  []     2 or more stable chronic illnesses or  []     1 acute illness with systemic symptoms     ---------------------------------------------------------------------  B. Risk of Treatment (any

## 2024-05-11 NOTE — DISCHARGE SUMMARY
Hospital Medicine Discharge Summary    Patient ID: Ashutosh Donovan      Patient's PCP: No primary care provider on file.    Admit Date: 5/9/2024     Discharge Date:   5/11/2024     Admitting Provider: Lisa Culp MD     Discharge Provider: Lisa Culp MD     Discharge Diagnoses:       Active Hospital Problems    Diagnosis     Right facial numbness [R20.0]     TIA (transient ischemic attack) [G45.9]     Dyslipidemia [E78.5]     Chronic obstructive pulmonary disease (HCC) [J44.9]     Major depression, recurrent (HCC) [F33.9]        The patient was seen and examined on day of discharge and this discharge summary is in conjunction with any daily progress note from day of discharge.    Hospital Course:   The patient is a 53-year-old gentleman with history of hypertension, CAD, hypothyroidism, stage III CKD noncompliant with medications was recently discharged from hospital for atypical chest pain and returned with complaints of right facial numbness and bilateral hand numbness.  Patient was admitted for probable TIA.         TIA (transient ischemic attack): Patient with right facial numbness and bilateral hand numbness.  Symptoms incongruent.  This seems to be an element of chronic symptomatology.  Head imaging was unremarkable.  Echocardiogram with EF of 60 to 65% and no wall motion abnormality with grade 3 diastolic dysfunction.  MRI brain with no acute pathology but noted for remote frontal stroke.  MRI cervical spine with degenerative disease and will need outpatient neurosurgery referral at discharge  Continue aspirin, Plavix and statin     Active Problems:    Major depression, recurrent (HCC): Stable    Chronic obstructive pulmonary disease (HCC): Without exacerbation.  Continue current inhalers    CAD: No chest pains or shortness of breath.  Continue antianginal therapy with aspirin, statin, clopidogrel, isosorbide, metoprolol and lisinopril    Hypertension: Stable on  pain with radiation down both legs; DDD (degenerative disc disease), lumbosacral      aspirin 81 MG EC tablet Take 1 tablet by mouth daily  Qty: 30 tablet, Refills: 3      isosorbide mononitrate (IMDUR) 30 MG extended release tablet Take 1 tablet by mouth daily  Qty: 30 tablet, Refills: 3      nitroGLYCERIN (NITROSTAT) 0.4 MG SL tablet Place 1 tablet under the tongue every 5 minutes as needed for Chest pain  Qty: 25 tablet, Refills: 3      ranolazine (RANEXA) 500 MG extended release tablet Take 1 tablet by mouth 2 times daily  Qty: 60 tablet, Refills: 3      albuterol sulfate HFA (PROVENTIL;VENTOLIN;PROAIR) 108 (90 Base) MCG/ACT inhaler Inhale 2 puffs into the lungs every 6 hours as needed for Shortness of Breath or Wheezing  Qty: 18 g, Refills: 3      atorvastatin (LIPITOR) 40 MG tablet Take 1 tablet by mouth daily  Qty: 30 tablet, Refills: 3      lisinopril (PRINIVIL;ZESTRIL) 20 MG tablet Take 1 tablet by mouth daily  Qty: 30 tablet, Refills: 3      prazosin (MINIPRESS) 2 MG capsule Take 1 capsule by mouth nightly  Qty: 60 capsule, Refills: 3      metoprolol tartrate (LOPRESSOR) 25 MG tablet Take 1 tablet by mouth 2 times daily  Qty: 60 tablet, Refills: 3      clopidogrel (PLAVIX) 75 MG tablet Take 1 tablet by mouth daily  Qty: 30 tablet, Refills: 3      cyclobenzaprine (FLEXERIL) 10 MG tablet Take 1 tablet by mouth 3 times daily as needed for Muscle spasms  Qty: 30 tablet, Refills: 1      levothyroxine (SYNTHROID) 150 MCG tablet Take 1 tablet by mouth Daily  Qty: 60 tablet, Refills: 3      pantoprazole (PROTONIX) 40 MG tablet Take 1 tablet by mouth every morning (before breakfast)  Qty: 60 tablet, Refills: 0             Time Spent on discharge: 31 minutes in the examination, evaluation, counseling and review of medications and discharge plan.      Signed:    Lisa Culp MD   5/11/2024      Thank you No primary care provider on file. for the opportunity to be involved in this patient's care. If you have

## 2024-05-11 NOTE — PROGRESS NOTES
Patient called out complaining of 9/10 chest pain and diaphoresis. This RN put in for a stat EKG and notified the provider. Provider to bedside to assess patient, see MAR for orders.

## 2024-05-11 NOTE — PROGRESS NOTES
Data- discharge order received, pt verbalized agreement to discharge, disposition to previous residence, no needs for HHC/DME.     Action- discharge instructions prepared and given to Ashutosh Donovan, pt verbalized understanding. Medication information packet given r/t NEW and/or CHANGED prescriptions emphasizing name/purpose/side effects, pt verbalized understanding. Discharge instruction summary: Diet- Regular, Activity- Independent, Primary Care Physician as follows: No primary care provider on file. None f/u appointment ***, immunizations reviewed and ***, prescription medications filled ***. Inpatient surgical procedure precautions reviewed: *** CHF Education reviewed. Pt/ Family has had a total of 60 minutes CHF education this admission encounter.     1. WEIGHT: Admit Weight - Scale: 93.4 kg (206 lb) (05/09/24 1426)        Today  Weight - Scale: 92 kg (202 lb 13.2 oz) (05/11/24 0824)       2. O2 SAT.: SpO2: 96 % (05/11/24 0845)    Response- Pt belongings gathered, IV removed. Disposition is home (no HHC/DME needs), transported with ***, taken to lobby via w/c w/ ***, no complications.

## 2024-05-11 NOTE — PLAN OF CARE
Problem: Pain  Goal: Verbalizes/displays adequate comfort level or baseline comfort level  Outcome: Adequate for Discharge     Problem: Discharge Planning  Goal: Discharge to home or other facility with appropriate resources  Outcome: Adequate for Discharge     Problem: Chronic Conditions and Co-morbidities  Goal: Patient's chronic conditions and co-morbidity symptoms are monitored and maintained or improved  Outcome: Adequate for Discharge     
  Problem: Pain  Goal: Verbalizes/displays adequate comfort level or baseline comfort level  Outcome: Progressing  Flowsheets (Taken 5/10/2024 4830)  Verbalizes/displays adequate comfort level or baseline comfort level: Encourage patient to monitor pain and request assistance     Problem: Discharge Planning  Goal: Discharge to home or other facility with appropriate resources  Outcome: Progressing     
19

## 2024-08-23 ENCOUNTER — HOSPITAL ENCOUNTER (EMERGENCY)
Age: 54
Discharge: ANOTHER HOSPITAL | End: 2024-08-23
Attending: EMERGENCY MEDICINE
Payer: MEDICARE

## 2024-08-23 ENCOUNTER — HOSPITAL ENCOUNTER (INPATIENT)
Age: 54
LOS: 1 days | Discharge: HOME | End: 2024-08-24
Attending: PSYCHIATRY & NEUROLOGY | Admitting: PSYCHIATRY & NEUROLOGY
Payer: MEDICARE

## 2024-08-23 ENCOUNTER — APPOINTMENT (OUTPATIENT)
Dept: GENERAL RADIOLOGY | Age: 54
End: 2024-08-23
Payer: MEDICARE

## 2024-08-23 VITALS
HEART RATE: 83 BPM | TEMPERATURE: 97.9 F | RESPIRATION RATE: 12 BRPM | SYSTOLIC BLOOD PRESSURE: 148 MMHG | OXYGEN SATURATION: 98 % | DIASTOLIC BLOOD PRESSURE: 88 MMHG

## 2024-08-23 DIAGNOSIS — R07.9 CHEST PAIN, UNSPECIFIED TYPE: Primary | ICD-10-CM

## 2024-08-23 PROBLEM — F33.9 MAJOR DEPRESSIVE DISORDER, RECURRENT (HCC): Status: ACTIVE | Noted: 2024-08-23

## 2024-08-23 PROBLEM — Z86.59 H/O MAJOR DEPRESSION: Status: ACTIVE | Noted: 2024-08-23

## 2024-08-23 LAB
ALBUMIN SERPL-MCNC: 4.3 G/DL (ref 3.4–5)
ALBUMIN/GLOB SERPL: 1.8 {RATIO} (ref 1.1–2.2)
ALP SERPL-CCNC: 94 U/L (ref 40–129)
ALT SERPL-CCNC: 6 U/L (ref 10–40)
AMPHETAMINES UR QL SCN>1000 NG/ML: ABNORMAL
ANION GAP SERPL CALCULATED.3IONS-SCNC: 13 MMOL/L (ref 3–16)
APAP SERPL-MCNC: <5 UG/ML (ref 10–30)
AST SERPL-CCNC: 19 U/L (ref 15–37)
BARBITURATES UR QL SCN>200 NG/ML: ABNORMAL
BASOPHILS # BLD: 0.1 K/UL (ref 0–0.2)
BASOPHILS NFR BLD: 1.2 %
BENZODIAZ UR QL SCN>200 NG/ML: ABNORMAL
BILIRUB SERPL-MCNC: <0.2 MG/DL (ref 0–1)
BUN SERPL-MCNC: 29 MG/DL (ref 7–20)
CALCIUM SERPL-MCNC: 9.8 MG/DL (ref 8.3–10.6)
CANNABINOIDS UR QL SCN>50 NG/ML: POSITIVE
CHLORIDE SERPL-SCNC: 104 MMOL/L (ref 99–110)
CO2 SERPL-SCNC: 24 MMOL/L (ref 21–32)
COCAINE UR QL SCN: POSITIVE
CREAT SERPL-MCNC: 1.8 MG/DL (ref 0.9–1.3)
DEPRECATED RDW RBC AUTO: 14.5 % (ref 12.4–15.4)
DRUG SCREEN COMMENT UR-IMP: ABNORMAL
EKG ATRIAL RATE: 80 BPM
EKG DIAGNOSIS: NORMAL
EKG P AXIS: 64 DEGREES
EKG P-R INTERVAL: 180 MS
EKG Q-T INTERVAL: 414 MS
EKG QRS DURATION: 120 MS
EKG QTC CALCULATION (BAZETT): 477 MS
EKG R AXIS: 49 DEGREES
EKG T AXIS: 62 DEGREES
EKG VENTRICULAR RATE: 80 BPM
EOSINOPHIL # BLD: 0.1 K/UL (ref 0–0.6)
EOSINOPHIL NFR BLD: 0.8 %
ETHANOLAMINE SERPL-MCNC: 10 MG/DL (ref 0–0.08)
FENTANYL SCREEN, URINE: ABNORMAL
GFR SERPLBLD CREATININE-BSD FMLA CKD-EPI: 45 ML/MIN/{1.73_M2}
GLUCOSE SERPL-MCNC: 128 MG/DL (ref 70–99)
HCT VFR BLD AUTO: 34.4 % (ref 40.5–52.5)
HGB BLD-MCNC: 11.5 G/DL (ref 13.5–17.5)
LIPASE SERPL-CCNC: 58 U/L (ref 13–60)
LYMPHOCYTES # BLD: 2.1 K/UL (ref 1–5.1)
LYMPHOCYTES NFR BLD: 25.1 %
MCH RBC QN AUTO: 28.2 PG (ref 26–34)
MCHC RBC AUTO-ENTMCNC: 33.6 G/DL (ref 31–36)
MCV RBC AUTO: 83.9 FL (ref 80–100)
METHADONE UR QL SCN>300 NG/ML: ABNORMAL
MONOCYTES # BLD: 0.7 K/UL (ref 0–1.3)
MONOCYTES NFR BLD: 8.6 %
NEUTROPHILS # BLD: 5.3 K/UL (ref 1.7–7.7)
NEUTROPHILS NFR BLD: 64.3 %
NT-PROBNP SERPL-MCNC: 83 PG/ML (ref 0–124)
OPIATES UR QL SCN>300 NG/ML: ABNORMAL
OXYCODONE UR QL SCN: ABNORMAL
PCP UR QL SCN>25 NG/ML: ABNORMAL
PH UR STRIP: 5 [PH]
PLATELET # BLD AUTO: 165 K/UL (ref 135–450)
PMV BLD AUTO: 9.4 FL (ref 5–10.5)
POTASSIUM SERPL-SCNC: 3.7 MMOL/L (ref 3.5–5.1)
PROT SERPL-MCNC: 6.7 G/DL (ref 6.4–8.2)
RBC # BLD AUTO: 4.1 M/UL (ref 4.2–5.9)
SALICYLATES SERPL-MCNC: 0.5 MG/DL (ref 15–30)
SODIUM SERPL-SCNC: 141 MMOL/L (ref 136–145)
TROPONIN, HIGH SENSITIVITY: 15 NG/L (ref 0–22)
TROPONIN, HIGH SENSITIVITY: 18 NG/L (ref 0–22)
WBC # BLD AUTO: 8.3 K/UL (ref 4–11)

## 2024-08-23 PROCEDURE — 99285 EMERGENCY DEPT VISIT HI MDM: CPT

## 2024-08-23 PROCEDURE — 83880 ASSAY OF NATRIURETIC PEPTIDE: CPT

## 2024-08-23 PROCEDURE — 82077 ASSAY SPEC XCP UR&BREATH IA: CPT

## 2024-08-23 PROCEDURE — 80143 DRUG ASSAY ACETAMINOPHEN: CPT

## 2024-08-23 PROCEDURE — 6370000000 HC RX 637 (ALT 250 FOR IP): Performed by: NURSE PRACTITIONER

## 2024-08-23 PROCEDURE — 83690 ASSAY OF LIPASE: CPT

## 2024-08-23 PROCEDURE — 93010 ELECTROCARDIOGRAM REPORT: CPT | Performed by: INTERNAL MEDICINE

## 2024-08-23 PROCEDURE — 1240000000 HC EMOTIONAL WELLNESS R&B

## 2024-08-23 PROCEDURE — 6360000002 HC RX W HCPCS: Performed by: EMERGENCY MEDICINE

## 2024-08-23 PROCEDURE — 80053 COMPREHEN METABOLIC PANEL: CPT

## 2024-08-23 PROCEDURE — 85025 COMPLETE CBC W/AUTO DIFF WBC: CPT

## 2024-08-23 PROCEDURE — 96374 THER/PROPH/DIAG INJ IV PUSH: CPT

## 2024-08-23 PROCEDURE — 71045 X-RAY EXAM CHEST 1 VIEW: CPT

## 2024-08-23 PROCEDURE — 6370000000 HC RX 637 (ALT 250 FOR IP)

## 2024-08-23 PROCEDURE — 84484 ASSAY OF TROPONIN QUANT: CPT

## 2024-08-23 PROCEDURE — 93005 ELECTROCARDIOGRAM TRACING: CPT | Performed by: EMERGENCY MEDICINE

## 2024-08-23 PROCEDURE — 90791 PSYCH DIAGNOSTIC EVALUATION: CPT | Performed by: SOCIAL WORKER

## 2024-08-23 PROCEDURE — 80179 DRUG ASSAY SALICYLATE: CPT

## 2024-08-23 PROCEDURE — 80307 DRUG TEST PRSMV CHEM ANLYZR: CPT

## 2024-08-23 RX ORDER — ASPIRIN 81 MG/1
81 TABLET ORAL DAILY
Status: DISCONTINUED | OUTPATIENT
Start: 2024-08-23 | End: 2024-08-24 | Stop reason: HOSPADM

## 2024-08-23 RX ORDER — CLOPIDOGREL BISULFATE 75 MG/1
75 TABLET ORAL DAILY
Status: DISCONTINUED | OUTPATIENT
Start: 2024-08-23 | End: 2024-08-24 | Stop reason: HOSPADM

## 2024-08-23 RX ORDER — RANOLAZINE 500 MG/1
500 TABLET, EXTENDED RELEASE ORAL 2 TIMES DAILY
Status: DISCONTINUED | OUTPATIENT
Start: 2024-08-23 | End: 2024-08-24 | Stop reason: HOSPADM

## 2024-08-23 RX ORDER — POLYETHYLENE GLYCOL 3350 17 G
2 POWDER IN PACKET (EA) ORAL
Status: DISCONTINUED | OUTPATIENT
Start: 2024-08-23 | End: 2024-08-24 | Stop reason: HOSPADM

## 2024-08-23 RX ORDER — CYCLOBENZAPRINE HCL 10 MG
10 TABLET ORAL 3 TIMES DAILY PRN
Status: DISCONTINUED | OUTPATIENT
Start: 2024-08-23 | End: 2024-08-23

## 2024-08-23 RX ORDER — HALOPERIDOL 5 MG/1
5 TABLET ORAL EVERY 4 HOURS PRN
Status: DISCONTINUED | OUTPATIENT
Start: 2024-08-23 | End: 2024-08-24 | Stop reason: HOSPADM

## 2024-08-23 RX ORDER — HALOPERIDOL 5 MG/ML
5 INJECTION INTRAMUSCULAR EVERY 4 HOURS PRN
Status: DISCONTINUED | OUTPATIENT
Start: 2024-08-23 | End: 2024-08-24 | Stop reason: HOSPADM

## 2024-08-23 RX ORDER — LIDOCAINE 4 G/G
1 PATCH TOPICAL ONCE
Status: COMPLETED | OUTPATIENT
Start: 2024-08-24 | End: 2024-08-24

## 2024-08-23 RX ORDER — ALBUTEROL SULFATE 90 UG/1
2 AEROSOL, METERED RESPIRATORY (INHALATION) EVERY 6 HOURS PRN
Status: DISCONTINUED | OUTPATIENT
Start: 2024-08-23 | End: 2024-08-24 | Stop reason: HOSPADM

## 2024-08-23 RX ORDER — FLUPHENAZINE HYDROCHLORIDE 5 MG/1
5 TABLET ORAL NIGHTLY
COMMUNITY

## 2024-08-23 RX ORDER — DIPHENHYDRAMINE HYDROCHLORIDE 50 MG/ML
50 INJECTION INTRAMUSCULAR; INTRAVENOUS EVERY 4 HOURS PRN
Status: DISCONTINUED | OUTPATIENT
Start: 2024-08-23 | End: 2024-08-24 | Stop reason: HOSPADM

## 2024-08-23 RX ORDER — ISOSORBIDE MONONITRATE 30 MG/1
60 TABLET, EXTENDED RELEASE ORAL DAILY
Status: DISCONTINUED | OUTPATIENT
Start: 2024-08-23 | End: 2024-08-24 | Stop reason: HOSPADM

## 2024-08-23 RX ORDER — DULOXETIN HYDROCHLORIDE 60 MG/1
60 CAPSULE, DELAYED RELEASE ORAL DAILY
Status: DISCONTINUED | OUTPATIENT
Start: 2024-08-23 | End: 2024-08-24 | Stop reason: HOSPADM

## 2024-08-23 RX ORDER — AMLODIPINE BESYLATE 5 MG/1
5 TABLET ORAL DAILY
COMMUNITY

## 2024-08-23 RX ORDER — DULOXETIN HYDROCHLORIDE 60 MG/1
60 CAPSULE, DELAYED RELEASE ORAL DAILY
COMMUNITY

## 2024-08-23 RX ORDER — LEVOTHYROXINE SODIUM 150 UG/1
150 TABLET ORAL DAILY
Status: DISCONTINUED | OUTPATIENT
Start: 2024-08-23 | End: 2024-08-24 | Stop reason: HOSPADM

## 2024-08-23 RX ORDER — ATORVASTATIN CALCIUM 40 MG/1
80 TABLET, FILM COATED ORAL DAILY
Status: DISCONTINUED | OUTPATIENT
Start: 2024-08-23 | End: 2024-08-24 | Stop reason: HOSPADM

## 2024-08-23 RX ORDER — MAGNESIUM HYDROXIDE/ALUMINUM HYDROXICE/SIMETHICONE 120; 1200; 1200 MG/30ML; MG/30ML; MG/30ML
30 SUSPENSION ORAL EVERY 6 HOURS PRN
Status: DISCONTINUED | OUTPATIENT
Start: 2024-08-23 | End: 2024-08-24 | Stop reason: HOSPADM

## 2024-08-23 RX ORDER — METOPROLOL TARTRATE 25 MG/1
25 TABLET, FILM COATED ORAL 2 TIMES DAILY
Status: DISCONTINUED | OUTPATIENT
Start: 2024-08-23 | End: 2024-08-24 | Stop reason: HOSPADM

## 2024-08-23 RX ORDER — TRAZODONE HYDROCHLORIDE 50 MG/1
50 TABLET, FILM COATED ORAL NIGHTLY PRN
Status: DISCONTINUED | OUTPATIENT
Start: 2024-08-23 | End: 2024-08-24 | Stop reason: HOSPADM

## 2024-08-23 RX ORDER — LISINOPRIL 20 MG/1
20 TABLET ORAL DAILY
Status: DISCONTINUED | OUTPATIENT
Start: 2024-08-23 | End: 2024-08-24 | Stop reason: HOSPADM

## 2024-08-23 RX ORDER — HYDROXYZINE HYDROCHLORIDE 50 MG/1
50 TABLET, FILM COATED ORAL 3 TIMES DAILY PRN
Status: DISCONTINUED | OUTPATIENT
Start: 2024-08-23 | End: 2024-08-24 | Stop reason: HOSPADM

## 2024-08-23 RX ORDER — FLUPHENAZINE HYDROCHLORIDE 5 MG/1
5 TABLET ORAL NIGHTLY
Status: DISCONTINUED | OUTPATIENT
Start: 2024-08-23 | End: 2024-08-24 | Stop reason: HOSPADM

## 2024-08-23 RX ORDER — AMLODIPINE BESYLATE 5 MG/1
5 TABLET ORAL DAILY
Status: DISCONTINUED | OUTPATIENT
Start: 2024-08-23 | End: 2024-08-24 | Stop reason: HOSPADM

## 2024-08-23 RX ORDER — PROCHLORPERAZINE EDISYLATE 5 MG/ML
10 INJECTION INTRAMUSCULAR; INTRAVENOUS EVERY 6 HOURS PRN
Status: DISCONTINUED | OUTPATIENT
Start: 2024-08-23 | End: 2024-08-23 | Stop reason: HOSPADM

## 2024-08-23 RX ADMIN — ASPIRIN 81 MG: 81 TABLET, COATED ORAL at 16:46

## 2024-08-23 RX ADMIN — FLUPHENAZINE HYDROCHLORIDE 5 MG: 5 TABLET ORAL at 21:18

## 2024-08-23 RX ADMIN — ATORVASTATIN CALCIUM 80 MG: 40 TABLET, FILM COATED ORAL at 21:18

## 2024-08-23 RX ADMIN — AMLODIPINE BESYLATE 5 MG: 5 TABLET ORAL at 16:46

## 2024-08-23 RX ADMIN — PROCHLORPERAZINE EDISYLATE 10 MG: 5 INJECTION INTRAMUSCULAR; INTRAVENOUS at 02:27

## 2024-08-23 RX ADMIN — CLOPIDOGREL BISULFATE 75 MG: 75 TABLET ORAL at 16:59

## 2024-08-23 RX ADMIN — METOPROLOL TARTRATE 25 MG: 25 TABLET, FILM COATED ORAL at 21:18

## 2024-08-23 RX ADMIN — ISOSORBIDE MONONITRATE 60 MG: 30 TABLET, EXTENDED RELEASE ORAL at 16:46

## 2024-08-23 RX ADMIN — TRAZODONE HYDROCHLORIDE 50 MG: 50 TABLET ORAL at 23:38

## 2024-08-23 RX ADMIN — RANOLAZINE 500 MG: 500 TABLET, FILM COATED, EXTENDED RELEASE ORAL at 21:18

## 2024-08-23 RX ADMIN — DULOXETINE HYDROCHLORIDE 60 MG: 60 CAPSULE, DELAYED RELEASE ORAL at 21:18

## 2024-08-23 ASSESSMENT — PAIN DESCRIPTION - LOCATION
LOCATION: BACK
LOCATION: GENERALIZED
LOCATION: CHEST
LOCATION: CHEST

## 2024-08-23 ASSESSMENT — SLEEP AND FATIGUE QUESTIONNAIRES
DO YOU USE A SLEEP AID: NO
SLEEP PATTERN: RESTLESSNESS;INSOMNIA
SLEEP PATTERN: UNABLE TO ASSESS
DO YOU HAVE DIFFICULTY SLEEPING: YES
AVERAGE NUMBER OF SLEEP HOURS: 5
DO YOU HAVE DIFFICULTY SLEEPING: YES
DO YOU USE A SLEEP AID: NO

## 2024-08-23 ASSESSMENT — PATIENT HEALTH QUESTIONNAIRE - PHQ9
SUM OF ALL RESPONSES TO PHQ QUESTIONS 1-9: 3
SUM OF ALL RESPONSES TO PHQ QUESTIONS 1-9: 3
SUM OF ALL RESPONSES TO PHQ9 QUESTIONS 1 & 2: 3
1. LITTLE INTEREST OR PLEASURE IN DOING THINGS: MORE THAN HALF THE DAYS
SUM OF ALL RESPONSES TO PHQ QUESTIONS 1-9: 3
SUM OF ALL RESPONSES TO PHQ QUESTIONS 1-9: 3
2. FEELING DOWN, DEPRESSED OR HOPELESS: SEVERAL DAYS

## 2024-08-23 ASSESSMENT — PAIN SCALES - GENERAL
PAINLEVEL_OUTOF10: 3
PAINLEVEL_OUTOF10: 0
PAINLEVEL_OUTOF10: 9
PAINLEVEL_OUTOF10: 4
PAINLEVEL_OUTOF10: 8

## 2024-08-23 ASSESSMENT — PAIN DESCRIPTION - FREQUENCY: FREQUENCY: CONTINUOUS

## 2024-08-23 ASSESSMENT — LIFESTYLE VARIABLES
HOW MANY STANDARD DRINKS CONTAINING ALCOHOL DO YOU HAVE ON A TYPICAL DAY: PATIENT DOES NOT DRINK
HOW OFTEN DO YOU HAVE A DRINK CONTAINING ALCOHOL: NEVER
HOW MANY STANDARD DRINKS CONTAINING ALCOHOL DO YOU HAVE ON A TYPICAL DAY: PATIENT DOES NOT DRINK
HOW OFTEN DO YOU HAVE A DRINK CONTAINING ALCOHOL: NEVER

## 2024-08-23 ASSESSMENT — PAIN - FUNCTIONAL ASSESSMENT: PAIN_FUNCTIONAL_ASSESSMENT: ACTIVITIES ARE NOT PREVENTED

## 2024-08-23 ASSESSMENT — PAIN DESCRIPTION - ORIENTATION
ORIENTATION: LOWER
ORIENTATION: MID
ORIENTATION: MID

## 2024-08-23 ASSESSMENT — PAIN DESCRIPTION - PAIN TYPE
TYPE: CHRONIC PAIN
TYPE: ACUTE PAIN

## 2024-08-23 ASSESSMENT — PAIN DESCRIPTION - DESCRIPTORS
DESCRIPTORS: DISCOMFORT;ACHING
DESCRIPTORS: DISCOMFORT

## 2024-08-23 ASSESSMENT — PAIN DESCRIPTION - ONSET: ONSET: ON-GOING
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
## 2024-08-23 NOTE — PROGRESS NOTES
4 Eyes Skin Assessment     The patient is being assessed for  Admission    I agree that 2 RN's have performed a thorough Head to Toe Skin Assessment on the patient. ALL assessment sites listed below have been assessed.       Areas assessed for pressure by both nurses: Julio Martinez RN; Muriel Bell RN  [x]   Head, Face, and Ears   [x]   Shoulders, Back, and Chest  [x]   Arms, Elbows, and Hands   [x]   Coccyx, Sacrum, and Ischum  [x]   Legs, Feet, and Heels    Scattered bruising bilateral arms; small abrasions right knee; small abrasion R hand      Nurse 1 eSignature: Electronically signed by Julio Martinez RN (Peters) on 8/23/24 at 7:04 PM EDT    **SHARE this note so that the co-signing nurse is able to place an eSignature**    Nurse 2 eSignature: {Esignature:[id number]}

## 2024-08-23 NOTE — PROGRESS NOTES
Pt arrived on unit at 1144 via transport and security.  Pt went through security check point.  Irritable but cooperative upon arrive.  Tour of unit, unit policies, and hydration/nutrition provided to patient.

## 2024-08-23 NOTE — PROGRESS NOTES
[facility] [place]  Admission Note     Admission Type:   Admission Type: Involuntary    Reason for admission:  Reason for Admission: SI, depression      Addictive Behavior:   Addictive Behavior  In the Past 3 Months, Have You Felt or Has Someone Told You That You Have a Problem With  : None    Medical Problems:   Past Medical History:   Diagnosis Date    Arthritis     CAD (coronary artery disease)     CHF (congestive heart failure) ([facility])     Chronic pain     twister vertebra and two collapsed disc, states injury happened at 49yo    Heart attack ([facility])     pt reports 10 heart attacks in 6 years, 8 stents placed    Hypothyroid        Status EXAM:  Mental Status and Behavioral Exam  Normal: No  Level of Assistance: Independent/Self  Facial Expression: Worried, Sad  Affect: Inappropriate  Level of Consciousness: Alert  Frequency of Checks: 4 times per hour, close  Mood:Normal: No  Mood: Depressed, Irritable, Sad  Motor Activity:Normal: Yes  Eye Contact: Good  Observed Behavior: Cooperative, Guarded, Threatening (threatening to a particular peer)  Sexual Misconduct History: Current - no  Preception: [place]osh[place] to person, [place] to time, [place] to place, [place] to situation  Attention:Normal: Yes  Thought Processes: Circumstantial  Thought Content:Normal: Yes  Depression Symptoms: Change in energy level, Increased irritability, Feelings of helplessness  Anxiety Symptoms: Generalized  Flora Symptoms: No problems reported or observed.  Hallucinations: None  Delusions: No  Memory:Normal: Yes  Insight and Judgment: Yes    Tobacco Screening:  Practical Counseling, on admission, rafa X, if applicable and completed (first 3 are required if patient doesn't refuse):            ( ) Recognizing danger situations (included triggers and roadblocks)                    ( ) Coping skills (new ways to manage stress,relaxation techniques, changing routine, distraction)                                                           ( )

## 2024-08-23 NOTE — ED NOTES
Transfer Center Handoff for Behavioral Health Transfers      Patient's Current Location: [facility] EMERGENCY DEPARTMENT     Chief Complaint   Patient presents with    Chest Pain     Pt presents to the ED via EMS with c/o midsternal chest pain that radiates to his back.        Current or History of Violent Behavior: No    Currently in Restraints Now or During this Encounter: No  (Specify if Agitation or self harm is noted in ED?)  If yes, please describe behaviors requiring restraint:             Medical Clearance Documented and Verified in the Chart: Yes    Allergies   Allergen Reactions    Latex Itching    Ziprasidone Hcl Other (See Comments)     Pt reports seizures with Geodon    Zofran [Ondansetron Hcl] Swelling    Acetaminophen Other (See Comments)     Pt has hep c    Bupropion Swelling    Ketorolac Tromethamine Nausea Only    Lithium     Olanzapine Swelling    Risperidone Swelling    Tramadol     Aspirin Nausea Only and Rash    Ibuprofen Nausea Only and Rash        Can Patient Tolerate Lying Flat: Yes    Able to Perform ADLs:  Yes  (Specify if able to ambulate or uses any mobility devices such as cane or walker)  Activity:  Independent  Level of Assistance:  Independent  Assistive Device:  None  Miscellaneous Devices:  None    LABS    CBC:   Lab Results   Component Value Date/Time    WBC 8.3 08/23/2024 02:24 AM    RBC 4.10 08/23/2024 02:24 AM    HGB 11.5 08/23/2024 02:24 AM    HCT 34.4 08/23/2024 02:24 AM    MCV 83.9 08/23/2024 02:24 AM    MCH 28.2 08/23/2024 02:24 AM    MCHC 33.6 08/23/2024 02:24 AM    RDW 14.5 08/23/2024 02:24 AM     08/23/2024 02:24 AM    MPV 9.4 08/23/2024 02:24 AM     CMP:   Lab Results   Component Value Date/Time     08/23/2024 02:24 AM    K 3.7 08/23/2024 02:24 AM     08/23/2024 02:24 AM    CO2 24 08/23/2024 02:24 AM    BUN 29 08/23/2024 02:24 AM    CREATININE 1.8 08/23/2024 02:24 AM    GFRAA >60 10/30/2018 03:05 AM    GFRAA >60 08/05/2011 08:45 AM     receiving care for the above psychiatric illness.     Home Medications  Does Patient Have Medications to Bring to the Facility: Yes  Medications Prior to Admission:   Prior to Admission Medications   Prescriptions Last Dose Informant Patient Reported? Taking?   albuterol sulfate HFA (PROVENTIL;VENTOLIN;PROAIR) 108 (90 Base) MCG/ACT inhaler   No No   Sig: Inhale 2 puffs into the lungs every 6 hours as needed for Shortness of Breath or Wheezing   aspirin 81 MG EC tablet   No No   Sig: Take 1 tablet by mouth daily   atorvastatin (LIPITOR) 40 MG tablet   No No   Sig: Take 1 tablet by mouth daily   clopidogrel (PLAVIX) 75 MG tablet   No No   Sig: Take 1 tablet by mouth daily   cyclobenzaprine (FLEXERIL) 10 MG tablet   No No   Sig: Take 1 tablet by mouth 3 times daily as needed for Muscle spasms   isosorbide mononitrate (IMDUR) 30 MG extended release tablet   No No   Sig: Take 1 tablet by mouth daily   levothyroxine (SYNTHROID) 150 MCG tablet   No No   Sig: Take 1 tablet by mouth Daily   lisinopril (PRINIVIL;ZESTRIL) 20 MG tablet   No No   Sig: Take 1 tablet by mouth daily   metoprolol tartrate (LOPRESSOR) 25 MG tablet   No No   Sig: Take 1 tablet by mouth 2 times daily   nitroGLYCERIN (NITROSTAT) 0.4 MG SL tablet   No No   Sig: Place 1 tablet under the tongue every 5 minutes as needed for Chest pain   pantoprazole (PROTONIX) 40 MG tablet   No No   Sig: Take 1 tablet by mouth every morning (before breakfast)   prazosin (MINIPRESS) 2 MG capsule   No No   Sig: Take 1 capsule by mouth nightly   ranolazine (RANEXA) 500 MG extended release tablet   No No   Sig: Take 1 tablet by mouth 2 times daily      Facility-Administered Medications: None          Additional Information  Isolation:  None    HIV Positive: unknown    Oxygen Use: No    Any Legal Issues (Current or Past): unknown    Does Patient have POA or Guardian: No    Current Living Situation:  Homeless, living with brother occasionally    Roommate Appropriate: Yes    Is

## 2024-08-23 NOTE — ED NOTES
Upon discharge patient states \"I just want to die.\" When questioned. Pt stated he doesn't have a plan for suicide, but just doesn't have a reason to live anymore. Denies any homicidal ideation. Dr. Larson made aware. Tele psych will be consulted.
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
## 2024-08-23 NOTE — TRANSFER CENTER NOTE
Transfer Center Handoff for Behavioral Health Transfers      Patient's Current Location: [facility] EMERGENCY DEPARTMENT     Chief Complaint   Patient presents with    Chest Pain     Pt presents to the ED via EMS with c/o midsternal chest pain that radiates to his back.        Current or History of Violent Behavior: No    Currently in Restraints Now or During this Encounter: No  (Specify if Agitation or self harm is noted in ED?)  If yes, please describe behaviors requiring restraint:             Medical Clearance Documented and Verified in the Chart: Yes    Allergies   Allergen Reactions    Latex Itching    Ziprasidone Hcl Other (See Comments)     Pt reports seizures with Geodon    Zofran [Ondansetron Hcl] Swelling    Acetaminophen Other (See Comments)     Pt has hep c    Bupropion Swelling    Ketorolac Tromethamine Nausea Only    Lithium     Olanzapine Swelling    Risperidone Swelling    Tramadol     Aspirin Nausea Only and Rash    Ibuprofen Nausea Only and Rash        Can Patient Tolerate Lying Flat: Yes    Able to Perform ADLs:  Yes  (Specify if able to ambulate or uses any mobility devices such as cane or walker)  Activity:    Level of Assistance:    Assistive Device:    Miscellaneous Devices:      LABS    CBC:   Lab Results   Component Value Date/Time    WBC 8.3 08/23/2024 02:24 AM    RBC 4.10 08/23/2024 02:24 AM    HGB 11.5 08/23/2024 02:24 AM    HCT 34.4 08/23/2024 02:24 AM    MCV 83.9 08/23/2024 02:24 AM    MCH 28.2 08/23/2024 02:24 AM    MCHC 33.6 08/23/2024 02:24 AM    RDW 14.5 08/23/2024 02:24 AM     08/23/2024 02:24 AM    MPV 9.4 08/23/2024 02:24 AM     CMP:   Lab Results   Component Value Date/Time     08/23/2024 02:24 AM    K 3.7 08/23/2024 02:24 AM     08/23/2024 02:24 AM    CO2 24 08/23/2024 02:24 AM    BUN 29 08/23/2024 02:24 AM    CREATININE 1.8 08/23/2024 02:24 AM    GFRAA >60 10/30/2018 03:05 AM    GFRAA >60 08/05/2011 08:45 AM    AGRATIO 1.8 08/23/2024 02:24  Bring to the Facility: Yes  Medications Prior to Admission:   Prior to Admission Medications   Prescriptions Last Dose Informant Patient Reported? Taking?   albuterol sulfate HFA (PROVENTIL;VENTOLIN;PROAIR) 108 (90 Base) MCG/ACT inhaler   No No   Sig: Inhale 2 puffs into the lungs every 6 hours as needed for Shortness of Breath or Wheezing   aspirin 81 MG EC tablet   No No   Sig: Take 1 tablet by mouth daily   atorvastatin (LIPITOR) 40 MG tablet   No No   Sig: Take 1 tablet by mouth daily   clopidogrel (PLAVIX) 75 MG tablet   No No   Sig: Take 1 tablet by mouth daily   cyclobenzaprine (FLEXERIL) 10 MG tablet   No No   Sig: Take 1 tablet by mouth 3 times daily as needed for Muscle spasms   isosorbide mononitrate (IMDUR) 30 MG extended release tablet   No No   Sig: Take 1 tablet by mouth daily   levothyroxine (SYNTHROID) 150 MCG tablet   No No   Sig: Take 1 tablet by mouth Daily   lisinopril (PRINIVIL;ZESTRIL) 20 MG tablet   No No   Sig: Take 1 tablet by mouth daily   metoprolol tartrate (LOPRESSOR) 25 MG tablet   No No   Sig: Take 1 tablet by mouth 2 times daily   nitroGLYCERIN (NITROSTAT) 0.4 MG SL tablet   No No   Sig: Place 1 tablet under the tongue every 5 minutes as needed for Chest pain   pantoprazole (PROTONIX) 40 MG tablet   No No   Sig: Take 1 tablet by mouth every morning (before breakfast)   prazosin (MINIPRESS) 2 MG capsule   No No   Sig: Take 1 capsule by mouth nightly   ranolazine (RANEXA) 500 MG extended release tablet   No No   Sig: Take 1 tablet by mouth 2 times daily      Facility-Administered Medications: None          Additional Information  Isolation:      HIV Positive: no    Oxygen Use: No    Any Legal Issues (Current or Past): no    Does Patient have POA or Guardian: No    Current Living Situation:  Homeless    Roommate Appropriate: Yes    Is this a special case or have any other considerations:  No  (pregnancy, autism, developmental disabled, etc.)    Does the patient have confirmed or
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
## 2024-08-23 NOTE — PROGRESS NOTES
CSSR-S Assessment completed with patient who then scored low risk.     Provider LANRE Bruno  was notified of low score, via In-person at 1159.      Were suicide precautions ordered: YES  Suicide precautions were ordered per protocol due to a high CSSRS completed prior to admission. Pt reports only having a wish to die, denies having a plan or intent.  Precautions discontinued per provider.      Completed by: Julio Martinez RN

## 2024-08-24 ENCOUNTER — APPOINTMENT (OUTPATIENT)
Dept: GENERAL RADIOLOGY | Age: 54
End: 2024-08-24
Attending: PSYCHIATRY & NEUROLOGY
Payer: MEDICARE

## 2024-08-24 VITALS
WEIGHT: 214 LBS | RESPIRATION RATE: 16 BRPM | BODY MASS INDEX: 29.96 KG/M2 | DIASTOLIC BLOOD PRESSURE: 94 MMHG | TEMPERATURE: 97.5 F | SYSTOLIC BLOOD PRESSURE: 145 MMHG | HEIGHT: 71 IN | HEART RATE: 70 BPM | OXYGEN SATURATION: 98 %

## 2024-08-24 LAB
ANION GAP SERPL CALCULATED.3IONS-SCNC: 8 MMOL/L (ref 3–16)
BASOPHILS # BLD: 0.1 K/UL (ref 0–0.2)
BASOPHILS NFR BLD: 1.5 %
BUN SERPL-MCNC: 17 MG/DL (ref 7–20)
CALCIUM SERPL-MCNC: 9.4 MG/DL (ref 8.3–10.6)
CHLORIDE SERPL-SCNC: 104 MMOL/L (ref 99–110)
CO2 SERPL-SCNC: 24 MMOL/L (ref 21–32)
CREAT SERPL-MCNC: 1.2 MG/DL (ref 0.9–1.3)
DEPRECATED RDW RBC AUTO: 14.9 % (ref 12.4–15.4)
EKG ATRIAL RATE: 62 BPM
EKG DIAGNOSIS: NORMAL
EKG P AXIS: 62 DEGREES
EKG P-R INTERVAL: 176 MS
EKG Q-T INTERVAL: 436 MS
EKG QRS DURATION: 118 MS
EKG QTC CALCULATION (BAZETT): 442 MS
EKG R AXIS: 64 DEGREES
EKG T AXIS: 34 DEGREES
EKG VENTRICULAR RATE: 62 BPM
EOSINOPHIL # BLD: 0.1 K/UL (ref 0–0.6)
EOSINOPHIL NFR BLD: 1.9 %
GFR SERPLBLD CREATININE-BSD FMLA CKD-EPI: 72 ML/MIN/{1.73_M2}
GLUCOSE SERPL-MCNC: 103 MG/DL (ref 70–99)
HCT VFR BLD AUTO: 37.2 % (ref 40.5–52.5)
HGB BLD-MCNC: 12.1 G/DL (ref 13.5–17.5)
LYMPHOCYTES # BLD: 2 K/UL (ref 1–5.1)
LYMPHOCYTES NFR BLD: 30.1 %
MCH RBC QN AUTO: 27.8 PG (ref 26–34)
MCHC RBC AUTO-ENTMCNC: 32.6 G/DL (ref 31–36)
MCV RBC AUTO: 85.2 FL (ref 80–100)
MONOCYTES # BLD: 0.7 K/UL (ref 0–1.3)
MONOCYTES NFR BLD: 10 %
NEUTROPHILS # BLD: 3.7 K/UL (ref 1.7–7.7)
NEUTROPHILS NFR BLD: 56.5 %
PLATELET # BLD AUTO: 167 K/UL (ref 135–450)
PMV BLD AUTO: 8.9 FL (ref 5–10.5)
POTASSIUM SERPL-SCNC: 4 MMOL/L (ref 3.5–5.1)
RBC # BLD AUTO: 4.37 M/UL (ref 4.2–5.9)
SODIUM SERPL-SCNC: 136 MMOL/L (ref 136–145)
TROPONIN, HIGH SENSITIVITY: 12 NG/L (ref 0–22)
TROPONIN, HIGH SENSITIVITY: 12 NG/L (ref 0–22)
WBC # BLD AUTO: 6.5 K/UL (ref 4–11)

## 2024-08-24 PROCEDURE — 93005 ELECTROCARDIOGRAM TRACING: CPT

## 2024-08-24 PROCEDURE — 99223 1ST HOSP IP/OBS HIGH 75: CPT

## 2024-08-24 PROCEDURE — 6370000000 HC RX 637 (ALT 250 FOR IP)

## 2024-08-24 PROCEDURE — 36415 COLL VENOUS BLD VENIPUNCTURE: CPT

## 2024-08-24 PROCEDURE — 84484 ASSAY OF TROPONIN QUANT: CPT

## 2024-08-24 PROCEDURE — 93010 ELECTROCARDIOGRAM REPORT: CPT | Performed by: INTERNAL MEDICINE

## 2024-08-24 PROCEDURE — 6370000000 HC RX 637 (ALT 250 FOR IP): Performed by: NURSE PRACTITIONER

## 2024-08-24 PROCEDURE — 80048 BASIC METABOLIC PNL TOTAL CA: CPT

## 2024-08-24 PROCEDURE — 5130000000 HC BRIDGE APPOINTMENT

## 2024-08-24 PROCEDURE — 85025 COMPLETE CBC W/AUTO DIFF WBC: CPT

## 2024-08-24 PROCEDURE — 71045 X-RAY EXAM CHEST 1 VIEW: CPT

## 2024-08-24 RX ORDER — NICOTINE 21 MG/24HR
1 PATCH, TRANSDERMAL 24 HOURS TRANSDERMAL DAILY
Status: DISCONTINUED | OUTPATIENT
Start: 2024-08-24 | End: 2024-08-24 | Stop reason: HOSPADM

## 2024-08-24 RX ADMIN — ASPIRIN 81 MG: 81 TABLET, COATED ORAL at 08:29

## 2024-08-24 RX ADMIN — RANOLAZINE 500 MG: 500 TABLET, FILM COATED, EXTENDED RELEASE ORAL at 08:30

## 2024-08-24 RX ADMIN — AMLODIPINE BESYLATE 5 MG: 5 TABLET ORAL at 08:29

## 2024-08-24 RX ADMIN — HYDROXYZINE HYDROCHLORIDE 50 MG: 50 TABLET, FILM COATED ORAL at 09:54

## 2024-08-24 RX ADMIN — METOPROLOL TARTRATE 25 MG: 25 TABLET, FILM COATED ORAL at 08:29

## 2024-08-24 RX ADMIN — LEVOTHYROXINE SODIUM 150 MCG: 0.15 TABLET ORAL at 08:29

## 2024-08-24 RX ADMIN — ISOSORBIDE MONONITRATE 60 MG: 30 TABLET, EXTENDED RELEASE ORAL at 08:29

## 2024-08-24 RX ADMIN — DULOXETINE HYDROCHLORIDE 60 MG: 60 CAPSULE, DELAYED RELEASE ORAL at 08:29

## 2024-08-24 RX ADMIN — CLOPIDOGREL BISULFATE 75 MG: 75 TABLET ORAL at 08:29

## 2024-08-24 ASSESSMENT — PAIN SCALES - GENERAL
PAINLEVEL_OUTOF10: 0
PAINLEVEL_OUTOF10: 0

## 2024-08-24 NOTE — PLAN OF CARE
Pt A/Ox4, has been mostly isolative to room but did come out to the day room and ate his dinner. Pt was friendly to writer but was verbally aggressive to another pt- redirected with no other issues. Pt was compliant with scheduled medications and denied SI/HI/AVH. Pt did state he is feeling sad/depressed and feels like he has nothing to live for. Pt wants to get ahold of his CW through [facility] ([facility]) tomorrow and try to come up with some type of placement for when he is discharged.     Pt c/o lower back pain and trouble sleeping at 2330-- PRN trazodone and lidocaine patch given at 2338 with effectiveness.        Problem: Chronic Conditions and Co-morbidities  Goal: Patient's chronic conditions and co-morbidity symptoms are monitored and maintained or improved  Outcome: Progressing     Problem: Anxiety  Goal: Will report anxiety at manageable levels  Description: INTERVENTIONS:  1. Administer medication as ordered  2. Teach and rehearse alternative coping skills  3. Provide emotional support with 1:1 interaction with staff  Outcome: Progressing     Problem: Decision Making  Goal: Pt/Family able to effectively weigh alternatives and participate in decision making related to treatment and care  Description: INTERVENTIONS:  1. Determine when there are differences between patient's view, family's view, and healthcare provider's view of condition  2. Facilitate patient and family articulation of goals for care  3. Help patient and family identify pros/cons of alternative solutions  4. Provide information as requested by patient/family  5. Respect patient/family right to receive or not to receive information  6. Serve as a liaison between patient and family and health care team  7. Initiate Consults from Ethics, Palliative Care or initiate Family Care Conference as is appropriate  Outcome: Progressing     Problem: Behavior  Goal: Pt/Family maintain appropriate behavior and adhere to behavioral management agreement, if  patient/family identify possible irrational spiritual/cultural beliefs and values.  3. Explore possibilities of making amends & reconciliation with self, others, and/or a greater power.  4. Guide patient/family in identifying painful feelings of guilt.  5. Help patient/famiy explore and identify spiritual beliefs, cultural understandings or values that may help or hinder letting go of issue.  6. Help patient/family explore feelings of anger, bitterness, resentment.  7. Help patient/family identify and examine the situation in which these feelings are experienced.  8. Help patient/family identify destructive displacement of feelings onto other individuals.  9. Invite use of sacraments/rituals/ceremonies as appropriate (e.g. - confession, anointing, smudging).  10. Refer patient/family to formal counseling and/or to [facility] community for further support work.  Outcome: Progressing     Problem: Self Harm/Suicidality  Goal: Will have no self-injury during hospital stay  Description: INTERVENTIONS:  1.  Ensure constant observer at bedside with Q15M safety checks  2.  Maintain a safe environment  3.  Secure patient belongings  4.  Ensure family/visitors adhere to safety recommendations  5.  Ensure safety tray has been added to patient's diet order  6.  Every shift and PRN: Re-assess suicidal risk via Frequent Screener  Outcome: Progressing     Problem: Risk for Elopement  Goal: Patient will not exit the unit/facility without proper excort  Outcome: Progressing     Problem: Pain  Goal: Verbalizes/displays adequate comfort level or baseline comfort level  Outcome: Progressing

## 2024-08-24 NOTE — BH NOTE
[facility] [place]  Discharge Note    Pt discharged with followings belongings:   Dental Appliances: None  Vision - Corrective Lenses: Other (Comment)  Hearing Aid: None  Jewelry: None  Body Piercings Removed: N/A  Clothing: Pants, Shirt, Socks  Other Valuables: Other (Comment) (black leather wallet)   Valuables returned to patient. Patient educated on aftercare instructions: yes  I.Patient verbalize understanding of AVS:  yes.    Status EXAM upon discharge:  Mental Status and Behavioral Exam  Normal: No  Level of Assistance: Independent/Self  Facial Expression: Sad  Affect: Blunt  Level of Consciousness: Alert  Frequency of Checks: 4 times per hour, close  Mood:Normal: No  Mood: Depressed, Labile, Irritable  Motor Activity:Normal: Yes  Eye Contact: Fair  Observed Behavior: Guarded, Withdrawn, Cooperative  Sexual Misconduct History: Current - no  Preception: [place] to person, [place] to time, [place] to place, [place] to situation  Attention:Normal: Yes  Thought Processes: Circumstantial  Thought Content:Normal: Yes  Depression Symptoms: Change in energy level, Isolative, Increased irritability  Anxiety Symptoms: Generalized  Flora Symptoms: Poor judgment  Hallucinations: None (Pt denies)  Delusions: No  Memory:Normal: Yes  Insight and Judgment: No  Insight and Judgment: Poor judgment    Tobacco Screening:  Practical Counseling, on admission, rafa X, if applicable and completed (first 3 are required if patient doesn't refuse)x:            ( ) Recognizing danger situations (included triggers and roadblocks)                    ( ) Coping skills (new ways to manage stress,relaxation techniques, changing routine, distraction)                                                           ( ) Basic information about quitting (benefits of quitting, techniques in how to quit, available resources  ( ) [facility]

## 2024-08-24 NOTE — BH NOTE
Bridge Appointment completed: Reviewed Discharge Instructions with patient.    Patient verbalizes understanding and agreement with the discharge plan using the teachback method.         Vaccinations (rafa X if applicable and completed):  ( ) Patient states already received influenza vaccine elsewhere  ( ) Patient received influenza vaccine during this hospitalization  ( ) Patient refused influenza vaccine at this time  ( x) Not offered
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
## 2024-08-24 NOTE — H&P
SYSTEMS:     ***  Constitutional: Negative for fever   HENT: Negative for sore throat   Eyes: Negative for redness   Respiratory: Negative  for dyspnea, cough   Cardiovascular: Negative for chest pain   Gastrointestinal: Negative for vomiting, diarrhea   Genitourinary: Negative for hematuria   Musculoskeletal: Negative for arthralgias   Skin: Negative for rash   Neurological: Negative for syncope    Hematological: Negative for easy bruising/bleeding   Psychiatric/Behavorial: Per psychiatry team evaluation     PHYSICAL EXAM:    BP (!) 145/94   Pulse 70   Temp 97.5 °F (36.4 °C) (Oral)   Resp 16   Ht 1.803 m (5' 11\")   Wt 97.1 kg (214 lb)   SpO2 98%   BMI 29.85 kg/m²   ***  Gen: No distress. Alert.   Eyes: PERRL. No sclera icterus. No conjunctival injection.   ENT: No discharge. Pharynx clear.   Neck: No JVD.  No Carotid Bruit. Trachea midline.  Resp: No accessory muscle use. No crackles. No wheezes. No rhonchi.   CV: Regular rate. Regular rhythm. No murmur.  No rub. No edema.   GI: Non-tender. Non-distended. Normal bowel sounds.   Skin: Warm and dry. No nodule on exposed extremities. No rash on exposed extremities.   M/S: No cyanosis. No joint deformity. No clubbing.   Neuro: Awake. No focal neurologic deficit on exam.  Cranial nerves II through XII intact.  Patient is able to ambulate without difficulty.  Psych: Per psychiatry team evaluation     [unfilled]     U/A:    Lab Results   Component Value Date/Time    COLORU Yellow 08/19/2023 10:10 AM    WBCUA 1 08/19/2023 10:10 AM    RBCUA 0 08/19/2023 10:10 AM    BACTERIA None Seen 08/19/2023 10:10 AM    CLARITYU CLOUDY 08/19/2023 10:10 AM    LEUKOCYTESUR Negative 08/19/2023 10:10 AM    BLOODU Negative 08/19/2023 10:10 AM    GLUCOSEU Negative 08/19/2023 10:10 AM    GLUCOSEU NEGATIVE 08/05/2011 10:22 AM       CULTURES  ***    EKG:  I have reviewed the EKG with the following interpretation:   ***    RADIOLOGY  ***    Pertinent previous results reviewed    ***    ASSESSMENT/PLAN:  ***  - per psychiatry team          Pt has no medical complaints at this time. They were informed that should a medical concern arise during their admission they may have [facility] contact us. ***

## 2024-08-24 NOTE — PLAN OF CARE
Problem: Chronic Conditions and Co-morbidities  Goal: Patient's chronic conditions and co-morbidity symptoms are monitored and maintained or improved  8/24/2024 1357 by Angi Noble RN  Outcome: Completed  8/24/2024 0144 by Alberta Case RN  Outcome: Progressing     Problem: Anxiety  Goal: Will report anxiety at manageable levels  Description: INTERVENTIONS:  1. Administer medication as ordered  2. Teach and rehearse alternative coping skills  3. Provide emotional support with 1:1 interaction with staff  8/24/2024 1357 by Angi Noble RN  Outcome: Completed  8/24/2024 0144 by Alberta Case RN  Outcome: Progressing     Problem: Decision Making  Goal: Pt/Family able to effectively weigh alternatives and participate in decision making related to treatment and care  Description: INTERVENTIONS:  1. Determine when there are differences between patient's view, family's view, and healthcare provider's view of condition  2. Facilitate patient and family articulation of goals for care  3. Help patient and family identify pros/cons of alternative solutions  4. Provide information as requested by patient/family  5. Respect patient/family right to receive or not to receive information  6. Serve as a liaison between patient and family and health care team  7. Initiate Consults from Ethics, Palliative Care or initiate Family Care Conference as is appropriate  8/24/2024 1357 by Anig Noble RN  Outcome: Completed  8/24/2024 0144 by Alberta Case RN  Outcome: Progressing     Problem: Behavior  Goal: Pt/Family maintain appropriate behavior and adhere to behavioral management agreement, if implemented  Description: INTERVENTIONS:  1. Assess patient/family's coping skills and  non-compliant behavior (including use of illegal substances)  2. Notify security of behavior or suspected illegal substances which indicate the need for search of the family and/or belongings  3. Encourage verbalization of  making amends & reconciliation with self, others, and/or a greater power.  4. Guide patient/family in identifying painful feelings of guilt.  5. Help patient/famiy explore and identify spiritual beliefs, cultural understandings or values that may help or hinder letting go of issue.  6. Help patient/family explore feelings of anger, bitterness, resentment.  7. Help patient/family identify and examine the situation in which these feelings are experienced.  8. Help patient/family identify destructive displacement of feelings onto other individuals.  9. Invite use of sacraments/rituals/ceremonies as appropriate (e.g. - confession, anointing, smudging).  10. Refer patient/family to formal counseling and/or to bobo community for further support work.  8/24/2024 1357 by Angi Noble RN  Outcome: Completed  8/24/2024 0144 by Alberta Case RN  Outcome: Progressing     Problem: Self Harm/Suicidality  Goal: Will have no self-injury during hospital stay  Description: INTERVENTIONS:  1.  Ensure constant observer at bedside with Q15M safety checks  2.  Maintain a safe environment  3.  Secure patient belongings  4.  Ensure family/visitors adhere to safety recommendations  5.  Ensure safety tray has been added to patient's diet order  6.  Every shift and PRN: Re-assess suicidal risk via Frequent Screener    8/24/2024 1357 by Angi Noble RN  Outcome: Completed  8/24/2024 0144 by Alberta Case RN  Outcome: Progressing     Problem: Risk for Elopement  Goal: Patient will not exit the unit/facility without proper excort  8/24/2024 1357 by Angi Noble RN  Outcome: Completed  8/24/2024 0144 by Alberta Case RN  Outcome: Progressing     Problem: Pain  Goal: Verbalizes/displays adequate comfort level or baseline comfort level  8/24/2024 1357 by Angi Noble RN  Outcome: Completed  8/24/2024 0144 by Alberta Case RN  Outcome: Progressing

## 2024-08-24 NOTE — BH NOTE
Pt c/o chest pain. Pt vs WNL at this time. DR. Salguero made aware, awaiting response. Brittany NP made aware ordered for STAT EKG and Troponin. EKG and Lab made aware of stat orders.

## 2024-08-24 NOTE — BH NOTE
Pt resting in room. EKG completed and Troponin drawn. Pt states chest pain is improving. N.O received from Dr. Salguero.

## 2024-08-24 NOTE — PROGRESS NOTES
Behavioral Services  Medicare Certification Upon Admission    I certify that this patient's inpatient psychiatric hospital admission is medically necessary for:    [x] (1) Treatment which could reasonably be expected to improve this patient's condition,       [x] (2) Or for diagnostic study;     AND     [x](2) The inpatient psychiatric services are provided while the individual is under the care of a physician and are included in the individualized plan of care.    Estimated length of stay/service 2-5 days    Plan for post-hospital care outpatient support    Electronically signed by MALCOM Forrest CNP on 8/24/2024 at 9:47 AM
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
## 2024-08-24 NOTE — BH NOTE
Pt up to nurses station and states PRN atarax was effective. Clothes given to pt per policy and pt request.
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
## 2024-08-26 ENCOUNTER — FOLLOWUP TELEPHONE ENCOUNTER (OUTPATIENT)
Dept: PSYCHIATRY | Age: 54
End: 2024-08-26

## 2024-08-27 ENCOUNTER — FOLLOWUP TELEPHONE ENCOUNTER (OUTPATIENT)
Dept: PSYCHIATRY | Age: 54
End: 2024-08-27

## 2024-08-28 ENCOUNTER — FOLLOWUP TELEPHONE ENCOUNTER (OUTPATIENT)
Dept: PSYCHIATRY | Age: 54
End: 2024-08-28

## 2024-11-10 ENCOUNTER — APPOINTMENT (OUTPATIENT)
Dept: CT IMAGING | Age: 54
DRG: 074 | End: 2024-11-10
Payer: MEDICARE

## 2024-11-10 ENCOUNTER — HOSPITAL ENCOUNTER (INPATIENT)
Age: 54
LOS: 1 days | Discharge: ANOTHER HOSPITAL | DRG: 074 | End: 2024-11-12
Attending: EMERGENCY MEDICINE | Admitting: HOSPITALIST
Payer: MEDICARE

## 2024-11-10 ENCOUNTER — APPOINTMENT (OUTPATIENT)
Dept: GENERAL RADIOLOGY | Age: 54
DRG: 074 | End: 2024-11-10
Payer: MEDICARE

## 2024-11-10 DIAGNOSIS — R07.9 ACUTE CHEST PAIN: Primary | ICD-10-CM

## 2024-11-10 DIAGNOSIS — Z59.00 HOMELESSNESS: ICD-10-CM

## 2024-11-10 DIAGNOSIS — R20.0 LEFT SIDED NUMBNESS: ICD-10-CM

## 2024-11-10 DIAGNOSIS — I63.411 CEREBROVASCULAR ACCIDENT (CVA) DUE TO EMBOLISM OF RIGHT MIDDLE CEREBRAL ARTERY (HCC): ICD-10-CM

## 2024-11-10 LAB
ALBUMIN SERPL-MCNC: 4.4 G/DL (ref 3.4–5)
ALBUMIN/GLOB SERPL: 1.8 {RATIO} (ref 1.1–2.2)
ALP SERPL-CCNC: 82 U/L (ref 40–129)
ALT SERPL-CCNC: 7 U/L (ref 10–40)
ANION GAP SERPL CALCULATED.3IONS-SCNC: 10 MMOL/L (ref 3–16)
AST SERPL-CCNC: 20 U/L (ref 15–37)
BACTERIA URNS QL MICRO: NORMAL /HPF
BASOPHILS # BLD: 0.1 K/UL (ref 0–0.2)
BASOPHILS NFR BLD: 1 %
BILIRUB SERPL-MCNC: <0.2 MG/DL (ref 0–1)
BILIRUB UR QL STRIP.AUTO: NEGATIVE
BUN SERPL-MCNC: 18 MG/DL (ref 7–20)
CALCIUM SERPL-MCNC: 9.8 MG/DL (ref 8.3–10.6)
CHLORIDE SERPL-SCNC: 104 MMOL/L (ref 99–110)
CLARITY UR: CLEAR
CO2 SERPL-SCNC: 26 MMOL/L (ref 21–32)
COLOR UR: YELLOW
CREAT SERPL-MCNC: 1.6 MG/DL (ref 0.9–1.3)
DEPRECATED RDW RBC AUTO: 18.1 % (ref 12.4–15.4)
EOSINOPHIL # BLD: 0.1 K/UL (ref 0–0.6)
EOSINOPHIL NFR BLD: 1 %
EPI CELLS #/AREA URNS AUTO: 0 /HPF (ref 0–5)
GFR SERPLBLD CREATININE-BSD FMLA CKD-EPI: 51 ML/MIN/{1.73_M2}
GLUCOSE BLD-MCNC: 118 MG/DL (ref 70–99)
GLUCOSE SERPL-MCNC: 122 MG/DL (ref 70–99)
GLUCOSE UR STRIP.AUTO-MCNC: NEGATIVE MG/DL
HCT VFR BLD AUTO: 38.6 % (ref 40.5–52.5)
HGB BLD-MCNC: 13.2 G/DL (ref 13.5–17.5)
HGB UR QL STRIP.AUTO: NEGATIVE
HYALINE CASTS #/AREA URNS AUTO: 1 /LPF (ref 0–8)
INR PPP: 0.86 (ref 0.85–1.15)
KETONES UR STRIP.AUTO-MCNC: ABNORMAL MG/DL
LEUKOCYTE ESTERASE UR QL STRIP.AUTO: NEGATIVE
LYMPHOCYTES # BLD: 2.5 K/UL (ref 1–5.1)
LYMPHOCYTES NFR BLD: 29.2 %
MCH RBC QN AUTO: 28.6 PG (ref 26–34)
MCHC RBC AUTO-ENTMCNC: 34.2 G/DL (ref 31–36)
MCV RBC AUTO: 83.8 FL (ref 80–100)
MONOCYTES # BLD: 0.8 K/UL (ref 0–1.3)
MONOCYTES NFR BLD: 9.5 %
NEUTROPHILS # BLD: 5 K/UL (ref 1.7–7.7)
NEUTROPHILS NFR BLD: 59.3 %
NITRITE UR QL STRIP.AUTO: NEGATIVE
NT-PROBNP SERPL-MCNC: 96 PG/ML (ref 0–124)
PERFORMED ON: ABNORMAL
PH UR STRIP.AUTO: 6 [PH] (ref 5–8)
PLATELET # BLD AUTO: 217 K/UL (ref 135–450)
PMV BLD AUTO: 8.8 FL (ref 5–10.5)
POTASSIUM SERPL-SCNC: 3.6 MMOL/L (ref 3.5–5.1)
PROT SERPL-MCNC: 6.8 G/DL (ref 6.4–8.2)
PROT UR STRIP.AUTO-MCNC: ABNORMAL MG/DL
PROTHROMBIN TIME: 12 SEC (ref 11.9–14.9)
RBC # BLD AUTO: 4.61 M/UL (ref 4.2–5.9)
RBC CLUMPS #/AREA URNS AUTO: 1 /HPF (ref 0–4)
SODIUM SERPL-SCNC: 140 MMOL/L (ref 136–145)
SP GR UR STRIP.AUTO: 1.06 (ref 1–1.03)
TROPONIN, HIGH SENSITIVITY: 12 NG/L (ref 0–22)
UA COMPLETE W REFLEX CULTURE PNL UR: ABNORMAL
UA DIPSTICK W REFLEX MICRO PNL UR: YES
URN SPEC COLLECT METH UR: ABNORMAL
UROBILINOGEN UR STRIP-ACNC: 1 E.U./DL
WBC # BLD AUTO: 8.4 K/UL (ref 4–11)
WBC #/AREA URNS AUTO: 2 /HPF (ref 0–5)

## 2024-11-10 PROCEDURE — 80053 COMPREHEN METABOLIC PANEL: CPT

## 2024-11-10 PROCEDURE — 81001 URINALYSIS AUTO W/SCOPE: CPT

## 2024-11-10 PROCEDURE — 99285 EMERGENCY DEPT VISIT HI MDM: CPT

## 2024-11-10 PROCEDURE — 85025 COMPLETE CBC W/AUTO DIFF WBC: CPT

## 2024-11-10 PROCEDURE — 93005 ELECTROCARDIOGRAM TRACING: CPT | Performed by: EMERGENCY MEDICINE

## 2024-11-10 PROCEDURE — 70450 CT HEAD/BRAIN W/O DYE: CPT

## 2024-11-10 PROCEDURE — 85610 PROTHROMBIN TIME: CPT

## 2024-11-10 PROCEDURE — 83880 ASSAY OF NATRIURETIC PEPTIDE: CPT

## 2024-11-10 PROCEDURE — 70496 CT ANGIOGRAPHY HEAD: CPT

## 2024-11-10 PROCEDURE — 6360000004 HC RX CONTRAST MEDICATION: Performed by: EMERGENCY MEDICINE

## 2024-11-10 PROCEDURE — 4A03X5D MEASUREMENT OF ARTERIAL FLOW, INTRACRANIAL, EXTERNAL APPROACH: ICD-10-PCS | Performed by: FAMILY MEDICINE

## 2024-11-10 PROCEDURE — 71045 X-RAY EXAM CHEST 1 VIEW: CPT

## 2024-11-10 PROCEDURE — 84484 ASSAY OF TROPONIN QUANT: CPT

## 2024-11-10 PROCEDURE — 36415 COLL VENOUS BLD VENIPUNCTURE: CPT

## 2024-11-10 PROCEDURE — 6370000000 HC RX 637 (ALT 250 FOR IP): Performed by: EMERGENCY MEDICINE

## 2024-11-10 RX ORDER — OXYCODONE HYDROCHLORIDE 5 MG/1
5 TABLET ORAL ONCE
Status: COMPLETED | OUTPATIENT
Start: 2024-11-10 | End: 2024-11-10

## 2024-11-10 RX ORDER — IOPAMIDOL 755 MG/ML
75 INJECTION, SOLUTION INTRAVASCULAR
Status: COMPLETED | OUTPATIENT
Start: 2024-11-10 | End: 2024-11-10

## 2024-11-10 RX ADMIN — OXYCODONE HYDROCHLORIDE 5 MG: 5 TABLET ORAL at 23:26

## 2024-11-10 RX ADMIN — NITROGLYCERIN 1 INCH: 20 OINTMENT TOPICAL at 22:09

## 2024-11-10 RX ADMIN — IOPAMIDOL 75 ML: 755 INJECTION, SOLUTION INTRAVENOUS at 22:02

## 2024-11-10 SDOH — ECONOMIC STABILITY - HOUSING INSECURITY: HOMELESSNESS UNSPECIFIED: Z59.00

## 2024-11-11 ENCOUNTER — APPOINTMENT (OUTPATIENT)
Dept: MRI IMAGING | Age: 54
DRG: 074 | End: 2024-11-11
Payer: MEDICARE

## 2024-11-11 ENCOUNTER — APPOINTMENT (OUTPATIENT)
Age: 54
DRG: 074 | End: 2024-11-11
Attending: FAMILY MEDICINE
Payer: MEDICARE

## 2024-11-11 PROBLEM — I63.9 CVA (CEREBRAL VASCULAR ACCIDENT) (HCC): Status: ACTIVE | Noted: 2024-11-11

## 2024-11-11 PROBLEM — N18.9 CHRONIC KIDNEY DISEASE: Status: ACTIVE | Noted: 2024-11-11

## 2024-11-11 PROBLEM — I63.9 ACUTE CVA (CEREBROVASCULAR ACCIDENT) (HCC): Status: ACTIVE | Noted: 2024-11-11

## 2024-11-11 PROBLEM — I10 HTN (HYPERTENSION): Status: ACTIVE | Noted: 2024-11-11

## 2024-11-11 LAB
AMPHETAMINES UR QL SCN>1000 NG/ML: ABNORMAL
BARBITURATES UR QL SCN>200 NG/ML: ABNORMAL
BENZODIAZ UR QL SCN>200 NG/ML: ABNORMAL
CANNABINOIDS UR QL SCN>50 NG/ML: POSITIVE
COCAINE UR QL SCN: ABNORMAL
DRUG SCREEN COMMENT UR-IMP: ABNORMAL
ECHO AO ROOT DIAM: 3.3 CM
ECHO AO ROOT INDEX: 1.55 CM/M2
ECHO AV AREA PEAK VELOCITY: 1.8 CM2
ECHO AV AREA VTI: 1.7 CM2
ECHO AV AREA/BSA PEAK VELOCITY: 0.8 CM2/M2
ECHO AV AREA/BSA VTI: 0.8 CM2/M2
ECHO AV MEAN GRADIENT: 11 MMHG
ECHO AV MEAN VELOCITY: 1.6 M/S
ECHO AV PEAK GRADIENT: 20 MMHG
ECHO AV PEAK VELOCITY: 2.3 M/S
ECHO AV VELOCITY RATIO: 0.43
ECHO AV VTI: 53.8 CM
ECHO BSA: 2.15 M2
ECHO LA DIAMETER INDEX: 1.46 CM/M2
ECHO LA DIAMETER: 3.1 CM
ECHO LA TO AORTIC ROOT RATIO: 0.94
ECHO LV EF PHYSICIAN: 60 %
ECHO LV FRACTIONAL SHORTENING: 36 % (ref 28–44)
ECHO LV INTERNAL DIMENSION DIASTOLE INDEX: 2.21 CM/M2
ECHO LV INTERNAL DIMENSION DIASTOLIC: 4.7 CM (ref 4.2–5.9)
ECHO LV INTERNAL DIMENSION SYSTOLIC INDEX: 1.41 CM/M2
ECHO LV INTERNAL DIMENSION SYSTOLIC: 3 CM
ECHO LV IVSD: 1.3 CM (ref 0.6–1)
ECHO LV MASS 2D: 237.9 G (ref 88–224)
ECHO LV MASS INDEX 2D: 111.7 G/M2 (ref 49–115)
ECHO LV POSTERIOR WALL DIASTOLIC: 1.3 CM (ref 0.6–1)
ECHO LV RELATIVE WALL THICKNESS RATIO: 0.55
ECHO LVOT AREA: 3.8 CM2
ECHO LVOT AV VTI INDEX: 0.44
ECHO LVOT DIAM: 2.2 CM
ECHO LVOT MEAN GRADIENT: 2 MMHG
ECHO LVOT PEAK GRADIENT: 4 MMHG
ECHO LVOT PEAK VELOCITY: 1 M/S
ECHO LVOT STROKE VOLUME INDEX: 41.9 ML/M2
ECHO LVOT SV: 89.3 ML
ECHO LVOT VTI: 23.5 CM
ECHO RV INTERNAL DIMENSION: 3.2 CM
ECHO RV TAPSE: 2 CM (ref 1.7–?)
EKG ATRIAL RATE: 77 BPM
EKG DIAGNOSIS: NORMAL
EKG P AXIS: 50 DEGREES
EKG P-R INTERVAL: 166 MS
EKG Q-T INTERVAL: 414 MS
EKG QRS DURATION: 122 MS
EKG QTC CALCULATION (BAZETT): 468 MS
EKG R AXIS: 55 DEGREES
EKG T AXIS: 72 DEGREES
EKG VENTRICULAR RATE: 77 BPM
FENTANYL SCREEN, URINE: ABNORMAL
METHADONE UR QL SCN>300 NG/ML: ABNORMAL
OPIATES UR QL SCN>300 NG/ML: POSITIVE
OXYCODONE UR QL SCN: POSITIVE
PCP UR QL SCN>25 NG/ML: ABNORMAL
PH UR STRIP: 6 [PH]
TROPONIN, HIGH SENSITIVITY: 12 NG/L (ref 0–22)

## 2024-11-11 PROCEDURE — 97530 THERAPEUTIC ACTIVITIES: CPT

## 2024-11-11 PROCEDURE — 93325 DOPPLER ECHO COLOR FLOW MAPG: CPT | Performed by: INTERNAL MEDICINE

## 2024-11-11 PROCEDURE — 97162 PT EVAL MOD COMPLEX 30 MIN: CPT

## 2024-11-11 PROCEDURE — 97116 GAIT TRAINING THERAPY: CPT

## 2024-11-11 PROCEDURE — 93321 DOPPLER ECHO F-UP/LMTD STD: CPT | Performed by: INTERNAL MEDICINE

## 2024-11-11 PROCEDURE — 97166 OT EVAL MOD COMPLEX 45 MIN: CPT

## 2024-11-11 PROCEDURE — 93308 TTE F-UP OR LMTD: CPT | Performed by: INTERNAL MEDICINE

## 2024-11-11 PROCEDURE — 96376 TX/PRO/DX INJ SAME DRUG ADON: CPT

## 2024-11-11 PROCEDURE — 6370000000 HC RX 637 (ALT 250 FOR IP): Performed by: HOSPITALIST

## 2024-11-11 PROCEDURE — 80307 DRUG TEST PRSMV CHEM ANLYZR: CPT

## 2024-11-11 PROCEDURE — 93010 ELECTROCARDIOGRAM REPORT: CPT | Performed by: INTERNAL MEDICINE

## 2024-11-11 PROCEDURE — 93308 TTE F-UP OR LMTD: CPT

## 2024-11-11 PROCEDURE — 99223 1ST HOSP IP/OBS HIGH 75: CPT | Performed by: INTERNAL MEDICINE

## 2024-11-11 PROCEDURE — 94760 N-INVAS EAR/PLS OXIMETRY 1: CPT

## 2024-11-11 PROCEDURE — 96375 TX/PRO/DX INJ NEW DRUG ADDON: CPT

## 2024-11-11 PROCEDURE — G0378 HOSPITAL OBSERVATION PER HR: HCPCS

## 2024-11-11 PROCEDURE — 92610 EVALUATE SWALLOWING FUNCTION: CPT

## 2024-11-11 PROCEDURE — 6360000002 HC RX W HCPCS: Performed by: HOSPITALIST

## 2024-11-11 PROCEDURE — 96372 THER/PROPH/DIAG INJ SC/IM: CPT

## 2024-11-11 PROCEDURE — 70551 MRI BRAIN STEM W/O DYE: CPT

## 2024-11-11 PROCEDURE — 96374 THER/PROPH/DIAG INJ IV PUSH: CPT

## 2024-11-11 PROCEDURE — 2580000003 HC RX 258: Performed by: HOSPITALIST

## 2024-11-11 RX ORDER — SODIUM CHLORIDE 0.9 % (FLUSH) 0.9 %
5-40 SYRINGE (ML) INJECTION EVERY 12 HOURS SCHEDULED
Status: DISCONTINUED | OUTPATIENT
Start: 2024-11-11 | End: 2024-11-12 | Stop reason: HOSPADM

## 2024-11-11 RX ORDER — PROCHLORPERAZINE EDISYLATE 5 MG/ML
10 INJECTION INTRAMUSCULAR; INTRAVENOUS EVERY 6 HOURS PRN
Status: DISCONTINUED | OUTPATIENT
Start: 2024-11-11 | End: 2024-11-12 | Stop reason: HOSPADM

## 2024-11-11 RX ORDER — ASPIRIN 300 MG/1
300 SUPPOSITORY RECTAL DAILY
Status: DISCONTINUED | OUTPATIENT
Start: 2024-11-11 | End: 2024-11-12 | Stop reason: HOSPADM

## 2024-11-11 RX ORDER — ATORVASTATIN CALCIUM 80 MG/1
80 TABLET, FILM COATED ORAL NIGHTLY
Status: DISCONTINUED | OUTPATIENT
Start: 2024-11-11 | End: 2024-11-12

## 2024-11-11 RX ORDER — CARVEDILOL 3.12 MG/1
3.12 TABLET ORAL 2 TIMES DAILY WITH MEALS
Status: ON HOLD | COMMUNITY
End: 2024-11-24

## 2024-11-11 RX ORDER — ASPIRIN 81 MG/1
81 TABLET ORAL DAILY
Status: DISCONTINUED | OUTPATIENT
Start: 2024-11-11 | End: 2024-11-11

## 2024-11-11 RX ORDER — ASPIRIN 81 MG/1
81 TABLET, CHEWABLE ORAL DAILY
Status: DISCONTINUED | OUTPATIENT
Start: 2024-11-11 | End: 2024-11-12 | Stop reason: HOSPADM

## 2024-11-11 RX ORDER — LORAZEPAM 1 MG/1
1 TABLET ORAL
Status: DISCONTINUED | OUTPATIENT
Start: 2024-11-11 | End: 2024-11-12

## 2024-11-11 RX ORDER — HALOPERIDOL 5 MG/ML
5 INJECTION INTRAMUSCULAR EVERY 6 HOURS PRN
Status: DISCONTINUED | OUTPATIENT
Start: 2024-11-11 | End: 2024-11-12 | Stop reason: HOSPADM

## 2024-11-11 RX ORDER — PANTOPRAZOLE SODIUM 40 MG/1
40 TABLET, DELAYED RELEASE ORAL 2 TIMES DAILY
COMMUNITY
End: 2024-11-11

## 2024-11-11 RX ORDER — CLOPIDOGREL BISULFATE 75 MG/1
75 TABLET ORAL DAILY
Status: DISCONTINUED | OUTPATIENT
Start: 2024-11-11 | End: 2024-11-12 | Stop reason: HOSPADM

## 2024-11-11 RX ORDER — AMIODARONE HYDROCHLORIDE 200 MG/1
200 TABLET ORAL DAILY
COMMUNITY
End: 2024-11-11

## 2024-11-11 RX ORDER — LORATADINE 10 MG/1
10 TABLET ORAL DAILY
COMMUNITY

## 2024-11-11 RX ORDER — FLUTICASONE PROPIONATE 50 MCG
2 SPRAY, SUSPENSION (ML) NASAL DAILY PRN
COMMUNITY

## 2024-11-11 RX ORDER — ONDANSETRON 2 MG/ML
4 INJECTION INTRAMUSCULAR; INTRAVENOUS EVERY 6 HOURS PRN
Status: DISCONTINUED | OUTPATIENT
Start: 2024-11-11 | End: 2024-11-11

## 2024-11-11 RX ORDER — ATORVASTATIN CALCIUM 80 MG/1
80 TABLET, FILM COATED ORAL NIGHTLY
Status: DISCONTINUED | OUTPATIENT
Start: 2024-11-11 | End: 2024-11-12 | Stop reason: HOSPADM

## 2024-11-11 RX ORDER — TAMSULOSIN HYDROCHLORIDE 0.4 MG/1
0.4 CAPSULE ORAL DAILY
COMMUNITY

## 2024-11-11 RX ORDER — NICOTINE 21 MG/24HR
1 PATCH, TRANSDERMAL 24 HOURS TRANSDERMAL EVERY 24 HOURS
COMMUNITY

## 2024-11-11 RX ORDER — LIDOCAINE 4 G/G
1 PATCH TOPICAL DAILY
COMMUNITY

## 2024-11-11 RX ORDER — METHOCARBAMOL 750 MG/1
750 TABLET, FILM COATED ORAL 3 TIMES DAILY PRN
Status: ON HOLD | COMMUNITY
End: 2024-11-13

## 2024-11-11 RX ORDER — LABETALOL HYDROCHLORIDE 5 MG/ML
10 INJECTION, SOLUTION INTRAVENOUS EVERY 4 HOURS PRN
Status: DISCONTINUED | OUTPATIENT
Start: 2024-11-11 | End: 2024-11-12 | Stop reason: HOSPADM

## 2024-11-11 RX ORDER — SODIUM CHLORIDE 9 MG/ML
INJECTION, SOLUTION INTRAVENOUS PRN
Status: DISCONTINUED | OUTPATIENT
Start: 2024-11-11 | End: 2024-11-12 | Stop reason: HOSPADM

## 2024-11-11 RX ORDER — MORPHINE SULFATE 2 MG/ML
2 INJECTION, SOLUTION INTRAMUSCULAR; INTRAVENOUS
Status: DISCONTINUED | OUTPATIENT
Start: 2024-11-11 | End: 2024-11-12

## 2024-11-11 RX ORDER — ALPRAZOLAM 0.25 MG/1
0.25 TABLET ORAL 2 TIMES DAILY PRN
COMMUNITY

## 2024-11-11 RX ORDER — ONDANSETRON 4 MG/1
4 TABLET, ORALLY DISINTEGRATING ORAL EVERY 8 HOURS PRN
Status: DISCONTINUED | OUTPATIENT
Start: 2024-11-11 | End: 2024-11-11

## 2024-11-11 RX ORDER — ERGOCALCIFEROL 1.25 MG/1
50000 CAPSULE ORAL WEEKLY
COMMUNITY

## 2024-11-11 RX ORDER — POLYETHYLENE GLYCOL 3350 17 G/17G
17 POWDER, FOR SOLUTION ORAL DAILY PRN
Status: DISCONTINUED | OUTPATIENT
Start: 2024-11-11 | End: 2024-11-12 | Stop reason: HOSPADM

## 2024-11-11 RX ORDER — PROMETHAZINE HYDROCHLORIDE 25 MG/ML
6.25 INJECTION, SOLUTION INTRAMUSCULAR; INTRAVENOUS EVERY 6 HOURS PRN
Status: CANCELLED | OUTPATIENT
Start: 2024-11-11

## 2024-11-11 RX ORDER — NITROGLYCERIN 0.4 MG/1
0.4 TABLET SUBLINGUAL EVERY 5 MIN PRN
Status: ON HOLD | COMMUNITY
End: 2024-11-24

## 2024-11-11 RX ORDER — ACETAMINOPHEN 500 MG
500 TABLET ORAL EVERY 6 HOURS PRN
Status: ON HOLD | COMMUNITY
End: 2024-11-13

## 2024-11-11 RX ORDER — SODIUM CHLORIDE 0.9 % (FLUSH) 0.9 %
5-40 SYRINGE (ML) INJECTION PRN
Status: DISCONTINUED | OUTPATIENT
Start: 2024-11-11 | End: 2024-11-12 | Stop reason: HOSPADM

## 2024-11-11 RX ORDER — MORPHINE SULFATE 4 MG/ML
4 INJECTION, SOLUTION INTRAMUSCULAR; INTRAVENOUS
Status: DISCONTINUED | OUTPATIENT
Start: 2024-11-11 | End: 2024-11-12

## 2024-11-11 RX ORDER — ENOXAPARIN SODIUM 100 MG/ML
40 INJECTION SUBCUTANEOUS DAILY
Status: DISCONTINUED | OUTPATIENT
Start: 2024-11-11 | End: 2024-11-12 | Stop reason: HOSPADM

## 2024-11-11 RX ADMIN — MORPHINE SULFATE 2 MG: 2 INJECTION, SOLUTION INTRAMUSCULAR; INTRAVENOUS at 21:06

## 2024-11-11 RX ADMIN — ASPIRIN 81 MG: 81 TABLET, CHEWABLE ORAL at 08:01

## 2024-11-11 RX ADMIN — MORPHINE SULFATE 2 MG: 2 INJECTION, SOLUTION INTRAMUSCULAR; INTRAVENOUS at 12:35

## 2024-11-11 RX ADMIN — MORPHINE SULFATE 2 MG: 2 INJECTION, SOLUTION INTRAMUSCULAR; INTRAVENOUS at 15:09

## 2024-11-11 RX ADMIN — ENOXAPARIN SODIUM 40 MG: 100 INJECTION SUBCUTANEOUS at 08:01

## 2024-11-11 RX ADMIN — MORPHINE SULFATE 2 MG: 2 INJECTION, SOLUTION INTRAMUSCULAR; INTRAVENOUS at 10:05

## 2024-11-11 RX ADMIN — MORPHINE SULFATE 4 MG: 4 INJECTION, SOLUTION INTRAMUSCULAR; INTRAVENOUS at 02:24

## 2024-11-11 RX ADMIN — MORPHINE SULFATE 2 MG: 2 INJECTION, SOLUTION INTRAMUSCULAR; INTRAVENOUS at 23:12

## 2024-11-11 RX ADMIN — MORPHINE SULFATE 2 MG: 2 INJECTION, SOLUTION INTRAMUSCULAR; INTRAVENOUS at 08:01

## 2024-11-11 RX ADMIN — ATORVASTATIN CALCIUM 80 MG: 80 TABLET, FILM COATED ORAL at 02:24

## 2024-11-11 RX ADMIN — MORPHINE SULFATE 2 MG: 2 INJECTION, SOLUTION INTRAMUSCULAR; INTRAVENOUS at 05:14

## 2024-11-11 RX ADMIN — ATORVASTATIN CALCIUM 80 MG: 80 TABLET, FILM COATED ORAL at 21:06

## 2024-11-11 RX ADMIN — PROCHLORPERAZINE EDISYLATE 10 MG: 5 INJECTION INTRAMUSCULAR; INTRAVENOUS at 18:52

## 2024-11-11 RX ADMIN — CLOPIDOGREL BISULFATE 75 MG: 75 TABLET ORAL at 08:01

## 2024-11-11 RX ADMIN — MORPHINE SULFATE 2 MG: 2 INJECTION, SOLUTION INTRAMUSCULAR; INTRAVENOUS at 18:41

## 2024-11-11 RX ADMIN — SODIUM CHLORIDE, PRESERVATIVE FREE 10 ML: 5 INJECTION INTRAVENOUS at 21:09

## 2024-11-11 RX ADMIN — SODIUM CHLORIDE, PRESERVATIVE FREE 10 ML: 5 INJECTION INTRAVENOUS at 08:05

## 2024-11-11 ASSESSMENT — PAIN SCALES - WONG BAKER
WONGBAKER_NUMERICALRESPONSE: NO HURT

## 2024-11-11 ASSESSMENT — PAIN SCALES - GENERAL
PAINLEVEL_OUTOF10: 6
PAINLEVEL_OUTOF10: 6
PAINLEVEL_OUTOF10: 9
PAINLEVEL_OUTOF10: 6

## 2024-11-11 ASSESSMENT — PAIN DESCRIPTION - DESCRIPTORS
DESCRIPTORS: ACHING
DESCRIPTORS: ACHING

## 2024-11-11 ASSESSMENT — PAIN DESCRIPTION - ORIENTATION: ORIENTATION: RIGHT;LEFT

## 2024-11-11 ASSESSMENT — PAIN DESCRIPTION - LOCATION
LOCATION: CHEST
LOCATION: CHEST;BACK

## 2024-11-11 ASSESSMENT — PAIN - FUNCTIONAL ASSESSMENT: PAIN_FUNCTIONAL_ASSESSMENT: ACTIVITIES ARE NOT PREVENTED

## 2024-11-11 NOTE — CONSULTS
Cardiovascular Consultation     Attending Physician: Niurka Henao MD    PATIENT: Ashutosh Donovan  : 1970  MRN: [id number]    Reason for Consultation:   Chief Complaint   Patient presents with    Chest Pain     Chest pain for about an hour before calling ems, left sided sharp. Pt has hx of MI and 10 stents placed. Pt took 3 nitro and 324 Asprin at home        History of present illness:   Mr. Ashutosh Donovan is a 53 y.o. male patient, with history of multivessel PCI, presented to ER with sharp chest pain.   Mr. Donovan states that he has followed with a nurse practitioner in Cardiology at [facility] and \"ran out of medication\" between follow ups. He states that he has been off for two days \"and could have complications from that\".   Sharp left sided chest pains were not exertional or responsive to Nitro.   When reviewing testing, he asks multiple times if he needs further testing, referencing ten stents.   Denies palpitations, lightheadedness, or syncope and is without shortness of breath, PND, orthopnea, or LE edema.     Medical History:      Diagnosis Date    Arthritis     CAD (coronary artery disease)     CHF (congestive heart failure) ([facility])     Chronic pain     twister vertebra and two collapsed disc, states injury happened at 49yo    Heart attack ([facility])     pt reports 10 heart attacks in 6 years, 8 stents placed    Hypothyroid        Surgical History:      Procedure Laterality Date    CHOLECYSTECTOMY      CORONARY STENT PLACEMENT      8 stents in 6 years    TONSILLECTOMY         Social History:  Social History     Socioeconomic History    Marital status: Single     Spouse name: Not on file    Number of children: 0    Years of education: 11    Highest education level: Not on file   Occupational History    Not on file   Tobacco Use    Smoking status: Every Day     Current packs/day: 0.50     Types: Cigarettes    Smokeless tobacco: Never   Vaping Use    Vaping status: Never Used  circumflex, and RCA stents    of the RPDA   Normal LV function   Normal LVEDP   Normal systemic pressures     11/10/24 CTA Head & Neck  IMPRESSION:  1. Hemodynamically significant focal stenosis at the origin of the left  vertebral artery.  2. At the proximal left V4 segment, there appears to be severe focal  stenosis. The right V4 segment is diminutive with its distal portion nearly  nonvisible. It should be noted however, that there is fetal origin of the  left PCA and on the right there is a robust posterior communicating artery,  which is larger in caliber than the P1 segment.  3. Otherwise, no significant stenosis or large vessel occlusion of the head  or neck.    11/11/2024  MRI Brain  No acute intracranial abnormality.     Stable mild chronic microvascular disease within the periventricular white   matter.     11/10/24 ECG  Normal sinus rhythm  Non-specific intra-ventricular conduction delay  No significant change from prior     11/11/2024  ECHO:     Left Ventricle: Normal left ventricular systolic function with a   visually estimated EF of 60 - 65%. Left ventricle size is normal.   Increased wall thickness. Normal wall motion.     Right Ventricle: Right ventricle size is normal. Normal systolic   function. TAPSE is normal. TAPSE is 2.0 cm.     11/10/2024  HS troponin 12, 12    Impression/Recommendations    Mr. Ashutosh Donovan is a 53 y.o. male patient, established with [facility]:     Chest pain  CAD: History of recurrent LAD PCI, PCI-D1, mid LCx, mid-distal RCA; RPDA  with L-->R collaterals  Hypertension  CKD  Tobacco  Opioid abuse   Medication noncompliance   Polysubstance   Depression      Reviewed negative workup with normal cardiac enzymes, ECG and echo with Mr. Donovan.   Stressed the importance of compliance with treatment plan and follow up. He has been in multiple health systems frequently this year, following coronary angiogram in May 2024 with patent stents.   Resume home

## 2024-11-11 NOTE — PLAN OF CARE
Problem: Chronic Conditions and Co-morbidities  Goal: Patient's chronic conditions and co-morbidity symptoms are monitored and maintained or improved  11/11/2024 1432 by Odessa Glover RN  Outcome: Progressing  11/11/2024 0303 by Khatiwada, Swastika, RN  Outcome: Progressing     Problem: Discharge Planning  Goal: Discharge to home or other facility with appropriate resources  11/11/2024 1432 by Odessa Glover RN  Outcome: Progressing  11/11/2024 0303 by Khatiwada, Swastika, RN  Outcome: Progressing     Problem: Safety - Adult  Goal: Free from fall injury  11/11/2024 1432 by Odessa Glover RN  Outcome: Progressing  11/11/2024 0303 by Khatiwada, Swastika, RN  Outcome: Progressing     Problem: ABCDS Injury Assessment  Goal: Absence of physical injury  11/11/2024 1432 by Odessa Glover RN  Outcome: Progressing  11/11/2024 0303 by Khatiwada, Swastika, RN  Outcome: Progressing

## 2024-11-11 NOTE — PROGRESS NOTES
[facility]: [facility] 4 MED SURG  DYSPHAGIA BEDSIDE SWALLOW EVALUATION     Patient: Ashutosh Donovan   : 1970   MRN: [id number]      Evaluation Date: 2024     Admitting Diagnosis:   Homelessness [Z59.00]  Acute chest pain [R07.9]  Left sided numbness [R20.0]  Acute CVA (cerebrovascular accident) (HCC) [I63.9]  Cerebrovascular accident (CVA) due to embolism of right middle cerebral artery (HCC) [I63.411]   has a past medical history of Arthritis, CAD (coronary artery disease), CHF (congestive heart failure) (HCC), Chronic pain, Heart attack (HCC), and Hypothyroid.   has a past surgical history that includes Coronary stent placement; Cholecystectomy; and Tonsillectomy.  Allergies: medication allergies noted  Dysphagia History including instrumental assessment:seen during recent admission within the [facility] with rec for regular/thin 5/10/2024  GI history: h/o gastroparesis; EGD 2018: LA Grade C reflux esophagitis. Benign-appearing esophageal stenosis. Dilated. Acute gastritis. Biopsied. Non-bleeding gastric ulcers with no stigmata of bleeding. Esophageal ulcers. Duodenitis.   ENT history: none on record  Baseline/Prior Level of Function: reports living with brother with hope to get own place; regular/thin at baseline    Onset Date: 11/10/2024        Reason for referral: SLP evaluation orders received due to CVA protocol                         CURRENT ENCOUNTER DIAGNOSTICS/COURSE OF ADMISSION     11/10/2024 admitted with c/o chest pain and L numbness. MD ADMISSION H&P HPI: \"Ashutosh Donovan is a 53 y.o. male with pmh of tobacco abuse; CKD stage IIIa; emphysema; multiple CAD history of about 10 stents; hypertension; and congestive heart failure who presents with chest pain and numbness of the left side that started about an hour and a half prior to presentation.  Associated with his symptoms is weakness of the left side\"   Consults noted: Neurology, Cardiology    Most Recent

## 2024-11-11 NOTE — PROGRESS NOTES
4 Eyes Skin Assessment     NAME:  Ashutosh Donovan  YOB: 1970  MEDICAL RECORD NUMBER:  [id number]    The patient is being assessed for  Admission    I agree that at least one RN has performed a thorough Head to Toe Skin Assessment on the patient. ALL assessment sites listed below have been assessed.      Areas assessed by both nurses:    Head, Face, Ears, Shoulders, Back, Chest, Arms, Elbows, Hands, Sacrum. Buttock, Coccyx, Ischium, Legs. Feet and Heels, and Under Medical Devices         Does the Patient have a Wound? No noted wound(s)       Jovanny Prevention initiated by RN: No  Wound Care Orders initiated by RN: No    Pressure Injury (Stage 3,4, Unstageable, DTI, NWPT, and Complex wounds) if present, place Wound referral order by RN under : No    New Ostomies, if present place, Ostomy referral order under : No     Nurse 1 eSignature: Electronically signed by KHATIWADA,SWASTIKA, RN on 11/11/24 at 3:09 AM EST    **SHARE this note so that the co-signing nurse can place an eSignature**    Nurse 2 eSignature: Electronically signed by Sarina Pelaez RN on 11/11/24 at 6:14 AM EST

## 2024-11-11 NOTE — PLAN OF CARE
Charge Nurse let supervisor know patient wanted to talk with Supervisor, pt then also called  asking to speak with supervisor. Nursing Supervisor to room, Pt  unhappy that PCA asks what his needs are when he puts on the call light. RN sup attempt to explain why that is appropriate for her position, pt keeps interrupting Sup and wont let sup talk. Pt does not believe I am the house Supervisor and called  with me at bedside, pt aware if he will not let PCA in room there could be a delay in his call light being answered and needs being met. RN sup. Also asked pt to stop coming out to desk. Pt keeps talking and is not redirectable. Pt agreeable to not have PCA in room, denies any other needs at this time, RN sup spoke with Charge RN, believe pt psych disorder seems heightened at this time, suggest notifying  MD, may need sedation..Electronically signed by Dayanara Cheema RN on 11/11/2024 at 5:29 PM

## 2024-11-11 NOTE — ED NOTES
Pt requesting pain mediations. Pt states he would like to be admitted for echo, pt states he's not homeless but he wouldn't mind being admitted and staying. Pt has a multiple hospital visits recently for similar symptoms

## 2024-11-11 NOTE — PROGRESS NOTES
Patient admitted from ED to bed no. 4105. Patients orientx4. VSS. Bedside NIHS completed score:1. Bedside table and call light in reach and explained about use of call light.bed is locked and in lowest position.bed alarm on.

## 2024-11-11 NOTE — H&P
V2.0  History and Physical      Name:  Ashutosh Donovan /Age/Sex: 1970  (53 y.o. male)   MRN & CSN:  [id number] & [id number] Encounter Date/Time: 2024 12:23 AM EST   Location:   PCP: No primary care provider on file.       Hospital Day: 2    Assessment and Plan:   Ashutosh Donovan is a 53 y.o. male with a pmh of tobacco abuse; CKD stage IIIa; emphysema; multiple CAD history of about 10 stents; hypertension; and congestive heart failure who presents with chest pain and CVA (cerebral vascular accident) ([facility])    Hospital Problems             Last Modified POA    * (Principal) CVA (cerebral vascular accident) ([facility]) 2024 Yes    Chest pain at rest 2024 Yes    HTN (hypertension) 2024 Yes    Acute CVA (cerebrovascular accident) ([facility]) 2024 Yes       Plan:  1.Serial NINDS NIH Scale ordered  2.Lipid profile and A1c ordered.  3.Physical therapy, and occupational therapy to work with patient.  N.p.o. until bedside swallow eval.  4.Daily aspirin and Plavix continue.  5.High intensity statin ordered  6.Permissive hypertension.  Control blood pressure with labetalol for systolic blood pressure of more than 220 or diastolic blood pressure of more than 120.  7.MRI of head ordered  8.Echocardiogram ordered.  Morphine for pain  CKD stage IIIa-stable.  Trend BMP  Tobacco abuse-counseled  Emphysema-mild wheezes not hypoxia        Advance care planning:  Advanced care planning was discussed with patient for a total of 16 minutes.  Full code, DNR CC, DNR CCA, power of  and living will were discussed.  Patient elected to be a full code.  Disposition:   Current Living situation: Home  Expected Disposition: Home  Estimated D/C: 2 days    Diet Diet NPO   DVT Prophylaxis [] Lovenox, []  Heparin, [] SCDs, [] Ambulation,  [] Eliquis, [] Xarelto, [] Coumadin   Code Status Full code   Surrogate Decision Maker/ POA Mother and brother Evin     Personally reviewed Lab Studies and Imaging  Cigarettes    Smokeless tobacco: Never   Vaping Use    Vaping status: Never Used   Substance and Sexual Activity    Alcohol use: No    Drug use: Yes     Types: Marijuana (Weed), Cocaine     Comment: medical MJ - daily    Sexual activity: Yes     Partners: Female     Social Determinants of Health     Food Insecurity: Food Insecurity Present (10/17/2024)    Received from [facility]    Hunger Vital Sign     Worried About Running Out of Food in the Last Year: Sometimes true     Ran Out of Food in the Last Year: Sometimes true   Transportation Needs: No Transportation Needs (10/17/2024)    Received from [facility]    PRAPARE - Transportation     Lack of Transportation (Medical): No     Lack of Transportation (Non-Medical): No   Recent Concern: Transportation Needs - Unmet Transportation Needs (8/23/2024)    PRAPARE - Transportation     Lack of Transportation (Medical): Yes     Lack of Transportation (Non-Medical): Yes    Social Connections ([facility] HRSN)    Received from [facility]    Abuse Screen   Housing Stability: High Risk (10/17/2024)    Received from [facility]    Housing Stability Vital Sign     Unable to Pay for Housing in the Last Year: No     Number of Times Moved in the Last Year: 2     Homeless in the Last Year: Patient unable to answer       Medications:   Medications:    Infusions:   PRN Meds:     Labs      CBC:   Recent Labs     11/10/24  2142   WBC 8.4   HGB 13.2*        BMP:    Recent Labs     11/10/24  2142      K 3.6      CO2 26   BUN 18   CREATININE 1.6*   GLUCOSE 122*     Hepatic:   Recent Labs     11/10/24  2142   AST 20   ALT 7*   BILITOT <0.2   ALKPHOS 82     Lipids:   Lab Results   Component Value Date/Time    CHOL 232 05/10/2024 05:27 AM    HDL 57 05/10/2024 05:27 AM    TRIG 251 05/10/2024 05:27 AM     Hemoglobin A1C:   Lab Results   Component Value Date/Time    LABA1C 5.8 05/10/2024 05:27 AM     TSH: No results found for: \"TSH\"  Troponin: No results found for:

## 2024-11-11 NOTE — ED PROVIDER NOTES
[facility] EMERGENCY DEPARTMENT  EMERGENCY DEPARTMENT ENCOUNTER      Pt Name: Ashutosh Donovan  MRN: [id number]  Birthdate 1970  Date of evaluation: 11/10/2024  Provider: DMITRY HERNANDEZ DO    CHIEF COMPLAINT  Chief Complaint   Patient presents with    Chest Pain     Chest pain for about an hour before calling ems, left sided sharp. Pt has hx of MI and 10 stents placed. Pt took 3 nitro and 324 Asprin at home          This patient is at risk for a communicable infection.  Therefore, personal protection equipment consisting of a mask was worn for the exam.    HPI  Ashutosh Donovan is a 53 y.o. male who presents with chest pain that started 1 hour prior to arrival.  He describes the same pain that he has when he has his heart attacks.  He states he has had 14 heart attacks in the past.  Had stent placements.  He also developed numbness of the left side of his body from his face to his toes that started at the same time.  He is homeless.  He denies weakness.  He has had shortness of breath.  He states this feels like a heart attack.  But he also is concerned about the numbness on the left side of his body.  He states he had a chest pain of 10/10 and took a nitroglycerin for it and went to 9/10.  He states he currently having 9/10 chest pain.  He describes as a sharp pressure sensation.  He denies fevers or chills.  He denies cough.  Denies other complaints.  ?  REVIEW OF SYSTEMS  All systems negative except as noted in the HPI.  Reviewed Nurses' notes and concur.    No LMP for male patient.    PAST MEDICAL HISTORY  Past Medical History:   Diagnosis Date    Arthritis     CAD (coronary artery disease)     CHF (congestive heart failure) (HCC)     Chronic pain     twister vertebra and two collapsed disc, states injury happened at 47yo    Heart attack (HCC)     pt reports 10 heart attacks in 6 years, 8 stents placed    Hypothyroid        FAMILY HISTORY  No family history on file.    SOCIAL  Comments)     Pt has hep c    Bupropion Swelling    Ketorolac Tromethamine Nausea Only    Lithium     Olanzapine Swelling    Risperidone Swelling    Tramadol     Aspirin Nausea Only and Rash    Ibuprofen Nausea Only and Rash       Tetanus vaccination status reviewed: tetanus re-vaccination not indicated.      PHYSICAL EXAM  VITAL SIGNS: /87   Pulse 79   Temp 98.6 °F (37 °C) (Oral)   Resp 20   Wt 92.3 kg (203 lb 7.8 oz)   SpO2 97%   BMI 28.38 kg/m²   Constitutional: Well-developed, well-nourished, appears normal, nontoxic, activity: Resting comfortably on the cart, no obvious pain, speaking in full sentences, does not appear ill or toxic.  There is no slurred speech.  Patient is not confused.  HENT: Normocephalic, Atraumatic, Bilateral external ears normal, TM's were normal, Mucus membranes are moist and oropharynx is patent and clear, No oral exudates, Nose normal.  Eyes: PERRLA, EOMI, Conjunctiva normal, No discharge. No scleral icterus.  Neck: Normal range of motion, No tenderness, Supple.  Lymphatic: No lymphadenopathy noted.  Cardiovascular: Normal heart rate, Normal rhythm, no murmurs, no gallops, no rubs.  Thorax & Lungs: Normal breath sounds, no respiratory distress, no wheezing, no rales, no rhonchi  Abdomen: Soft, Nontender, No hepatosplenomegaly, No masses, No pulsatile masses, No distension, normal bowel sounds  Skin: Warm, Dry  Back: No tenderness, Full range of motion, No scoliosis.  Extremities: No edema, No tenderness, No cyanosis, No clubbing. No amputations, capillary refill less than 2 seconds.  Musculoskeletal: Good range of motion in all major joints, No tenderness to palpation, no major deformities noted.  Neurologic: Alert & oriented x 3, CN II through XII are intact, normal motor function, normal sensory function, no focal deficits noted, no clonus, no tremors.  NIH stroke scale is 1 for numbness on the left side of his body from his face to his foot  Psychiatric: Affect normal,

## 2024-11-11 NOTE — PROGRESS NOTES
Mr. Donovan has been increasingly combative requring security to come by.  Order haldol 5mg q6h PRN for agitation.  ECG yesterday with QTc less than 500.

## 2024-11-11 NOTE — PROGRESS NOTES
Occupational Therapy  Facility/Department: [address] MED SURG  Occupational Therapy Initial Assessment    Name: Ashutosh Donovan  : 1970  MRN: [id number]  Date of Service: 2024    Discharge Recommendations:  Home with assist PRN  OT Equipment Recommendations  Equipment Needed: Yes  Mobility Devices: ADL Assistive Devices  ADL Assistive Devices: Shower Chair with back    Ashutosh Donovan scored a 19/24 on the AM-[facility] ADL Inpatient form. Current research shows that an AM-PAC score of 18 or greater is typically associated with a discharge to the patient's home setting.     If patient discharges prior to next session this note will serve as a discharge summary.  Please see below for the latest assessment towards goals.         Patient Diagnosis(es): The primary encounter diagnosis was Acute chest pain. Diagnoses of Left sided numbness, Homelessness, and Cerebrovascular accident (CVA) due to embolism of right middle cerebral artery (HCC) were also pertinent to this visit.  Past Medical History:  has a past medical history of Arthritis, CAD (coronary artery disease), CHF (congestive heart failure) (HCC), Chronic pain, Heart attack (HCC), and Hypothyroid.  Past Surgical History:  has a past surgical history that includes Coronary stent placement; Cholecystectomy; and Tonsillectomy.    Treatment Diagnosis: impaired ADL status, L UE numbness      Assessment  Performance deficits / Impairments: Decreased functional mobility ;Decreased ADL status;Decreased ROM;Decreased strength;Decreased endurance;Decreased sensation;Decreased safe awareness;Decreased cognition;Decreased high-level IADLs;Decreased balance  Assessment: Pt is a 53 y.o. male admitted with chest pain, L-sided numbness, r/o CVA. At baseline, pt lives in a second floor apartment with brother and brother's girlfriend. Pt reports independent ADLs and fxl mobility using rollator, brother has been managing most homemaking recently. Pt currently  functioning below baseline d/t the above deficits, today requiring SBA fxl transfers, CGA fxl mobility with RW community distances in hallway, CGA fxl mobility no AD shorter distances in room. Pt impulsive with fxl mobility. Pt SBA partial LB dressing, and anticipate pt would require SBA for full ADL. Pt c/o L-sided numbness and was unable to localize light touch to L UE. L UE strength grossly 4-/5, but using functionally during ADLs. L UE coordination normal without ataxia. R hand with previous injury to fifth digit currently in splint. Will cont to see on acute to address the above limitations and maximize pt's safety and independence. Anticipate pt will be safe to return home with assist prn at d/c. Do not anticipate need for home OT.  Treatment Diagnosis: impaired ADL status, L UE numbness  Prognosis: Good  Decision Making: Medium Complexity  History: see above  REQUIRES OT FOLLOW-UP: Yes  Activity Tolerance  Activity Tolerance: Patient limited by fatigue;Patient limited by pain;Patient Tolerated treatment well     Plan  Occupational Therapy Plan  Times Per Week: 3-5  Current Treatment Recommendations: Strengthening, ROM, Balance training, Functional mobility training, Endurance training, Neuromuscular re-education, Self-Care / ADL, Safety education & training, Equipment evaluation, education, & procurement    Restrictions  Restrictions/Precautions  Restrictions/Precautions: Fall Risk    Subjective  General  Chart Reviewed: Yes  Additional Pertinent Hx: Per Anderson Burkett MD's H&P: \"Ashutosh Donovan is a 53 y.o. male with pmh of tobacco abuse; CKD stage IIIa; emphysema; multiple CAD history of about 10 stents; hypertension; and congestive heart failure who presents with chest pain and numbness of the left side that started about an hour and a half prior to presentation.  Associated with his symptoms is weakness of the left side.\"  Family / Caregiver Present: No  Referring Practitioner: Anderson Burkett

## 2024-11-11 NOTE — CONSULTS
aspirin  300 mg Rectal Daily    atorvastatin  80 mg Oral Nightly    clopidogrel  75 mg Oral Daily    atorvastatin  80 mg Oral Nightly     Medications Prior to Admission:   nitroGLYCERIN (NITROSTAT) 0.4 MG SL tablet, Place 1 tablet under the tongue every 5 minutes as needed for Chest pain up to max of 3 total doses. If no relief after 1 dose, call 911.  loratadine (CLARITIN) 10 MG tablet, Take 1 tablet by mouth daily  lidocaine 4 % external patch, Place 1 patch onto the skin daily  tamsulosin (FLOMAX) 0.4 MG capsule, Take 1 capsule by mouth daily  nicotine (NICODERM CQ) 21 MG/24HR, Place 1 patch onto the skin every 24 hours  carvedilol (COREG) 3.125 MG tablet, Take 1 tablet by mouth 2 times daily (with meals)  ergocalciferol (ERGOCALCIFEROL) 1.25 MG (33289 UT) capsule, Take 1 capsule by mouth once a week  fluticasone (FLONASE) 50 MCG/ACT nasal spray, 2 sprays by Each Nostril route daily as needed for Rhinitis  ALPRAZolam (XANAX) 0.25 MG tablet, Take 1 tablet by mouth 2 times daily as needed for Sleep or Anxiety. Max Daily Amount: 0.5 mg  methocarbamol (ROBAXIN) 750 MG tablet, Take 1 tablet by mouth 3 times daily as needed (Muscle Spasm)  omeprazole (PRILOSEC) 20 MG delayed release capsule, Take 2 capsules by mouth daily  acetaminophen (TYLENOL) 500 MG tablet, Take 1 tablet by mouth every 6 hours as needed for Pain  amLODIPine (NORVASC) 5 MG tablet, Take 1 tablet by mouth daily  DULoxetine (CYMBALTA) 60 MG extended release capsule, Take 1 capsule by mouth daily  fluPHENAZine HCl (PROLIXIN) 5 MG tablet, Take 1 tablet by mouth at bedtime  aspirin 81 MG EC tablet, Take 1 tablet by mouth daily  isosorbide mononitrate (IMDUR) 30 MG extended release tablet, Take 1 tablet by mouth daily (Patient taking differently: Take 2 tablets by mouth daily)  ranolazine (RANEXA) 500 MG extended release tablet, Take 1 tablet by mouth 2 times daily  albuterol sulfate HFA (PROVENTIL;VENTOLIN;PROAIR) 108 (90 Base) MCG/ACT inhaler, Inhale 2  puffs into the lungs every 6 hours as needed for Shortness of Breath or Wheezing  atorvastatin (LIPITOR) 40 MG tablet, Take 1 tablet by mouth daily (Patient taking differently: Take 2 tablets by mouth daily)  prazosin (MINIPRESS) 2 MG capsule, Take 1 capsule by mouth nightly (Patient taking differently: Take 5 mg by mouth nightly)  clopidogrel (PLAVIX) 75 MG tablet, Take 1 tablet by mouth daily  levothyroxine (SYNTHROID) 150 MCG tablet, Take 1 tablet by mouth Daily (Patient taking differently: Take 175 mcg by mouth Daily)    Allergies   Allergen Reactions    Latex Itching    Ziprasidone Hcl Other (See Comments)     Pt reports seizures with Geodon    Zofran [Ondansetron Hcl] Swelling    Acetaminophen Other (See Comments)     Pt has hep c    Bupropion Swelling    Ketorolac Tromethamine Nausea Only    Lithium     Olanzapine Swelling    Risperidone Swelling    Tramadol     Aspirin Nausea Only and Rash    Ibuprofen Nausea Only and Rash     No family history on file.    Social History     Tobacco Use   Smoking Status Every Day    Current packs/day: 0.50    Types: Cigarettes   Smokeless Tobacco Never     Social History     Substance and Sexual Activity   Drug Use Yes    Types: Marijuana (Weed), Cocaine    Comment: medical MJ - daily     Social History     Substance and Sexual Activity   Alcohol Use No     ROS  Constitutional- No weight loss or fevers  Eyes- No diplopia. No photophobia.  Ears/nose/throat- No dysphagia. No Dysarthria  Cardiovascular- No palpitations. No chest pain  Respiratory- No dyspnea. No Cough  Gastrointestinal- No Abdominal pain. No Vomiting.  Genitourinary- No incontinence. No urinary retention  Musculoskeletal- No myalgia. No arthralgia  Skin- No rash. No easy bruising.  Psychiatric- No depression. No anxiety  Endocrine- No diabetes. No thyroid issues.  Hematologic- No bleeding difficulty. No fatigue  Neurologic- No weakness. No Headache.    Exam  Blood pressure (!) 146/88, pulse 62, temperature (!)

## 2024-11-11 NOTE — CARE COORDINATION
Discharge Planning:  Attempt to meet with pt to complete initial assessment.  Pt was off the floor for testing.  SW will attempt again later as time provides.  JAYLEEN [facility]  [phone]  Electronically signed by JAYLEEN Camacho on 11/11/2024 at 12:20 PM

## 2024-11-11 NOTE — PLAN OF CARE
STROKE TEAM PLAN OF CARE    Name:  Ashutosh Donovan  : 1970  MRN:  [id number]  Today's Date: 11/10/2024    Stroke team contacted at: [id number]  History obtained from: emergency physician    History     Ashutosh Donovan is a 53 y.o. male who presented with chest pain and endorses left sided numbness.    Briefly, patient presented with chest pain but began endorsing complete numbness on the left side of his body. NIH 1 for his numbness on ED physician's exam. His LKW was one hour prior to presentation ~    CTH without acute ischemia and CTA without LVO on my read. It was read as having significant focal stenosis of L VA origin and L V4 segment with L fetal PCA and robust R Pcomm.    Imaging     CT HEAD WO CONTRAST   Final Result   Normal examination for age.         XR CHEST PORTABLE    (Results Pending)   CTA HEAD NECK W CONTRAST    (Results Pending)     Acute Ischemic Stroke Clinical Decision Making     (1) Intravenous thrombolysis:  The patient is not a candidate for IV thrombolysis based on:  Symptoms nondisabling    (2) Angiography / Thrombectomy:  The patient does not have evidence of a proximal large vessel occlusion on CTA, and therefore is not an EVT candidate. Discussed vertebral artery stenoses with Dr. Rodriguez. Nothing acutely to do about these stenoses as his symptoms are still non-disabling and there is no intervention for VA stenosis.    For any additional questions regarding acute stroke care, please feel free to contact the  Stroke Team at [phone].     Nacho Recio MD   Stroke Team   [phone]  11/10/2024 10:49 PM

## 2024-11-11 NOTE — CARE COORDINATION
CM met pt in room. CM introduced self and pt requested to get \"referral for respite care\". CM informed will talk to provider and they can decide/put in the referral. Pt was then feeling sick and starting throwing up. CM got pt's nurse and informed will touch base later, when pt is feeling better.    Electronically signed by Dominic Larson on 11/11/2024 at 3:22 PM

## 2024-11-12 ENCOUNTER — APPOINTMENT (OUTPATIENT)
Dept: MRI IMAGING | Age: 54
DRG: 074 | End: 2024-11-12
Payer: MEDICARE

## 2024-11-12 ENCOUNTER — HOSPITAL ENCOUNTER (INPATIENT)
Age: 54
LOS: 12 days | Discharge: HOME | DRG: 551 | End: 2024-11-24
Attending: HOSPITALIST | Admitting: HOSPITALIST
Payer: MEDICARE

## 2024-11-12 VITALS
SYSTOLIC BLOOD PRESSURE: 157 MMHG | RESPIRATION RATE: 18 BRPM | DIASTOLIC BLOOD PRESSURE: 85 MMHG | WEIGHT: 205.91 LBS | HEIGHT: 71 IN | BODY MASS INDEX: 28.83 KG/M2 | HEART RATE: 66 BPM | OXYGEN SATURATION: 97 % | TEMPERATURE: 97.8 F

## 2024-11-12 DIAGNOSIS — Z98.890 S/P SPINAL SURGERY: Primary | ICD-10-CM

## 2024-11-12 PROBLEM — G83.4 CAUDA EQUINA COMPRESSION (HCC): Status: ACTIVE | Noted: 2024-11-12

## 2024-11-12 PROBLEM — R20.0 LOWER EXTREMITY NUMBNESS: Status: ACTIVE | Noted: 2024-11-12

## 2024-11-12 LAB
ANION GAP SERPL CALCULATED.3IONS-SCNC: 8 MMOL/L (ref 3–16)
BUN SERPL-MCNC: <2 MG/DL (ref 7–20)
CALCIUM SERPL-MCNC: 9.9 MG/DL (ref 8.3–10.6)
CHLORIDE SERPL-SCNC: 104 MMOL/L (ref 99–110)
CHOLEST SERPL-MCNC: 165 MG/DL (ref 0–199)
CO2 SERPL-SCNC: 25 MMOL/L (ref 21–32)
CREAT SERPL-MCNC: 1.2 MG/DL (ref 0.9–1.3)
DEPRECATED RDW RBC AUTO: 18.2 % (ref 12.4–15.4)
EST. AVERAGE GLUCOSE BLD GHB EST-MCNC: 125.5 MG/DL
GFR SERPLBLD CREATININE-BSD FMLA CKD-EPI: 72 ML/MIN/{1.73_M2}
GLUCOSE SERPL-MCNC: 90 MG/DL (ref 70–99)
HBA1C MFR BLD: 6 %
HCT VFR BLD AUTO: 40.3 % (ref 40.5–52.5)
HDLC SERPL-MCNC: 8 MG/DL (ref 40–60)
HGB BLD-MCNC: 13.3 G/DL (ref 13.5–17.5)
LDLC SERPL CALC-MCNC: 136 MG/DL
MCH RBC QN AUTO: 28 PG (ref 26–34)
MCHC RBC AUTO-ENTMCNC: 32.9 G/DL (ref 31–36)
MCV RBC AUTO: 85 FL (ref 80–100)
PLATELET # BLD AUTO: 213 K/UL (ref 135–450)
PMV BLD AUTO: 9.4 FL (ref 5–10.5)
POTASSIUM SERPL-SCNC: 4 MMOL/L (ref 3.5–5.1)
RBC # BLD AUTO: 4.74 M/UL (ref 4.2–5.9)
SODIUM SERPL-SCNC: 137 MMOL/L (ref 136–145)
TRIGL SERPL-MCNC: 107 MG/DL (ref 0–150)
VLDLC SERPL CALC-MCNC: 21 MG/DL
WBC # BLD AUTO: 6.8 K/UL (ref 4–11)

## 2024-11-12 PROCEDURE — 2060000000 HC ICU INTERMEDIATE R&B

## 2024-11-12 PROCEDURE — 1200000000 HC SEMI PRIVATE

## 2024-11-12 PROCEDURE — 94760 N-INVAS EAR/PLS OXIMETRY 1: CPT

## 2024-11-12 PROCEDURE — 72141 MRI NECK SPINE W/O DYE: CPT

## 2024-11-12 PROCEDURE — G0378 HOSPITAL OBSERVATION PER HR: HCPCS

## 2024-11-12 PROCEDURE — 92526 ORAL FUNCTION THERAPY: CPT

## 2024-11-12 PROCEDURE — 6360000002 HC RX W HCPCS: Performed by: INTERNAL MEDICINE

## 2024-11-12 PROCEDURE — 96372 THER/PROPH/DIAG INJ SC/IM: CPT

## 2024-11-12 PROCEDURE — 6370000000 HC RX 637 (ALT 250 FOR IP): Performed by: FAMILY MEDICINE

## 2024-11-12 PROCEDURE — 2580000003 HC RX 258: Performed by: INTERNAL MEDICINE

## 2024-11-12 PROCEDURE — 6370000000 HC RX 637 (ALT 250 FOR IP): Performed by: INTERNAL MEDICINE

## 2024-11-12 PROCEDURE — 80061 LIPID PANEL: CPT

## 2024-11-12 PROCEDURE — 93005 ELECTROCARDIOGRAM TRACING: CPT | Performed by: HOSPITALIST

## 2024-11-12 PROCEDURE — 83036 HEMOGLOBIN GLYCOSYLATED A1C: CPT

## 2024-11-12 PROCEDURE — 2580000003 HC RX 258: Performed by: HOSPITALIST

## 2024-11-12 PROCEDURE — 80048 BASIC METABOLIC PNL TOTAL CA: CPT

## 2024-11-12 PROCEDURE — 6370000000 HC RX 637 (ALT 250 FOR IP): Performed by: HOSPITALIST

## 2024-11-12 PROCEDURE — 6360000002 HC RX W HCPCS: Performed by: HOSPITALIST

## 2024-11-12 PROCEDURE — 96376 TX/PRO/DX INJ SAME DRUG ADON: CPT

## 2024-11-12 PROCEDURE — 72148 MRI LUMBAR SPINE W/O DYE: CPT

## 2024-11-12 PROCEDURE — 85027 COMPLETE CBC AUTOMATED: CPT

## 2024-11-12 PROCEDURE — 6360000002 HC RX W HCPCS: Performed by: FAMILY MEDICINE

## 2024-11-12 PROCEDURE — 36415 COLL VENOUS BLD VENIPUNCTURE: CPT

## 2024-11-12 RX ORDER — POTASSIUM CHLORIDE 7.45 MG/ML
10 INJECTION INTRAVENOUS PRN
Status: DISCONTINUED | OUTPATIENT
Start: 2024-11-12 | End: 2024-11-24 | Stop reason: HOSPADM

## 2024-11-12 RX ORDER — MORPHINE SULFATE 4 MG/ML
4 INJECTION, SOLUTION INTRAMUSCULAR; INTRAVENOUS
Status: DISCONTINUED | OUTPATIENT
Start: 2024-11-12 | End: 2024-11-12 | Stop reason: HOSPADM

## 2024-11-12 RX ORDER — OXYCODONE HYDROCHLORIDE 5 MG/1
5 TABLET ORAL EVERY 4 HOURS PRN
Status: DISCONTINUED | OUTPATIENT
Start: 2024-11-12 | End: 2024-11-15

## 2024-11-12 RX ORDER — CYCLOBENZAPRINE HCL 5 MG
5 TABLET ORAL PRN
COMMUNITY

## 2024-11-12 RX ORDER — SODIUM CHLORIDE 0.9 % (FLUSH) 0.9 %
5-40 SYRINGE (ML) INJECTION EVERY 12 HOURS SCHEDULED
Status: DISCONTINUED | OUTPATIENT
Start: 2024-11-12 | End: 2024-11-24 | Stop reason: HOSPADM

## 2024-11-12 RX ORDER — HYDROMORPHONE HYDROCHLORIDE 1 MG/ML
0.25 INJECTION, SOLUTION INTRAMUSCULAR; INTRAVENOUS; SUBCUTANEOUS
Status: DISCONTINUED | OUTPATIENT
Start: 2024-11-12 | End: 2024-11-14

## 2024-11-12 RX ORDER — OXYCODONE HYDROCHLORIDE 5 MG/1
10 TABLET ORAL EVERY 4 HOURS PRN
Status: DISCONTINUED | OUTPATIENT
Start: 2024-11-12 | End: 2024-11-15

## 2024-11-12 RX ORDER — LORAZEPAM 1 MG/1
1 TABLET ORAL
Status: COMPLETED | OUTPATIENT
Start: 2024-11-11 | End: 2024-11-12

## 2024-11-12 RX ORDER — AMLODIPINE BESYLATE 5 MG/1
5 TABLET ORAL DAILY
Status: DISCONTINUED | OUTPATIENT
Start: 2024-11-12 | End: 2024-11-12 | Stop reason: HOSPADM

## 2024-11-12 RX ORDER — LEVOTHYROXINE SODIUM 175 UG/1
175 TABLET ORAL DAILY
COMMUNITY

## 2024-11-12 RX ORDER — MAGNESIUM SULFATE IN WATER 40 MG/ML
2000 INJECTION, SOLUTION INTRAVENOUS PRN
Status: DISCONTINUED | OUTPATIENT
Start: 2024-11-12 | End: 2024-11-24 | Stop reason: HOSPADM

## 2024-11-12 RX ORDER — POLYETHYLENE GLYCOL 3350 17 G/17G
17 POWDER, FOR SOLUTION ORAL DAILY PRN
Status: DISCONTINUED | OUTPATIENT
Start: 2024-11-12 | End: 2024-11-24 | Stop reason: HOSPADM

## 2024-11-12 RX ORDER — MORPHINE SULFATE 2 MG/ML
2 INJECTION, SOLUTION INTRAMUSCULAR; INTRAVENOUS
Status: DISCONTINUED | OUTPATIENT
Start: 2024-11-12 | End: 2024-11-12 | Stop reason: HOSPADM

## 2024-11-12 RX ORDER — SODIUM CHLORIDE 0.9 % (FLUSH) 0.9 %
5-40 SYRINGE (ML) INJECTION PRN
Status: DISCONTINUED | OUTPATIENT
Start: 2024-11-12 | End: 2024-11-24 | Stop reason: HOSPADM

## 2024-11-12 RX ORDER — ACETAMINOPHEN 650 MG/1
650 SUPPOSITORY RECTAL EVERY 6 HOURS PRN
Status: DISCONTINUED | OUTPATIENT
Start: 2024-11-12 | End: 2024-11-14

## 2024-11-12 RX ORDER — PRAZOSIN HYDROCHLORIDE 5 MG/1
5 CAPSULE ORAL NIGHTLY
COMMUNITY

## 2024-11-12 RX ORDER — POTASSIUM CHLORIDE 1500 MG/1
40 TABLET, EXTENDED RELEASE ORAL PRN
Status: DISCONTINUED | OUTPATIENT
Start: 2024-11-12 | End: 2024-11-24 | Stop reason: HOSPADM

## 2024-11-12 RX ORDER — HYDROMORPHONE HYDROCHLORIDE 1 MG/ML
0.5 INJECTION, SOLUTION INTRAMUSCULAR; INTRAVENOUS; SUBCUTANEOUS
Status: DISCONTINUED | OUTPATIENT
Start: 2024-11-12 | End: 2024-11-14

## 2024-11-12 RX ORDER — FLUPHENAZINE HYDROCHLORIDE 5 MG/1
5 TABLET ORAL NIGHTLY
Status: DISCONTINUED | OUTPATIENT
Start: 2024-11-12 | End: 2024-11-12 | Stop reason: HOSPADM

## 2024-11-12 RX ORDER — ACETAMINOPHEN 325 MG/1
650 TABLET ORAL EVERY 6 HOURS PRN
Status: DISCONTINUED | OUTPATIENT
Start: 2024-11-12 | End: 2024-11-14

## 2024-11-12 RX ORDER — SODIUM CHLORIDE 9 MG/ML
INJECTION, SOLUTION INTRAVENOUS CONTINUOUS
Status: ACTIVE | OUTPATIENT
Start: 2024-11-12 | End: 2024-11-13

## 2024-11-12 RX ORDER — ATORVASTATIN CALCIUM 80 MG/1
80 TABLET, FILM COATED ORAL DAILY
Status: ON HOLD | COMMUNITY
End: 2024-11-24

## 2024-11-12 RX ORDER — SODIUM CHLORIDE 9 MG/ML
INJECTION, SOLUTION INTRAVENOUS PRN
Status: DISCONTINUED | OUTPATIENT
Start: 2024-11-12 | End: 2024-11-24 | Stop reason: HOSPADM

## 2024-11-12 RX ORDER — ONDANSETRON 2 MG/ML
4 INJECTION INTRAMUSCULAR; INTRAVENOUS EVERY 6 HOURS PRN
Status: DISCONTINUED | OUTPATIENT
Start: 2024-11-12 | End: 2024-11-12

## 2024-11-12 RX ORDER — ISOSORBIDE MONONITRATE 60 MG/1
60 TABLET, EXTENDED RELEASE ORAL DAILY
Status: ON HOLD | COMMUNITY
End: 2024-11-24 | Stop reason: HOSPADM

## 2024-11-12 RX ORDER — PRAZOSIN HYDROCHLORIDE 5 MG/1
5 CAPSULE ORAL NIGHTLY
Status: DISCONTINUED | OUTPATIENT
Start: 2024-11-12 | End: 2024-11-12 | Stop reason: HOSPADM

## 2024-11-12 RX ORDER — ISOSORBIDE MONONITRATE 60 MG/1
60 TABLET, EXTENDED RELEASE ORAL DAILY
Status: DISCONTINUED | OUTPATIENT
Start: 2024-11-12 | End: 2024-11-12 | Stop reason: HOSPADM

## 2024-11-12 RX ORDER — ONDANSETRON 4 MG/1
4 TABLET, ORALLY DISINTEGRATING ORAL EVERY 8 HOURS PRN
Status: DISCONTINUED | OUTPATIENT
Start: 2024-11-12 | End: 2024-11-12

## 2024-11-12 RX ORDER — RANOLAZINE 500 MG/1
500 TABLET, EXTENDED RELEASE ORAL 2 TIMES DAILY
Status: DISCONTINUED | OUTPATIENT
Start: 2024-11-12 | End: 2024-11-12 | Stop reason: HOSPADM

## 2024-11-12 RX ORDER — DULOXETIN HYDROCHLORIDE 60 MG/1
60 CAPSULE, DELAYED RELEASE ORAL DAILY
Status: DISCONTINUED | OUTPATIENT
Start: 2024-11-12 | End: 2024-11-12 | Stop reason: HOSPADM

## 2024-11-12 RX ORDER — CARVEDILOL 3.12 MG/1
3.12 TABLET ORAL 2 TIMES DAILY WITH MEALS
Status: DISCONTINUED | OUTPATIENT
Start: 2024-11-12 | End: 2024-11-12 | Stop reason: HOSPADM

## 2024-11-12 RX ADMIN — ENOXAPARIN SODIUM 40 MG: 100 INJECTION SUBCUTANEOUS at 07:43

## 2024-11-12 RX ADMIN — CARVEDILOL 3.12 MG: 3.12 TABLET, FILM COATED ORAL at 12:44

## 2024-11-12 RX ADMIN — MORPHINE SULFATE 2 MG: 2 INJECTION, SOLUTION INTRAMUSCULAR; INTRAVENOUS at 10:50

## 2024-11-12 RX ADMIN — SODIUM CHLORIDE, PRESERVATIVE FREE 10 ML: 5 INJECTION INTRAVENOUS at 07:44

## 2024-11-12 RX ADMIN — MORPHINE SULFATE 2 MG: 2 INJECTION, SOLUTION INTRAMUSCULAR; INTRAVENOUS at 15:01

## 2024-11-12 RX ADMIN — CARVEDILOL 3.12 MG: 3.12 TABLET, FILM COATED ORAL at 16:54

## 2024-11-12 RX ADMIN — CLOPIDOGREL BISULFATE 75 MG: 75 TABLET ORAL at 07:44

## 2024-11-12 RX ADMIN — RANOLAZINE 500 MG: 500 TABLET, FILM COATED, EXTENDED RELEASE ORAL at 12:44

## 2024-11-12 RX ADMIN — ASPIRIN 81 MG: 81 TABLET, CHEWABLE ORAL at 07:44

## 2024-11-12 RX ADMIN — MORPHINE SULFATE 2 MG: 2 INJECTION, SOLUTION INTRAMUSCULAR; INTRAVENOUS at 02:37

## 2024-11-12 RX ADMIN — OXYCODONE 10 MG: 5 TABLET ORAL at 23:43

## 2024-11-12 RX ADMIN — LORAZEPAM 1 MG: 1 TABLET ORAL at 10:50

## 2024-11-12 RX ADMIN — LEVOTHYROXINE SODIUM 175 MCG: 0.12 TABLET ORAL at 12:43

## 2024-11-12 RX ADMIN — MORPHINE SULFATE 2 MG: 2 INJECTION, SOLUTION INTRAMUSCULAR; INTRAVENOUS at 18:16

## 2024-11-12 RX ADMIN — HYDROMORPHONE HYDROCHLORIDE 0.5 MG: 1 INJECTION, SOLUTION INTRAMUSCULAR; INTRAVENOUS; SUBCUTANEOUS at 22:17

## 2024-11-12 RX ADMIN — SODIUM CHLORIDE: 9 INJECTION, SOLUTION INTRAVENOUS at 22:23

## 2024-11-12 RX ADMIN — AMLODIPINE BESYLATE 5 MG: 5 TABLET ORAL at 12:43

## 2024-11-12 RX ADMIN — MORPHINE SULFATE 2 MG: 2 INJECTION, SOLUTION INTRAMUSCULAR; INTRAVENOUS at 07:43

## 2024-11-12 RX ADMIN — DULOXETINE HYDROCHLORIDE 60 MG: 60 CAPSULE, DELAYED RELEASE ORAL at 12:45

## 2024-11-12 RX ADMIN — ISOSORBIDE MONONITRATE 60 MG: 60 TABLET, EXTENDED RELEASE ORAL at 12:45

## 2024-11-12 RX ADMIN — SODIUM CHLORIDE, PRESERVATIVE FREE 10 ML: 5 INJECTION INTRAVENOUS at 22:19

## 2024-11-12 ASSESSMENT — PAIN SCALES - GENERAL
PAINLEVEL_OUTOF10: 9
PAINLEVEL_OUTOF10: 8
PAINLEVEL_OUTOF10: 4
PAINLEVEL_OUTOF10: 7
PAINLEVEL_OUTOF10: 8
PAINLEVEL_OUTOF10: 9
PAINLEVEL_OUTOF10: 8
PAINLEVEL_OUTOF10: 6
PAINLEVEL_OUTOF10: 8
PAINLEVEL_OUTOF10: 9
PAINLEVEL_OUTOF10: 6

## 2024-11-12 ASSESSMENT — PAIN DESCRIPTION - PAIN TYPE
TYPE: ACUTE PAIN
TYPE: ACUTE PAIN

## 2024-11-12 ASSESSMENT — PAIN DESCRIPTION - DESCRIPTORS
DESCRIPTORS: ACHING
DESCRIPTORS: PRESSURE;STABBING
DESCRIPTORS: STABBING;ACHING
DESCRIPTORS: ACHING;STABBING

## 2024-11-12 ASSESSMENT — PAIN SCALES - WONG BAKER: WONGBAKER_NUMERICALRESPONSE: NO HURT

## 2024-11-12 ASSESSMENT — PAIN - FUNCTIONAL ASSESSMENT
PAIN_FUNCTIONAL_ASSESSMENT: ACTIVITIES ARE NOT PREVENTED

## 2024-11-12 ASSESSMENT — PAIN DESCRIPTION - LOCATION
LOCATION: CHEST
LOCATION: CHEST
LOCATION: CHEST;BACK
LOCATION: CHEST

## 2024-11-12 ASSESSMENT — PAIN DESCRIPTION - ORIENTATION
ORIENTATION: LEFT
ORIENTATION: MID;POSTERIOR
ORIENTATION: LEFT
ORIENTATION: RIGHT;LEFT

## 2024-11-12 ASSESSMENT — PAIN DESCRIPTION - FREQUENCY
FREQUENCY: CONTINUOUS
FREQUENCY: CONTINUOUS

## 2024-11-12 ASSESSMENT — PAIN DESCRIPTION - ONSET
ONSET: ON-GOING
ONSET: ON-GOING

## 2024-11-12 NOTE — PLAN OF CARE
Problem: Chronic Conditions and Co-morbidities  Goal: Patient's chronic conditions and co-morbidity symptoms are monitored and maintained or improved  11/12/2024 0936 by Janessa Mckinney RN  Outcome: Progressing  11/11/2024 2302 by Dante Acosta RN  Outcome: Progressing  Flowsheets (Taken 11/11/2024 2035)  Care Plan - Patient's Chronic Conditions and Co-Morbidity Symptoms are Monitored and Maintained or Improved:   Monitor and assess patient's chronic conditions and comorbid symptoms for stability, deterioration, or improvement   Collaborate with multidisciplinary team to address chronic and comorbid conditions and prevent exacerbation or deterioration     Problem: Discharge Planning  Goal: Discharge to home or other facility with appropriate resources  11/12/2024 0936 by Janessa Mckinney RN  Outcome: Progressing  11/11/2024 2302 by Dante Acosta RN  Outcome: Progressing  Flowsheets (Taken 11/11/2024 2035)  Discharge to home or other facility with appropriate resources:   Identify barriers to discharge with patient and caregiver   Arrange for needed discharge resources and transportation as appropriate   Identify discharge learning needs (meds, wound care, etc)     Problem: Safety - Adult  Goal: Free from fall injury  11/12/2024 0936 by Janessa Mckinney RN  Outcome: Progressing  11/11/2024 2302 by Dante Acosta RN  Outcome: Progressing     Problem: ABCDS Injury Assessment  Goal: Absence of physical injury  11/12/2024 0936 by Janessa Mckinney RN  Outcome: Progressing  11/11/2024 2302 by Dante Acosta RN  Outcome: Progressing     Problem: Pain  Goal: Verbalizes/displays adequate comfort level or baseline comfort level  Outcome: Progressing

## 2024-11-12 NOTE — PROGRESS NOTES
Called [facility] for Myrtle SHELBY bennett. Unable to speak with someone, left message for call back.  Electronically signed by Otilia Day RN on 11/12/2024 at 4:23 PM

## 2024-11-12 NOTE — PROGRESS NOTES
Pt transferring  to [facility] room 0504. Call report to nurse Clover CASTELAN. Answered all questions. Per nurse request IV in place. Cervical Aspen collar in place per  and neurosurgery.   Transfer time set 6pm today. Pt is dressed and belongings are packed.     Call pt mom but unable to get hold of her. No answer. Notify the patient as well.

## 2024-11-12 NOTE — PLAN OF CARE
Problem: Chronic Conditions and Co-morbidities  Goal: Patient's chronic conditions and co-morbidity symptoms are monitored and maintained or improved  11/11/2024 2302 by Dante Acosta RN  Outcome: Progressing  Flowsheets (Taken 11/11/2024 2035)  Care Plan - Patient's Chronic Conditions and Co-Morbidity Symptoms are Monitored and Maintained or Improved:   Monitor and assess patient's chronic conditions and comorbid symptoms for stability, deterioration, or improvement   Collaborate with multidisciplinary team to address chronic and comorbid conditions and prevent exacerbation or deterioration  11/11/2024 1432 by Odessa Glover RN  Outcome: Progressing     Problem: Discharge Planning  Goal: Discharge to home or other facility with appropriate resources  11/11/2024 2302 by Dante Acosta RN  Outcome: Progressing  Flowsheets (Taken 11/11/2024 2035)  Discharge to home or other facility with appropriate resources:   Identify barriers to discharge with patient and caregiver   Arrange for needed discharge resources and transportation as appropriate   Identify discharge learning needs (meds, wound care, etc)  11/11/2024 1432 by Odessa Glover RN  Outcome: Progressing     Problem: Safety - Adult  Goal: Free from fall injury  11/11/2024 2302 by Dante Acosta, RN  Outcome: Progressing  11/11/2024 1432 by Odessa Glover RN  Outcome: Progressing     Problem: ABCDS Injury Assessment  Goal: Absence of physical injury  11/11/2024 2302 by Dante Acosta, RN  Outcome: Progressing  11/11/2024 1432 by Odessa Glover RN  Outcome: Progressing

## 2024-11-12 NOTE — DISCHARGE SUMMARY
internal carotid, anterior cerebral, or middle cerebral arteries. No aneurysm. POSTERIOR CIRCULATION: At the proximal left V4 segment, there is localized patchy atherosclerotic calcification which may result in severe focal stenosis.  The right V4 segment is diminutive with its distal portion nearly nonvisible.  It should be noted however that there is fetal origin of the left PCA.  On the right there is a robust posterior communicating artery, which is larger in caliber than the P1 segment.  Otherwise, no significant stenosis of the vertebral, basilar, or posterior cerebral arteries. No aneurysm. OTHER: No dural venous sinus thrombosis on this non-dedicated study.     1. Hemodynamically significant focal stenosis at the origin of the left vertebral artery. 2. At the proximal left V4 segment, there appears to be severe focal stenosis. The right V4 segment is diminutive with its distal portion nearly nonvisible. It should be noted however, that there is fetal origin of the left PCA and on the right there is a robust posterior communicating artery, which is larger in caliber than the P1 segment. 3. Otherwise, no significant stenosis or large vessel occlusion of the head or neck. This scan was analyzed using [employer]. contact LVO. Identification of suspected findings is not for diagnostic use beyond notification. Viz LVO is limited to analysis of imaging data and should not be used in-lieu of full patient evaluation or relied upon to make or confirm diagnosis.     XR CHEST PORTABLE    Result Date: 11/10/2024  EXAMINATION: ONE XRAY VIEW OF THE CHEST 11/10/2024 10:04 pm COMPARISON: 08/24/2024 HISTORY: ORDERING SYSTEM PROVIDED HISTORY: pain TECHNOLOGIST PROVIDED HISTORY: Reason for exam:->pain Reason for Exam: chest pain FINDINGS: The heart is normal.  The pulmonary vessels are normal.  The lungs are mildly hyperinflated.  No consolidation or effusion is seen.  There are old healed rib fractures inferiorly on the left which  11/12/2024 04:16 AM    HDL 8 11/12/2024 04:16 AM    TRIG 107 11/12/2024 04:16 AM     Hemoglobin A1C:   Lab Results   Component Value Date/Time    LABA1C 6.0 11/12/2024 04:16 AM     TSH: No results found for: \"TSH\"  Troponin: No results found for: \"TROPONINT\"  Lactic Acid: No results for input(s): \"LACTA\" in the last 72 hours.  BNP:   Recent Labs     11/10/24  2142   PROBNP 96     UA:  Lab Results   Component Value Date/Time    NITRU Negative 11/10/2024 11:27 PM    COLORU Yellow 11/10/2024 11:27 PM    PHUR 6.0 11/11/2024 12:55 PM    PHUR 6.0 11/19/2023 10:46 AM    WBCUA 2 11/10/2024 11:27 PM    RBCUA 1 11/10/2024 11:27 PM    BACTERIA None Seen 11/10/2024 11:27 PM    CLARITYU Clear 11/10/2024 11:27 PM    LEUKOCYTESUR Negative 11/10/2024 11:27 PM    UROBILINOGEN 1.0 11/10/2024 11:27 PM    BILIRUBINUR Negative 11/10/2024 11:27 PM    BLOODU Negative 11/10/2024 11:27 PM    GLUCOSEU Negative 11/10/2024 11:27 PM    GLUCOSEU NEGATIVE 08/05/2011 10:22 AM    KETUA TRACE 11/10/2024 11:27 PM     Urine Cultures: No results found for: \"LABURIN\"  Blood Cultures: No results found for: \"BC\"  No results found for: \"BLOODCULT2\"  Organism: No results found for: \"ORG\"    Time Spent Discharging patient 45 minutes    Electronically signed by Niurka Henao MD on 11/12/2024 at 4:16 PM

## 2024-11-12 NOTE — CARE COORDINATION
SW attempted to meet with patient, however, he was away for testing. MARYELLEN will try again later if time permits.     Respectfully submitted,    Geno CLEMENS, JORGE  [facility]   [phone]    Electronically signed by JYALEEN Akhtar, LSW on 11/12/2024 at 12:32 PM

## 2024-11-12 NOTE — PROGRESS NOTES
Pt returned from MRI. Back in the room. Laying down in bed. Call light within reach. No needs at this time.

## 2024-11-12 NOTE — PROGRESS NOTES
seen.  11/10/2024 CT Head: IMPRESSION: Normal examination for age.  11/10/2024 CTA Head/Neck: IMPRESSION: 1. Hemodynamically significant focal stenosis at the origin of the left vertebral artery. 2. At the proximal left V4 segment, there appears to be severe focal stenosis. The right V4 segment is diminutive with its distal portion nearly nonvisible. It should be noted however, that there is fetal origin of the left PCA and on the right there is a robust posterior communicating artery,  which is larger in caliber than the P1 segment. 3. Otherwise, no significant stenosis or large vessel occlusion of the head or neck.  11/11/2024 MRI Brain: IMPRESSION: No acute intracranial abnormality. Stable mild chronic microvascular disease within the periventricular white matter.    Current Diet Level : Regular texture, Thin liquids      Respiratory Status: Room Air     Reason for initial referral: SLP evaluation orders received due to CVA protocol     SUBJECTIVE     General: Accepted and tolerated tx at bedside. Seen with lunch.    Comments regarding diet toleration:   Patient: denies concerns/complaints  Family: not present  Staff: denies concerns    TREATMENT SUMMARY / IMPRESSIONS     Diagnosis: Oropharyngeal Dysphagia   oropharyngeal swallow appears grossly WF   Patient alert and cooperative; tangential tendencies noted as well as tendencies to hyperfocus on concepts/ideas at times; patient verbally responsive and follows dx; oriented to self, place, and situation. Patient denies motor speech or voice changes as well as receptive/expressive/cognitive language changes at this time   No overt signs/symptoms of penetration/aspiration.   Continue current diet.    Medical Or Cognitive/Behavioral Factors Which Can Exacerbate:   h/o gastroparesis  Comorbidities    Dysphagia Prognosis: good  Barriers to progress: co-morbidities    RECOMMENDATIONS     Recommended Diet and Intervention 11/12/2024:  Diet Solids Recommendation:  Regular  by:    Anahy Cintron MA, CCC-SLP, #[id number]  Speech-Language Pathologist  Portable phone: [phone]   11/12/24  12:40 PM

## 2024-11-12 NOTE — CARE COORDINATION
Discharge order noted. MARYELLEN advised of transfer to [facility] in the works. We can't order DME or home care until home is the final destination. MARYELLEN spoke to bedside nurse and she is aware.     Respectfully submitted,    Geno CLEMENS, JORGE  [facility]   [phone]    Electronically signed by JAYLEEN Akhtar, SPENSERW on 11/12/2024 at 4:22 PM

## 2024-11-12 NOTE — PROGRESS NOTES
[facility] Hospitalist Transfer accept note  Transfer center PS received  Case reviewed with ER physician  Reason for Transfer:  Ashutosh Donovan 53 y.o. male  - Ashutosh Donovan is a 53 y.o. male with pmh of tobacco abuse; CKD stage IIIa; emphysema; multiple CAD history of about 10 stents; hypertension; and congestive heart failure who presents with chest pain and numbness of the lower extremity . Trop were ok. Cardiology did not recommend further work up     MRI brain was ok. Cervical MRI showed Mild reversal the cervical curvature from C4 through C6.  MRI lumbar spine showed : severe thecal sac stenosis at L4-5 with crowding of the cauda equina.  Thickened and redundant nerve root within the cauda equina similar to prior exam.  This may be due to the stenosis at L4-5 or less likely a nerve sheath tumor.    Of note he has a combative personality and hx of cocaine abuse . He received his ASA and plavix this morning     - Recommendations: transfer to [facility] for NS eval for possible cauda equina syndrome      Patient has been accepted for transfer to [facility].   Once patient arrive please page ON CALL HOSPITALIST so patient can be seen.   If unable to reach physician on [employer] please call hospitalist phone (#[phone])     PCP: No primary care provider on file.     Thanks  Bobby Omalley MD  Hospitalist

## 2024-11-12 NOTE — PROGRESS NOTES
no suprapubic tenderness  Musculoskeletal: No edema  Skin: warm, dry  Neuro: Alert.  Psych: Mood appropriate.         Medications:   Medications:    amLODIPine  5 mg Oral Daily    carvedilol  3.125 mg Oral BID WC    DULoxetine  60 mg Oral Daily    fluPHENAZine HCl  5 mg Oral Nightly    isosorbide mononitrate  60 mg Oral Daily    levothyroxine  175 mcg Oral Daily    prazosin  5 mg Oral Nightly    ranolazine  500 mg Oral BID    sodium chloride flush  5-40 mL IntraVENous 2 times per day    enoxaparin  40 mg SubCUTAneous Daily    aspirin  81 mg Oral Daily    Or    aspirin  300 mg Rectal Daily    atorvastatin  80 mg Oral Nightly    clopidogrel  75 mg Oral Daily    atorvastatin  80 mg Oral Nightly      Infusions:    sodium chloride       PRN Meds: morphine, 2 mg, Q3H PRN   Or  morphine, 4 mg, Q3H PRN  sodium chloride flush, 5-40 mL, PRN  sodium chloride, , PRN  polyethylene glycol, 17 g, Daily PRN  perflutren lipid microspheres, 1.5 mL, ONCE PRN  labetalol, 10 mg, Q4H PRN  prochlorperazine, 10 mg, Q6H PRN  haloperidol lactate, 5 mg, Q6H PRN        Labs and Imaging   MRI CERVICAL SPINE WO CONTRAST    Result Date: 11/12/2024  EXAMINATION: MRI OF THE CERVICAL SPINE WITHOUT CONTRAST 11/12/2024 11:00 am TECHNIQUE: Multiplanar multisequence MRI of the cervical spine was performed without the administration of intravenous contrast. COMPARISON: 05/10/2024 HISTORY: ORDERING SYSTEM PROVIDED HISTORY: Neck pain, left sided weakness and numbness. Bowel and bladder change reported TECHNOLOGIST PROVIDED HISTORY: Reason for exam:->Neck pain, left sided weakness and numbness. Bowel and bladder change reported Reason for Exam: Neck pain, left sided weakness and numbness. Bowel and bladder change reported Initial evaluation FINDINGS: BONES/ALIGNMENT: There is mild reversal of the cervical curvature at C4, C5, and C6.  There is mild anterior subluxation of C3 on C4, and of C4 on C5. There is disc space narrowing and osteophytes at C3-4,  11/10/2024 11:27 PM    BILIRUBINUR Negative 11/10/2024 11:27 PM    BLOODU Negative 11/10/2024 11:27 PM    GLUCOSEU Negative 11/10/2024 11:27 PM    GLUCOSEU NEGATIVE 08/05/2011 10:22 AM    KETUA TRACE 11/10/2024 11:27 PM     Urine Cultures: No results found for: \"LABURIN\"  Blood Cultures: No results found for: \"BC\"  No results found for: \"BLOODCULT2\"  Organism: No results found for: \"ORG\"      Electronically signed by Niurka Henao MD on 11/12/2024 at 2:06 PM

## 2024-11-12 NOTE — PROGRESS NOTES
Physical Therapy  Attempt Note  Ashutosh Donovan    The pt is currently out of the room at MRI. Will attempt back later this afternoon.    If pt. is D/C'd prior to next visit please refer back to last daily progress note for D/C status.  Electronically signed by Jacki Sun, PT [id number] on 11/12/2024 at 11:41 AM

## 2024-11-13 ENCOUNTER — APPOINTMENT (OUTPATIENT)
Dept: CT IMAGING | Age: 54
DRG: 551 | End: 2024-11-13
Attending: HOSPITALIST
Payer: MEDICARE

## 2024-11-13 ENCOUNTER — APPOINTMENT (OUTPATIENT)
Dept: GENERAL RADIOLOGY | Age: 54
DRG: 551 | End: 2024-11-13
Attending: HOSPITALIST
Payer: MEDICARE

## 2024-11-13 ENCOUNTER — APPOINTMENT (OUTPATIENT)
Dept: MRI IMAGING | Age: 54
DRG: 551 | End: 2024-11-13
Attending: HOSPITALIST
Payer: MEDICARE

## 2024-11-13 LAB
ABO + RH BLD: NORMAL
ANION GAP SERPL CALCULATED.3IONS-SCNC: 7 MMOL/L (ref 3–16)
APTT BLD: 25.7 SEC (ref 22.1–36.4)
BASOPHILS # BLD: 0.1 K/UL (ref 0–0.2)
BASOPHILS NFR BLD: 1.1 %
BLD GP AB SCN SERPL QL: NORMAL
BUN SERPL-MCNC: 15 MG/DL (ref 7–20)
CALCIUM SERPL-MCNC: 9.6 MG/DL (ref 8.3–10.6)
CHLORIDE SERPL-SCNC: 104 MMOL/L (ref 99–110)
CO2 SERPL-SCNC: 27 MMOL/L (ref 21–32)
CREAT SERPL-MCNC: 1.3 MG/DL (ref 0.9–1.3)
DEPRECATED RDW RBC AUTO: 17.9 % (ref 12.4–15.4)
EKG ATRIAL RATE: 59 BPM
EKG DIAGNOSIS: NORMAL
EKG P AXIS: 39 DEGREES
EKG P-R INTERVAL: 174 MS
EKG Q-T INTERVAL: 466 MS
EKG QRS DURATION: 120 MS
EKG QTC CALCULATION (BAZETT): 461 MS
EKG R AXIS: 38 DEGREES
EKG T AXIS: 65 DEGREES
EKG VENTRICULAR RATE: 59 BPM
EOSINOPHIL # BLD: 0.1 K/UL (ref 0–0.6)
EOSINOPHIL NFR BLD: 1.2 %
GFR SERPLBLD CREATININE-BSD FMLA CKD-EPI: 66 ML/MIN/{1.73_M2}
GLUCOSE SERPL-MCNC: 93 MG/DL (ref 70–99)
HCT VFR BLD AUTO: 38.5 % (ref 40.5–52.5)
HGB BLD-MCNC: 12.5 G/DL (ref 13.5–17.5)
INR PPP: 0.9 (ref 0.85–1.15)
LYMPHOCYTES # BLD: 2.4 K/UL (ref 1–5.1)
LYMPHOCYTES NFR BLD: 39.1 %
MAGNESIUM SERPL-MCNC: 2.07 MG/DL (ref 1.8–2.4)
MCH RBC QN AUTO: 27.8 PG (ref 26–34)
MCHC RBC AUTO-ENTMCNC: 32.4 G/DL (ref 31–36)
MCV RBC AUTO: 85.9 FL (ref 80–100)
MONOCYTES # BLD: 0.6 K/UL (ref 0–1.3)
MONOCYTES NFR BLD: 10.4 %
NEUTROPHILS # BLD: 3 K/UL (ref 1.7–7.7)
NEUTROPHILS NFR BLD: 48.2 %
PLATELET # BLD AUTO: 214 K/UL (ref 135–450)
PMV BLD AUTO: 9.3 FL (ref 5–10.5)
POTASSIUM SERPL-SCNC: 4.2 MMOL/L (ref 3.5–5.1)
PROTHROMBIN TIME: 12.4 SEC (ref 11.9–14.9)
RBC # BLD AUTO: 4.48 M/UL (ref 4.2–5.9)
SODIUM SERPL-SCNC: 138 MMOL/L (ref 136–145)
TROPONIN, HIGH SENSITIVITY: 15 NG/L (ref 0–22)
WBC # BLD AUTO: 6.2 K/UL (ref 4–11)

## 2024-11-13 PROCEDURE — 83735 ASSAY OF MAGNESIUM: CPT

## 2024-11-13 PROCEDURE — 85610 PROTHROMBIN TIME: CPT

## 2024-11-13 PROCEDURE — 72120 X-RAY BEND ONLY L-S SPINE: CPT

## 2024-11-13 PROCEDURE — 86901 BLOOD TYPING SEROLOGIC RH(D): CPT

## 2024-11-13 PROCEDURE — 86900 BLOOD TYPING SEROLOGIC ABO: CPT

## 2024-11-13 PROCEDURE — 6370000000 HC RX 637 (ALT 250 FOR IP): Performed by: INTERNAL MEDICINE

## 2024-11-13 PROCEDURE — 80048 BASIC METABOLIC PNL TOTAL CA: CPT

## 2024-11-13 PROCEDURE — 85025 COMPLETE CBC W/AUTO DIFF WBC: CPT

## 2024-11-13 PROCEDURE — 99222 1ST HOSP IP/OBS MODERATE 55: CPT | Performed by: NURSE PRACTITIONER

## 2024-11-13 PROCEDURE — 84484 ASSAY OF TROPONIN QUANT: CPT

## 2024-11-13 PROCEDURE — 6360000002 HC RX W HCPCS: Performed by: INTERNAL MEDICINE

## 2024-11-13 PROCEDURE — 6370000000 HC RX 637 (ALT 250 FOR IP): Performed by: NURSE PRACTITIONER

## 2024-11-13 PROCEDURE — 2580000003 HC RX 258: Performed by: INTERNAL MEDICINE

## 2024-11-13 PROCEDURE — 93010 ELECTROCARDIOGRAM REPORT: CPT | Performed by: INTERNAL MEDICINE

## 2024-11-13 PROCEDURE — 72125 CT NECK SPINE W/O DYE: CPT

## 2024-11-13 PROCEDURE — 72131 CT LUMBAR SPINE W/O DYE: CPT

## 2024-11-13 PROCEDURE — 6360000002 HC RX W HCPCS: Performed by: NURSE PRACTITIONER

## 2024-11-13 PROCEDURE — 2060000000 HC ICU INTERMEDIATE R&B

## 2024-11-13 PROCEDURE — 36415 COLL VENOUS BLD VENIPUNCTURE: CPT

## 2024-11-13 PROCEDURE — 72141 MRI NECK SPINE W/O DYE: CPT

## 2024-11-13 PROCEDURE — 85730 THROMBOPLASTIN TIME PARTIAL: CPT

## 2024-11-13 PROCEDURE — 86850 RBC ANTIBODY SCREEN: CPT

## 2024-11-13 RX ORDER — DULOXETIN HYDROCHLORIDE 60 MG/1
60 CAPSULE, DELAYED RELEASE ORAL DAILY
Status: DISCONTINUED | OUTPATIENT
Start: 2024-11-13 | End: 2024-11-24 | Stop reason: HOSPADM

## 2024-11-13 RX ORDER — PRAZOSIN HYDROCHLORIDE 5 MG/1
5 CAPSULE ORAL NIGHTLY
Status: DISCONTINUED | OUTPATIENT
Start: 2024-11-13 | End: 2024-11-24 | Stop reason: HOSPADM

## 2024-11-13 RX ORDER — TAMSULOSIN HYDROCHLORIDE 0.4 MG/1
0.4 CAPSULE ORAL DAILY
Status: DISCONTINUED | OUTPATIENT
Start: 2024-11-13 | End: 2024-11-24 | Stop reason: HOSPADM

## 2024-11-13 RX ORDER — PANTOPRAZOLE SODIUM 40 MG/1
40 TABLET, DELAYED RELEASE ORAL
Status: DISCONTINUED | OUTPATIENT
Start: 2024-11-13 | End: 2024-11-19

## 2024-11-13 RX ORDER — LIDOCAINE 4 G/G
1 PATCH TOPICAL DAILY
Status: DISCONTINUED | OUTPATIENT
Start: 2024-11-13 | End: 2024-11-24 | Stop reason: HOSPADM

## 2024-11-13 RX ORDER — NICOTINE 21 MG/24HR
1 PATCH, TRANSDERMAL 24 HOURS TRANSDERMAL DAILY
Status: DISCONTINUED | OUTPATIENT
Start: 2024-11-13 | End: 2024-11-13

## 2024-11-13 RX ORDER — CARVEDILOL 3.12 MG/1
3.12 TABLET ORAL 2 TIMES DAILY WITH MEALS
Status: DISCONTINUED | OUTPATIENT
Start: 2024-11-13 | End: 2024-11-24 | Stop reason: HOSPADM

## 2024-11-13 RX ORDER — ALPRAZOLAM 0.25 MG/1
0.25 TABLET ORAL 2 TIMES DAILY PRN
Status: DISCONTINUED | OUTPATIENT
Start: 2024-11-13 | End: 2024-11-24 | Stop reason: HOSPADM

## 2024-11-13 RX ORDER — CYCLOBENZAPRINE HCL 10 MG
5 TABLET ORAL EVERY 8 HOURS PRN
Status: DISCONTINUED | OUTPATIENT
Start: 2024-11-13 | End: 2024-11-13

## 2024-11-13 RX ORDER — LORAZEPAM 2 MG/ML
1 INJECTION INTRAMUSCULAR
Status: COMPLETED | OUTPATIENT
Start: 2024-11-13 | End: 2024-11-13

## 2024-11-13 RX ORDER — ATORVASTATIN CALCIUM 80 MG/1
80 TABLET, FILM COATED ORAL DAILY
Status: DISCONTINUED | OUTPATIENT
Start: 2024-11-13 | End: 2024-11-24 | Stop reason: HOSPADM

## 2024-11-13 RX ORDER — ISOSORBIDE MONONITRATE 60 MG/1
60 TABLET, EXTENDED RELEASE ORAL DAILY
Status: DISCONTINUED | OUTPATIENT
Start: 2024-11-13 | End: 2024-11-24 | Stop reason: HOSPADM

## 2024-11-13 RX ORDER — AMLODIPINE BESYLATE 5 MG/1
5 TABLET ORAL DAILY
Status: DISCONTINUED | OUTPATIENT
Start: 2024-11-13 | End: 2024-11-24 | Stop reason: HOSPADM

## 2024-11-13 RX ORDER — CETIRIZINE HYDROCHLORIDE 10 MG/1
10 TABLET ORAL DAILY
Status: DISCONTINUED | OUTPATIENT
Start: 2024-11-13 | End: 2024-11-24 | Stop reason: HOSPADM

## 2024-11-13 RX ORDER — LEVOTHYROXINE SODIUM 175 UG/1
175 TABLET ORAL DAILY
Status: DISCONTINUED | OUTPATIENT
Start: 2024-11-13 | End: 2024-11-24 | Stop reason: HOSPADM

## 2024-11-13 RX ORDER — RANOLAZINE 500 MG/1
500 TABLET, EXTENDED RELEASE ORAL 2 TIMES DAILY
Status: DISCONTINUED | OUTPATIENT
Start: 2024-11-13 | End: 2024-11-24 | Stop reason: HOSPADM

## 2024-11-13 RX ORDER — FLUTICASONE PROPIONATE 50 MCG
2 SPRAY, SUSPENSION (ML) NASAL DAILY PRN
Status: DISCONTINUED | OUTPATIENT
Start: 2024-11-13 | End: 2024-11-24 | Stop reason: HOSPADM

## 2024-11-13 RX ORDER — PROCHLORPERAZINE EDISYLATE 5 MG/ML
10 INJECTION INTRAMUSCULAR; INTRAVENOUS
Status: COMPLETED | OUTPATIENT
Start: 2024-11-13 | End: 2024-11-13

## 2024-11-13 RX ORDER — NICOTINE 21 MG/24HR
1 PATCH, TRANSDERMAL 24 HOURS TRANSDERMAL DAILY
Status: DISCONTINUED | OUTPATIENT
Start: 2024-11-13 | End: 2024-11-22

## 2024-11-13 RX ORDER — FLUPHENAZINE HYDROCHLORIDE 2.5 MG/1
5 TABLET ORAL NIGHTLY
Status: DISCONTINUED | OUTPATIENT
Start: 2024-11-13 | End: 2024-11-24 | Stop reason: HOSPADM

## 2024-11-13 RX ORDER — ALBUTEROL SULFATE 0.83 MG/ML
2.5 SOLUTION RESPIRATORY (INHALATION) EVERY 6 HOURS PRN
Status: DISCONTINUED | OUTPATIENT
Start: 2024-11-13 | End: 2024-11-24 | Stop reason: HOSPADM

## 2024-11-13 RX ADMIN — HYDROMORPHONE HYDROCHLORIDE 0.5 MG: 1 INJECTION, SOLUTION INTRAMUSCULAR; INTRAVENOUS; SUBCUTANEOUS at 08:35

## 2024-11-13 RX ADMIN — TAMSULOSIN HYDROCHLORIDE 0.4 MG: 0.4 CAPSULE ORAL at 08:30

## 2024-11-13 RX ADMIN — RANOLAZINE 500 MG: 500 TABLET, EXTENDED RELEASE ORAL at 08:30

## 2024-11-13 RX ADMIN — LEVOTHYROXINE SODIUM 175 MCG: 0.17 TABLET ORAL at 05:26

## 2024-11-13 RX ADMIN — CARVEDILOL 3.12 MG: 3.12 TABLET, FILM COATED ORAL at 08:30

## 2024-11-13 RX ADMIN — TIZANIDINE 4 MG: 4 TABLET ORAL at 21:49

## 2024-11-13 RX ADMIN — AMLODIPINE BESYLATE 5 MG: 5 TABLET ORAL at 08:30

## 2024-11-13 RX ADMIN — DULOXETINE HYDROCHLORIDE 60 MG: 60 CAPSULE, DELAYED RELEASE ORAL at 08:30

## 2024-11-13 RX ADMIN — ATORVASTATIN CALCIUM 80 MG: 80 TABLET, FILM COATED ORAL at 08:30

## 2024-11-13 RX ADMIN — RANOLAZINE 500 MG: 500 TABLET, EXTENDED RELEASE ORAL at 21:49

## 2024-11-13 RX ADMIN — SODIUM CHLORIDE, PRESERVATIVE FREE 10 ML: 5 INJECTION INTRAVENOUS at 21:49

## 2024-11-13 RX ADMIN — LORAZEPAM 1 MG: 2 INJECTION INTRAMUSCULAR; INTRAVENOUS at 20:31

## 2024-11-13 RX ADMIN — OXYCODONE 10 MG: 5 TABLET ORAL at 09:53

## 2024-11-13 RX ADMIN — SODIUM CHLORIDE, PRESERVATIVE FREE 10 ML: 5 INJECTION INTRAVENOUS at 08:11

## 2024-11-13 RX ADMIN — HYDROMORPHONE HYDROCHLORIDE 0.5 MG: 1 INJECTION, SOLUTION INTRAMUSCULAR; INTRAVENOUS; SUBCUTANEOUS at 05:26

## 2024-11-13 RX ADMIN — FLUPHENAZINE HYDROCHLORIDE 5 MG: 2.5 TABLET, FILM COATED ORAL at 21:48

## 2024-11-13 RX ADMIN — HYDROMORPHONE HYDROCHLORIDE 0.5 MG: 1 INJECTION, SOLUTION INTRAMUSCULAR; INTRAVENOUS; SUBCUTANEOUS at 02:16

## 2024-11-13 RX ADMIN — RANOLAZINE 500 MG: 500 TABLET, EXTENDED RELEASE ORAL at 02:17

## 2024-11-13 RX ADMIN — ISOSORBIDE MONONITRATE 60 MG: 60 TABLET, EXTENDED RELEASE ORAL at 08:30

## 2024-11-13 RX ADMIN — PROCHLORPERAZINE EDISYLATE 10 MG: 5 INJECTION INTRAMUSCULAR; INTRAVENOUS at 08:10

## 2024-11-13 RX ADMIN — CETIRIZINE HYDROCHLORIDE 10 MG: 10 TABLET, FILM COATED ORAL at 08:30

## 2024-11-13 RX ADMIN — OXYCODONE 10 MG: 5 TABLET ORAL at 04:17

## 2024-11-13 RX ADMIN — PANTOPRAZOLE SODIUM 40 MG: 40 TABLET, DELAYED RELEASE ORAL at 05:26

## 2024-11-13 RX ADMIN — FLUPHENAZINE HYDROCHLORIDE 5 MG: 2.5 TABLET, FILM COATED ORAL at 02:17

## 2024-11-13 RX ADMIN — PRAZOSIN HYDROCHLORIDE 5 MG: 5 CAPSULE ORAL at 21:49

## 2024-11-13 RX ADMIN — HYDROMORPHONE HYDROCHLORIDE 0.5 MG: 1 INJECTION, SOLUTION INTRAMUSCULAR; INTRAVENOUS; SUBCUTANEOUS at 21:47

## 2024-11-13 RX ADMIN — OXYCODONE 10 MG: 5 TABLET ORAL at 17:15

## 2024-11-13 RX ADMIN — HYDROMORPHONE HYDROCHLORIDE 0.5 MG: 1 INJECTION, SOLUTION INTRAMUSCULAR; INTRAVENOUS; SUBCUTANEOUS at 18:40

## 2024-11-13 RX ADMIN — CARVEDILOL 3.12 MG: 3.12 TABLET, FILM COATED ORAL at 17:16

## 2024-11-13 RX ADMIN — TIZANIDINE 4 MG: 4 TABLET ORAL at 09:54

## 2024-11-13 RX ADMIN — SODIUM CHLORIDE: 9 INJECTION, SOLUTION INTRAVENOUS at 10:57

## 2024-11-13 RX ADMIN — HYDROMORPHONE HYDROCHLORIDE 0.5 MG: 1 INJECTION, SOLUTION INTRAMUSCULAR; INTRAVENOUS; SUBCUTANEOUS at 13:33

## 2024-11-13 ASSESSMENT — PAIN SCALES - GENERAL
PAINLEVEL_OUTOF10: 10
PAINLEVEL_OUTOF10: 9
PAINLEVEL_OUTOF10: 9
PAINLEVEL_OUTOF10: 6
PAINLEVEL_OUTOF10: 7
PAINLEVEL_OUTOF10: 0
PAINLEVEL_OUTOF10: 9
PAINLEVEL_OUTOF10: 10
PAINLEVEL_OUTOF10: 9
PAINLEVEL_OUTOF10: 8
PAINLEVEL_OUTOF10: 9
PAINLEVEL_OUTOF10: 8
PAINLEVEL_OUTOF10: 9

## 2024-11-13 ASSESSMENT — PAIN DESCRIPTION - ORIENTATION
ORIENTATION: MID;RIGHT
ORIENTATION: POSTERIOR
ORIENTATION: MID;RIGHT
ORIENTATION: RIGHT;MID
ORIENTATION: LOWER;RIGHT;POSTERIOR
ORIENTATION: MID;POSTERIOR
ORIENTATION: RIGHT;MID
ORIENTATION: RIGHT;POSTERIOR
ORIENTATION: POSTERIOR
ORIENTATION: POSTERIOR

## 2024-11-13 ASSESSMENT — PAIN DESCRIPTION - FREQUENCY
FREQUENCY: CONTINUOUS

## 2024-11-13 ASSESSMENT — PAIN DESCRIPTION - ONSET
ONSET: ON-GOING

## 2024-11-13 ASSESSMENT — PAIN DESCRIPTION - LOCATION
LOCATION: BACK;CHEST;HAND
LOCATION: BACK
LOCATION: BACK;CHEST
LOCATION: BACK
LOCATION: BACK;CHEST
LOCATION: ARM;BACK
LOCATION: BACK;CHEST
LOCATION: BACK
LOCATION: BACK;CHEST

## 2024-11-13 ASSESSMENT — PAIN DESCRIPTION - DESCRIPTORS
DESCRIPTORS: DISCOMFORT;ACHING
DESCRIPTORS: ACHING
DESCRIPTORS: ACHING;STABBING
DESCRIPTORS: ACHING;SQUEEZING;STABBING
DESCRIPTORS: ACHING;STABBING
DESCRIPTORS: DISCOMFORT;ACHING
DESCRIPTORS: ACHING
DESCRIPTORS: STABBING;ACHING
DESCRIPTORS: ACHING;SQUEEZING
DESCRIPTORS: ACHING;STABBING
DESCRIPTORS: DISCOMFORT;ACHING

## 2024-11-13 ASSESSMENT — PAIN DESCRIPTION - PAIN TYPE
TYPE: ACUTE PAIN

## 2024-11-13 ASSESSMENT — PAIN - FUNCTIONAL ASSESSMENT

## 2024-11-13 NOTE — CONSULTS
NEUROSURGERY CONSULT NOTE    ANIBAL MCDONALD  [id number]   1970 11/13/2024    Requesting physician: Tamika Magaña MD    Reason for consultation: Lumbar back pain with myelopathy and radiculopathy    History of present illness: Patient is a 53 y.o. male  has a past medical history of Arthritis, CAD (coronary artery disease), CHF (congestive heart failure) ([facility]), Chronic pain, Heart attack (HCC), and Hypothyroid. Patient presented on 11/13/2024 to [facility] with chest pain that started 1 hour prior to arrival. Patient stated the pain was similar to when he had his heart attacks. Patient also endorsed numbness to the left side of his body from his face to his toes. Cardia w/u showed normal cardiac enzymes, ECG and echo. Neurologic w/u showed MRI brain with no acute cause, but there could be recrudescence with previous right hemispheric injury. Patient does have a history of severe spinal canal stenosis in cervical and lumbar spine, so MRI scans were repeated to evaluate for worsening stenosis, and patient was transferred to [facility] for neurosurgery evaluation.    ROS:   GENERAL:  Denies fever or recent illness. Denies weight changes   EYES:  Denies vision change or diplopia  EARS:  Denies hearing loss  CARDIAC:  Denies chest pain  RESPIRATORY:  Denies shortness of breath  SKIN:  Denies rash or lesions   HEM:  Denies excessive bruising  PSYCH:  Denies anxiety or depression  NEURO: Endorses pain radiating into both legs and numbness in both legs intermittently. Denies headache, numbness or tingling or lateralizing weakness elsewhere  :  Denies urinary difficulty  GI: Denies nausea, vomiting, diarrhea or constipation  MUSCULOSKELETAL: Endorses right hand pain 2/2 mildly displaced fracture base proximal phalanx fifth digit     Allergies   Allergen Reactions    Latex Itching    Ziprasidone Hcl Other (See Comments)     Pt reports seizures with Geodon    Zofran [Ondansetron Hcl] Swelling    Acetaminophen Other  voice  IX: Palate movement equal bilaterally  XI: Shoulder shrug equal bilaterally  XII: Tongue midline    Musculoskeletal:   Gait: Not tested   Assist devices: None   Tone: Normal  Motor strength: Exam limited 2/2 right hand pain 2/2 mildly displaced fracture base proximal phalanx fifth digit    Right  Left    Right  Left    Deltoid  5 5  Hip Flex  5 5   Biceps  5 5  Knee Extensors  5 5   Triceps  5 5  Knee Flexors  5 5   Wrist Ext  5 5  Ankle Dorsiflex.  5 5   Wrist Flex  5 5  Ankle Plantarflex.  5 5   Handgrip  Defer 5  Ext Jitendra Longus  5 5   Thumb Ext  5 5         Radiological Findings:  CT HEAD WO CONTRAST  Result Date: 11/10/2024  Normal examination for age.      CTA HEAD NECK W CONTRAST  Result Date: 11/10/2024  1. Hemodynamically significant focal stenosis at the origin of the left vertebral artery.  2. At the proximal left V4 segment, there appears to be severe focal stenosis. The right V4 segment is diminutive with its distal portion nearly nonvisible. It should be noted however, that there is fetal origin of the left PCA and on the right there is a robust posterior communicating artery, which is larger in caliber than the P1 segment.  3. Otherwise, no significant stenosis or large vessel occlusion of the head or neck.     MRI brain without contrast  Result Date: 11/11/2024  No acute intracranial abnormality. Stable mild chronic microvascular disease within the periventricular white matter.     MRI CERVICAL SPINE WO CONTRAST  Result Date: 11/12/2024  1. Motion artifact degrades the images.   2. Mild reversal the cervical curvature from C4 through C6.  Anterior subluxation of C3 on C4, and C4 on C5.  3. Disc and osteophytes result in stenosis of the thecal sac and narrowing of the neural foramina throughout the cervical region as discussed above.     MRI LUMBAR SPINE WO CONTRAST  Result Date: 11/12/2024  1. Grade 1 anterolisthesis of L4 on L5.  2. L4-L5: Disc bulge, facet and ligament flavum hypertrophy

## 2024-11-13 NOTE — H&P
V2.0  History and Physical      Name:  Ahsutosh Donovan /Age/Sex: 1970  (53 y.o. male)   MRN & CSN:  [id number] & [id number] Encounter Date/Time: 2024 1:53 AM EST   Location:  [address] [place] [address] PCP: No primary care provider on file.       Hospital Day: 2    Assessment and Plan:   Ashutosh Donovan is a 53 y.o. male with PMH of CAD s/p multiple stents, HTN, HLD, hypothyroid, chronic LBP who presents with Lower extremity numbness    Left sided numbness and altered sensation  Bowel/bladder insensitivity  Potential cauda equina compression  Subulxation in cervical spine  - brain MRI reassuring  - MRI demonstrating the abnormalities per above  - consulted neurosurgery:  recommend              -hold all blood thinners              - place in cervical collar     Chest pain:  - seen by cardiology:  no further workup given normal troponins, ECG and recent Echo and reassuring coronary angiogram in May 2024     CAD hx:  - cont lipitor, coreg, ASA, plavix, Imdur, ranexa- hold DAPT     Hypothyroidism:  cont synthroid  Mood d/o:  cont fluphenazine    Hospital Problems             Last Modified POA    * (Principal) Lower extremity numbness 2024 Yes       Disposition:   Current Living situation: home  Expected Disposition: home  Estimated D/C: TBD    Diet Diet NPO   DVT Prophylaxis [] Lovenox, []  Heparin, [] SCDs, [] Ambulation,  [] Eliquis, [] Xarelto, [] Coumadin   Code Status Full Code   Surrogate Decision Maker/ POA NA     Personally reviewed Lab Studies and Imaging       History from:     patient    History of Present Illness:     Chief Complaint: weakness BLLE    Ashutosh Donovan is a 53 y.o. male admitted for chest pain but subsequently c/p chronic LBP, BLLE weakness , falls    W/u reveals cauda equina and hence this transfer     Review of Systems:    Constitutional: Negative.    HENT: Negative.     Eyes: Negative.    Respiratory: Negative.     Cardiovascular: Negative.    Gastrointestinal:  (11/12/2024)    Housing Stability Vital Sign     Unable to Pay for Housing in the Last Year: Yes     Number of Times Moved in the Last Year: 8     Homeless in the Last Year: No       Medications:   Medications:    nicotine  1 patch TransDERmal Daily    tamsulosin  0.4 mg Oral Daily    ranolazine  500 mg Oral BID    prazosin  5 mg Oral Nightly    pantoprazole  40 mg Oral QAM AC    cetirizine  10 mg Oral Daily    lidocaine  1 patch TransDERmal Daily    levothyroxine  175 mcg Oral Daily    isosorbide mononitrate  60 mg Oral Daily    fluPHENAZine HCl  5 mg Oral Nightly    DULoxetine  60 mg Oral Daily    carvedilol  3.125 mg Oral BID WC    atorvastatin  80 mg Oral Daily    amLODIPine  5 mg Oral Daily    sodium chloride flush  5-40 mL IntraVENous 2 times per day      Infusions:    sodium chloride      sodium chloride 75 mL/hr at 11/12/24 2223     PRN Meds: fluticasone, 2 spray, Daily PRN  cyclobenzaprine, 5 mg, PRN  ALPRAZolam, 0.25 mg, BID PRN  albuterol sulfate HFA, 2 puff, Q6H PRN  sodium chloride flush, 5-40 mL, PRN  sodium chloride, , PRN  potassium chloride, 40 mEq, PRN   Or  potassium alternative oral replacement, 40 mEq, PRN   Or  potassium chloride, 10 mEq, PRN  magnesium sulfate, 2,000 mg, PRN  polyethylene glycol, 17 g, Daily PRN  acetaminophen, 650 mg, Q6H PRN   Or  acetaminophen, 650 mg, Q6H PRN  HYDROmorphone, 0.25 mg, Q3H PRN   Or  HYDROmorphone, 0.5 mg, Q3H PRN  oxyCODONE, 5 mg, Q4H PRN   Or  oxyCODONE, 10 mg, Q4H PRN        Labs      CBC:   Recent Labs     11/10/24  2142 11/12/24  0416   WBC 8.4 6.8   HGB 13.2* 13.3*    213     BMP:    Recent Labs     11/10/24  2142 11/12/24  0416    137   K 3.6 4.0    104   CO2 26 25   BUN 18 <2*   CREATININE 1.6* 1.2   GLUCOSE 122* 90     Hepatic:   Recent Labs     11/10/24  2142   AST 20   ALT 7*   BILITOT <0.2   ALKPHOS 82     Lipids:   Lab Results   Component Value Date/Time    CHOL 165 11/12/2024 04:16 AM    HDL 8 11/12/2024 04:16 AM    TRIG 107

## 2024-11-13 NOTE — PLAN OF CARE
Brief note:  Currently, c/o 8/10 low back pain. States he has radicular pain in the RLE but no numbness or tingling. Denies sciatic/radicular pains in the LLE but does endorse having numbness. Reports subjective weakness in both legs and states he had one urinary incontinent episode, the night before going to [facility]. Reports feeling like his groin is numb but endorses he can feel light touch. Denies urinary retention or fecal incontinence. Has a c collar on and endorses having some right shoulder pain with intermittent numbness and tingling.     PLAN:  - Check basic labs including BMP, CBC, PT/INR, aPTT, type and screen, magnesium  - Keep NPO  - Neuro checks Q4H  - Hold all AC and antiplatelet medications  - SCD's for DVT prophylaxis    Formal consultation note to follow later this morning. Please call with questions, concerns or changes in neurologic exam.     PHYSICAL EXAM:  Vitals:    11/12/24 2217 11/12/24 2247 11/12/24 2341 11/13/24 0013   BP:   127/74    Pulse:   71    Resp: 18 18 18 18   Temp:   98.7 °F (37.1 °C)    TempSrc:   Axillary    SpO2:   96%    Weight:       Height:             General: Alert, no distress, well-nourished  Neurologic  Mental status:   orientation to person, place, time, situation   Attention intact as able to attend well to the exam     Language fluent in conversation   Comprehension intact; follows simple commands    Cranial nerves:   CN2: Visual fields full w/o extinction on confrontational testing   CN 3,4,6: Pupils equal and reactive to light, extraocular muscles intact  CN5: Facial sensation symmetric   CN7: Face symmetric  CN8: Hearing symmetric to spoken voice  CN9: Palate elevated symmetrically  CN11: Traps full strength on shoulder shrug  CN12: Tongue midline with protrusion    Motor Exam:   R  L    Deltoid 5  5   Biceps 5 5   Triceps 5 5   Wrist extension  5 5   Interossei 5 5      R  L    Hip flexion  5  4   Hip extension  5 4   Knee flexion  5 4   Knee  extension  5 4   Ankle dorsiflexion  5 4   Ankle plantar flexion  5 4     Deep tendon reflexes:    R  L    Biceps  3  3   Brachioradialis  3 3   Patellar  1 1   Clemens's  Absent Absent   Clonus Absent Absent   Toes Mute Mute     Sensory: reduced sensation to light touch in the LLE with sensory extinction. Full sensation to light touch int he RLE. Full sensation to light touch in BUE's.   Cerebellar/coordination: finger nose finger normal without ataxia  Tone: normal in all 4 extremities  Gait: Deferred for safety    OTHER SYSTEMS:  Cardiovascular: Warm, appears well perfused   Respiratory: Easy, non-labored respiratory pattern   Abdominal: Abdomen is without distention   Extremities: Upper and lower extremities are atraumatic in appearance without deformity. No swelling or erythema.

## 2024-11-13 NOTE — PROGRESS NOTES
Pt reported loss of sensation to the face, no facial drooping, no slurred speech. Messaged NeuroCritical Care regarding pt status, was informed to monitor for any further changes.

## 2024-11-13 NOTE — CONSULTS
Comprehensive Nutrition Assessment    RECOMMENDATIONS:  PO Diet: Continue Regular;Low Sodium  Nutrition Supplement: Begin Ensure Max bid  Nutrition Education: No recommendation at this time     NUTRITION ASSESSMENT:   Nutritional summary & status: Consult for untintentional wt loss; ONS;Poor intake/Appetite 5 or more days. Pt admitted with c/o's chest pain. Pt with reports of poor appetite. Pt currently on a Regular;Low Sodium diet with meal intake at % since admit. EMR showing no weight loss in past year. Pt off unit during at attempted encounter. Will order Ensure supplement bid for added nutrition. Will monitor intake adequacy and need for additional nutrition interventions.   Admission // PMH: Lower extremity numbness//CAD, CHF, Ch pain, hypothyroid    MALNUTRITION ASSESSMENT  Context of Malnutrition: Chronic Illness   Malnutrition Status: At risk for malnutrition  Findings of the 6 clinical characteristics of malnutrition (Minimum of 2 out of 6 clinical characteristics is required to make the diagnosis of moderate or severe Protein Calorie Malnutrition based on AND/ASPEN Guidelines):  Energy Intake:  Mild decrease in energy intake  Weight Loss:  No weight loss     Body Fat Loss:  Unable to assess (pt off unit)     Muscle Mass Loss:  Unable to assess      NUTRITION DIAGNOSIS   Inadequate oral intake, in context of social or environmental circumstances (pt is homeless) related to inadequate protein-energy intake as evidenced by poor intake prior to admission    Nutrition Monitoring and Evaluation:   Food/Nutrient Intake Outcomes:  Food and Nutrient Intake, Supplement Intake  Physical Signs/Symptoms Outcomes:  Biochemical Data, Nutrition Focused Physical Findings, Weight     OBJECTIVE DATA: Significant to nutrition assessment  Nutrition Related Findings: No BM noted. No edema. Labs reviewed.  Wounds: None  Nutrition Goals: PO intake 75% or greater, by next RD assessment     CURRENT NUTRITION

## 2024-11-13 NOTE — CARE COORDINATION
Case Management Assessment  Initial Evaluation    Date/Time of Evaluation: 11/13/2024 4:48 PM  Assessment Completed by: Maddison Scott    If patient is discharged prior to next notation, then this note serves as note for discharge by case management.    Patient Name: Ashutosh Donovan                   YOB: 1970  Diagnosis: Lower extremity numbness [R20.0]                   Date / Time: 11/12/2024  7:24 PM    Patient Admission Status: Inpatient   Readmission Risk (Low < 19, Mod (19-27), High > 27): Readmission Risk Score: 18.2    Current PCP: No primary care provider on file.  PCP verified by CM? No    Chart Reviewed: Yes      History Provided by: Patient, Medical Record  Patient Orientation: Alert and Oriented    Patient Cognition: Alert    Hospitalization in the last 30 days (Readmission):  No    If yes, Readmission Assessment in CM Navigator will be completed.    Advance Directives:      Code Status: Full Code   Patient's Primary Decision Maker is: Legal Next of Kin    Primary Decision Maker: Kaylin Wood - [facility] - [phone]    Primary Decision Maker: Juanjose Donovan - Brother/Sister    Secondary Decision Maker: Sagar Donovan - Brother/Sister    Discharge Planning:    Patient lives with: Family Members Type of Home: Homeless (Lives with brother or girlfriend)  Primary Care Giver: Self  Patient Support Systems include: Spouse/Significant Other, Family Members   Current Financial resources: Other (Comment) (had MCR & PATTIE this AM- now listed as self pay)  Current community resources: None  Current services prior to admission: Transportation            Current DME:              Type of Home Care services:  Aide Services, Nursing Services    ADLS  Prior functional level: Independent in ADLs/IADLs  Current functional level: Independent in ADLs/IADLs    PT AM-PAC:   /24  OT AM-PAC:   /24    Family can provide assistance at DC: Yes  Would you like Case Management to discuss the

## 2024-11-13 NOTE — ACP (ADVANCE CARE PLANNING)
Advance Care Planning     Advance Care Planning Inpatient Note  [facility] Department    Today's Date: 11/13/2024  Unit: [facility] 4 PCU    Received request from IDT Member.  Upon review of chart and communication with care team, patient's decision making abilities are not in question.. Patient was/were present in the room during visit.    Goals of ACP Conversation:  Discuss advance care planning documents    Health Care Decision Makers:       Primary Decision Maker: Zion RoweKaylin - [facility] - [phone]    Primary Decision Maker: Juanjose Donovan - Brother/Sister    Secondary Decision Maker: Sagar Donovan - Brother/Sister  Summary:  No Decision Maker named by patient at this time    Advance Care Planning Documents (Patient Wishes):  Healthcare Power of /Advance Directive Appointment of Health Care Agent     Assessment:  Pt feeling sleepy during conversation. Pt was okay for this  to come back but will let RN call .  gave pt a blank copy of Advanced Directives. Will follow up when pt is ready.    Outcomes/Plan:  ACP Discussion: Postponed    Electronically signed by Chaplain Brian on 11/13/2024 at 10:59 AM

## 2024-11-13 NOTE — PROGRESS NOTES
4 Eyes Skin Assessment     NAME:  Ashutosh Donovan  YOB: 1970  MEDICAL RECORD NUMBER:  [id number]    The patient is being assessed for  Admission    I agree that at least one RN has performed a thorough Head to Toe Skin Assessment on the patient. ALL assessment sites listed below have been assessed.      Areas assessed by both nurses:    Head, Face, Ears, Shoulders, Back, Chest, Arms, Elbows, Hands, Sacrum. Buttock, Coccyx, Ischium, and Legs. Feet and Heels        Scattered abrasion, L forearm, L Knee, chest.   L fingers wrap from previous injury.   Does the Patient have a Wound? No noted wound(s)       Jovanny Prevention initiated by RN: Yes  Wound Care Orders initiated by RN: No    Pressure Injury (Stage 3,4, Unstageable, DTI, NWPT, and Complex wounds) if present, place Wound referral order by RN under : No    New Ostomies, if present place, Ostomy referral order under : No     Nurse 1 eSignature: Electronically signed by Alexander Rasheed RN on 11/13/24 at 2:32 AM EST    **SHARE this note so that the co-signing nurse can place an eSignature**    Nurse 2 eSignature: Electronically signed by HAROLDO GARCIA RN on 11/13/24 at 2:49 AM EST

## 2024-11-13 NOTE — PROGRESS NOTES
Otherwise, no significant stenosis or large vessel occlusion of the head or neck. This scan was analyzed using Viz.ai contact LVO. Identification of suspected findings is not for diagnostic use beyond notification. Viz LVO is limited to analysis of imaging data and should not be used in-lieu of full patient evaluation or relied upon to make or confirm diagnosis.     XR CHEST PORTABLE    Result Date: 11/10/2024  EXAMINATION: ONE XRAY VIEW OF THE CHEST 11/10/2024 10:04 pm COMPARISON: 08/24/2024 HISTORY: ORDERING SYSTEM PROVIDED HISTORY: pain TECHNOLOGIST PROVIDED HISTORY: Reason for exam:->pain Reason for Exam: chest pain FINDINGS: The heart is normal.  The pulmonary vessels are normal.  The lungs are mildly hyperinflated.  No consolidation or effusion is seen.  There are old healed rib fractures inferiorly on the left which are unchanged.     Stable chronic changes with no acute abnormality seen.     CT HEAD WO CONTRAST    Result Date: 11/10/2024  EXAMINATION: CT OF THE HEAD WITHOUT CONTRAST  11/10/2024 9:56 pm TECHNIQUE: CT of the head was performed without the administration of intravenous contrast. Automated exposure control, iterative reconstruction, and/or weight based adjustment of the mA/kV was utilized to reduce the radiation dose to as low as reasonably achievable. COMPARISON: Cerebral MRI without contrast dated 05/10/2024. Cerebral CT without contrast dated 05/09/2024. HISTORY: ORDERING SYSTEM PROVIDED HISTORY: Stroke Symptoms TECHNOLOGIST PROVIDED HISTORY: Has a \"code stroke\" or \"stroke alert\" been called?->Yes Reason for exam:->Stroke Symptoms Decision Support Exception - unselect if not a suspected or confirmed emergency medical condition->Emergency Medical Condition (MA) Reason for Exam: stroke symptoms lt sided numbness FINDINGS: BRAIN/VENTRICLES: There is no acute intracranial hemorrhage, mass effect or midline shift.  No abnormal extra-axial fluid collection.  The gray-white differentiation is

## 2024-11-13 NOTE — PLAN OF CARE
Problem: Chronic Conditions and Co-morbidities  Goal: Patient's chronic conditions and co-morbidity symptoms are monitored and maintained or improved  Outcome: Progressing  Flowsheets (Taken 11/13/2024 0010)  Care Plan - Patient's Chronic Conditions and Co-Morbidity Symptoms are Monitored and Maintained or Improved:   Monitor and assess patient's chronic conditions and comorbid symptoms for stability, deterioration, or improvement   Collaborate with multidisciplinary team to address chronic and comorbid conditions and prevent exacerbation or deterioration     Problem: Discharge Planning  Goal: Discharge to home or other facility with appropriate resources  Outcome: Progressing  Flowsheets (Taken 11/13/2024 0010)  Discharge to home or other facility with appropriate resources:   Identify barriers to discharge with patient and caregiver   Arrange for needed discharge resources and transportation as appropriate     Problem: Pain  Goal: Verbalizes/displays adequate comfort level or baseline comfort level  Outcome: Progressing  Flowsheets (Taken 11/13/2024 0010)  Verbalizes/displays adequate comfort level or baseline comfort level:   Encourage patient to monitor pain and request assistance   Assess pain using appropriate pain scale   Administer analgesics based on type and severity of pain and evaluate response  Note: Pt reported chest pain, provided prn meds, pt satisfied.      Problem: Skin/Tissue Integrity  Goal: Absence of new skin breakdown  Description: 1.  Monitor for areas of redness and/or skin breakdown  2.  Assess vascular access sites hourly  3.  Every 4-6 hours minimum:  Change oxygen saturation probe site  4.  Every 4-6 hours:  If on nasal continuous positive airway pressure, respiratory therapy assess nares and determine need for appliance change or resting period.  Outcome: Progressing     Problem: ABCDS Injury Assessment  Goal: Absence of physical injury  Outcome: Progressing  Flowsheets (Taken

## 2024-11-14 LAB — ABO + RH BLD: NORMAL

## 2024-11-14 PROCEDURE — 99231 SBSQ HOSP IP/OBS SF/LOW 25: CPT | Performed by: NURSE PRACTITIONER

## 2024-11-14 PROCEDURE — 6370000000 HC RX 637 (ALT 250 FOR IP): Performed by: NURSE PRACTITIONER

## 2024-11-14 PROCEDURE — 6370000000 HC RX 637 (ALT 250 FOR IP): Performed by: INTERNAL MEDICINE

## 2024-11-14 PROCEDURE — 2060000000 HC ICU INTERMEDIATE R&B

## 2024-11-14 PROCEDURE — 6360000002 HC RX W HCPCS: Performed by: INTERNAL MEDICINE

## 2024-11-14 PROCEDURE — 2580000003 HC RX 258: Performed by: INTERNAL MEDICINE

## 2024-11-14 PROCEDURE — 6360000002 HC RX W HCPCS: Performed by: NURSE PRACTITIONER

## 2024-11-14 RX ORDER — ACETAMINOPHEN 325 MG/1
650 TABLET ORAL EVERY 8 HOURS SCHEDULED
Status: DISCONTINUED | OUTPATIENT
Start: 2024-11-14 | End: 2024-11-24 | Stop reason: HOSPADM

## 2024-11-14 RX ORDER — CALCIUM CARBONATE 500 MG/1
500 TABLET, CHEWABLE ORAL 3 TIMES DAILY PRN
Status: DISCONTINUED | OUTPATIENT
Start: 2024-11-14 | End: 2024-11-24 | Stop reason: HOSPADM

## 2024-11-14 RX ORDER — PROCHLORPERAZINE EDISYLATE 5 MG/ML
10 INJECTION INTRAMUSCULAR; INTRAVENOUS
Status: COMPLETED | OUTPATIENT
Start: 2024-11-14 | End: 2024-11-14

## 2024-11-14 RX ORDER — ACETAMINOPHEN 325 MG/1
650 TABLET ORAL EVERY 12 HOURS PRN
Status: DISCONTINUED | OUTPATIENT
Start: 2024-11-14 | End: 2024-11-24 | Stop reason: HOSPADM

## 2024-11-14 RX ORDER — HYDROMORPHONE HYDROCHLORIDE 1 MG/ML
0.5 INJECTION, SOLUTION INTRAMUSCULAR; INTRAVENOUS; SUBCUTANEOUS
Status: DISCONTINUED | OUTPATIENT
Start: 2024-11-14 | End: 2024-11-15

## 2024-11-14 RX ORDER — HYDROMORPHONE HYDROCHLORIDE 1 MG/ML
0.25 INJECTION, SOLUTION INTRAMUSCULAR; INTRAVENOUS; SUBCUTANEOUS
Status: DISCONTINUED | OUTPATIENT
Start: 2024-11-14 | End: 2024-11-15

## 2024-11-14 RX ADMIN — FLUPHENAZINE HYDROCHLORIDE 5 MG: 2.5 TABLET, FILM COATED ORAL at 21:26

## 2024-11-14 RX ADMIN — LEVOTHYROXINE SODIUM 175 MCG: 0.17 TABLET ORAL at 05:56

## 2024-11-14 RX ADMIN — RANOLAZINE 500 MG: 500 TABLET, EXTENDED RELEASE ORAL at 21:02

## 2024-11-14 RX ADMIN — DULOXETINE HYDROCHLORIDE 60 MG: 60 CAPSULE, DELAYED RELEASE ORAL at 08:27

## 2024-11-14 RX ADMIN — TIZANIDINE 4 MG: 4 TABLET ORAL at 12:49

## 2024-11-14 RX ADMIN — ISOSORBIDE MONONITRATE 60 MG: 60 TABLET, EXTENDED RELEASE ORAL at 08:28

## 2024-11-14 RX ADMIN — CARVEDILOL 3.12 MG: 3.12 TABLET, FILM COATED ORAL at 08:27

## 2024-11-14 RX ADMIN — HYDROMORPHONE HYDROCHLORIDE 0.5 MG: 1 INJECTION, SOLUTION INTRAMUSCULAR; INTRAVENOUS; SUBCUTANEOUS at 21:09

## 2024-11-14 RX ADMIN — HYDROMORPHONE HYDROCHLORIDE 0.5 MG: 1 INJECTION, SOLUTION INTRAMUSCULAR; INTRAVENOUS; SUBCUTANEOUS at 18:00

## 2024-11-14 RX ADMIN — OXYCODONE 10 MG: 5 TABLET ORAL at 19:17

## 2024-11-14 RX ADMIN — ANTACID TABLETS 500 MG: 500 TABLET, CHEWABLE ORAL at 22:17

## 2024-11-14 RX ADMIN — CARVEDILOL 3.12 MG: 3.12 TABLET, FILM COATED ORAL at 18:00

## 2024-11-14 RX ADMIN — ATORVASTATIN CALCIUM 80 MG: 80 TABLET, FILM COATED ORAL at 08:28

## 2024-11-14 RX ADMIN — OXYCODONE 10 MG: 5 TABLET ORAL at 23:32

## 2024-11-14 RX ADMIN — OXYCODONE 10 MG: 5 TABLET ORAL at 08:54

## 2024-11-14 RX ADMIN — PANTOPRAZOLE SODIUM 40 MG: 40 TABLET, DELAYED RELEASE ORAL at 05:56

## 2024-11-14 RX ADMIN — HYDROMORPHONE HYDROCHLORIDE 0.5 MG: 1 INJECTION, SOLUTION INTRAMUSCULAR; INTRAVENOUS; SUBCUTANEOUS at 03:28

## 2024-11-14 RX ADMIN — PRAZOSIN HYDROCHLORIDE 5 MG: 5 CAPSULE ORAL at 21:02

## 2024-11-14 RX ADMIN — TIZANIDINE 4 MG: 4 TABLET ORAL at 08:28

## 2024-11-14 RX ADMIN — TAMSULOSIN HYDROCHLORIDE 0.4 MG: 0.4 CAPSULE ORAL at 08:27

## 2024-11-14 RX ADMIN — RANOLAZINE 500 MG: 500 TABLET, EXTENDED RELEASE ORAL at 08:27

## 2024-11-14 RX ADMIN — SODIUM CHLORIDE, PRESERVATIVE FREE 10 ML: 5 INJECTION INTRAVENOUS at 21:30

## 2024-11-14 RX ADMIN — PROCHLORPERAZINE EDISYLATE 10 MG: 5 INJECTION INTRAMUSCULAR; INTRAVENOUS at 23:57

## 2024-11-14 RX ADMIN — AMLODIPINE BESYLATE 5 MG: 5 TABLET ORAL at 08:27

## 2024-11-14 RX ADMIN — TIZANIDINE 4 MG: 4 TABLET ORAL at 21:02

## 2024-11-14 RX ADMIN — CETIRIZINE HYDROCHLORIDE 10 MG: 10 TABLET, FILM COATED ORAL at 08:27

## 2024-11-14 RX ADMIN — HYDROMORPHONE HYDROCHLORIDE 0.5 MG: 1 INJECTION, SOLUTION INTRAMUSCULAR; INTRAVENOUS; SUBCUTANEOUS at 12:49

## 2024-11-14 ASSESSMENT — PAIN DESCRIPTION - ORIENTATION
ORIENTATION: LOWER;LEFT
ORIENTATION: MID;LEFT
ORIENTATION: LOWER;LEFT
ORIENTATION: MID;LEFT
ORIENTATION: MID;LEFT
ORIENTATION: LEFT;MID
ORIENTATION: MID;POSTERIOR
ORIENTATION: MID;LEFT

## 2024-11-14 ASSESSMENT — PAIN SCALES - GENERAL
PAINLEVEL_OUTOF10: 0
PAINLEVEL_OUTOF10: 8
PAINLEVEL_OUTOF10: 9
PAINLEVEL_OUTOF10: 10
PAINLEVEL_OUTOF10: 9
PAINLEVEL_OUTOF10: 8
PAINLEVEL_OUTOF10: 9
PAINLEVEL_OUTOF10: 9
PAINLEVEL_OUTOF10: 6
PAINLEVEL_OUTOF10: 9
PAINLEVEL_OUTOF10: 10
PAINLEVEL_OUTOF10: 6

## 2024-11-14 ASSESSMENT — PAIN DESCRIPTION - PAIN TYPE
TYPE: ACUTE PAIN

## 2024-11-14 ASSESSMENT — PAIN DESCRIPTION - DIRECTION
RADIATING_TOWARDS: LEFT LEG
RADIATING_TOWARDS: LEFT LEG

## 2024-11-14 ASSESSMENT — PAIN DESCRIPTION - DESCRIPTORS
DESCRIPTORS: ACHING;DISCOMFORT;SHARP;STABBING
DESCRIPTORS: THROBBING;NUMBNESS
DESCRIPTORS: ACHING;DISCOMFORT;STABBING;SHARP
DESCRIPTORS: ACHING;THROBBING;SHOOTING
DESCRIPTORS: ACHING;SHARP
DESCRIPTORS: ACHING;STABBING
DESCRIPTORS: NUMBNESS;THROBBING
DESCRIPTORS: ACHING;DISCOMFORT;SHARP

## 2024-11-14 ASSESSMENT — PAIN DESCRIPTION - FREQUENCY
FREQUENCY: CONTINUOUS

## 2024-11-14 ASSESSMENT — PAIN DESCRIPTION - LOCATION
LOCATION: BACK;CHEST
LOCATION: BACK;CHEST
LOCATION: BACK;CHEST;NECK
LOCATION: BACK;CHEST
LOCATION: CHEST;NECK;BACK
LOCATION: BACK;NECK;CHEST
LOCATION: ARM;BACK
LOCATION: BACK;ARM

## 2024-11-14 ASSESSMENT — PAIN DESCRIPTION - ONSET
ONSET: ON-GOING

## 2024-11-14 ASSESSMENT — PAIN - FUNCTIONAL ASSESSMENT
PAIN_FUNCTIONAL_ASSESSMENT: ACTIVITIES ARE NOT PREVENTED
PAIN_FUNCTIONAL_ASSESSMENT: PREVENTS OR INTERFERES SOME ACTIVE ACTIVITIES AND ADLS
PAIN_FUNCTIONAL_ASSESSMENT: ACTIVITIES ARE NOT PREVENTED
PAIN_FUNCTIONAL_ASSESSMENT: PREVENTS OR INTERFERES SOME ACTIVE ACTIVITIES AND ADLS

## 2024-11-14 NOTE — PLAN OF CARE
Problem: Skin/Tissue Integrity  Goal: Absence of new skin breakdown  Description: 1.  Monitor for areas of redness and/or skin breakdown  2.  Assess vascular access sites hourly  3.  Every 4-6 hours minimum:  Change oxygen saturation probe site  4.  Every 4-6 hours:  If on nasal continuous positive airway pressure, respiratory therapy assess nares and determine need for appliance change or resting period.  Outcome: Progressing  Note: Jovanny scale completed, patient up to chair and ambulated 150 feet in [place].      Problem: ABCDS Injury Assessment  Goal: Absence of physical injury  Outcome: Progressing  Flowsheets (Taken 11/13/2024 1946)  Absence of Physical Injury: Implement safety measures based on patient assessment  Note: Encouraged pt to call out and use call light when wanting to get up.      Problem: Safety - Adult  Goal: Free from fall injury  Outcome: Progressing

## 2024-11-14 NOTE — PROGRESS NOTES
V2.0    [facility] Progress Note      Name:  Ashutosh Donovan /Age/Sex: 1970  (53 y.o. male)   MRN & CSN:  [id number] & [id number] Encounter Date/Time: 2024 9:07 AM EST   Location:  [address] [place] [address] PCP: No primary care provider on file.     Attending:Navid Barone MD       Hospital Day: 3    HPI: Patient 53-year-old male with history of multivessel PCI who presented to [facility] with chest pain.  After admission patient mentioned some numbness in his face arm and radiating through the left lower leg.  Occasionally possible bowel movement without realizing it    Assessment and Recommendations     Hospital course:    Ashutosh Donovan is a 53 y.o. male with pmh of coronary who presents with Lower extremity numbness      Plan:     Cervical Stenosis   Lumbar Myelopathy and Radiculopathy   -Presented with Left-sided numbness with mild weakness, -History of bowel ,bladder issues over the last few weeks  -Neurosurgery consulted  -MRI cervical spine and lumbar spine showed severe spinal stenosis (MRI cervical spine was degraded by motion artifacts, but showed reversal of spinal curvature from C4-C6 with anterior subluxation of C3 on C4 and C4 on C5 resulting in stenosis of thecal sac and narrowing.  MRI lumbar spine showed stenosis L4-L5 with thickening redundant nerve root within the cauda equina, crowding of cauda equina)  -MRI brain unremarkable  - Seen by NSGY: requested  redo of MRI Cervical scan as motion artifact degraded the recent images. Done. Pendng NSGY attending review  -Multimodal pain control strategy, including with narcotics: PO and  parenteral, monitoring for side effects sec to narcotics  - Muscle relaxants  - Continue cervical collar for now   - PT/OT eval    Coronary artery disease with history of stent  -Initially presented with chest pain to [facility].  This occurred the setting of missing his medications, cardiac meds were resumed at the outside hospital.  Cardiology saw the  WITHOUT CONTRAST  11/11/2024 11:55 am TECHNIQUE: Multiplanar multisequence MRI of the brain was performed without the administration of intravenous contrast. COMPARISON: 05/10/2020 HISTORY: ORDERING SYSTEM PROVIDED HISTORY: acute cva TECHNOLOGIST PROVIDED HISTORY: Reason for exam:->acute cva What is the sedation requirement?->None Decision Support Exception - unselect if not a suspected or confirmed emergency medical condition->Emergency Medical Condition (MA) Reason for Exam: acute cva FINDINGS: INTRACRANIAL STRUCTURES/VENTRICLES: There is no acute infarct. No mass effect or midline shift. No evidence of an acute intracranial hemorrhage.  The ventricles and sulci are normal in size and configuration.  The sellar/suprasellar regions appear unremarkable.  The normal signal voids within the major intracranial vessels appear maintained. Mild chronic microvascular disease is noted within the periventricular white matter. ORBITS: The visualized portion of the orbits demonstrate no acute abnormality. SINUSES: The visualized paranasal sinuses and mastoid air cells demonstrate no acute abnormality. BONES/SOFT TISSUES: The bone marrow signal intensity appears normal. The soft tissues demonstrate no acute abnormality.     No acute intracranial abnormality. Stable mild chronic microvascular disease within the periventricular white matter.     Echo (TTE) limited (PRN contrast/bubble/strain/3D)    Result Date: 11/11/2024    Left Ventricle: Normal left ventricular systolic function with a visually estimated EF of 60 - 65%. Left ventricle size is normal. Increased wall thickness. Normal wall motion.   Right Ventricle: Right ventricle size is normal. Normal systolic function. TAPSE is normal. TAPSE is 2.0 cm.     CTA HEAD NECK W CONTRAST    Result Date: 11/10/2024  EXAMINATION: CTA OF THE HEAD AND NECK WITH CONTRAST 11/10/2024 10:03 pm: TECHNIQUE: CTA of the head and neck was performed with the administration of intravenous

## 2024-11-14 NOTE — ACP (ADVANCE CARE PLANNING)
Advance Care Planning     Advance Care Planning Inpatient Note  [facility] Department    Today's Date: 11/14/2024  Unit: [facility] 4 PCU    Received request from IDT Member.  Upon review of chart and communication with care team, patient's decision making abilities are not in question.. Patient was/were present in the room during visit.    Goals of ACP Conversation:  Discuss advance care planning documents    Health Care Decision Makers:       Primary Decision Maker: Kaylin Wood - Parent - [phone]    Secondary Decision Maker: MilliganNarcisa - Girlfriend  Summary:  Updated Healthcare Decision Maker    Advance Care Planning Documents (Patient Wishes):  Healthcare Power of /Advance Directive Appointment of Health Care Agent  Living Will/Advance Directive     Assessment:  Follow up visit to discuss Advanced Directives. Pt wants his mother, Kaylin to be the primary decision maker, his girlfriend, Narcisa to be his first alternative decision maker, and younger brother to be his second alternative decision maker. Pt was not able to provide information needed to fill out Advanced Directives.  gave pt a blank paper to write out phone numbers and addresses of decision makers. Pt will request assistance when ready.  prayed with pt at bedside as requested.    Interventions:  Provided education on documents for clarity and greater understanding  Reviewed but did not complete ACP document      Outcomes/Plan:  Pt will ask nurse to put in a new consult when ready.    Electronically signed by Chaplain Brian on 11/14/2024 at 11:55 AM

## 2024-11-14 NOTE — PLAN OF CARE
Problem: Chronic Conditions and Co-morbidities  Goal: Patient's chronic conditions and co-morbidity symptoms are monitored and maintained or improved  Outcome: Progressing     Problem: Skin/Tissue Integrity  Goal: Absence of new skin breakdown  Description: 1.  Monitor for areas of redness and/or skin breakdown  2.  Assess vascular access sites hourly  3.  Every 4-6 hours minimum:  Change oxygen saturation probe site  4.  Every 4-6 hours:  If on nasal continuous positive airway pressure, respiratory therapy assess nares and determine need for appliance change or resting period.  11/13/2024 1946 by Andreea Cervantes RN  Outcome: Progressing  Note: Jovanny scale completed, patient up to chair and ambulated 150 feet in [place].      Problem: ABCDS Injury Assessment  Goal: Absence of physical injury  Outcome: Progressing  Flowsheets (Taken 11/13/2024 1946)  Absence of Physical Injury: Implement safety measures based on patient assessment  Note: Encouraged pt to call out and use call light when wanting to get up.
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
## 2024-11-14 NOTE — PROGRESS NOTES
instability.     MRI CERVICAL SPINE WO CONTRAST  Result Date: 11/13/2024  Reversal of the normal cervical spinal lordosis, centered at C5. Mild anterolisthesis of C3 on C4 and of C4 on C5. Multilevel degenerative disc disease and hypertrophic changes throughout the cervical spine. At the C3-C4 level, findings contribute to severe acquired central canal and bilateral foraminal stenosis. Mild effacement of the cord at this level. Other findings as described above.       Labs:  Recent Labs     11/13/24  0525   WBC 6.2   HGB 12.5*   HCT 38.5*          Recent Labs     11/13/24  0525      K 4.2      CO2 27   BUN 15   CREATININE 1.3   GLUCOSE 93   CALCIUM 9.6   MG 2.07       Recent Labs     11/13/24 0525   PROTIME 12.4   INR 0.90   APTT 25.7       Patient Active Problem List    Diagnosis Date Noted    Cauda equina compression ([facility]) 11/12/2024    Lower extremity numbness 11/12/2024    CVA (cerebral vascular accident) ([facility]) 11/11/2024    HTN (hypertension) 11/11/2024    Acute CVA (cerebrovascular accident) ([facility]) 11/11/2024    Chronic kidney disease 11/11/2024    Major depressive disorder, recurrent ([facility]) 08/23/2024    H/O major depression 08/23/2024    Right facial numbness 05/11/2024    TIA (transient ischemic attack) 05/09/2024    Chest pain at rest 05/06/2024    Dyslipidemia 04/13/2024    Moderate episode of recurrent major depressive disorder ([facility]) 11/20/2023    PTSD (post-traumatic stress disorder) 11/20/2023    Cocaine use disorder in remission 11/20/2023    Opioid use disorder, mild, in early remission ([facility]) 11/20/2023    Major depression, recurrent ([facility]) 11/20/2023    Coronary artery disease of native artery of native heart with stable angina pectoris ([facility]) 11/20/2023    Tobacco use 11/20/2023    Chronic obstructive pulmonary disease ([facility]) 11/20/2023    Current severe episode of major depressive disorder without psychotic features ([facility]) 11/20/2023    LA (acute kidney injury) ([facility]) 08/18/2023     Spondylolisthesis at L4-L5 level 04/13/2023    Urinary incontinence without sensory awareness 04/13/2023    Lumbar pain with radiation down both legs 04/13/2023    DDD (degenerative disc disease), lumbosacral 04/13/2023     Assessment:  Patient is a 53 y.o. male w/some right shoulder pain with intermittent numbness and tingling, radicular pain in the RLE but no numbness or tingling, LLE numbness, feeling like his groin is numb but endorses he can feel light touch, and subjective weakness in both legs. Denies urinary retention or fecal incontinence, but states he had one urinary incontinent episode, the night before going to [facility].      Plan:  Neurologic exams frequency: Q4H  For change in exam MUST contact neurosurgery team along with critical care or primary team  Cervical Stenosis:  - Cervical collar when ambulating  - Further recs after Dr. Ocampo reviews imaging.  Lumbar Myelopathy and Radiculopathy:  - Measure all urine output and check PVR  - Zanaflex TID for spasms  - Pain control managed by medical team  Advance diet / activity per primary team  Will follow inpatient.  Please call with any questions or decline in neurological status     DISPO: Remain inpatient from neurosurgery standpoint. Dispo timing to be determined by primary team once patient is medically stable for discharge.     Patient was discussed with Dr. Ocampo who agrees with above assessment and plan.     Electronically signed by: MALCOM May CNP, MALCOM-CNP, 11/14/2024 4:24 PM  [phone]    I spent 25 minutes in the care of this patient.  Over 50% of that time was in face-to-face counseling regarding disease process, diagnostic testing, preventative measures, and answering patient and family questions

## 2024-11-15 PROCEDURE — 6360000002 HC RX W HCPCS

## 2024-11-15 PROCEDURE — 36415 COLL VENOUS BLD VENIPUNCTURE: CPT

## 2024-11-15 PROCEDURE — 6370000000 HC RX 637 (ALT 250 FOR IP): Performed by: NURSE PRACTITIONER

## 2024-11-15 PROCEDURE — 97530 THERAPEUTIC ACTIVITIES: CPT

## 2024-11-15 PROCEDURE — 86701 HIV-1ANTIBODY: CPT

## 2024-11-15 PROCEDURE — 2580000003 HC RX 258: Performed by: INTERNAL MEDICINE

## 2024-11-15 PROCEDURE — 86803 HEPATITIS C AB TEST: CPT

## 2024-11-15 PROCEDURE — 6360000002 HC RX W HCPCS: Performed by: INTERNAL MEDICINE

## 2024-11-15 PROCEDURE — 6370000000 HC RX 637 (ALT 250 FOR IP): Performed by: INTERNAL MEDICINE

## 2024-11-15 PROCEDURE — 97116 GAIT TRAINING THERAPY: CPT

## 2024-11-15 PROCEDURE — 97166 OT EVAL MOD COMPLEX 45 MIN: CPT

## 2024-11-15 PROCEDURE — 99231 SBSQ HOSP IP/OBS SF/LOW 25: CPT | Performed by: NURSE PRACTITIONER

## 2024-11-15 PROCEDURE — 6370000000 HC RX 637 (ALT 250 FOR IP)

## 2024-11-15 PROCEDURE — 97162 PT EVAL MOD COMPLEX 30 MIN: CPT

## 2024-11-15 PROCEDURE — 87390 HIV-1 AG IA: CPT

## 2024-11-15 PROCEDURE — 86702 HIV-2 ANTIBODY: CPT

## 2024-11-15 PROCEDURE — 2060000000 HC ICU INTERMEDIATE R&B

## 2024-11-15 RX ORDER — OXYCODONE HYDROCHLORIDE 5 MG/1
10 TABLET ORAL EVERY 4 HOURS PRN
Status: DISCONTINUED | OUTPATIENT
Start: 2024-11-15 | End: 2024-11-17

## 2024-11-15 RX ORDER — OXYCODONE HYDROCHLORIDE 15 MG/1
15 TABLET ORAL EVERY 4 HOURS PRN
Status: DISCONTINUED | OUTPATIENT
Start: 2024-11-15 | End: 2024-11-17

## 2024-11-15 RX ORDER — PROCHLORPERAZINE EDISYLATE 5 MG/ML
10 INJECTION INTRAMUSCULAR; INTRAVENOUS EVERY 6 HOURS PRN
Status: DISCONTINUED | OUTPATIENT
Start: 2024-11-15 | End: 2024-11-24 | Stop reason: HOSPADM

## 2024-11-15 RX ORDER — HYDROMORPHONE HYDROCHLORIDE 1 MG/ML
0.5 INJECTION, SOLUTION INTRAMUSCULAR; INTRAVENOUS; SUBCUTANEOUS EVERY 6 HOURS PRN
Status: DISCONTINUED | OUTPATIENT
Start: 2024-11-15 | End: 2024-11-22

## 2024-11-15 RX ADMIN — CARVEDILOL 3.12 MG: 3.12 TABLET, FILM COATED ORAL at 16:18

## 2024-11-15 RX ADMIN — RANOLAZINE 500 MG: 500 TABLET, EXTENDED RELEASE ORAL at 08:49

## 2024-11-15 RX ADMIN — TIZANIDINE 4 MG: 4 TABLET ORAL at 08:50

## 2024-11-15 RX ADMIN — ISOSORBIDE MONONITRATE 60 MG: 60 TABLET, EXTENDED RELEASE ORAL at 08:49

## 2024-11-15 RX ADMIN — CETIRIZINE HYDROCHLORIDE 10 MG: 10 TABLET, FILM COATED ORAL at 08:49

## 2024-11-15 RX ADMIN — PRAZOSIN HYDROCHLORIDE 5 MG: 5 CAPSULE ORAL at 21:23

## 2024-11-15 RX ADMIN — HYDROMORPHONE HYDROCHLORIDE 0.5 MG: 1 INJECTION, SOLUTION INTRAMUSCULAR; INTRAVENOUS; SUBCUTANEOUS at 12:15

## 2024-11-15 RX ADMIN — TIZANIDINE 4 MG: 4 TABLET ORAL at 21:23

## 2024-11-15 RX ADMIN — HYDROMORPHONE HYDROCHLORIDE 0.5 MG: 1 INJECTION, SOLUTION INTRAMUSCULAR; INTRAVENOUS; SUBCUTANEOUS at 18:22

## 2024-11-15 RX ADMIN — RANOLAZINE 500 MG: 500 TABLET, EXTENDED RELEASE ORAL at 21:22

## 2024-11-15 RX ADMIN — OXYCODONE 10 MG: 5 TABLET ORAL at 03:31

## 2024-11-15 RX ADMIN — PROCHLORPERAZINE EDISYLATE 10 MG: 5 INJECTION INTRAMUSCULAR; INTRAVENOUS at 17:38

## 2024-11-15 RX ADMIN — FLUPHENAZINE HYDROCHLORIDE 5 MG: 2.5 TABLET, FILM COATED ORAL at 21:23

## 2024-11-15 RX ADMIN — FLUTICASONE PROPIONATE 2 SPRAY: 50 SPRAY, METERED NASAL at 09:57

## 2024-11-15 RX ADMIN — HYDROMORPHONE HYDROCHLORIDE 0.5 MG: 1 INJECTION, SOLUTION INTRAMUSCULAR; INTRAVENOUS; SUBCUTANEOUS at 09:03

## 2024-11-15 RX ADMIN — SODIUM CHLORIDE, PRESERVATIVE FREE 10 ML: 5 INJECTION INTRAVENOUS at 21:23

## 2024-11-15 RX ADMIN — ATORVASTATIN CALCIUM 80 MG: 80 TABLET, FILM COATED ORAL at 08:49

## 2024-11-15 RX ADMIN — AMLODIPINE BESYLATE 5 MG: 5 TABLET ORAL at 08:50

## 2024-11-15 RX ADMIN — TAMSULOSIN HYDROCHLORIDE 0.4 MG: 0.4 CAPSULE ORAL at 08:49

## 2024-11-15 RX ADMIN — OXYCODONE 15 MG: 15 TABLET ORAL at 14:25

## 2024-11-15 RX ADMIN — PANTOPRAZOLE SODIUM 40 MG: 40 TABLET, DELAYED RELEASE ORAL at 06:03

## 2024-11-15 RX ADMIN — OXYCODONE 10 MG: 5 TABLET ORAL at 10:30

## 2024-11-15 RX ADMIN — CARVEDILOL 3.12 MG: 3.12 TABLET, FILM COATED ORAL at 08:49

## 2024-11-15 RX ADMIN — HYDROMORPHONE HYDROCHLORIDE 0.5 MG: 1 INJECTION, SOLUTION INTRAMUSCULAR; INTRAVENOUS; SUBCUTANEOUS at 00:53

## 2024-11-15 RX ADMIN — OXYCODONE 15 MG: 15 TABLET ORAL at 21:22

## 2024-11-15 RX ADMIN — LEVOTHYROXINE SODIUM 175 MCG: 0.17 TABLET ORAL at 06:03

## 2024-11-15 RX ADMIN — TIZANIDINE 4 MG: 4 TABLET ORAL at 16:18

## 2024-11-15 RX ADMIN — DULOXETINE HYDROCHLORIDE 60 MG: 60 CAPSULE, DELAYED RELEASE ORAL at 08:49

## 2024-11-15 RX ADMIN — HYDROMORPHONE HYDROCHLORIDE 0.5 MG: 1 INJECTION, SOLUTION INTRAMUSCULAR; INTRAVENOUS; SUBCUTANEOUS at 06:10

## 2024-11-15 RX ADMIN — SODIUM CHLORIDE, PRESERVATIVE FREE 10 ML: 5 INJECTION INTRAVENOUS at 08:49

## 2024-11-15 ASSESSMENT — PAIN SCALES - GENERAL
PAINLEVEL_OUTOF10: 9
PAINLEVEL_OUTOF10: 5
PAINLEVEL_OUTOF10: 0
PAINLEVEL_OUTOF10: 8
PAINLEVEL_OUTOF10: 10
PAINLEVEL_OUTOF10: 9
PAINLEVEL_OUTOF10: 6
PAINLEVEL_OUTOF10: 9
PAINLEVEL_OUTOF10: 9
PAINLEVEL_OUTOF10: 8
PAINLEVEL_OUTOF10: 9
PAINLEVEL_OUTOF10: 9
PAINLEVEL_OUTOF10: 1
PAINLEVEL_OUTOF10: 8
PAINLEVEL_OUTOF10: 3
PAINLEVEL_OUTOF10: 2

## 2024-11-15 ASSESSMENT — PAIN DESCRIPTION - ONSET
ONSET: ON-GOING

## 2024-11-15 ASSESSMENT — PAIN DESCRIPTION - LOCATION
LOCATION: BACK
LOCATION: BACK;NECK
LOCATION: BACK
LOCATION: BACK;NECK
LOCATION: BACK
LOCATION: BACK;NECK

## 2024-11-15 ASSESSMENT — PAIN - FUNCTIONAL ASSESSMENT
PAIN_FUNCTIONAL_ASSESSMENT: ACTIVITIES ARE NOT PREVENTED

## 2024-11-15 ASSESSMENT — PAIN DESCRIPTION - DESCRIPTORS
DESCRIPTORS: ACHING
DESCRIPTORS: ACHING
DESCRIPTORS: THROBBING;SHARP
DESCRIPTORS: ACHING
DESCRIPTORS: ACHING
DESCRIPTORS: SHARP
DESCRIPTORS: ACHING
DESCRIPTORS: ACHING
DESCRIPTORS: SHARP;THROBBING
DESCRIPTORS: ACHING
DESCRIPTORS: SHARP
DESCRIPTORS: SHARP;THROBBING

## 2024-11-15 ASSESSMENT — PAIN DESCRIPTION - FREQUENCY
FREQUENCY: CONTINUOUS

## 2024-11-15 ASSESSMENT — PAIN DESCRIPTION - ORIENTATION
ORIENTATION: LOWER;MID
ORIENTATION: RIGHT;LEFT
ORIENTATION: LOWER
ORIENTATION: RIGHT;LEFT
ORIENTATION: MID;LOWER
ORIENTATION: LOWER;MID
ORIENTATION: RIGHT;LEFT
ORIENTATION: LOWER;MID

## 2024-11-15 ASSESSMENT — PAIN DESCRIPTION - PAIN TYPE
TYPE: ACUTE PAIN

## 2024-11-15 NOTE — PROGRESS NOTES
NEUROSURGERY PROGRESS NOTE    11/15/2024 9:40 AM                               Ashutosh Donovan                      LOS: 3 days       Subjective:  No acute events overnight. Patient remains neurologically stable, but continues to c/o back pain.    Physical Exam:  Patient seen and examined    Vitals:    11/15/24 0401   BP:    Pulse: 69   Resp: 16   Temp:    SpO2:      GCS:  4 - Opens eyes on own  5 - Alert and oriented  6 - Follows simple motor commands  General: Well developed. Alert and cooperative in no acute distress.   HENT: atraumatic, neck supple  Eyes: Optic discs: Not tested  Pulmonary: unlabored respiratory effort  Cardiovascular:  Warm well perfused. No peripheral edema  Gastrointestinal: abdomen soft, NT, ND     Neurological:  Mental Status: Awake, alert, oriented x 4, speech clear and appropriate  Attention: Intact  Language: No aphasia or dysarthria noted  Sensation: Intact to all extremities to light touch  Coordination: Intact     Cranial Nerves:  II: Visual acuity not tested, denies new visual changes / diplopia  III, IV, VI: PERRL, 3 mm bilaterally, EOMI, no nystagmus noted  V: Facial sensation intact bilaterally to touch  VII: Face symmetric  VIII: Hearing intact bilaterally to spoken voice  IX: Palate movement equal bilaterally  XI: Shoulder shrug equal bilaterally  XII: Tongue midline     Musculoskeletal:   Gait: Not tested   Assist devices: None   Tone: Normal  Motor strength: Exam limited 2/2 right hand pain 2/2 mildly displaced fracture base proximal phalanx fifth digit     Right  Left      Right  Left    Deltoid  5 5   Hip Flex  5 5   Biceps  5 5   Knee Extensors  5 5   Triceps  5 5   Knee Flexors  5 5   Wrist Ext  5 5   Ankle Dorsiflex.  5 5   Wrist Flex  5 5   Ankle Plantarflex.  5 5   Handgrip  Defer 5   Ext Jitendra Longus  5 5   Thumb Ext  5 5              Positive Clemens's Reflex in left hand    Radiological Findings:  CT HEAD WO CONTRAST  Result Date: 11/10/2024  Normal examination

## 2024-11-15 NOTE — PROGRESS NOTES
Physical Therapy  Facility/Department: [address]  Physical Therapy Initial Assessment and Treatment    Name: Ashutosh Donovan  : 1970  MRN: [id number]  Date of Service: 11/15/2024    Discharge Recommendations:  Subacute/Skilled Nursing Facility (vs 24 hr assist/home PT pending progress)   PT Equipment Recommendations  Equipment Needed: No      Patient Diagnosis(es): LE numbness  Past Medical History:  has a past medical history of Arthritis, CAD (coronary artery disease), CHF (congestive heart failure) ([facility]), Chronic pain, Heart attack ([facility]), and Hypothyroid.  Past Surgical History:  has a past surgical history that includes Coronary stent placement; Cholecystectomy; and Tonsillectomy.    Assessment  Body Structures, Functions, Activity Limitations Requiring Skilled Therapeutic Intervention: Decreased functional mobility ;Decreased strength;Decreased safe awareness;Decreased endurance;Decreased balance;Increased pain  Assessment: Pt with decreased independent mobility from reported baseline function.  Pt reports normally independent with mobility with rollator however admits to minimal activity 2/2 back pain.  Currently needing supervision for bed mobility, CGA for transfers and CGA for ambulation with RW.  Pt c/o LE weakness and feeling like his legs were going to give out.  Pt with history of multiple falls.  At risk for further falls.  Not safe to ambulate alone. Rec cont skilled PT to maximize mobilitly and independence.  If pt would return home, rec 24 hr direct assist and home PT  Treatment Diagnosis: impaired functional moblity 2/2 decreased LE strength  Decision Making: Medium Complexity  Requires PT Follow-Up: Yes    Plan  Physical Therapy Plan  General Plan:  (2-5)  Current Treatment Recommendations: Balance training, Functional mobility training, Gait training, Stair training, Patient/Caregiver education & training, Safety education & training  Safety Devices  Type of Devices: Left in bed, Bed

## 2024-11-15 NOTE — CARE COORDINATION
CM spoke with pt at bedside- he is agreeable to SNF placement at [place]. Top preference is [facility], but pt also agreeable to [facility] or [facility] if denied by . Pt accepted to both [facility]. Wound need [facility] pre-cert.    Thank you  Cat Tyler CASTELAN, BSN, CM  PCU   [phone]

## 2024-11-15 NOTE — PLAN OF CARE
Problem: Chronic Conditions and Co-morbidities  Goal: Patient's chronic conditions and co-morbidity symptoms are monitored and maintained or improved  Outcome: Progressing  Flowsheets (Taken 11/15/2024 0453)  Care Plan - Patient's Chronic Conditions and Co-Morbidity Symptoms are Monitored and Maintained or Improved:   Monitor and assess patient's chronic conditions and comorbid symptoms for stability, deterioration, or improvement   Collaborate with multidisciplinary team to address chronic and comorbid conditions and prevent exacerbation or deterioration   Update acute care plan with appropriate goals if chronic or comorbid symptoms are exacerbated and prevent overall improvement and discharge     Problem: Discharge Planning  Goal: Discharge to home or other facility with appropriate resources  Outcome: Progressing  Flowsheets (Taken 11/15/2024 0453)  Discharge to home or other facility with appropriate resources:   Identify barriers to discharge with patient and caregiver   Identify discharge learning needs (meds, wound care, etc)   Arrange for needed discharge resources and transportation as appropriate     Problem: Pain  Goal: Verbalizes/displays adequate comfort level or baseline comfort level  Outcome: Progressing  Flowsheets (Taken 11/15/2024 0453)  Verbalizes/displays adequate comfort level or baseline comfort level:   Encourage patient to monitor pain and request assistance   Consider cultural and social influences on pain and pain management   Administer analgesics based on type and severity of pain and evaluate response   Assess pain using appropriate pain scale   Implement non-pharmacological measures as appropriate and evaluate response   Notify Licensed Independent Practitioner if interventions unsuccessful or patient reports new pain     Problem: Skin/Tissue Integrity  Goal: Absence of new skin breakdown  Description: 1.  Monitor for areas of redness and/or skin breakdown  2.  Assess vascular access

## 2024-11-15 NOTE — PLAN OF CARE
Problem: Chronic Conditions and Co-morbidities  Goal: Patient's chronic conditions and co-morbidity symptoms are monitored and maintained or improved  11/15/2024 0932 by Gena Cornell RN  Outcome: Progressing  Flowsheets (Taken 11/15/2024 0932)  Care Plan - Patient's Chronic Conditions and Co-Morbidity Symptoms are Monitored and Maintained or Improved:   Monitor and assess patient's chronic conditions and comorbid symptoms for stability, deterioration, or improvement   Collaborate with multidisciplinary team to address chronic and comorbid conditions and prevent exacerbation or deterioration     Problem: Discharge Planning  Goal: Discharge to home or other facility with appropriate resources  11/15/2024 0932 by Gena Cornell RN  Outcome: Progressing  Flowsheets (Taken 11/15/2024 0932)  Discharge to home or other facility with appropriate resources:   Identify barriers to discharge with patient and caregiver   Arrange for needed discharge resources and transportation as appropriate   Identify discharge learning needs (meds, wound care, etc)     Problem: Pain  Goal: Verbalizes/displays adequate comfort level or baseline comfort level  11/15/2024 0932 by Gena Cornell RN  Outcome: Progressing  Flowsheets (Taken 11/15/2024 0932)  Verbalizes/displays adequate comfort level or baseline comfort level:   Encourage patient to monitor pain and request assistance   Assess pain using appropriate pain scale     Problem: Skin/Tissue Integrity  Goal: Absence of new skin breakdown  Description: 1.  Monitor for areas of redness and/or skin breakdown  2.  Assess vascular access sites hourly  3.  Every 4-6 hours minimum:  Change oxygen saturation probe site  4.  Every 4-6 hours:  If on nasal continuous positive airway pressure, respiratory therapy assess nares and determine need for appliance change or resting period.  11/15/2024 0932 by Gena Cornell, RN  Outcome: Progressing     Problem: ABCDS Injury

## 2024-11-15 NOTE — PROGRESS NOTES
Occupational Therapy  Facility/Department: [address]  Occupational Therapy Initial Assessment and Treatment    Name: Ashutosh Donovan  : 1970  MRN: [id number]  Date of Service: 11/15/2024    Discharge Recommendations:  Subacute/Skilled Nursing Facility  OT Equipment Recommendations  Equipment Needed: No  Other: defer to next level of care     Past Medical History:  has a past medical history of Arthritis, CAD (coronary artery disease), CHF (congestive heart failure) ([facility]), Chronic pain, Heart attack ([facility]), and Hypothyroid.  Past Surgical History:  has a past surgical history that includes Coronary stent placement; Cholecystectomy; and Tonsillectomy.    Treatment Diagnosis: Impaired ADL status; Impaired functional mobility      Assessment  Performance deficits / Impairments: Decreased functional mobility ;Decreased ADL status;Decreased strength;Decreased safe awareness;Decreased cognition;Decreased endurance;Decreased sensation;Decreased balance;Decreased high-level IADLs;Decreased coordination  Assessment: Pt from home with brother and reportedly independent with ADLs and IADLs at baseline. Pt functioning below baseline as he is CGA for transfers and functional mobility. Pt was able to complete LB dressing with SBA and beverage management IND this date. Pt c/o dizziness during session, BP WNL, and pain which limited treatment. Pt educated in compensatory strategies to complete ADLs to avoid breaking spinal precautions. Pt would benefit from additional OT to improve activity tolerance and strength. Pt is a fall risk. Recommend SNF upon discharge, pending progress with acute therapy, to maximize safety. Anticipated surgery next week. Will continue to follow per POC  Treatment Diagnosis: Impaired ADL status; Impaired functional mobility  Prognosis: Good  Decision Making: Medium Complexity  REQUIRES OT FOLLOW-UP: Yes  Activity Tolerance  Activity Tolerance: Patient limited by fatigue;Patient limited by  Independent  Homemaking Assistance: Needs assistance (brother manages most homemaking. Pt hasn't been able to complete laundry recently.)  Ambulation Assistance: Independent (w/rollator)  Transfer Assistance: Independent  Active : No  Mode of Transportation: Bus  Occupation: On disability  Leisure & Hobbies: go to see girlfriend  Additional Comments: Reports fairly sedentary 2/2 back pain.  Has been hurting for a few years.  Reports pain located in lower back.  Radiates down back of legs and then around front on lower legs to feet.   Reports increased pain with standing/walking.  Decreased with sitting, pain medicine.   Reports L arm numbness last few days - constant.  Also notes weakness in LUE.    Objective  Treatment included functional transfer training, ADL's and pt. education.          Safety Devices  Type of Devices: Left in bed;Bed alarm in place;Gait belt;Nurse notified;Call light within reach (Pt declined sitting up in chair)        AROM: Generally decreased, functional (R hand- recent fracture in 4th digit, pt in splint, otherwise RUE WFL)  Strength: Generally decreased, functional (RUE: WFL; LUE: decreased strength throughout, 4-)  Coordination: Within functional limits  Sensation: Impaired (Pt reports numbness in LUE)  ADL  Feeding: Independent;Beverage management  Feeding Skilled Clinical Factors: Pt drinking water during session  Grooming: Based on clinical judgement;Stand by assistance  Grooming Skilled Clinical Factors: Pt rubbing face with L hand during session; ROM WFL  LE Dressing: Stand by assistance  LE Dressing Skilled Clinical Factors: Pt able to don/doff socks using figure 4 method while seated.  Toileting:  (Pt denied need)  Functional Mobility: Contact guard assistance  Functional Mobility Skilled Clinical Factors: Pt walked from EOB into hallway using RW with CGA. Pt then walked back over to EOB with CGA using RW  Additional Comments: Pt declined all other ADLs  Skin Care: Bath

## 2024-11-15 NOTE — PROGRESS NOTES
Hospital Medicine Progress Note      Date of Admission: 11/12/2024  Hospital Day: 4    Chief Admission Complaint: Lower extremity numbness     Subjective:      Seen and examined at bedside today.  States he continues to have back pain but he feels more drowsy with Dilaudid.  Hemodynamically stable      Presenting Admission History:       Patient 53-year-old male with history of multivessel PCI who presented to [facility] with chest pain. After admission patient mentioned some numbness in his face arm and radiating through the left lower leg. Occasionally possible bowel movement without realizing it     Assessment/Plan:    Cervical stenosis  Lumbar myelopathy and radiculopathy  MRI reveals severe cervical spinal stenosis with anterior subluxation of C3 on C4 and C4 on C5, L4-L5 stenosis with thickening redundant nerve root within the cauda equina, crowding of cauda equina.    MRI brain unremarkable  Neurosurgery consulted, plan for surgical intervention and or after 11/19  Pain management adjusted with increase in oxy and decreasing Dilaudid, continue muscle relaxant  Continue cervical collar  PT/OT evaluated and recommend SNF    Coronary artery disease s/p stent  Hypertension  Presented to [facility] ER with chest pain and was evaluated by cardiology with no further workup recommended.  Aspirin and Plavix held for possible neurosurgery procedure since 11/12  Continue Coreg 3.125, Imdur 60, Ranexa 500 twice daily, amlodipine 5    Hypothyroidism, Synthroid  Mood disorder, continue fluphenazine, prazosin.  Last QTc 461  BPH, on Flomax    Physical Exam Performed:      General appearance:  No apparent distress  Respiratory:  Normal respiratory effort.   Cardiovascular:  Regular rate and rhythm.  Abdomen:  Soft, non-tender, non-distended.  Musculoskeletal:  No edema  Neurologic: Range of motion decreased due to pain  Psychiatric:  Alert and oriented    /63   Pulse 69   Temp 97.6 °F (36.4 °C) (Oral)   Resp 16    Ordered or Continued:  Other -   [] Decision to De-escalate Care this DOS due to change in treatment goals:  [] Decision to Escalate Care To:   [x] Major Surgery/Procedure (any 1):   Elective with patient risk factors including Procedure Type and Risk Factors: Neurosurgical intervention  Emergent Procedure Type:    ----------------------------------------------------------------------  C. Data (any 2)  [x] Data Review (3+ points)  [x] Consultant Note reviewed with note date, specialty, and summary (1 point each) neurosurgery  [x] All current labs were reviewed and interpreted for clinical significance   [x] Studies Reviewed (1 point each): MRI spine  [] Collateral history obtained including from who and why needed (Max 1 point):  [] Independent (Danika Conley MD) Interpretation of tests (any 1)  [] Rhythm Strip (Telemetry) personally reviewed and interpreted as documented above    [] Imaging personally reviewed and interpreted, includes:    [x] Discussion (any 1)  [x] Discussed the discharge plan in detail with case management  including timing/barriers to discharge, need for support services and/or placement decision   [] Discussed management of the case with:        Medications:  Personally reviewed in detail in conjunction w/ labs as documented for evidence of drug toxicity.     Infusion Medications    sodium chloride       Scheduled Medications    acetaminophen  650 mg Oral 3 times per day    tamsulosin  0.4 mg Oral Daily    ranolazine  500 mg Oral BID    prazosin  5 mg Oral Nightly    pantoprazole  40 mg Oral QAM AC    cetirizine  10 mg Oral Daily    lidocaine  1 patch TransDERmal Daily    levothyroxine  175 mcg Oral Daily    isosorbide mononitrate  60 mg Oral Daily    fluPHENAZine HCl  5 mg Oral Nightly    DULoxetine  60 mg Oral Daily    carvedilol  3.125 mg Oral BID WC    atorvastatin  80 mg Oral Daily    amLODIPine  5 mg Oral Daily    nicotine  1 patch TransDERmal Daily    tiZANidine  4 mg Oral TID

## 2024-11-16 LAB
HIV 1+2 AB+HIV1 P24 AG SERPL QL IA: NORMAL
HIV 2 AB SERPL QL IA: NORMAL
HIV1 AB SERPL QL IA: NORMAL
HIV1 P24 AG SERPL QL IA: NORMAL

## 2024-11-16 PROCEDURE — 6370000000 HC RX 637 (ALT 250 FOR IP): Performed by: INTERNAL MEDICINE

## 2024-11-16 PROCEDURE — 6360000002 HC RX W HCPCS

## 2024-11-16 PROCEDURE — 99231 SBSQ HOSP IP/OBS SF/LOW 25: CPT | Performed by: NURSE PRACTITIONER

## 2024-11-16 PROCEDURE — 2060000000 HC ICU INTERMEDIATE R&B

## 2024-11-16 PROCEDURE — 6370000000 HC RX 637 (ALT 250 FOR IP): Performed by: NURSE PRACTITIONER

## 2024-11-16 PROCEDURE — 2580000003 HC RX 258: Performed by: INTERNAL MEDICINE

## 2024-11-16 PROCEDURE — 6370000000 HC RX 637 (ALT 250 FOR IP)

## 2024-11-16 RX ADMIN — PRAZOSIN HYDROCHLORIDE 5 MG: 5 CAPSULE ORAL at 21:09

## 2024-11-16 RX ADMIN — PROCHLORPERAZINE EDISYLATE 10 MG: 5 INJECTION INTRAMUSCULAR; INTRAVENOUS at 02:12

## 2024-11-16 RX ADMIN — TIZANIDINE 4 MG: 4 TABLET ORAL at 14:43

## 2024-11-16 RX ADMIN — FLUPHENAZINE HYDROCHLORIDE 5 MG: 2.5 TABLET, FILM COATED ORAL at 21:09

## 2024-11-16 RX ADMIN — TIZANIDINE 4 MG: 4 TABLET ORAL at 21:08

## 2024-11-16 RX ADMIN — RANOLAZINE 500 MG: 500 TABLET, EXTENDED RELEASE ORAL at 09:00

## 2024-11-16 RX ADMIN — OXYCODONE 15 MG: 15 TABLET ORAL at 13:01

## 2024-11-16 RX ADMIN — SODIUM CHLORIDE, PRESERVATIVE FREE 10 ML: 5 INJECTION INTRAVENOUS at 19:47

## 2024-11-16 RX ADMIN — CARVEDILOL 3.12 MG: 3.12 TABLET, FILM COATED ORAL at 09:00

## 2024-11-16 RX ADMIN — HYDROMORPHONE HYDROCHLORIDE 0.5 MG: 1 INJECTION, SOLUTION INTRAMUSCULAR; INTRAVENOUS; SUBCUTANEOUS at 19:46

## 2024-11-16 RX ADMIN — AMLODIPINE BESYLATE 5 MG: 5 TABLET ORAL at 09:00

## 2024-11-16 RX ADMIN — CETIRIZINE HYDROCHLORIDE 10 MG: 10 TABLET, FILM COATED ORAL at 09:00

## 2024-11-16 RX ADMIN — OXYCODONE 15 MG: 15 TABLET ORAL at 23:36

## 2024-11-16 RX ADMIN — OXYCODONE 15 MG: 15 TABLET ORAL at 17:04

## 2024-11-16 RX ADMIN — HYDROMORPHONE HYDROCHLORIDE 0.5 MG: 1 INJECTION, SOLUTION INTRAMUSCULAR; INTRAVENOUS; SUBCUTANEOUS at 00:25

## 2024-11-16 RX ADMIN — HYDROMORPHONE HYDROCHLORIDE 0.5 MG: 1 INJECTION, SOLUTION INTRAMUSCULAR; INTRAVENOUS; SUBCUTANEOUS at 11:27

## 2024-11-16 RX ADMIN — TIZANIDINE 4 MG: 4 TABLET ORAL at 09:00

## 2024-11-16 RX ADMIN — LEVOTHYROXINE SODIUM 175 MCG: 0.17 TABLET ORAL at 06:34

## 2024-11-16 RX ADMIN — TAMSULOSIN HYDROCHLORIDE 0.4 MG: 0.4 CAPSULE ORAL at 09:00

## 2024-11-16 RX ADMIN — DULOXETINE HYDROCHLORIDE 60 MG: 60 CAPSULE, DELAYED RELEASE ORAL at 09:00

## 2024-11-16 RX ADMIN — PANTOPRAZOLE SODIUM 40 MG: 40 TABLET, DELAYED RELEASE ORAL at 06:34

## 2024-11-16 RX ADMIN — ISOSORBIDE MONONITRATE 60 MG: 60 TABLET, EXTENDED RELEASE ORAL at 09:00

## 2024-11-16 RX ADMIN — ATORVASTATIN CALCIUM 80 MG: 80 TABLET, FILM COATED ORAL at 09:00

## 2024-11-16 RX ADMIN — RANOLAZINE 500 MG: 500 TABLET, EXTENDED RELEASE ORAL at 21:08

## 2024-11-16 RX ADMIN — CARVEDILOL 3.12 MG: 3.12 TABLET, FILM COATED ORAL at 17:16

## 2024-11-16 ASSESSMENT — PAIN SCALES - GENERAL
PAINLEVEL_OUTOF10: 9
PAINLEVEL_OUTOF10: 6
PAINLEVEL_OUTOF10: 7
PAINLEVEL_OUTOF10: 9
PAINLEVEL_OUTOF10: 5
PAINLEVEL_OUTOF10: 0
PAINLEVEL_OUTOF10: 9
PAINLEVEL_OUTOF10: 7
PAINLEVEL_OUTOF10: 7
PAINLEVEL_OUTOF10: 9
PAINLEVEL_OUTOF10: 9
PAINLEVEL_OUTOF10: 7

## 2024-11-16 ASSESSMENT — PAIN DESCRIPTION - LOCATION
LOCATION: BACK;CHEST
LOCATION: SHOULDER
LOCATION: BACK
LOCATION: SHOULDER
LOCATION: BACK
LOCATION: SHOULDER
LOCATION: BACK

## 2024-11-16 ASSESSMENT — PAIN DESCRIPTION - ORIENTATION
ORIENTATION: MID;LOWER
ORIENTATION: RIGHT;LEFT
ORIENTATION: LOWER
ORIENTATION: LOWER
ORIENTATION: RIGHT
ORIENTATION: LOWER
ORIENTATION: RIGHT;LEFT
ORIENTATION: LOWER

## 2024-11-16 ASSESSMENT — PAIN - FUNCTIONAL ASSESSMENT
PAIN_FUNCTIONAL_ASSESSMENT: ACTIVITIES ARE NOT PREVENTED

## 2024-11-16 ASSESSMENT — PAIN DESCRIPTION - FREQUENCY
FREQUENCY: CONTINUOUS

## 2024-11-16 ASSESSMENT — PAIN DESCRIPTION - ONSET
ONSET: ON-GOING

## 2024-11-16 ASSESSMENT — PAIN DESCRIPTION - PAIN TYPE
TYPE: ACUTE PAIN

## 2024-11-16 ASSESSMENT — PAIN DESCRIPTION - DESCRIPTORS
DESCRIPTORS: ACHING
DESCRIPTORS: ACHING
DESCRIPTORS: STABBING
DESCRIPTORS: ACHING
DESCRIPTORS: ACHING
DESCRIPTORS: STABBING
DESCRIPTORS: ACHING
DESCRIPTORS: ACHING

## 2024-11-16 ASSESSMENT — PAIN SCALES - WONG BAKER: WONGBAKER_NUMERICALRESPONSE: NO HURT

## 2024-11-16 NOTE — PLAN OF CARE
Problem: Chronic Conditions and Co-morbidities  Goal: Patient's chronic conditions and co-morbidity symptoms are monitored and maintained or improved  11/15/2024 1956 by Jamia Lopez RN  Outcome: Progressing  Flowsheets (Taken 11/15/2024 1956)  Care Plan - Patient's Chronic Conditions and Co-Morbidity Symptoms are Monitored and Maintained or Improved: Monitor and assess patient's chronic conditions and comorbid symptoms for stability, deterioration, or improvement  11/15/2024 0932 by Gena Cornell, RN  Outcome: Progressing  Flowsheets  Taken 11/15/2024 0932  Care Plan - Patient's Chronic Conditions and Co-Morbidity Symptoms are Monitored and Maintained or Improved:   Monitor and assess patient's chronic conditions and comorbid symptoms for stability, deterioration, or improvement   Collaborate with multidisciplinary team to address chronic and comorbid conditions and prevent exacerbation or deterioration  Taken 11/15/2024 0800  Care Plan - Patient's Chronic Conditions and Co-Morbidity Symptoms are Monitored and Maintained or Improved: Monitor and assess patient's chronic conditions and comorbid symptoms for stability, deterioration, or improvement     Problem: Discharge Planning  Goal: Discharge to home or other facility with appropriate resources  11/15/2024 1956 by Jamia Lopez RN  Outcome: Progressing  Flowsheets (Taken 11/15/2024 1956)  Discharge to home or other facility with appropriate resources:   Identify barriers to discharge with patient and caregiver   Arrange for needed discharge resources and transportation as appropriate   Identify discharge learning needs (meds, wound care, etc)   Arrange for interpreters to assist at discharge as needed  11/15/2024 0932 by Gena Cornell, RN  Outcome: Progressing  Flowsheets  Taken 11/15/2024 0932  Discharge to home or other facility with appropriate resources:   Identify barriers to discharge with patient and caregiver   Arrange for needed discharge  resources and transportation as appropriate   Identify discharge learning needs (meds, wound care, etc)  Taken 11/15/2024 0800  Discharge to home or other facility with appropriate resources: Identify barriers to discharge with patient and caregiver     Problem: Pain  Goal: Verbalizes/displays adequate comfort level or baseline comfort level  11/15/2024 1956 by Jamia Lopez RN  Outcome: Progressing  Flowsheets (Taken 11/15/2024 1956)  Verbalizes/displays adequate comfort level or baseline comfort level:   Encourage patient to monitor pain and request assistance   Assess pain using appropriate pain scale   Administer analgesics based on type and severity of pain and evaluate response   Implement non-pharmacological measures as appropriate and evaluate response  11/15/2024 0932 by Gena Cornell RN  Outcome: Progressing  Flowsheets (Taken 11/15/2024 0932)  Verbalizes/displays adequate comfort level or baseline comfort level:   Encourage patient to monitor pain and request assistance   Assess pain using appropriate pain scale     Problem: Skin/Tissue Integrity  Goal: Absence of new skin breakdown  Description: 1.  Monitor for areas of redness and/or skin breakdown  2.  Assess vascular access sites hourly  3.  Every 4-6 hours minimum:  Change oxygen saturation probe site  4.  Every 4-6 hours:  If on nasal continuous positive airway pressure, respiratory therapy assess nares and determine need for appliance change or resting period.  11/15/2024 1956 by Jamia Lopez RN  Outcome: Progressing  11/15/2024 0932 by Gena Cornell RN  Outcome: Progressing     Problem: ABCDS Injury Assessment  Goal: Absence of physical injury  11/15/2024 1956 by Jamia Lopez RN  Outcome: Progressing  Flowsheets (Taken 11/15/2024 0933 by Gena Cornell, RN)  Absence of Physical Injury: Implement safety measures based on patient assessment  11/15/2024 0932 by Gena Cornell, RN  Outcome: Progressing  Flowsheets (Taken

## 2024-11-16 NOTE — PROGRESS NOTES
NEUROSURGERY PROGRESS NOTE    11/16/2024 7:36 AM                               Ashutosh Donovan                      LOS: 4 days       Subjective:  No acute events overnight. Patient resting in bed upon entering the room. Patient remains neurologically stable    Physical Exam:  Patient seen and examined    Vitals:    11/16/24 0331   BP: (!) 164/98   Pulse: (!) 125   Resp: 18   Temp:    SpO2: 93%     GCS:  4 - Opens eyes on own  5 - Alert and oriented  6 - Follows simple motor commands  General: Well developed. Alert and cooperative in no acute distress.   HENT: atraumatic, neck supple  Eyes: Optic discs: Not tested  Pulmonary: unlabored respiratory effort  Cardiovascular:  Warm well perfused. No peripheral edema  Gastrointestinal: abdomen soft, NT, ND     Neurological:  Mental Status: Awakens easily, alert, oriented x 4, speech clear and appropriate  Attention: Intact  Language: No aphasia or dysarthria noted  Sensation: Intact to all extremities to light touch  Coordination: Intact     Cranial Nerves:  II: Visual acuity not tested, denies new visual changes / diplopia  III, IV, VI: PERRL, 3 mm bilaterally, EOMI, no nystagmus noted  V: Facial sensation intact bilaterally to touch  VII: Face symmetric  VIII: Hearing intact bilaterally to spoken voice  IX: Palate movement equal bilaterally  XI: Shoulder shrug equal bilaterally  XII: Tongue midline     Musculoskeletal:   Gait: Not tested   Assist devices: None   Tone: Normal  Motor strength: Exam limited 2/2 right hand pain 2/2 mildly displaced fracture base proximal phalanx fifth digit     Right  Left      Right  Left    Deltoid  5 5   Hip Flex  5 5   Biceps  5 5   Knee Extensors  5 5   Triceps  5 5   Knee Flexors  5 5   Wrist Ext  5 5   Ankle Dorsiflex.  5 5   Wrist Flex  5 5   Ankle Plantarflex.  5 5   Handgrip  Defer 5   Ext Jitendra Longus  5 5   Thumb Ext  5 5              Positive Clemens's Reflex in left hand    Radiological Findings:  CT HEAD WO

## 2024-11-16 NOTE — PLAN OF CARE
Problem: Chronic Conditions and Co-morbidities  Goal: Patient's chronic conditions and co-morbidity symptoms are monitored and maintained or improved  Outcome: Progressing  Flowsheets (Taken 11/15/2024 1956 by Jamia Lopez, RN)  Care Plan - Patient's Chronic Conditions and Co-Morbidity Symptoms are Monitored and Maintained or Improved: Monitor and assess patient's chronic conditions and comorbid symptoms for stability, deterioration, or improvement     Problem: Discharge Planning  Goal: Discharge to home or other facility with appropriate resources  Outcome: Progressing  Flowsheets (Taken 11/15/2024 1956 by Jamia Lopez, RN)  Discharge to home or other facility with appropriate resources:   Identify barriers to discharge with patient and caregiver   Arrange for needed discharge resources and transportation as appropriate   Identify discharge learning needs (meds, wound care, etc)   Arrange for interpreters to assist at discharge as needed     Problem: Pain  Goal: Verbalizes/displays adequate comfort level or baseline comfort level  Outcome: Progressing  Flowsheets (Taken 11/15/2024 1956 by Jamia Lopez, RN)  Verbalizes/displays adequate comfort level or baseline comfort level:   Encourage patient to monitor pain and request assistance   Assess pain using appropriate pain scale   Administer analgesics based on type and severity of pain and evaluate response   Implement non-pharmacological measures as appropriate and evaluate response     Problem: ABCDS Injury Assessment  Goal: Absence of physical injury  Outcome: Progressing  Flowsheets (Taken 11/15/2024 0933 by Gena Cornell, RN)  Absence of Physical Injury: Implement safety measures based on patient assessment     Problem: Safety - Adult  Goal: Free from fall injury  Outcome: Progressing  Flowsheets (Taken 11/15/2024 1956 by Jamia Lopez, RN)  Free From Fall Injury: Instruct family/caregiver on patient safety     Problem: Risk for Elopement  Goal:

## 2024-11-16 NOTE — PROGRESS NOTES
Hospital Medicine Progress Note      Date of Admission: 11/12/2024  Hospital Day: 5    Chief Admission Complaint: Lower extremity numbness     Subjective:    No acute events reported overnight  Seen and examined at bedside today.  States pain is controlled with current regimen      Presenting Admission History:       Patient 53-year-old male with history of multivessel PCI who presented to [facility] with chest pain. After admission patient mentioned some numbness in his face arm and radiating through the left lower leg. Occasionally possible bowel movement without realizing it     Assessment/Plan:    Cervical stenosis  Lumbar myelopathy and radiculopathy  MRI reveals severe cervical spinal stenosis with anterior subluxation of C3 on C4 and C4 on C5, L4-L5 stenosis with thickening redundant nerve root within the cauda equina, crowding of cauda equina.    MRI brain unremarkable  Neurosurgery consulted, plan for surgical intervention after 11/19  Pain management with oxy, Dilaudid and Zanaflex  Continue cervical collar when ambulating  PT/OT evaluated and recommend SNF    Coronary artery disease s/p stent  Hypertension  Presented to [facility] ER with chest pain and was evaluated by cardiology with no further workup recommended.  Aspirin and Plavix held for possible neurosurgery procedure since 11/12  Continue Coreg 3.125, Imdur 60, Ranexa 500 twice daily, amlodipine 5    Hypothyroidism, Synthroid  Mood disorder, continue fluphenazine, prazosin.  Last QTc 461  BPH, on Flomax    Physical Exam Performed:      General appearance:  No apparent distress  Respiratory:  Normal respiratory effort.   Cardiovascular:  Regular rate and rhythm.  Abdomen:  Soft, non-tender, non-distended.  Musculoskeletal:  No edema  Neurologic: Range of motion decreased due to pain  Psychiatric:  Alert and oriented    BP (!) 164/98   Pulse (!) 125   Temp 97.3 °F (36.3 °C) (Oral)   Resp 18   Ht 1.803 m (5' 11\")   Wt 99.7 kg (219 lb 12.8 oz)  De-escalate Care this DOS due to change in treatment goals:  [] Decision to Escalate Care To:   [x] Major Surgery/Procedure (any 1):   Elective with patient risk factors including Procedure Type and Risk Factors: Neurosurgical intervention  Emergent Procedure Type:    ----------------------------------------------------------------------  C. Data (any 2)  [x] Data Review (3+ points)  [x] Consultant Note reviewed with note date, specialty, and summary (1 point each) neurosurgery  [x] All current labs were reviewed and interpreted for clinical significance   [x] Studies Reviewed (1 point each): MRI spine  [] Collateral history obtained including from who and why needed (Max 1 point):  [] Independent (Danika Conley MD) Interpretation of tests (any 1)  [] Rhythm Strip (Telemetry) personally reviewed and interpreted as documented above    [] Imaging personally reviewed and interpreted, includes:    [x] Discussion (any 1)  [x] Discussed the discharge plan in detail with case management  including timing/barriers to discharge, need for support services and/or placement decision   [] Discussed management of the case with:        Medications:  Personally reviewed in detail in conjunction w/ labs as documented for evidence of drug toxicity.     Infusion Medications    sodium chloride       Scheduled Medications    acetaminophen  650 mg Oral 3 times per day    tamsulosin  0.4 mg Oral Daily    ranolazine  500 mg Oral BID    prazosin  5 mg Oral Nightly    pantoprazole  40 mg Oral QAM AC    cetirizine  10 mg Oral Daily    lidocaine  1 patch TransDERmal Daily    levothyroxine  175 mcg Oral Daily    isosorbide mononitrate  60 mg Oral Daily    fluPHENAZine HCl  5 mg Oral Nightly    DULoxetine  60 mg Oral Daily    carvedilol  3.125 mg Oral BID WC    atorvastatin  80 mg Oral Daily    amLODIPine  5 mg Oral Daily    nicotine  1 patch TransDERmal Daily    tiZANidine  4 mg Oral TID    sodium chloride flush  5-40 mL IntraVENous 2

## 2024-11-17 ENCOUNTER — APPOINTMENT (OUTPATIENT)
Dept: GENERAL RADIOLOGY | Age: 54
DRG: 551 | End: 2024-11-17
Attending: HOSPITALIST
Payer: MEDICARE

## 2024-11-17 LAB
AMORPH SED URNS QL MICRO: ABNORMAL /HPF
ANION GAP SERPL CALCULATED.3IONS-SCNC: 9 MMOL/L (ref 3–16)
BACTERIA URNS QL MICRO: ABNORMAL /HPF
BASOPHILS # BLD: 0 K/UL (ref 0–0.2)
BASOPHILS # BLD: 0.1 K/UL (ref 0–0.2)
BASOPHILS NFR BLD: 0.3 %
BASOPHILS NFR BLD: 0.4 %
BILIRUB UR QL STRIP.AUTO: NEGATIVE
BUN SERPL-MCNC: 23 MG/DL (ref 7–20)
CALCIUM SERPL-MCNC: 9.9 MG/DL (ref 8.3–10.6)
CHLORIDE SERPL-SCNC: 102 MMOL/L (ref 99–110)
CLARITY UR: CLEAR
CO2 SERPL-SCNC: 26 MMOL/L (ref 21–32)
COLOR UR: YELLOW
CREAT SERPL-MCNC: 1.3 MG/DL (ref 0.9–1.3)
DEPRECATED RDW RBC AUTO: 18.1 % (ref 12.4–15.4)
DEPRECATED RDW RBC AUTO: 18.2 % (ref 12.4–15.4)
EOSINOPHIL # BLD: 0.1 K/UL (ref 0–0.6)
EOSINOPHIL # BLD: 0.1 K/UL (ref 0–0.6)
EOSINOPHIL NFR BLD: 0.3 %
EOSINOPHIL NFR BLD: 0.5 %
EPI CELLS #/AREA URNS HPF: ABNORMAL /HPF (ref 0–5)
FLUAV RNA RESP QL NAA+PROBE: NOT DETECTED
FLUAV RNA UPPER RESP QL NAA+PROBE: NEGATIVE
FLUBV AG NPH QL: NEGATIVE
FLUBV RNA RESP QL NAA+PROBE: NOT DETECTED
GFR SERPLBLD CREATININE-BSD FMLA CKD-EPI: 66 ML/MIN/{1.73_M2}
GLUCOSE SERPL-MCNC: 107 MG/DL (ref 70–99)
GLUCOSE UR STRIP.AUTO-MCNC: NEGATIVE MG/DL
HCT VFR BLD AUTO: 33 % (ref 40.5–52.5)
HCT VFR BLD AUTO: 33.6 % (ref 40.5–52.5)
HGB BLD-MCNC: 10.7 G/DL (ref 13.5–17.5)
HGB BLD-MCNC: 10.9 G/DL (ref 13.5–17.5)
HGB UR QL STRIP.AUTO: NEGATIVE
KETONES UR STRIP.AUTO-MCNC: NEGATIVE MG/DL
LACTATE BLDV-SCNC: 0.3 MMOL/L (ref 0.4–1.9)
LEUKOCYTE ESTERASE UR QL STRIP.AUTO: NEGATIVE
LYMPHOCYTES # BLD: 1.3 K/UL (ref 1–5.1)
LYMPHOCYTES # BLD: 1.3 K/UL (ref 1–5.1)
LYMPHOCYTES NFR BLD: 7.8 %
LYMPHOCYTES NFR BLD: 8 %
MCH RBC QN AUTO: 27.8 PG (ref 26–34)
MCH RBC QN AUTO: 27.8 PG (ref 26–34)
MCHC RBC AUTO-ENTMCNC: 32.5 G/DL (ref 31–36)
MCHC RBC AUTO-ENTMCNC: 32.6 G/DL (ref 31–36)
MCV RBC AUTO: 85.5 FL (ref 80–100)
MCV RBC AUTO: 85.5 FL (ref 80–100)
MONOCYTES # BLD: 1.1 K/UL (ref 0–1.3)
MONOCYTES # BLD: 1.4 K/UL (ref 0–1.3)
MONOCYTES NFR BLD: 6.9 %
MONOCYTES NFR BLD: 8.6 %
NEUTROPHILS # BLD: 13.5 K/UL (ref 1.7–7.7)
NEUTROPHILS # BLD: 13.6 K/UL (ref 1.7–7.7)
NEUTROPHILS NFR BLD: 82.7 %
NEUTROPHILS NFR BLD: 84.5 %
NITRITE UR QL STRIP.AUTO: NEGATIVE
PH UR STRIP.AUTO: 6 [PH] (ref 5–8)
PLATELET # BLD AUTO: 161 K/UL (ref 135–450)
PLATELET # BLD AUTO: 168 K/UL (ref 135–450)
PMV BLD AUTO: 9.2 FL (ref 5–10.5)
PMV BLD AUTO: 9.6 FL (ref 5–10.5)
POTASSIUM SERPL-SCNC: 4.2 MMOL/L (ref 3.5–5.1)
PROT UR STRIP.AUTO-MCNC: ABNORMAL MG/DL
RBC # BLD AUTO: 3.87 M/UL (ref 4.2–5.9)
RBC # BLD AUTO: 3.93 M/UL (ref 4.2–5.9)
RBC #/AREA URNS HPF: ABNORMAL /HPF (ref 0–4)
SARS-COV-2 RNA RESP QL NAA+PROBE: NOT DETECTED
SODIUM SERPL-SCNC: 137 MMOL/L (ref 136–145)
SP GR UR STRIP.AUTO: 1.02 (ref 1–1.03)
UA COMPLETE W REFLEX CULTURE PNL UR: ABNORMAL
UA DIPSTICK W REFLEX MICRO PNL UR: YES
URN SPEC COLLECT METH UR: ABNORMAL
UROBILINOGEN UR STRIP-ACNC: 0.2 E.U./DL
WBC # BLD AUTO: 16.1 K/UL (ref 4–11)
WBC # BLD AUTO: 16.4 K/UL (ref 4–11)
WBC #/AREA URNS HPF: ABNORMAL /HPF (ref 0–5)

## 2024-11-17 PROCEDURE — 6360000002 HC RX W HCPCS

## 2024-11-17 PROCEDURE — 6370000000 HC RX 637 (ALT 250 FOR IP): Performed by: NURSE PRACTITIONER

## 2024-11-17 PROCEDURE — 85025 COMPLETE CBC W/AUTO DIFF WBC: CPT

## 2024-11-17 PROCEDURE — 6370000000 HC RX 637 (ALT 250 FOR IP): Performed by: INTERNAL MEDICINE

## 2024-11-17 PROCEDURE — 71045 X-RAY EXAM CHEST 1 VIEW: CPT

## 2024-11-17 PROCEDURE — 83605 ASSAY OF LACTIC ACID: CPT

## 2024-11-17 PROCEDURE — 2580000003 HC RX 258: Performed by: NURSE PRACTITIONER

## 2024-11-17 PROCEDURE — 80048 BASIC METABOLIC PNL TOTAL CA: CPT

## 2024-11-17 PROCEDURE — 2580000003 HC RX 258: Performed by: INTERNAL MEDICINE

## 2024-11-17 PROCEDURE — 2060000000 HC ICU INTERMEDIATE R&B

## 2024-11-17 PROCEDURE — 87040 BLOOD CULTURE FOR BACTERIA: CPT

## 2024-11-17 PROCEDURE — 87636 SARSCOV2 & INF A&B AMP PRB: CPT

## 2024-11-17 PROCEDURE — 87804 INFLUENZA ASSAY W/OPTIC: CPT

## 2024-11-17 PROCEDURE — 6370000000 HC RX 637 (ALT 250 FOR IP)

## 2024-11-17 PROCEDURE — 81001 URINALYSIS AUTO W/SCOPE: CPT

## 2024-11-17 PROCEDURE — 36415 COLL VENOUS BLD VENIPUNCTURE: CPT

## 2024-11-17 PROCEDURE — 99231 SBSQ HOSP IP/OBS SF/LOW 25: CPT | Performed by: NURSE PRACTITIONER

## 2024-11-17 RX ORDER — OXYCODONE HYDROCHLORIDE 5 MG/1
10 TABLET ORAL EVERY 4 HOURS PRN
Status: DISCONTINUED | OUTPATIENT
Start: 2024-11-17 | End: 2024-11-24 | Stop reason: HOSPADM

## 2024-11-17 RX ORDER — OXYCODONE HYDROCHLORIDE 5 MG/1
5 TABLET ORAL EVERY 4 HOURS PRN
Status: DISCONTINUED | OUTPATIENT
Start: 2024-11-17 | End: 2024-11-24 | Stop reason: HOSPADM

## 2024-11-17 RX ORDER — ENOXAPARIN SODIUM 100 MG/ML
40 INJECTION SUBCUTANEOUS DAILY
Status: DISCONTINUED | OUTPATIENT
Start: 2024-11-17 | End: 2024-11-24 | Stop reason: HOSPADM

## 2024-11-17 RX ORDER — 0.9 % SODIUM CHLORIDE 0.9 %
30 INTRAVENOUS SOLUTION INTRAVENOUS ONCE
Status: DISCONTINUED | OUTPATIENT
Start: 2024-11-17 | End: 2024-11-18 | Stop reason: ALTCHOICE

## 2024-11-17 RX ADMIN — HYDROMORPHONE HYDROCHLORIDE 0.5 MG: 1 INJECTION, SOLUTION INTRAMUSCULAR; INTRAVENOUS; SUBCUTANEOUS at 23:13

## 2024-11-17 RX ADMIN — OXYCODONE 15 MG: 15 TABLET ORAL at 14:41

## 2024-11-17 RX ADMIN — TIZANIDINE 4 MG: 4 TABLET ORAL at 14:29

## 2024-11-17 RX ADMIN — OXYCODONE 15 MG: 15 TABLET ORAL at 09:40

## 2024-11-17 RX ADMIN — ENOXAPARIN SODIUM 40 MG: 100 INJECTION SUBCUTANEOUS at 09:39

## 2024-11-17 RX ADMIN — TIZANIDINE 4 MG: 4 TABLET ORAL at 09:04

## 2024-11-17 RX ADMIN — CARVEDILOL 3.12 MG: 3.12 TABLET, FILM COATED ORAL at 17:44

## 2024-11-17 RX ADMIN — PANTOPRAZOLE SODIUM 40 MG: 40 TABLET, DELAYED RELEASE ORAL at 06:27

## 2024-11-17 RX ADMIN — TAMSULOSIN HYDROCHLORIDE 0.4 MG: 0.4 CAPSULE ORAL at 09:03

## 2024-11-17 RX ADMIN — ISOSORBIDE MONONITRATE 60 MG: 60 TABLET, EXTENDED RELEASE ORAL at 09:03

## 2024-11-17 RX ADMIN — LEVOTHYROXINE SODIUM 175 MCG: 0.17 TABLET ORAL at 06:27

## 2024-11-17 RX ADMIN — OXYCODONE 15 MG: 15 TABLET ORAL at 04:12

## 2024-11-17 RX ADMIN — RANOLAZINE 500 MG: 500 TABLET, EXTENDED RELEASE ORAL at 09:03

## 2024-11-17 RX ADMIN — SODIUM CHLORIDE, PRESERVATIVE FREE 10 ML: 5 INJECTION INTRAVENOUS at 09:11

## 2024-11-17 RX ADMIN — CETIRIZINE HYDROCHLORIDE 10 MG: 10 TABLET, FILM COATED ORAL at 09:04

## 2024-11-17 RX ADMIN — DULOXETINE HYDROCHLORIDE 60 MG: 60 CAPSULE, DELAYED RELEASE ORAL at 09:03

## 2024-11-17 RX ADMIN — CARVEDILOL 3.12 MG: 3.12 TABLET, FILM COATED ORAL at 09:04

## 2024-11-17 RX ADMIN — TIZANIDINE 4 MG: 4 TABLET ORAL at 22:03

## 2024-11-17 RX ADMIN — ALPRAZOLAM 0.25 MG: 0.25 TABLET ORAL at 23:14

## 2024-11-17 RX ADMIN — AMLODIPINE BESYLATE 5 MG: 5 TABLET ORAL at 09:03

## 2024-11-17 RX ADMIN — HYDROMORPHONE HYDROCHLORIDE 0.5 MG: 1 INJECTION, SOLUTION INTRAMUSCULAR; INTRAVENOUS; SUBCUTANEOUS at 02:16

## 2024-11-17 RX ADMIN — ATORVASTATIN CALCIUM 80 MG: 80 TABLET, FILM COATED ORAL at 09:04

## 2024-11-17 RX ADMIN — RANOLAZINE 500 MG: 500 TABLET, EXTENDED RELEASE ORAL at 22:03

## 2024-11-17 ASSESSMENT — PAIN DESCRIPTION - ORIENTATION
ORIENTATION: LOWER;RIGHT
ORIENTATION: RIGHT;LOWER
ORIENTATION: RIGHT;LOWER
ORIENTATION: LOWER
ORIENTATION: LOWER;RIGHT
ORIENTATION: LOWER
ORIENTATION: LOWER
ORIENTATION: RIGHT;LOWER
ORIENTATION: LOWER
ORIENTATION: RIGHT;LOWER

## 2024-11-17 ASSESSMENT — PAIN DESCRIPTION - PAIN TYPE
TYPE: ACUTE PAIN

## 2024-11-17 ASSESSMENT — PAIN DESCRIPTION - LOCATION
LOCATION: BACK;NECK
LOCATION: BACK
LOCATION: BACK;SHOULDER
LOCATION: BACK
LOCATION: BACK
LOCATION: BACK;SHOULDER
LOCATION: BACK;SHOULDER
LOCATION: BACK
LOCATION: BACK
LOCATION: BACK;SHOULDER

## 2024-11-17 ASSESSMENT — PAIN DESCRIPTION - ONSET
ONSET: ON-GOING

## 2024-11-17 ASSESSMENT — PAIN SCALES - GENERAL
PAINLEVEL_OUTOF10: 10
PAINLEVEL_OUTOF10: 6
PAINLEVEL_OUTOF10: 5
PAINLEVEL_OUTOF10: 6
PAINLEVEL_OUTOF10: 10
PAINLEVEL_OUTOF10: 8
PAINLEVEL_OUTOF10: 8
PAINLEVEL_OUTOF10: 6
PAINLEVEL_OUTOF10: 7
PAINLEVEL_OUTOF10: 4
PAINLEVEL_OUTOF10: 6
PAINLEVEL_OUTOF10: 9
PAINLEVEL_OUTOF10: 7
PAINLEVEL_OUTOF10: 6
PAINLEVEL_OUTOF10: 8
PAINLEVEL_OUTOF10: 5
PAINLEVEL_OUTOF10: 6
PAINLEVEL_OUTOF10: 0

## 2024-11-17 ASSESSMENT — PAIN DESCRIPTION - FREQUENCY
FREQUENCY: CONTINUOUS

## 2024-11-17 ASSESSMENT — PAIN - FUNCTIONAL ASSESSMENT
PAIN_FUNCTIONAL_ASSESSMENT: ACTIVITIES ARE NOT PREVENTED

## 2024-11-17 ASSESSMENT — PAIN DESCRIPTION - DESCRIPTORS
DESCRIPTORS: ACHING
DESCRIPTORS: ACHING
DESCRIPTORS: STABBING
DESCRIPTORS: ACHING
DESCRIPTORS: STABBING
DESCRIPTORS: ACHING
DESCRIPTORS: STABBING
DESCRIPTORS: ACHING

## 2024-11-17 ASSESSMENT — PAIN SCALES - WONG BAKER: WONGBAKER_NUMERICALRESPONSE: NO HURT

## 2024-11-17 NOTE — PLAN OF CARE
Problem: Pain  Goal: Verbalizes/displays adequate comfort level or baseline comfort level  11/17/2024 0119 by Amarilys Burns  Outcome: Progressing  Flowsheets (Taken 11/17/2024 0119)  Verbalizes/displays adequate comfort level or baseline comfort level:   Administer analgesics based on type and severity of pain and evaluate response   Consider cultural and social influences on pain and pain management   Encourage patient to monitor pain and request assistance  Note: Pt verbalizes pain level and descriptors. PRN meds are given based on severity.     Problem: Skin/Tissue Integrity  Goal: Absence of new skin breakdown  Description: 1.  Monitor for areas of redness and/or skin breakdown  2.  Assess vascular access sites hourly  3.  Every 4-6 hours minimum:  Change oxygen saturation probe site  4.  Every 4-6 hours:  If on nasal continuous positive airway pressure, respiratory therapy assess nares and determine need for appliance change or resting period.  Outcome: Progressing  Note: No new skin breakdown       Problem: ABCDS Injury Assessment  Goal: Absence of physical injury  11/17/2024 0119 by Amarilys Burns  Outcome: Progressing  Flowsheets (Taken 11/17/2024 0119)  Absence of Physical Injury: Implement safety measures based on patient assessment  Note: Bed alarm is on for fall risk     Problem: Safety - Adult  Goal: Free from fall injury  11/17/2024 0119 by Amarilys Burns  Outcome: Progressing  Flowsheets (Taken 11/17/2024 0119)  Free From Fall Injury: Instruct family/caregiver on patient safety  Note: Pt educated on bed alarm and using call light for help. Call light is within reach.

## 2024-11-17 NOTE — PROGRESS NOTES
On arrival from PCU pt Aox self stating he doesn't know month/year, believes he is at [facility], and does not know why he is in the hospital. O2 88%, /93, , and Temp 99.9. Right pupil reaction to light sluggish. 2L O2 via NC applied, O2 93%. Coreg admin. Yael BARAHONA notified and evaled pt at bedside.

## 2024-11-17 NOTE — PROGRESS NOTES
Hospital Medicine Progress Note      Date of Admission: 11/12/2024  Hospital Day: 6    Chief Admission Complaint: Lower extremity numbness     Subjective:    No acute events reported overnight  Seen and examined at bedside today.  States pain is in good control      Presenting Admission History:       Patient 53-year-old male with history of multivessel PCI who presented to [facility] with chest pain. After admission patient mentioned some numbness in his face arm and radiating through the left lower leg. Occasionally possible bowel movement without realizing it     Assessment/Plan:    Cervical stenosis  Lumbar myelopathy and radiculopathy  MRI reveals severe cervical spinal stenosis with anterior subluxation of C3 on C4 and C4 on C5, L4-L5 stenosis with thickening redundant nerve root within the cauda equina, crowding of cauda equina.    MRI brain unremarkable  Neurosurgery consulted, plan for surgical intervention after 11/19  Discussed with neurosurgery regarding DVT prophylaxis, agreeable to be on DVT prophylaxis at this time and will hold prior to surgery.  Pain management with oxy, Dilaudid and Zanaflex  Continue cervical collar when ambulating  PT/OT evaluated and recommend SNF    Coronary artery disease s/p stent  Hypertension  Presented to [facility] ER with chest pain and was evaluated by cardiology with no further workup recommended.  Aspirin and Plavix held for possible neurosurgery procedure since 11/12  Continue Coreg 3.125, Imdur 60, Ranexa 500 twice daily, amlodipine 5    Hypothyroidism, Synthroid  Mood disorder, continue fluphenazine, prazosin.  Last QTc 461  BPH, on Flomax    Physical Exam Performed:      General appearance:  No apparent distress  Respiratory:  Normal respiratory effort.   Cardiovascular:  Regular rate and rhythm.  Abdomen:  Soft, non-tender, non-distended.  Musculoskeletal:  No edema  Neurologic: Range of motion decreased due to pain  Psychiatric:  Alert and oriented    BP (!)  Ordered or Continued:   [] PRN Order including Drug Name/Route, Reason Ordered or Continued:  Other -   [] Decision to De-escalate Care this DOS due to change in treatment goals:  [] Decision to Escalate Care To:   [x] Major Surgery/Procedure (any 1):   Elective with patient risk factors including Procedure Type and Risk Factors: Neurosurgical intervention  Emergent Procedure Type:    ----------------------------------------------------------------------  C. Data (any 2)  [x] Data Review (3+ points)  [x] Consultant Note reviewed with note date, specialty, and summary (1 point each) neurosurgery  [x] All current labs were reviewed and interpreted for clinical significance   [x] Studies Reviewed (1 point each): MRI spine  [] Collateral history obtained including from who and why needed (Max 1 point):  [] Independent (Danika Conley MD) Interpretation of tests (any 1)  [] Rhythm Strip (Telemetry) personally reviewed and interpreted as documented above    [] Imaging personally reviewed and interpreted, includes:    [x] Discussion (any 1)  [x] Discussed the discharge plan in detail with case management  including timing/barriers to discharge, need for support services and/or placement decision   [] Discussed management of the case with:        Medications:  Personally reviewed in detail in conjunction w/ labs as documented for evidence of drug toxicity.     Infusion Medications    sodium chloride       Scheduled Medications    enoxaparin  40 mg SubCUTAneous Daily    acetaminophen  650 mg Oral 3 times per day    tamsulosin  0.4 mg Oral Daily    ranolazine  500 mg Oral BID    prazosin  5 mg Oral Nightly    pantoprazole  40 mg Oral QAM AC    cetirizine  10 mg Oral Daily    lidocaine  1 patch TransDERmal Daily    levothyroxine  175 mcg Oral Daily    isosorbide mononitrate  60 mg Oral Daily    fluPHENAZine HCl  5 mg Oral Nightly    DULoxetine  60 mg Oral Daily    carvedilol  3.125 mg Oral BID WC    atorvastatin  80

## 2024-11-17 NOTE — PROGRESS NOTES
4 Eyes Skin Assessment     NAME:  Ashutosh Donovan  YOB: 1970  MEDICAL RECORD NUMBER:  [id number]    The patient is being assessed for  Admission    I agree that at least one RN has performed a thorough Head to Toe Skin Assessment on the patient. ALL assessment sites listed below have been assessed.      Areas assessed by both nurses:    Head, Face, Ears, Shoulders, Back, Chest, Arms, Elbows, Hands, Sacrum. Buttock, Coccyx, Ischium, Legs. Feet and Heels, and Under Medical Devices         Does the Patient have a Wound? No noted wound(s)     Scattered bruising/small abrasions. Right hand ace wrap     Jovanny Prevention initiated by RN: Yes  Wound Care Orders initiated by RN: No    Pressure Injury (Stage 3,4, Unstageable, DTI, NWPT, and Complex wounds) if present, place Wound referral order by RN under : No    New Ostomies, if present place, Ostomy referral order under : No     Nurse 1 eSignature: Electronically signed by Arsenio Moon RN on 11/17/24 at 6:27 PM EST    **SHARE this note so that the co-signing nurse can place an eSignature**    Nurse 2 eSignature: Electronically signed by Aleta Aleman RN on 11/17/24 at 8:10 PM EST

## 2024-11-17 NOTE — PROGRESS NOTES
NEUROSURGERY PROGRESS NOTE    11/17/2024 9:14 AM                               Ashutosh Donovan                      LOS: 5 days       Subjective:  No acute events overnight. Patient sitting on the side of the bed eating breakfast upon entering the room.    Physical Exam:  Patient seen and examined    Vitals:    11/17/24 0855   BP: (!) 147/69   Pulse: 72   Resp: 18   Temp: 98.3 °F (36.8 °C)   SpO2: 95%     GCS:  4 - Opens eyes on own  5 - Alert and oriented  6 - Follows simple motor commands  General: Well developed. Alert and cooperative in no acute distress.   HENT: atraumatic, neck supple  Eyes: Optic discs: Not tested  Pulmonary: unlabored respiratory effort  Cardiovascular:  Warm well perfused. No peripheral edema  Gastrointestinal: abdomen soft, NT, ND     Neurological:  Mental Status: Awakens easily, alert, oriented x 4, speech clear and appropriate  Attention: Intact  Language: No aphasia or dysarthria noted  Sensation: Intact to all extremities to light touch  Coordination: Intact     Cranial Nerves:  II: Visual acuity not tested, denies new visual changes / diplopia  III, IV, VI: PERRL, 3 mm bilaterally, EOMI, no nystagmus noted  V: Facial sensation intact bilaterally to touch  VII: Face symmetric  VIII: Hearing intact bilaterally to spoken voice  IX: Palate movement equal bilaterally  XI: Shoulder shrug equal bilaterally  XII: Tongue midline     Musculoskeletal:   Gait: Not tested   Assist devices: None   Tone: Normal  Motor strength: Exam limited 2/2 right hand pain 2/2 mildly displaced fracture base proximal phalanx fifth digit     Right  Left      Right  Left    Deltoid  5 5   Hip Flex  5 5   Biceps  5 5   Knee Extensors  5 5   Triceps  5 5   Knee Flexors  5 5   Wrist Ext  5 5   Ankle Dorsiflex.  5 5   Wrist Flex  5 5   Ankle Plantarflex.  5 5   Handgrip  Defer 5   Ext Jitendra Longus  5 5   Thumb Ext  5 5              Positive Clemens's Reflex in left hand    Radiological Findings:  CT HEAD WO  L4-L5 and L5-S1 levels. Limited extension motion. No evidence of instability.     MRI CERVICAL SPINE WO CONTRAST  Result Date: 11/13/2024  Reversal of the normal cervical spinal lordosis, centered at C5. Mild anterolisthesis of C3 on C4 and of C4 on C5. Multilevel degenerative disc disease and hypertrophic changes throughout the cervical spine. At the C3-C4 level, findings contribute to severe acquired central canal and bilateral foraminal stenosis. Mild effacement of the cord at this level. Other findings as described above.       Labs:  No results for input(s): \"WBC\", \"HGB\", \"HCT\", \"PLT\" in the last 72 hours.      No results for input(s): \"NA\", \"K\", \"CL\", \"CO2\", \"BUN\", \"CREATININE\", \"GLUCOSE\", \"CALCIUM\", \"PHOS\", \"MG\" in the last 72 hours.      No results for input(s): \"PROTIME\", \"INR\", \"APTT\" in the last 72 hours.      Patient Active Problem List    Diagnosis Date Noted    Cauda equina compression ([facility]) 11/12/2024    Lower extremity numbness 11/12/2024    CVA (cerebral vascular accident) ([facility]) 11/11/2024    HTN (hypertension) 11/11/2024    Acute CVA (cerebrovascular accident) ([facility]) 11/11/2024    Chronic kidney disease 11/11/2024    Major depressive disorder, recurrent ([facility]) 08/23/2024    H/O major depression 08/23/2024    Right facial numbness 05/11/2024    TIA (transient ischemic attack) 05/09/2024    Chest pain at rest 05/06/2024    Dyslipidemia 04/13/2024    Moderate episode of recurrent major depressive disorder ([facility]) 11/20/2023    PTSD (post-traumatic stress disorder) 11/20/2023    Cocaine use disorder in remission 11/20/2023    Opioid use disorder, mild, in early remission ([facility]) 11/20/2023    Major depression, recurrent ([facility]) 11/20/2023    Coronary artery disease of native artery of native heart with stable angina pectoris ([facility]) 11/20/2023    Tobacco use 11/20/2023    Chronic obstructive pulmonary disease (HCC) 11/20/2023    Current severe episode of major depressive disorder without psychotic features (HCC)

## 2024-11-18 ENCOUNTER — APPOINTMENT (OUTPATIENT)
Dept: GENERAL RADIOLOGY | Age: 54
DRG: 551 | End: 2024-11-18
Attending: HOSPITALIST
Payer: MEDICARE

## 2024-11-18 LAB
HCV AB S/CO SERPL IA: >11 IV
HCV AB SERPL QL IA: ABNORMAL
LACTATE BLDV-SCNC: 0.8 MMOL/L (ref 0.4–1.9)

## 2024-11-18 PROCEDURE — 2580000003 HC RX 258

## 2024-11-18 PROCEDURE — 6370000000 HC RX 637 (ALT 250 FOR IP): Performed by: INTERNAL MEDICINE

## 2024-11-18 PROCEDURE — 6370000000 HC RX 637 (ALT 250 FOR IP): Performed by: NURSE PRACTITIONER

## 2024-11-18 PROCEDURE — 6360000002 HC RX W HCPCS: Performed by: NURSE PRACTITIONER

## 2024-11-18 PROCEDURE — 2580000003 HC RX 258: Performed by: INTERNAL MEDICINE

## 2024-11-18 PROCEDURE — 2060000000 HC ICU INTERMEDIATE R&B

## 2024-11-18 PROCEDURE — 6370000000 HC RX 637 (ALT 250 FOR IP)

## 2024-11-18 PROCEDURE — 99231 SBSQ HOSP IP/OBS SF/LOW 25: CPT | Performed by: NURSE PRACTITIONER

## 2024-11-18 PROCEDURE — 71046 X-RAY EXAM CHEST 2 VIEWS: CPT

## 2024-11-18 PROCEDURE — 6360000002 HC RX W HCPCS

## 2024-11-18 PROCEDURE — 2580000003 HC RX 258: Performed by: NURSE PRACTITIONER

## 2024-11-18 RX ORDER — SODIUM CHLORIDE 9 MG/ML
INJECTION, SOLUTION INTRAVENOUS CONTINUOUS
Status: ACTIVE | OUTPATIENT
Start: 2024-11-18 | End: 2024-11-18

## 2024-11-18 RX ADMIN — CARVEDILOL 3.12 MG: 3.12 TABLET, FILM COATED ORAL at 16:21

## 2024-11-18 RX ADMIN — OXYCODONE 10 MG: 5 TABLET ORAL at 00:33

## 2024-11-18 RX ADMIN — AMPICILLIN SODIUM AND SULBACTAM SODIUM 3000 MG: 2; 1 INJECTION, POWDER, FOR SOLUTION INTRAMUSCULAR; INTRAVENOUS at 20:49

## 2024-11-18 RX ADMIN — PRAZOSIN HYDROCHLORIDE 5 MG: 5 CAPSULE ORAL at 20:42

## 2024-11-18 RX ADMIN — AMLODIPINE BESYLATE 5 MG: 5 TABLET ORAL at 08:36

## 2024-11-18 RX ADMIN — PANTOPRAZOLE SODIUM 40 MG: 40 TABLET, DELAYED RELEASE ORAL at 06:34

## 2024-11-18 RX ADMIN — FLUPHENAZINE HYDROCHLORIDE 5 MG: 2.5 TABLET, FILM COATED ORAL at 00:33

## 2024-11-18 RX ADMIN — SODIUM CHLORIDE: 9 INJECTION, SOLUTION INTRAVENOUS at 03:12

## 2024-11-18 RX ADMIN — SODIUM CHLORIDE: 9 INJECTION, SOLUTION INTRAVENOUS at 01:52

## 2024-11-18 RX ADMIN — ACETAMINOPHEN 650 MG: 325 TABLET ORAL at 15:16

## 2024-11-18 RX ADMIN — SODIUM CHLORIDE, PRESERVATIVE FREE 10 ML: 5 INJECTION INTRAVENOUS at 08:36

## 2024-11-18 RX ADMIN — TAMSULOSIN HYDROCHLORIDE 0.4 MG: 0.4 CAPSULE ORAL at 08:34

## 2024-11-18 RX ADMIN — OXYCODONE 10 MG: 5 TABLET ORAL at 11:18

## 2024-11-18 RX ADMIN — SODIUM CHLORIDE: 9 INJECTION, SOLUTION INTRAVENOUS at 00:37

## 2024-11-18 RX ADMIN — HYDROMORPHONE HYDROCHLORIDE 0.5 MG: 1 INJECTION, SOLUTION INTRAMUSCULAR; INTRAVENOUS; SUBCUTANEOUS at 19:50

## 2024-11-18 RX ADMIN — HYDROMORPHONE HYDROCHLORIDE 0.5 MG: 1 INJECTION, SOLUTION INTRAMUSCULAR; INTRAVENOUS; SUBCUTANEOUS at 08:54

## 2024-11-18 RX ADMIN — FLUPHENAZINE HYDROCHLORIDE 5 MG: 2.5 TABLET, FILM COATED ORAL at 20:42

## 2024-11-18 RX ADMIN — CEFEPIME 2000 MG: 2 INJECTION, POWDER, FOR SOLUTION INTRAVENOUS at 01:53

## 2024-11-18 RX ADMIN — TIZANIDINE 4 MG: 4 TABLET ORAL at 15:15

## 2024-11-18 RX ADMIN — SODIUM CHLORIDE 1000 ML: 9 INJECTION, SOLUTION INTRAVENOUS at 01:50

## 2024-11-18 RX ADMIN — ATORVASTATIN CALCIUM 80 MG: 80 TABLET, FILM COATED ORAL at 08:35

## 2024-11-18 RX ADMIN — CETIRIZINE HYDROCHLORIDE 10 MG: 10 TABLET, FILM COATED ORAL at 08:34

## 2024-11-18 RX ADMIN — AMPICILLIN SODIUM AND SULBACTAM SODIUM 3000 MG: 2; 1 INJECTION, POWDER, FOR SOLUTION INTRAMUSCULAR; INTRAVENOUS at 15:23

## 2024-11-18 RX ADMIN — OXYCODONE 10 MG: 5 TABLET ORAL at 16:07

## 2024-11-18 RX ADMIN — DULOXETINE HYDROCHLORIDE 60 MG: 60 CAPSULE, DELAYED RELEASE ORAL at 08:35

## 2024-11-18 RX ADMIN — CEFEPIME HYDROCHLORIDE 2000 MG: 2 INJECTION, POWDER, FOR SOLUTION INTRAVENOUS at 09:04

## 2024-11-18 RX ADMIN — ISOSORBIDE MONONITRATE 60 MG: 60 TABLET, EXTENDED RELEASE ORAL at 08:35

## 2024-11-18 RX ADMIN — TIZANIDINE 4 MG: 4 TABLET ORAL at 08:35

## 2024-11-18 RX ADMIN — SODIUM CHLORIDE, PRESERVATIVE FREE 10 ML: 5 INJECTION INTRAVENOUS at 00:39

## 2024-11-18 RX ADMIN — OXYCODONE 10 MG: 5 TABLET ORAL at 20:53

## 2024-11-18 RX ADMIN — CARVEDILOL 3.12 MG: 3.12 TABLET, FILM COATED ORAL at 08:36

## 2024-11-18 RX ADMIN — RANOLAZINE 500 MG: 500 TABLET, EXTENDED RELEASE ORAL at 08:35

## 2024-11-18 RX ADMIN — TIZANIDINE 4 MG: 4 TABLET ORAL at 20:42

## 2024-11-18 RX ADMIN — LEVOTHYROXINE SODIUM 175 MCG: 0.17 TABLET ORAL at 06:34

## 2024-11-18 RX ADMIN — PRAZOSIN HYDROCHLORIDE 5 MG: 5 CAPSULE ORAL at 00:33

## 2024-11-18 RX ADMIN — ENOXAPARIN SODIUM 40 MG: 100 INJECTION SUBCUTANEOUS at 09:06

## 2024-11-18 RX ADMIN — RANOLAZINE 500 MG: 500 TABLET, EXTENDED RELEASE ORAL at 20:42

## 2024-11-18 ASSESSMENT — PAIN DESCRIPTION - LOCATION
LOCATION: BACK

## 2024-11-18 ASSESSMENT — PAIN - FUNCTIONAL ASSESSMENT
PAIN_FUNCTIONAL_ASSESSMENT: ACTIVITIES ARE NOT PREVENTED
PAIN_FUNCTIONAL_ASSESSMENT: ACTIVITIES ARE NOT PREVENTED
PAIN_FUNCTIONAL_ASSESSMENT: PREVENTS OR INTERFERES SOME ACTIVE ACTIVITIES AND ADLS
PAIN_FUNCTIONAL_ASSESSMENT: PREVENTS OR INTERFERES SOME ACTIVE ACTIVITIES AND ADLS
PAIN_FUNCTIONAL_ASSESSMENT: ACTIVITIES ARE NOT PREVENTED
PAIN_FUNCTIONAL_ASSESSMENT: ACTIVITIES ARE NOT PREVENTED

## 2024-11-18 ASSESSMENT — PAIN DESCRIPTION - PAIN TYPE
TYPE: ACUTE PAIN

## 2024-11-18 ASSESSMENT — PAIN SCALES - GENERAL
PAINLEVEL_OUTOF10: 5
PAINLEVEL_OUTOF10: 5
PAINLEVEL_OUTOF10: 7
PAINLEVEL_OUTOF10: 6
PAINLEVEL_OUTOF10: 6
PAINLEVEL_OUTOF10: 5
PAINLEVEL_OUTOF10: 9
PAINLEVEL_OUTOF10: 8
PAINLEVEL_OUTOF10: 7
PAINLEVEL_OUTOF10: 6

## 2024-11-18 ASSESSMENT — PAIN DESCRIPTION - ORIENTATION
ORIENTATION: LOWER
ORIENTATION: MID
ORIENTATION: MID
ORIENTATION: RIGHT;LOWER

## 2024-11-18 ASSESSMENT — PAIN DESCRIPTION - FREQUENCY
FREQUENCY: CONTINUOUS

## 2024-11-18 ASSESSMENT — PAIN DESCRIPTION - DESCRIPTORS
DESCRIPTORS: ACHING;THROBBING
DESCRIPTORS: ACHING

## 2024-11-18 ASSESSMENT — PAIN DESCRIPTION - ONSET
ONSET: ON-GOING

## 2024-11-18 NOTE — PROGRESS NOTES
[facility] - Clinical Pharmacy Note - Renal Dosing    Cefepime ordered for treatment of Bloodstream infection. Per [facility] Renal Dose Adjustment Policy, Cefepime 1000mg Q8H will be changed to 2000 mg Q8H.     Estimated Creatinine Clearance: Estimated Creatinine Clearance: 79 mL/min (based on SCr of 1.3 mg/dL).  Dialysis Status, LA, CKD: Baseline  BMI: Body mass index is 30.04 kg/m².    Rationale for Adjustment: Agent is renally eliminated.    Pharmacy will continue to monitor renal function and adjust dose as necessary.      Please call with any questions.    Renato Fernandes, [facility] ext [phone]

## 2024-11-18 NOTE — PROGRESS NOTES
Patient is alert and oriented x4. VSS on room air. Medications given per MAR, no side effects noted. Patient ambulating x1 with gait belt and walker. Endorses pain to lower back.  Continuing to monitor and manage per MAR. Voiding well via BRP.      Patient is currently resting in bed with bed alarm on for safety. Call light within reach and all fall precautions in place. Plan of care continues.

## 2024-11-18 NOTE — CARE COORDINATION
UPDATE: CM received VM from [facility] reporting that the pt is too young for them to accept. CM met with pt at bedside and updated pt on accepting facilities. Pt requested referrals to [facility] and [facility]. CM spoke with Lul with [facility] [place] and they are OON with pt's insurance. Referral to [facility] is pending. CM will continue to follow for discharge planning.  Electronically signed by JAYLEEN Cooper on 11/18/2024 at 4:01 PM  [phone]    CM following: CM left a VM for Cordashia at [facility] to follow up on referral which is currently pending.   CM spoke with Haylee with [facility] who reports that both [facility] and [facility] could accept the pt at discharge.   CM will f/u with pt after CM hears back from [facility] as they are pt's first choice location. Timing of surgical intervention with nrsgy currently pending.   CM will continue to follow for discharge planning.  Electronically signed by JAYLEEN Cooper on 11/18/2024 at 2:09 PM  [phone]

## 2024-11-18 NOTE — PLAN OF CARE
RN Yoselin Hurley notified Cross Cover NP of Pt.'s change in neuro status upon arrival    To [address] [place] [place] from PCU. He arrived very confused, clammy and covered in urine, temp. 101.1. He was seen by     Day Hospitalist at 6 p.m. and CBC ordered. B/P's 110's-150's/60-80's. Sats now 88% on RA,     O2 started at 2 L NC.         WBC 16.4 now, this up from 6.2 on 11/13/24. Lung sounds diminished, Pt. Not drinking well,    spilling his drinks so rehydrating this way not good option. He is awaiting upcoming    Neurosurgical procedure on Tuesday, 11/19/24.     Assessment: Concern for Sepsis, unknown origin    Plan:  Repeat CBC now  Lactic q 2 hours x 2  UA, reflex to culture  CXR STAT  CoVid and Flu testing  Blood Cultures x 2 STAT    20:13 Addendum: Dr. Camarillo, Hospitalist Attending notified of change in status, Start Cefepime 1 Gm q 8 hours to start after Blood Cultures completed.    20:43 Pt. Noted to be getting a little tachy, along with fever and WBC, meets protocol for Sepsis Bolus, 2.931cc .9NS ordered to initiate after first Lactate draw completed. Nurse notified.       23:00 Results Addendum:     Influenza A  NOT DETECTED NOT DETECTED   Influenza B  NOT DETECTED NOT DETECTED   Resulting Agency [facility]              Specimen Collected: 11/17/24 21:48 EST Last Resulted: 11/17/24 22:15 EST             Latest Reference Range & Units 11/17/24 21:43   Color, UA Straw/Yellow  Yellow   Clarity, UA Clear  Clear   Glucose, Ur Negative mg/dL Negative   Bilirubin, Urine Negative  Negative   Ketones, Urine Negative mg/dL Negative   Specific Gravity, UA 1.005 - 1.030  1.025   Blood, Urine Negative  Negative   Protein, UA Negative mg/dL TRACE !   Urobilinogen <2.0 E.U./dL 0.2   Nitrite, Urine Negative  Negative   Leukocyte Esterase, Urine Negative  Negative   Urine Type  Voided   Urine Reflex to Culture  Not Indicated   pH, Urine 5.0 - 8.0  6.0   WBC, UA 0 - 5 /HPF 0-2   RBC, UA 0 - 4 /HPF 3-4   Epithelial Cells,

## 2024-11-18 NOTE — PROGRESS NOTES
severe focal stenosis. The right V4 segment is diminutive with its distal portion nearly nonvisible. It should be noted however, that there is fetal origin of the left PCA and on the right there is a robust posterior communicating artery, which is larger in caliber than the P1 segment.  3. Otherwise, no significant stenosis or large vessel occlusion of the head or neck.      MRI brain without contrast  Result Date: 11/11/2024  No acute intracranial abnormality. Stable mild chronic microvascular disease within the periventricular white matter.      MRI CERVICAL SPINE WO CONTRAST  Result Date: 11/12/2024  1. Motion artifact degrades the images.   2. Mild reversal the cervical curvature from C4 through C6.  Anterior subluxation of C3 on C4, and C4 on C5.  3. Disc and osteophytes result in stenosis of the thecal sac and narrowing of the neural foramina throughout the cervical region as discussed above.      MRI LUMBAR SPINE WO CONTRAST  Result Date: 11/12/2024  1. Grade 1 anterolisthesis of L4 on L5.  2. L4-L5: Disc bulge, facet and ligament flavum hypertrophy cause severe thecal sac, severe right and mild left foraminal stenosis.  3. L5-S1: Disc height loss and disc bulge cause moderate right and severe left foraminal stenosis.   4. Severe thecal sac stenosis at L4-5 with crowding of the cauda equina. Thickened and redundant nerve root within the cauda equina similar to prior exam.  This may be due to the stenosis at L4-5 or less likely a nerve sheath tumor.     XR LUMBAR SPINE FLEXION AND EXTENSION ONLY  Result Date: 11/13/2024  Grade 1 anterolisthesis of L4 on L5. Degenerative disc disease and hypertrophic degenerative changes of the posterior elements at L4-L5 and L5-S1 levels. Limited extension motion. No evidence of instability.     MRI CERVICAL SPINE WO CONTRAST  Result Date: 11/13/2024  Reversal of the normal cervical spinal lordosis, centered at C5. Mild anterolisthesis of C3 on C4 and of C4 on C5. Multilevel  degenerative disc disease and hypertrophic changes throughout the cervical spine. At the C3-C4 level, findings contribute to severe acquired central canal and bilateral foraminal stenosis. Mild effacement of the cord at this level. Other findings as described above.       Labs:  Recent Labs     11/17/24 2126   WBC 16.1*   HGB 10.7*   HCT 33.0*            Recent Labs     11/17/24 1916      K 4.2      CO2 26   BUN 23*   CREATININE 1.3   GLUCOSE 107*   CALCIUM 9.9         No results for input(s): \"PROTIME\", \"INR\", \"APTT\" in the last 72 hours.      Patient Active Problem List    Diagnosis Date Noted    Cauda equina compression ([facility]) 11/12/2024    Lower extremity numbness 11/12/2024    CVA (cerebral vascular accident) ([facility]) 11/11/2024    HTN (hypertension) 11/11/2024    Acute CVA (cerebrovascular accident) ([facility]) 11/11/2024    Chronic kidney disease 11/11/2024    Major depressive disorder, recurrent ([facility]) 08/23/2024    H/O major depression 08/23/2024    Right facial numbness 05/11/2024    TIA (transient ischemic attack) 05/09/2024    Chest pain at rest 05/06/2024    Dyslipidemia 04/13/2024    Moderate episode of recurrent major depressive disorder ([facility]) 11/20/2023    PTSD (post-traumatic stress disorder) 11/20/2023    Cocaine use disorder in remission 11/20/2023    Opioid use disorder, mild, in early remission ([facility]) 11/20/2023    Major depression, recurrent ([facility]) 11/20/2023    Coronary artery disease of native artery of native heart with stable angina pectoris ([facility]) 11/20/2023    Tobacco use 11/20/2023    Chronic obstructive pulmonary disease ([facility]) 11/20/2023    Current severe episode of major depressive disorder without psychotic features ([facility]) 11/20/2023    LA (acute kidney injury) ([facility]) 08/18/2023    Spondylolisthesis at L4-L5 level 04/13/2023    Urinary incontinence without sensory awareness 04/13/2023    Lumbar pain with radiation down both legs 04/13/2023    DDD (degenerative disc disease),

## 2024-11-18 NOTE — PLAN OF CARE
Problem: Pain  Goal: Verbalizes/displays adequate comfort level or baseline comfort level  11/18/2024 0746 by Mikaela Baptiste, RN  Outcome: Progressing   Medication administered per MAR and non pharm pratibha    Problem: Safety - Adult  Goal: Free from fall injury  11/18/2024 0746 by Mikaela Baptiste, RN  Outcome: Progressing   Standard safety measures in place - call light within reach

## 2024-11-18 NOTE — PROGRESS NOTES
Assumed care of this pt at 1900. Upon report pt was diaphoretic and febrile at 101.1. HR tachy also. The off going RN had cleaned up an episode of incontinence which occurred on arrival from previous unit PCU today. Pt was only alert to to self. Hospitalist notified of concern of this writer for early sepsis as WBC was 16.4 per a recent draw. NP on call for hospitalist ordered lactate CXR , blood cultures and flu and covid swabs as well as UA. All have been sent and awiat results. Once lactate results will start ABX and IV fluids. Pt ended up voiding 100 cc after PO fluids encouraged. Up x1 walker gb and c-collar. Hand wrapped with ace. PIV SL until fluids started. Plan of care continues      Update 0200: afebrile and UOP improving after IV fluids. PO intake encouraged as well. Pt had three PIV infiltrate overnight. Fourth PIV placed by ED working well and ABX infusing. Droplet cancelled as results back are negative. CXR reads as \"concerning for pneumonia\". Plan of care continues        I/O this shift:  In: 2621.6 [P.O.:240; I.V.:1396.9; IV Piggyback:984.7]  Out: 900 [Urine:900]

## 2024-11-18 NOTE — PROGRESS NOTES
Hospital Medicine Progress Note      Date of Admission: 11/12/2024  Hospital Day: 7    Chief Admission Complaint: Lower extremity numbness     Subjective:    Was spiking fever yesterday and workup for sepsis done and patient started on cefepime  Patient is awake and alert this morning.  Denies any complaints including cough, difficulty breathing    Pain in good control      Presenting Admission History:       Patient 53-year-old male with history of multivessel PCI who presented to [facility] with chest pain. After admission patient mentioned some numbness in his face arm and radiating through the left lower leg. Occasionally possible bowel movement without realizing it     Assessment/Plan:    Altered mental status  Sepsis 2/2 right lung pneumonia which likely could be aspiration  Acute hypoxia 2/2 above  Leukocytosis  Patient was lethargic, febrile, urinary incontinence and became hypoxic on 11/17  Chest x-ray concerning for right lung pneumonia  COVID and flu negative  UA inconsistent with infection  Follow blood culture  Will switch cefepime to Unasyn given the concerns of aspiration pneumonia  Repeat chest x-ray today significant for right lung opacity.      Cervical stenosis  Lumbar myelopathy and radiculopathy  MRI reveals severe cervical spinal stenosis with anterior subluxation of C3 on C4 and C4 on C5, L4-L5 stenosis with thickening redundant nerve root within the cauda equina, crowding of cauda equina.    MRI brain unremarkable  Neurosurgery consulted, plan for surgical intervention after 11/19  Discussed with neurosurgery regarding DVT prophylaxis, agreeable to be on DVT prophylaxis at this time and will hold prior to surgery.  Pain management with oxy, Dilaudid and Zanaflex  Continue cervical collar when ambulating  PT/OT evaluated and recommend SNF    Coronary artery disease s/p stent  Hypertension  Presented to [facility] ER with chest pain and was evaluated by cardiology with no further workup

## 2024-11-18 NOTE — PLAN OF CARE
Problem: Pain  Goal: Verbalizes/displays adequate comfort level or baseline comfort level  Outcome: Progressing  Flowsheets (Taken 11/17/2024 1732 by Arsenio Moon RN)  Verbalizes/displays adequate comfort level or baseline comfort level:   Encourage patient to monitor pain and request assistance   Assess pain using appropriate pain scale   Administer analgesics based on type and severity of pain and evaluate response   Implement non-pharmacological measures as appropriate and evaluate response   Consider cultural and social influences on pain and pain management   Notify Licensed Independent Practitioner if interventions unsuccessful or patient reports new pain   Controlled with PO and IV overnight  Problem: Skin/Tissue Integrity  Goal: Absence of new skin breakdown  Description: 1.  Monitor for areas of redness and/or skin breakdown  2.  Assess vascular access sites hourly  3.  Every 4-6 hours minimum:  Change oxygen saturation probe site  4.  Every 4-6 hours:  If on nasal continuous positive airway pressure, respiratory therapy assess nares and determine need for appliance change or resting period.  Outcome: Progressing   No new breakdown. Turns self  Problem: Safety - Adult  Goal: Free from fall injury  Outcome: Progressing   Pt free from injury this shift and free of falls. 3/4 rails up on bed and bed is in the lowest position.Wheels locked and bed alarm set. Socks on pt and ID bands on pt. Call light in reach of pt and pt educated to call out to get up x1 walker Gb, c-collar. Will continue to monitor for safety.

## 2024-11-18 NOTE — PLAN OF CARE
Problem: Discharge Planning  Goal: Discharge to home or other facility with appropriate resources  11/18/2024 1831 by Norma Petty, RN  Outcome: Progressing  Flowsheets (Taken 11/18/2024 1615)  Discharge to home or other facility with appropriate resources: Identify barriers to discharge with patient and caregiver  Pt involved in discharge planning. Barriers to discharge discussed with patient. Discharge learning needs identified. Discuss with patient any additional needed resources and transportation plans. Case management following plan of care.      Problem: Skin/Tissue Integrity  Goal: Absence of new skin breakdown  Description: 1.  Monitor for areas of redness and/or skin breakdown  2.  Assess vascular access sites hourly  3.  Every 4-6 hours minimum:  Change oxygen saturation probe site  4.  Every 4-6 hours:  If on nasal continuous positive airway pressure, respiratory therapy assess nares and determine need for appliance change or resting period.  Assisted with q2 position changes to prevent skin breakdown.     Problem: Safety - Adult  Goal: Free from fall injury  11/18/2024 1833 by Norma Petty, RN  Outcome: Progressing  All fall precautions in place. Bed locked and in lowest position with alarm on. Overbed table and personal belonings within reach. Call light within reach and patient instructed to use call light for assistance. Non-skid socks on.

## 2024-11-19 ENCOUNTER — APPOINTMENT (OUTPATIENT)
Dept: GENERAL RADIOLOGY | Age: 54
DRG: 551 | End: 2024-11-19
Attending: HOSPITALIST
Payer: MEDICARE

## 2024-11-19 LAB
ANION GAP SERPL CALCULATED.3IONS-SCNC: 7 MMOL/L (ref 3–16)
BASOPHILS # BLD: 0 K/UL (ref 0–0.2)
BASOPHILS NFR BLD: 0 %
BUN SERPL-MCNC: 17 MG/DL (ref 7–20)
CALCIUM SERPL-MCNC: 9.7 MG/DL (ref 8.3–10.6)
CHLORIDE SERPL-SCNC: 107 MMOL/L (ref 99–110)
CO2 SERPL-SCNC: 28 MMOL/L (ref 21–32)
CREAT SERPL-MCNC: 1.1 MG/DL (ref 0.9–1.3)
DEPRECATED RDW RBC AUTO: 18.2 % (ref 12.4–15.4)
EOSINOPHIL # BLD: 0.2 K/UL (ref 0–0.6)
EOSINOPHIL NFR BLD: 2 %
GFR SERPLBLD CREATININE-BSD FMLA CKD-EPI: 80 ML/MIN/{1.73_M2}
GLUCOSE SERPL-MCNC: 107 MG/DL (ref 70–99)
HCT VFR BLD AUTO: 32.8 % (ref 40.5–52.5)
HGB BLD-MCNC: 10.8 G/DL (ref 13.5–17.5)
LYMPHOCYTES # BLD: 1.5 K/UL (ref 1–5.1)
LYMPHOCYTES NFR BLD: 18 %
MCH RBC QN AUTO: 28.1 PG (ref 26–34)
MCHC RBC AUTO-ENTMCNC: 32.8 G/DL (ref 31–36)
MCV RBC AUTO: 85.7 FL (ref 80–100)
METAMYELOCYTES NFR BLD MANUAL: 1 %
MONOCYTES # BLD: 0.7 K/UL (ref 0–1.3)
MONOCYTES NFR BLD: 9 %
NEUTROPHILS # BLD: 5.8 K/UL (ref 1.7–7.7)
NEUTROPHILS NFR BLD: 70 %
PATH INTERP BLD-IMP: NO
PLATELET # BLD AUTO: 185 K/UL (ref 135–450)
PLATELET BLD QL SMEAR: ADEQUATE
PMV BLD AUTO: 9.4 FL (ref 5–10.5)
POTASSIUM SERPL-SCNC: 4 MMOL/L (ref 3.5–5.1)
RBC # BLD AUTO: 3.83 M/UL (ref 4.2–5.9)
RBC MORPH BLD: NORMAL
SLIDE REVIEW: ABNORMAL
SODIUM SERPL-SCNC: 142 MMOL/L (ref 136–145)
WBC # BLD AUTO: 8.2 K/UL (ref 4–11)

## 2024-11-19 PROCEDURE — 2580000003 HC RX 258: Performed by: INTERNAL MEDICINE

## 2024-11-19 PROCEDURE — 2060000000 HC ICU INTERMEDIATE R&B

## 2024-11-19 PROCEDURE — 36415 COLL VENOUS BLD VENIPUNCTURE: CPT

## 2024-11-19 PROCEDURE — 72040 X-RAY EXAM NECK SPINE 2-3 VW: CPT

## 2024-11-19 PROCEDURE — 85025 COMPLETE CBC W/AUTO DIFF WBC: CPT

## 2024-11-19 PROCEDURE — 6360000002 HC RX W HCPCS

## 2024-11-19 PROCEDURE — 80048 BASIC METABOLIC PNL TOTAL CA: CPT

## 2024-11-19 PROCEDURE — 6370000000 HC RX 637 (ALT 250 FOR IP): Performed by: INTERNAL MEDICINE

## 2024-11-19 PROCEDURE — 99231 SBSQ HOSP IP/OBS SF/LOW 25: CPT | Performed by: NURSE PRACTITIONER

## 2024-11-19 PROCEDURE — 6370000000 HC RX 637 (ALT 250 FOR IP)

## 2024-11-19 PROCEDURE — 6370000000 HC RX 637 (ALT 250 FOR IP): Performed by: NURSE PRACTITIONER

## 2024-11-19 PROCEDURE — 97530 THERAPEUTIC ACTIVITIES: CPT

## 2024-11-19 PROCEDURE — 2580000003 HC RX 258

## 2024-11-19 RX ORDER — PANTOPRAZOLE SODIUM 40 MG/1
40 TABLET, DELAYED RELEASE ORAL
Status: DISCONTINUED | OUTPATIENT
Start: 2024-11-19 | End: 2024-11-24 | Stop reason: HOSPADM

## 2024-11-19 RX ADMIN — HYDROMORPHONE HYDROCHLORIDE 0.5 MG: 1 INJECTION, SOLUTION INTRAMUSCULAR; INTRAVENOUS; SUBCUTANEOUS at 02:25

## 2024-11-19 RX ADMIN — OXYCODONE 10 MG: 5 TABLET ORAL at 07:31

## 2024-11-19 RX ADMIN — PANTOPRAZOLE SODIUM 40 MG: 40 TABLET, DELAYED RELEASE ORAL at 05:23

## 2024-11-19 RX ADMIN — ALPRAZOLAM 0.25 MG: 0.25 TABLET ORAL at 14:29

## 2024-11-19 RX ADMIN — DULOXETINE HYDROCHLORIDE 60 MG: 60 CAPSULE, DELAYED RELEASE ORAL at 08:31

## 2024-11-19 RX ADMIN — LEVOTHYROXINE SODIUM 175 MCG: 0.17 TABLET ORAL at 05:23

## 2024-11-19 RX ADMIN — CARVEDILOL 3.12 MG: 3.12 TABLET, FILM COATED ORAL at 17:02

## 2024-11-19 RX ADMIN — RANOLAZINE 500 MG: 500 TABLET, EXTENDED RELEASE ORAL at 08:31

## 2024-11-19 RX ADMIN — CARVEDILOL 3.12 MG: 3.12 TABLET, FILM COATED ORAL at 08:31

## 2024-11-19 RX ADMIN — AMLODIPINE BESYLATE 5 MG: 5 TABLET ORAL at 08:32

## 2024-11-19 RX ADMIN — FLUPHENAZINE HYDROCHLORIDE 5 MG: 2.5 TABLET, FILM COATED ORAL at 20:02

## 2024-11-19 RX ADMIN — HYDROMORPHONE HYDROCHLORIDE 0.5 MG: 1 INJECTION, SOLUTION INTRAMUSCULAR; INTRAVENOUS; SUBCUTANEOUS at 21:51

## 2024-11-19 RX ADMIN — AMPICILLIN SODIUM AND SULBACTAM SODIUM 3000 MG: 2; 1 INJECTION, POWDER, FOR SOLUTION INTRAMUSCULAR; INTRAVENOUS at 08:37

## 2024-11-19 RX ADMIN — OXYCODONE 10 MG: 5 TABLET ORAL at 18:13

## 2024-11-19 RX ADMIN — AMPICILLIN SODIUM AND SULBACTAM SODIUM 3000 MG: 2; 1 INJECTION, POWDER, FOR SOLUTION INTRAMUSCULAR; INTRAVENOUS at 02:29

## 2024-11-19 RX ADMIN — AMPICILLIN SODIUM AND SULBACTAM SODIUM 3000 MG: 2; 1 INJECTION, POWDER, FOR SOLUTION INTRAMUSCULAR; INTRAVENOUS at 14:32

## 2024-11-19 RX ADMIN — CETIRIZINE HYDROCHLORIDE 10 MG: 10 TABLET, FILM COATED ORAL at 08:32

## 2024-11-19 RX ADMIN — ALPRAZOLAM 0.25 MG: 0.25 TABLET ORAL at 23:59

## 2024-11-19 RX ADMIN — OXYCODONE 10 MG: 5 TABLET ORAL at 01:05

## 2024-11-19 RX ADMIN — PANTOPRAZOLE SODIUM 40 MG: 40 TABLET, DELAYED RELEASE ORAL at 14:29

## 2024-11-19 RX ADMIN — OXYCODONE 10 MG: 5 TABLET ORAL at 11:27

## 2024-11-19 RX ADMIN — ENOXAPARIN SODIUM 40 MG: 100 INJECTION SUBCUTANEOUS at 08:32

## 2024-11-19 RX ADMIN — TIZANIDINE 4 MG: 4 TABLET ORAL at 08:32

## 2024-11-19 RX ADMIN — HYDROMORPHONE HYDROCHLORIDE 0.5 MG: 1 INJECTION, SOLUTION INTRAMUSCULAR; INTRAVENOUS; SUBCUTANEOUS at 08:34

## 2024-11-19 RX ADMIN — ATORVASTATIN CALCIUM 80 MG: 80 TABLET, FILM COATED ORAL at 08:31

## 2024-11-19 RX ADMIN — TIZANIDINE 4 MG: 4 TABLET ORAL at 20:02

## 2024-11-19 RX ADMIN — ISOSORBIDE MONONITRATE 60 MG: 60 TABLET, EXTENDED RELEASE ORAL at 08:32

## 2024-11-19 RX ADMIN — RANOLAZINE 500 MG: 500 TABLET, EXTENDED RELEASE ORAL at 20:02

## 2024-11-19 RX ADMIN — SODIUM CHLORIDE, PRESERVATIVE FREE 10 ML: 5 INJECTION INTRAVENOUS at 08:33

## 2024-11-19 RX ADMIN — TAMSULOSIN HYDROCHLORIDE 0.4 MG: 0.4 CAPSULE ORAL at 08:32

## 2024-11-19 RX ADMIN — SODIUM CHLORIDE, PRESERVATIVE FREE 10 ML: 5 INJECTION INTRAVENOUS at 20:07

## 2024-11-19 RX ADMIN — TIZANIDINE 4 MG: 4 TABLET ORAL at 14:29

## 2024-11-19 RX ADMIN — HYDROMORPHONE HYDROCHLORIDE 0.5 MG: 1 INJECTION, SOLUTION INTRAMUSCULAR; INTRAVENOUS; SUBCUTANEOUS at 16:00

## 2024-11-19 RX ADMIN — AMPICILLIN SODIUM AND SULBACTAM SODIUM 3000 MG: 2; 1 INJECTION, POWDER, FOR SOLUTION INTRAMUSCULAR; INTRAVENOUS at 20:04

## 2024-11-19 RX ADMIN — OXYCODONE 10 MG: 5 TABLET ORAL at 23:58

## 2024-11-19 RX ADMIN — PRAZOSIN HYDROCHLORIDE 5 MG: 5 CAPSULE ORAL at 20:19

## 2024-11-19 ASSESSMENT — PAIN SCALES - GENERAL
PAINLEVEL_OUTOF10: 9
PAINLEVEL_OUTOF10: 9
PAINLEVEL_OUTOF10: 7
PAINLEVEL_OUTOF10: 9
PAINLEVEL_OUTOF10: 10
PAINLEVEL_OUTOF10: 6
PAINLEVEL_OUTOF10: 9
PAINLEVEL_OUTOF10: 3

## 2024-11-19 ASSESSMENT — PAIN DESCRIPTION - DESCRIPTORS
DESCRIPTORS: ACHING;SORE
DESCRIPTORS: ACHING;DULL
DESCRIPTORS: SHARP;SORE
DESCRIPTORS: ACHING
DESCRIPTORS: ACHING
DESCRIPTORS: ACHING;SORE
DESCRIPTORS: SHARP
DESCRIPTORS: SHARP;SORE
DESCRIPTORS: ACHING
DESCRIPTORS: SHARP

## 2024-11-19 ASSESSMENT — PAIN DESCRIPTION - ORIENTATION
ORIENTATION: LOWER;MID
ORIENTATION: MID;LOWER
ORIENTATION: MID;LOWER
ORIENTATION: LOWER
ORIENTATION: MID;LOWER
ORIENTATION: LOWER;MID
ORIENTATION: UPPER;LOWER;MID
ORIENTATION: MID;LOWER
ORIENTATION: LOWER;MID
ORIENTATION: MID

## 2024-11-19 ASSESSMENT — PAIN DESCRIPTION - LOCATION
LOCATION: BACK
LOCATION: NECK
LOCATION: BACK

## 2024-11-19 ASSESSMENT — PAIN - FUNCTIONAL ASSESSMENT
PAIN_FUNCTIONAL_ASSESSMENT: ACTIVITIES ARE NOT PREVENTED
PAIN_FUNCTIONAL_ASSESSMENT: ACTIVITIES ARE NOT PREVENTED
PAIN_FUNCTIONAL_ASSESSMENT: PREVENTS OR INTERFERES SOME ACTIVE ACTIVITIES AND ADLS
PAIN_FUNCTIONAL_ASSESSMENT: PREVENTS OR INTERFERES SOME ACTIVE ACTIVITIES AND ADLS

## 2024-11-19 ASSESSMENT — PAIN DESCRIPTION - ONSET
ONSET: ON-GOING
ONSET: ON-GOING
ONSET: GRADUAL
ONSET: SUDDEN

## 2024-11-19 ASSESSMENT — PAIN DESCRIPTION - PAIN TYPE
TYPE: ACUTE PAIN
TYPE: ACUTE PAIN
TYPE: ACUTE PAIN;CHRONIC PAIN
TYPE: ACUTE PAIN;CHRONIC PAIN

## 2024-11-19 ASSESSMENT — PAIN DESCRIPTION - FREQUENCY
FREQUENCY: CONTINUOUS
FREQUENCY: INTERMITTENT
FREQUENCY: INTERMITTENT
FREQUENCY: CONTINUOUS

## 2024-11-19 ASSESSMENT — PAIN SCALES - WONG BAKER
WONGBAKER_NUMERICALRESPONSE: HURTS WORST
WONGBAKER_NUMERICALRESPONSE: HURTS WHOLE LOT

## 2024-11-19 NOTE — PLAN OF CARE
Problem: Chronic Conditions and Co-morbidities  Goal: Patient's chronic conditions and co-morbidity symptoms are monitored and maintained or improved  Outcome: Progressing  Flowsheets (Taken 11/18/2024 1615 by Norma Petty RN)  Care Plan - Patient's Chronic Conditions and Co-Morbidity Symptoms are Monitored and Maintained or Improved: Monitor and assess patient's chronic conditions and comorbid symptoms for stability, deterioration, or improvement     Problem: Discharge Planning  Goal: Discharge to home or other facility with appropriate resources  11/19/2024 0000 by Nandini Barry RN  Outcome: Progressing  11/18/2024 1831 by Norma Petty RN  Outcome: Progressing  Flowsheets (Taken 11/18/2024 1615)  Discharge to home or other facility with appropriate resources: Identify barriers to discharge with patient and caregiver     Problem: Pain  Goal: Verbalizes/displays adequate comfort level or baseline comfort level  11/19/2024 0000 by Nandini Barry RN  Outcome: Progressing  11/18/2024 1831 by Norma Petty RN  Outcome: Progressing     Problem: Skin/Tissue Integrity  Goal: Absence of new skin breakdown  Description: 1.  Monitor for areas of redness and/or skin breakdown  2.  Assess vascular access sites hourly  3.  Every 4-6 hours minimum:  Change oxygen saturation probe site  4.  Every 4-6 hours:  If on nasal continuous positive airway pressure, respiratory therapy assess nares and determine need for appliance change or resting period.  Outcome: Progressing     Problem: ABCDS Injury Assessment  Goal: Absence of physical injury  11/19/2024 0000 by Nandini Barry RN  Outcome: Progressing  11/18/2024 1831 by Norma Petty RN  Outcome: Progressing     Problem: Risk for Elopement  Goal: Patient will not exit the unit/facility without proper excort  Outcome: Progressing  Flowsheets  Taken 11/18/2024 2149 by Nandini Barry RN  Nursing Interventions for Elopement Risk:   Assist with personal care needs  such as toileting, eating, dressing, as needed to reduce the risk of wandering   Collaborate with treatment team for drug withdrawal symptoms treatment  Taken 11/18/2024 2010 by Nandini Barry, RN  Nursing Interventions for Elopement Risk:   Assist with personal care needs such as toileting, eating, dressing, as needed to reduce the risk of wandering   Collaborate with treatment team for drug withdrawal symptoms treatment  Taken 11/18/2024 1615 by Norma Petty, RN  Nursing Interventions for Elopement Risk:   Assist with personal care needs such as toileting, eating, dressing, as needed to reduce the risk of wandering   Collaborate with treatment team for drug withdrawal symptoms treatment  Taken 11/18/2024 1215 by Norma Petty, RN  Nursing Interventions for Elopement Risk:   Assist with personal care needs such as toileting, eating, dressing, as needed to reduce the risk of wandering   Collaborate with treatment team for drug withdrawal symptoms treatment     Problem: Nutrition Deficit:  Goal: Optimize nutritional status  Outcome: Progressing

## 2024-11-19 NOTE — CARE COORDINATION
UPDATE: CM met with pt at bedside and updated on accepting facilities. Pt expressed disappointment that so many facilities had declined the pt. Pt states he will consider if he'll need SNF after surgery or if his girlfriend could provide the needed support at home. CM will continue to follow for post-op PT/OT evals and discharge planning.  Electronically signed by JAYLEEN Cooper on 11/19/2024 at 3:43 PM  [phone]    CM following: Update on SNF options:  [facility] - denied due to [facility]  [facility] - accepted  [facility] - accepted  [facility] - declined [facility]  [facility] - declined [facility]  CM will update pt at bedside. Nrsgy intervention currently pending. CM will continue to follow for discharge planning.  Electronically signed by JAYLEEN Cooper on 11/19/2024 at 10:07 AM  [phone]

## 2024-11-19 NOTE — PROGRESS NOTES
NEUROSURGERY PROGRESS NOTE    11/19/2024 4:58 PM                               Ashutosh C [place]                      LOS: 7 days       Subjective:  No acute events overnight. Patient sitting on the side of the bed eating breakfast upon entering the room.    Physical Exam:  Patient seen and examined    Vitals:    11/19/24 1603   BP: 114/75   Pulse: 82   Resp: 16   Temp: 97.4 °F (36.3 °C)   SpO2: 95%     GCS:  4 - Opens eyes on own  5 - Alert and oriented  6 - Follows simple motor commands  General: Well developed. Alert and cooperative in no acute distress.   HENT: atraumatic, neck supple  Eyes: Optic discs: Not tested  Pulmonary: unlabored respiratory effort  Cardiovascular:  Warm well perfused. No peripheral edema  Gastrointestinal: abdomen soft, NT, ND     Neurological:  Mental Status: Awakens easily, alert, oriented x 4, speech clear and appropriate  Attention: Intact  Language: No aphasia or dysarthria noted  Sensation: Intact to all extremities to light touch  Coordination: Intact     Cranial Nerves:  II: Visual acuity not tested, denies new visual changes / diplopia  III, IV, VI: PERRL, 3 mm bilaterally, EOMI, no nystagmus noted  V: Facial sensation intact bilaterally to touch  VII: Face symmetric  VIII: Hearing intact bilaterally to spoken voice  IX: Palate movement equal bilaterally  XI: Shoulder shrug equal bilaterally  XII: Tongue midline     Musculoskeletal:   Gait: Not tested   Assist devices: None   Tone: Normal  Motor strength: Exam limited 2/2 right hand pain 2/2 mildly displaced fracture base proximal phalanx fifth digit     Right  Left      Right  Left    Deltoid  5 5   Hip Flex  5 5   Biceps  5 5   Knee Extensors  5 5   Triceps  5 5   Knee Flexors  5 5   Wrist Ext  5 5   Ankle Dorsiflex.  5 5   Wrist Flex  5 5   Ankle Plantarflex.  5 5   Handgrip  Defer 5   Ext Jitendra Longus  5 5   Thumb Ext  5 5              Positive Clemens's Reflex in left hand    Radiological Findings:  CT HEAD WO

## 2024-11-19 NOTE — PROGRESS NOTES
Peripheral IV (20G 1.75\") successfully placed with ultrasound guidance to right forearm after 1 attempt. Flushed with normal saline. Blood return noted. Line capped. Patient tolerated well.

## 2024-11-19 NOTE — PROGRESS NOTES
Occupational Therapy  Occupational Therapy  Daily Treatment Note  Patient Name: Ashutosh Donovan  MRN: [id number]    Chart Reviewed: Yes       Other position/activity restrictions: Up as tolerated;Cervical collar when ambulating     Additional Pertinent Hx: Pt is a 53 y.o. male adm 11/12 with LE numbness.  Pt originally presented to [facility] on 11/10 with chest pain and endorsed left sided numbness.   MRI LSpine showed Severe thecal sac stenosis at L4-5 with crowding of the cauda equina. Transferred to  for further management.  CT Cspine:Degenerative disease and facet hypertrophy with notable stenosis including C3-4, C4-5, C5-6 and C6-7.  CT Lspine:Lumbar degenerative disc disease and facet arthropathy with L4-L5 spinal stenosis and foraminal narrowing.  MRI Cspine:Mild anterolisthesis of C3 on C4 and of C4 on C5. Multilevel degenerative disc disease and hypertrophic changes throughout the cervical spine.  At the C3-C4 level, findings contribute to severe acquired central canal and bilateral foraminal stenosis. Mild effacement of the cord at this level.  L spine Xray:Grade 1 anterolisthesis of L4 on L5.  Degenerative disc disease and hypertrophic degenerative changes of the posterior elements at L4-L5 and L5-S1 levels.    PMH:tobacco abuse; CKD stage IIIa; emphysema; multiple CAD history of about 10 stents; hypertension; and congestive heart failure        Diagnosis: Lower extremity numbness  Treatment Diagnosis: Impaired ADL status; Impaired functional mobility    Subjective: Pt sitting up in bed with Janice gold. Pt with beverage spilled on sheet and RN present to change linens. Pt agreeable to limited therapy session. \"I've been wanting somebody to walk me, but nobody will.\" Pt declined all ADLs and adamantly declined use of gait belt and physical assist/touch - CGA. Pt heavily educated on need for gait belt incase pt had LOB. Pt stating \"If I'm going to fall I'll just sit in a chair or that bench.\"

## 2024-11-19 NOTE — PLAN OF CARE
Problem: Chronic Conditions and Co-morbidities  Goal: Patient's chronic conditions and co-morbidity symptoms are monitored and maintained or improved  11/19/2024 0904 by Minerva Kerr RN  Outcome: Progressing  11/19/2024 0000 by Nandini Barry RN  Outcome: Progressing  Flowsheets (Taken 11/18/2024 1615 by Norma Petty RN)  Care Plan - Patient's Chronic Conditions and Co-Morbidity Symptoms are Monitored and Maintained or Improved: Monitor and assess patient's chronic conditions and comorbid symptoms for stability, deterioration, or improvement     Problem: Discharge Planning  Goal: Discharge to home or other facility with appropriate resources  11/19/2024 0904 by Minerva Kerr RN  Outcome: Progressing  11/19/2024 0000 by Nandini Barry RN  Outcome: Progressing     Problem: Pain  Goal: Verbalizes/displays adequate comfort level or baseline comfort level  11/19/2024 0904 by Minerva Kerr RN  Outcome: Progressing  11/19/2024 0000 by Nandini Barry RN  Outcome: Progressing     Problem: Skin/Tissue Integrity  Goal: Absence of new skin breakdown  Description: 1.  Monitor for areas of redness and/or skin breakdown  2.  Assess vascular access sites hourly  3.  Every 4-6 hours minimum:  Change oxygen saturation probe site  4.  Every 4-6 hours:  If on nasal continuous positive airway pressure, respiratory therapy assess nares and determine need for appliance change or resting period.  11/19/2024 0904 by Minerva Kerr RN  Outcome: Progressing  11/19/2024 0000 by Nandini Barry RN  Outcome: Progressing     Problem: ABCDS Injury Assessment  Goal: Absence of physical injury  11/19/2024 0904 by Minerva Kerr RN  Outcome: Progressing  11/19/2024 0000 by Nandini Barry RN  Outcome: Progressing     Problem: Safety - Adult  Goal: Free from fall injury  11/19/2024 0904 by Minerva Kerr RN  Outcome: Progressing  11/19/2024 0000 by Nandini Barry RN  Outcome: Progressing     Problem: Risk for Elopement  Goal:

## 2024-11-19 NOTE — PROGRESS NOTES
Hospital Medicine Progress Note      Date of Admission: 11/12/2024  Hospital Day: 8    Chief Admission Complaint: Lower extremity numbness     Subjective:    Patient seen and examined at bedside today.  No acute events reported overnight.  Pain is tolerable with current regimen  Complaining of acid reflux  No further episodes of fever  Leukocytosis resolved    Presenting Admission History:       Patient 53-year-old male with history of multivessel PCI who presented to [facility] with chest pain. After admission patient mentioned some numbness in his face arm and radiating through the left lower leg. Occasionally possible bowel movement without realizing it     Assessment/Plan:      Cervical stenosis  Lumbar myelopathy and radiculopathy  MRI reveals severe cervical spinal stenosis with anterior subluxation of C3 on C4 and C4 on C5, L4-L5 stenosis with thickening redundant nerve root within the cauda equina, crowding of cauda equina.    MRI brain unremarkable  Neurosurgery consulted, plan for surgical intervention after 11/19  Discussed with neurosurgery regarding DVT prophylaxis, agreeable to be on DVT prophylaxis at this time and will hold prior to surgery.  Pain management with oxy, Dilaudid and Zanaflex  Continue cervical collar when ambulating  PT/OT evaluated and recommend SNF      Right lung infiltrate concerning for aspiration pneumonia  Patient was lethargic, febrile, urinary incontinence and became hypoxic requiring 2 LNC on 11/17  Chest x-ray concerning for right lung pneumonia  COVID and flu negative  Blood culture no growth till date  Patient likely got aspirated while he was on IV pain medication, which was decreased  SLP consulted  Cefepime x 1, Unasyn for 4 days until 11/21 for total of 5 days antibiotics    Coronary artery disease s/p stent  Hypertension  Presented to [facility] ER with chest pain and was evaluated by cardiology with no further workup recommended.  Aspirin and Plavix held for  SERVICES  IP CONSULT TO DIETITIAN  IP CONSULT TO CASE MANAGEMENT  IP CONSULT TO NEUROSURGERY      ------------------------------------------------------------------------------------------------------------------------------------------------------------------------    MDM  [x] High (any 2 of A, B, or C)    A. Problems (any 1)  [x] Acute/Chronic Illness/injury posing threat to life or bodily function:    [] Severe exacerbation of chronic illness:    ---------------------------------------------------------------------  B. Risk of Treatment (any 1)   [] IV ABX requiring serial renal monitoring for nephrotoxicity:     [x] IV Narcotic analgesia for adverse drug reaction  [] IV diuresis requiring serial monitoring for renal impairment and electrolyte derangements  [] Critical electrolyte abnormalities requiring IV replacement and close serial monitoring  [] Insulin - monitoring serial FSBS for Hypoglycemic adverse drug reaction  [] Anticoagulation requiring serial monitoring of coagulation factors  [] IV/IM Controlled Substances order (any 1)   [] One-time Order including Drug Name/Route, Reason Ordered:   [] Scheduled Order including Drug Name/Route, Reason Ordered or Continued:   [] PRN Order including Drug Name/Route, Reason Ordered or Continued:  Other -   [] Decision to De-escalate Care this DOS due to change in treatment goals:  [] Decision to Escalate Care To:   [x] Major Surgery/Procedure (any 1):   Elective with patient risk factors including Procedure Type and Risk Factors: Neurosurgical intervention  Emergent Procedure Type:    ----------------------------------------------------------------------  C. Data (any 2)  [x] Data Review (3+ points)  [x] Consultant Note reviewed with note date, specialty, and summary (1 point each) neurosurgery  [x] All current labs were reviewed and interpreted for clinical significance   [x] Studies Reviewed (1 point each): MRI spine  [] Collateral history obtained including from

## 2024-11-19 NOTE — PROGRESS NOTES
Pt a/o x4. VSS on RA. Voiding adequately via BRP. Tolerating ambulation x1 SBA with c-collar. Pain managed with oral and IV medications per MAR. Fall precautions are in place.

## 2024-11-20 ENCOUNTER — APPOINTMENT (OUTPATIENT)
Dept: GENERAL RADIOLOGY | Age: 54
DRG: 551 | End: 2024-11-20
Attending: HOSPITALIST
Payer: MEDICARE

## 2024-11-20 LAB
ANION GAP SERPL CALCULATED.3IONS-SCNC: 9 MMOL/L (ref 3–16)
BASOPHILS # BLD: 0 K/UL (ref 0–0.2)
BASOPHILS NFR BLD: 0 %
BUN SERPL-MCNC: 22 MG/DL (ref 7–20)
CALCIUM SERPL-MCNC: 9.6 MG/DL (ref 8.3–10.6)
CHLORIDE SERPL-SCNC: 104 MMOL/L (ref 99–110)
CO2 SERPL-SCNC: 29 MMOL/L (ref 21–32)
CREAT SERPL-MCNC: 1.2 MG/DL (ref 0.9–1.3)
DEPRECATED RDW RBC AUTO: 18 % (ref 12.4–15.4)
EOSINOPHIL # BLD: 0.1 K/UL (ref 0–0.6)
EOSINOPHIL NFR BLD: 1 %
GFR SERPLBLD CREATININE-BSD FMLA CKD-EPI: 72 ML/MIN/{1.73_M2}
GLUCOSE SERPL-MCNC: 114 MG/DL (ref 70–99)
HCT VFR BLD AUTO: 32.9 % (ref 40.5–52.5)
HCV RNA SERPL NAA+PROBE-ACNC: NOT DETECTED IU/ML
HCV RNA SERPL NAA+PROBE-LOG IU: NOT DETECTED LOG IU/ML
HCV RNA SERPL QL NAA+PROBE: NOT DETECTED
HGB BLD-MCNC: 11 G/DL (ref 13.5–17.5)
LYMPHOCYTES # BLD: 1.4 K/UL (ref 1–5.1)
LYMPHOCYTES NFR BLD: 19 %
MCH RBC QN AUTO: 28.4 PG (ref 26–34)
MCHC RBC AUTO-ENTMCNC: 33.4 G/DL (ref 31–36)
MCV RBC AUTO: 85.1 FL (ref 80–100)
MONOCYTES # BLD: 0.7 K/UL (ref 0–1.3)
MONOCYTES NFR BLD: 9 %
NEUTROPHILS # BLD: 5.3 K/UL (ref 1.7–7.7)
NEUTROPHILS NFR BLD: 71 %
PLATELET # BLD AUTO: 205 K/UL (ref 135–450)
PLATELET BLD QL SMEAR: ADEQUATE
PMV BLD AUTO: 8.8 FL (ref 5–10.5)
POTASSIUM SERPL-SCNC: 3.9 MMOL/L (ref 3.5–5.1)
RBC # BLD AUTO: 3.87 M/UL (ref 4.2–5.9)
RBC MORPH BLD: NORMAL
SLIDE REVIEW: ABNORMAL
SODIUM SERPL-SCNC: 142 MMOL/L (ref 136–145)
WBC # BLD AUTO: 7.5 K/UL (ref 4–11)

## 2024-11-20 PROCEDURE — 36415 COLL VENOUS BLD VENIPUNCTURE: CPT

## 2024-11-20 PROCEDURE — 6370000000 HC RX 637 (ALT 250 FOR IP): Performed by: NURSE PRACTITIONER

## 2024-11-20 PROCEDURE — 2580000003 HC RX 258: Performed by: INTERNAL MEDICINE

## 2024-11-20 PROCEDURE — 74230 X-RAY XM SWLNG FUNCJ C+: CPT

## 2024-11-20 PROCEDURE — 85025 COMPLETE CBC W/AUTO DIFF WBC: CPT

## 2024-11-20 PROCEDURE — 6370000000 HC RX 637 (ALT 250 FOR IP): Performed by: INTERNAL MEDICINE

## 2024-11-20 PROCEDURE — 6370000000 HC RX 637 (ALT 250 FOR IP)

## 2024-11-20 PROCEDURE — 92526 ORAL FUNCTION THERAPY: CPT

## 2024-11-20 PROCEDURE — 92610 EVALUATE SWALLOWING FUNCTION: CPT

## 2024-11-20 PROCEDURE — 6360000002 HC RX W HCPCS

## 2024-11-20 PROCEDURE — 2580000003 HC RX 258

## 2024-11-20 PROCEDURE — 2060000000 HC ICU INTERMEDIATE R&B

## 2024-11-20 PROCEDURE — 80048 BASIC METABOLIC PNL TOTAL CA: CPT

## 2024-11-20 PROCEDURE — 92611 MOTION FLUOROSCOPY/SWALLOW: CPT

## 2024-11-20 PROCEDURE — 87522 HEPATITIS C REVRS TRNSCRPJ: CPT

## 2024-11-20 PROCEDURE — 99231 SBSQ HOSP IP/OBS SF/LOW 25: CPT | Performed by: NURSE PRACTITIONER

## 2024-11-20 RX ADMIN — CARVEDILOL 3.12 MG: 3.12 TABLET, FILM COATED ORAL at 08:47

## 2024-11-20 RX ADMIN — ALPRAZOLAM 0.25 MG: 0.25 TABLET ORAL at 16:23

## 2024-11-20 RX ADMIN — ISOSORBIDE MONONITRATE 60 MG: 60 TABLET, EXTENDED RELEASE ORAL at 08:47

## 2024-11-20 RX ADMIN — AMPICILLIN SODIUM AND SULBACTAM SODIUM 3000 MG: 2; 1 INJECTION, POWDER, FOR SOLUTION INTRAMUSCULAR; INTRAVENOUS at 20:16

## 2024-11-20 RX ADMIN — HYDROMORPHONE HYDROCHLORIDE 0.5 MG: 1 INJECTION, SOLUTION INTRAMUSCULAR; INTRAVENOUS; SUBCUTANEOUS at 22:01

## 2024-11-20 RX ADMIN — AMPICILLIN SODIUM AND SULBACTAM SODIUM 3000 MG: 2; 1 INJECTION, POWDER, FOR SOLUTION INTRAMUSCULAR; INTRAVENOUS at 03:57

## 2024-11-20 RX ADMIN — CETIRIZINE HYDROCHLORIDE 10 MG: 10 TABLET, FILM COATED ORAL at 08:47

## 2024-11-20 RX ADMIN — LEVOTHYROXINE SODIUM 175 MCG: 0.17 TABLET ORAL at 05:58

## 2024-11-20 RX ADMIN — AMLODIPINE BESYLATE 5 MG: 5 TABLET ORAL at 08:47

## 2024-11-20 RX ADMIN — RANOLAZINE 500 MG: 500 TABLET, EXTENDED RELEASE ORAL at 08:47

## 2024-11-20 RX ADMIN — TIZANIDINE 4 MG: 4 TABLET ORAL at 16:11

## 2024-11-20 RX ADMIN — TIZANIDINE 4 MG: 4 TABLET ORAL at 08:47

## 2024-11-20 RX ADMIN — OXYCODONE 10 MG: 5 TABLET ORAL at 11:08

## 2024-11-20 RX ADMIN — TAMSULOSIN HYDROCHLORIDE 0.4 MG: 0.4 CAPSULE ORAL at 08:47

## 2024-11-20 RX ADMIN — SODIUM CHLORIDE, PRESERVATIVE FREE 10 ML: 5 INJECTION INTRAVENOUS at 20:12

## 2024-11-20 RX ADMIN — HYDROMORPHONE HYDROCHLORIDE 0.5 MG: 1 INJECTION, SOLUTION INTRAMUSCULAR; INTRAVENOUS; SUBCUTANEOUS at 03:59

## 2024-11-20 RX ADMIN — RANOLAZINE 500 MG: 500 TABLET, EXTENDED RELEASE ORAL at 20:11

## 2024-11-20 RX ADMIN — AMPICILLIN SODIUM AND SULBACTAM SODIUM 3000 MG: 2; 1 INJECTION, POWDER, FOR SOLUTION INTRAMUSCULAR; INTRAVENOUS at 16:14

## 2024-11-20 RX ADMIN — OXYCODONE 10 MG: 5 TABLET ORAL at 19:05

## 2024-11-20 RX ADMIN — AMPICILLIN SODIUM AND SULBACTAM SODIUM 3000 MG: 2; 1 INJECTION, POWDER, FOR SOLUTION INTRAMUSCULAR; INTRAVENOUS at 08:52

## 2024-11-20 RX ADMIN — CARVEDILOL 3.12 MG: 3.12 TABLET, FILM COATED ORAL at 16:11

## 2024-11-20 RX ADMIN — ENOXAPARIN SODIUM 40 MG: 100 INJECTION SUBCUTANEOUS at 08:46

## 2024-11-20 RX ADMIN — PANTOPRAZOLE SODIUM 40 MG: 40 TABLET, DELAYED RELEASE ORAL at 16:11

## 2024-11-20 RX ADMIN — SODIUM CHLORIDE, PRESERVATIVE FREE 10 ML: 5 INJECTION INTRAVENOUS at 08:46

## 2024-11-20 RX ADMIN — TIZANIDINE 4 MG: 4 TABLET ORAL at 20:11

## 2024-11-20 RX ADMIN — OXYCODONE 10 MG: 5 TABLET ORAL at 05:57

## 2024-11-20 RX ADMIN — PRAZOSIN HYDROCHLORIDE 5 MG: 5 CAPSULE ORAL at 20:11

## 2024-11-20 RX ADMIN — ATORVASTATIN CALCIUM 80 MG: 80 TABLET, FILM COATED ORAL at 08:47

## 2024-11-20 RX ADMIN — HYDROMORPHONE HYDROCHLORIDE 0.5 MG: 1 INJECTION, SOLUTION INTRAMUSCULAR; INTRAVENOUS; SUBCUTANEOUS at 16:12

## 2024-11-20 RX ADMIN — HYDROMORPHONE HYDROCHLORIDE 0.5 MG: 1 INJECTION, SOLUTION INTRAMUSCULAR; INTRAVENOUS; SUBCUTANEOUS at 10:13

## 2024-11-20 RX ADMIN — FLUPHENAZINE HYDROCHLORIDE 5 MG: 2.5 TABLET, FILM COATED ORAL at 20:11

## 2024-11-20 RX ADMIN — PANTOPRAZOLE SODIUM 40 MG: 40 TABLET, DELAYED RELEASE ORAL at 05:58

## 2024-11-20 RX ADMIN — ANTACID TABLETS 500 MG: 500 TABLET, CHEWABLE ORAL at 20:11

## 2024-11-20 RX ADMIN — DULOXETINE HYDROCHLORIDE 60 MG: 60 CAPSULE, DELAYED RELEASE ORAL at 08:47

## 2024-11-20 ASSESSMENT — PAIN SCALES - GENERAL
PAINLEVEL_OUTOF10: 10
PAINLEVEL_OUTOF10: 5
PAINLEVEL_OUTOF10: 6
PAINLEVEL_OUTOF10: 9
PAINLEVEL_OUTOF10: 8
PAINLEVEL_OUTOF10: 6
PAINLEVEL_OUTOF10: 8
PAINLEVEL_OUTOF10: 7
PAINLEVEL_OUTOF10: 8
PAINLEVEL_OUTOF10: 8
PAINLEVEL_OUTOF10: 10
PAINLEVEL_OUTOF10: 7
PAINLEVEL_OUTOF10: 10

## 2024-11-20 ASSESSMENT — PAIN DESCRIPTION - DESCRIPTORS
DESCRIPTORS: SHARP
DESCRIPTORS: ACHING
DESCRIPTORS: SORE
DESCRIPTORS: SORE
DESCRIPTORS: SHARP;SHOOTING
DESCRIPTORS: SHARP;SHOOTING
DESCRIPTORS: ACHING
DESCRIPTORS: ACHING;DULL

## 2024-11-20 ASSESSMENT — PAIN DESCRIPTION - ORIENTATION
ORIENTATION: UPPER;LOWER;MID
ORIENTATION: LOWER;MID
ORIENTATION: LOWER;MID
ORIENTATION: MID;LOWER
ORIENTATION: MID;LOWER;UPPER
ORIENTATION: MID;LOWER;UPPER
ORIENTATION: LOWER;MID
ORIENTATION: MID;LOWER

## 2024-11-20 ASSESSMENT — PAIN DESCRIPTION - ONSET
ONSET: PROGRESSIVE
ONSET: ON-GOING
ONSET: PROGRESSIVE

## 2024-11-20 ASSESSMENT — PAIN SCALES - WONG BAKER: WONGBAKER_NUMERICALRESPONSE: HURTS WHOLE LOT

## 2024-11-20 ASSESSMENT — PAIN - FUNCTIONAL ASSESSMENT
PAIN_FUNCTIONAL_ASSESSMENT: PREVENTS OR INTERFERES SOME ACTIVE ACTIVITIES AND ADLS
PAIN_FUNCTIONAL_ASSESSMENT: PREVENTS OR INTERFERES WITH MANY ACTIVE NOT PASSIVE ACTIVITIES
PAIN_FUNCTIONAL_ASSESSMENT: ACTIVITIES ARE NOT PREVENTED

## 2024-11-20 ASSESSMENT — PAIN DESCRIPTION - FREQUENCY
FREQUENCY: INTERMITTENT

## 2024-11-20 ASSESSMENT — PAIN DESCRIPTION - LOCATION
LOCATION: BACK

## 2024-11-20 ASSESSMENT — PAIN DESCRIPTION - PAIN TYPE
TYPE: ACUTE PAIN
TYPE: ACUTE PAIN;CHRONIC PAIN
TYPE: ACUTE PAIN;CHRONIC PAIN

## 2024-11-20 NOTE — PROGRESS NOTES
Physical Therapy/Occupational Therapy  Hold    Chart reviewed.  Pt with bed alarm going off earlier this AM with pt found in bathroom.  RNs quickly arrived to room.  Spoke with RN who states pt not appropriate for therapy at this time 2* increased agitation.  Pt wanting pain meds but not yet due.  Will f/u later per POC.    Madisyn Tony, PT, DPT  [id number]  Tariq LEIJA/OLGA Osorio, OTR/L [id number]

## 2024-11-20 NOTE — PROGRESS NOTES
Comprehensive Nutrition Assessment    RECOMMENDATIONS:  PO Diet: Continue Regular  Nutrition Supplement: Continue Ensure Max tid(aspen or straw)  Nutrition Education: No recommendation at this time     NUTRITION ASSESSMENT:   Nutritional summary & status: Follow-up. Pt reports good appetite at present with EMR showing avg % intake of meals. Dentures left at home, however, doesn't need for eating. MBS performed on this date with no change in diet recom per SLP. Receiving Ensure supplement tid which pt endorses drinking and would like to continue with meals, prefers chocolate or strawberrry. Continue to monitor intake adequacy and  acceptance of supplements.   Admission // PMH: Lower extremity numbness//CAD, CHF, Ch pain, hypothyroid    MALNUTRITION ASSESSMENT  Context of Malnutrition: Chronic Illness   Malnutrition Status: At risk for malnutrition  Findings of the 6 clinical characteristics of malnutrition (Minimum of 2 out of 6 clinical characteristics is required to make the diagnosis of moderate or severe Protein Calorie Malnutrition based on AND/ASPEN Guidelines):  Energy Intake:  Mild decrease in energy intake  Weight Loss:  No weight loss     Body Fat Loss:  No body fat loss     Muscle Mass Loss:  No muscle mass loss    Fluid Accumulation:  No fluid accumulation     Strength:  Not Performed    NUTRITION DIAGNOSIS   Inadequate oral intake, in context of social or environmental circumstances (pt is homeless) related to inadequate protein-energy intake as evidenced by poor intake prior to admission    Nutrition Monitoring and Evaluation:   Food/Nutrient Intake Outcomes:  Food and Nutrient Intake, Supplement Intake  Physical Signs/Symptoms Outcomes:  Biochemical Data, Nutrition Focused Physical Findings, Weight     OBJECTIVE DATA: Significant to nutrition assessment  Nutrition Related Findings: LBm11/20. Glu 114. No edema.  Wounds: None  Nutrition Goals: PO intake 75% or greater, by next RD assessment      CURRENT NUTRITION THERAPIES  ADULT DIET; Regular  ADULT ORAL NUTRITION SUPPLEMENT; Breakfast, Dinner, Lunch; Low Calorie/High Protein Oral Supplement  PO Intake: %   PO Supplement Intake:Unable to assess (pt reports drinking; no data avail)  Additional Sources of Calories/IVF:n/a     COMPARATIVE STANDARDS  Energy (kcal):  4910-2286 (20-25 kcal/CBW)     Protein (g):  101-117 (1.3-1.5 gm/IBW)       Fluid (ml/day):  or per MD    ANTHROPOMETRICS  Current Height: 180.3 cm (5' 11\")  Current Weight - Scale: 101.6 kg (224 lb)    Admission weight: 93.9 kg (207 lb 0.2 oz)    The patient will be monitored per nutrition standards of care. Consult dietitian if additional nutrition interventions are needed prior to RD reassessment.     Chano Alex RD  [place]:  [phone]

## 2024-11-20 NOTE — PROGRESS NOTES
Pt aox4, VSSRA. Assist CGA, C-collar in place when  OOB. Tolerating fluids w/o complications. Voiding BRP. Pain endorsed to back, managed per MAR. NAEO. All fall / safety precautions in place. POC continues.

## 2024-11-20 NOTE — PLAN OF CARE
Problem: Chronic Conditions and Co-morbidities  Goal: Patient's chronic conditions and co-morbidity symptoms are monitored and maintained or improved  11/20/2024 0906 by Minerva Kerr RN  Outcome: Progressing  11/20/2024 0101 by Stephan Ahumada RN  Outcome: Progressing     Problem: Discharge Planning  Goal: Discharge to home or other facility with appropriate resources  11/20/2024 0906 by Minerva Kerr RN  Outcome: Progressing  11/20/2024 0101 by Stephan Ahumada RN  Outcome: Progressing  Flowsheets (Taken 11/20/2024 0101)  Discharge to home or other facility with appropriate resources:   Identify barriers to discharge with patient and caregiver   Arrange for needed discharge resources and transportation as appropriate   Identify discharge learning needs (meds, wound care, etc)  Note: Patient plans to discharge to skilled nursing facility.     Problem: Pain  Goal: Verbalizes/displays adequate comfort level or baseline comfort level  11/20/2024 0906 by Minerva Kerr RN  Outcome: Progressing  11/20/2024 0101 by Stephan Ahumada RN  Outcome: Progressing  Flowsheets (Taken 11/17/2024 1732 by Arsenio Moon, RN)  Verbalizes/displays adequate comfort level or baseline comfort level:   Encourage patient to monitor pain and request assistance   Assess pain using appropriate pain scale   Administer analgesics based on type and severity of pain and evaluate response   Implement non-pharmacological measures as appropriate and evaluate response   Consider cultural and social influences on pain and pain management   Notify Licensed Independent Practitioner if interventions unsuccessful or patient reports new pain  Note: Pain managed per MAR.     Problem: Skin/Tissue Integrity  Goal: Absence of new skin breakdown  Description: 1.  Monitor for areas of redness and/or skin breakdown  2.  Assess vascular access sites hourly  3.  Every 4-6 hours minimum:  Change oxygen saturation probe site  4.  Every 4-6 hours:  If on nasal

## 2024-11-20 NOTE — PROGRESS NOTES
Hospital Medicine Progress Note      Date of Admission: 11/12/2024  Hospital Day: 9    Chief Admission Complaint: Lower extremity numbness     Subjective:      No acute events reported overnight.  Patient denies any complaints including cough, chest pain, difficulty breathing  Afebrile and hemodynamically stable      Presenting Admission History:       Patient 53-year-old male with history of multivessel PCI who presented to [facility] with chest pain. After admission patient mentioned some numbness in his face arm and radiating through the left lower leg. Occasionally possible bowel movement without realizing it     Assessment/Plan:      Cervical stenosis  Lumbar myelopathy and radiculopathy  MRI reveals severe cervical spinal stenosis with anterior subluxation of C3 on C4 and C4 on C5, L4-L5 stenosis with thickening redundant nerve root within the cauda equina, crowding of cauda equina.    MRI brain unremarkable  Neurosurgery consulted, plan for C3-5 anterior cervical discectomy and fusion on 11/22  Pain management with oxy, Dilaudid and Zanaflex  Continue cervical collar when ambulating  PT/OT evaluated and recommend SNF      Right lung infiltrate concerning for aspiration pneumonia  Patient was lethargic, febrile, urinary incontinence and became hypoxic requiring 2 LNC on 11/17  Chest x-ray concerning for right lung pneumonia  COVID and flu negative  Blood culture no growth till date  Patient likely got aspirated while he was on IV pain medication, which was decreased  SLP consulted  Cefepime x 1, Unasyn for 4 days until 11/21 for total of 5 days antibiotics    Coronary artery disease s/p stent  Hypertension  Presented to [facility] ER with chest pain and was evaluated by cardiology with no further workup recommended.  Aspirin and Plavix held for possible neurosurgery procedure since 11/12  Continue Coreg 3.125, Imdur 60, Ranexa 500 twice daily, amlodipine 5    Hypothyroidism, Synthroid  Mood disorder,  Illness/injury posing threat to life or bodily function:    [] Severe exacerbation of chronic illness:    ---------------------------------------------------------------------  B. Risk of Treatment (any 1)   [] IV ABX requiring serial renal monitoring for nephrotoxicity:     [x] IV Narcotic analgesia for adverse drug reaction  [] IV diuresis requiring serial monitoring for renal impairment and electrolyte derangements  [] Critical electrolyte abnormalities requiring IV replacement and close serial monitoring  [] Insulin - monitoring serial FSBS for Hypoglycemic adverse drug reaction  [] Anticoagulation requiring serial monitoring of coagulation factors  [] IV/IM Controlled Substances order (any 1)   [] One-time Order including Drug Name/Route, Reason Ordered:   [] Scheduled Order including Drug Name/Route, Reason Ordered or Continued:   [] PRN Order including Drug Name/Route, Reason Ordered or Continued:  Other -   [] Decision to De-escalate Care this DOS due to change in treatment goals:  [] Decision to Escalate Care To:   [x] Major Surgery/Procedure (any 1):   Elective with patient risk factors including Procedure Type and Risk Factors: Neurosurgical intervention  Emergent Procedure Type:    ----------------------------------------------------------------------  C. Data (any 2)  [x] Data Review (3+ points)  [x] Consultant Note reviewed with note date, specialty, and summary (1 point each) neurosurgery  [x] All current labs were reviewed and interpreted for clinical significance   [x] Studies Reviewed (1 point each): MRI spine  [] Collateral history obtained including from who and why needed (Max 1 point):  [] Independent (Danika Conley MD) Interpretation of tests (any 1)  [] Rhythm Strip (Telemetry) personally reviewed and interpreted as documented above    [] Imaging personally reviewed and interpreted, includes:    [x] Discussion (any 1)  [x] Discussed the discharge plan in detail with case management

## 2024-11-20 NOTE — PROCEDURES
INSTRUMENTAL SWALLOW REPORT  MODIFIED BARIUM SWALLOW/treat/dc    NAME: Ashutosh Donovan     : 1970  MRN: [id number]       Date of Eval: 2024     Ordering Physician: Dr Conley  Referring Diagnosis: Dysphagia    Past Medical History:  has a past medical history of Arthritis, CAD (coronary artery disease), CHF (congestive heart failure) (HCC), Chronic pain, Heart attack (HCC), and Hypothyroid.  Past Surgical History:  has a past surgical history that includes Coronary stent placement; Cholecystectomy; and Tonsillectomy.    Imaging:     XR CHEST (2 VW)   Final Result   Impression: Patchy pulmonary opacities on the right persists.       Electronically signed by Rubio Sigala MD       XR CHEST PORTABLE   Final Result       Mild patchy airspace disease in right lung concerning for pneumonia.       Electronically signed by Johnny Campos MD     Prior MBS Results: none    Patient Complaints/Reason for Referral:  Ashutosh Donovan was referred for a MBS to assess the efficiency of his/her swallow function, assess for aspiration, and to make recommendations regarding safe dietary consistencies, effective compensatory strategies, and safe eating environment.    Onset of problem:   24    Behavior/Cognition: alert and cooperative  Vision/Hearing: WFL    Impressions:  Oral Phase  WFL  Pharyngeal Phase  WFL- pt demonstrated adequate airway protection and swallow efficiency. Pt consumed large volumes, which resulted in premature spillage and very shallow penetration that cleared  Upper Esophageal Screen  Single instance of what appeared to be backflow, not fully to UES    Treatment Dx and ICD 10: dysphagia  Position: Lateral and upright  Consistencies administered: puree, thin and solids    Penetration Aspiration Scale (PAS)  [x] 2 Material enters the airway, remains above the vocal folds, and is ejected from airway - x 1 with very large volume of thin via straw    Dysphagia Outcome Severity

## 2024-11-20 NOTE — PROGRESS NOTES
NEUROSURGERY PROGRESS NOTE    11/20/2024 3:31 PM                               Ashutosh C [place]                      LOS: 8 days       Subjective:  No acute events overnight. Patient remain neurologically stable.    Physical Exam:  Patient seen and examined    Vitals:    11/20/24 1151   BP: 125/79   Pulse: 80   Resp: 20   Temp: 97.6 °F (36.4 °C)   SpO2: 92%     GCS:  4 - Opens eyes on own  5 - Alert and oriented  6 - Follows simple motor commands  General: Well developed. Alert and cooperative in no acute distress.   HENT: atraumatic, neck supple  Eyes: Optic discs: Not tested  Pulmonary: unlabored respiratory effort  Cardiovascular:  Warm well perfused. No peripheral edema  Gastrointestinal: abdomen soft, NT, ND     Neurological:  Mental Status: Awakens easily, alert, oriented x 4, speech clear and appropriate  Attention: Intact  Language: No aphasia or dysarthria noted  Sensation: Intact to all extremities to light touch  Coordination: Intact     Cranial Nerves:  II: Visual acuity not tested, denies new visual changes / diplopia  III, IV, VI: PERRL, 3 mm bilaterally, EOMI, no nystagmus noted  V: Facial sensation intact bilaterally to touch  VII: Face symmetric  VIII: Hearing intact bilaterally to spoken voice  IX: Palate movement equal bilaterally  XI: Shoulder shrug equal bilaterally  XII: Tongue midline     Musculoskeletal:   Gait: Not tested   Assist devices: None   Tone: Normal  Motor strength: Exam limited 2/2 right hand pain 2/2 mildly displaced fracture base proximal phalanx fifth digit     Right  Left      Right  Left    Deltoid  5 5   Hip Flex  5 5   Biceps  5 5   Knee Extensors  5 5   Triceps  5 5   Knee Flexors  5 5   Wrist Ext  5 5   Ankle Dorsiflex.  5 5   Wrist Flex  5 5   Ankle Plantarflex.  5 5   Handgrip  Defer 5   Ext Jitendra Longus  5 5   Thumb Ext  5 5              Positive Clemens's Reflex in left hand    Radiological Findings:  CT HEAD WO CONTRAST  Result Date: 11/10/2024  Normal  ([facility]) 08/18/2023    Spondylolisthesis at L4-L5 level 04/13/2023    Urinary incontinence without sensory awareness 04/13/2023    Lumbar pain with radiation down both legs 04/13/2023    DDD (degenerative disc disease), lumbosacral 04/13/2023     Assessment:  Patient is a 53 y.o. male w/some right shoulder pain with intermittent numbness and tingling, radicular pain in the RLE but no numbness or tingling, LLE numbness, feeling like his groin is numb but endorses he can feel light touch, and subjective weakness in both legs. Denies urinary retention or fecal incontinence, but states he had one urinary incontinent episode, the night before going to [facility].      Plan:  Neurologic exams frequency: Q4H  For change in exam MUST contact neurosurgery team along with critical care or primary team  Cervical Stenosis:  - Cervical collar when ambulating  - Plan for surgical intervention to correct stenosis in cervical spine Friday 11/22/2024  - Medical clearance for surgery in setting of persistent patchy airspace disease in right lung seen on CXR and High Positive result for anti-HCV. HCV RNA testing will determine if the patient is currently infected, results pending.  Lumbar Myelopathy and Radiculopathy:  - Zanaflex TID for spasms  - Pain control managed by medical team  Advance diet / activity per primary team  Will follow inpatient.  Please call with any questions or decline in neurological status     DISPO: Remain inpatient from neurosurgery standpoint. Dispo timing to be determined by primary team once patient is medically stable for discharge.     Patient was discussed with Dr. Ocampo who agrees with above assessment and plan.     Electronically signed by: MALCOM May CNP, APRN-CNP, 11/20/2024 3:31 PM  [phone]    I spent 25 minutes in the care of this patient.  Over 50% of that time was in face-to-face counseling regarding disease process, diagnostic testing, preventative measures, and

## 2024-11-20 NOTE — PROGRESS NOTES
Patient alert and oriented x4. Argumentative at times. On room air. Pain managed per MAR, Pills WWW. SabrixA. C-collar to be on when up. Awaiting surgery plan.

## 2024-11-20 NOTE — PROGRESS NOTES
Speech Language Pathology  Facility/Department:[facility] 5T ORTHO/NEURO  Dysphagia Evaluation    Name: Ashutosh Donovan  : 1970  MRN: [id number]                                                     Patient Diagnosis(es):   Patient Active Problem List    Diagnosis Date Noted    Cauda equina compression ([facility]) 2024    Lower extremity numbness 2024    CVA (cerebral vascular accident) ([facility]) 2024    HTN (hypertension) 2024    Acute CVA (cerebrovascular accident) ([facility]) 2024    Chronic kidney disease 2024    Major depressive disorder, recurrent ([facility]) 2024    H/O major depression 2024    Right facial numbness 2024    TIA (transient ischemic attack) 2024    Chest pain at rest 2024    Dyslipidemia 2024    Moderate episode of recurrent major depressive disorder ([facility]) 2023    PTSD (post-traumatic stress disorder) 2023    Cocaine use disorder in remission 2023    Opioid use disorder, mild, in early remission ([facility]) 2023    Major depression, recurrent ([facility]) 2023    Coronary artery disease of native artery of native heart with stable angina pectoris ([facility]) 2023    Tobacco use 2023    Chronic obstructive pulmonary disease ([facility]) 2023    Current severe episode of major depressive disorder without psychotic features ([facility]) 2023    LA (acute kidney injury) ([facility]) 2023    Spondylolisthesis at L4-L5 level 2023    Urinary incontinence without sensory awareness 2023    Lumbar pain with radiation down both legs 2023    DDD (degenerative disc disease), lumbosacral 2023       Past Medical History:   Diagnosis Date    Arthritis     CAD (coronary artery disease)     CHF (congestive heart failure) ([facility])     Chronic pain     twister vertebra and two collapsed disc, states injury happened at 49yo    Heart attack ([facility])     pt reports 10 heart attacks in 6 years, 8 stents placed

## 2024-11-20 NOTE — CARE COORDINATION
CM following: Current plan for surgical intervention with nrsgy on Friday. Pt has been accepted at [facility] at this time. Pt will need post-op PT/OT evals and precert for SNF. Pt considering if he would want SNF or could go home with assistance from is girlfriend. CM will continue to follow for post-op evals and discharge planning.  Electronically signed by JAYLEEN Cooper on 11/20/2024 at 11:42 AM  [phone]

## 2024-11-20 NOTE — PLAN OF CARE
Problem: SLP Adult - Impaired Swallowing  Goal: By Discharge: Advance to least restrictive diet without signs or symptoms of aspiration for planned discharge setting.  See evaluation for individualized goals.  Outcome: Progressing      Better on lamictal 25, will continue 25 but may titrate to 50 in 1-2 weeks, reassess in 8 week

## 2024-11-20 NOTE — PLAN OF CARE
Problem: Discharge Planning  Goal: Discharge to home or other facility with appropriate resources  Outcome: Progressing  Flowsheets (Taken 11/20/2024 0101)  Discharge to home or other facility with appropriate resources:   Identify barriers to discharge with patient and caregiver   Arrange for needed discharge resources and transportation as appropriate   Identify discharge learning needs (meds, wound care, etc)  Note: Patient plans to discharge to skilled nursing facility.     Problem: Pain  Goal: Verbalizes/displays adequate comfort level or baseline comfort level  Outcome: Progressing  Flowsheets (Taken 11/17/2024 1732 by Arsenio Moon, RN)  Verbalizes/displays adequate comfort level or baseline comfort level:   Encourage patient to monitor pain and request assistance   Assess pain using appropriate pain scale   Administer analgesics based on type and severity of pain and evaluate response   Implement non-pharmacological measures as appropriate and evaluate response   Consider cultural and social influences on pain and pain management   Notify Licensed Independent Practitioner if interventions unsuccessful or patient reports new pain  Note: Pain managed per MAR.     Problem: Safety - Adult  Goal: Free from fall injury  Outcome: Progressing  Flowsheets (Taken 11/17/2024 0119 by Amarilys Burns)  Free From Fall Injury: Instruct family/caregiver on patient safety  Note: All fall / safety precautions in place. Bed in low position and locked. Call light within reach.

## 2024-11-21 ENCOUNTER — ANESTHESIA EVENT (OUTPATIENT)
Dept: OPERATING ROOM | Age: 54
End: 2024-11-21
Payer: MEDICARE

## 2024-11-21 ENCOUNTER — APPOINTMENT (OUTPATIENT)
Dept: GENERAL RADIOLOGY | Age: 54
DRG: 551 | End: 2024-11-21
Attending: HOSPITALIST
Payer: MEDICARE

## 2024-11-21 LAB
ABO + RH BLD: NORMAL
ANION GAP SERPL CALCULATED.3IONS-SCNC: 8 MMOL/L (ref 3–16)
APTT BLD: 28.8 SEC (ref 22.1–36.4)
BACTERIA BLD CULT ORG #2: NORMAL
BACTERIA BLD CULT: NORMAL
BLD GP AB SCN SERPL QL: NORMAL
BUN SERPL-MCNC: 16 MG/DL (ref 7–20)
CALCIUM SERPL-MCNC: 10 MG/DL (ref 8.3–10.6)
CHLORIDE SERPL-SCNC: 103 MMOL/L (ref 99–110)
CO2 SERPL-SCNC: 31 MMOL/L (ref 21–32)
CREAT SERPL-MCNC: 1.1 MG/DL (ref 0.9–1.3)
DEPRECATED RDW RBC AUTO: 18.2 % (ref 12.4–15.4)
GFR SERPLBLD CREATININE-BSD FMLA CKD-EPI: 80 ML/MIN/{1.73_M2}
GLUCOSE SERPL-MCNC: 92 MG/DL (ref 70–99)
HCT VFR BLD AUTO: 35.5 % (ref 40.5–52.5)
HGB BLD-MCNC: 11.6 G/DL (ref 13.5–17.5)
INR PPP: 0.88 (ref 0.85–1.15)
MCH RBC QN AUTO: 27.9 PG (ref 26–34)
MCHC RBC AUTO-ENTMCNC: 32.6 G/DL (ref 31–36)
MCV RBC AUTO: 85.6 FL (ref 80–100)
PLATELET # BLD AUTO: 244 K/UL (ref 135–450)
PMV BLD AUTO: 8.8 FL (ref 5–10.5)
POTASSIUM SERPL-SCNC: 3.9 MMOL/L (ref 3.5–5.1)
PROTHROMBIN TIME: 12.2 SEC (ref 11.9–14.9)
RBC # BLD AUTO: 4.15 M/UL (ref 4.2–5.9)
SODIUM SERPL-SCNC: 142 MMOL/L (ref 136–145)
WBC # BLD AUTO: 9.1 K/UL (ref 4–11)

## 2024-11-21 PROCEDURE — 1200000000 HC SEMI PRIVATE

## 2024-11-21 PROCEDURE — 85610 PROTHROMBIN TIME: CPT

## 2024-11-21 PROCEDURE — 2580000003 HC RX 258: Performed by: INTERNAL MEDICINE

## 2024-11-21 PROCEDURE — 85027 COMPLETE CBC AUTOMATED: CPT

## 2024-11-21 PROCEDURE — 2060000000 HC ICU INTERMEDIATE R&B

## 2024-11-21 PROCEDURE — 2580000003 HC RX 258

## 2024-11-21 PROCEDURE — 6370000000 HC RX 637 (ALT 250 FOR IP): Performed by: NURSE PRACTITIONER

## 2024-11-21 PROCEDURE — 6370000000 HC RX 637 (ALT 250 FOR IP): Performed by: INTERNAL MEDICINE

## 2024-11-21 PROCEDURE — 85730 THROMBOPLASTIN TIME PARTIAL: CPT

## 2024-11-21 PROCEDURE — 73120 X-RAY EXAM OF HAND: CPT

## 2024-11-21 PROCEDURE — 86901 BLOOD TYPING SEROLOGIC RH(D): CPT

## 2024-11-21 PROCEDURE — 86900 BLOOD TYPING SEROLOGIC ABO: CPT

## 2024-11-21 PROCEDURE — 6370000000 HC RX 637 (ALT 250 FOR IP)

## 2024-11-21 PROCEDURE — 6360000002 HC RX W HCPCS

## 2024-11-21 PROCEDURE — 99231 SBSQ HOSP IP/OBS SF/LOW 25: CPT | Performed by: NURSE PRACTITIONER

## 2024-11-21 PROCEDURE — 36415 COLL VENOUS BLD VENIPUNCTURE: CPT

## 2024-11-21 PROCEDURE — 86850 RBC ANTIBODY SCREEN: CPT

## 2024-11-21 PROCEDURE — 80048 BASIC METABOLIC PNL TOTAL CA: CPT

## 2024-11-21 RX ORDER — SODIUM CHLORIDE 9 MG/ML
INJECTION, SOLUTION INTRAVENOUS CONTINUOUS
Status: DISCONTINUED | OUTPATIENT
Start: 2024-11-22 | End: 2024-11-22 | Stop reason: HOSPADM

## 2024-11-21 RX ADMIN — OXYCODONE 10 MG: 5 TABLET ORAL at 23:20

## 2024-11-21 RX ADMIN — CETIRIZINE HYDROCHLORIDE 10 MG: 10 TABLET, FILM COATED ORAL at 10:09

## 2024-11-21 RX ADMIN — TIZANIDINE 4 MG: 4 TABLET ORAL at 14:32

## 2024-11-21 RX ADMIN — AMPICILLIN SODIUM AND SULBACTAM SODIUM 3000 MG: 2; 1 INJECTION, POWDER, FOR SOLUTION INTRAMUSCULAR; INTRAVENOUS at 15:13

## 2024-11-21 RX ADMIN — RANOLAZINE 500 MG: 500 TABLET, EXTENDED RELEASE ORAL at 10:09

## 2024-11-21 RX ADMIN — HYDROMORPHONE HYDROCHLORIDE 0.5 MG: 1 INJECTION, SOLUTION INTRAMUSCULAR; INTRAVENOUS; SUBCUTANEOUS at 17:10

## 2024-11-21 RX ADMIN — OXYCODONE 10 MG: 5 TABLET ORAL at 14:32

## 2024-11-21 RX ADMIN — OXYCODONE 10 MG: 5 TABLET ORAL at 05:51

## 2024-11-21 RX ADMIN — SODIUM CHLORIDE, PRESERVATIVE FREE 10 ML: 5 INJECTION INTRAVENOUS at 21:43

## 2024-11-21 RX ADMIN — ATORVASTATIN CALCIUM 80 MG: 80 TABLET, FILM COATED ORAL at 10:09

## 2024-11-21 RX ADMIN — PANTOPRAZOLE SODIUM 40 MG: 40 TABLET, DELAYED RELEASE ORAL at 14:32

## 2024-11-21 RX ADMIN — AMPICILLIN SODIUM AND SULBACTAM SODIUM 3000 MG: 2; 1 INJECTION, POWDER, FOR SOLUTION INTRAMUSCULAR; INTRAVENOUS at 04:01

## 2024-11-21 RX ADMIN — FLUPHENAZINE HYDROCHLORIDE 5 MG: 2.5 TABLET, FILM COATED ORAL at 23:23

## 2024-11-21 RX ADMIN — TIZANIDINE 4 MG: 4 TABLET ORAL at 10:09

## 2024-11-21 RX ADMIN — OXYCODONE 10 MG: 5 TABLET ORAL at 19:16

## 2024-11-21 RX ADMIN — RANOLAZINE 500 MG: 500 TABLET, EXTENDED RELEASE ORAL at 21:40

## 2024-11-21 RX ADMIN — DULOXETINE HYDROCHLORIDE 60 MG: 60 CAPSULE, DELAYED RELEASE ORAL at 10:09

## 2024-11-21 RX ADMIN — TIZANIDINE 4 MG: 4 TABLET ORAL at 21:40

## 2024-11-21 RX ADMIN — AMPICILLIN SODIUM AND SULBACTAM SODIUM 3000 MG: 2; 1 INJECTION, POWDER, FOR SOLUTION INTRAMUSCULAR; INTRAVENOUS at 10:07

## 2024-11-21 RX ADMIN — ISOSORBIDE MONONITRATE 60 MG: 60 TABLET, EXTENDED RELEASE ORAL at 10:08

## 2024-11-21 RX ADMIN — OXYCODONE 10 MG: 5 TABLET ORAL at 00:23

## 2024-11-21 RX ADMIN — HYDROMORPHONE HYDROCHLORIDE 0.5 MG: 1 INJECTION, SOLUTION INTRAMUSCULAR; INTRAVENOUS; SUBCUTANEOUS at 03:58

## 2024-11-21 RX ADMIN — ALPRAZOLAM 0.25 MG: 0.25 TABLET ORAL at 00:22

## 2024-11-21 RX ADMIN — TAMSULOSIN HYDROCHLORIDE 0.4 MG: 0.4 CAPSULE ORAL at 10:09

## 2024-11-21 RX ADMIN — HYDROMORPHONE HYDROCHLORIDE 0.5 MG: 1 INJECTION, SOLUTION INTRAMUSCULAR; INTRAVENOUS; SUBCUTANEOUS at 11:11

## 2024-11-21 RX ADMIN — AMPICILLIN SODIUM AND SULBACTAM SODIUM 3000 MG: 2; 1 INJECTION, POWDER, FOR SOLUTION INTRAMUSCULAR; INTRAVENOUS at 21:30

## 2024-11-21 RX ADMIN — HYDROMORPHONE HYDROCHLORIDE 0.5 MG: 1 INJECTION, SOLUTION INTRAMUSCULAR; INTRAVENOUS; SUBCUTANEOUS at 22:10

## 2024-11-21 RX ADMIN — LEVOTHYROXINE SODIUM 175 MCG: 0.17 TABLET ORAL at 05:51

## 2024-11-21 RX ADMIN — PRAZOSIN HYDROCHLORIDE 5 MG: 5 CAPSULE ORAL at 23:23

## 2024-11-21 RX ADMIN — ACETAMINOPHEN 650 MG: 325 TABLET ORAL at 14:32

## 2024-11-21 RX ADMIN — AMLODIPINE BESYLATE 5 MG: 5 TABLET ORAL at 10:09

## 2024-11-21 RX ADMIN — CARVEDILOL 3.12 MG: 3.12 TABLET, FILM COATED ORAL at 10:09

## 2024-11-21 RX ADMIN — ENOXAPARIN SODIUM 40 MG: 100 INJECTION SUBCUTANEOUS at 10:09

## 2024-11-21 RX ADMIN — OXYCODONE 10 MG: 5 TABLET ORAL at 10:09

## 2024-11-21 RX ADMIN — CARVEDILOL 3.12 MG: 3.12 TABLET, FILM COATED ORAL at 17:10

## 2024-11-21 RX ADMIN — SODIUM CHLORIDE, PRESERVATIVE FREE 10 ML: 5 INJECTION INTRAVENOUS at 10:10

## 2024-11-21 RX ADMIN — PANTOPRAZOLE SODIUM 40 MG: 40 TABLET, DELAYED RELEASE ORAL at 05:51

## 2024-11-21 ASSESSMENT — PAIN DESCRIPTION - LOCATION
LOCATION: BACK
LOCATION: NECK
LOCATION: BACK

## 2024-11-21 ASSESSMENT — PAIN SCALES - GENERAL
PAINLEVEL_OUTOF10: 3
PAINLEVEL_OUTOF10: 8
PAINLEVEL_OUTOF10: 8
PAINLEVEL_OUTOF10: 7
PAINLEVEL_OUTOF10: 7
PAINLEVEL_OUTOF10: 9
PAINLEVEL_OUTOF10: 8
PAINLEVEL_OUTOF10: 9
PAINLEVEL_OUTOF10: 7
PAINLEVEL_OUTOF10: 6
PAINLEVEL_OUTOF10: 9
PAINLEVEL_OUTOF10: 7
PAINLEVEL_OUTOF10: 4
PAINLEVEL_OUTOF10: 9
PAINLEVEL_OUTOF10: 5
PAINLEVEL_OUTOF10: 4
PAINLEVEL_OUTOF10: 6
PAINLEVEL_OUTOF10: 8
PAINLEVEL_OUTOF10: 9
PAINLEVEL_OUTOF10: 7

## 2024-11-21 ASSESSMENT — PAIN DESCRIPTION - ONSET
ONSET: ON-GOING
ONSET: GRADUAL
ONSET: ON-GOING

## 2024-11-21 ASSESSMENT — PAIN DESCRIPTION - PAIN TYPE
TYPE: ACUTE PAIN

## 2024-11-21 ASSESSMENT — PAIN DESCRIPTION - ORIENTATION
ORIENTATION: UPPER
ORIENTATION: LOWER
ORIENTATION: MID;UPPER
ORIENTATION: UPPER;POSTERIOR
ORIENTATION: LOWER
ORIENTATION: MID;LOWER
ORIENTATION: LOWER
ORIENTATION: LOWER;MID;UPPER
ORIENTATION: LOWER
ORIENTATION: LOWER;MID;UPPER
ORIENTATION: MID;LOWER;UPPER

## 2024-11-21 ASSESSMENT — PAIN DESCRIPTION - FREQUENCY
FREQUENCY: CONTINUOUS
FREQUENCY: INTERMITTENT
FREQUENCY: CONTINUOUS
FREQUENCY: INTERMITTENT
FREQUENCY: CONTINUOUS

## 2024-11-21 ASSESSMENT — PAIN DESCRIPTION - DESCRIPTORS
DESCRIPTORS: ACHING
DESCRIPTORS: ACHING
DESCRIPTORS: ACHING;THROBBING
DESCRIPTORS: ACHING
DESCRIPTORS: ACHING;DULL
DESCRIPTORS: ACHING
DESCRIPTORS: DULL
DESCRIPTORS: ACHING
DESCRIPTORS: ACHING;THROBBING

## 2024-11-21 ASSESSMENT — PAIN - FUNCTIONAL ASSESSMENT

## 2024-11-21 ASSESSMENT — ENCOUNTER SYMPTOMS: SHORTNESS OF BREATH: 1

## 2024-11-21 ASSESSMENT — LIFESTYLE VARIABLES: SMOKING_STATUS: 1

## 2024-11-21 NOTE — PLAN OF CARE
Problem: Chronic Conditions and Co-morbidities  Goal: Patient's chronic conditions and co-morbidity symptoms are monitored and maintained or improved  Outcome: Progressing  Flowsheets (Taken 11/18/2024 1615 by Norma Petty, RN)  Care Plan - Patient's Chronic Conditions and Co-Morbidity Symptoms are Monitored and Maintained or Improved: Monitor and assess patient's chronic conditions and comorbid symptoms for stability, deterioration, or improvement     Problem: Discharge Planning  Goal: Discharge to home or other facility with appropriate resources  Outcome: Progressing  Flowsheets (Taken 11/20/2024 0101)  Discharge to home or other facility with appropriate resources:   Identify barriers to discharge with patient and caregiver   Arrange for needed discharge resources and transportation as appropriate   Identify discharge learning needs (meds, wound care, etc)     Problem: Pain  Goal: Verbalizes/displays adequate comfort level or baseline comfort level  Outcome: Progressing  Flowsheets (Taken 11/17/2024 1732 by Arsenio Moon, RN)  Verbalizes/displays adequate comfort level or baseline comfort level:   Encourage patient to monitor pain and request assistance   Assess pain using appropriate pain scale   Administer analgesics based on type and severity of pain and evaluate response   Implement non-pharmacological measures as appropriate and evaluate response   Consider cultural and social influences on pain and pain management   Notify Licensed Independent Practitioner if interventions unsuccessful or patient reports new pain

## 2024-11-21 NOTE — PROGRESS NOTES
crowding of the cauda equina. Thickened and redundant nerve root within the cauda equina similar to prior exam.  This may be due to the stenosis at L4-5 or less likely a nerve sheath tumor.     XR LUMBAR SPINE FLEXION AND EXTENSION ONLY  Result Date: 11/13/2024  Grade 1 anterolisthesis of L4 on L5. Degenerative disc disease and hypertrophic degenerative changes of the posterior elements at L4-L5 and L5-S1 levels. Limited extension motion. No evidence of instability.     MRI CERVICAL SPINE WO CONTRAST  Result Date: 11/13/2024  Reversal of the normal cervical spinal lordosis, centered at C5. Mild anterolisthesis of C3 on C4 and of C4 on C5. Multilevel degenerative disc disease and hypertrophic changes throughout the cervical spine. At the C3-C4 level, findings contribute to severe acquired central canal and bilateral foraminal stenosis. Mild effacement of the cord at this level. Other findings as described above.       Labs:  Recent Labs     11/20/24  0528   WBC 7.5   HGB 11.0*   HCT 32.9*            Recent Labs     11/20/24  0528      K 3.9      CO2 29   BUN 22*   CREATININE 1.2   GLUCOSE 114*   CALCIUM 9.6         No results for input(s): \"PROTIME\", \"INR\", \"APTT\" in the last 72 hours.      Patient Active Problem List    Diagnosis Date Noted    Cauda equina compression (HCC) 11/12/2024    Lower extremity numbness 11/12/2024    CVA (cerebral vascular accident) (HCC) 11/11/2024    HTN (hypertension) 11/11/2024    Acute CVA (cerebrovascular accident) (HCC) 11/11/2024    Chronic kidney disease 11/11/2024    Major depressive disorder, recurrent (HCC) 08/23/2024    H/O major depression 08/23/2024    Right facial numbness 05/11/2024    TIA (transient ischemic attack) 05/09/2024    Chest pain at rest 05/06/2024    Dyslipidemia 04/13/2024    Moderate episode of recurrent major depressive disorder (HCC) 11/20/2023    PTSD (post-traumatic stress disorder) 11/20/2023    Cocaine use disorder in remission  spent 25 minutes in the care of this patient.  Over 50% of that time was in face-to-face counseling regarding disease process, diagnostic testing, preventative measures, and answering patient and family questions

## 2024-11-21 NOTE — PROGRESS NOTES
Hospital Medicine Progress Note      Date of Admission: 11/12/2024  Hospital Day: 10    Chief Admission Complaint: Lower extremity numbness     Subjective:      Patient seen and examined at bedside today.  He states that he has not been given pain medication by the RN and called the    He does not remember when his last pain medication was.  Awaiting surgery tomorrow  Labs pending      Presenting Admission History:       Patient 53-year-old male with history of multivessel PCI who presented to [facility] with chest pain. After admission patient mentioned some numbness in his face arm and radiating through the left lower leg. Occasionally possible bowel movement without realizing it     Assessment/Plan:      Cervical stenosis  Lumbar myelopathy and radiculopathy  MRI reveals severe cervical spinal stenosis with anterior subluxation of C3 on C4 and C4 on C5, L4-L5 stenosis with thickening redundant nerve root within the cauda equina, crowding of cauda equina.    MRI brain unremarkable  Neurosurgery consulted, plan for C3-5 anterior cervical discectomy and fusion on 11/22  Pain management with oxy, Dilaudid and Zanaflex  Continue cervical collar when ambulating  PT/OT evaluated and recommend SNF      Right lung infiltrate concerning for aspiration pneumonia  Patient was lethargic, febrile, urinary incontinence and became hypoxic requiring 2 LNC on 11/17  Chest x-ray concerning for right lung pneumonia  COVID and flu negative  Blood culture no growth till date  Patient likely got aspirated while he was on IV pain medication, which was decreased  SLP consulted  Cefepime x 1, Unasyn for 4 days until 11/21 for total of 5 days antibiotics    Coronary artery disease s/p stent  Hypertension  Presented to [facility] ER with chest pain and was evaluated by cardiology with no further workup recommended.  Aspirin and Plavix held for possible neurosurgery procedure since 11/12  Continue Coreg 3.125, Imdur 60, Ranexa 500

## 2024-11-21 NOTE — PROGRESS NOTES
Pt aox4, VSSRA. Assist x1, impulsive. Tolerating fluids w/o complications, voiding BRP. Pt endorses pain to back, pain managed per MAR. C-collar in place when OOB, pt refusing. NAEO. All fall / safety precautions in place. POC continues.

## 2024-11-21 NOTE — PLAN OF CARE
Problem: Pain  Goal: Verbalizes/displays adequate comfort level or baseline comfort level  11/21/2024 0754 by Otilia Basilio, RN  Outcome: Progressing   Pt endorsing pain to neck. Being treated with PRN pain medication, rest, and frequent repositioning with pillow support for comfort and pressure relief. Pt reports some relief from pain with above interventions.   Problem: Skin/Tissue Integrity  Goal: Absence of new skin breakdown  Description: 1.  Monitor for areas of redness and/or skin breakdown  2.  Assess vascular access sites hourly  3.  Every 4-6 hours minimum:  Change oxygen saturation probe site  4.  Every 4-6 hours:  If on nasal continuous positive airway pressure, respiratory therapy assess nares and determine need for appliance change or resting period.  11/21/2024 0754 by Otilia Basilio, RN  Outcome: Progressing   Pt turns self   Problem: Safety - Adult  Goal: Free from fall injury  11/21/2024 0754 by Otilia Basilio, RN  Outcome: Progressing   All fall precautions in place. Bed locked and in lowest position with alarm on. Overbed table and personal belonings within reach. Call light within reach and patient instructed to use call light for assistance. Non-skid socks on.

## 2024-11-21 NOTE — CARE COORDINATION
CM following: Pt has been accepted at two SNF's [facility]. CM attended pt's room and reviewed accepting facilities with pt. Pt reports that he has spoken with his girlfriend and that he is hoping he may be able to go home with her at discharge. Pt reports girlfriend states he can stay with her and she can provide assistance. CM encouraged pt to see how he is able to ambulate after surgery as pt's girlfriend has steps to navigate at her place. Pt agreeable. CM will continue to follow for discharge planning.  Electronically signed by JAYLEEN Cooper on 11/21/2024 at 4:17 PM  [phone]

## 2024-11-21 NOTE — ANESTHESIA PRE PROCEDURE
Department of Anesthesiology  Preprocedure Note       Name:  Ashutosh Donovan   Age:  53 y.o.  :  1970                                          MRN:  [id number]         Date:  2024      Surgeon: Surgeon(s):  Otilia Ocampo MD    Procedure: Procedure(s):  Cervical 3 - Cervical 5 Anterior Cervical Discectomy and Fusion    Medications prior to admission:   Prior to Admission medications    Medication Sig Start Date End Date Taking? Authorizing Provider   atorvastatin (LIPITOR) 80 MG tablet Take 1 tablet by mouth daily   Yes Zeeshan Green MD   levothyroxine (SYNTHROID) 175 MCG tablet Take 1 tablet by mouth Daily   Yes Zeeshan Green MD   isosorbide mononitrate (IMDUR) 60 MG extended release tablet Take 1 tablet by mouth daily   Yes Zeeshan Green MD   prazosin (MINIPRESS) 5 MG capsule Take 1 capsule by mouth nightly   Yes Zeeshan Green MD   cyclobenzaprine (FLEXERIL) 5 MG tablet Take 1 tablet by mouth as needed for Muscle spasms   Yes Zeeshan Green MD   nitroGLYCERIN (NITROSTAT) 0.4 MG SL tablet Place 1 tablet under the tongue every 5 minutes as needed for Chest pain up to max of 3 total doses. If no relief after 1 dose, call 911.   Yes Zeeshan Green MD   loratadine (CLARITIN) 10 MG tablet Take 1 tablet by mouth daily   Yes Zeeshan Green MD   lidocaine 4 % external patch Place 1 patch onto the skin daily   Yes Zeeshan Green MD   tamsulosin (FLOMAX) 0.4 MG capsule Take 1 capsule by mouth daily   Yes Zeeshan Green MD   carvedilol (COREG) 3.125 MG tablet Take 1 tablet by mouth 2 times daily (with meals)   Yes Zeeshan Green MD   ergocalciferol (ERGOCALCIFEROL) 1.25 MG (75996 UT) capsule Take 1 capsule by mouth once a week   Yes Zeeshan Green MD   fluticasone (FLONASE) 50 MCG/ACT nasal spray 2 sprays by Each Nostril route daily as needed for Rhinitis   Yes Zeeshan Green MD   ALPRAZolam (XANAX) 0.25 MG tablet 
hours.    Coags:   Lab Results   Component Value Date/Time    PROTIME 12.2 11/21/2024 09:51 AM    INR 0.88 11/21/2024 09:51 AM    APTT 28.8 11/21/2024 09:51 AM       HCG (If Applicable): No results found for: \"PREGTESTUR\", \"PREGSERUM\", \"HCG\", \"HCGQUANT\"     ABGs: No results found for: \"PHART\", \"PO2ART\", \"JSN8XZC\", \"SZY5AJH\", \"BEART\", \"O9MLNITX\"     Type & Screen (If Applicable):  Lab Results   Component Value Date    ABORH A POS 11/21/2024    LABANTI NEG 11/21/2024       Drug/Infectious Status (If Applicable):  Lab Results   Component Value Date/Time    HIV Non-Reactive 11/15/2024 11:50 AM       COVID-19 Screening (If Applicable):   Lab Results   Component Value Date/Time    COVID19 NOT DETECTED 11/17/2024 09:48 PM           Anesthesia Evaluation  Patient summary reviewed and Nursing notes reviewed   no history of anesthetic complications:   Airway: Mallampati: II  TM distance: >3 FB   Neck ROM: limited  Mouth opening: > = 3 FB   Dental:          Pulmonary:Negative Pulmonary ROS and normal exam    (+)   shortness of breath: chronic,         current smoker                          ROS comment: Last smoked 2 weeks ago   Cardiovascular:  Exercise tolerance: poor (<4 METS)  (+) past MI: no interval change, CAD:, GORDON:               ROS comment: Echo 11/2024:  ·  Left Ventricle: Normal left ventricular systolic function with a visually estimated EF of 60 - 65%. Left ventricle size is normal. Increased wall thickness. Normal wall motion.  ·  Right Ventricle: Right ventricle size is normal. Normal systolic function. TAPSE is normal. TAPSE is 2.0 cm.      MI: December 2023 -  stents placed       Neuro/Psych:   (+) CVA: no interval change, psychiatric history:depression/anxiety              ROS comment: Cervical stenosis - pain radiating down back, numbness/tingling in left arm > right arm GI/Hepatic/Renal:   (+) renal disease:          Endo/Other: Negative Endo/Other ROS                    Abdominal:             Vascular:

## 2024-11-21 NOTE — PROGRESS NOTES
Patient is alert and oriented x4. Vs on ra. Pt is a sba to the bathroom. C collar on. Pt ha blayne to neck, managed per prn pharm measures. Pt can be impulsive. Pt tolerating po intake. Voiding. Poc continues.

## 2024-11-22 ENCOUNTER — APPOINTMENT (OUTPATIENT)
Dept: GENERAL RADIOLOGY | Age: 54
DRG: 551 | End: 2024-11-22
Attending: HOSPITALIST
Payer: MEDICARE

## 2024-11-22 ENCOUNTER — ANESTHESIA (OUTPATIENT)
Dept: OPERATING ROOM | Age: 54
End: 2024-11-22
Payer: MEDICARE

## 2024-11-22 LAB
EKG ATRIAL RATE: 82 BPM
EKG DIAGNOSIS: NORMAL
EKG P AXIS: 39 DEGREES
EKG P-R INTERVAL: 174 MS
EKG Q-T INTERVAL: 430 MS
EKG QRS DURATION: 118 MS
EKG QTC CALCULATION (BAZETT): 502 MS
EKG R AXIS: 36 DEGREES
EKG T AXIS: 45 DEGREES
EKG VENTRICULAR RATE: 82 BPM
GLUCOSE BLD-MCNC: 127 MG/DL (ref 70–99)
PERFORMED ON: ABNORMAL

## 2024-11-22 PROCEDURE — 2580000003 HC RX 258: Performed by: STUDENT IN AN ORGANIZED HEALTH CARE EDUCATION/TRAINING PROGRAM

## 2024-11-22 PROCEDURE — 93010 ELECTROCARDIOGRAM REPORT: CPT | Performed by: INTERNAL MEDICINE

## 2024-11-22 PROCEDURE — 2580000003 HC RX 258: Performed by: ANESTHESIOLOGY

## 2024-11-22 PROCEDURE — 2060000000 HC ICU INTERMEDIATE R&B

## 2024-11-22 PROCEDURE — 2709999900 HC NON-CHARGEABLE SUPPLY: Performed by: STUDENT IN AN ORGANIZED HEALTH CARE EDUCATION/TRAINING PROGRAM

## 2024-11-22 PROCEDURE — C1713 ANCHOR/SCREW BN/BN,TIS/BN: HCPCS | Performed by: STUDENT IN AN ORGANIZED HEALTH CARE EDUCATION/TRAINING PROGRAM

## 2024-11-22 PROCEDURE — 6370000000 HC RX 637 (ALT 250 FOR IP): Performed by: STUDENT IN AN ORGANIZED HEALTH CARE EDUCATION/TRAINING PROGRAM

## 2024-11-22 PROCEDURE — 6370000000 HC RX 637 (ALT 250 FOR IP): Performed by: NURSE PRACTITIONER

## 2024-11-22 PROCEDURE — C1889 IMPLANT/INSERT DEVICE, NOC: HCPCS | Performed by: STUDENT IN AN ORGANIZED HEALTH CARE EDUCATION/TRAINING PROGRAM

## 2024-11-22 PROCEDURE — 6360000002 HC RX W HCPCS: Performed by: NURSE PRACTITIONER

## 2024-11-22 PROCEDURE — 2500000003 HC RX 250 WO HCPCS: Performed by: ANESTHESIOLOGY

## 2024-11-22 PROCEDURE — 3700000001 HC ADD 15 MINUTES (ANESTHESIA): Performed by: STUDENT IN AN ORGANIZED HEALTH CARE EDUCATION/TRAINING PROGRAM

## 2024-11-22 PROCEDURE — 6360000002 HC RX W HCPCS

## 2024-11-22 PROCEDURE — 3700000000 HC ANESTHESIA ATTENDED CARE: Performed by: STUDENT IN AN ORGANIZED HEALTH CARE EDUCATION/TRAINING PROGRAM

## 2024-11-22 PROCEDURE — 99024 POSTOP FOLLOW-UP VISIT: CPT | Performed by: NURSE PRACTITIONER

## 2024-11-22 PROCEDURE — 0RB30ZZ EXCISION OF CERVICAL VERTEBRAL DISC, OPEN APPROACH: ICD-10-PCS | Performed by: STUDENT IN AN ORGANIZED HEALTH CARE EDUCATION/TRAINING PROGRAM

## 2024-11-22 PROCEDURE — 6360000002 HC RX W HCPCS: Performed by: ANESTHESIOLOGY

## 2024-11-22 PROCEDURE — 93005 ELECTROCARDIOGRAM TRACING: CPT | Performed by: ANESTHESIOLOGY

## 2024-11-22 PROCEDURE — 3600000014 HC SURGERY LEVEL 4 ADDTL 15MIN: Performed by: STUDENT IN AN ORGANIZED HEALTH CARE EDUCATION/TRAINING PROGRAM

## 2024-11-22 PROCEDURE — 2720000010 HC SURG SUPPLY STERILE: Performed by: STUDENT IN AN ORGANIZED HEALTH CARE EDUCATION/TRAINING PROGRAM

## 2024-11-22 PROCEDURE — A4217 STERILE WATER/SALINE, 500 ML: HCPCS | Performed by: STUDENT IN AN ORGANIZED HEALTH CARE EDUCATION/TRAINING PROGRAM

## 2024-11-22 PROCEDURE — 4A11X4G MONITORING OF PERIPHERAL NERVOUS ELECTRICAL ACTIVITY, INTRAOPERATIVE, EXTERNAL APPROACH: ICD-10-PCS | Performed by: STUDENT IN AN ORGANIZED HEALTH CARE EDUCATION/TRAINING PROGRAM

## 2024-11-22 PROCEDURE — 7100000000 HC PACU RECOVERY - FIRST 15 MIN: Performed by: STUDENT IN AN ORGANIZED HEALTH CARE EDUCATION/TRAINING PROGRAM

## 2024-11-22 PROCEDURE — 3600000004 HC SURGERY LEVEL 4 BASE: Performed by: STUDENT IN AN ORGANIZED HEALTH CARE EDUCATION/TRAINING PROGRAM

## 2024-11-22 PROCEDURE — 7100000001 HC PACU RECOVERY - ADDTL 15 MIN: Performed by: STUDENT IN AN ORGANIZED HEALTH CARE EDUCATION/TRAINING PROGRAM

## 2024-11-22 PROCEDURE — 6360000002 HC RX W HCPCS: Performed by: STUDENT IN AN ORGANIZED HEALTH CARE EDUCATION/TRAINING PROGRAM

## 2024-11-22 PROCEDURE — 72040 X-RAY EXAM NECK SPINE 2-3 VW: CPT

## 2024-11-22 PROCEDURE — 0RG20A0 FUSION OF 2 OR MORE CERVICAL VERTEBRAL JOINTS WITH INTERBODY FUSION DEVICE, ANTERIOR APPROACH, ANTERIOR COLUMN, OPEN APPROACH: ICD-10-PCS | Performed by: STUDENT IN AN ORGANIZED HEALTH CARE EDUCATION/TRAINING PROGRAM

## 2024-11-22 PROCEDURE — C1729 CATH, DRAINAGE: HCPCS | Performed by: STUDENT IN AN ORGANIZED HEALTH CARE EDUCATION/TRAINING PROGRAM

## 2024-11-22 PROCEDURE — 01N10ZZ RELEASE CERVICAL NERVE, OPEN APPROACH: ICD-10-PCS | Performed by: STUDENT IN AN ORGANIZED HEALTH CARE EDUCATION/TRAINING PROGRAM

## 2024-11-22 PROCEDURE — 2580000003 HC RX 258

## 2024-11-22 DEVICE — IMPLANTABLE DEVICE: Type: IMPLANTABLE DEVICE | Site: SPINE CERVICAL | Status: FUNCTIONAL

## 2024-11-22 DEVICE — IMPL CAGE ACIS PRO TI 360 7MM: Type: IMPLANTABLE DEVICE | Site: SPINE CERVICAL | Status: FUNCTIONAL

## 2024-11-22 DEVICE — SCREW SPNL L 14 MM DIA 3.5 MM TI ANTR CERV SELF DRILLING VA: Type: IMPLANTABLE DEVICE | Site: SPINE CERVICAL | Status: FUNCTIONAL

## 2024-11-22 DEVICE — ALLOGRAFT BNE 1 CC FIBER PLIAFX PRIM: Type: IMPLANTABLE DEVICE | Site: SPINE CERVICAL | Status: FUNCTIONAL

## 2024-11-22 DEVICE — SCREW SPNL L 16 MM DIA 3.5 MM TI ANTR CERV SELF DRILLING VA: Type: IMPLANTABLE DEVICE | Site: SPINE CERVICAL | Status: FUNCTIONAL

## 2024-11-22 RX ORDER — ONDANSETRON 2 MG/ML
INJECTION INTRAMUSCULAR; INTRAVENOUS
Status: DISCONTINUED | OUTPATIENT
Start: 2024-11-22 | End: 2024-11-22 | Stop reason: SDUPTHER

## 2024-11-22 RX ORDER — NALOXONE HYDROCHLORIDE 0.4 MG/ML
INJECTION, SOLUTION INTRAMUSCULAR; INTRAVENOUS; SUBCUTANEOUS PRN
Status: DISCONTINUED | OUTPATIENT
Start: 2024-11-22 | End: 2024-11-22 | Stop reason: HOSPADM

## 2024-11-22 RX ORDER — ROCURONIUM BROMIDE 10 MG/ML
INJECTION, SOLUTION INTRAVENOUS
Status: DISCONTINUED | OUTPATIENT
Start: 2024-11-22 | End: 2024-11-22 | Stop reason: SDUPTHER

## 2024-11-22 RX ORDER — LIDOCAINE HYDROCHLORIDE 20 MG/ML
INJECTION, SOLUTION INTRAVENOUS
Status: DISCONTINUED | OUTPATIENT
Start: 2024-11-22 | End: 2024-11-22 | Stop reason: SDUPTHER

## 2024-11-22 RX ORDER — SODIUM CHLORIDE, SODIUM LACTATE, POTASSIUM CHLORIDE, CALCIUM CHLORIDE 600; 310; 30; 20 MG/100ML; MG/100ML; MG/100ML; MG/100ML
INJECTION, SOLUTION INTRAVENOUS
Status: DISCONTINUED | OUTPATIENT
Start: 2024-11-22 | End: 2024-11-22 | Stop reason: SDUPTHER

## 2024-11-22 RX ORDER — SODIUM CHLORIDE 0.9 % (FLUSH) 0.9 %
5-40 SYRINGE (ML) INJECTION PRN
Status: DISCONTINUED | OUTPATIENT
Start: 2024-11-22 | End: 2024-11-22 | Stop reason: HOSPADM

## 2024-11-22 RX ORDER — SUCCINYLCHOLINE/SOD CL,ISO/PF 200MG/10ML
SYRINGE (ML) INTRAVENOUS
Status: DISCONTINUED | OUTPATIENT
Start: 2024-11-22 | End: 2024-11-22 | Stop reason: SDUPTHER

## 2024-11-22 RX ORDER — SODIUM CHLORIDE 9 MG/ML
INJECTION, SOLUTION INTRAVENOUS
Status: DISCONTINUED | OUTPATIENT
Start: 2024-11-22 | End: 2024-11-22 | Stop reason: SDUPTHER

## 2024-11-22 RX ORDER — METHOCARBAMOL 100 MG/ML
1000 INJECTION, SOLUTION INTRAMUSCULAR; INTRAVENOUS EVERY 8 HOURS
Status: COMPLETED | OUTPATIENT
Start: 2024-11-22 | End: 2024-11-23

## 2024-11-22 RX ORDER — SODIUM CHLORIDE 0.9 % (FLUSH) 0.9 %
5-40 SYRINGE (ML) INJECTION EVERY 12 HOURS SCHEDULED
Status: DISCONTINUED | OUTPATIENT
Start: 2024-11-22 | End: 2024-11-22 | Stop reason: HOSPADM

## 2024-11-22 RX ORDER — METHOCARBAMOL 750 MG/1
750 TABLET, FILM COATED ORAL 4 TIMES DAILY
Qty: 40 TABLET | Refills: 0 | Status: ON HOLD | OUTPATIENT
Start: 2024-11-22 | End: 2024-12-03 | Stop reason: HOSPADM

## 2024-11-22 RX ORDER — METHOCARBAMOL 100 MG/ML
INJECTION, SOLUTION INTRAMUSCULAR; INTRAVENOUS
Status: DISCONTINUED | OUTPATIENT
Start: 2024-11-22 | End: 2024-11-22 | Stop reason: SDUPTHER

## 2024-11-22 RX ORDER — SODIUM CHLORIDE 9 MG/ML
INJECTION, SOLUTION INTRAVENOUS PRN
Status: DISCONTINUED | OUTPATIENT
Start: 2024-11-22 | End: 2024-11-22 | Stop reason: HOSPADM

## 2024-11-22 RX ORDER — DEXAMETHASONE SODIUM PHOSPHATE 4 MG/ML
INJECTION, SOLUTION INTRA-ARTICULAR; INTRALESIONAL; INTRAMUSCULAR; INTRAVENOUS; SOFT TISSUE
Status: DISPENSED
Start: 2024-11-22 | End: 2024-11-23

## 2024-11-22 RX ORDER — METOPROLOL TARTRATE 1 MG/ML
INJECTION, SOLUTION INTRAVENOUS
Status: DISCONTINUED | OUTPATIENT
Start: 2024-11-22 | End: 2024-11-22 | Stop reason: SDUPTHER

## 2024-11-22 RX ORDER — HALOPERIDOL 5 MG/ML
1 INJECTION INTRAMUSCULAR
Status: DISCONTINUED | OUTPATIENT
Start: 2024-11-22 | End: 2024-11-22 | Stop reason: HOSPADM

## 2024-11-22 RX ORDER — PROPOFOL 10 MG/ML
INJECTION, EMULSION INTRAVENOUS
Status: DISCONTINUED | OUTPATIENT
Start: 2024-11-22 | End: 2024-11-22 | Stop reason: SDUPTHER

## 2024-11-22 RX ORDER — HYDROMORPHONE HYDROCHLORIDE 1 MG/ML
0.5 INJECTION, SOLUTION INTRAMUSCULAR; INTRAVENOUS; SUBCUTANEOUS EVERY 5 MIN PRN
Status: DISCONTINUED | OUTPATIENT
Start: 2024-11-22 | End: 2024-11-22 | Stop reason: HOSPADM

## 2024-11-22 RX ORDER — CEFAZOLIN SODIUM 1 G/3ML
INJECTION, POWDER, FOR SOLUTION INTRAMUSCULAR; INTRAVENOUS
Status: DISCONTINUED | OUTPATIENT
Start: 2024-11-22 | End: 2024-11-22 | Stop reason: SDUPTHER

## 2024-11-22 RX ORDER — DEXAMETHASONE SODIUM PHOSPHATE 4 MG/ML
6 INJECTION, SOLUTION INTRA-ARTICULAR; INTRALESIONAL; INTRAMUSCULAR; INTRAVENOUS; SOFT TISSUE ONCE
Status: COMPLETED | OUTPATIENT
Start: 2024-11-22 | End: 2024-11-22

## 2024-11-22 RX ORDER — ENOXAPARIN SODIUM 100 MG/ML
30 INJECTION SUBCUTANEOUS DAILY
Status: CANCELLED | OUTPATIENT
Start: 2024-11-23

## 2024-11-22 RX ORDER — FENTANYL CITRATE 50 UG/ML
INJECTION, SOLUTION INTRAMUSCULAR; INTRAVENOUS
Status: DISCONTINUED | OUTPATIENT
Start: 2024-11-22 | End: 2024-11-22 | Stop reason: SDUPTHER

## 2024-11-22 RX ORDER — HYDRALAZINE HYDROCHLORIDE 20 MG/ML
10 INJECTION INTRAMUSCULAR; INTRAVENOUS
Status: DISCONTINUED | OUTPATIENT
Start: 2024-11-22 | End: 2024-11-22 | Stop reason: HOSPADM

## 2024-11-22 RX ORDER — DEXAMETHASONE SODIUM PHOSPHATE 4 MG/ML
INJECTION, SOLUTION INTRA-ARTICULAR; INTRALESIONAL; INTRAMUSCULAR; INTRAVENOUS; SOFT TISSUE
Status: DISCONTINUED | OUTPATIENT
Start: 2024-11-22 | End: 2024-11-22 | Stop reason: SDUPTHER

## 2024-11-22 RX ORDER — PROCHLORPERAZINE EDISYLATE 5 MG/ML
5 INJECTION INTRAMUSCULAR; INTRAVENOUS
Status: DISCONTINUED | OUTPATIENT
Start: 2024-11-22 | End: 2024-11-22 | Stop reason: HOSPADM

## 2024-11-22 RX ORDER — HYDROMORPHONE HYDROCHLORIDE 1 MG/ML
0.5 INJECTION, SOLUTION INTRAMUSCULAR; INTRAVENOUS; SUBCUTANEOUS EVERY 4 HOURS PRN
Status: DISCONTINUED | OUTPATIENT
Start: 2024-11-22 | End: 2024-11-24 | Stop reason: HOSPADM

## 2024-11-22 RX ORDER — NICOTINE 21 MG/24HR
1 PATCH, TRANSDERMAL 24 HOURS TRANSDERMAL EVERY 24 HOURS
Status: DISCONTINUED | OUTPATIENT
Start: 2024-11-22 | End: 2024-11-24 | Stop reason: HOSPADM

## 2024-11-22 RX ORDER — FENTANYL CITRATE 50 UG/ML
25 INJECTION, SOLUTION INTRAMUSCULAR; INTRAVENOUS EVERY 5 MIN PRN
Status: DISCONTINUED | OUTPATIENT
Start: 2024-11-22 | End: 2024-11-22 | Stop reason: HOSPADM

## 2024-11-22 RX ORDER — DIAZEPAM 5 MG/1
5 TABLET ORAL EVERY 6 HOURS PRN
Status: DISCONTINUED | OUTPATIENT
Start: 2024-11-22 | End: 2024-11-24 | Stop reason: HOSPADM

## 2024-11-22 RX ORDER — HYDROMORPHONE HYDROCHLORIDE 2 MG/ML
INJECTION, SOLUTION INTRAMUSCULAR; INTRAVENOUS; SUBCUTANEOUS
Status: DISCONTINUED | OUTPATIENT
Start: 2024-11-22 | End: 2024-11-22 | Stop reason: SDUPTHER

## 2024-11-22 RX ORDER — ESMOLOL HYDROCHLORIDE 10 MG/ML
INJECTION INTRAVENOUS
Status: DISCONTINUED | OUTPATIENT
Start: 2024-11-22 | End: 2024-11-22 | Stop reason: SDUPTHER

## 2024-11-22 RX ORDER — OXYCODONE HYDROCHLORIDE 10 MG/1
10 TABLET ORAL EVERY 4 HOURS PRN
Qty: 18 TABLET | Refills: 0 | Status: SHIPPED | OUTPATIENT
Start: 2024-11-22 | End: 2024-11-25

## 2024-11-22 RX ADMIN — CARVEDILOL 3.12 MG: 3.12 TABLET, FILM COATED ORAL at 18:10

## 2024-11-22 RX ADMIN — DIAZEPAM 5 MG: 5 TABLET ORAL at 23:34

## 2024-11-22 RX ADMIN — METHOCARBAMOL 200 MG: 100 INJECTION INTRAMUSCULAR; INTRAVENOUS at 10:31

## 2024-11-22 RX ADMIN — WATER 2000 MG: 1 INJECTION INTRAMUSCULAR; INTRAVENOUS; SUBCUTANEOUS at 18:10

## 2024-11-22 RX ADMIN — DEXAMETHASONE SODIUM PHOSPHATE 6 MG: 4 INJECTION INTRA-ARTICULAR; INTRALESIONAL; INTRAMUSCULAR; INTRAVENOUS; SOFT TISSUE at 20:16

## 2024-11-22 RX ADMIN — HYDROMORPHONE HYDROCHLORIDE 0.5 MG: 1 INJECTION, SOLUTION INTRAMUSCULAR; INTRAVENOUS; SUBCUTANEOUS at 20:12

## 2024-11-22 RX ADMIN — DEXAMETHASONE SODIUM PHOSPHATE 10 MG: 4 INJECTION INTRA-ARTICULAR; INTRALESIONAL; INTRAMUSCULAR; INTRAVENOUS; SOFT TISSUE at 08:42

## 2024-11-22 RX ADMIN — PRAZOSIN HYDROCHLORIDE 5 MG: 5 CAPSULE ORAL at 20:17

## 2024-11-22 RX ADMIN — METHOCARBAMOL 1000 MG: 100 INJECTION INTRAMUSCULAR; INTRAVENOUS at 23:34

## 2024-11-22 RX ADMIN — Medication 160 MG: at 08:23

## 2024-11-22 RX ADMIN — LIDOCAINE HYDROCHLORIDE 100 MG: 20 INJECTION, SOLUTION INTRAVENOUS at 08:23

## 2024-11-22 RX ADMIN — ALPRAZOLAM 0.25 MG: 0.25 TABLET ORAL at 20:34

## 2024-11-22 RX ADMIN — REMIFENTANIL HYDROCHLORIDE 0.12 MCG/KG/MIN: 1 INJECTION, POWDER, LYOPHILIZED, FOR SOLUTION INTRAVENOUS at 08:25

## 2024-11-22 RX ADMIN — TIZANIDINE 4 MG: 4 TABLET ORAL at 20:17

## 2024-11-22 RX ADMIN — HYDROMORPHONE HYDROCHLORIDE 0.5 MG: 2 INJECTION, SOLUTION INTRAMUSCULAR; INTRAVENOUS; SUBCUTANEOUS at 10:35

## 2024-11-22 RX ADMIN — HYDROMORPHONE HYDROCHLORIDE 0.5 MG: 2 INJECTION, SOLUTION INTRAMUSCULAR; INTRAVENOUS; SUBCUTANEOUS at 08:12

## 2024-11-22 RX ADMIN — PHENYLEPHRINE HYDROCHLORIDE 20 MCG/MIN: 10 INJECTION INTRAVENOUS at 08:31

## 2024-11-22 RX ADMIN — METHOCARBAMOL 200 MG: 100 INJECTION INTRAMUSCULAR; INTRAVENOUS at 10:45

## 2024-11-22 RX ADMIN — HYDROMORPHONE HYDROCHLORIDE 0.5 MG: 1 INJECTION, SOLUTION INTRAMUSCULAR; INTRAVENOUS; SUBCUTANEOUS at 14:36

## 2024-11-22 RX ADMIN — CEFAZOLIN SODIUM 2 G: 1 POWDER, FOR SOLUTION INTRAMUSCULAR; INTRAVENOUS at 08:27

## 2024-11-22 RX ADMIN — METHOCARBAMOL 200 MG: 100 INJECTION INTRAMUSCULAR; INTRAVENOUS at 10:42

## 2024-11-22 RX ADMIN — METHOCARBAMOL 1000 MG: 100 INJECTION INTRAMUSCULAR; INTRAVENOUS at 18:10

## 2024-11-22 RX ADMIN — RANOLAZINE 500 MG: 500 TABLET, EXTENDED RELEASE ORAL at 20:16

## 2024-11-22 RX ADMIN — AMPICILLIN SODIUM AND SULBACTAM SODIUM 3000 MG: 2; 1 INJECTION, POWDER, FOR SOLUTION INTRAMUSCULAR; INTRAVENOUS at 03:16

## 2024-11-22 RX ADMIN — METHOCARBAMOL 200 MG: 100 INJECTION INTRAMUSCULAR; INTRAVENOUS at 10:38

## 2024-11-22 RX ADMIN — ONDANSETRON 4 MG: 2 INJECTION INTRAMUSCULAR; INTRAVENOUS at 10:45

## 2024-11-22 RX ADMIN — SODIUM CHLORIDE: 9 INJECTION, SOLUTION INTRAVENOUS at 08:12

## 2024-11-22 RX ADMIN — ESMOLOL HYDROCHLORIDE 20 MG: 10 INJECTION, SOLUTION INTRAVENOUS at 09:18

## 2024-11-22 RX ADMIN — HYDROMORPHONE HYDROCHLORIDE 0.5 MG: 1 INJECTION, SOLUTION INTRAMUSCULAR; INTRAVENOUS; SUBCUTANEOUS at 04:21

## 2024-11-22 RX ADMIN — FENTANYL CITRATE 50 MCG: 50 INJECTION, SOLUTION INTRAMUSCULAR; INTRAVENOUS at 09:17

## 2024-11-22 RX ADMIN — FENTANYL CITRATE 50 MCG: 50 INJECTION, SOLUTION INTRAMUSCULAR; INTRAVENOUS at 09:13

## 2024-11-22 RX ADMIN — HYDROMORPHONE HYDROCHLORIDE 0.5 MG: 2 INJECTION, SOLUTION INTRAMUSCULAR; INTRAVENOUS; SUBCUTANEOUS at 08:20

## 2024-11-22 RX ADMIN — WATER 2000 MG: 1 INJECTION INTRAMUSCULAR; INTRAVENOUS; SUBCUTANEOUS at 23:36

## 2024-11-22 RX ADMIN — TIZANIDINE 4 MG: 4 TABLET ORAL at 14:28

## 2024-11-22 RX ADMIN — PANTOPRAZOLE SODIUM 40 MG: 40 TABLET, DELAYED RELEASE ORAL at 14:29

## 2024-11-22 RX ADMIN — PROPOFOL 150 MG: 10 INJECTION, EMULSION INTRAVENOUS at 08:23

## 2024-11-22 RX ADMIN — OXYCODONE 10 MG: 5 TABLET ORAL at 23:35

## 2024-11-22 RX ADMIN — OXYCODONE 10 MG: 5 TABLET ORAL at 18:10

## 2024-11-22 RX ADMIN — PROPOFOL 150 MCG/KG/MIN: 10 INJECTION, EMULSION INTRAVENOUS at 08:25

## 2024-11-22 RX ADMIN — METHOCARBAMOL 200 MG: 100 INJECTION INTRAMUSCULAR; INTRAVENOUS at 10:34

## 2024-11-22 RX ADMIN — SODIUM CHLORIDE 990 ML: 9 INJECTION, SOLUTION INTRAVENOUS at 03:15

## 2024-11-22 RX ADMIN — ROCURONIUM BROMIDE 50 MG: 10 INJECTION, SOLUTION INTRAVENOUS at 08:45

## 2024-11-22 RX ADMIN — SUGAMMADEX 200 MG: 100 INJECTION, SOLUTION INTRAVENOUS at 09:28

## 2024-11-22 RX ADMIN — SODIUM CHLORIDE, SODIUM LACTATE, POTASSIUM CHLORIDE, AND CALCIUM CHLORIDE: .6; .31; .03; .02 INJECTION, SOLUTION INTRAVENOUS at 08:40

## 2024-11-22 RX ADMIN — AMPICILLIN SODIUM AND SULBACTAM SODIUM 3000 MG: 2; 1 INJECTION, POWDER, FOR SOLUTION INTRAMUSCULAR; INTRAVENOUS at 14:45

## 2024-11-22 RX ADMIN — FLUPHENAZINE HYDROCHLORIDE 5 MG: 2.5 TABLET, FILM COATED ORAL at 20:17

## 2024-11-22 RX ADMIN — METOPROLOL TARTRATE 2.5 MG: 1 INJECTION, SOLUTION INTRAVENOUS at 10:32

## 2024-11-22 ASSESSMENT — PAIN SCALES - GENERAL
PAINLEVEL_OUTOF10: 0
PAINLEVEL_OUTOF10: 7
PAINLEVEL_OUTOF10: 9
PAINLEVEL_OUTOF10: 0
PAINLEVEL_OUTOF10: 9
PAINLEVEL_OUTOF10: 7
PAINLEVEL_OUTOF10: 0
PAINLEVEL_OUTOF10: 0
PAINLEVEL_OUTOF10: 7
PAINLEVEL_OUTOF10: 6
PAINLEVEL_OUTOF10: 7
PAINLEVEL_OUTOF10: 0

## 2024-11-22 ASSESSMENT — ENCOUNTER SYMPTOMS: SHORTNESS OF BREATH: 1

## 2024-11-22 ASSESSMENT — PAIN DESCRIPTION - LOCATION
LOCATION: NECK
LOCATION: BACK
LOCATION: NECK;BACK
LOCATION: NECK;BACK
LOCATION: NECK

## 2024-11-22 ASSESSMENT — PAIN DESCRIPTION - ORIENTATION
ORIENTATION: ANTERIOR;LOWER
ORIENTATION: MID
ORIENTATION: LOWER
ORIENTATION: MID
ORIENTATION: MID
ORIENTATION: MID;LOWER
ORIENTATION: MID

## 2024-11-22 ASSESSMENT — PAIN DESCRIPTION - DESCRIPTORS
DESCRIPTORS: ACHING
DESCRIPTORS: ACHING;BURNING
DESCRIPTORS: ACHING
DESCRIPTORS: ACHING;BURNING;DISCOMFORT
DESCRIPTORS: ACHING;BURNING
DESCRIPTORS: ACHING

## 2024-11-22 ASSESSMENT — PAIN DESCRIPTION - FREQUENCY
FREQUENCY: INTERMITTENT
FREQUENCY: CONTINUOUS
FREQUENCY: INTERMITTENT
FREQUENCY: CONTINUOUS

## 2024-11-22 ASSESSMENT — PAIN DESCRIPTION - PAIN TYPE
TYPE: SURGICAL PAIN
TYPE: ACUTE PAIN

## 2024-11-22 ASSESSMENT — PAIN - FUNCTIONAL ASSESSMENT
PAIN_FUNCTIONAL_ASSESSMENT: ACTIVITIES ARE NOT PREVENTED

## 2024-11-22 ASSESSMENT — PAIN DESCRIPTION - ONSET
ONSET: ON-GOING
ONSET: ON-GOING

## 2024-11-22 ASSESSMENT — PAIN SCALES - WONG BAKER: WONGBAKER_NUMERICALRESPONSE: NO HURT

## 2024-11-22 ASSESSMENT — LIFESTYLE VARIABLES: SMOKING_STATUS: 1

## 2024-11-22 NOTE — PROGRESS NOTES
Per report from anesthesiologist, pt was somnolent pre op. He remains lethargic post op. Will continue to give him time to awake before considering tx back to his room on 5T

## 2024-11-22 NOTE — PROGRESS NOTES
Pt awake in bed, alert and oriented x4. VSS on RA, pt exhibiting no s/s acute distress at this time. Pt tolerating PO diet well, pt NPO since 0000 in prep for sx at 0730. Pt ambulated this shift x1 SBA, at times with gait belt as pt refuses gait belt at times, tolerating well. Pt is impulsive. Pt voiding via BRP. Pt pain managed via MAR. Standard safety precautions in place, bed locked and in lowest position, bed/chair alarm on, gripper socks applied, bedside table/call light within reach, pt oriented to fall prevention measures. Video monitoring utilized to promote safety. Pt states he Has no other needs at this time. Plan of care to continue.    Electronically signed by MANUEL SEXTON RN on 11/22/2024 at 1:05 AM

## 2024-11-22 NOTE — FLOWSHEET NOTE
ADMISSION TO PACU     Patient: Ashutosh Donvoan    Procedure(s):  Cervical 3 - Cervical 5 Anterior Cervical Discectomy and Fusion    Level of consciousness: lethargic  Nursing Assessment: on speciality bed  Incision to R anterior neck  Hemovac drain - site edematous with poss hematoma - neuro NP Jessica made aware  Art line in L radial - site C/D/II  PIV in bilat wrists  Per anesthesia doc, pt to maintain systolic greater than 120 per Dr Ocampo for protection of his spinal cord.   Face mask @ 5L    Rozina PACU #1: Rozina Score: 5  1st PACU Vitals:   Vitals:    11/22/24 1115   BP: 112/63   Pulse: 86   Resp: 11   Temp: 98.4 °F (36.9 °C)   SpO2: 99%     Pain upon arrival to PACU: none per FLACC    No results for input(s): \"POCGLU\" in the last 72 hours.    Lab Results   Component Value Date/Time    HGB 11.6 (L) 11/21/2024 09:51 AM     11/21/2024 09:51 AM    K 3.9 11/21/2024 09:51 AM    MG 2.07 11/13/2024 05:25 AM    CREATININE 1.1 11/21/2024 09:51 AM         Intake/Output Summary (Last 24 hours) at 11/22/2024 1125  Last data filed at 11/22/2024 1043  Gross per 24 hour   Intake 865.84 ml   Output 450 ml   Net 415.84 ml

## 2024-11-22 NOTE — PROGRESS NOTES
KATELYN Lopez came back to bedside and evaluated his hemovac site. All is stable, systolic is stable above 100. As long as pt remains stable, he is ok to return to 5T from neuro's standpoint

## 2024-11-22 NOTE — PROGRESS NOTES
KATELYN Lopez at bedside evaluating pt. NP discussing findings with surgeon Dr Alonso.    Np/MD stated to monitor site and if it increases in size, then a CT will be ordered.   NP to come back to bedside for re-evaluation shortly.     NP/MD stated to keep systolic greater than 100 and less then 160.     Pt denying dyspnea or SOB. He reports he is having pain at surgical site but is unable to provide a rating of his pain.   His POSS score is too high for additional narcotics.

## 2024-11-22 NOTE — PROGRESS NOTES
Hemovac site edematous and hard. The protruding area is the size of a bb pellet.     Neuro Nps paged to come to bedside to assess

## 2024-11-22 NOTE — PROGRESS NOTES
4 Eyes Skin Assessment     NAME:  Ashutosh Donovan  YOB: 1970  MEDICAL RECORD NUMBER:  [id number]    The patient is being assessed for  Post-Op Surgical    I agree that at least one RN has performed a thorough Head to Toe Skin Assessment on the patient. ALL assessment sites listed below have been assessed.      Areas assessed by both nurses:    Head, Face, Ears, Shoulders, Back, Chest, Arms, Elbows, Hands, Sacrum. Buttock, Coccyx, Ischium, Legs. Feet and Heels, and Under Medical Devices         Does the Patient have a Wound? No noted wound(s) - surgical incision see LDA       Jovanny Prevention initiated by RN: Yes  Wound Care Orders initiated by RN: No    Pressure Injury (Stage 3,4, Unstageable, DTI, NWPT, and Complex wounds) if present, place Wound referral order by RN under : No    New Ostomies, if present place, Ostomy referral order under : No     Nurse 1 eSignature: Electronically signed by Tami Campos RN on 11/22/24 at 1:08 PM EST    **SHARE this note so that the co-signing nurse can place an eSignature**    Nurse 2 eSignature: {Esignature:[id number]}   Alcoholism: Resources for Family and Friends  Are you affected by a loved one’s drinking? This sheet tells you about resources to help you cope with this problem.     [employer] is a self-help program. It is for adult family and friends

## 2024-11-22 NOTE — PROGRESS NOTES
Occupational Therapy/Physical Therapy  Discharge    Pt off the floor for Cervical 3 - Cervical 5 Anterior Cervical Discectomy and Fusion. Will sign off and request new orders s/p surgery.    Kiki Garay OTR/L #[id number]  Afia Salazar, PT [id number]

## 2024-11-22 NOTE — PLAN OF CARE
Problem: Chronic Conditions and Co-morbidities  Goal: Patient's chronic conditions and co-morbidity symptoms are monitored and maintained or improved  Outcome: Progressing     Pt chronic conditions managed via MAR    Problem: Discharge Planning  Goal: Discharge to home or other facility with appropriate resources  Outcome: Progressing     Problem: Pain  Goal: Verbalizes/displays adequate comfort level or baseline comfort level  Outcome: Progressing     Pt pain managed via MAR    Problem: Skin/Tissue Integrity  Goal: Absence of new skin breakdown  Description: 1.  Monitor for areas of redness and/or skin breakdown  2.  Assess vascular access sites hourly  3.  Every 4-6 hours minimum:  Change oxygen saturation probe site  4.  Every 4-6 hours:  If on nasal continuous positive airway pressure, respiratory therapy assess nares and determine need for appliance change or resting period.  Outcome: Progressing     Problem: ABCDS Injury Assessment  Goal: Absence of physical injury  Outcome: Progressing     Problem: Safety - Adult  Goal: Free from fall injury  Outcome: Progressing     Standard safety precautions in place, bed locked and in lowest position, bed/chair alarm on, bedside table/call light within reach, gripper socks applied, pt oriented to fall prevention measures. Video monitoring utilized to promote safety, pt reoriented to fall precautions measures as needed.    Problem: Risk for Elopement  Goal: Patient will not exit the unit/facility without proper excort  Outcome: Progressing     Problem: Nutrition Deficit:  Goal: Optimize nutritional status  Outcome: Progressing      "Anticipated Discharge Disposition: Home with home health    Action: RN CM met with pt at bedside, pt states she does not want Home Health, she has had them before, and they were helpful but she doesn't need them now.  She wishes to know the cost for self pay at a SNF for a week of \"recuperation\".  RN CM will f/u.      Pt wishes to discuss the tentative discharge plan with her son, who is visiting tomorrow at about 1300.    RN CM informed pt of $47 cost for Eliquis, which is dispensed at the [facility] pharmacy.  Pt states it will be difficult, but she can pay for it.    Barriers to Discharge: Determining discharge plan    Plan: RN CM will follow up    "

## 2024-11-22 NOTE — FLOWSHEET NOTE
PACU Transfer to Floor Note    Writer returned from lunch when ANNELISE Tate was providing bedside report to 5T RN.    Procedure(s):  Cervical 3 - Cervical 5 Anterior Cervical Discectomy and Fusion      Pt meets criteria as per Rozina Score and ASPAN Standards to transfer to next phase of care.       Vitals:    11/22/24 1259   BP: 131/79   Pulse:    Resp:    Temp:    SpO2:        In: 865.8 [P.O.:720; I.V.:145.8]  Out: 450       Pt transported by Francesca

## 2024-11-22 NOTE — CONSULTS
Department of Orthopedic Surgery  Attending Consult Note        Reason for Consult:  Right small finger proximal phalanx fracture  Requesting Physician:      Danika Conley MD       CHIEF COMPLAINT:  Right small finger injury after a fall    History Obtained From:  patient, electronic medical record    HISTORY OF PRESENT ILLNESS:                The patient is a 53 y.o. male who is admitted for cervical fusion which was completed yesterday.  He reports sustaining a right small finger fracture 10/28/2024 after a fall on his steps and was treated at an outside facility ([facility]) with an aluminum splint placed on the dorsal surface.  He underwent ACDF yesterday for cervical myelopathy and orthopaedics was consulted post-op to evaluate the fracture.      Past Medical History:        Diagnosis Date    Arthritis     CAD (coronary artery disease)     CHF (congestive heart failure) ([facility])     Chronic pain     twister vertebra and two collapsed disc, states injury happened at 47yo    Heart attack ([facility])     pt reports 10 heart attacks in 6 years, 8 stents placed    Hepatitis C     Hypothyroid    Depression  PTSD  Prior cocaine and opioid use disorders  Smoker  COPD    Past Surgical History:        Procedure Laterality Date    CERVICAL FUSION N/A 11/22/2024    Cervical 3 - Cervical 5 Anterior Cervical Discectomy and Fusion performed by Otilia Ocampo MD at [facility] OR    CHOLECYSTECTOMY      CORONARY STENT PLACEMENT      8 stents in 6 years    TONSILLECTOMY       Current Medications:   Current Facility-Administered Medications: [DISCONTINUED] HYDROmorphone HCl PF (DILAUDID) injection 0.25 mg, 0.25 mg, IntraVENous, Q3H PRN **OR** HYDROmorphone HCl PF (DILAUDID) injection 0.5 mg, 0.5 mg, IntraVENous, Q4H PRN  methocarbamol (ROBAXIN) injection 1,000 mg, 1,000 mg, IntraVENous, Q8H  ceFAZolin (ANCEF) 2,000 mg in sterile water 20 mL IV syringe, 2,000 mg, IntraVENous, Q8H  diazePAM (VALIUM) tablet 5 mg,  fracture deformity of the right fifth metacarpal.     IMPRESSION:     Acute fracture involving right fifth proximal phalanx base    IMPRESSION/RECOMMENDATIONS:  Closed right small finger displaced transverse proximal phalanx fracture - Current plan will be for management in a splint and then progressive motion.  May have some level of mild ROM reduction but giving prior fractures / hx should be acceptable and functional.  Splint improved to place finger in safe position with flexion at the MPJ and extension of the PIP which will help with over all alignment.  He should follow up in a week for splint removal and then transition to lisha taping.  The fracture at this point is 3.5 weeks old and will not fully correct at this time without open surgical correction.  Goal is to regain functional ROM and use of the digit and if he fails, options of surgical correction of the deformity can be entertained as an outpatient.

## 2024-11-22 NOTE — CARE COORDINATION
CM following: Pt is from home, IPTA. Prior to surgery pt was accepted at [facility] for skilled services at discharge. Pt was denied at [facility] and [facility]. Pt will see how he progresses with PT/OT post-op to determine if he wants to discharge to SNF vs to his girlfriend's home with girlfriend's support. Pt will need post-op PT/OT evals and [employer] precert if SNF decided upon. Regardless of discharge disposition pt will need transportation at discharge. CM will continue to follow for discharge planning.  Electronically signed by JAYLEEN Cooper on 11/22/2024 at 2:54 PM  [phone]

## 2024-11-22 NOTE — PROGRESS NOTES
Hospital Medicine Progress Note      Date of Admission: 11/12/2024  Hospital Day: 11    Chief Admission Complaint: Lower extremity numbness     Subjective:      Patient seen and examined at bedside today after surgery  Complains of pain in his back  No other issues at this time      Presenting Admission History:       Patient 53-year-old male with history of multivessel PCI who presented to [facility] with chest pain. After admission patient mentioned some numbness in his face arm and radiating through the left lower leg. Occasionally possible bowel movement without realizing it     Assessment/Plan:      Cervical stenosis  Cervical and lumbar myelopathy and radiculopathy  MRI reveals severe cervical spinal stenosis with anterior subluxation of C3 on C4 and C4 on C5, L4-L5 stenosis with thickening redundant nerve root within the cauda equina, crowding of cauda equina.    MRI brain unremarkable  Neurosurgery consulted, had C3-5 anterior cervical discectomy and fusion today  Pain management with oxy, Dilaudid and Zanaflex  Continue cervical collar when ambulating  PT/OT evaluated and recommend SNF      Right fifth phalanx fracture  Had fracture of right fifth phalanx in October following a fall and is in splint  Repeat x-ray reveals acute fracture  Orthopedic consulted    Right lung infiltrate concerning for aspiration pneumonia  Completed 5 days of antibiotic with Unasyn    Coronary artery disease s/p stent  Hypertension  Presented to [facility] ER with chest pain and was evaluated by cardiology with no further workup recommended.  Aspirin and Plavix held for possible neurosurgery procedure since 11/12, will need to resume once cleared by neurosurgery  Continue Coreg 3.125, Imdur 60, Ranexa 500 twice daily, amlodipine 5    Hypothyroidism, Synthroid  Mood disorder, continue fluphenazine, prazosin.  Last QTc 461  BPH, on Flomax  GERD, increase pantoprazole to twice daily  Mild normocytic anemia, stable  Medical

## 2024-11-22 NOTE — PROGRESS NOTES
NEUROSURGERY PROGRESS NOTE       Patient Name: Ashutosh Donovan YOB: 1970   Sex: Male Age: 53 yrs     CC / Reason for Consult: Cervical stenosis, now s/p C3-5 ACDF    Subjective / ROS / Updates over last 24 hours:  Patient went to surgery today for ACDF. Tolerated surgery well. Drain left in place.     ASSESSMENT & RECOMMENDATIONS   Patient is a 52 y/o M who presented w/ R shoulder pain w/ intermittent numbness & tingling, radicular pain in R arm & LLE numbness. Found to have severe cervical stenosis. Now POD#1 s/p C3-5 ACDF.     Plan  Neurologic exam frequency: Q4H  Mobility: PT/OT   Diet: PO diet - advance as able  Antibiotics: completed post op course  Drain: HV drain - keep in place today  Gilbert: removal per protocol  DVT Prophylaxis: SCDs & Lovenox (to start 11/24)  GI Prophylaxis: Pepcid  Bowel Regimen: SenoKot-S BID & Glycolax   Pain control: PRN oxycodone / IV dilaudid  Muscle relaxer: Scheduled Robaxin / PRN valium  Incisional Care: Keep CARY - okay to start washing after drain removed, soap and water only  Dispo Planning: pending pain control, mobility         Management and plan discussed with:  Nursing staff  Dr. Damaris Wilson, APRN - CNP   NEUROSURGERY  11/22/2024 1:39 PM  [facility]: [facility] NEUROSURGERY NP    HISTORY     Allergies Allergies   Allergen Reactions    Latex Itching    Ziprasidone Hcl Other (See Comments)     Pt reports seizures with Geodon    Acetaminophen Other (See Comments)     Pt has hep c    Bupropion Swelling    Ketorolac Tromethamine Nausea Only    Lithium     Olanzapine Swelling    Risperidone Swelling    Tramadol     Ibuprofen Nausea Only and Rash      Diet No diet orders on file   Isolation No active isolations     LABS   Metabolic Panel Recent Labs     11/20/24  0528 11/21/24  0951    142   K 3.9 3.9    103   CO2 29 31   BUN 22* 16   CREATININE 1.2 1.1   GLUCOSE 114* 92   CALCIUM 9.6 10.0      CBC / Coags Recent Labs

## 2024-11-22 NOTE — PROGRESS NOTES
Pt transported to surgery via bed, all pt belongings remaining in room, 2 chg baths completed this shift with appropriate gown/linen changes.     Electronically signed by MANUEL SEXTON RN on 11/22/2024 at 6:29 AM

## 2024-11-22 NOTE — OP NOTE
Operative Report    PATIENT NAME: Ashutosh Donovan  YOB: 1970  MEDICAL RECORD# [id number]  SURGERY DATE: 11/22/2024  SURGEON:  Otilia Ocampo MD  ASSISTANT:  None  DICTATED BY: Otilia Ocampo MD        PREOPERATIVE DIAGNOSIS:  1.  Cervical Degenerative Disc Disease and Herniated Cervical Disc at C3-C4, C4-C5    POSTOPERATIVE DIAGNOSIS:  1.  Same    PROCEDURES PERFORMED:  1.  Anterior cervical diskectomy C3-C4 and C4-C5 with decompression of spinal cord and nerve roots  2.  Anterior cervical arthrodesis with *** mm PEEK graft filled with PrimaFx allograft C5-C6  3.  Anterior cervical arthrodesis with *** mm PEEK graft filled with PrimaFx allograft C6-C7  4.  Anterior cervical plating C3-C4 with Synthes plating  5.  Microscopic dissection  6.  Intraoperative fluoroscopy  7.   Intraoperative neuromonitoring (MEP, SSEP, EEG)     ANESTHESIA:  General    IMPLANTS:   Synthes  allograft cage, *** mm at C5-C6 and *** mm at C6-C7  Synthes 14 mm (#6) titanium screws and *** mm Gazelle plate    COMPLICATIONS:  None    INDICATIONS FOR SURGERY:  The patient is a 53 y.o. yo patient with paresthesias and found to have severe cervical stenosis.  Myelopathic on exam.  An MRI scan was performed and this showed severe stenosis at C3-4 as well as xrays that showed subluxation at C3-4 and C4-5 which reduced with extension. I discussed the risks and benefits to include (but not limited to): Bleeding, infection, failure to relieve her pain, adjacent level degeneration, need for further surgery, spinal cord injury, numbness or weakness of the upper extremities, paralysis, esophageal injury, and hoarseness of voice. Given his myelopathy and severe stenosis, the patient elected to proceed with anterior cervical discectomy with stabilization and fusion at C3-C4, C4-C5.    DETAILS OF PROCEDURE:  Under universal basic protocol, the patient was brought to the operating room and placed under general anesthesia. Intravenous  `Patient is a 74y old  Male who presents with a chief complaint of acute CHF exacerbation (15 May 2022 08:08)    PATIENT IS SEEN AND EXAMINED IN MEDICAL FLOOR.  PETEY [    ]    ERASTO [   ]      GT [   ]    ALLERGIES:  No Known Allergies      Daily     Daily     VITALS:    Vital Signs Last 24 Hrs  T(C): 36.5 (15 May 2022 07:56), Max: 37.1 (14 May 2022 16:15)  T(F): 97.7 (15 May 2022 07:56), Max: 98.8 (14 May 2022 16:15)  HR: 88 (15 May 2022 07:56) (85 - 114)  BP: 106/51 (15 May 2022 07:56) (100/70 - 118/58)  BP(mean): --  RR: 18 (15 May 2022 07:56) (18 - 19)  SpO2: 92% (15 May 2022 07:56) (90% - 95%)    LABS:    CBC Full  -  ( 14 May 2022 07:18 )  WBC Count : 11.44 K/uL  RBC Count : 4.09 M/uL  Hemoglobin : 10.5 g/dL  Hematocrit : 33.1 %  Platelet Count - Automated : 299 K/uL  Mean Cell Volume : 80.9 fl  Mean Cell Hemoglobin : 25.7 pg  Mean Cell Hemoglobin Concentration : 31.7 gm/dL  Auto Neutrophil # : x  Auto Lymphocyte # : x  Auto Monocyte # : x  Auto Eosinophil # : x  Auto Basophil # : x  Auto Neutrophil % : x  Auto Lymphocyte % : x  Auto Monocyte % : x  Auto Eosinophil % : x  Auto Basophil % : x    PT/INR - ( 15 May 2022 04:18 )   PT: 15.8 sec;   INR: 1.32 ratio         PTT - ( 15 May 2022 04:18 )  PTT:39.2 sec  05-14    132<L>  |  96  |  74<H>  ----------------------------<  89  5.4<H>   |  19<L>  |  9.28<H>    Ca    9.2      14 May 2022 07:18      CAPILLARY BLOOD GLUCOSE              Creatinine Trend: 9.28<--, 7.21<--, 11.00<--, 8.59<--, 6.12<--, 7.88<--  I&O's Summary    14 May 2022 07:01  -  15 May 2022 07:00  --------------------------------------------------------  IN: 650 mL / OUT: 1407 mL / NET: -757 mL            .Stool Feces  05-03 @ 18:27   No enteric pathogens isolated.  (Stool culture examined for Salmonella,  Shigella, Campylobacter, Aeromonas, Plesiomonas,  Vibrio, E.coli O157 and Yersinia)  No enteric gram negative rods isolated  --  --          MEDICATIONS:    MEDICATIONS  (STANDING):  ARIPiprazole 15 milliGRAM(s) Oral daily  chlorhexidine 2% Cloths 1 Application(s) Topical daily  diltiazem Infusion 5 mG/Hr (5 mL/Hr) IV Continuous <Continuous>  ferrous    sulfate 325 milliGRAM(s) Oral daily  finasteride 5 milliGRAM(s) Oral daily  gabapentin 100 milliGRAM(s) Oral three times a day  lamoTRIgine 200 milliGRAM(s) Oral daily  metoprolol tartrate 25 milliGRAM(s) Oral two times a day  midodrine. 5 milliGRAM(s) Oral three times a day  pantoprazole    Tablet 40 milliGRAM(s) Oral before breakfast  sevelamer carbonate 800 milliGRAM(s) Oral three times a day with meals  sodium zirconium cyclosilicate 10 Gram(s) Oral once  tamsulosin 0.4 milliGRAM(s) Oral at bedtime  traZODone 25 milliGRAM(s) Oral at bedtime      MEDICATIONS  (PRN):      REVIEW OF SYSTEMS:                           ALL ROS DONE [ X   ]    CONSTITUTIONAL:  LETHARGIC [   ], FEVER [   ], UNRESPONSIVE [   ]  CVS:  CP  [   ], SOB, [   ], PALPITATIONS [   ], DIZZYNESS [   ]  RS: COUGH [   ], SPUTUM [   ]  GI: ABDOMINAL PAIN [   ], NAUSEA [   ], VOMITINGS [   ], DIARRHEA [   ], CONSTIPATION [   ]  :  DYSURIA [   ], NOCTURIA [   ], INCREASED FREQUENCY [   ], DRIBLING [   ],  SKELETAL: PAINFUL JOINTS [   ], SWOLLEN JOINTS [   ], NECK ACHE [   ], LOW BACK ACHE [   ],  SKIN : ULCERS [   ], RASH [   ], ITCHING [   ]  CNS: HEAD ACHE [   ], DOUBLE VISION [   ], BLURRED VISION [   ], AMS / CONFUSION [   ], SEIZURES [   ], WEAKNESS [   ],TINGLING / NUMBNESS [   ]      PHYSICAL EXAMINATION:  GENERAL APPEARANCE: NO DISTRESS  HEENT:  NO PALLOR, NO  JVD,  NO   NODES, NECK SUPPLE  CVS: S1 +, S2 +,   RS: AEEB,  OCCASIONAL  RALES +  ABD: SOFT, NT, NO, BS +   ILEOSTOMY [STOMA W/ PINK BASE]  EXT: PE + [IMPROVING]  SKIN: WARM,   RIGHT IJ SHILEY+  SKELETAL:  ROM ACCEPTABLE  CNS:  AAO X 3    RADIOLOGY :    ACC: [id number] EXAM:  XR CHEST PORTABLE URGENT 1V                          PROCEDURE DATE:  04/28/2022          INTERPRETATION:  Clinical history: 74-year-old male, chest pain.    Portable/expiratory view of the chest is compared to 20 and demonstrates   a normal cardiac silhouette with no lobar consolidation, gross effusion,   pneumothorax or acute osseous finding.    Bilateral lower lobe patchy opacities consistent with infiltrates/areas   of atelectasis in the correct clinical context, increased. Elevated right   hemidiaphragm are evident.    IMPRESSION:    Bilateral lower lobe interstitial infiltrates/platelike atelectasis,   increased    ================================      ACC: [id number] EXAM:  MR ABDOMEN                          *** ADDENDUM***    Some of the hepatic lesions have small T1 hyperintense components on   fat-suppressed T1-weighted gradient echo images, suggestive of blood   products.    --- End of Report---    *** END OF ADDENDUM***      PROCEDURE DATE:  05/03/2022          INTERPRETATION:  CLINICAL INFORMATION: Hepatic lesions. History of   prostate cancer.    COMPARISON: None.    CONTRAST/COMPLICATIONS:  IV Contrast: NONE  Oral Contrast: NONE  Complications: None reported at time of study completion    PROCEDURE:  MRI of the abdomen was performed.    FINDINGS:  LOWER CHEST: Within normal limits.    LIVER: Innumerable mildly T2 hyperintense lesions are seen throughout the   liver, suggestive ofmetastasis. The largest lesion measures 13.8 x 11.7   x 11.7 cm in the liver dome.  BILE DUCTS: Normal caliber.  GALLBLADDER: Cholelithiasis.  SPLEEN: Within normal limits.  PANCREAS: Within normal limits.  ADRENALS: Within normal limits.  KIDNEYS/URETERS: Multiple brightly T2 hyperintense lesion in the kidneys   bilaterally, representing probable cysts.    VISUALIZED PORTIONS:  BOWEL: Small hiatal hernia. Right lower quadrant ostomy.  PERITONEUM: No ascites.  VESSELS: Within normal limits.  RETROPERITONEUM/LYMPH NODES: No lymphadenopathy.  ABDOMINAL WALL: Within normal limits.  BONES: Multiple T2 hyperintense and T2 hyperintense lesions in the   thoracolumbar spine may represent osseous metastasis. Whole-body bone   scan may be pursued for further evaluation.    IMPRESSION:  Innumerable mildly T2 hyperintense lesions are seen throughout the liver,   suggestive of metastasis. The largest lesion measures 13.8 x 11.7 x 11.7   cm in the liver dome.    Multiple T2 hyperintense and T2 hyperintense lesions in the thoracolumbar   spine may represent osseous metastasis. Whole-body bone scan may be   pursued for further evaluation.    Cholelithiasis.      ASSESSMENT :     Hyperkalemia    No pertinent past medical history    COPD, mild    Anemia    Bipolar disorder    IBD (inflammatory bowel disease)    HTN (hypertension)    HLD (hyperlipidemia)    PAD (peripheral artery disease)    CKD (chronic kidney disease)    Prostate CA    BPH with urinary obstruction    No significant past surgical history    History of creation of ostomy        PLAN:  HPI:  Patient is a 74M from [facility], who is ambulatory at baseline with a walker. He has Hx of HTN, CHF?, COPD, HLD, PAD, BPH, Prostate CA (s/p radiation), Bipolar Disorder, IBD - Crohn's Disease, R ileostomy bag (s/p sigmoid resection). He was brought to ED due to bilateral leg swelling. For the past 2 days he has been having progressive swelling/ edema of both his legs with associated ambulatory dysfunction. He also started having episodes of shortness of breath and difficulty of breathing. He denies any fever, cough, chest pain, palpitation. When he came to the ED, he was saturating at 93-94% on room air. He was placed on nasal cannula. EKG was done showing NSR. Troponin was negative and BNP was mildly elevated at 398. CXR showed bilateral vascular congestion. Serum potassium was elevated at 6.1. He got a dose of lasix and was given Hyperkalemia cocktail in the ED. Of note, he had Hx of COVID in 2020 andwas vaccinated with COVID-19 x 3 (Pfizer) and Influenza in 2021. He was subsequently admitted for acute exacerbation of his heart failure.    GOC: FULL CODE (28 Apr 2022 18:40)    # CASE D/W BROTHER KERI VIA PHONE [ C  ; H  ] - CASE DISCUSSED AT LENGTH, ALL QUESTIONS ANSWERED [5/13]    # NEW-ONSET A.FIB [DURING ATTEMPT TO EXCHANGE SHILEY]  - PLACED ON TELEMETRY  - STARTED ON LOPRESSOR, CARDIZEM GTT  - HEPARIN DRIP GTT  - CARDIOLOGY CONSULT IN PROGRESS    # HEMATURIA - RESOLVED  # HX OF PROSTATE CA S/P RADIATION  - HOLD A/C, TREND CBC,   - NOTED BLADDER SCAN, NOTED RECENT U/S  - PER UROLOGY, REPEAT BLADDER SCAN W/ PVR - THEN WILL DECIDE REGARDING MAURER PLACEMENT  - NOTED CT A/P ; PSA < 0.01  - UROLOGY CONSULT DR. CHAN    # ACUTE HYPOXIC RESPIRATORY FAILURE SUSPECT S/T PULMONARY VASCULAR CONGESTION  # RULED OUT CHF   # PAWAN ON CKD - STARTED ON HD, S/P SHILEY PLACEMENT   # BPH  # HX OF PROSTATE CA S/P RADIATION  - PLACE ON TELEMETRY  - NOTED CXR  - HOLD LASIX  - NOTE TSH  - STRICT IS AND OS  - ECHOCARDIOGRAM - NORMAL LVEF, NORMAL DIASTOLIC FUNCTION  - NEPHROLOGY CONSULT, CARDIOLOGY CONSULT  - NEPHROLOGY RECC FOR PLACEMENT OF SHILEY W/ INITIATION OF HD    - PLANNED FOR TUNNELLED CATH PLACEMENT DELAYED DUE TO NEW ONSET A.FIB W/ RVR     # LIVER LESIONS, BONE LESIONS  S/P LIVER BIOPSY [5/11]  # TRANSAMINITIS, NOTED HEP C AND HEP B MARKERS  - NOTED MRI ABDOMEN  - NOTED TUMOR MARKERS  - REVIEWED BONE SCAN  - HEPATOLOGY CONSULT  - ONCOLOGY CONSULT    - PLANNED FOR IR GUIDED BIOPSY ON 5/6  --- DELAYED BY IR DUE TO BLEEDING RISK  -- PLANNED FOR 5/11  - F/U PATH    # HYPERKALEMIA   - NEPHROLOGY CONSULT    - [5/14] - UNDERWENT HD    # CROHN'S DISEASE  # RIGHT ILEOSTOMY BAG S/P SIGMOID RESECTION  - MONITORING OUTPUT  - GASTROENTEROLOGY CONSULT    # NORMOCYTIC ANEMIA    # HTN  # COPD  # HLD  # PAD  # BIPOLAR DX  # GI AND DVT PPX      Patient is a 74y old  Male who presents with a chief complaint of acute CHF exacerbation (15 May 2022 08:08)    PATIENT IS SEEN AND EXAMINED IN MEDICAL FLOOR.    ALLERGIES:  No Known Allergies    VITALS:    Vital Signs Last 24 Hrs  T(C): 36.5 (15 May 2022 07:56), Max: 37.1 (14 May 2022 16:15)  T(F): 97.7 (15 May 2022 07:56), Max: 98.8 (14 May 2022 16:15)  HR: 88 (15 May 2022 07:56) (85 - 114)  BP: 106/51 (15 May 2022 07:56) (100/70 - 118/58)  BP(mean): --  RR: 18 (15 May 2022 07:56) (18 - 19)  SpO2: 92% (15 May 2022 07:56) (90% - 95%)    LABS:    CBC Full  -  ( 14 May 2022 07:18 )  WBC Count : 11.44 K/uL  RBC Count : 4.09 M/uL  Hemoglobin : 10.5 g/dL  Hematocrit : 33.1 %  Platelet Count - Automated : 299 K/uL  Mean Cell Volume : 80.9 fl  Mean Cell Hemoglobin : 25.7 pg  Mean Cell Hemoglobin Concentration : 31.7 gm/dL  Auto Neutrophil # : x  Auto Lymphocyte # : x  Auto Monocyte # : x  Auto Eosinophil # : x  Auto Basophil # : x  Auto Neutrophil % : x  Auto Lymphocyte % : x  Auto Monocyte % : x  Auto Eosinophil % : x  Auto Basophil % : x    PT/INR - ( 15 May 2022 04:18 )   PT: 15.8 sec;   INR: 1.32 ratio         PTT - ( 15 May 2022 04:18 )  PTT:39.2 sec  05-14    132<L>  |  96  |  74<H>  ----------------------------<  89  5.4<H>   |  19<L>  |  9.28<H>    Ca    9.2      14 May 2022 07:18      CAPILLARY BLOOD GLUCOSE      Creatinine Trend: 9.28<--, 7.21<--, 11.00<--, 8.59<--, 6.12<--, 7.88<--  I&O's Summary    14 May 2022 07:01  -  15 May 2022 07:00  --------------------------------------------------------  IN: 650 mL / OUT: 1407 mL / NET: -757 mL      .Stool Feces  05-03 @ 18:27   No enteric pathogens isolated.  (Stool culture examined for Salmonella,  Shigella, Campylobacter, Aeromonas, Plesiomonas,  Vibrio, E.coli O157 and Yersinia)  No enteric gram negative rods isolated  --  --          MEDICATIONS:    MEDICATIONS  (STANDING):  ARIPiprazole 15 milliGRAM(s) Oral daily  chlorhexidine 2% Cloths 1 Application(s) Topical daily  diltiazem Infusion 5 mG/Hr (5 mL/Hr) IV Continuous <Continuous>  ferrous    sulfate 325 milliGRAM(s) Oral daily  finasteride 5 milliGRAM(s) Oral daily  gabapentin 100 milliGRAM(s) Oral three times a day  lamoTRIgine 200 milliGRAM(s) Oral daily  metoprolol tartrate 25 milliGRAM(s) Oral two times a day  midodrine. 5 milliGRAM(s) Oral three times a day  pantoprazole    Tablet 40 milliGRAM(s) Oral before breakfast  sevelamer carbonate 800 milliGRAM(s) Oral three times a day with meals  sodium zirconium cyclosilicate 10 Gram(s) Oral once  tamsulosin 0.4 milliGRAM(s) Oral at bedtime  traZODone 25 milliGRAM(s) Oral at bedtime      MEDICATIONS  (PRN):      REVIEW OF SYSTEMS:                           ALL ROS DONE [ X   ]    CONSTITUTIONAL:  LETHARGIC [   ], FEVER [   ], UNRESPONSIVE [   ]  CVS:  CP  [   ], SOB, [   ], PALPITATIONS [   ], DIZZYNESS [   ]  RS: COUGH [   ], SPUTUM [   ]  GI: ABDOMINAL PAIN [   ], NAUSEA [   ], VOMITINGS [   ], DIARRHEA [   ], CONSTIPATION [   ]  :  DYSURIA [   ], NOCTURIA [   ], INCREASED FREQUENCY [   ], DRIBLING [   ],  SKELETAL: PAINFUL JOINTS [   ], SWOLLEN JOINTS [   ], NECK ACHE [   ], LOW BACK ACHE [   ],  SKIN : ULCERS [   ], RASH [   ], ITCHING [   ]  CNS: HEAD ACHE [   ], DOUBLE VISION [   ], BLURRED VISION [   ], AMS / CONFUSION [   ], SEIZURES [   ], WEAKNESS [   ],TINGLING / NUMBNESS [   ]      PHYSICAL EXAMINATION:  GENERAL APPEARANCE: NO DISTRESS  HEENT:  NO PALLOR, NO  JVD,  NO   NODES, NECK SUPPLE  CVS: S1 +, S2 +,   RS: AEEB,  OCCASIONAL  RALES +  ABD: SOFT, NT, NO, BS +   ILEOSTOMY [STOMA W/ PINK BASE]  EXT: PE + [IMPROVING]  SKIN: WARM,   RIGHT IJ SHILEY+  SKELETAL:  ROM ACCEPTABLE  CNS:  AAO X 3    RADIOLOGY :    ACC: [id number] EXAM:  XR CHEST PORTABLE URGENT 1V                          PROCEDURE DATE:  04/28/2022          INTERPRETATION:  Clinical history: 74-year-old male, chest pain.    Portable/expiratory view of the chest is compared to 20 and demonstrates   a normal cardiac silhouette with no lobar consolidation, gross effusion,   pneumothorax or acute osseous finding.    Bilateral lower lobe patchy opacities consistent with infiltrates/areas   of atelectasis in the correct clinical context, increased. Elevated right   hemidiaphragm are evident.    IMPRESSION:    Bilateral lower lobe interstitial infiltrates/platelike atelectasis,   increased    ================================      ACC: [id number] EXAM:  MR ABDOMEN                          *** ADDENDUM***    Some of the hepatic lesions have small T1 hyperintense components on   fat-suppressed T1-weighted gradient echo images, suggestive of blood   products.    --- End of Report---    *** END OF ADDENDUM***      PROCEDURE DATE:  05/03/2022          INTERPRETATION:  CLINICAL INFORMATION: Hepatic lesions. History of   prostate cancer.    COMPARISON: None.    CONTRAST/COMPLICATIONS:  IV Contrast: NONE  Oral Contrast: NONE  Complications: None reported at time of study completion    PROCEDURE:  MRI of the abdomen was performed.    FINDINGS:  LOWER CHEST: Within normal limits.    LIVER: Innumerable mildly T2 hyperintense lesions are seen throughout the   liver, suggestive ofmetastasis. The largest lesion measures 13.8 x 11.7   x 11.7 cm in the liver dome.  BILE DUCTS: Normal caliber.  GALLBLADDER: Cholelithiasis.  SPLEEN: Within normal limits.  PANCREAS: Within normal limits.  ADRENALS: Within normal limits.  KIDNEYS/URETERS: Multiple brightly T2 hyperintense lesion in the kidneys   bilaterally, representing probable cysts.    VISUALIZED PORTIONS:  BOWEL: Small hiatal hernia. Right lower quadrant ostomy.  PERITONEUM: No ascites.  VESSELS: Within normal limits.  RETROPERITONEUM/LYMPH NODES: No lymphadenopathy.  ABDOMINAL WALL: Within normal limits.  BONES: Multiple T2 hyperintense and T2 hyperintense lesions in the   thoracolumbar spine may represent osseous metastasis. Whole-body bone   scan may be pursued for further evaluation.    IMPRESSION:  Innumerable mildly T2 hyperintense lesions are seen throughout the liver,   suggestive of metastasis. The largest lesion measures 13.8 x 11.7 x 11.7   cm in the liver dome.    Multiple T2 hyperintense and T2 hyperintense lesions in the thoracolumbar   spine may represent osseous metastasis. Whole-body bone scan may be   pursued for further evaluation.    Cholelithiasis.      ASSESSMENT :     Hyperkalemia    No pertinent past medical history    COPD, mild    Anemia    Bipolar disorder    IBD (inflammatory bowel disease)    HTN (hypertension)    HLD (hyperlipidemia)    PAD (peripheral artery disease)    CKD (chronic kidney disease)    Prostate CA    BPH with urinary obstruction    No significant past surgical history    History of creation of ostomy        PLAN:  HPI:  Patient is a 74M from [facility], who is ambulatory at baseline with a walker. He has Hx of HTN, CHF?, COPD, HLD, PAD, BPH, Prostate CA (s/p radiation), Bipolar Disorder, IBD - Crohn's Disease, R ileostomy bag (s/p sigmoid resection). He was brought to ED due to bilateral leg swelling. For the past 2 days he has been having progressive swelling/ edema of both his legs with associated ambulatory dysfunction. He also started having episodes of shortness of breath and difficulty of breathing. He denies any fever, cough, chest pain, palpitation. When he came to the ED, he was saturating at 93-94% on room air. He was placed on nasal cannula. EKG was done showing NSR. Troponin was negative and BNP was mildly elevated at 398. CXR showed bilateral vascular congestion. Serum potassium was elevated at 6.1. He got a dose of lasix and was given Hyperkalemia cocktail in the ED. Of note, he had Hx of COVID in 2020 andwas vaccinated with COVID-19 x 3 (Pfizer) and Influenza in 2021. He was subsequently admitted for acute exacerbation of his heart failure.    GOC: FULL CODE (28 Apr 2022 18:40)    # CASE D/W BROTHER KERI VIA PHONE [ C  ; H  ] - CASE DISCUSSED AT LENGTH, ALL QUESTIONS ANSWERED [5/13]  - CALLED BROTHER MULTIPLE TIMES @ BOTH C  ; H   - VM LEFT    # NEW-ONSET A.FIB [DURING ATTEMPT TO EXCHANGE SHILEY]  - PLACED ON TELEMETRY  - STARTED ON LOPRESSOR, CARDIZEM GTT  - HEPARIN DRIP GTT  - CARDIOLOGY CONSULT IN PROGRESS    # HEMATURIA - RESOLVED  # HX OF PROSTATE CA S/P RADIATION  - HOLD A/C, TREND CBC,   - NOTED BLADDER SCAN, NOTED RECENT U/S  - PER UROLOGY, REPEAT BLADDER SCAN W/ PVR - THEN WILL DECIDE REGARDING MAURER PLACEMENT  - NOTED CT A/P ; PSA < 0.01  - UROLOGY CONSULT DR. CHAN    # ACUTE HYPOXIC RESPIRATORY FAILURE SUSPECT S/T PULMONARY VASCULAR CONGESTION  # RULED OUT CHF   # PAWAN ON CKD - STARTED ON HD, S/P SHILEY PLACEMENT   # BPH  # HX OF PROSTATE CA S/P RADIATION  - PLACE ON TELEMETRY  - NOTED CXR  - HOLD LASIX  - NOTE TSH  - STRICT IS AND OS  - ECHOCARDIOGRAM - NORMAL LVEF, NORMAL DIASTOLIC FUNCTION  - NEPHROLOGY CONSULT, CARDIOLOGY CONSULT  - NEPHROLOGY RECC FOR PLACEMENT OF SHILEY W/ INITIATION OF HD    - PLANNED FOR TUNNELLED CATH PLACEMENT DELAYED DUE TO NEW ONSET A.FIB W/ RVR     - [5/14] - CXR ORDERED, LASIX GIVEN, D/W NEPHROLOGY TEAM REGARDING HD    # LIVER LESIONS, BONE LESIONS  S/P LIVER BIOPSY [5/11]  # TRANSAMINITIS, NOTED HEP C AND HEP B MARKERS  - NOTED MRI ABDOMEN  - NOTED TUMOR MARKERS  - REVIEWED BONE SCAN  - HEPATOLOGY CONSULT  - ONCOLOGY CONSULT    - PLANNED FOR IR GUIDED BIOPSY ON 5/6  --- DELAYED BY IR DUE TO BLEEDING RISK  -- PLANNED FOR 5/11  - F/U PATH    # HYPERKALEMIA   - NEPHROLOGY CONSULT    - [5/14] - UNDERWENT HD    # CROHN'S DISEASE  # RIGHT ILEOSTOMY BAG S/P SIGMOID RESECTION  - MONITORING OUTPUT  - GASTROENTEROLOGY CONSULT    # NORMOCYTIC ANEMIA    # HTN  # COPD  # HLD  # PAD  # BIPOLAR DX  # GI AND DVT PPX

## 2024-11-22 NOTE — PROGRESS NOTES
Pt continues with clammy/diaphroetic skin. Oral temp 98.1. POC glucose: 127.  Initially he denied CP, but at 1156 pt started to report CP but is unable to provide a description/severity.  Dr Balderas paged. STAT EKG ordered and EKG tech paged.  VSS, o2 mask placed back on pt for comfort - not out of neccesity.   Hemovac site remains edeamtous but has not increased in size.  He continue with snoring and \"gurgly\" inspiration sounds.

## 2024-11-22 NOTE — ANESTHESIA POSTPROCEDURE EVALUATION
Department of Anesthesiology  Postprocedure Note    Patient: Ashutosh Donovan  MRN: [id number]  YOB: 1970  Date of evaluation: 11/22/2024    Procedure Summary       Date: 11/22/24 Room / Location: [address]    Anesthesia Start: 0812 Anesthesia Stop: 1117    Procedure: Cervical 3 - Cervical 5 Anterior Cervical Discectomy and Fusion (Spine Cervical) Diagnosis:       Cervical stenosis of spinal canal      (Cervical stenosis of spinal canal [M48.02])    Surgeons: Otilia Ocampo MD Responsible Provider: Anderson Balderas MD    Anesthesia Type: general, TIVA ASA Status: 3            Anesthesia Type: No value filed.    Rozina Phase I: Rozina Score: 8    Rozina Phase II:      Anesthesia Post Evaluation    Patient location during evaluation: PACU  Patient participation: complete - patient participated  Level of consciousness: awake  Pain score: 2  Airway patency: patent  Cardiovascular status: blood pressure returned to baseline  Respiratory status: acceptable  Hydration status: euvolemic  Pain management: adequate    No notable events documented.

## 2024-11-23 PROBLEM — S62.616A CLOSED DISPLACED FRACTURE OF PROXIMAL PHALANX OF RIGHT LITTLE FINGER: Status: ACTIVE | Noted: 2024-11-23

## 2024-11-23 LAB
EKG ATRIAL RATE: 87 BPM
EKG DIAGNOSIS: NORMAL
EKG P AXIS: 45 DEGREES
EKG P-R INTERVAL: 194 MS
EKG Q-T INTERVAL: 418 MS
EKG QRS DURATION: 126 MS
EKG QTC CALCULATION (BAZETT): 502 MS
EKG R AXIS: 40 DEGREES
EKG T AXIS: 34 DEGREES
EKG VENTRICULAR RATE: 87 BPM
TROPONIN, HIGH SENSITIVITY: 11 NG/L (ref 0–22)

## 2024-11-23 PROCEDURE — 6370000000 HC RX 637 (ALT 250 FOR IP): Performed by: STUDENT IN AN ORGANIZED HEALTH CARE EDUCATION/TRAINING PROGRAM

## 2024-11-23 PROCEDURE — 97530 THERAPEUTIC ACTIVITIES: CPT

## 2024-11-23 PROCEDURE — 97168 OT RE-EVAL EST PLAN CARE: CPT

## 2024-11-23 PROCEDURE — 97116 GAIT TRAINING THERAPY: CPT

## 2024-11-23 PROCEDURE — 97164 PT RE-EVAL EST PLAN CARE: CPT

## 2024-11-23 PROCEDURE — 99222 1ST HOSP IP/OBS MODERATE 55: CPT | Performed by: ORTHOPAEDIC SURGERY

## 2024-11-23 PROCEDURE — 2060000000 HC ICU INTERMEDIATE R&B

## 2024-11-23 PROCEDURE — 2580000003 HC RX 258: Performed by: STUDENT IN AN ORGANIZED HEALTH CARE EDUCATION/TRAINING PROGRAM

## 2024-11-23 PROCEDURE — 6370000000 HC RX 637 (ALT 250 FOR IP): Performed by: NURSE PRACTITIONER

## 2024-11-23 PROCEDURE — 6360000002 HC RX W HCPCS: Performed by: STUDENT IN AN ORGANIZED HEALTH CARE EDUCATION/TRAINING PROGRAM

## 2024-11-23 PROCEDURE — 84484 ASSAY OF TROPONIN QUANT: CPT

## 2024-11-23 PROCEDURE — 36415 COLL VENOUS BLD VENIPUNCTURE: CPT

## 2024-11-23 PROCEDURE — 93010 ELECTROCARDIOGRAM REPORT: CPT | Performed by: INTERNAL MEDICINE

## 2024-11-23 PROCEDURE — 93005 ELECTROCARDIOGRAM TRACING: CPT | Performed by: NURSE PRACTITIONER

## 2024-11-23 RX ORDER — ALPRAZOLAM 0.5 MG
0.5 TABLET ORAL ONCE
Status: COMPLETED | OUTPATIENT
Start: 2024-11-23 | End: 2024-11-23

## 2024-11-23 RX ADMIN — ISOSORBIDE MONONITRATE 60 MG: 60 TABLET, EXTENDED RELEASE ORAL at 08:33

## 2024-11-23 RX ADMIN — TAMSULOSIN HYDROCHLORIDE 0.4 MG: 0.4 CAPSULE ORAL at 08:37

## 2024-11-23 RX ADMIN — OXYCODONE 10 MG: 5 TABLET ORAL at 08:29

## 2024-11-23 RX ADMIN — CARVEDILOL 3.12 MG: 3.12 TABLET, FILM COATED ORAL at 08:33

## 2024-11-23 RX ADMIN — ATORVASTATIN CALCIUM 80 MG: 80 TABLET, FILM COATED ORAL at 08:34

## 2024-11-23 RX ADMIN — CETIRIZINE HYDROCHLORIDE 10 MG: 10 TABLET, FILM COATED ORAL at 08:34

## 2024-11-23 RX ADMIN — RANOLAZINE 500 MG: 500 TABLET, EXTENDED RELEASE ORAL at 08:34

## 2024-11-23 RX ADMIN — FLUPHENAZINE HYDROCHLORIDE 5 MG: 2.5 TABLET, FILM COATED ORAL at 20:54

## 2024-11-23 RX ADMIN — PROCHLORPERAZINE EDISYLATE 10 MG: 5 INJECTION INTRAMUSCULAR; INTRAVENOUS at 01:12

## 2024-11-23 RX ADMIN — METHOCARBAMOL 1000 MG: 100 INJECTION INTRAMUSCULAR; INTRAVENOUS at 11:10

## 2024-11-23 RX ADMIN — LEVOTHYROXINE SODIUM 175 MCG: 0.17 TABLET ORAL at 08:33

## 2024-11-23 RX ADMIN — HYDROMORPHONE HYDROCHLORIDE 0.5 MG: 1 INJECTION, SOLUTION INTRAMUSCULAR; INTRAVENOUS; SUBCUTANEOUS at 19:39

## 2024-11-23 RX ADMIN — ISOSORBIDE MONONITRATE 60 MG: 60 TABLET, EXTENDED RELEASE ORAL at 08:57

## 2024-11-23 RX ADMIN — ACETAMINOPHEN 650 MG: 325 TABLET ORAL at 13:18

## 2024-11-23 RX ADMIN — PANTOPRAZOLE SODIUM 40 MG: 40 TABLET, DELAYED RELEASE ORAL at 17:06

## 2024-11-23 RX ADMIN — SODIUM CHLORIDE, PRESERVATIVE FREE 10 ML: 5 INJECTION INTRAVENOUS at 08:56

## 2024-11-23 RX ADMIN — HYDROMORPHONE HYDROCHLORIDE 0.5 MG: 1 INJECTION, SOLUTION INTRAMUSCULAR; INTRAVENOUS; SUBCUTANEOUS at 01:00

## 2024-11-23 RX ADMIN — DULOXETINE HYDROCHLORIDE 60 MG: 60 CAPSULE, DELAYED RELEASE ORAL at 08:34

## 2024-11-23 RX ADMIN — DIAZEPAM 5 MG: 5 TABLET ORAL at 17:08

## 2024-11-23 RX ADMIN — CARVEDILOL 3.12 MG: 3.12 TABLET, FILM COATED ORAL at 17:06

## 2024-11-23 RX ADMIN — WATER 2000 MG: 1 INJECTION INTRAMUSCULAR; INTRAVENOUS; SUBCUTANEOUS at 11:10

## 2024-11-23 RX ADMIN — TIZANIDINE 4 MG: 4 TABLET ORAL at 08:37

## 2024-11-23 RX ADMIN — PRAZOSIN HYDROCHLORIDE 5 MG: 5 CAPSULE ORAL at 20:54

## 2024-11-23 RX ADMIN — TIZANIDINE 4 MG: 4 TABLET ORAL at 20:54

## 2024-11-23 RX ADMIN — HYDROMORPHONE HYDROCHLORIDE 0.5 MG: 1 INJECTION, SOLUTION INTRAMUSCULAR; INTRAVENOUS; SUBCUTANEOUS at 13:18

## 2024-11-23 RX ADMIN — RANOLAZINE 500 MG: 500 TABLET, EXTENDED RELEASE ORAL at 20:54

## 2024-11-23 RX ADMIN — PANTOPRAZOLE SODIUM 40 MG: 40 TABLET, DELAYED RELEASE ORAL at 08:33

## 2024-11-23 RX ADMIN — TAMSULOSIN HYDROCHLORIDE 0.4 MG: 0.4 CAPSULE ORAL at 08:34

## 2024-11-23 RX ADMIN — OXYCODONE 10 MG: 5 TABLET ORAL at 17:06

## 2024-11-23 RX ADMIN — AMLODIPINE BESYLATE 5 MG: 5 TABLET ORAL at 08:34

## 2024-11-23 RX ADMIN — TIZANIDINE 4 MG: 4 TABLET ORAL at 13:18

## 2024-11-23 RX ADMIN — ALPRAZOLAM 0.5 MG: 0.5 TABLET ORAL at 01:53

## 2024-11-23 RX ADMIN — OXYCODONE 10 MG: 5 TABLET ORAL at 21:41

## 2024-11-23 ASSESSMENT — PAIN DESCRIPTION - ORIENTATION
ORIENTATION: MID
ORIENTATION: ANTERIOR
ORIENTATION: MID
ORIENTATION: ANTERIOR;LOWER
ORIENTATION: LOWER
ORIENTATION: MID

## 2024-11-23 ASSESSMENT — PAIN DESCRIPTION - LOCATION
LOCATION: NECK
LOCATION: NECK
LOCATION: NECK;CHEST
LOCATION: NECK
LOCATION: BACK;NECK
LOCATION: NECK;CHEST;BACK

## 2024-11-23 ASSESSMENT — PAIN SCALES - GENERAL
PAINLEVEL_OUTOF10: 7
PAINLEVEL_OUTOF10: 7
PAINLEVEL_OUTOF10: 5
PAINLEVEL_OUTOF10: 8
PAINLEVEL_OUTOF10: 9
PAINLEVEL_OUTOF10: 7
PAINLEVEL_OUTOF10: 9
PAINLEVEL_OUTOF10: 7
PAINLEVEL_OUTOF10: 4
PAINLEVEL_OUTOF10: 7
PAINLEVEL_OUTOF10: 8
PAINLEVEL_OUTOF10: 4
PAINLEVEL_OUTOF10: 6

## 2024-11-23 ASSESSMENT — PAIN DESCRIPTION - FREQUENCY
FREQUENCY: CONTINUOUS
FREQUENCY: INTERMITTENT
FREQUENCY: CONTINUOUS

## 2024-11-23 ASSESSMENT — PAIN SCALES - WONG BAKER
WONGBAKER_NUMERICALRESPONSE: NO HURT
WONGBAKER_NUMERICALRESPONSE: NO HURT

## 2024-11-23 ASSESSMENT — PAIN DESCRIPTION - ONSET
ONSET: ON-GOING
ONSET: ON-GOING
ONSET: PROGRESSIVE
ONSET: PROGRESSIVE
ONSET: ON-GOING

## 2024-11-23 ASSESSMENT — PAIN DESCRIPTION - PAIN TYPE
TYPE: SURGICAL PAIN

## 2024-11-23 ASSESSMENT — PAIN DESCRIPTION - DESCRIPTORS
DESCRIPTORS: ACHING
DESCRIPTORS: ACHING;DISCOMFORT
DESCRIPTORS: SHARP;ACHING;DISCOMFORT
DESCRIPTORS: SHARP;ACHING;DISCOMFORT

## 2024-11-23 NOTE — PROGRESS NOTES
Pt A&O x4, pt continues to be noncompliant with leaving drain/surgical site alone, pt continues to tug at, rub, and scratch at incision and drain site.

## 2024-11-23 NOTE — PLAN OF CARE
Problem: Discharge Planning  Goal: Discharge to home or other facility with appropriate resources  Outcome: Progressing   Case management is consulted for all discharge purposes.  Problem: Pain  Goal: Verbalizes/displays adequate comfort level or baseline comfort level  Outcome: Progressing   Pain medications are being managed by the MAR.  Problem: Skin/Tissue Integrity  Goal: Absence of new skin breakdown  Description: 1.  Monitor for areas of redness and/or skin breakdown  2.  Assess vascular access sites hourly  3.  Every 4-6 hours minimum:  Change oxygen saturation probe site  4.  Every 4-6 hours:  If on nasal continuous positive airway pressure, respiratory therapy assess nares and determine need for appliance change or resting period.  Outcome: Progressing   Patient remains absent from new skin breakdown.

## 2024-11-23 NOTE — PROGRESS NOTES
Illness/injury posing threat to life or bodily function:    [] Severe exacerbation of chronic illness:    ---------------------------------------------------------------------  B. Risk of Treatment (any 1)   [] IV ABX requiring serial renal monitoring for nephrotoxicity:     [x] IV Narcotic analgesia for adverse drug reaction  [] IV diuresis requiring serial monitoring for renal impairment and electrolyte derangements  [] Critical electrolyte abnormalities requiring IV replacement and close serial monitoring  [] Insulin - monitoring serial FSBS for Hypoglycemic adverse drug reaction  [] Anticoagulation requiring serial monitoring of coagulation factors  [] IV/IM Controlled Substances order (any 1)   [] One-time Order including Drug Name/Route, Reason Ordered:   [] Scheduled Order including Drug Name/Route, Reason Ordered or Continued:   [] PRN Order including Drug Name/Route, Reason Ordered or Continued:  Other -   [] Decision to De-escalate Care this DOS due to change in treatment goals:  [] Decision to Escalate Care To:   [x] Major Surgery/Procedure (any 1):   Elective with patient risk factors including Procedure Type and Risk Factors: Neurosurgical intervention  Emergent Procedure Type:    ----------------------------------------------------------------------  C. Data (any 2)  [x] Data Review (3+ points)  [x] Consultant Note reviewed with note date, specialty, and summary (1 point each) neurosurgery  [x] All current labs were reviewed and interpreted for clinical significance   [x] Studies Reviewed (1 point each): MRI spine  [] Collateral history obtained including from who and why needed (Max 1 point):  [] Independent (J Luis Luis MD) Interpretation of tests (any 1)  [] Rhythm Strip (Telemetry) personally reviewed and interpreted as documented above    [] Imaging personally reviewed and interpreted, includes:    [x] Discussion (any 1)  [x] Discussed the discharge plan in detail with case management  including          Urine Cultures: No results found for: \"LABURIN\"  Blood Cultures:   Lab Results   Component Value Date/Time    BC No Growth after 4 days of incubation. 11/17/2024 09:25 PM     Lab Results   Component Value Date/Time    BLOODCULT2 No Growth after 4 days of incubation. 11/17/2024 09:25 PM     Organism: No results found for: \"ORG\"      J Luis Luis MD

## 2024-11-23 NOTE — PROGRESS NOTES
Patient is alert and orient X4, VSS, patient is has an order for a reg diet, patient is not tolerating diet at this time, patient is voiding per urinal, patient has not ambulated since he came up from the pacu, patient has a drain and is not compliant to leaving the drain in place, the MD was contacted and came to the bedside, patient has all standard fall precautions in place call light and tray table are in reach.

## 2024-11-23 NOTE — PROGRESS NOTES
Pt called this RN to notify of sharp stabbing chest pain that awoke him from sleep, VS showed elevated BP but the rest of the vitals stable. This RN notified on call hospitalist who ordered EKG and troponin lab. Stat EKG completed, results examined at bedside by on call hospitalist. Pt BP stabilized, pt reported chest pain decreasing.     Electronically signed by MANUEL SEXTON RN on 11/23/2024 at 7:55 AM

## 2024-11-23 NOTE — DISCHARGE INSTRUCTIONS
Keep aluminum splint in place with tape on plamar side of the fracture for one week.  Follow up for splint removal and repeat x-rays to assess fracture healing and covert to lisha taping treatment with the ring finger in one week.

## 2024-11-23 NOTE — PLAN OF CARE
Problem: Chronic Conditions and Co-morbidities  Goal: Patient's chronic conditions and co-morbidity symptoms are monitored and maintained or improved  Outcome: Progressing     Pt chronic conditions managed via MAR    Problem: Discharge Planning  Goal: Discharge to home or other facility with appropriate resources  11/23/2024 0342 by Nancy Sabillon RN  Outcome: Progressing     Collaboration with social work to meet pt discharge needs    Problem: Pain  Goal: Verbalizes/displays adequate comfort level or baseline comfort level  11/23/2024 0342 by Nancy Sabillon RN  Outcome: Progressing     Pt pain managed via MAR    Problem: Skin/Tissue Integrity  Goal: Absence of new skin breakdown  Description: 1.  Monitor for areas of redness and/or skin breakdown  2.  Assess vascular access sites hourly  3.  Every 4-6 hours minimum:  Change oxygen saturation probe site  4.  Every 4-6 hours:  If on nasal continuous positive airway pressure, respiratory therapy assess nares and determine need for appliance change or resting period.  11/23/2024 0342 by Nancy Sabillon RN  Outcome: Progressing       Problem: ABCDS Injury Assessment  Goal: Absence of physical injury  Outcome: Progressing     Problem: Safety - Adult  Goal: Free from fall injury  Outcome: Progressing  Flowsheets (Taken 11/22/2024 1918 by Yasmin Medrano, RN)  Free From Fall Injury: Instruct family/caregiver on patient safety     Problem: Risk for Elopement  Goal: Patient will not exit the unit/facility without proper excort  Outcome: Progressing     Problem: Nutrition Deficit:  Goal: Optimize nutritional status  Outcome: Progressing

## 2024-11-23 NOTE — PROGRESS NOTES
Pt awake in bed, alert and oriented x 4. VSS on RA, pt exhibiting no s/s acute distress at this time. Pt tolerating PO diet well. Pt ambulated this shift SBA, pt impulsive and refusing walker and GB. Pt voiding via BRP. Pt pain managed via MAR. Standard safety precautions in place, bed locked and in lowest position, bed/chair alarm on, gripper socks applied, bedside table/call light within reach, pt oriented to fall prevention measures. Video monitoring to promote pt safety. Pt noncompliant with instructions to leave anterior neck drain site alone, pt continues to touch and tug at drain. Pt states he Has no other needs at this time. Plan of care to continue.    Electronically signed by MANUEL SEXTON RN on 11/23/2024 at 3:36 AM

## 2024-11-23 NOTE — PROGRESS NOTES
Neurosurgery Progress Note    Patient seen and examined on 11/23/24. Patient complaining of chest pain overnight without acute findings. Complaining of drain bothering him.       A/P: POD #1 s/p C3-5 ACDF    Neurologic exam frequency: Q4H  Mobility: PT/OT   Diet: PO diet - advance as able  Antibiotics: completed post op course  Drain: HV drain - monitor output, likely remove later today  Gilbert: removal per protocol  DVT Prophylaxis: SCDs & Lovenox (to start 11/24)  GI Prophylaxis: Pepcid  Bowel Regimen: SenoKot-S BID & Glycolax   Pain control: PRN oxycodone / IV dilaudid  Muscle relaxer: Scheduled Robaxin / PRN valium  Incisional Care: Keep CARY - okay to start washing after drain removed, soap and water only  Dispo Planning: pending pain control, mobility       Phoebe Gan PA-C  [facility] [place] [address], [address]  [place], [place], [address] [phone]
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
## 2024-11-23 NOTE — PROGRESS NOTES
Occupational Therapy  Facility/Department: [facility] 5T ORTHO/NEURO  Occupational Therapy Re-Evaluation/treatment    Name: Ashutosh Donovan  : 1970  MRN: [id number]  Date of Service: 2024    Discharge Recommendations:  24 hour supervision or assist  OT Equipment Recommendations  Equipment Needed: Yes  ADL Assistive Devices: Shower Chair with back       Patient Diagnosis(es): The encounter diagnosis was S/P spinal surgery.  Past Medical History:  has a past medical history of Arthritis, CAD (coronary artery disease), CHF (congestive heart failure) (HCC), Chronic pain, Heart attack (HCC), Hepatitis C, and Hypothyroid.  Past Surgical History:  has a past surgical history that includes Coronary stent placement; Cholecystectomy; Tonsillectomy; and cervical fusion (N/A, 2024).    Treatment Diagnosis: Impaired ADL status; Impaired functional mobility      Assessment  Performance deficits / Impairments: Decreased functional mobility ;Decreased ADL status;Decreased strength;Decreased safe awareness;Decreased cognition;Decreased endurance;Decreased sensation;Decreased balance;Decreased high-level IADLs;Decreased coordination  Assessment: Pt seen for re-evaluation s/p Anterior cervical diskectomy C3-C4 and C4-C5 with decompression of spinal cord and nerve roots.  Pt req close SBA for functional mobility with walker.  Pt with loss of balance when walking to bathroom to get the walker.  Pt refused gate belt and CG assist.  Pt refused for OT to remain in bathroom during toileting.  Max assist to don c-collar.  Will follow for acute OT.  Treatment Diagnosis: Impaired ADL status; Impaired functional mobility  Prognosis: Good  REQUIRES OT FOLLOW-UP: Yes  Activity Tolerance  Activity Tolerance: Treatment limited secondary to agitation     Plan  Occupational Therapy Plan  Times Per Week: 5-7  Current Treatment Recommendations: Strengthening, Balance training, Functional mobility training, Safety education & training,

## 2024-11-23 NOTE — PROGRESS NOTES
Physical Therapy  Facility/Department: [facility] ORTHO/NEURO  Physical Therapy Initial Assessment and treatment    Name: Ashutosh Donovan  : 1970  MRN: [id number]  Date of Service: 2024    Discharge Recommendations:  24 hour supervision or assist (home with 24 hour supervision/assistance)   PT Equipment Recommendations  Equipment Needed: Yes  Mobility Devices: Walker  Walker: Rolling      Patient Diagnosis(es): The encounter diagnosis was S/P spinal surgery.  Past Medical History:  has a past medical history of Arthritis, CAD (coronary artery disease), CHF (congestive heart failure) (HCC), Chronic pain, Heart attack (HCC), Hepatitis C, and Hypothyroid.  Past Surgical History:  has a past surgical history that includes Coronary stent placement; Cholecystectomy; Tonsillectomy; and cervical fusion (N/A, 2024).    Assessment  Body Structures, Functions, Activity Limitations Requiring Skilled Therapeutic Intervention: Decreased functional mobility ;Decreased strength;Decreased safe awareness;Decreased endurance;Decreased balance;Increased pain  Assessment: Patient re-evaluated following Cervical 3 - Cervical 5 Anterior Cervical Discectomy and Fusion on .  He continues to be impulsive with all mobility and is agitated at times, refusing use of gait belt during mobility.  Patient was agreeable to [place] J collar, dependent to don.  He demonstrates one small loss of balance while ambulating unassisted requiring CGA for safety.  Verbal cues for safety and technique provided throughout session but patient not receptive to education.  Patient planning to d/c to girlfriend's house.  Recommend initial 24 hour supervision/assistance.  Will follow while in acute care setting to maximize independence with mobility. Patient uses rollator at baseline; recommend use of FWW for improved stability - patient will need prior to d/c.  Treatment Diagnosis: impaired functional moblity 2/2 decreased LE  strength  Therapy Prognosis: Good  Decision Making: Medium Complexity  Requires PT Follow-Up: Yes  Activity Tolerance  Activity Tolerance: Patient tolerated evaluation without incident    Plan  Physical Therapy Plan  General Plan: 5-7 times per week  Current Treatment Recommendations: Balance training, Functional mobility training, Gait training, Stair training, Patient/Caregiver education & training, Safety education & training  Safety Devices  Type of Devices: Left in bed, Bed alarm in place, Gait belt, Nurse notified, Call light within reach    Restrictions  Position Activity Restriction  Other position/activity restrictions: Up as tolerated;Cervical collar when ambulating     Subjective  General  Chart Reviewed: Yes  Patient assessed for rehabilitation services?: Yes  Additional Pertinent Hx: Pt is a 53 y.o. male adm 11/12 with LE numbness.  Pt originally presented to [place] [facility] on 11/10 with chest pain and endorsed left sided numbness.   MRI LSpine showed Severe thecal sac stenosis at L4-5 with crowding of the cauda equina. Transferred to  for further management.  CT Cspine:Degenerative disease and facet hypertrophy with notable stenosis including C3-4, C4-5, C5-6 and C6-7.  CT Lspine:Lumbar degenerative disc disease and facet arthropathy with L4-L5 spinal stenosis and foraminal narrowing.  MRI Cspine:Mild anterolisthesis of C3 on C4 and of C4 on C5. Multilevel degenerative disc disease and hypertrophic changes throughout the cervical spine.  At the C3-C4 level, findings contribute to severe acquired central canal and bilateral foraminal stenosis. Mild effacement of the cord at this level.  L spine Xray:Grade 1 anterolisthesis of L4 on L5.  Degenerative disc disease and hypertrophic degenerative changes of the posterior elements at L4-L5 and L5-S1 levels.    PMH:tobacco abuse; CKD stage IIIa; emphysema; multiple CAD history of about 10 stents; hypertension; and congestive heart failure.  Patient now s/p

## 2024-11-23 NOTE — PLAN OF CARE
Problem: Discharge Planning  Goal: Discharge to home or other facility with appropriate resources  11/23/2024 1041 by Yasmin Medrano, RN  Outcome: Progressing Case management is consulted for discharge.  Flowsheets (Taken 11/23/2024 0800)  Discharge to home or other facility with appropriate resources: Identify barriers to discharge with patient and caregiver  Problem: Pain  Goal: Verbalizes/displays adequate comfort level or baseline comfort level  11/23/2024 1041 by Yasmin Medrano, RN  Outcome: Progressing  Pain medications are being managed by the MAR.  Problem: Chronic Conditions and Co-morbidities  Goal: Patient's chronic conditions and co-morbidity symptoms are monitored and maintained or improved  11/23/2024 1041 by Yasmin Medrano, RN  Outcome: Progressing  Flowsheets (Taken 11/23/2024 0800)  Care Plan - Patient's Chronic Conditions and Co-Morbidity Symptoms are Monitored and Maintained or Improved: Monitor and assess patient's chronic conditions and comorbid symptoms for stability, deterioration, or improvement   Patient remains stable at this time.

## 2024-11-23 NOTE — PROGRESS NOTES
Patient is alert and orient X4, VSS, patient is tolerating a reg diet, patient is up ambulating independent with standby assist , patient is not allowing a gait belt, patient is refusing to wear the compression devices, patient is not compliant with me about touching the drain he keeps attempting to pull out the drain, this RN did educated the patient on the importance of the drain operating properly, patient is having a lot of anxiety, patient has all standard fall precautions in place, call light and tray table are in reach.

## 2024-11-24 VITALS
HEIGHT: 71 IN | SYSTOLIC BLOOD PRESSURE: 157 MMHG | HEART RATE: 88 BPM | BODY MASS INDEX: 31.48 KG/M2 | RESPIRATION RATE: 17 BRPM | WEIGHT: 224.87 LBS | DIASTOLIC BLOOD PRESSURE: 99 MMHG | TEMPERATURE: 97.7 F | OXYGEN SATURATION: 96 %

## 2024-11-24 PROCEDURE — 6360000002 HC RX W HCPCS

## 2024-11-24 PROCEDURE — 2580000003 HC RX 258: Performed by: STUDENT IN AN ORGANIZED HEALTH CARE EDUCATION/TRAINING PROGRAM

## 2024-11-24 PROCEDURE — 6370000000 HC RX 637 (ALT 250 FOR IP): Performed by: NURSE PRACTITIONER

## 2024-11-24 PROCEDURE — 6360000002 HC RX W HCPCS: Performed by: STUDENT IN AN ORGANIZED HEALTH CARE EDUCATION/TRAINING PROGRAM

## 2024-11-24 PROCEDURE — 6370000000 HC RX 637 (ALT 250 FOR IP): Performed by: STUDENT IN AN ORGANIZED HEALTH CARE EDUCATION/TRAINING PROGRAM

## 2024-11-24 RX ORDER — CLOPIDOGREL BISULFATE 75 MG/1
75 TABLET ORAL DAILY
Qty: 30 TABLET | Refills: 0 | Status: SHIPPED | OUTPATIENT
Start: 2024-11-24

## 2024-11-24 RX ORDER — ASPIRIN 81 MG/1
81 TABLET ORAL DAILY
Qty: 30 TABLET | Refills: 0 | Status: SHIPPED | OUTPATIENT
Start: 2024-11-24

## 2024-11-24 RX ORDER — RANOLAZINE 500 MG/1
500 TABLET, EXTENDED RELEASE ORAL 2 TIMES DAILY
Qty: 60 TABLET | Refills: 0 | Status: SHIPPED | OUTPATIENT
Start: 2024-11-24

## 2024-11-24 RX ORDER — ATORVASTATIN CALCIUM 80 MG/1
80 TABLET, FILM COATED ORAL DAILY
Qty: 30 TABLET | Refills: 0 | Status: SHIPPED | OUTPATIENT
Start: 2024-11-24

## 2024-11-24 RX ORDER — CARVEDILOL 3.12 MG/1
3.12 TABLET ORAL 2 TIMES DAILY WITH MEALS
Qty: 60 TABLET | Refills: 0 | Status: SHIPPED | OUTPATIENT
Start: 2024-11-24

## 2024-11-24 RX ORDER — AMLODIPINE BESYLATE 5 MG/1
5 TABLET ORAL DAILY
Qty: 30 TABLET | Refills: 0 | Status: SHIPPED | OUTPATIENT
Start: 2024-11-24

## 2024-11-24 RX ORDER — NITROGLYCERIN 0.4 MG/1
0.4 TABLET SUBLINGUAL EVERY 5 MIN PRN
Qty: 25 TABLET | Refills: 0 | Status: SHIPPED | OUTPATIENT
Start: 2024-11-24

## 2024-11-24 RX ADMIN — SODIUM CHLORIDE, PRESERVATIVE FREE 10 ML: 5 INJECTION INTRAVENOUS at 08:05

## 2024-11-24 RX ADMIN — ENOXAPARIN SODIUM 40 MG: 100 INJECTION SUBCUTANEOUS at 08:06

## 2024-11-24 RX ADMIN — CARVEDILOL 3.12 MG: 3.12 TABLET, FILM COATED ORAL at 08:05

## 2024-11-24 RX ADMIN — DULOXETINE HYDROCHLORIDE 60 MG: 60 CAPSULE, DELAYED RELEASE ORAL at 08:05

## 2024-11-24 RX ADMIN — RANOLAZINE 500 MG: 500 TABLET, EXTENDED RELEASE ORAL at 08:05

## 2024-11-24 RX ADMIN — ISOSORBIDE MONONITRATE 60 MG: 60 TABLET, EXTENDED RELEASE ORAL at 08:04

## 2024-11-24 RX ADMIN — DIAZEPAM 5 MG: 5 TABLET ORAL at 11:21

## 2024-11-24 RX ADMIN — AMLODIPINE BESYLATE 5 MG: 5 TABLET ORAL at 08:05

## 2024-11-24 RX ADMIN — TAMSULOSIN HYDROCHLORIDE 0.4 MG: 0.4 CAPSULE ORAL at 08:05

## 2024-11-24 RX ADMIN — OXYCODONE 10 MG: 5 TABLET ORAL at 04:02

## 2024-11-24 RX ADMIN — OXYCODONE 10 MG: 5 TABLET ORAL at 08:04

## 2024-11-24 RX ADMIN — HYDROMORPHONE HYDROCHLORIDE 0.5 MG: 1 INJECTION, SOLUTION INTRAMUSCULAR; INTRAVENOUS; SUBCUTANEOUS at 05:15

## 2024-11-24 RX ADMIN — PANTOPRAZOLE SODIUM 40 MG: 40 TABLET, DELAYED RELEASE ORAL at 05:15

## 2024-11-24 RX ADMIN — LEVOTHYROXINE SODIUM 175 MCG: 0.17 TABLET ORAL at 05:15

## 2024-11-24 RX ADMIN — ATORVASTATIN CALCIUM 80 MG: 80 TABLET, FILM COATED ORAL at 08:05

## 2024-11-24 RX ADMIN — HYDROMORPHONE HYDROCHLORIDE 0.5 MG: 1 INJECTION, SOLUTION INTRAMUSCULAR; INTRAVENOUS; SUBCUTANEOUS at 09:20

## 2024-11-24 RX ADMIN — CETIRIZINE HYDROCHLORIDE 10 MG: 10 TABLET, FILM COATED ORAL at 08:05

## 2024-11-24 RX ADMIN — HYDROMORPHONE HYDROCHLORIDE 0.5 MG: 1 INJECTION, SOLUTION INTRAMUSCULAR; INTRAVENOUS; SUBCUTANEOUS at 01:21

## 2024-11-24 RX ADMIN — TIZANIDINE 4 MG: 4 TABLET ORAL at 08:05

## 2024-11-24 ASSESSMENT — PAIN DESCRIPTION - PAIN TYPE
TYPE: SURGICAL PAIN

## 2024-11-24 ASSESSMENT — PAIN DESCRIPTION - DESCRIPTORS
DESCRIPTORS: SHARP;ACHING;DISCOMFORT
DESCRIPTORS: ACHING
DESCRIPTORS: ACHING

## 2024-11-24 ASSESSMENT — PAIN DESCRIPTION - LOCATION
LOCATION: BACK
LOCATION: NECK
LOCATION: LEG;BACK

## 2024-11-24 ASSESSMENT — PAIN - FUNCTIONAL ASSESSMENT
PAIN_FUNCTIONAL_ASSESSMENT: PREVENTS OR INTERFERES SOME ACTIVE ACTIVITIES AND ADLS

## 2024-11-24 ASSESSMENT — PAIN SCALES - GENERAL
PAINLEVEL_OUTOF10: 4
PAINLEVEL_OUTOF10: 8
PAINLEVEL_OUTOF10: 7
PAINLEVEL_OUTOF10: 3
PAINLEVEL_OUTOF10: 8
PAINLEVEL_OUTOF10: 8
PAINLEVEL_OUTOF10: 4
PAINLEVEL_OUTOF10: 8

## 2024-11-24 ASSESSMENT — PAIN DESCRIPTION - ORIENTATION
ORIENTATION: MID
ORIENTATION: ANTERIOR
ORIENTATION: ANTERIOR
ORIENTATION: MID
ORIENTATION: MID

## 2024-11-24 ASSESSMENT — PAIN DESCRIPTION - ONSET
ONSET: PROGRESSIVE

## 2024-11-24 ASSESSMENT — PAIN DESCRIPTION - FREQUENCY
FREQUENCY: CONTINUOUS

## 2024-11-24 NOTE — PLAN OF CARE
Problem: Chronic Conditions and Co-morbidities  Goal: Patient's chronic conditions and co-morbidity symptoms are monitored and maintained or improved  11/24/2024 0851 by Minerva Kerr RN  Outcome: Progressing  11/24/2024 0020 by Radha Mckeon RN  Outcome: Progressing  Flowsheets (Taken 11/23/2024 1200 by Yasmin Medrano, RN)  Care Plan - Patient's Chronic Conditions and Co-Morbidity Symptoms are Monitored and Maintained or Improved: Monitor and assess patient's chronic conditions and comorbid symptoms for stability, deterioration, or improvement     Problem: Discharge Planning  Goal: Discharge to home or other facility with appropriate resources  11/24/2024 0851 by Minerva Kerr RN  Outcome: Progressing  11/24/2024 0020 by Radha Mckeon RN  Outcome: Progressing  Flowsheets (Taken 11/23/2024 1200 by Yasmin Medrano, RN)  Discharge to home or other facility with appropriate resources: Identify barriers to discharge with patient and caregiver     Problem: Pain  Goal: Verbalizes/displays adequate comfort level or baseline comfort level  11/24/2024 0851 by Minevra Kerr RN  Outcome: Progressing  11/24/2024 0020 by Radha Mckeon RN  Outcome: Progressing  Note: Pt is encouraged to monitor pain and request assistance. Appropriate pain scale is being used.     Problem: Skin/Tissue Integrity  Goal: Absence of new skin breakdown  Description: 1.  Monitor for areas of redness and/or skin breakdown  2.  Assess vascular access sites hourly  3.  Every 4-6 hours minimum:  Change oxygen saturation probe site  4.  Every 4-6 hours:  If on nasal continuous positive airway pressure, respiratory therapy assess nares and determine need for appliance change or resting period.  11/24/2024 0851 by Minerva Kerr RN  Outcome: Progressing  11/24/2024 0020 by Radha Mckeon RN  Outcome: Progressing     Problem: ABCDS Injury Assessment  Goal: Absence of physical injury  11/24/2024 0851 by Minerva Kerr RN  Outcome:

## 2024-11-24 NOTE — PROGRESS NOTES
Neurosurgery Progress Note    Patient seen and examined on 11/24/24. No acute events overnight. Patient states pain controlled and would like to go home.      A/P: POD #2 s/p C3-5 ACDF    Neurologic exam frequency: Q4H  Mobility: PT/OT   Diet: PO diet - advance as able  Antibiotics: completed post op course  Drain: remove today  Gilbert: removal per protocol  DVT Prophylaxis: SCDs & Lovenox (to start 11/24)  GI Prophylaxis: Pepcid  Bowel Regimen: SenoKot-S BID & Glycolax   Pain control: PRN oxycodone / IV dilaudid  Muscle relaxer: Scheduled Robaxin / PRN valium  Incisional Care: Keep CARY - okay to start washing after drain removed, soap and water only  Dispo Planning: ok for discharge from a neurosurgical standpoint      Phoebe Gan PA-C  [facility] [address], [address]  [place], [place], [address] [phone]

## 2024-11-24 NOTE — CARE COORDINATION
Patient for discharge to home today. No family to provide transportation    Transportation arranged through [employer]    Bedside nurse updated.     ADDENDUM:    Per Bedside nurse Paresh Palma has not called.    I redispatched, I informed Minerva that a [employer]V is on its way to  the patient.    I inquired if the patient needs a walker, states does not need a walker.

## 2024-11-24 NOTE — PLAN OF CARE
Problem: Chronic Conditions and Co-morbidities  Goal: Patient's chronic conditions and co-morbidity symptoms are monitored and maintained or improved  11/24/2024 1037 by Minerva Kerr RN  Outcome: Completed  11/24/2024 0851 by Minerva Kerr RN  Outcome: Progressing  11/24/2024 0020 by Radha Mckeon RN  Outcome: Progressing  Flowsheets (Taken 11/23/2024 1200 by Yasmin Medrano, RN)  Care Plan - Patient's Chronic Conditions and Co-Morbidity Symptoms are Monitored and Maintained or Improved: Monitor and assess patient's chronic conditions and comorbid symptoms for stability, deterioration, or improvement     Problem: Discharge Planning  Goal: Discharge to home or other facility with appropriate resources  11/24/2024 1037 by Minerva Kerr RN  Outcome: Completed  11/24/2024 0851 by Minerva Kerr RN  Outcome: Progressing  11/24/2024 0020 by Radha Mckeon RN  Outcome: Progressing  Flowsheets (Taken 11/23/2024 1200 by Yasmin Medrano, RN)  Discharge to home or other facility with appropriate resources: Identify barriers to discharge with patient and caregiver     Problem: Pain  Goal: Verbalizes/displays adequate comfort level or baseline comfort level  11/24/2024 1037 by Minerva Kerr RN  Outcome: Completed  11/24/2024 0851 by Minerva Kerr RN  Outcome: Progressing  11/24/2024 0020 by Radha Mckeon RN  Outcome: Progressing  Note: Pt is encouraged to monitor pain and request assistance. Appropriate pain scale is being used.     Problem: Skin/Tissue Integrity  Goal: Absence of new skin breakdown  Description: 1.  Monitor for areas of redness and/or skin breakdown  2.  Assess vascular access sites hourly  3.  Every 4-6 hours minimum:  Change oxygen saturation probe site  4.  Every 4-6 hours:  If on nasal continuous positive airway pressure, respiratory therapy assess nares and determine need for appliance change or resting period.  11/24/2024 1037 by Minerva Kerr RN  Outcome: Completed  11/24/2024 0851 by

## 2024-11-24 NOTE — PROGRESS NOTES
Iv removed from patient. Discharge instructions gone over with patient including when to call and make follow up appointments and discharge medications. Patient discharging via [employer] to girlfriend's house. Patient in stable condition for discharge

## 2024-11-24 NOTE — PLAN OF CARE
Problem: Pain  Goal: Verbalizes/displays adequate comfort level or baseline comfort level  11/24/2024 0020 by Radha Mckeon, RN  Outcome: Progressing  Note: Pt is encouraged to monitor pain and request assistance. Appropriate pain scale is being used.     Problem: Safety - Adult  Goal: Free from fall injury  Outcome: Progressing  Flowsheets (Taken 11/23/2024 1038 by Yasmin Medrano, RN)  Free From Fall Injury: Instruct family/caregiver on patient safety  Note: Pt is free of fall injury this shift. All proper safety precautions are in place. Call light is within reach. Bed alarm is on.

## 2024-11-24 NOTE — PROGRESS NOTES
Patient is A&Ox 4. VSS this shift with exception of elevated BP. Patient has endorsed pain to neck/ back managed well per MAR and non-pharm measures. Patient is tolerating PO diet. Tolerating standby assist with GB. Voiding adequately via BRP. Patient updated on plan of care. Fall and safety precautions in place, call light within reach.

## 2024-11-24 NOTE — DISCHARGE SUMMARY
V2.0  Discharge Summary    Name:  Ashutosh Donovan /Age/Sex: 1970 (53 y.o. male)   Admit Date: 2024  Discharge Date: 24    MRN & CSN:  [id number] & [id number] Encounter Date and Time 24 10:12 AM EST    Attending:  J Luis Luis MD Discharging Provider: J Luis Luis MD       Hospital Course:     Brief HPI: Ashutosh Donovan is a 53 y.o. male who presented with lumbar back pain and was found to have myelopathy and radiculopathy.  Admitted to the hospital for care.    Brief Problem Based Course:   Cervical stenosis-status post C3-C5 ACDF did well postop.  Pain is controlled.  Treated with muscle relaxants.  Improved neurological status.  Was moving arms and legs.  Had less numbness and tingling after the procedure.  Sent home on oxycodone and Robaxin as a muscle relaxant.  Follow-up with [facility].  Right small finger proximal phalanx fracture-seen by orthopedics.  Recommendation was no surgical intervention.  Splint with progressive range of motion.  Follow-up with Ortho.  Coronary artery disease-stable cleared for surgery.  Had no anginal symptoms in the perioperative postoperative period.  Continue antianginals at discharge.  Lumbar radiculopathy-conservative management with muscle relaxants PT OT  Hypertension-stable on meds      The patient expressed appropriate understanding of, and agreement with the discharge recommendations, medications, and plan.     Consults this admission:  IP CONSULT TO SPIRITUAL SERVICES  IP CONSULT TO DIETITIAN  IP CONSULT TO CASE MANAGEMENT  IP CONSULT TO NEUROSURGERY  IP CONSULT TO ORTHOPEDIC SURGERY    Discharge Diagnosis:   Cervical stenosis  Lumbar radiculopathy  Coronary artery disease  Left proximal phalanx fracture    Discharge Instruction:   Follow up appointments: [facility] in 1 week.  [facility] orthopedics in 1 to 2 weeks.  Primary care physician: No primary care provider on file. within 1 week  Diet: cardiac diet   Activity: no heavy

## 2024-11-25 ASSESSMENT — LIFESTYLE VARIABLES: SMOKING_STATUS: 1

## 2024-11-25 ASSESSMENT — ENCOUNTER SYMPTOMS: SHORTNESS OF BREATH: 1

## 2024-12-02 ENCOUNTER — HOSPITAL ENCOUNTER (INPATIENT)
Age: 54
LOS: 2 days | Discharge: NURSING, REHAB OR LONG-TERM CARE | DRG: 871 | End: 2024-12-07
Attending: EMERGENCY MEDICINE | Admitting: INTERNAL MEDICINE
Payer: MEDICARE

## 2024-12-02 ENCOUNTER — APPOINTMENT (OUTPATIENT)
Dept: CT IMAGING | Age: 54
DRG: 871 | End: 2024-12-02
Payer: MEDICARE

## 2024-12-02 ENCOUNTER — APPOINTMENT (OUTPATIENT)
Dept: GENERAL RADIOLOGY | Age: 54
DRG: 871 | End: 2024-12-02
Payer: MEDICARE

## 2024-12-02 DIAGNOSIS — R29.6 MULTIPLE FALLS: ICD-10-CM

## 2024-12-02 DIAGNOSIS — F33.9 EPISODE OF RECURRENT MAJOR DEPRESSIVE DISORDER, UNSPECIFIED DEPRESSION EPISODE SEVERITY (HCC): ICD-10-CM
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
## 2024-12-02 LAB
ALBUMIN SERPL-MCNC: 4.1 G/DL (ref 3.4–5)
ALP SERPL-CCNC: 151 U/L (ref 40–129)
ALT SERPL-CCNC: ABNORMAL U/L (ref 10–40)
ANION GAP SERPL CALCULATED.3IONS-SCNC: 11 MMOL/L (ref 3–16)
AST SERPL-CCNC: 41 U/L (ref 15–37)
BASOPHILS # BLD: 0.1 K/UL (ref 0–0.2)
BASOPHILS NFR BLD: 1.4 %
BILIRUB DIRECT SERPL-MCNC: ABNORMAL MG/DL (ref 0–0.3)
BILIRUB INDIRECT SERPL-MCNC: ABNORMAL MG/DL (ref 0–1)
BILIRUB SERPL-MCNC: 0.4 MG/DL (ref 0–1)
BILIRUB UR QL STRIP.AUTO: NEGATIVE
BUN SERPL-MCNC: 16 MG/DL (ref 7–20)
CALCIUM SERPL-MCNC: 10.3 MG/DL (ref 8.3–10.6)
CHLORIDE SERPL-SCNC: 102 MMOL/L (ref 99–110)
CK SERPL-CCNC: 111 U/L (ref 39–308)
CLARITY UR: CLEAR
CO2 SERPL-SCNC: 26 MMOL/L (ref 21–32)
COLOR UR: YELLOW
CREAT SERPL-MCNC: 1.1 MG/DL (ref 0.9–1.3)
DEPRECATED RDW RBC AUTO: 17.7 % (ref 12.4–15.4)
EKG ATRIAL RATE: 80 BPM
EKG DIAGNOSIS: NORMAL
EKG P AXIS: 41 DEGREES
EKG P-R INTERVAL: 158 MS
EKG Q-T INTERVAL: 424 MS
EKG QRS DURATION: 118 MS
EKG QTC CALCULATION (BAZETT): 489 MS
EKG R AXIS: 3 DEGREES
EKG T AXIS: 52 DEGREES
EKG VENTRICULAR RATE: 80 BPM
EOSINOPHIL # BLD: 0.1 K/UL (ref 0–0.6)
EOSINOPHIL NFR BLD: 1 %
EPI CELLS #/AREA URNS HPF: ABNORMAL /HPF (ref 0–5)
GFR SERPLBLD CREATININE-BSD FMLA CKD-EPI: 80 ML/MIN/{1.73_M2}
GLUCOSE SERPL-MCNC: 104 MG/DL (ref 70–99)
GLUCOSE UR STRIP.AUTO-MCNC: NEGATIVE MG/DL
HCT VFR BLD AUTO: 39.8 % (ref 40.5–52.5)
HGB BLD-MCNC: 13.2 G/DL (ref 13.5–17.5)
HGB UR QL STRIP.AUTO: NEGATIVE
KETONES UR STRIP.AUTO-MCNC: NEGATIVE MG/DL
LEUKOCYTE ESTERASE UR QL STRIP.AUTO: NEGATIVE
LYMPHOCYTES # BLD: 2.1 K/UL (ref 1–5.1)
LYMPHOCYTES NFR BLD: 19.3 %
MCH RBC QN AUTO: 28 PG (ref 26–34)
MCHC RBC AUTO-ENTMCNC: 33.1 G/DL (ref 31–36)
MCV RBC AUTO: 84.6 FL (ref 80–100)
MONOCYTES # BLD: 1.2 K/UL (ref 0–1.3)
MONOCYTES NFR BLD: 11.2 %
MUCOUS THREADS #/AREA URNS LPF: ABNORMAL /LPF
NEUTROPHILS # BLD: 7.2 K/UL (ref 1.7–7.7)
NEUTROPHILS NFR BLD: 67.1 %
NITRITE UR QL STRIP.AUTO: NEGATIVE
PH UR STRIP.AUTO: 6 [PH] (ref 5–8)
PLATELET # BLD AUTO: 513 K/UL (ref 135–450)
PMV BLD AUTO: 9 FL (ref 5–10.5)
POTASSIUM SERPL-SCNC: 3.3 MMOL/L (ref 3.5–5.1)
POTASSIUM SERPL-SCNC: ABNORMAL MMOL/L (ref 3.5–5.1)
PROT SERPL-MCNC: 7.6 G/DL (ref 6.4–8.2)
PROT UR STRIP.AUTO-MCNC: ABNORMAL MG/DL
RBC # BLD AUTO: 4.7 M/UL (ref 4.2–5.9)
RBC #/AREA URNS HPF: ABNORMAL /HPF (ref 0–4)
SODIUM SERPL-SCNC: 139 MMOL/L (ref 136–145)
SP GR UR STRIP.AUTO: >=1.03 (ref 1–1.03)
TROPONIN, HIGH SENSITIVITY: 18 NG/L (ref 0–22)
TROPONIN, HIGH SENSITIVITY: 20 NG/L (ref 0–22)
UA COMPLETE W REFLEX CULTURE PNL UR: ABNORMAL
UA DIPSTICK W REFLEX MICRO PNL UR: YES
URN SPEC COLLECT METH UR: ABNORMAL
UROBILINOGEN UR STRIP-ACNC: 0.2 E.U./DL
WBC # BLD AUTO: 10.7 K/UL (ref 4–11)
WBC #/AREA URNS HPF: ABNORMAL /HPF (ref 0–5)

## 2024-12-02 PROCEDURE — 6370000000 HC RX 637 (ALT 250 FOR IP): Performed by: FAMILY MEDICINE

## 2024-12-02 PROCEDURE — 93005 ELECTROCARDIOGRAM TRACING: CPT | Performed by: PHYSICIAN ASSISTANT

## 2024-12-02 PROCEDURE — 6370000000 HC RX 637 (ALT 250 FOR IP): Performed by: PHYSICIAN ASSISTANT

## 2024-12-02 PROCEDURE — 97166 OT EVAL MOD COMPLEX 45 MIN: CPT

## 2024-12-02 PROCEDURE — 96376 TX/PRO/DX INJ SAME DRUG ADON: CPT

## 2024-12-02 PROCEDURE — 71046 X-RAY EXAM CHEST 2 VIEWS: CPT

## 2024-12-02 PROCEDURE — 99285 EMERGENCY DEPT VISIT HI MDM: CPT

## 2024-12-02 PROCEDURE — 70450 CT HEAD/BRAIN W/O DYE: CPT

## 2024-12-02 PROCEDURE — 6360000002 HC RX W HCPCS: Performed by: PHYSICIAN ASSISTANT

## 2024-12-02 PROCEDURE — 72125 CT NECK SPINE W/O DYE: CPT

## 2024-12-02 PROCEDURE — 96375 TX/PRO/DX INJ NEW DRUG ADDON: CPT

## 2024-12-02 PROCEDURE — 96361 HYDRATE IV INFUSION ADD-ON: CPT

## 2024-12-02 PROCEDURE — 97530 THERAPEUTIC ACTIVITIES: CPT

## 2024-12-02 PROCEDURE — 80076 HEPATIC FUNCTION PANEL: CPT

## 2024-12-02 PROCEDURE — 84484 ASSAY OF TROPONIN QUANT: CPT

## 2024-12-02 PROCEDURE — 6370000000 HC RX 637 (ALT 250 FOR IP): Performed by: NURSE PRACTITIONER

## 2024-12-02 PROCEDURE — 97161 PT EVAL LOW COMPLEX 20 MIN: CPT

## 2024-12-02 PROCEDURE — 80048 BASIC METABOLIC PNL TOTAL CA: CPT

## 2024-12-02 PROCEDURE — 96374 THER/PROPH/DIAG INJ IV PUSH: CPT

## 2024-12-02 PROCEDURE — 82550 ASSAY OF CK (CPK): CPT

## 2024-12-02 PROCEDURE — 85025 COMPLETE CBC W/AUTO DIFF WBC: CPT

## 2024-12-02 PROCEDURE — G0378 HOSPITAL OBSERVATION PER HR: HCPCS

## 2024-12-02 PROCEDURE — 6360000002 HC RX W HCPCS: Performed by: FAMILY MEDICINE

## 2024-12-02 PROCEDURE — 72131 CT LUMBAR SPINE W/O DYE: CPT

## 2024-12-02 PROCEDURE — 73502 X-RAY EXAM HIP UNI 2-3 VIEWS: CPT

## 2024-12-02 PROCEDURE — 81001 URINALYSIS AUTO W/SCOPE: CPT

## 2024-12-02 PROCEDURE — 2580000003 HC RX 258: Performed by: FAMILY MEDICINE

## 2024-12-02 PROCEDURE — 97116 GAIT TRAINING THERAPY: CPT

## 2024-12-02 PROCEDURE — 84132 ASSAY OF SERUM POTASSIUM: CPT

## 2024-12-02 RX ORDER — SODIUM CHLORIDE 9 MG/ML
INJECTION, SOLUTION INTRAVENOUS CONTINUOUS
Status: DISCONTINUED | OUTPATIENT
Start: 2024-12-02 | End: 2024-12-03

## 2024-12-02 RX ORDER — SODIUM CHLORIDE 0.9 % (FLUSH) 0.9 %
5-40 SYRINGE (ML) INJECTION PRN
Status: DISCONTINUED | OUTPATIENT
Start: 2024-12-02 | End: 2024-12-07 | Stop reason: HOSPADM

## 2024-12-02 RX ORDER — OXYCODONE HYDROCHLORIDE 5 MG/1
5 TABLET ORAL EVERY 6 HOURS PRN
Status: DISCONTINUED | OUTPATIENT
Start: 2024-12-02 | End: 2024-12-02

## 2024-12-02 RX ORDER — POTASSIUM CHLORIDE 7.45 MG/ML
10 INJECTION INTRAVENOUS PRN
Status: DISCONTINUED | OUTPATIENT
Start: 2024-12-02 | End: 2024-12-07 | Stop reason: HOSPADM

## 2024-12-02 RX ORDER — DULOXETIN HYDROCHLORIDE 60 MG/1
60 CAPSULE, DELAYED RELEASE ORAL DAILY
Status: DISCONTINUED | OUTPATIENT
Start: 2024-12-03 | End: 2024-12-02

## 2024-12-02 RX ORDER — CARVEDILOL 3.12 MG/1
3.12 TABLET ORAL 2 TIMES DAILY WITH MEALS
Status: DISCONTINUED | OUTPATIENT
Start: 2024-12-03 | End: 2024-12-07 | Stop reason: HOSPADM

## 2024-12-02 RX ORDER — ASPIRIN 81 MG/1
81 TABLET ORAL DAILY
Status: DISCONTINUED | OUTPATIENT
Start: 2024-12-03 | End: 2024-12-07 | Stop reason: HOSPADM

## 2024-12-02 RX ORDER — METHOCARBAMOL 500 MG/1
750 TABLET, FILM COATED ORAL 4 TIMES DAILY
Status: DISCONTINUED | OUTPATIENT
Start: 2024-12-02 | End: 2024-12-07 | Stop reason: HOSPADM

## 2024-12-02 RX ORDER — NICOTINE 21 MG/24HR
1 PATCH, TRANSDERMAL 24 HOURS TRANSDERMAL DAILY
Status: DISCONTINUED | OUTPATIENT
Start: 2024-12-02 | End: 2024-12-02

## 2024-12-02 RX ORDER — CLOPIDOGREL BISULFATE 75 MG/1
75 TABLET ORAL DAILY
Status: DISCONTINUED | OUTPATIENT
Start: 2024-12-03 | End: 2024-12-07 | Stop reason: HOSPADM

## 2024-12-02 RX ORDER — ENOXAPARIN SODIUM 100 MG/ML
40 INJECTION SUBCUTANEOUS DAILY
Status: DISCONTINUED | OUTPATIENT
Start: 2024-12-03 | End: 2024-12-07 | Stop reason: HOSPADM

## 2024-12-02 RX ORDER — CYCLOBENZAPRINE HCL 10 MG
5 TABLET ORAL 3 TIMES DAILY PRN
Status: DISCONTINUED | OUTPATIENT
Start: 2024-12-02 | End: 2024-12-07 | Stop reason: HOSPADM

## 2024-12-02 RX ORDER — METHOCARBAMOL 500 MG/1
750 TABLET, FILM COATED ORAL ONCE
Status: COMPLETED | OUTPATIENT
Start: 2024-12-02 | End: 2024-12-02

## 2024-12-02 RX ORDER — POTASSIUM CHLORIDE 1500 MG/1
40 TABLET, EXTENDED RELEASE ORAL PRN
Status: DISCONTINUED | OUTPATIENT
Start: 2024-12-02 | End: 2024-12-07 | Stop reason: HOSPADM

## 2024-12-02 RX ORDER — LEVOTHYROXINE SODIUM 175 UG/1
175 TABLET ORAL DAILY
Status: DISCONTINUED | OUTPATIENT
Start: 2024-12-03 | End: 2024-12-07 | Stop reason: HOSPADM

## 2024-12-02 RX ORDER — NICOTINE 21 MG/24HR
1 PATCH, TRANSDERMAL 24 HOURS TRANSDERMAL EVERY 24 HOURS
Status: DISCONTINUED | OUTPATIENT
Start: 2024-12-02 | End: 2024-12-07 | Stop reason: HOSPADM

## 2024-12-02 RX ORDER — RANOLAZINE 500 MG/1
500 TABLET, EXTENDED RELEASE ORAL 2 TIMES DAILY
Status: DISCONTINUED | OUTPATIENT
Start: 2024-12-02 | End: 2024-12-07 | Stop reason: HOSPADM

## 2024-12-02 RX ORDER — TAMSULOSIN HYDROCHLORIDE 0.4 MG/1
0.4 CAPSULE ORAL DAILY
Status: DISCONTINUED | OUTPATIENT
Start: 2024-12-03 | End: 2024-12-07 | Stop reason: HOSPADM

## 2024-12-02 RX ORDER — OXYCODONE HYDROCHLORIDE 5 MG/1
10 TABLET ORAL EVERY 6 HOURS PRN
Status: DISCONTINUED | OUTPATIENT
Start: 2024-12-02 | End: 2024-12-06

## 2024-12-02 RX ORDER — FLUTICASONE PROPIONATE 50 MCG
2 SPRAY, SUSPENSION (ML) NASAL DAILY PRN
Status: DISCONTINUED | OUTPATIENT
Start: 2024-12-02 | End: 2024-12-07 | Stop reason: HOSPADM

## 2024-12-02 RX ORDER — POLYETHYLENE GLYCOL 3350 17 G/17G
17 POWDER, FOR SOLUTION ORAL DAILY PRN
Status: DISCONTINUED | OUTPATIENT
Start: 2024-12-02 | End: 2024-12-07 | Stop reason: HOSPADM

## 2024-12-02 RX ORDER — OXYCODONE HYDROCHLORIDE 5 MG/1
10 TABLET ORAL ONCE
Status: COMPLETED | OUTPATIENT
Start: 2024-12-02 | End: 2024-12-02

## 2024-12-02 RX ORDER — ALBUTEROL SULFATE 0.83 MG/ML
2.5 SOLUTION RESPIRATORY (INHALATION) EVERY 6 HOURS PRN
Status: DISCONTINUED | OUTPATIENT
Start: 2024-12-02 | End: 2024-12-07 | Stop reason: HOSPADM

## 2024-12-02 RX ORDER — PANTOPRAZOLE SODIUM 40 MG/1
40 TABLET, DELAYED RELEASE ORAL
Status: DISCONTINUED | OUTPATIENT
Start: 2024-12-03 | End: 2024-12-02

## 2024-12-02 RX ORDER — ATORVASTATIN CALCIUM 80 MG/1
80 TABLET, FILM COATED ORAL DAILY
Status: DISCONTINUED | OUTPATIENT
Start: 2024-12-03 | End: 2024-12-07 | Stop reason: HOSPADM

## 2024-12-02 RX ORDER — LORAZEPAM 2 MG/ML
1 INJECTION INTRAMUSCULAR ONCE
Status: DISCONTINUED | OUTPATIENT
Start: 2024-12-02 | End: 2024-12-07 | Stop reason: HOSPADM

## 2024-12-02 RX ORDER — AMLODIPINE BESYLATE 5 MG/1
5 TABLET ORAL DAILY
Status: DISCONTINUED | OUTPATIENT
Start: 2024-12-03 | End: 2024-12-07 | Stop reason: HOSPADM

## 2024-12-02 RX ORDER — SODIUM CHLORIDE 0.9 % (FLUSH) 0.9 %
5-40 SYRINGE (ML) INJECTION EVERY 12 HOURS SCHEDULED
Status: DISCONTINUED | OUTPATIENT
Start: 2024-12-02 | End: 2024-12-07 | Stop reason: HOSPADM

## 2024-12-02 RX ORDER — SODIUM CHLORIDE 9 MG/ML
INJECTION, SOLUTION INTRAVENOUS PRN
Status: DISCONTINUED | OUTPATIENT
Start: 2024-12-02 | End: 2024-12-07 | Stop reason: HOSPADM

## 2024-12-02 RX ORDER — MAGNESIUM SULFATE IN WATER 40 MG/ML
2000 INJECTION, SOLUTION INTRAVENOUS PRN
Status: DISCONTINUED | OUTPATIENT
Start: 2024-12-02 | End: 2024-12-07 | Stop reason: HOSPADM

## 2024-12-02 RX ORDER — ALPRAZOLAM 0.25 MG/1
0.25 TABLET ORAL 2 TIMES DAILY PRN
Status: DISCONTINUED | OUTPATIENT
Start: 2024-12-02 | End: 2024-12-07 | Stop reason: HOSPADM

## 2024-12-02 RX ADMIN — HYDROMORPHONE HYDROCHLORIDE 1 MG: 1 INJECTION, SOLUTION INTRAMUSCULAR; INTRAVENOUS; SUBCUTANEOUS at 14:10

## 2024-12-02 RX ADMIN — METHOCARBAMOL TABLETS 750 MG: 500 TABLET, COATED ORAL at 12:24

## 2024-12-02 RX ADMIN — SODIUM CHLORIDE, PRESERVATIVE FREE 10 ML: 5 INJECTION INTRAVENOUS at 20:05

## 2024-12-02 RX ADMIN — SODIUM CHLORIDE: 9 INJECTION, SOLUTION INTRAVENOUS at 21:16

## 2024-12-02 RX ADMIN — HYDROMORPHONE HYDROCHLORIDE 1 MG: 1 INJECTION, SOLUTION INTRAMUSCULAR; INTRAVENOUS; SUBCUTANEOUS at 20:05

## 2024-12-02 RX ADMIN — OXYCODONE 5 MG: 5 TABLET ORAL at 17:59

## 2024-12-02 RX ADMIN — ALPRAZOLAM 0.25 MG: 0.25 TABLET ORAL at 22:46

## 2024-12-02 RX ADMIN — METHOCARBAMOL TABLETS 750 MG: 500 TABLET, COATED ORAL at 23:17

## 2024-12-02 RX ADMIN — OXYCODONE 10 MG: 5 TABLET ORAL at 12:24

## 2024-12-02 RX ADMIN — OXYCODONE 10 MG: 5 TABLET ORAL at 22:45

## 2024-12-02 RX ADMIN — RANOLAZINE 500 MG: 500 TABLET, FILM COATED, EXTENDED RELEASE ORAL at 23:17

## 2024-12-02 RX ADMIN — HYDROMORPHONE HYDROCHLORIDE 0.5 MG: 1 INJECTION, SOLUTION INTRAMUSCULAR; INTRAVENOUS; SUBCUTANEOUS at 10:41

## 2024-12-02 ASSESSMENT — ENCOUNTER SYMPTOMS
NAUSEA: 0
ABDOMINAL PAIN: 0
EYE REDNESS: 0
SHORTNESS OF BREATH: 1
VOMITING: 0
BACK PAIN: 1

## 2024-12-02 ASSESSMENT — PAIN SCALES - GENERAL
PAINLEVEL_OUTOF10: 10
PAINLEVEL_OUTOF10: 10
PAINLEVEL_OUTOF10: 7
PAINLEVEL_OUTOF10: 10
PAINLEVEL_OUTOF10: 8
PAINLEVEL_OUTOF10: 10

## 2024-12-02 ASSESSMENT — PAIN DESCRIPTION - ORIENTATION
ORIENTATION: POSTERIOR
ORIENTATION: LOWER;UPPER

## 2024-12-02 ASSESSMENT — PAIN DESCRIPTION - LOCATION
LOCATION: NECK
LOCATION: NECK

## 2024-12-02 ASSESSMENT — PAIN DESCRIPTION - FREQUENCY: FREQUENCY: CONTINUOUS

## 2024-12-02 ASSESSMENT — LIFESTYLE VARIABLES
HOW MANY STANDARD DRINKS CONTAINING ALCOHOL DO YOU HAVE ON A TYPICAL DAY: PATIENT DOES NOT DRINK
HOW OFTEN DO YOU HAVE A DRINK CONTAINING ALCOHOL: NEVER

## 2024-12-02 ASSESSMENT — PAIN DESCRIPTION - DESCRIPTORS
DESCRIPTORS: ACHING;JABBING
DESCRIPTORS: ACHING

## 2024-12-02 ASSESSMENT — PAIN DESCRIPTION - ONSET: ONSET: ON-GOING

## 2024-12-02 NOTE — PROGRESS NOTES
Physical Therapy  Facility/Department: THE [facility] EMERGENCY DEPARTMENT  Physical Therapy Initial Assessment and Treatment    Name: Ashutosh Donovan  : 1970  MRN: [id number]  Date of Service: 2024    Discharge Recommendations:  Subacute/Skilled Nursing Facility   PT Equipment Recommendations  Equipment Needed:  (defer)      Patient Diagnosis(es): post fall  Past Medical History:  has a past medical history of Arthritis, CAD (coronary artery disease), CHF (congestive heart failure) (HCC), Chronic pain, Heart attack (HCC), Hepatitis C, and Hypothyroid.  Past Surgical History:  has a past surgical history that includes Coronary stent placement; Cholecystectomy; Tonsillectomy; and cervical fusion (N/A, 2024).    Assessment  Assessment: Pt post cervical fusion, initial DC , now returning to ER post fall with inability to care for self at home.  He is normally independent with functional mobility, ambulation and gait.  Currently needing CG for all mobility and limited gait with RW.  Recommend continued IP therapies to maximize mobility, safety and independence  Treatment Diagnosis: decreased functional mobility  Barriers to Learning: none noted  Requires PT Follow-Up: Yes  Activity Tolerance  Activity Tolerance: Patient tolerated evaluation without incident;Patient limited by pain;Patient limited by endurance    Plan  Physical Therapy Plan  General Plan:  (2-5)  Current Treatment Recommendations: Functional mobility training, Transfer training, Gait training, Stair training, Safety education & training, Patient/Caregiver education & training, Pain management  Safety Devices  Type of Devices: Gait belt, Left in bed, Nurse notified (ED)    Restrictions  Position Activity Restriction  Other position/activity restrictions: PT/OT eval and treat     Subjective  General  Additional Pertinent Hx: Recent DC from [facility]  followingg Cervical 3 - Cervical 5 Anterior Cervical Discectomy and Fusion

## 2024-12-02 NOTE — ED NOTES
While drawing patient's repeat troponin, pt verbalize passive suicidal ideation stating, \"Man sometimes I just wish this was all over. Like I just don't want to wake up and deal with it.\" Patient reporting stressors include: recent fight with brother, surgery and pain, homelessness (pt reports staying with friends but changing locations frequently). This RN notified River Wren RN  12/02/24 0648

## 2024-12-02 NOTE — ED NOTES
Patient Name: Ashutosh Donovan  : 1970 53 y.o.  MRN: [id number]  ED Room #: B16/B16-16     Chief complaint:   Chief Complaint   Patient presents with    Fall     Stood up out of bed, felt weak and fell, 10/10 neck pain, pt in C-Collar from spinal stenosis surgery    Care Coordination     Pt requesting to speak with social work to be placed into nursing home    Chest Pain     Pt report chest pain ongoing since approx. 0000     Hospital Problem/Diagnosis: Principal Problem:    Multiple falls  Resolved Problems:    * No resolved hospital problems. *      O2 Flow Rate:O2 Device: None (Room air)   (if applicable)  Cardiac Rhythm:   (if applicable)  Active LDA's:   Peripheral IV 24 Right Wrist (Active)            How does patient ambulate?  Wheeled walker    2. How does patient take pills? Whole with Water    3. Is patient alert? Alert    4. Is patient oriented? To Person, To Place, To Time, To Situation, and Follows Commands    5.   Patient arrived from:  home, hoping to be placed in rehab  Facility Name: ___________________________________________    6. If patient is disoriented or from a Skill Nursing Facility has family been notified of admission? No    7. Patient belongings? Belongings: Cell Phone and Clothing    Disposition of belongings? Kept with Patient     8. Any specific patient or family belongings/needs/dynamics?   a.     9. Miscellaneous comments/pending orders?  a.       If there are any additional questions please reach out to the Emergency Department.      River Orr RN  [phone]       River Orr RN  [phone]

## 2024-12-02 NOTE — PROGRESS NOTES
Occupational Therapy  Facility/Department: THE [facility] EMERGENCY DEPARTMENT  Occupational Therapy Initial Assessment    Name: Ashutosh Donovan  : 1970  MRN: [id number]  Date of Service: 2024    Discharge Recommendations:  Subacute/Skilled Nursing Facility  OT Equipment Recommendations  Equipment Needed: No         Past Medical History:  has a past medical history of Arthritis, CAD (coronary artery disease), CHF (congestive heart failure) (HCC), Chronic pain, Heart attack (HCC), Hepatitis C, and Hypothyroid.  Past Surgical History:  has a past surgical history that includes Coronary stent placement; Cholecystectomy; Tonsillectomy; and cervical fusion (N/A, 2024).    Treatment Diagnosis: imp mob, tf, ADL      Assessment  Performance deficits / Impairments: Decreased functional mobility ;Decreased ADL status;Decreased cognition  Assessment: From home with gf, cervical surgery last week. Admit from home with falls and weakness. Pt completing mobility this date with CGA, short mobility with RW. Assist with ADLs. Would benefit from cont therapies while in acute care and cont inpt at [facility].  Treatment Diagnosis: imp mob, tf, ADL  Decision Making: Medium Complexity  REQUIRES OT FOLLOW-UP: Yes  Activity Tolerance  Activity Tolerance: Patient limited by pain     Plan  Occupational Therapy Plan  Times Per Week: 2-5    Restrictions  Position Activity Restriction  Other position/activity restrictions: PT/OT eval and treat    Subjective  General  Chart Reviewed: Yes  Additional Pertinent Hx: 53 y.o. male with recent admission for cervical stenosis, cervical and lumbar myelopathy and radiculopathy s/p C3-5 anterior cervical diskectomy and fusion discharged last week to home who presents with multiple falls at home today.  Patient states that when he was discharged, it was recommended that he go to rehab facility.  However he states that he wanted to be home for the holidays.  He states he has been

## 2024-12-02 NOTE — CARE COORDINATION
ADDENDUM:  4:17 PM    Pre-cert pending.     Electronically signed by JAYLEEN Daniel LSW, ACM-SW on 12/2/2024 at 4:17 PM    2:55 PM    SW called [facility], sw is unable to submit cert pt is no longer managed by [facility].    SW met w/pt he is in agreement to go to [facility].     SW spoke to zulema in admissions she will verify pt's benefits and if needed she will start pre-cert.     Electronically signed by JAYLEEN Daniel LSW, ACM-SW on 12/2/2024 at 2:55 PM    2:31 PM    SW spoke to SNF were pt was previously accepted at [facility], they will review and call back.     SW sent back up SNF referrals     Electronically signed by JAYLEEN Daniel LSW, ACM-SW on 12/2/2024 at 2:31 PM    9:47 AM    Pt will be a readmission and will require a readmission assessment if admitted Inpatient.     Pt is from home w/his brother & brother's girlfriend. Pt lives in a 2nd floor apartment. Pt is indp at baseline. Pt dc home 11/24 and denied service needs other than a ride home.     Electronically signed by JAYLEEN Daniel LSW, ACM-SW on 12/2/2024 at 9:47 AM  [phone]

## 2024-12-02 NOTE — ED PROVIDER NOTES
THE [facility]  EMERGENCY DEPARTMENT ENCOUNTER          PHYSICIAN ASSISTANT NOTE       Date of evaluation: 12/2/2024    Chief Complaint     Fall (Stood up out of bed, felt weak and fell, 10/10 neck pain, pt in C-Collar from spinal stenosis surgery), Care Coordination (Pt requesting to speak with social work to be placed into nursing home), and Chest Pain (Pt report chest pain ongoing since approx. 0000)      History of Present Illness     Ashutosh Donovan is a 53 y.o. male with recent admission for cervical stenosis, cervical and lumbar myelopathy and radiculopathy s/p C3-5 anterior cervical diskectomy and fusion discharged last week to home who presents with multiple falls at home today.  Patient states that when he was discharged, it was recommended that he go to rehab facility.  However he states that he wanted to be home for the holidays.  He states he has been mostly lying in bed except for trips to the bathroom since he was discharged.  He states that he attempted to get up out of bed on his own today.  He states that he felt dizzy and saw stars and fell forwards, striking his head on the bed frame and had a brief LOC. He states that his girlfriend helped him up and back into bed. He then attempted to get up again and fell backwards, striking his head on the dresser. He reports another brief LOC. He was brought to the ED via EMS. Patient states he was wearing his cervical collar during both falls. He complains of generalized headache since the fall, worsening neck pain that radiates to his elbows bilaterally, chest pain (intermittent sharp, pressure like, identical to pain during his admission), lower back pain with radiation down both legs.  Patient states that his bilateral lower extremities feel weak, like they will give out on him if he attempts to get up again.  The patient reports that he was sent home with 10 mg oxycodone had been taking them about 3 times per day.  He states that he ran out.  ibuprofen.    Physical Exam     INITIAL VITALS: BP: (!) 178/107, Temp: 97.9 °F (36.6 °C), Pulse: 87, Respirations: 18, SpO2: 98 %  Physical Exam  Vitals and nursing note reviewed.   Constitutional:       General: He is not in acute distress.  HENT:      Head: Normocephalic and atraumatic.   Eyes:      Extraocular Movements: Extraocular movements intact.      Conjunctiva/sclera: Conjunctivae normal.      Pupils: Pupils are equal, round, and reactive to light.   Neck:      Comments: In cervical collar  Cardiovascular:      Rate and Rhythm: Normal rate and regular rhythm.   Pulmonary:      Effort: Pulmonary effort is normal. No respiratory distress.      Breath sounds: Normal breath sounds. No wheezing, rhonchi or rales.   Abdominal:      General: Bowel sounds are normal. There is no distension.      Palpations: Abdomen is soft.      Tenderness: There is no abdominal tenderness. There is no guarding or rebound.   Musculoskeletal:      Cervical back: Bony tenderness present.      Thoracic back: No bony tenderness.      Lumbar back: Bony tenderness present.      Right hip: No deformity or bony tenderness.      Left hip: No deformity or bony tenderness.      Comments: Pain in right hip with log roll   Neurological:      Mental Status: He is alert.      Cranial Nerves: Cranial nerves 2-12 are intact. No dysarthria.      Sensory: Sensory deficit (patient reports decreased sensation to light touch lateral aspect of bilateral lower extremities plus top of feet) present.      Motor: No weakness or pronator drift.   Psychiatric:         Mood and Affect: Mood normal.         Behavior: Behavior normal.            Anahy Cordova PA-C  12/02/24 7078

## 2024-12-02 NOTE — ED PROVIDER NOTES
ED Attending Attestation Note     Date of evaluation: 12/2/2024    This patient was seen by the advance practice provider.  I have seen and examined the patient, agree with the workup, evaluation, management and diagnosis. The care plan has been discussed.  My assessment reveals patient comes in status post fall.  History of spinal stenosis with recent surgery and c-collar in place status post that surgery.  Comes in today after falling.  He was offered rehabilitation after surgery but patient refused on previous admission.  CTs obtained without any acute pathology reported.  Patient case we discussed with PT OT case management for placement and patient is agreeing to this at this time     Tomas Weber MD  12/02/24 8519

## 2024-12-03 ENCOUNTER — APPOINTMENT (OUTPATIENT)
Dept: MRI IMAGING | Age: 54
DRG: 871 | End: 2024-12-03
Payer: MEDICARE

## 2024-12-03 PROBLEM — F39 MOOD DISORDER (HCC): Status: ACTIVE | Noted: 2023-11-19
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
## 2024-12-03 LAB
ALBUMIN SERPL-MCNC: 3.5 G/DL (ref 3.4–5)
ALBUMIN/GLOB SERPL: 1.3 {RATIO} (ref 1.1–2.2)
ALP SERPL-CCNC: 117 U/L (ref 40–129)
ALT SERPL-CCNC: 8 U/L (ref 10–40)
ANION GAP SERPL CALCULATED.3IONS-SCNC: 10 MMOL/L (ref 3–16)
AST SERPL-CCNC: 14 U/L (ref 15–37)
BASOPHILS # BLD: 0.2 K/UL (ref 0–0.2)
BASOPHILS NFR BLD: 1.9 %
BILIRUB SERPL-MCNC: <0.2 MG/DL (ref 0–1)
BUN SERPL-MCNC: 16 MG/DL (ref 7–20)
CALCIUM SERPL-MCNC: 8.8 MG/DL (ref 8.3–10.6)
CHLORIDE SERPL-SCNC: 106 MMOL/L (ref 99–110)
CHOLEST SERPL-MCNC: 168 MG/DL (ref 0–199)
CO2 SERPL-SCNC: 24 MMOL/L (ref 21–32)
CREAT SERPL-MCNC: 1 MG/DL (ref 0.9–1.3)
DEPRECATED RDW RBC AUTO: 17.8 % (ref 12.4–15.4)
EOSINOPHIL # BLD: 0.1 K/UL (ref 0–0.6)
EOSINOPHIL NFR BLD: 1.6 %
EST. AVERAGE GLUCOSE BLD GHB EST-MCNC: 125.5 MG/DL
GFR SERPLBLD CREATININE-BSD FMLA CKD-EPI: >90 ML/MIN/{1.73_M2}
GLUCOSE SERPL-MCNC: 105 MG/DL (ref 70–99)
HBA1C MFR BLD: 6 %
HCT VFR BLD AUTO: 35.1 % (ref 40.5–52.5)
HDLC SERPL-MCNC: 42 MG/DL (ref 40–60)
HGB BLD-MCNC: 11.5 G/DL (ref 13.5–17.5)
LDLC SERPL CALC-MCNC: 106 MG/DL
LYMPHOCYTES # BLD: 2.5 K/UL (ref 1–5.1)
LYMPHOCYTES NFR BLD: 31.2 %
MAGNESIUM SERPL-MCNC: 1.97 MG/DL (ref 1.8–2.4)
MCH RBC QN AUTO: 28.2 PG (ref 26–34)
MCHC RBC AUTO-ENTMCNC: 32.7 G/DL (ref 31–36)
MCV RBC AUTO: 86.1 FL (ref 80–100)
MONOCYTES # BLD: 0.9 K/UL (ref 0–1.3)
MONOCYTES NFR BLD: 11.6 %
NEUTROPHILS # BLD: 4.4 K/UL (ref 1.7–7.7)
NEUTROPHILS NFR BLD: 53.7 %
PLATELET # BLD AUTO: 427 K/UL (ref 135–450)
PMV BLD AUTO: 9 FL (ref 5–10.5)
POTASSIUM SERPL-SCNC: 3.1 MMOL/L (ref 3.5–5.1)
PROT SERPL-MCNC: 6.3 G/DL (ref 6.4–8.2)
RBC # BLD AUTO: 4.08 M/UL (ref 4.2–5.9)
SODIUM SERPL-SCNC: 140 MMOL/L (ref 136–145)
TRIGL SERPL-MCNC: 101 MG/DL (ref 0–150)
VLDLC SERPL CALC-MCNC: 20 MG/DL
WBC # BLD AUTO: 8.1 K/UL (ref 4–11)

## 2024-12-03 PROCEDURE — 70551 MRI BRAIN STEM W/O DYE: CPT

## 2024-12-03 PROCEDURE — 72148 MRI LUMBAR SPINE W/O DYE: CPT

## 2024-12-03 PROCEDURE — 6360000002 HC RX W HCPCS: Performed by: FAMILY MEDICINE

## 2024-12-03 PROCEDURE — 2580000003 HC RX 258: Performed by: FAMILY MEDICINE

## 2024-12-03 PROCEDURE — 99222 1ST HOSP IP/OBS MODERATE 55: CPT | Performed by: NURSE PRACTITIONER

## 2024-12-03 PROCEDURE — 96376 TX/PRO/DX INJ SAME DRUG ADON: CPT

## 2024-12-03 PROCEDURE — 36415 COLL VENOUS BLD VENIPUNCTURE: CPT

## 2024-12-03 PROCEDURE — 92610 EVALUATE SWALLOWING FUNCTION: CPT

## 2024-12-03 PROCEDURE — 96372 THER/PROPH/DIAG INJ SC/IM: CPT

## 2024-12-03 PROCEDURE — 92526 ORAL FUNCTION THERAPY: CPT

## 2024-12-03 PROCEDURE — 83735 ASSAY OF MAGNESIUM: CPT

## 2024-12-03 PROCEDURE — 80061 LIPID PANEL: CPT

## 2024-12-03 PROCEDURE — 99223 1ST HOSP IP/OBS HIGH 75: CPT | Performed by: REGISTERED NURSE

## 2024-12-03 PROCEDURE — 83036 HEMOGLOBIN GLYCOSYLATED A1C: CPT

## 2024-12-03 PROCEDURE — 85025 COMPLETE CBC W/AUTO DIFF WBC: CPT

## 2024-12-03 PROCEDURE — G0378 HOSPITAL OBSERVATION PER HR: HCPCS

## 2024-12-03 PROCEDURE — 96361 HYDRATE IV INFUSION ADD-ON: CPT

## 2024-12-03 PROCEDURE — 6370000000 HC RX 637 (ALT 250 FOR IP): Performed by: FAMILY MEDICINE

## 2024-12-03 PROCEDURE — 80053 COMPREHEN METABOLIC PANEL: CPT

## 2024-12-03 RX ORDER — ALPRAZOLAM 0.25 MG/1
0.25 TABLET ORAL 2 TIMES DAILY PRN
Qty: 5 TABLET | Refills: 0 | Status: SHIPPED | OUTPATIENT
Start: 2024-12-03 | End: 2024-12-08

## 2024-12-03 RX ADMIN — CARVEDILOL 3.12 MG: 3.12 TABLET, FILM COATED ORAL at 17:27

## 2024-12-03 RX ADMIN — METHOCARBAMOL TABLETS 750 MG: 500 TABLET, COATED ORAL at 17:27

## 2024-12-03 RX ADMIN — HYDROMORPHONE HYDROCHLORIDE 1 MG: 1 INJECTION, SOLUTION INTRAMUSCULAR; INTRAVENOUS; SUBCUTANEOUS at 10:19

## 2024-12-03 RX ADMIN — METHOCARBAMOL TABLETS 750 MG: 500 TABLET, COATED ORAL at 08:32

## 2024-12-03 RX ADMIN — OXYCODONE 10 MG: 5 TABLET ORAL at 12:28

## 2024-12-03 RX ADMIN — ALPRAZOLAM 0.25 MG: 0.25 TABLET ORAL at 13:42

## 2024-12-03 RX ADMIN — HYDROMORPHONE HYDROCHLORIDE 1 MG: 1 INJECTION, SOLUTION INTRAMUSCULAR; INTRAVENOUS; SUBCUTANEOUS at 04:22

## 2024-12-03 RX ADMIN — ATORVASTATIN CALCIUM 80 MG: 80 TABLET, FILM COATED ORAL at 08:32

## 2024-12-03 RX ADMIN — CYCLOBENZAPRINE 5 MG: 10 TABLET, FILM COATED ORAL at 00:20

## 2024-12-03 RX ADMIN — HYDROMORPHONE HYDROCHLORIDE 1 MG: 1 INJECTION, SOLUTION INTRAMUSCULAR; INTRAVENOUS; SUBCUTANEOUS at 00:20

## 2024-12-03 RX ADMIN — TAMSULOSIN HYDROCHLORIDE 0.4 MG: 0.4 CAPSULE ORAL at 08:32

## 2024-12-03 RX ADMIN — CARVEDILOL 3.12 MG: 3.12 TABLET, FILM COATED ORAL at 08:31

## 2024-12-03 RX ADMIN — LEVOTHYROXINE SODIUM 175 MCG: 0.17 TABLET ORAL at 05:13

## 2024-12-03 RX ADMIN — RANOLAZINE 500 MG: 500 TABLET, FILM COATED, EXTENDED RELEASE ORAL at 08:32

## 2024-12-03 RX ADMIN — ENOXAPARIN SODIUM 40 MG: 100 INJECTION SUBCUTANEOUS at 08:32

## 2024-12-03 RX ADMIN — METHOCARBAMOL TABLETS 750 MG: 500 TABLET, COATED ORAL at 19:45

## 2024-12-03 RX ADMIN — HYDROMORPHONE HYDROCHLORIDE 1 MG: 1 INJECTION, SOLUTION INTRAMUSCULAR; INTRAVENOUS; SUBCUTANEOUS at 19:45

## 2024-12-03 RX ADMIN — SODIUM CHLORIDE, PRESERVATIVE FREE 10 ML: 5 INJECTION INTRAVENOUS at 08:33

## 2024-12-03 RX ADMIN — ALPRAZOLAM 0.25 MG: 0.25 TABLET ORAL at 20:42

## 2024-12-03 RX ADMIN — AMLODIPINE BESYLATE 5 MG: 5 TABLET ORAL at 08:32

## 2024-12-03 RX ADMIN — METHOCARBAMOL TABLETS 750 MG: 500 TABLET, COATED ORAL at 13:27

## 2024-12-03 RX ADMIN — SODIUM CHLORIDE, PRESERVATIVE FREE 10 ML: 5 INJECTION INTRAVENOUS at 19:45

## 2024-12-03 RX ADMIN — RANOLAZINE 500 MG: 500 TABLET, FILM COATED, EXTENDED RELEASE ORAL at 19:45

## 2024-12-03 RX ADMIN — HYDROMORPHONE HYDROCHLORIDE 1 MG: 1 INJECTION, SOLUTION INTRAMUSCULAR; INTRAVENOUS; SUBCUTANEOUS at 15:03

## 2024-12-03 ASSESSMENT — PAIN DESCRIPTION - LOCATION
LOCATION: BACK;NECK;SHOULDER
LOCATION: BACK;NECK
LOCATION: NECK
LOCATION: NECK
LOCATION: BACK;NECK;SHOULDER
LOCATION: NECK;BACK
LOCATION: BACK;NECK
LOCATION: BACK;NECK

## 2024-12-03 ASSESSMENT — PAIN DESCRIPTION - ORIENTATION
ORIENTATION: RIGHT;LEFT;LOWER;UPPER
ORIENTATION: INNER
ORIENTATION: RIGHT;LEFT;LOWER;UPPER

## 2024-12-03 ASSESSMENT — PAIN DESCRIPTION - FREQUENCY: FREQUENCY: INTERMITTENT

## 2024-12-03 ASSESSMENT — PAIN SCALES - GENERAL
PAINLEVEL_OUTOF10: 9
PAINLEVEL_OUTOF10: 9
PAINLEVEL_OUTOF10: 10
PAINLEVEL_OUTOF10: 9
PAINLEVEL_OUTOF10: 10
PAINLEVEL_OUTOF10: 8
PAINLEVEL_OUTOF10: 10

## 2024-12-03 ASSESSMENT — PAIN - FUNCTIONAL ASSESSMENT: PAIN_FUNCTIONAL_ASSESSMENT: PREVENTS OR INTERFERES WITH MANY ACTIVE NOT PASSIVE ACTIVITIES

## 2024-12-03 ASSESSMENT — PAIN DESCRIPTION - DESCRIPTORS
DESCRIPTORS: ACHING;DISCOMFORT
DESCRIPTORS: ACHING;SHARP
DESCRIPTORS: ACHING
DESCRIPTORS: ACHING;SHARP

## 2024-12-03 ASSESSMENT — PAIN DESCRIPTION - PAIN TYPE: TYPE: ACUTE PAIN

## 2024-12-03 ASSESSMENT — PAIN DESCRIPTION - ONSET: ONSET: AWAKENED FROM SLEEP

## 2024-12-03 NOTE — DISCHARGE SUMMARY
capsule  Commonly known as: FLOMAX             Objective Findings at Discharge:   /74   Pulse 83   Temp 97.9 °F (36.6 °C) (Oral)   Resp 16   Ht 1.803 m (5' 11\")   Wt 94 kg (207 lb 3.7 oz)   SpO2 95%   BMI 28.90 kg/m²       Physical Exam:     General: Anxious male sitting up in bed  Eyes: EOMI  ENT: [place] J collar in place  Cardiovascular: Regular rate.  Respiratory: Clear to auscultation  Gastrointestinal: Soft, non tender  Genitourinary: no suprapubic tenderness  Musculoskeletal: No edema  Skin: warm, dry  Neuro: Alert.  Psych: Mood anxious        Labs and Imaging   MRI brain without contrast    Result Date: 12/3/2024  EXAM: MRI BRAIN WO CONTRAST INDICATION: Right lower extremity weakness, numbness COMPARISON: 11/11/2024 TECHNIQUE: Multiplanar, multisequence MR imaging of the head obtained without contrast. IV contrast: None. FINDINGS: MIDLINE STRUCTURES: The sella turcica and pituitary gland are normal. Craniovertebral alignment and cerebellar tonsils are normal. VENTRICLES: Normal size and configuration for patient age. INTRACRANIAL HEMORRHAGE: None. BRAIN PARENCHYMA: No diffusion restriction. Mild to moderate parenchymal volume loss. Moderate patchy T2 hyperintense white matter disease. Small chronic cortical infarct with gliosis right frontal lobe. Remote lacunar infarct medial left thalamus. INTRACRANIAL VASCULATURE: Major intracranial vessels are grossly patent. ORBITS: Normal. BONE MARROW: Bone marrow signal within normal limits. PARANASAL SINUSES/MASTOID BONES: No acute sinusitis or mastoiditis.     1. No acute intracranial abnormality. 2. Small chronic infarct with gliosis right frontal lobe. 3. Remote lacunar infarct left thalamus. 4. Moderate patchy T2 hyperintense white matter disease likely relate to chronic microvascular ischemia. 5. Mild to moderate parenchymal volume loss.  Electronically signed by Johnny Campos MD    CT LUMBAR SPINE WO CONTRAST    Result Date: 12/2/2024  EXAM: CT  LUMBAR SPINE WO CONTRAST INDICATION: fall. COMPARISON: 11/13/2024 TECHNIQUE: Standard per department protocol. Up-to-date CT equipment and radiation dose reduction techniques were employed. CONTRAST: None FINDINGS: : No additional findings. Extraspinal structures: Coronary calcifications. Moderate to extensive aortic atherosclerosis. Lumbar spine: No acute fracture of the lumbar spine. Stable minimal right inferior endplate compression deformity of L3. Unchanged minimal grade 1 anterolisthesis at L4-L5, likely degenerative secondary to facet arthropathy. Minimal levoconvex scoliosis. Paravertebral  soft tissues unremarkable. Subacute to chronic left L1 transverse process fracture. There appears to be progressive bony callus formation compared to the prior study from 11/13/2024. Severe disc height loss with endplate spurring and vacuum disc phenomenon at L4-L5, similar to the prior study. Lesser disc height loss at other levels. Multilevel facet arthropathy. Degree of multilevel degenerative changes better assessed on prior MRI, with no significant change.     No acute fracture or subluxation of the lumbar spine. Subacute to chronic appearing left L1 transverse process fracture with progressive healing compared to the prior study. Multilevel lumbar spondylosis and facet arthropathy, better assessed on prior MRI. Electronically signed by Antwon Delgado MD    XR CHEST (2 VW)    Result Date: 12/2/2024  EXAM: XR CHEST (2 VW) INDICATION: chest pain COMPARISON: 11/18/2024 FINDINGS: Medical Devices: None. Lungs: The lungs are clear. Pleura: No pneumothorax or pleural effusion. Heart and Mediastinum: The cardiomediastinal silhouette is within normal limits. Bones: Degenerative changes of the right acromioclavicular joint.     1.  No acute cardiopulmonary findings. Electronically signed by Rey Reece    XR HIP 2-3 VW W PELVIS RIGHT    Result Date: 12/2/2024  EXAM: XR HIP 2-3 VW W PELVIS RIGHT HISTORY: pain, fall

## 2024-12-03 NOTE — DISCHARGE INSTR - COC
Continuity of Care Form    Patient Name: Ashutosh Donovan   :  1970  MRN:  [id number]    Admit date:  2024  Discharge date:  ***    Code Status Order: Full Code   Advance Directives:   Advance Care Flowsheet Documentation             Admitting Physician:  Waqar Muse MD  PCP: No primary care provider on file.    Discharging Nurse: ***  Discharging Hospital Unit/Room#: 6313/6313-01  Discharging Unit Phone Number: ***    Emergency Contact:   Extended Emergency Contact Information  Primary Emergency Contact: Kaylin Wood  Address[address] [place], [place]  Home Phone: [phone]  Relation: Parent  Secondary Emergency Contact: ZionJuanjose           [place], [place]  Relation: Brother/Sister    Past Surgical History:  Past Surgical History:   Procedure Laterality Date    CERVICAL FUSION N/A 2024    Cervical 3 - Cervical 5 Anterior Cervical Discectomy and Fusion performed by Otilia Ocampo MD at [facility] OR    CHOLECYSTECTOMY      CORONARY STENT PLACEMENT      8 stents in 6 years    TONSILLECTOMY         Immunization History:     There is no immunization history on file for this patient.    Active Problems:  Patient Active Problem List   Diagnosis Code    Spondylolisthesis at L4-L5 level M43.16    Urinary incontinence without sensory awareness N39.42    Lumbar pain with radiation down both legs M54.50, M79.604, M79.605    DDD (degenerative disc disease), lumbosacral M51.379    LA (acute kidney injury) ([facility]) N17.9    Moderate episode of recurrent major depressive disorder ([facility]) F33.1    PTSD (post-traumatic stress disorder) F43.10    Cocaine use disorder in remission F14.91    Opioid use disorder, mild, in early remission ([facility]) F11.11    Major depression, recurrent ([facility]) F33.9    Coronary artery disease of native artery of native heart with stable angina pectoris ([facility]) I25.118    Tobacco use Z72.0    Chronic obstructive pulmonary disease ([facility]) J44.9    Current  severe episode of major depressive disorder without psychotic features ([facility]) F32.2    Dyslipidemia E78.5    Chest pain at rest R07.9    TIA (transient ischemic attack) G45.9    Right facial numbness R20.0    Major depressive disorder, recurrent ([facility]) F33.9    H/O major depression Z86.59    CVA (cerebral vascular accident) ([facility]) I63.9    HTN (hypertension) I10    Acute CVA (cerebrovascular accident) ([facility]) I63.9    Chronic kidney disease N18.9    Cauda equina compression ([facility]) G83.4    Lower extremity numbness R20.0    Closed displaced fracture of proximal phalanx of right little finger S62.616A    Multiple falls R29.6       Isolation/Infection:   Isolation            No Isolation          Patient Infection Status       None to display                     Nurse Assessment:  Last Vital Signs: /74   Pulse 83   Temp 97.9 °F (36.6 °C) (Oral)   Resp 16   Ht 1.803 m (5' 11\")   Wt 94 kg (207 lb 3.7 oz)   SpO2 95%   BMI 28.90 kg/m²     Last documented pain score (0-10 scale): Pain Level: 9  Last Weight:   Wt Readings from Last 1 Encounters:   12/02/24 94 kg (207 lb 3.7 oz)     Mental Status:  {IP PT MENTAL STATUS:20030}    IV Access:  { ANNA IV ACCESS:895551458}    Nursing Mobility/ADLs:  Walking   {CHP DME ADLs:916288633}  Transfer  {CHP DME ADLs:355275509}  Bathing  {CHP DME ADLs:812923109}  Dressing  {CHP DME ADLs:333030607}  Toileting  {CHP DME ADLs:370662090}  Feeding  {CHP DME ADLs:946587563}  Med Admin  {CHP DME ADLs:724737191}  Med Delivery   { ANNA MED Delivery:486194093}    Wound Care Documentation and Therapy:  Incision 11/22/24 Neck Right (Active)   Dressing Status Clean;Dry;Intact 12/03/24 1207   Incision Cleansed Soap and water 12/03/24 0155   Dressing/Treatment Surgical glue 12/03/24 1207   Incision Assessment Dry;Erythema 11/24/24 0400   Drainage Amount None (dry) 11/24/24 0400   Odor None 11/24/24 0400   Evelyn-incision Assessment Intact 11/24/24 0400   Number of days: 11

## 2024-12-03 NOTE — CARE COORDINATION
UPDATE:   -  D/C order placed  :  1515   Pending  : Vascular duplex carotid bilateral   -    Inpatient consult to Neurology:  veritgo, falls near syncope        [place] Consult to Tele-pysch Provider :  Suicidal/Risk to Self   - SNF pre cert  also pending     CM cont to follow    Electronically signed by Muriel Swanson RN on 12/3/2024 at 4:15 PM     +++++++++++++++++++++++++++++++++      CM  following for  d/c planning;    Patient  admitted  Observation:  -  Admitted  with  Multiple  falls  -  Pending  MRI of Lumbar spine / echo   (  Lower extremity numbness weakness falls)    -  Neurosurgery  following        PTOT  rec  SNF :  pt is agreeable and  SW placed referral to  [facility]  who accepted  and  started  pre cert.        [facility]    Services Available   Skilled Nursing      Address   [address] [address]             Contact Information    [phone]          KATHERINE met with  pt  at  bedside  today  :  patient acknowledged and agreeable to plan .    Aware  of  pre cert  pending.  .     CM  spoke with Haylee   [phone] in admissions and  pre cert is still pending .     CM cont to follow     Electronically signed by Muriel Swanson RN on 12/3/2024 at 2:57 PM       Muriel Swanson RN Case Manager  [facility] [address]  [facility] [address] [phone]  Fax [phone]

## 2024-12-03 NOTE — VIRTUAL HEALTH
Antwon Delgado MD    FLUORO FOR SURGICAL PROCEDURES    Result Date: 11/22/2024  HISTORY: C3-C5 anterior cervical discectomy and fusion. Intraoperative fluoroscopy FINDINGS: 15.3 seconds fluoroscopic time. 3 images demonstrate a marker placed at the level of C4 with subsequent anterior fixation plate from C3-C5 with discectomies at C3-C4 and C4-C5 with interbody fusion devices. No intraoperative complication identified.     1. Intraoperative fluoroscopy as described. (Ka,r) 1.9 mGy. Electronically signed by Bryn Wong    XR CERVICAL SPINE (2-3 VIEWS)    Result Date: 11/22/2024  HISTORY: C3-C5 anterior cervical discectomy and fusion. Intraoperative fluoroscopy FINDINGS: 15.3 seconds fluoroscopic time. 3 images demonstrate a marker placed at the level of C4 with subsequent anterior fixation plate from C3-C5 with discectomies at C3-C4 and C4-C5 with interbody fusion devices. No intraoperative complication identified.     1. Intraoperative fluoroscopy as described. (Ka,r) 1.9 mGy. Electronically signed by Bryn Wong    XR HAND RIGHT (2 VIEWS)    Result Date: 11/21/2024  EXAM: XR HAND RIGHT (2 VIEWS) INDICATION: fracture fifth phalanx COMPARISON: 1/9/2009 FINDINGS: There is acute fracture involving the base of the right fifth metacarpal. No gross intra-articular involvement. There is healed fracture deformity of the right fifth metacarpal.     Acute fracture involving right fifth proximal phalanx base Electronically signed by Eliel Gordon MD    FL MODIFIED BARIUM SWALLOW W VIDEO    Result Date: 11/20/2024  Radiology exam is complete. No Radiologist dictation. Please follow up with ordering provider.     XR CERVICAL SPINE FLEXION AND EXTENSION    Result Date: 11/19/2024  EXAM: XR CERVICAL SPINE FLEXION AND EXTENSION HISTORY: eval for instability.  Please check flex/ex images out of collar. COMPARISON: 11/13/2014 FINDINGS: Mild anterolisthesis of C2 on C3, C3 on C4, and C4 on C5. This appears exaggerated with  providers, appreciate assistance.  Medication Recommendations:  Remeron 7.5mg QHS; monitor for increased lethargy and prolonged QtC    Lab Recommendations: B12 and folate  Reviewed treatment plan with patient including risks, benefits, alternatives, and side effects of medications, and any/all black box warnings. Patient verbalized understanding.  Patient had an opportunity to ask questions and address concerns. Obtained informed consent for treatment.  Medical records, labs, and diagnostic tests reviewed.   Please re-consult for any new changes or concerns   Thank you for this consult.        TelePsych recommendations:Discharge    Legal hold: No Involuntary Hold    Please send message or call via [employer] if anything more is required.     Electronically signed by MALCOM Smallwood CNP on 12/3/2024 at 6:46 PM.    END OF NOTE  -------------------------    Ashutosh Donovan, was evaluated through a synchronous (real-time) audio-video encounter. The patient (and/or guardian if applicable) is aware that this is a billable service, which includes applicable co-pays. This virtual visit was conducted with patient's (and/or legal guardian's) consent. Patient identification was verified, and a caregiver was present when appropriate.  The patient was located at Facility (Appt Department): [place] [address]  [facility] [address]  Loc: [phone]  The provider was located at Home (City/State): [facility]   Confirm you are appropriately licensed, registered, or certified to deliver care in the state where the patient is located as indicated above. If you are not or unsure, please re-schedule the visit: Yes, I confirm.   [place] Consult to Tele-pysch Provider  Consult performed by: John Aleman APRN - CNP  Consult ordered by: Antonella Palm APRN - CNP  Reason for consult: psych consult           Total time spent on this encounter:  90 minutes    --John SALDIVAR

## 2024-12-03 NOTE — PROGRESS NOTES
V2.0    [facility] [place]      Name:  Ashutosh Donovan /Age/Sex: 1970  (53 y.o. male)   MRN & CSN:  [id number] & [id number] Encounter Date/Time: 12/3/2024 8:07 AM EST   Location:  [address] [place] [address] PCP: No primary care provider on file.     Attending:Issa Beasley MD       Hospital Day: 2    Assessment and Recommendations       Assessment/Plan:      Status post ACDF: Status post C3-5  Reviewed note from neurosurgery  Wear [place] J collar at all times may remove for hygiene  CT cervical spine reviewed stable  P.o. pain medicine for control and muscle relaxant  Resume aspirin and Plavix tomorrow  Okay for Lovenox  Okay for discharge     Multiple falls:  Carotid Dopplers  Cardiac echo  Consult neurology  Telemetry strip shows sinus rhythm reviewed by me     CAD:  Patient may resume aspirin and Plavix tomorrow  Continue other home medication     COPD: Without exacerbation  Will continue with home medication     BPH:  Continue with Flomax     Hyperlipidemia:  Continue home Lipitor 80 mg daily     Generalized anxiety:  Continue home Xanax as needed      MDM       MDM  [x] High (any 2 of A, B, or C)    A. Problems (any 1)  [x] Acute/Chronic Illness/injury posing threat to life or bodily function:    [] Severe exacerbation of chronic illness:    ---------------------------------------------------------------------  B. Risk of Treatment (any 1)   [] IV ABX requiring serial renal monitoring for nephrotoxicity:     [] IV Narcotic analgesia for adverse drug reaction  [] IV diuresis requiring serial monitoring for renal impairment and electrolyte derangements  [] Critical electrolyte abnormalities requiring IV replacement and close serial monitoring  [] Insulin - monitoring serial FSBS for Hypoglycemic adverse drug reaction  [] Anticoagulation requiring serial monitoring of coagulation factors  [] IV/IM Controlled Substances order (any 1)   [] One-time Order including Drug Name/Route, Reason Ordered:   []  height loss at C5-C6 and C6-C7 and lesser disc height loss at other levels. Multilevel facet arthropathy. Areas of spinal canal and foraminal stenosis are better assessed on recent MRI from 11/13/2024.     HEAD: No acute intracranial hemorrhage or mass effect.  Mild chronic small vessel ischemic change. Small remote right superior frontal lobe infarct. CERVICAL SPINE: No acute fracture of the cervical spine. Prior ACDF at C3-C7 without visible complication. Multilevel cervical spondylosis and facet arthropathy, better assessed by recent MRI from 11/13/2024.  Electronically signed by Antwon Delgado MD      CBC:   Recent Labs     12/02/24  1046 12/03/24  0526   WBC 10.7 8.1   HGB 13.2* 11.5*   * 427     BMP:    Recent Labs     12/02/24  1046 12/02/24  1147 12/03/24  0526     --  140   K see below 3.3* 3.1*     --  106   CO2 26  --  24   BUN 16  --  16   CREATININE 1.1  --  1.0   GLUCOSE 104*  --  105*     Hepatic:   Recent Labs     12/02/24  1046 12/03/24  0526   AST 41* 14*   ALT see below 8*   BILITOT 0.4 <0.2   ALKPHOS 151* 117     Lipids:   Lab Results   Component Value Date/Time    CHOL 165 11/12/2024 04:16 AM    HDL 8 11/12/2024 04:16 AM    TRIG 107 11/12/2024 04:16 AM     Hemoglobin A1C:   Lab Results   Component Value Date/Time    LABA1C 6.0 11/12/2024 04:16 AM     TSH: No results found for: \"TSH\"  Troponin: No results found for: \"TROPONINT\"  Lactic Acid: No results for input(s): \"LACTA\" in the last 72 hours.  BNP: No results for input(s): \"PROBNP\" in the last 72 hours.  UA:  Lab Results   Component Value Date/Time    NITRU Negative 12/02/2024 10:54 AM    COLORU Yellow 12/02/2024 10:54 AM    PHUR 6.0 12/02/2024 10:54 AM    PHUR 6.0 11/19/2023 10:46 AM    WBCUA 3-5 12/02/2024 10:54 AM    RBCUA 0-2 12/02/2024 10:54 AM    MUCUS 2+ 12/02/2024 10:54 AM    BACTERIA 1+ 11/17/2024 09:43 PM    CLARITYU Clear 12/02/2024 10:54 AM    LEUKOCYTESUR Negative 12/02/2024 10:54 AM    UROBILINOGEN 0.2

## 2024-12-03 NOTE — CONSULTS
NEUROSURGERY CONSULT  ANIBAL MCDONALD  [id number]   1970   12/3/2024    Requesting physician: Waqar Muse MD    Reason for consultation: recent surgery dizziness and fall new onset numbness RLE     History of present illness: Patient is a 53 y.o. male w/ PMH of CAD, CHF, MI, Hep C, cervical and lumbar stenosis - s/p C3-5 ACDF in November who presented on 12/2/24 with multiple complaints including recurrent falls from home. It was recommended patient go to SNF after his recent cervical fusion however he opted to go home with family help. Patient was temporarily living with brother and his girlfriend however his brother has kicked him out. Patient reports decreased mobility since being discharged due to lack of help. He lays in bed and only gets up to use the restroom most days. He reports on going neck pain and generalized weakness that has not changed since discharge. He also reports episodes of chest pain and possible syncope. He had two falls yesterday, he does believe he lost consciousness with second fall. He was wearing his [place] J collar at the time of his fall. He reports neck pain w/ movement, and some pain that radiates to bilateral arms but stops at elbows. He has had no new swelling or drainage from his incision. He denies fevers. CT cervical spine done on admission reveals no acute abnormality.     ROS:   GENERAL:  Denies fever or recent illness. Denies weight changes   EYES:  Denies vision change or diplopia  EARS:  Denies hearing loss  CARDIAC: +syncope, + chest pain   RESPIRATORY:  Denies shortness of breath  SKIN:  Denies rash or lesions   HEM:  Denies excessive bruising  PSYCH:  Denies anxiety or depression  NEURO:  +tingling   :  Denies urinary difficulty  GI: Denies nausea, vomiting, diarrhea or constipation  MUSCULOSKELETAL:  +neck pain, + arm pain     Allergies   Allergen Reactions    Latex Itching    Bupropion Seizure    Tramadol Hives and Angioedema    Ziprasidone Hcl  WO CONTRAST   Final Result      No acute fracture or subluxation of the lumbar spine.      Subacute to chronic appearing left L1 transverse process fracture with progressive healing compared to the prior study.      Multilevel lumbar spondylosis and facet arthropathy, better assessed on prior MRI.      Electronically signed by Antwon Delgado MD      CT CERVICAL SPINE WO CONTRAST   Final Result   HEAD:   No acute intracranial hemorrhage or mass effect.        Mild chronic small vessel ischemic change. Small remote right superior frontal lobe infarct.      CERVICAL SPINE:   No acute fracture of the cervical spine.      Prior ACDF at C3-C7 without visible complication.      Multilevel cervical spondylosis and facet arthropathy, better assessed by recent MRI from 11/13/2024.          Electronically signed by Antwon Delgado MD      CT HEAD WO CONTRAST   Final Result   HEAD:   No acute intracranial hemorrhage or mass effect.        Mild chronic small vessel ischemic change. Small remote right superior frontal lobe infarct.      CERVICAL SPINE:   No acute fracture of the cervical spine.      Prior ACDF at C3-C7 without visible complication.      Multilevel cervical spondylosis and facet arthropathy, better assessed by recent MRI from 11/13/2024.          Electronically signed by Antwon Delgado MD      MRI brain without contrast    (Results Pending)   MRI LUMBAR SPINE WO CONTRAST    (Results Pending)         Labs  Recent Labs     12/02/24  1046 12/03/24  0526    140    106   CO2 26 24   BUN 16 16   CREATININE 1.1 1.0   GLUCOSE 104* 105*   MG  --  1.97     Recent Labs     12/02/24  1046 12/03/24  0526   WBC 10.7 8.1   RBC 4.70 4.08*       Patient Active Problem List    Diagnosis Date Noted    Multiple falls 12/02/2024    Closed displaced fracture of proximal phalanx of right little finger 11/23/2024    Cauda equina compression (HCC) 11/12/2024    Lower extremity numbness 11/12/2024    CVA (cerebral

## 2024-12-03 NOTE — PROGRESS NOTES
Speech Language Pathology  Facility/Department:[address] TELEMETRY  Bedside Swallowing  Evaluation/ treatment  Speech,language, cognitive screen   Name: Ashutosh Donovan  : 1970  MRN: [id number]                                                       Patient Diagnosis(es):   Patient Active Problem List    Diagnosis Date Noted    Multiple falls 2024    Closed displaced fracture of proximal phalanx of right little finger 2024    Cauda equina compression (HCC) 2024    Lower extremity numbness 2024    CVA (cerebral vascular accident) ([facility]) 2024    HTN (hypertension) 2024    Acute CVA (cerebrovascular accident) ([facility]) 2024    Chronic kidney disease 2024    Major depressive disorder, recurrent ([facility]) 2024    H/O major depression 2024    Right facial numbness 2024    TIA (transient ischemic attack) 2024    Chest pain at rest 2024    Dyslipidemia 2024    Moderate episode of recurrent major depressive disorder ([facility]) 2023    PTSD (post-traumatic stress disorder) 2023    Cocaine use disorder in remission 2023    Opioid use disorder, mild, in early remission ([facility]) 2023    Major depression, recurrent ([facility]) 2023    Coronary artery disease of native artery of native heart with stable angina pectoris ([facility]) 2023    Tobacco use 2023    Chronic obstructive pulmonary disease ([facility]) 2023    Current severe episode of major depressive disorder without psychotic features ([facility]) 2023    LA (acute kidney injury) ([facility]) 2023    Spondylolisthesis at L4-L5 level 2023    Urinary incontinence without sensory awareness 2023    Lumbar pain with radiation down both legs 2023    DDD (degenerative disc disease), lumbosacral 2023       Past Medical History:   Diagnosis Date    Arthritis     CAD (coronary artery disease)     CHF (congestive heart failure) ([facility])     Chronic

## 2024-12-03 NOTE — H&P
Hospital Medicine History & Physical      Date of Admission: 12/2/2024    Date of Service:  Pt seen/examined on 12/2/2020    [x]Admitted to Inpatient with expected LOS greater than two midnights due to medical therapy.  []Placed in Observation status.    Chief Admission Complaint: Fall    Presenting Admission History:      53 y.o. male who presented to [facility] with multiple falls failure to thrive numbness.  PMHx significant for cervical and lumbar myelopathy radiculopathy with C3-5 anterior cervical dissecting fusion.      States it has been difficult taking care of himself at discharge, reports generalized weakness.  Today he was getting out of bed became dizzy like the room was spinning and his legs just gave out.  States he hit his head and had a loss of consciousness .  afterwards states his right lower extremity is now numb.  Reports residual weakness that is not worsening.  Reports back pain headache.  Denies chest pain chest pressure abdominal pain nausea vomiting fevers or chills    In the ER  Elevated blood pressure    Hemoglobin 13.2 previous 11.6  Platelets 513  Alk phos 151  AST 41    Troponin nonzero but negative    Potassium 3.3    CT HEAD shows No acute intracranial hemorrhage or mass effect./Mild chronic small vessel ischemic change. Small remote right superior frontal lobe infarct.     CT CERVICAL SPINE shows No acute fracture of the cervical spine./Prior ACDF at C3-C7 without visible complication./Multilevel cervical spondylosis and facet arthropathy, better assessed by recent MRI from 11/13/2024.    CT L-spine shows No acute fracture or subluxation of the lumbar spine./Subacute to chronic appearing left L1 transverse process fracture with progressive healing compared to the prior study./Multilevel lumbar spondylosis and facet arthropathy, better assessed on prior MRI.    XR hip shows No acute fracture or dislocation.     Assessment/Plan:      Current Principal Problem:  Multiple    methocarbamol (ROBAXIN-750) 750 MG tablet Take 1 tablet by mouth 4 times daily for 10 days 11/22/24 12/2/24  Jessica Wilson APRN - CNP   levothyroxine (SYNTHROID) 175 MCG tablet Take 1 tablet by mouth Daily    Zeeshan Green MD   prazosin (MINIPRESS) 5 MG capsule Take 1 capsule by mouth nightly    Zeeshan Green MD   cyclobenzaprine (FLEXERIL) 5 MG tablet Take 1 tablet by mouth as needed for Muscle spasms    Zeeshan Green MD   loratadine (CLARITIN) 10 MG tablet Take 1 tablet by mouth daily    Zeeshan Green MD   lidocaine 4 % external patch Place 1 patch onto the skin daily    Zeeshan Green MD   tamsulosin (FLOMAX) 0.4 MG capsule Take 1 capsule by mouth daily    Zeeshan Green MD   nicotine (NICODERM CQ) 21 MG/24HR Place 1 patch onto the skin every 24 hours    Zeeshan Green MD   ergocalciferol (ERGOCALCIFEROL) 1.25 MG (25207 UT) capsule Take 1 capsule by mouth once a week    Zeeshan Green MD   fluticasone (FLONASE) 50 MCG/ACT nasal spray 2 sprays by Each Nostril route daily as needed for Rhinitis    Zeeshan Green MD   ALPRAZolam (XANAX) 0.25 MG tablet Take 1 tablet by mouth 2 times daily as needed for Sleep or Anxiety.    Zeeshan Green MD   omeprazole (PRILOSEC) 20 MG delayed release capsule Take 2 capsules by mouth daily    Zeeshan Green MD   DULoxetine (CYMBALTA) 60 MG extended release capsule Take 1 capsule by mouth daily    Zeeshan Green MD   fluPHENAZine HCl (PROLIXIN) 5 MG tablet Take 1 tablet by mouth at bedtime    Zeeshan Green MD   albuterol sulfate HFA (PROVENTIL;VENTOLIN;PROAIR) 108 (90 Base) MCG/ACT inhaler Inhale 2 puffs into the lungs every 6 hours as needed for Shortness of Breath or Wheezing 5/7/24   Lisa Culp MD       Labs: Personally reviewed and interpreted for clinical significance.   Recent Labs     12/02/24  1046   WBC 10.7   HGB 13.2*   HCT 39.8*   *     Recent Labs

## 2024-12-03 NOTE — PROGRESS NOTES
Pt arrived to unit via stretcher. Pt oriented to room and call light. Pt is alert and oriented. Calm and pleasant. Right wrist piv intact and flushes well. Instructed on calling out for needs. Tolerates a regular diet. Neck brace in place. Instructed to call out for needs. Assessment completed. All safety measures in place. Pt requested all 4 side rails be up. Call light in reach, will continue to monitor.

## 2024-12-03 NOTE — CONSULTS
Neurology Consult Note    Patient: Ashutosh Donovan MRN: [id number]    YOB: 1970  Age: 53 y.o.  Sex: male   Unit: [address]  Room/Bed: 6313/6313-01 Location: [facility]    Date of Consultation: 12/3/2024  Date of Admission: 12/2/2024  9:46 AM ( LOS: 0 days )  Primary Care Physician: No primary care provider on file.   Consult Requested By: Issa Beasley MD    Reason for Consult:     veritgo, falls near syncope       IMPRESSION & RECOMMENDATIONS     IMPRESSION:  Ashutosh Donovan is a 54 y/o man with a history of polysubstance abuse, schizophrenia, recent ACDF who presents with two falls from home in the setting.     It sounds that these falls are very clearly related to deconditioning and failure to thrive. He refused SNF at discharge last admission, has little support at home, poor PO intake, and only gets out of bed to void. In addition to this I suspect he is being over-medicating (and possibly self medicating) with pain medication and alcohol (had several shots on Thanksgiving), and on my visit today he could barely keep his eyes open to speak with me.     MRI brain was unrevealing. CT C spine was unrevealing and strength is unchanged.     RECOMMENDATIONS:  - Agree with MRI lumbar spine as ordered by another provider  - Telepsych consult given reported SI in ER  - Orthostatic vitals when off bedrest. IVF as able.   - Consider EMG/NCS as outpatient if MRI lumbar spine is unrevealing  - Would strongly advise against sending patient home. He clearly needs more help than he is able to get in his home environment.     Management and plan discussed with:   Dr. Larson  Bedside nurse     MALCOM Chan - CNP   Neurology  12/3/2024 3:52 PM  [facility]: [facility] Neurology    History of Present Illness     Ashutosh Donovan is a 53 y.o. y/o male with history significant for recent ACDF (C3-5), HTN, HLD, CAD, schizophrenia, CHF, and hepatitis C who presents with two  deferred for safety    Diagnostic Testing Results   IMAGES:  EKG: NSR    CT HEAD:  No acute intracranial hemorrhage or mass effect.    Mild chronic small vessel ischemic change. Small remote right superior frontal lobe infarct.     CT CERVICAL SPINE:  No acute fracture of the cervical spine.  Prior ACDF at C3-C7 without visible complication.  Multilevel cervical spondylosis and facet arthropathy, better assessed by recent MRI from 11/13/2024.    MRI Brain w/o Contrast:  1. No acute intracranial abnormality.  2. Small chronic infarct with gliosis right frontal lobe.  3. Remote lacunar infarct left thalamus.  4. Moderate patchy T2 hyperintense white matter disease likely relate to chronic microvascular ischemia.  5. Mild to moderate parenchymal volume loss.    CT LUMBAR SPINE  No acute fracture or subluxation of the lumbar spine.  Subacute to chronic appearing left L1 transverse process fracture with progressive healing compared to the prior study.  Multilevel lumbar spondylosis and facet arthropathy, better assessed on prior MRI.    TTE 11/11/24    Left Ventricle: Normal left ventricular systolic function with a visually estimated EF of 60 - 65%. Left ventricle size is normal. Increased wall thickness. Normal wall motion.    Right Ventricle: Right ventricle size is normal. Normal systolic function. TAPSE is normal. TAPSE is 2.0 cm.    LABS:  All results below personally reviewed. Pertinent positives & negatives are addressed in Impression & Recommendations below.     LABS   Metabolic Panel Recent Labs     12/02/24  1046 12/02/24  1147 12/03/24  0526     --  140   K see below 3.3* 3.1*     --  106   CO2 26  --  24   BUN 16  --  16   CREATININE 1.1  --  1.0   GLUCOSE 104*  --  105*   CALCIUM 10.3  --  8.8   MG  --   --  1.97   ALKPHOS 151*  --  117   ALT see below  --  8*   AST 41*  --  14*      CBC / Coags Recent Labs     12/02/24  1046 12/03/24  0526   WBC 10.7 8.1   RBC 4.70 4.08*   HGB 13.2* 11.5*   HCT

## 2024-12-03 NOTE — PROGRESS NOTES
Occupational Therapy  Chart reviewed.  Pt is currently off the unit for echo.  Also, pt currently has \"strict bedrest\" orders.  No tx rendered. Will need updated activity orders to continue therapy.  RAJENDRA Hanna, OTR/L [id number]

## 2024-12-04 ENCOUNTER — HOSPITAL ENCOUNTER (OUTPATIENT)
Dept: VASCULAR LAB | Age: 54
Setting detail: OBSERVATION
DRG: 871 | End: 2024-12-04
Attending: INTERNAL MEDICINE
Payer: MEDICARE

## 2024-12-04 LAB
ALBUMIN SERPL-MCNC: 4 G/DL (ref 3.4–5)
ALBUMIN/GLOB SERPL: 1.3 {RATIO} (ref 1.1–2.2)
ALP SERPL-CCNC: 125 U/L (ref 40–129)
ALT SERPL-CCNC: 6 U/L (ref 10–40)
ANION GAP SERPL CALCULATED.3IONS-SCNC: 11 MMOL/L (ref 3–16)
AST SERPL-CCNC: 15 U/L (ref 15–37)
BASOPHILS # BLD: 0.2 K/UL (ref 0–0.2)
BASOPHILS NFR BLD: 2.2 %
BILIRUB SERPL-MCNC: <0.2 MG/DL (ref 0–1)
BUN SERPL-MCNC: 15 MG/DL (ref 7–20)
CALCIUM SERPL-MCNC: 10 MG/DL (ref 8.3–10.6)
CHLORIDE SERPL-SCNC: 101 MMOL/L (ref 99–110)
CO2 SERPL-SCNC: 28 MMOL/L (ref 21–32)
CREAT SERPL-MCNC: 1.3 MG/DL (ref 0.9–1.3)
DEPRECATED RDW RBC AUTO: 17.3 % (ref 12.4–15.4)
EOSINOPHIL # BLD: 0.1 K/UL (ref 0–0.6)
EOSINOPHIL NFR BLD: 1.7 %
GFR SERPLBLD CREATININE-BSD FMLA CKD-EPI: 66 ML/MIN/{1.73_M2}
GLUCOSE SERPL-MCNC: 104 MG/DL (ref 70–99)
HCT VFR BLD AUTO: 35.6 % (ref 40.5–52.5)
HGB BLD-MCNC: 11.8 G/DL (ref 13.5–17.5)
LYMPHOCYTES # BLD: 2.5 K/UL (ref 1–5.1)
LYMPHOCYTES NFR BLD: 35.3 %
MAGNESIUM SERPL-MCNC: 1.88 MG/DL (ref 1.8–2.4)
MCH RBC QN AUTO: 28.3 PG (ref 26–34)
MCHC RBC AUTO-ENTMCNC: 33.1 G/DL (ref 31–36)
MCV RBC AUTO: 85.7 FL (ref 80–100)
MONOCYTES # BLD: 0.8 K/UL (ref 0–1.3)
MONOCYTES NFR BLD: 11.2 %
NEUTROPHILS # BLD: 3.5 K/UL (ref 1.7–7.7)
NEUTROPHILS NFR BLD: 49.6 %
PLATELET # BLD AUTO: 400 K/UL (ref 135–450)
PMV BLD AUTO: 9 FL (ref 5–10.5)
POTASSIUM SERPL-SCNC: 3.4 MMOL/L (ref 3.5–5.1)
PROT SERPL-MCNC: 7 G/DL (ref 6.4–8.2)
RBC # BLD AUTO: 4.15 M/UL (ref 4.2–5.9)
SODIUM SERPL-SCNC: 140 MMOL/L (ref 136–145)
WBC # BLD AUTO: 7 K/UL (ref 4–11)

## 2024-12-04 PROCEDURE — G0378 HOSPITAL OBSERVATION PER HR: HCPCS

## 2024-12-04 PROCEDURE — 6370000000 HC RX 637 (ALT 250 FOR IP): Performed by: FAMILY MEDICINE

## 2024-12-04 PROCEDURE — 96372 THER/PROPH/DIAG INJ SC/IM: CPT

## 2024-12-04 PROCEDURE — 94640 AIRWAY INHALATION TREATMENT: CPT

## 2024-12-04 PROCEDURE — 92526 ORAL FUNCTION THERAPY: CPT

## 2024-12-04 PROCEDURE — 85025 COMPLETE CBC W/AUTO DIFF WBC: CPT

## 2024-12-04 PROCEDURE — 80053 COMPREHEN METABOLIC PANEL: CPT

## 2024-12-04 PROCEDURE — 83735 ASSAY OF MAGNESIUM: CPT

## 2024-12-04 PROCEDURE — 96376 TX/PRO/DX INJ SAME DRUG ADON: CPT

## 2024-12-04 PROCEDURE — 99231 SBSQ HOSP IP/OBS SF/LOW 25: CPT | Performed by: NURSE PRACTITIONER

## 2024-12-04 PROCEDURE — 6360000002 HC RX W HCPCS: Performed by: INTERNAL MEDICINE

## 2024-12-04 PROCEDURE — 2580000003 HC RX 258: Performed by: FAMILY MEDICINE

## 2024-12-04 PROCEDURE — 6360000002 HC RX W HCPCS: Performed by: FAMILY MEDICINE

## 2024-12-04 PROCEDURE — 36415 COLL VENOUS BLD VENIPUNCTURE: CPT

## 2024-12-04 RX ADMIN — OXYCODONE 10 MG: 5 TABLET ORAL at 21:12

## 2024-12-04 RX ADMIN — HYDROMORPHONE HYDROCHLORIDE 1 MG: 1 INJECTION, SOLUTION INTRAMUSCULAR; INTRAVENOUS; SUBCUTANEOUS at 00:44

## 2024-12-04 RX ADMIN — HYDROMORPHONE HYDROCHLORIDE 1 MG: 1 INJECTION, SOLUTION INTRAMUSCULAR; INTRAVENOUS; SUBCUTANEOUS at 05:35

## 2024-12-04 RX ADMIN — LEVOTHYROXINE SODIUM 175 MCG: 0.17 TABLET ORAL at 05:36

## 2024-12-04 RX ADMIN — CARVEDILOL 3.12 MG: 3.12 TABLET, FILM COATED ORAL at 08:37

## 2024-12-04 RX ADMIN — TAMSULOSIN HYDROCHLORIDE 0.4 MG: 0.4 CAPSULE ORAL at 08:36

## 2024-12-04 RX ADMIN — METHOCARBAMOL TABLETS 750 MG: 500 TABLET, COATED ORAL at 21:12

## 2024-12-04 RX ADMIN — AMLODIPINE BESYLATE 5 MG: 5 TABLET ORAL at 08:37

## 2024-12-04 RX ADMIN — OXYCODONE 10 MG: 5 TABLET ORAL at 15:38

## 2024-12-04 RX ADMIN — OXYCODONE 10 MG: 5 TABLET ORAL at 08:36

## 2024-12-04 RX ADMIN — HYDROMORPHONE HYDROCHLORIDE 1 MG: 1 INJECTION, SOLUTION INTRAMUSCULAR; INTRAVENOUS; SUBCUTANEOUS at 12:59

## 2024-12-04 RX ADMIN — CYCLOBENZAPRINE 5 MG: 10 TABLET, FILM COATED ORAL at 10:51

## 2024-12-04 RX ADMIN — HYDROMORPHONE HYDROCHLORIDE 1 MG: 1 INJECTION, SOLUTION INTRAMUSCULAR; INTRAVENOUS; SUBCUTANEOUS at 17:20

## 2024-12-04 RX ADMIN — CYCLOBENZAPRINE 5 MG: 10 TABLET, FILM COATED ORAL at 18:51

## 2024-12-04 RX ADMIN — RANOLAZINE 500 MG: 500 TABLET, FILM COATED, EXTENDED RELEASE ORAL at 21:12

## 2024-12-04 RX ADMIN — METHOCARBAMOL TABLETS 750 MG: 500 TABLET, COATED ORAL at 17:20

## 2024-12-04 RX ADMIN — SODIUM CHLORIDE, PRESERVATIVE FREE 10 ML: 5 INJECTION INTRAVENOUS at 08:37

## 2024-12-04 RX ADMIN — SODIUM CHLORIDE, PRESERVATIVE FREE 10 ML: 5 INJECTION INTRAVENOUS at 21:19

## 2024-12-04 RX ADMIN — CYCLOBENZAPRINE 5 MG: 10 TABLET, FILM COATED ORAL at 00:43

## 2024-12-04 RX ADMIN — HYDROMORPHONE HYDROCHLORIDE 1 MG: 1 INJECTION, SOLUTION INTRAMUSCULAR; INTRAVENOUS; SUBCUTANEOUS at 09:29

## 2024-12-04 RX ADMIN — METHOCARBAMOL TABLETS 750 MG: 500 TABLET, COATED ORAL at 12:58

## 2024-12-04 RX ADMIN — METHOCARBAMOL TABLETS 750 MG: 500 TABLET, COATED ORAL at 08:37

## 2024-12-04 RX ADMIN — RANOLAZINE 500 MG: 500 TABLET, FILM COATED, EXTENDED RELEASE ORAL at 08:36

## 2024-12-04 RX ADMIN — ALPRAZOLAM 0.25 MG: 0.25 TABLET ORAL at 21:12

## 2024-12-04 RX ADMIN — ATORVASTATIN CALCIUM 80 MG: 80 TABLET, FILM COATED ORAL at 08:37

## 2024-12-04 RX ADMIN — CARVEDILOL 3.12 MG: 3.12 TABLET, FILM COATED ORAL at 17:20

## 2024-12-04 RX ADMIN — OXYCODONE 10 MG: 5 TABLET ORAL at 02:56

## 2024-12-04 RX ADMIN — ALPRAZOLAM 0.25 MG: 0.25 TABLET ORAL at 03:08

## 2024-12-04 RX ADMIN — ENOXAPARIN SODIUM 40 MG: 100 INJECTION SUBCUTANEOUS at 08:36

## 2024-12-04 ASSESSMENT — PAIN DESCRIPTION - ONSET
ONSET: ON-GOING

## 2024-12-04 ASSESSMENT — PAIN SCALES - GENERAL
PAINLEVEL_OUTOF10: 6
PAINLEVEL_OUTOF10: 8
PAINLEVEL_OUTOF10: 9
PAINLEVEL_OUTOF10: 10
PAINLEVEL_OUTOF10: 7
PAINLEVEL_OUTOF10: 5
PAINLEVEL_OUTOF10: 10
PAINLEVEL_OUTOF10: 9
PAINLEVEL_OUTOF10: 9
PAINLEVEL_OUTOF10: 5
PAINLEVEL_OUTOF10: 6
PAINLEVEL_OUTOF10: 6

## 2024-12-04 ASSESSMENT — PAIN DESCRIPTION - DESCRIPTORS
DESCRIPTORS: ACHING;DISCOMFORT
DESCRIPTORS: DISCOMFORT;ACHING
DESCRIPTORS: ACHING;DISCOMFORT
DESCRIPTORS: ACHING;DISCOMFORT

## 2024-12-04 ASSESSMENT — PAIN DESCRIPTION - ORIENTATION
ORIENTATION: INNER
ORIENTATION: INNER
ORIENTATION: MID
ORIENTATION: INNER
ORIENTATION: ANTERIOR
ORIENTATION: RIGHT;LEFT;MID
ORIENTATION: ANTERIOR

## 2024-12-04 ASSESSMENT — PAIN SCALES - WONG BAKER
WONGBAKER_NUMERICALRESPONSE: NO HURT

## 2024-12-04 ASSESSMENT — PAIN - FUNCTIONAL ASSESSMENT
PAIN_FUNCTIONAL_ASSESSMENT: PREVENTS OR INTERFERES SOME ACTIVE ACTIVITIES AND ADLS
PAIN_FUNCTIONAL_ASSESSMENT: PREVENTS OR INTERFERES WITH MANY ACTIVE NOT PASSIVE ACTIVITIES
PAIN_FUNCTIONAL_ASSESSMENT: PREVENTS OR INTERFERES SOME ACTIVE ACTIVITIES AND ADLS

## 2024-12-04 ASSESSMENT — PAIN DESCRIPTION - PAIN TYPE
TYPE: ACUTE PAIN
TYPE: SURGICAL PAIN
TYPE: ACUTE PAIN
TYPE: ACUTE PAIN

## 2024-12-04 ASSESSMENT — PAIN DESCRIPTION - LOCATION
LOCATION: NECK
LOCATION: CHEST
LOCATION: NECK

## 2024-12-04 ASSESSMENT — PAIN DESCRIPTION - DIRECTION: RADIATING_TOWARDS: UP BACK

## 2024-12-04 ASSESSMENT — PAIN DESCRIPTION - FREQUENCY
FREQUENCY: CONTINUOUS

## 2024-12-04 NOTE — PROGRESS NOTES
Speech Language Pathology  Facility/Department:[address] TELEMETRY  Dysphagia treatment/dc    Name: Ashutosh Donovan  : 1970  MRN: [id number]                                                       Patient Diagnosis(es):   Patient Active Problem List    Diagnosis Date Noted    Fall 2024    Multiple falls 2024    Closed displaced fracture of proximal phalanx of right little finger 2024    Cauda equina compression (HCC) 2024    Lower extremity numbness 2024    CVA (cerebral vascular accident) ([facility]) 2024    HTN (hypertension) 2024    Acute CVA (cerebrovascular accident) ([facility]) 2024    Chronic kidney disease 2024    Major depressive disorder, recurrent ([facility]) 2024    H/O major depression 2024    Right facial numbness 2024    TIA (transient ischemic attack) 2024    Chest pain at rest 2024    Dyslipidemia 2024    Moderate episode of recurrent major depressive disorder ([facility]) 2023    PTSD (post-traumatic stress disorder) 2023    Cocaine use disorder in remission 2023    Opioid use disorder, mild, in early remission ([facility]) 2023    Major depression, recurrent ([facility]) 2023    Coronary artery disease of native artery of native heart with stable angina pectoris ([facility]) 2023    Tobacco use 2023    Chronic obstructive pulmonary disease ([facility]) 2023    Current severe episode of major depressive disorder without psychotic features ([facility]) 2023    Mood disorder ([facility]) 2023    LA (acute kidney injury) ([facility]) 2023    Spondylolisthesis at L4-L5 level 2023    Urinary incontinence without sensory awareness 2023    Lumbar pain with radiation down both legs 2023    DDD (degenerative disc disease), lumbosacral 2023     Past Medical History:   Diagnosis Date    Arthritis     CAD (coronary artery disease)     CHF (congestive heart failure) ([facility])     Chronic pain   respond to questions appropriately. Pt endorsed difficult with memory which he states was prior to his falls. Will monitor MRI results to determine need for full speech, language, cognitive evaluation.   12/4: MRI (-) for acute process. Speech intelligibility improved this date    Treatment:  Dysphagia Goals:  The pt will be seen  1-3 x to address the following goals:   Goal 1: Patient will tolerate least restrictive diet with adequate oral prep and without overt signs of aspiration or associated decline in respiratory status.  12/4: MRI (-) for acute abnormality. Pt sitting up in bed with breakfast tray. Pt consumed eggs, sausage, oatmeal and muffin as well as liquids. No cough/throat clear or change in voice occurred. Pt demonstrated adequate mastication with solid texture. Pt with intermittent c/o globus, mostly when taking very large bites of food, instructed to consume smaller volumes.  No respiratory decline per chart review  Goal met  Goal 2: Patient/caregiver will demonstrate understanding of dysphagia recommendations/concerns.  12/3-The pt was educated to purpose of the visit, anatomy and physiology of the swallow, s/s of aspiration, swallowing strategies, diet recommendations and possibility of being made NPO if s/s aspiration emerge. The pt stated comprehension. con't goal  12/4: Educated pt to purpose of visit, s/s of aspiration, concern if aspiration occurs and rationale for diet recommendation/strategies to reduce risk for aspiration. Reviewed results of prior MBS. Pt instructed to consume smaller volumes of food.  Educated pt to notify staff if any signs of aspiration emerge or dysphagia concerns. Pt stated comprehension   Goal met    Education  As above    Total treatment time: 14 dyspagia tx     Plan:   Recommended Diet: Regular with thin liquids-Make NPO if s/s aspiration emerge and alert SLP  Medication administration: whole with water     Strategies:   Upright during and 30 min after all

## 2024-12-04 NOTE — PLAN OF CARE
Problem: Pain  Goal: Verbalizes/displays adequate comfort level or baseline comfort level  Outcome: Progressing  Flowsheets (Taken 12/4/2024 0849)  Verbalizes/displays adequate comfort level or baseline comfort level:   Encourage patient to monitor pain and request assistance   Assess pain using appropriate pain scale   Administer analgesics based on type and severity of pain and evaluate response  Note: Pain level assessed and PRN medication made available when needed for breakthrough pain. Patient stated that he was experiencing 10/10 neck pain. Patient received PRN PO medications along with scheduled morning medication administration. Effectiveness pending. Will continue to monitor per protocol.     Problem: Safety - Adult  Goal: Free from fall injury  Outcome: Progressing  Flowsheets (Taken 12/4/2024 0849)  Free From Fall Injury:   Instruct family/caregiver on patient safety   Based on caregiver fall risk screen, instruct family/caregiver to ask for assistance with transferring infant if caregiver noted to have fall risk factors  Note: Patient is A/O x4 but due to weakness in lower extremities, patient has been deemed a fall risk. Bed alarm is engaged, bed is in lowest position, room is free of clutter and call light is within reach of patient. Education provided on calling when assistance is needed. Patient verbalized understanding at this time.     Problem: Skin/Tissue Integrity  Goal: Absence of new skin breakdown  Description: 1.  Monitor for areas of redness and/or skin breakdown  2.  Assess vascular access sites hourly  3.  Every 4-6 hours minimum:  Change oxygen saturation probe site  4.  Every 4-6 hours:  If on nasal continuous positive airway pressure, respiratory therapy assess nares and determine need for appliance change or resting period.  Outcome: Progressing  Note: Patient's skin is grossly intact at this time. Patient to turn and reposition as needed to alleviate pressure and prevent skin

## 2024-12-04 NOTE — CONSULTS
NEUROSURGERY PROGRESS NOTE    ANIBAL MCDONALD   [id number]   1970 12/4/2024    Interval History: [facility] Day #0     Subjective: resting in bed, still complains of neck pain w/ movement that is unchanged. Eager to hear about acceptance to SNF    Objective:  /87   Pulse 76   Temp 98 °F (36.7 °C) (Oral)   Resp 16   Ht 1.803 m (5' 11\")   Wt 93.9 kg (207 lb)   SpO2 96%   BMI 28.87 kg/m²     Labs:  Recent Labs     12/02/24  1046 12/03/24  0526 12/04/24  0507    140 140    106 101   CO2 26 24 28   BUN 16 16 15   CREATININE 1.1 1.0 1.3   GLUCOSE 104* 105* 104*     Recent Labs     12/02/24  1046 12/03/24  0526 12/04/24  0507   WBC 10.7 8.1 7.0   RBC 4.70 4.08* 4.15*       Neurologic Exam:  Neurological:  Mental Status: Awake, alert, oriented x 4  Attention: Intact  Sensation: intermittent paresthesias RLE     DTRs:     Right  Left    ankle clonus  - -   toes (babinski)  down down      Musculoskeletal:   Gait: Not tested   Assist devices: None   Tone: normal   Motor strength:     Right  Left      Right  Left    Deltoid  4 4   Hip Flex  5 5   Biceps  4 4   Knee Extensors  5 5   Triceps  4 4   Knee Flexors  5 5   Wrist Ext  4 4   Ankle Dorsiflex.  5 5   Wrist Flex  4 4   Ankle Plantarflex.  5 5   Handgrip  4 4   Ext Jitendra Longus  5 5   Thumb Ext  4 4               Assessment:  54 yo male s/p ACDF in November who presents with falls from home      Plan:  Ct Cervical spine reviewed, stable  [place] J collar at all times, ok to remove for hygiene  PO pain control/muscle relaxants  Wash incision daily w/ soap and water, patient may shower  SW consulted- will need placement  Primary team work up of syncope  Ok to resume aspirin/plavix today   F/u for lumbar stenosis as outpatient   Will follow peripherally, Please call with questions        DISPO- Dispo timing to be determined by primary team once patient is medically stable for discharge.     Niurka Hill, APRN-CNP  Neurosurgery Nurse

## 2024-12-04 NOTE — CARE COORDINATION
CM following. Precert remains pending for [facility], will need HENS and transport .     Laura Funes RN, BSN, CM  Case Management Department  [phone]

## 2024-12-04 NOTE — PROGRESS NOTES
Comprehensive Nutrition Assessment    RECOMMENDATIONS:  PO Diet: Regular diet  Nutrition Supplement: Add Ensure Max BID, strawberry or chocolate  Nutrition Education: Education/Counseling not indicated     NUTRITION ASSESSMENT:   Nutritional summary & status: Positive screen for unsure amt of wt loss and poor po. Patient recently d/c one week ago after spinal stenosis surgery requiring c-collar and presented two days ago after multiple falls per EMR notes. Patient with no recent wt loss and is tolerating a regular diet with good po intake of % of meals. Pt does accept Ensure however and RD will order. Pt agreeing to SNF at d/c as recommended per MD. RD will continue to monitor nutritional status and provide intervnention as needed.   Admission // PMH: Falls // CKD, HTN    MALNUTRITION ASSESSMENT  Context of Malnutrition: Acute Illness   Malnutrition Status: No malnutrition  Findings of the 6 clinical characteristics of malnutrition (Minimum of 2 out of 6 clinical characteristics is required to make the diagnosis of moderate or severe Protein Calorie Malnutrition based on AND/ASPEN Guidelines):  Energy Intake:  Mild decrease in energy intake  Weight Loss:  No weight loss     Body Fat Loss:  No body fat loss     Muscle Mass Loss:  No muscle mass loss    Fluid Accumulation:  No fluid accumulation     Strength:  Not Performed    NUTRITION DIAGNOSIS   Increased nutrient needs related to acute injury/trauma as evidenced by s/p surgery    Nutrition Monitoring and Evaluation:   Food/Nutrient Intake Outcomes:  Food and Nutrient Intake, Supplement Intake  Physical Signs/Symptoms Outcomes:  Biochemical Data, GI Status, Nutrition Focused Physical Findings     OBJECTIVE DATA: Significant to nutrition assessment  Nutrition Related Findings: No BM, K+ 3.4,   Wounds:  Surgical incision  Nutrition Goals: PO intake 50% or greater, by next RD assessment     CURRENT NUTRITION THERAPIES  ADULT DIET; Regular  PO Intake:  %   PO Supplement Intake:None Ordered  Additional Sources of Calories/IVF:  n/a    COMPARATIVE STANDARDS  Energy (kcal):  5585-8609 (20-22 kcal/kg CBW)     Protein (g):   (1.2-1.4 gm/kg IBW)       Fluid (ml/day):  2000 ml    ANTHROPOMETRICS  Current Height: 180.3 cm (5' 11\")  Current Weight - Scale: 93.9 kg (207 lb)    Admission weight: 91 kg (200 lb 9.6 oz)    The patient will be monitored per nutrition standards of care. Consult dietitian if additional nutrition interventions are needed prior to RD reassessment.     Antoinette Brush RD  [place]:  [phone]

## 2024-12-05 ENCOUNTER — APPOINTMENT (OUTPATIENT)
Dept: CT IMAGING | Age: 54
DRG: 871 | End: 2024-12-05
Payer: MEDICARE

## 2024-12-05 ENCOUNTER — APPOINTMENT (OUTPATIENT)
Dept: GENERAL RADIOLOGY | Age: 54
DRG: 871 | End: 2024-12-05
Payer: MEDICARE

## 2024-12-05 ENCOUNTER — APPOINTMENT (OUTPATIENT)
Dept: GENERAL RADIOLOGY | Age: 54
DRG: 871 | End: 2024-12-05
Attending: INTERNAL MEDICINE
Payer: MEDICARE
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
## 2024-12-05 PROBLEM — J18.9 HAP (HOSPITAL-ACQUIRED PNEUMONIA): Status: ACTIVE | Noted: 2024-12-05

## 2024-12-05 LAB
ALBUMIN SERPL-MCNC: 3.7 G/DL (ref 3.4–5)
ALBUMIN SERPL-MCNC: 3.9 G/DL (ref 3.4–5)
ALBUMIN SERPL-MCNC: 4.3 G/DL (ref 3.4–5)
ALBUMIN/GLOB SERPL: 1.3 {RATIO} (ref 1.1–2.2)
ALBUMIN/GLOB SERPL: 1.3 {RATIO} (ref 1.1–2.2)
ALBUMIN/GLOB SERPL: 1.4 {RATIO} (ref 1.1–2.2)
ALP SERPL-CCNC: 118 U/L (ref 40–129)
ALP SERPL-CCNC: 125 U/L (ref 40–129)
ALP SERPL-CCNC: 142 U/L (ref 40–129)
ALT SERPL-CCNC: 16 U/L (ref 10–40)
ALT SERPL-CCNC: 18 U/L (ref 10–40)
ALT SERPL-CCNC: 8 U/L (ref 10–40)
ANION GAP SERPL CALCULATED.3IONS-SCNC: 11 MMOL/L (ref 3–16)
ANION GAP SERPL CALCULATED.3IONS-SCNC: 12 MMOL/L (ref 3–16)
ANION GAP SERPL CALCULATED.3IONS-SCNC: 13 MMOL/L (ref 3–16)
AST SERPL-CCNC: 17 U/L (ref 15–37)
AST SERPL-CCNC: 32 U/L (ref 15–37)
AST SERPL-CCNC: 33 U/L (ref 15–37)
BASE EXCESS BLDA CALC-SCNC: 0.9 MMOL/L (ref -3–3)
BASOPHILS # BLD: 0.2 K/UL (ref 0–0.2)
BASOPHILS NFR BLD: 0.8 %
BILIRUB SERPL-MCNC: <0.2 MG/DL (ref 0–1)
BUN SERPL-MCNC: 14 MG/DL (ref 7–20)
BUN SERPL-MCNC: 15 MG/DL (ref 7–20)
BUN SERPL-MCNC: 16 MG/DL (ref 7–20)
CALCIUM SERPL-MCNC: 10.1 MG/DL (ref 8.3–10.6)
CALCIUM SERPL-MCNC: 9.5 MG/DL (ref 8.3–10.6)
CALCIUM SERPL-MCNC: 9.8 MG/DL (ref 8.3–10.6)
CHLORIDE SERPL-SCNC: 102 MMOL/L (ref 99–110)
CHLORIDE SERPL-SCNC: 103 MMOL/L (ref 99–110)
CHLORIDE SERPL-SCNC: 105 MMOL/L (ref 99–110)
CO2 BLDA-SCNC: 27 MMOL/L
CO2 SERPL-SCNC: 20 MMOL/L (ref 21–32)
CO2 SERPL-SCNC: 24 MMOL/L (ref 21–32)
CO2 SERPL-SCNC: 24 MMOL/L (ref 21–32)
COHGB MFR BLDA: 0.8 % (ref 0–1.5)
CREAT SERPL-MCNC: 1.2 MG/DL (ref 0.9–1.3)
CREAT SERPL-MCNC: 1.3 MG/DL (ref 0.9–1.3)
CREAT SERPL-MCNC: 1.3 MG/DL (ref 0.9–1.3)
DEPRECATED RDW RBC AUTO: 17.5 % (ref 12.4–15.4)
EOSINOPHIL # BLD: 0.1 K/UL (ref 0–0.6)
EOSINOPHIL NFR BLD: 0.3 %
GFR SERPLBLD CREATININE-BSD FMLA CKD-EPI: 66 ML/MIN/{1.73_M2}
GFR SERPLBLD CREATININE-BSD FMLA CKD-EPI: 66 ML/MIN/{1.73_M2}
GFR SERPLBLD CREATININE-BSD FMLA CKD-EPI: 72 ML/MIN/{1.73_M2}
GLUCOSE BLD-MCNC: 88 MG/DL (ref 70–99)
GLUCOSE SERPL-MCNC: 109 MG/DL (ref 70–99)
GLUCOSE SERPL-MCNC: 125 MG/DL (ref 70–99)
GLUCOSE SERPL-MCNC: 89 MG/DL (ref 70–99)
HCO3 BLDA-SCNC: 26 MMOL/L (ref 21–29)
HCT VFR BLD AUTO: 40.1 % (ref 40.5–52.5)
HGB BLD-MCNC: 12.9 G/DL (ref 13.5–17.5)
HGB BLDA-MCNC: 11.8 G/DL
LACTATE BLDV-SCNC: 1.5 MMOL/L (ref 0.4–1.9)
LACTATE BLDV-SCNC: 1.8 MMOL/L (ref 0.4–1.9)
LACTATE BLDV-SCNC: 2.2 MMOL/L (ref 0.4–2)
LYMPHOCYTES # BLD: 2 K/UL (ref 1–5.1)
LYMPHOCYTES NFR BLD: 8.3 %
MAGNESIUM SERPL-MCNC: 1.67 MG/DL (ref 1.8–2.4)
MAGNESIUM SERPL-MCNC: 1.88 MG/DL (ref 1.8–2.4)
MCH RBC QN AUTO: 27.8 PG (ref 26–34)
MCHC RBC AUTO-ENTMCNC: 32.2 G/DL (ref 31–36)
MCV RBC AUTO: 86.3 FL (ref 80–100)
METHGB MFR BLDA: 0.1 % (ref 0–1.4)
MONOCYTES # BLD: 0.9 K/UL (ref 0–1.3)
MONOCYTES NFR BLD: 3.8 %
NEUTROPHILS # BLD: 20.6 K/UL (ref 1.7–7.7)
NEUTROPHILS NFR BLD: 86.8 %
PCO2 BLDA: 41 MMHG (ref 35–45)
PERFORMED ON: NORMAL
PH BLDA: 7.41 [PH] (ref 7.35–7.45)
PLATELET # BLD AUTO: 410 K/UL (ref 135–450)
PMV BLD AUTO: 9.2 FL (ref 5–10.5)
PO2 BLDA: 56.7 MMHG (ref 75–108)
POTASSIUM SERPL-SCNC: 3.3 MMOL/L (ref 3.5–5.1)
POTASSIUM SERPL-SCNC: 3.4 MMOL/L (ref 3.5–5.1)
POTASSIUM SERPL-SCNC: 3.6 MMOL/L (ref 3.5–5.1)
PROCALCITONIN SERPL IA-MCNC: 0.04 NG/ML (ref 0–0.15)
PROT SERPL-MCNC: 6.5 G/DL (ref 6.4–8.2)
PROT SERPL-MCNC: 6.7 G/DL (ref 6.4–8.2)
PROT SERPL-MCNC: 7.6 G/DL (ref 6.4–8.2)
RBC # BLD AUTO: 4.64 M/UL (ref 4.2–5.9)
SAO2 % BLDA: 90 % (ref 93–100)
SODIUM SERPL-SCNC: 136 MMOL/L (ref 136–145)
SODIUM SERPL-SCNC: 138 MMOL/L (ref 136–145)
SODIUM SERPL-SCNC: 140 MMOL/L (ref 136–145)
TROPONIN, HIGH SENSITIVITY: 13 NG/L (ref 0–22)
WBC # BLD AUTO: 23.7 K/UL (ref 4–11)

## 2024-12-05 PROCEDURE — 2580000003 HC RX 258: Performed by: INTERNAL MEDICINE

## 2024-12-05 PROCEDURE — 6370000000 HC RX 637 (ALT 250 FOR IP): Performed by: FAMILY MEDICINE

## 2024-12-05 PROCEDURE — 92526 ORAL FUNCTION THERAPY: CPT

## 2024-12-05 PROCEDURE — 83735 ASSAY OF MAGNESIUM: CPT

## 2024-12-05 PROCEDURE — 6360000002 HC RX W HCPCS: Performed by: INTERNAL MEDICINE

## 2024-12-05 PROCEDURE — 96367 TX/PROPH/DG ADDL SEQ IV INF: CPT

## 2024-12-05 PROCEDURE — 87641 MR-STAPH DNA AMP PROBE: CPT

## 2024-12-05 PROCEDURE — 6360000002 HC RX W HCPCS: Performed by: NURSE PRACTITIONER

## 2024-12-05 PROCEDURE — 6360000004 HC RX CONTRAST MEDICATION

## 2024-12-05 PROCEDURE — 71275 CT ANGIOGRAPHY CHEST: CPT

## 2024-12-05 PROCEDURE — 37799 UNLISTED PX VASCULAR SURGERY: CPT

## 2024-12-05 PROCEDURE — 2580000003 HC RX 258: Performed by: FAMILY MEDICINE

## 2024-12-05 PROCEDURE — 6360000002 HC RX W HCPCS: Performed by: FAMILY MEDICINE

## 2024-12-05 PROCEDURE — 96372 THER/PROPH/DIAG INJ SC/IM: CPT

## 2024-12-05 PROCEDURE — 84145 PROCALCITONIN (PCT): CPT

## 2024-12-05 PROCEDURE — 87205 SMEAR GRAM STAIN: CPT

## 2024-12-05 PROCEDURE — 83605 ASSAY OF LACTIC ACID: CPT

## 2024-12-05 PROCEDURE — 94761 N-INVAS EAR/PLS OXIMETRY MLT: CPT

## 2024-12-05 PROCEDURE — 6370000000 HC RX 637 (ALT 250 FOR IP): Performed by: INTERNAL MEDICINE

## 2024-12-05 PROCEDURE — 84484 ASSAY OF TROPONIN QUANT: CPT

## 2024-12-05 PROCEDURE — 87070 CULTURE OTHR SPECIMN AEROBIC: CPT

## 2024-12-05 PROCEDURE — 6370000000 HC RX 637 (ALT 250 FOR IP)

## 2024-12-05 PROCEDURE — 82803 BLOOD GASES ANY COMBINATION: CPT

## 2024-12-05 PROCEDURE — 71045 X-RAY EXAM CHEST 1 VIEW: CPT

## 2024-12-05 PROCEDURE — 74230 X-RAY XM SWLNG FUNCJ C+: CPT

## 2024-12-05 PROCEDURE — 85025 COMPLETE CBC W/AUTO DIFF WBC: CPT

## 2024-12-05 PROCEDURE — 87040 BLOOD CULTURE FOR BACTERIA: CPT

## 2024-12-05 PROCEDURE — 96376 TX/PRO/DX INJ SAME DRUG ADON: CPT

## 2024-12-05 PROCEDURE — 92611 MOTION FLUOROSCOPY/SWALLOW: CPT

## 2024-12-05 PROCEDURE — 2700000000 HC OXYGEN THERAPY PER DAY

## 2024-12-05 PROCEDURE — 2580000003 HC RX 258

## 2024-12-05 PROCEDURE — 80053 COMPREHEN METABOLIC PANEL: CPT

## 2024-12-05 PROCEDURE — 94150 VITAL CAPACITY TEST: CPT

## 2024-12-05 PROCEDURE — 96361 HYDRATE IV INFUSION ADD-ON: CPT

## 2024-12-05 PROCEDURE — 6370000000 HC RX 637 (ALT 250 FOR IP): Performed by: NURSE PRACTITIONER

## 2024-12-05 PROCEDURE — 1200000000 HC SEMI PRIVATE

## 2024-12-05 PROCEDURE — 36415 COLL VENOUS BLD VENIPUNCTURE: CPT

## 2024-12-05 PROCEDURE — 96365 THER/PROPH/DIAG IV INF INIT: CPT

## 2024-12-05 PROCEDURE — 36600 WITHDRAWAL OF ARTERIAL BLOOD: CPT

## 2024-12-05 PROCEDURE — 93005 ELECTROCARDIOGRAM TRACING: CPT | Performed by: NURSE PRACTITIONER

## 2024-12-05 RX ORDER — VANCOMYCIN 2 G/400ML
2000 INJECTION, SOLUTION INTRAVENOUS ONCE
Status: COMPLETED | OUTPATIENT
Start: 2024-12-05 | End: 2024-12-05

## 2024-12-05 RX ORDER — MORPHINE SULFATE 2 MG/ML
2 INJECTION, SOLUTION INTRAMUSCULAR; INTRAVENOUS ONCE
Status: COMPLETED | OUTPATIENT
Start: 2024-12-05 | End: 2024-12-05

## 2024-12-05 RX ORDER — SODIUM CHLORIDE 9 MG/ML
INJECTION, SOLUTION INTRAVENOUS CONTINUOUS
Status: DISCONTINUED | OUTPATIENT
Start: 2024-12-05 | End: 2024-12-06

## 2024-12-05 RX ORDER — 0.9 % SODIUM CHLORIDE 0.9 %
500 INTRAVENOUS SOLUTION INTRAVENOUS ONCE
Status: DISCONTINUED | OUTPATIENT
Start: 2024-12-05 | End: 2024-12-07 | Stop reason: HOSPADM

## 2024-12-05 RX ORDER — SODIUM CHLORIDE 0.9 % (FLUSH) 0.9 %
5-40 SYRINGE (ML) INJECTION PRN
Status: DISCONTINUED | OUTPATIENT
Start: 2024-12-05 | End: 2024-12-07 | Stop reason: HOSPADM

## 2024-12-05 RX ORDER — HYDROMORPHONE HYDROCHLORIDE 1 MG/ML
0.5 INJECTION, SOLUTION INTRAMUSCULAR; INTRAVENOUS; SUBCUTANEOUS
Status: DISCONTINUED | OUTPATIENT
Start: 2024-12-05 | End: 2024-12-07 | Stop reason: HOSPADM

## 2024-12-05 RX ORDER — 0.9 % SODIUM CHLORIDE 0.9 %
30 INTRAVENOUS SOLUTION INTRAVENOUS ONCE
Status: COMPLETED | OUTPATIENT
Start: 2024-12-05 | End: 2024-12-05

## 2024-12-05 RX ORDER — SODIUM CHLORIDE 0.9 % (FLUSH) 0.9 %
5-40 SYRINGE (ML) INJECTION EVERY 12 HOURS SCHEDULED
Status: DISCONTINUED | OUTPATIENT
Start: 2024-12-05 | End: 2024-12-07 | Stop reason: HOSPADM

## 2024-12-05 RX ORDER — IOPAMIDOL 755 MG/ML
75 INJECTION, SOLUTION INTRAVASCULAR
Status: COMPLETED | OUTPATIENT
Start: 2024-12-05 | End: 2024-12-05

## 2024-12-05 RX ORDER — SODIUM CHLORIDE 9 MG/ML
INJECTION, SOLUTION INTRAVENOUS PRN
Status: DISCONTINUED | OUTPATIENT
Start: 2024-12-05 | End: 2024-12-07 | Stop reason: HOSPADM

## 2024-12-05 RX ORDER — VANCOMYCIN HCL IN 5 % DEXTROSE 1.25 G/25
1250 PLASTIC BAG, INJECTION (ML) INTRAVENOUS EVERY 12 HOURS
Status: DISCONTINUED | OUTPATIENT
Start: 2024-12-05 | End: 2024-12-06

## 2024-12-05 RX ORDER — NITROGLYCERIN 0.4 MG/1
0.4 TABLET SUBLINGUAL EVERY 5 MIN PRN
Status: DISCONTINUED | OUTPATIENT
Start: 2024-12-05 | End: 2024-12-07 | Stop reason: HOSPADM

## 2024-12-05 RX ORDER — MAGNESIUM SULFATE IN WATER 40 MG/ML
2000 INJECTION, SOLUTION INTRAVENOUS ONCE
Status: COMPLETED | OUTPATIENT
Start: 2024-12-05 | End: 2024-12-05

## 2024-12-05 RX ORDER — PANTOPRAZOLE SODIUM 40 MG/1
40 TABLET, DELAYED RELEASE ORAL
Status: DISCONTINUED | OUTPATIENT
Start: 2024-12-05 | End: 2024-12-07 | Stop reason: HOSPADM

## 2024-12-05 RX ORDER — POTASSIUM CHLORIDE 7.45 MG/ML
10 INJECTION INTRAVENOUS
Status: COMPLETED | OUTPATIENT
Start: 2024-12-05 | End: 2024-12-05

## 2024-12-05 RX ORDER — ACETAMINOPHEN 650 MG/1
650 SUPPOSITORY RECTAL EVERY 4 HOURS PRN
Status: DISCONTINUED | OUTPATIENT
Start: 2024-12-05 | End: 2024-12-07 | Stop reason: HOSPADM

## 2024-12-05 RX ORDER — ACETAMINOPHEN 325 MG/1
650 TABLET ORAL EVERY 4 HOURS PRN
Status: DISCONTINUED | OUTPATIENT
Start: 2024-12-05 | End: 2024-12-05

## 2024-12-05 RX ADMIN — VANCOMYCIN 2000 MG: 2 INJECTION, SOLUTION INTRAVENOUS at 10:04

## 2024-12-05 RX ADMIN — NITROGLYCERIN 0.4 MG: 0.4 TABLET SUBLINGUAL at 01:23

## 2024-12-05 RX ADMIN — NITROGLYCERIN 0.4 MG: 0.4 TABLET SUBLINGUAL at 01:01

## 2024-12-05 RX ADMIN — ALBUTEROL SULFATE 2.5 MG: 2.5 SOLUTION RESPIRATORY (INHALATION) at 00:40

## 2024-12-05 RX ADMIN — ATORVASTATIN CALCIUM 80 MG: 80 TABLET, FILM COATED ORAL at 12:45

## 2024-12-05 RX ADMIN — CYCLOBENZAPRINE 5 MG: 10 TABLET, FILM COATED ORAL at 15:30

## 2024-12-05 RX ADMIN — ACETAMINOPHEN 650 MG: 650 SUPPOSITORY RECTAL at 06:05

## 2024-12-05 RX ADMIN — HYDROMORPHONE HYDROCHLORIDE 0.5 MG: 1 INJECTION, SOLUTION INTRAMUSCULAR; INTRAVENOUS; SUBCUTANEOUS at 15:30

## 2024-12-05 RX ADMIN — METHOCARBAMOL TABLETS 750 MG: 500 TABLET, COATED ORAL at 16:53

## 2024-12-05 RX ADMIN — ENOXAPARIN SODIUM 40 MG: 100 INJECTION SUBCUTANEOUS at 10:10

## 2024-12-05 RX ADMIN — MAGNESIUM SULFATE HEPTAHYDRATE 2000 MG: 40 INJECTION, SOLUTION INTRAVENOUS at 11:01

## 2024-12-05 RX ADMIN — CEFEPIME 2000 MG: 2 INJECTION, POWDER, FOR SOLUTION INTRAVENOUS at 15:34

## 2024-12-05 RX ADMIN — SODIUM CHLORIDE, PRESERVATIVE FREE 10 ML: 5 INJECTION INTRAVENOUS at 20:28

## 2024-12-05 RX ADMIN — IOPAMIDOL 75 ML: 755 INJECTION, SOLUTION INTRAVENOUS at 06:19

## 2024-12-05 RX ADMIN — MORPHINE SULFATE 2 MG: 2 INJECTION, SOLUTION INTRAMUSCULAR; INTRAVENOUS at 02:56

## 2024-12-05 RX ADMIN — ALPRAZOLAM 0.25 MG: 0.25 TABLET ORAL at 15:30

## 2024-12-05 RX ADMIN — POTASSIUM CHLORIDE 10 MEQ: 10 INJECTION, SOLUTION INTRAVENOUS at 10:59

## 2024-12-05 RX ADMIN — RANOLAZINE 500 MG: 500 TABLET, FILM COATED, EXTENDED RELEASE ORAL at 20:27

## 2024-12-05 RX ADMIN — CLOPIDOGREL BISULFATE 75 MG: 75 TABLET ORAL at 12:45

## 2024-12-05 RX ADMIN — OXYCODONE 10 MG: 5 TABLET ORAL at 12:45

## 2024-12-05 RX ADMIN — TAMSULOSIN HYDROCHLORIDE 0.4 MG: 0.4 CAPSULE ORAL at 12:45

## 2024-12-05 RX ADMIN — METHOCARBAMOL TABLETS 750 MG: 500 TABLET, COATED ORAL at 20:27

## 2024-12-05 RX ADMIN — PANTOPRAZOLE SODIUM 40 MG: 40 TABLET, DELAYED RELEASE ORAL at 16:53

## 2024-12-05 RX ADMIN — ASPIRIN 81 MG: 81 TABLET, COATED ORAL at 12:45

## 2024-12-05 RX ADMIN — CEFEPIME 2000 MG: 2 INJECTION, POWDER, FOR SOLUTION INTRAVENOUS at 08:03

## 2024-12-05 RX ADMIN — ALPRAZOLAM 0.25 MG: 0.25 TABLET ORAL at 22:50

## 2024-12-05 RX ADMIN — HYDROMORPHONE HYDROCHLORIDE 0.5 MG: 1 INJECTION, SOLUTION INTRAMUSCULAR; INTRAVENOUS; SUBCUTANEOUS at 20:25

## 2024-12-05 RX ADMIN — SODIUM CHLORIDE: 9 INJECTION, SOLUTION INTRAVENOUS at 05:21

## 2024-12-05 RX ADMIN — POTASSIUM CHLORIDE 10 MEQ: 10 INJECTION, SOLUTION INTRAVENOUS at 13:35

## 2024-12-05 RX ADMIN — METHOCARBAMOL TABLETS 750 MG: 500 TABLET, COATED ORAL at 12:45

## 2024-12-05 RX ADMIN — CARVEDILOL 3.12 MG: 3.12 TABLET, FILM COATED ORAL at 16:53

## 2024-12-05 RX ADMIN — SODIUM CHLORIDE, PRESERVATIVE FREE 10 ML: 5 INJECTION INTRAVENOUS at 09:10

## 2024-12-05 RX ADMIN — OXYCODONE 10 MG: 5 TABLET ORAL at 22:50

## 2024-12-05 RX ADMIN — POTASSIUM CHLORIDE 10 MEQ: 10 INJECTION, SOLUTION INTRAVENOUS at 15:31

## 2024-12-05 RX ADMIN — SODIUM CHLORIDE 2817 ML: 9 INJECTION, SOLUTION INTRAVENOUS at 09:09

## 2024-12-05 RX ADMIN — RANOLAZINE 500 MG: 500 TABLET, FILM COATED, EXTENDED RELEASE ORAL at 12:45

## 2024-12-05 RX ADMIN — SODIUM CHLORIDE, PRESERVATIVE FREE 10 ML: 5 INJECTION INTRAVENOUS at 20:27

## 2024-12-05 RX ADMIN — POTASSIUM CHLORIDE 10 MEQ: 10 INJECTION, SOLUTION INTRAVENOUS at 12:06

## 2024-12-05 RX ADMIN — SODIUM CHLORIDE: 9 INJECTION, SOLUTION INTRAVENOUS at 12:51

## 2024-12-05 RX ADMIN — HYDROMORPHONE HYDROCHLORIDE 0.5 MG: 1 INJECTION, SOLUTION INTRAMUSCULAR; INTRAVENOUS; SUBCUTANEOUS at 09:22

## 2024-12-05 ASSESSMENT — PAIN DESCRIPTION - FREQUENCY
FREQUENCY: CONTINUOUS
FREQUENCY: INTERMITTENT
FREQUENCY: CONTINUOUS

## 2024-12-05 ASSESSMENT — PAIN - FUNCTIONAL ASSESSMENT
PAIN_FUNCTIONAL_ASSESSMENT: PREVENTS OR INTERFERES WITH ALL ACTIVE AND SOME PASSIVE ACTIVITIES
PAIN_FUNCTIONAL_ASSESSMENT: PREVENTS OR INTERFERES SOME ACTIVE ACTIVITIES AND ADLS
PAIN_FUNCTIONAL_ASSESSMENT: PREVENTS OR INTERFERES WITH ALL ACTIVE AND SOME PASSIVE ACTIVITIES
PAIN_FUNCTIONAL_ASSESSMENT: PREVENTS OR INTERFERES WITH MANY ACTIVE NOT PASSIVE ACTIVITIES

## 2024-12-05 ASSESSMENT — PAIN DESCRIPTION - PAIN TYPE
TYPE: ACUTE PAIN
TYPE: ACUTE PAIN;SURGICAL PAIN
TYPE: ACUTE PAIN
TYPE: SURGICAL PAIN
TYPE: ACUTE PAIN;SURGICAL PAIN

## 2024-12-05 ASSESSMENT — PAIN DESCRIPTION - LOCATION
LOCATION: NECK
LOCATION: BACK;NECK;CHEST
LOCATION: NECK;FACE;HEAD
LOCATION: NECK;SHOULDER
LOCATION: NECK;THROAT;SHOULDER
LOCATION: NECK
LOCATION: CHEST

## 2024-12-05 ASSESSMENT — PAIN SCALES - GENERAL
PAINLEVEL_OUTOF10: 10
PAINLEVEL_OUTOF10: 9
PAINLEVEL_OUTOF10: 10
PAINLEVEL_OUTOF10: 9
PAINLEVEL_OUTOF10: 6

## 2024-12-05 ASSESSMENT — PAIN DESCRIPTION - ORIENTATION
ORIENTATION: RIGHT;LEFT;MID
ORIENTATION: RIGHT;LEFT;MID
ORIENTATION: MID;RIGHT;LEFT
ORIENTATION: MID;LEFT
ORIENTATION: RIGHT;LEFT;MID

## 2024-12-05 ASSESSMENT — PAIN DESCRIPTION - DESCRIPTORS
DESCRIPTORS: ACHING;SORE
DESCRIPTORS: ACHING;SORE
DESCRIPTORS: SHARP;STABBING
DESCRIPTORS: ACHING;SORE
DESCRIPTORS: DISCOMFORT;STABBING
DESCRIPTORS: DISCOMFORT
DESCRIPTORS: ACHING;SORE

## 2024-12-05 ASSESSMENT — PAIN DESCRIPTION - DIRECTION
RADIATING_TOWARDS: NECK, SHOULDERS
RADIATING_TOWARDS: NECK, SHOULDER

## 2024-12-05 ASSESSMENT — PAIN DESCRIPTION - ONSET
ONSET: ON-GOING
ONSET: SUDDEN
ONSET: ON-GOING

## 2024-12-05 NOTE — SIGNIFICANT EVENT
RN called for high fever 102, /min, /120  Patient on 5 L NC sating 90%  ABG shows hypoxemia  CXR shows RLL pneumonia    Physical exam    Tachycardic, somnolent, hypoxic  RLL crackles  RRR  Soft  No edema  Cervical collar    Assessment    Acute hypoxic respiratory failure  Right lower lobe pneumonia  ? Aspiration pneumonia  Hypertensive urgency    Plan    IV  cefepime  CTPE study stat  500 cc normal saline bolus  ICU team notified    Pt has a high probability of imminent or life-threatening deterioration requiring close monitoring, and highly complex decision-making and/or interventions of high intensity to assess, manipulate, and support his critical organ systems to prevent the his inevitable decline which would occur if left untreated.   A total critical care time 30 minutes spent, this includes but not limited to examining patient, collaborating with other physicians, monitoring vital signs, telemetry, and clinical response to IV medications; documentation time, review and interpretation of laboratory and radiological data, review of nursing notes and old record review. This time excludes any time that may have been spent performing procedures.

## 2024-12-05 NOTE — CARE COORDINATION
CM following for  d/c planning:     Precert was approved for SNF at  [facility]:  colin Leach [phone]  in admissions.   -  Pre cert  good through  12/08/2024.     [facility]  Services: Skilled Nursing  [address] [facility] [address] [phone]       CM  informed pt and  was  up graded to  Inpt  status: [facility], will need HENS and transport .   IMM  completed:    Per MD  in  IDR rounds  pt  not medically cleared  ,  Febrile this AM, and  getting  MBS  today . Wean O 2  , currently down to  6L  nc  from 8 L HF.    Cont to follow .       Electronically signed by Muriel Swanson RN on 12/5/2024 at 12:33 PM       Muriel Swanson  RN Case Manager  [facility] [address]  [facility] [address] [phone]  Fax [phone]

## 2024-12-05 NOTE — PROGRESS NOTES
Physical Therapy  Hold    PT attempted to see for therapy, pt with orders in chart for strict bedrest, RN notified. Will hold therapy and follow up once activity orders upgraded.    Angi Hankins PT, DPT, NCS, CSRS

## 2024-12-05 NOTE — PROCEDURES
INSTRUMENTAL SWALLOW REPORT  MODIFIED BARIUM SWALLOW/treat    NAME: Ashutosh Donovan     : 1970  MRN: [id number]       Date of Eval: 2024     Ordering Physician: Dr Beasley  Referring Diagnosis: Dysphagia    Past Medical History:  has a past medical history of Arthritis, CAD (coronary artery disease), CHF (congestive heart failure) (HCC), Chronic pain, Heart attack (HCC), Hepatitis C, and Hypothyroid.  Past Surgical History:  has a past surgical history that includes Coronary stent placement; Cholecystectomy; Tonsillectomy; and cervical fusion (N/A, 2024).      Imaging:  MRI brain without contrast   Final Result       1. No acute intracranial abnormality.   2. Small chronic infarct with gliosis right frontal lobe.   3. Remote lacunar infarct left thalamus.   4. Moderate patchy T2 hyperintense white matter disease likely relate to chronic microvascular ischemia.   5. Mild to moderate parenchymal volume loss.               Electronically signed by Johnny Campos MD       XR CHEST (2 VW)   Final Result   1.  No acute cardiopulmonary findings.       Electronically signed by Rey Reece     Prior MBS Results 24  Oral Phase  WFL  Pharyngeal Phase  WFL- pt demonstrated adequate airway protection and swallow efficiency. Pt consumed large volumes, which resulted in premature spillage and very shallow penetration that cleared  Upper Esophageal Screen  Single instance of what appeared to be backflow, not fully to UES    Patient Complaints/Reason for Referral:  Ashutosh Donovan was referred for a MBS to assess the efficiency of his/her swallow function, assess for aspiration, and to make recommendations regarding safe dietary consistencies, effective compensatory strategies, and safe eating environment.    Onset of problem:   12/3/24    Behavior/Cognition: alert and cooperative  Vision/Hearing: appears functional for tasks presented    Impressions:  Oral Phase  Pt edentulous, demonstrating  mildly prolonged mastication though clears oral cavity  Pharyngeal Phase  Pt noted to have what appears to be prevertebral swelling from cervical surgery.  Intermittent penetration of thin liquids was noted, however cleared spontaneously, no aspiration was identified. Mild residue noted in vallecula and pyriforms, which pt endorsed inconsistent sensation. Liquid wash cleared almost fully.   Upper Esophageal Screen  Single instance of what appeared to be reflux occurred with last trial of thin liquids via straw.  This was also noted in prior study _RN present and to notify MD     Treatment Dx and ICD 10: dysphagia  Position: Lateral and upright  Consistencies administered: puree, nectar, thin and solids    Penetration Aspiration Scale (PAS)  [x] 2 Material enters the airway, remains above the vocal folds, and is ejected from airway    Dysphagia Outcome Severity Scale  [x] Level 5 - Mild dysphagia - Distant supervision. May need one diet consistency restricted    Recommendations/Treatment  Requires SLP Intervention: Yes    Recommended Exercises:   N/a    Education: Images and recommendations were reviewed with the patient following this exam. Pt indicated comprehension    Goals:      The pt will be seen  1-3 x to address the following goals:   Goal 1: Patient will tolerate least restrictive diet with adequate oral prep and without overt signs of aspiration or associated decline in respiratory status.  12/4: MRI (-) for acute abnormality. Pt sitting up in bed with breakfast tray. Pt consumed eggs, sausage, oatmeal and muffin as well as liquids. No cough/throat clear or change in voice occurred. Pt demonstrated adequate mastication with solid texture. Pt with intermittent c/o globus, mostly when taking very large bites of food, instructed to consume smaller volumes.  No respiratory decline per chart review  Goal met, cont to ensure consistency    Goal 2: Patient/caregiver will demonstrate understanding of dysphagia

## 2024-12-05 NOTE — PLAN OF CARE
Problem: Chronic Conditions and Co-morbidities  Goal: Patient's chronic conditions and co-morbidity symptoms are monitored and maintained or improved  Outcome: Progressing  Flowsheets (Taken 12/5/2024 6846)  Care Plan - Patient's Chronic Conditions and Co-Morbidity Symptoms are Monitored and Maintained or Improved:   Monitor and assess patient's chronic conditions and comorbid symptoms for stability, deterioration, or improvement   Collaborate with multidisciplinary team to address chronic and comorbid conditions and prevent exacerbation or deterioration   Update acute care plan with appropriate goals if chronic or comorbid symptoms are exacerbated and prevent overall improvement and discharge     Problem: Discharge Planning  Goal: Discharge to home or other facility with appropriate resources  Outcome: Progressing  Flowsheets (Taken 12/5/2024 1350)  Discharge to home or other facility with appropriate resources:   Identify barriers to discharge with patient and caregiver   Arrange for needed discharge resources and transportation as appropriate   Identify discharge learning needs (meds, wound care, etc)   Refer to discharge planning if patient needs post-hospital services based on physician order or complex needs related to functional status, cognitive ability or social support system  Note: Pt to go to rehab facility at d/c. Case management following     Problem: Pain  Goal: Verbalizes/displays adequate comfort level or baseline comfort level  Outcome: Progressing  Flowsheets (Taken 12/5/2024 1401)  Verbalizes/displays adequate comfort level or baseline comfort level:   Encourage patient to monitor pain and request assistance   Assess pain using appropriate pain scale   Administer analgesics based on type and severity of pain and evaluate response   Implement non-pharmacological measures as appropriate and evaluate response   Consider cultural and social influences on pain and pain management   Notify Licensed

## 2024-12-05 NOTE — CARE COORDINATION
Signed by patient placed in chart , copy provided to patient     Electronically signed by Muriel Swanson RN on 12/5/2024 at 12:36 PM          12/05/24 1235   IMM Letter   IMM Letter given to Patient/Family/Significant other/Guardian/POA/by: Muriel Swanson RN    IMM Letter date given: 12/05/24   IMM Letter time given: 1100

## 2024-12-05 NOTE — PROGRESS NOTES
V2.0    [facility] Progress Note      Name:  Ashutosh Donovan /Age/Sex: 1970  (53 y.o. male)   MRN & CSN:  [id number] & [id number] Encounter Date/Time: 2024 8:37 AM EST   Location:  [address] [place] [address] PCP: No primary care provider on file.     Attending:Issa Beasley MD       Hospital Day: 4    Assessment and Recommendations       Assessment/Plan:     Severe Sepsis:     Is this patient to be included in the SEP-1 core measure? Yes SEP-1 CORE MEASURE DATA      Sepsis Criteria   Severe Sepsis Criteria   Septic Shock Criteria       Must meet 2:    [x]Temp >100.9 F (38.3 C) or < 96.8 F (36 C)  [x]HR > 90  []RR > 20  []WBC > 12 or < 4 or 10% bands    AND:    [] Infection Confirmed or Suspected.     Must meet 1:    [x]Lactate > 2       or   []Signs of Organ Dysfunction:    - SBP < 90 or MAP < 65  -Creatinine > 2 or increased from baseline  -Urine Output < 0.5 ml/kg/hr  -Bilirubin > 2  -INR > 1.5 (not anticoagulated)  -Platelets < 100,000  -Acute Respiratory Failure as evidenced by new need for NIPPV or mechanical ventilation   Must meet 1:    []Lactate > 4        or   []SBP < 90 or MAP < 65 for at least two readings in the first hour after fluid bolus administration    []Vasopressors initiated (if hypotension persists after fluid resuscitation)   Patient Vitals for the past 6 hrs:   BP Temp Pulse Resp SpO2   24 0256 -- -- -- 18 --   24 0341 -- -- (!) 113 -- --   24 0413 (!) 155/99 (!) 102.9 °F (39.4 °C) (!) 131 -- (!) 89 %   24 0415 -- -- -- -- 90 %   24 0550 -- (!) 104.9 °F (40.5 °C) -- -- --   24 0809 108/67 (!) 102.5 °F (39.2 °C) (!) 113 18 96 %      Recent Labs     24  0526 24  0507 24  0031 24  0421   WBC 8.1 7.0  --  23.7*   LACTA  --   --   --  2.2*   CREATININE 1.0 1.3 1.3 1.3   BILITOT <0.2 <0.2 <0.2 <0.2    400  --  410        Severe sepsis identified date: 24 time: 0840    Fluid Resuscitation Rationale: at least 30mL/kg  protocol. Up-to-date CT equipment and radiation dose reduction techniques were employed. CONTRAST: None FINDINGS: : No additional findings. Extraspinal structures: Coronary calcifications. Moderate to extensive aortic atherosclerosis. Lumbar spine: No acute fracture of the lumbar spine. Stable minimal right inferior endplate compression deformity of L3. Unchanged minimal grade 1 anterolisthesis at L4-L5, likely degenerative secondary to facet arthropathy. Minimal levoconvex scoliosis. Paravertebral  soft tissues unremarkable. Subacute to chronic left L1 transverse process fracture. There appears to be progressive bony callus formation compared to the prior study from 11/13/2024. Severe disc height loss with endplate spurring and vacuum disc phenomenon at L4-L5, similar to the prior study. Lesser disc height loss at other levels. Multilevel facet arthropathy. Degree of multilevel degenerative changes better assessed on prior MRI, with no significant change.     No acute fracture or subluxation of the lumbar spine. Subacute to chronic appearing left L1 transverse process fracture with progressive healing compared to the prior study. Multilevel lumbar spondylosis and facet arthropathy, better assessed on prior MRI. Electronically signed by Antwon Delgado MD    XR CHEST (2 VW)    Result Date: 12/2/2024  EXAM: XR CHEST (2 VW) INDICATION: chest pain COMPARISON: 11/18/2024 FINDINGS: Medical Devices: None. Lungs: The lungs are clear. Pleura: No pneumothorax or pleural effusion. Heart and Mediastinum: The cardiomediastinal silhouette is within normal limits. Bones: Degenerative changes of the right acromioclavicular joint.     1.  No acute cardiopulmonary findings. Electronically signed by Rey Reece    XR HIP 2-3 VW W PELVIS RIGHT    Result Date: 12/2/2024  EXAM: XR HIP 2-3 VW W PELVIS RIGHT HISTORY: pain, fall COMPARISON: CT of the abdomen and pelvis dated 11/19/2023 FINDINGS: Bones: The osseous structures

## 2024-12-05 NOTE — PROGRESS NOTES
Pt reports neck pain 9/10, from previously at 10/10. Pt reports chest pain is a 7/10 from previously a 9/10. NP updated.

## 2024-12-05 NOTE — CONSULTS
[facility] -  Clinical Pharmacy Note    Vancomycin - Management by Pharmacy    Consult Date(s): 12/5/24  Consulting Provider(s): Dr Beasley    Assessment / Plan  1)  Sepsis, Pneumonia - Vancomycin  Concurrent Antimicrobials: Cefepime  Day of Vanc Therapy / Ordered Duration: 1 of 7  Current Dosing Method: Bayesian-Guided AUC Dosing  Therapeutic Goal: -600 mg/L*hr  Current Dose / Plan:   Baseline SCr appears to be ~1.0-1.3 per chart review.  SCr 1.3 today.  Will give loading dose of 2000mg IV x1 this AM, followed by 1250mg IV q12h.  Regimen predicts an AUC ~ 600 with trough ~17.9 mcg/mL.  Given estimated AUC at top of range, random level is ordered for 12/6 AM to further evaluate above regimen.  Will continue to monitor clinical condition and make adjustments to regimen as appropriate.    Please call with questions--  Thanks--  Deepthi Marin, PharmD, BCPS, BCGP  [id number] ([facility])   12/5/2024 9:02 AM        Interval update:  Pt febrile overnight (Tmax 104.9) with tachycardia, soft BP, and acute hypoxic respiratory failure - concern for sepsis and pneumonia with possible aspiration.  Cefepime / Vancomycin started.    Subjective/Objective:   Ashutosh Donovan is a 53 y.o. male with a PMHx significant for CAD, COPD, BPH, HLD, anxiety, hypothyroidism, and recent admission in November s/p C3-5 anterior cervical discectomy and fusion who is admitted with multiple falls.     Pharmacy is consulted to dose Vancomycin.    Ht Readings from Last 1 Encounters:   12/02/24 1.803 m (5' 11\")     Wt Readings from Last 1 Encounters:   12/04/24 93.9 kg (207 lb)     Current & Prior Antimicrobial Regimen(s):  Cefepime (12/5-current)  Vancomycin - Pharmacy to dose  2000mg IV x1 12/5 AM  1250mg IV q12h (12/5-current)    Vancomycin Level(s) / Doses:    Date Time Dose Type of Level / Level Interpretation   12/6  1250mg IV q12h Random = ordered           Note: Serum levels collected for AUC-based dosing may be high if

## 2024-12-05 NOTE — PROGRESS NOTES
Pt VSS remain stable, Pt still has new need for supplemental O2. Pt saturation decreased from 93% on 3L to 89%. NP notified. Goal SpO2 90%. Supplemental O2 increased to 4L, O2 still at 89%. Instructed Pt to deep breath through nose. No change in saturation. Supplemental O2 increased to 5L. Pt now saturating at 93% on 5L of O2. NP notified.     Chest X ray completed. ABG collected. Pt supplemental O2 decreased to 4L, Pt saturating at 93%. Pt given one time dose IV morphine for 9/10 chest pain that is unchanged post nitro administration. Pt c/o pain in chest, neck, back, and shoulders. Will continue to monitor.

## 2024-12-05 NOTE — PROGRESS NOTES
Pt called out for help to the restroom, PCA went to help Pt around 2300. This RN was with new admission during that time. Another RN came to report Pt had called out for SOB and O2 saturation on RA in the mid 80s. Pt placed on high flow nasal cannula at 5L. The other RN had called RT for assessment. This RN entered Pt room with RT doing breathing treatment and messaging NP. RT reported PT sounded like crackles prior to breathing treatment. Pt on high flow nasal cannula at 5L satting at 92 %. New labs drawn and EKG completed. Pt c/o 9/10 sharp stabbing chest pain starting at mid sternum and radiating to left chest. NP notified. Pt given nitro around 0100, /92. Pt still c/o of 9/10 stabbing chest pain radiating to left side. Pt given another nitro, /83. NP notified. Pt weaned down to 3L on high flow nasal cannula saturating at 93%. Pt lungs sound clear, no adventitious heart sounds auscultated at this time. Pt in and out of sleep. Pt more lethargic than baseline. Pt drowsy, somewhat difficult to arouse. NP notified. Will continue to monitor.

## 2024-12-05 NOTE — SIGNIFICANT EVENT
APRN notified by RN of patient with c/o chest pain. Described as \"giant sitting on chest\"  -also with c/o shortness of breath  -per RN, patient slow to respond to questions, was medicated with oxycodone around 6024-7717  -chest pain 9/10  -EKG completed  -T98, HR 87, /92   -troponin pending  -EKG with t wave inversion in anterior leads - reviewed by nocturnist  -troponin 13  -CXR, ABG  -morphine for pain  -further pending re-eval by attending in AM/results of above    Yasmin Cabrera APRN - NP

## 2024-12-05 NOTE — PROGRESS NOTES
Speech Therapy     New referral rec'd d/t choking and suspected aspiration episode with pizza last night.  Spoke with RN re:plan to repeat MBS this date. Discussed with pt rational for repeat MBS and a description of the procedure. Pt was agreeable to testing. Will schedule for this date. Full report to follow.  Discussed with RN, Denise and Niels.    Kaylin Webster MA, CCC-SLP [id number]  Speech-Language Pathologist  Pager  [phone]

## 2024-12-05 NOTE — PROGRESS NOTES
Pt called out with difficulty breathing. Pt at 84% on RA on entering room. Nasal cannula applied. Pt took approximately 8 min to recover on 4L. Pt recovered to 90% on 4L NC when respiratory therapy entered room for prn treatment.

## 2024-12-06 LAB
ALBUMIN SERPL-MCNC: 3.5 G/DL (ref 3.4–5)
ANION GAP SERPL CALCULATED.3IONS-SCNC: 8 MMOL/L (ref 3–16)
BACTERIA BLD CULT ORG #2: NORMAL
BACTERIA BLD CULT: NORMAL
BASOPHILS # BLD: 0.1 K/UL (ref 0–0.2)
BASOPHILS NFR BLD: 0.6 %
BUN SERPL-MCNC: 10 MG/DL (ref 7–20)
CALCIUM SERPL-MCNC: 9.4 MG/DL (ref 8.3–10.6)
CHLORIDE SERPL-SCNC: 109 MMOL/L (ref 99–110)
CO2 SERPL-SCNC: 22 MMOL/L (ref 21–32)
CREAT SERPL-MCNC: 1 MG/DL (ref 0.9–1.3)
DEPRECATED RDW RBC AUTO: 17.6 % (ref 12.4–15.4)
EKG ATRIAL RATE: 85 BPM
EKG DIAGNOSIS: NORMAL
EKG P AXIS: 37 DEGREES
EKG P-R INTERVAL: 176 MS
EKG Q-T INTERVAL: 406 MS
EKG QRS DURATION: 120 MS
EKG QTC CALCULATION (BAZETT): 483 MS
EKG R AXIS: 20 DEGREES
EKG T AXIS: 61 DEGREES
EKG VENTRICULAR RATE: 85 BPM
EOSINOPHIL # BLD: 0.1 K/UL (ref 0–0.6)
EOSINOPHIL NFR BLD: 0.6 %
GFR SERPLBLD CREATININE-BSD FMLA CKD-EPI: >90 ML/MIN/{1.73_M2}
GLUCOSE SERPL-MCNC: 108 MG/DL (ref 70–99)
HCT VFR BLD AUTO: 32.1 % (ref 40.5–52.5)
HGB BLD-MCNC: 10.3 G/DL (ref 13.5–17.5)
LYMPHOCYTES # BLD: 1.7 K/UL (ref 1–5.1)
LYMPHOCYTES NFR BLD: 10.6 %
MCH RBC QN AUTO: 27.6 PG (ref 26–34)
MCHC RBC AUTO-ENTMCNC: 32.1 G/DL (ref 31–36)
MCV RBC AUTO: 86.1 FL (ref 80–100)
MONOCYTES # BLD: 1 K/UL (ref 0–1.3)
MONOCYTES NFR BLD: 6.3 %
MRSA DNA SPEC QL NAA+PROBE: NORMAL
NEUTROPHILS # BLD: 13.1 K/UL (ref 1.7–7.7)
NEUTROPHILS NFR BLD: 81.9 %
PHOSPHATE SERPL-MCNC: 2.5 MG/DL (ref 2.5–4.9)
PLATELET # BLD AUTO: 292 K/UL (ref 135–450)
PMV BLD AUTO: 9.2 FL (ref 5–10.5)
POTASSIUM SERPL-SCNC: 3.8 MMOL/L (ref 3.5–5.1)
PROCALCITONIN SERPL IA-MCNC: 1.59 NG/ML (ref 0–0.15)
RBC # BLD AUTO: 3.72 M/UL (ref 4.2–5.9)
SODIUM SERPL-SCNC: 139 MMOL/L (ref 136–145)
VANCOMYCIN SERPL-MCNC: 16.5 UG/ML
WBC # BLD AUTO: 16.1 K/UL (ref 4–11)

## 2024-12-06 PROCEDURE — 6360000002 HC RX W HCPCS: Performed by: INTERNAL MEDICINE

## 2024-12-06 PROCEDURE — 84145 PROCALCITONIN (PCT): CPT

## 2024-12-06 PROCEDURE — 97166 OT EVAL MOD COMPLEX 45 MIN: CPT

## 2024-12-06 PROCEDURE — 80202 ASSAY OF VANCOMYCIN: CPT

## 2024-12-06 PROCEDURE — 93010 ELECTROCARDIOGRAM REPORT: CPT | Performed by: INTERNAL MEDICINE

## 2024-12-06 PROCEDURE — 97530 THERAPEUTIC ACTIVITIES: CPT

## 2024-12-06 PROCEDURE — 6360000002 HC RX W HCPCS: Performed by: NURSE PRACTITIONER

## 2024-12-06 PROCEDURE — 2580000003 HC RX 258: Performed by: INTERNAL MEDICINE

## 2024-12-06 PROCEDURE — 94640 AIRWAY INHALATION TREATMENT: CPT

## 2024-12-06 PROCEDURE — 1200000000 HC SEMI PRIVATE

## 2024-12-06 PROCEDURE — 80069 RENAL FUNCTION PANEL: CPT

## 2024-12-06 PROCEDURE — 36415 COLL VENOUS BLD VENIPUNCTURE: CPT

## 2024-12-06 PROCEDURE — 85025 COMPLETE CBC W/AUTO DIFF WBC: CPT

## 2024-12-06 PROCEDURE — 97535 SELF CARE MNGMENT TRAINING: CPT

## 2024-12-06 PROCEDURE — 6370000000 HC RX 637 (ALT 250 FOR IP): Performed by: FAMILY MEDICINE

## 2024-12-06 PROCEDURE — 6370000000 HC RX 637 (ALT 250 FOR IP): Performed by: INTERNAL MEDICINE

## 2024-12-06 PROCEDURE — 2580000003 HC RX 258: Performed by: FAMILY MEDICINE

## 2024-12-06 PROCEDURE — 6360000002 HC RX W HCPCS: Performed by: FAMILY MEDICINE

## 2024-12-06 PROCEDURE — 92526 ORAL FUNCTION THERAPY: CPT

## 2024-12-06 PROCEDURE — 94761 N-INVAS EAR/PLS OXIMETRY MLT: CPT

## 2024-12-06 PROCEDURE — 2700000000 HC OXYGEN THERAPY PER DAY

## 2024-12-06 RX ORDER — VANCOMYCIN 1.5 G/300ML
1500 INJECTION, SOLUTION INTRAVENOUS EVERY 12 HOURS
Status: DISCONTINUED | OUTPATIENT
Start: 2024-12-06 | End: 2024-12-06

## 2024-12-06 RX ORDER — PROCHLORPERAZINE EDISYLATE 5 MG/ML
10 INJECTION INTRAMUSCULAR; INTRAVENOUS EVERY 6 HOURS PRN
Status: DISCONTINUED | OUTPATIENT
Start: 2024-12-06 | End: 2024-12-07 | Stop reason: HOSPADM

## 2024-12-06 RX ORDER — OXYCODONE HYDROCHLORIDE 5 MG/1
10 TABLET ORAL EVERY 4 HOURS PRN
Status: DISCONTINUED | OUTPATIENT
Start: 2024-12-06 | End: 2024-12-07 | Stop reason: HOSPADM

## 2024-12-06 RX ORDER — GABAPENTIN 100 MG/1
100 CAPSULE ORAL 3 TIMES DAILY
Status: DISCONTINUED | OUTPATIENT
Start: 2024-12-06 | End: 2024-12-07 | Stop reason: HOSPADM

## 2024-12-06 RX ADMIN — TAMSULOSIN HYDROCHLORIDE 0.4 MG: 0.4 CAPSULE ORAL at 10:17

## 2024-12-06 RX ADMIN — GABAPENTIN 100 MG: 100 CAPSULE ORAL at 13:33

## 2024-12-06 RX ADMIN — CARVEDILOL 3.12 MG: 3.12 TABLET, FILM COATED ORAL at 08:40

## 2024-12-06 RX ADMIN — LEVOTHYROXINE SODIUM 175 MCG: 0.17 TABLET ORAL at 05:09

## 2024-12-06 RX ADMIN — SODIUM CHLORIDE, PRESERVATIVE FREE 10 ML: 5 INJECTION INTRAVENOUS at 10:00

## 2024-12-06 RX ADMIN — SODIUM CHLORIDE: 9 INJECTION, SOLUTION INTRAVENOUS at 01:30

## 2024-12-06 RX ADMIN — OXYCODONE 10 MG: 5 TABLET ORAL at 12:11

## 2024-12-06 RX ADMIN — PROCHLORPERAZINE EDISYLATE 10 MG: 5 INJECTION INTRAMUSCULAR; INTRAVENOUS at 21:27

## 2024-12-06 RX ADMIN — CEFEPIME 2000 MG: 2 INJECTION, POWDER, FOR SOLUTION INTRAVENOUS at 08:51

## 2024-12-06 RX ADMIN — CARVEDILOL 3.12 MG: 3.12 TABLET, FILM COATED ORAL at 16:36

## 2024-12-06 RX ADMIN — OXYCODONE 10 MG: 5 TABLET ORAL at 08:39

## 2024-12-06 RX ADMIN — GABAPENTIN 100 MG: 100 CAPSULE ORAL at 10:17

## 2024-12-06 RX ADMIN — OXYCODONE 10 MG: 5 TABLET ORAL at 16:35

## 2024-12-06 RX ADMIN — CYCLOBENZAPRINE 5 MG: 10 TABLET, FILM COATED ORAL at 13:33

## 2024-12-06 RX ADMIN — HYDROMORPHONE HYDROCHLORIDE 0.5 MG: 1 INJECTION, SOLUTION INTRAMUSCULAR; INTRAVENOUS; SUBCUTANEOUS at 05:07

## 2024-12-06 RX ADMIN — RANOLAZINE 500 MG: 500 TABLET, FILM COATED, EXTENDED RELEASE ORAL at 10:17

## 2024-12-06 RX ADMIN — ALBUTEROL SULFATE 2.5 MG: 2.5 SOLUTION RESPIRATORY (INHALATION) at 02:10

## 2024-12-06 RX ADMIN — VANCOMYCIN HYDROCHLORIDE 1250 MG: 1.25 INJECTION, SOLUTION INTRAVITREAL at 01:01

## 2024-12-06 RX ADMIN — CEFEPIME 2000 MG: 2 INJECTION, POWDER, FOR SOLUTION INTRAVENOUS at 01:31

## 2024-12-06 RX ADMIN — HYDROMORPHONE HYDROCHLORIDE 0.5 MG: 1 INJECTION, SOLUTION INTRAMUSCULAR; INTRAVENOUS; SUBCUTANEOUS at 19:36

## 2024-12-06 RX ADMIN — FLUTICASONE PROPIONATE 2 SPRAY: 50 SPRAY, METERED NASAL at 01:59

## 2024-12-06 RX ADMIN — HYDROMORPHONE HYDROCHLORIDE 0.5 MG: 1 INJECTION, SOLUTION INTRAMUSCULAR; INTRAVENOUS; SUBCUTANEOUS at 10:18

## 2024-12-06 RX ADMIN — RANOLAZINE 500 MG: 500 TABLET, FILM COATED, EXTENDED RELEASE ORAL at 20:02

## 2024-12-06 RX ADMIN — SODIUM CHLORIDE, PRESERVATIVE FREE 10 ML: 5 INJECTION INTRAVENOUS at 20:05

## 2024-12-06 RX ADMIN — METHOCARBAMOL TABLETS 750 MG: 500 TABLET, COATED ORAL at 16:35

## 2024-12-06 RX ADMIN — ATORVASTATIN CALCIUM 80 MG: 80 TABLET, FILM COATED ORAL at 10:17

## 2024-12-06 RX ADMIN — METHOCARBAMOL TABLETS 750 MG: 500 TABLET, COATED ORAL at 13:33

## 2024-12-06 RX ADMIN — HYDROMORPHONE HYDROCHLORIDE 0.5 MG: 1 INJECTION, SOLUTION INTRAMUSCULAR; INTRAVENOUS; SUBCUTANEOUS at 13:33

## 2024-12-06 RX ADMIN — SODIUM CHLORIDE: 9 INJECTION, SOLUTION INTRAVENOUS at 01:00

## 2024-12-06 RX ADMIN — HYDROMORPHONE HYDROCHLORIDE 0.5 MG: 1 INJECTION, SOLUTION INTRAMUSCULAR; INTRAVENOUS; SUBCUTANEOUS at 01:21

## 2024-12-06 RX ADMIN — METHOCARBAMOL TABLETS 750 MG: 500 TABLET, COATED ORAL at 20:02

## 2024-12-06 RX ADMIN — GABAPENTIN 100 MG: 100 CAPSULE ORAL at 20:02

## 2024-12-06 RX ADMIN — PANTOPRAZOLE SODIUM 40 MG: 40 TABLET, DELAYED RELEASE ORAL at 16:35

## 2024-12-06 RX ADMIN — CYCLOBENZAPRINE 5 MG: 10 TABLET, FILM COATED ORAL at 05:08

## 2024-12-06 RX ADMIN — PANTOPRAZOLE SODIUM 40 MG: 40 TABLET, DELAYED RELEASE ORAL at 05:08

## 2024-12-06 RX ADMIN — AMLODIPINE BESYLATE 5 MG: 5 TABLET ORAL at 10:17

## 2024-12-06 RX ADMIN — OXYCODONE 10 MG: 5 TABLET ORAL at 21:06

## 2024-12-06 RX ADMIN — CYCLOBENZAPRINE 5 MG: 10 TABLET, FILM COATED ORAL at 22:36

## 2024-12-06 RX ADMIN — METHOCARBAMOL TABLETS 750 MG: 500 TABLET, COATED ORAL at 10:17

## 2024-12-06 RX ADMIN — ENOXAPARIN SODIUM 40 MG: 100 INJECTION SUBCUTANEOUS at 10:17

## 2024-12-06 RX ADMIN — CEFEPIME 2000 MG: 2 INJECTION, POWDER, FOR SOLUTION INTRAVENOUS at 16:48

## 2024-12-06 ASSESSMENT — PAIN - FUNCTIONAL ASSESSMENT

## 2024-12-06 ASSESSMENT — PAIN SCALES - GENERAL
PAINLEVEL_OUTOF10: 6
PAINLEVEL_OUTOF10: 9
PAINLEVEL_OUTOF10: 6
PAINLEVEL_OUTOF10: 9
PAINLEVEL_OUTOF10: 9
PAINLEVEL_OUTOF10: 7
PAINLEVEL_OUTOF10: 9
PAINLEVEL_OUTOF10: 9
PAINLEVEL_OUTOF10: 8
PAINLEVEL_OUTOF10: 10
PAINLEVEL_OUTOF10: 6
PAINLEVEL_OUTOF10: 3

## 2024-12-06 ASSESSMENT — PAIN DESCRIPTION - DIRECTION
RADIATING_TOWARDS: NECK, SHOULDERS
RADIATING_TOWARDS: NECK, SHOULDERS

## 2024-12-06 ASSESSMENT — PAIN DESCRIPTION - DESCRIPTORS
DESCRIPTORS: ACHING;DISCOMFORT
DESCRIPTORS: STABBING
DESCRIPTORS: ACHING;SORE
DESCRIPTORS: SORE;ACHING
DESCRIPTORS: ACHING;SORE;THROBBING
DESCRIPTORS: ACHING;SORE
DESCRIPTORS: DISCOMFORT
DESCRIPTORS: ACHING;DISCOMFORT
DESCRIPTORS: DISCOMFORT
DESCRIPTORS: ACHING;DISCOMFORT
DESCRIPTORS: SORE;ACHING

## 2024-12-06 ASSESSMENT — PAIN DESCRIPTION - PAIN TYPE
TYPE: SURGICAL PAIN
TYPE: ACUTE PAIN;SURGICAL PAIN
TYPE: ACUTE PAIN;SURGICAL PAIN
TYPE: SURGICAL PAIN
TYPE: ACUTE PAIN
TYPE: ACUTE PAIN;SURGICAL PAIN
TYPE: ACUTE PAIN;SURGICAL PAIN

## 2024-12-06 ASSESSMENT — PAIN DESCRIPTION - FREQUENCY
FREQUENCY: CONTINUOUS

## 2024-12-06 ASSESSMENT — PAIN DESCRIPTION - ORIENTATION
ORIENTATION: RIGHT;LEFT;MID

## 2024-12-06 ASSESSMENT — PAIN DESCRIPTION - LOCATION
LOCATION: NECK;SHOULDER
LOCATION: SHOULDER;NECK
LOCATION: NECK
LOCATION: NECK;SHOULDER
LOCATION: NECK;SHOULDER
LOCATION: NECK
LOCATION: NECK;SHOULDER

## 2024-12-06 ASSESSMENT — PAIN DESCRIPTION - ONSET
ONSET: ON-GOING

## 2024-12-06 NOTE — PROGRESS NOTES
Occupational Therapy  Daily Treatment Note  Patient Name: Ashutosh Donovan  MRN: [id number]            Other Position/Activity Restrictions: [place] J collar at all times, ok to remove for hygiene. Up as tolerated     Additional Pertinent Hx: Recent DC from [facility] 11/24 followingg Cervical 3 - Cervical 5 Anterior Cervical Discectomy and Fusion on 11/22.        Diagnosis: falls  Treatment Diagnosis: imp mob, tf, ADL    Subjective: Pt getting to EOB with RN staff upon OT arrival, reporting urgent need to have BM. Cueing for safety 2/2 impulsivity.    Pain: pt reported unrated pain in upper trap/ shoulder region    Objective:    Cognition/Orientation:  Impulsive throughout 2/2 urgency and pain    Bed mobility   Supine to sit: min A with HOB elevated  Sit to Supine: min A to bring R LE into bed with HOB elevated  Scooting: min A ++ verbal cueing to scoot to EOB    Functional Mobility   Sit to Stand: CGA ++ cues for impulsivity. Pt reporting urgent need to have BM, attempting to stand prior to having O2 tubing lengthened or having gait belt/ RW close.  Stand to Sit: CGA for eccentric control/ safety  Bed to Chair Transfer: with RW with CGA ++ cueing for safety  Commode Transfer: CGA to slowly lower to commode    ADLs   Grooming: standing at sink for hand hygiene with CGA  UB dressing: changing gown with max A to maneuver around IVs  LB dressing: threading with cueing to complete while seated, pulling up with CGA.  Toileting: charlotte care with CGA seated on toilet    UE Exercises   B UE shoulder rolls forward/ backward x 10  Shoulder elevation/ depression x 10 reps  Bicep curls x 10  Grasp/ release x 10  Ulnar nerve glides x 10 reps. Pt noted with ulnar nerve tightness, educated to complete daily.     Activity Tolerance:  Pt reported mod fatigue after mobility in room/ to bathroom and back, limited by pain. On O2 per NC throughout mobiltiy    Patient Education:   Educated pt on importance of mobility and UE movements. Pt  educated on safety strategies, would benefit from reinforcement for both.     Safety Devices in Place:  BA activated, call light in reach and all needs met    Assessment:   Pt demonstrated improved sit to stand transfers and progressing with mobility and ADLs. Pt educated on UE HEP, would benefit from reinforcement. Pt planning to dc to SNF prior to returning home. Continue OT POC.    Timed Code Treatment Minutes:    Individual Concurrent Group Co-treatment   Time In 0845         Time Out 0940         Minutes 55           Timed Code Treatment Minutes:  55 mins total    Goals:  Short Term Goals  Time Frame for Short Term Goals: dc  Short Term Goal 1: supine to sit with SBA- ongoing, continue  Short Term Goal 2: Fx transfers with SBA- onging, continue  Short Term Goal 3: toileting with SBA- ongoing, continue         Plan:      Times Per Week: 2-5        If patient is discharged prior to next treatment, this note will serve as the discharge summary.  Sandi Márquez, OTR/L #[id number]

## 2024-12-06 NOTE — PROGRESS NOTES
Speech Language Pathology  Facility/Department:[address] TELEMETRY  Dysphagia treatment/dc    Name: Ashutosh Donovan  : 1970  MRN: [id number]                                                       Patient Diagnosis(es):   Patient Active Problem List    Diagnosis Date Noted    HAP (hospital-acquired pneumonia) 2024    Fall 2024    Multiple falls 2024    Closed displaced fracture of proximal phalanx of right little finger 2024    Cauda equina compression (HCC) 2024    Lower extremity numbness 2024    CVA (cerebral vascular accident) (HCC) 2024    HTN (hypertension) 2024    Acute CVA (cerebrovascular accident) (HCC) 2024    Chronic kidney disease 2024    Major depressive disorder, recurrent ([facility]) 2024    H/O major depression 2024    Right facial numbness 2024    TIA (transient ischemic attack) 2024    Chest pain at rest 2024    Dyslipidemia 2024    Moderate episode of recurrent major depressive disorder (HCC) 2023    PTSD (post-traumatic stress disorder) 2023    Cocaine use disorder in remission 2023    Opioid use disorder, mild, in early remission ([facility]) 2023    Major depression, recurrent ([facility]) 2023    Coronary artery disease of native artery of native heart with stable angina pectoris ([facility]) 2023    Tobacco use 2023    Chronic obstructive pulmonary disease ([facility]) 2023    Current severe episode of major depressive disorder without psychotic features ([facility]) 2023    Mood disorder (HCC) 2023    LA (acute kidney injury) (HCC) 2023    Spondylolisthesis at L4-L5 level 2023    Urinary incontinence without sensory awareness 2023    Lumbar pain with radiation down both legs 2023    DDD (degenerative disc disease), lumbosacral 2023     Past Medical History:   Diagnosis Date    Arthritis     CAD (coronary artery disease)     CHF  emerge and alert SLP  Medication administration: whole with water     Strategies:   Upright during and 30 min after all PO  Alternate between solids and liquids  Small bites and sips    Discharge Recommendation: no follow up at this time. Please re-refer should s/s of aspiration or increased c/o dysphagia emerge  Discussed with RN, Hope   Needs met prior to leaving room, call light within reach    Kaylin Webster MA, CCC-SLP [id number]  Speech-Language Pathologist  Pager  [phone]

## 2024-12-06 NOTE — PROGRESS NOTES
[facility] -  Clinical Pharmacy Note    Vancomycin - Management by Pharmacy    Consult Date(s): 12/5/24  Consulting Provider(s): Dr Beasley    Assessment / Plan  1)  Sepsis, Pneumonia - Vancomycin  Concurrent Antimicrobials: Cefepime  Day of Vanc Therapy / Ordered Duration: 2 of 7  Current Dosing Method: Bayesian-Guided AUC Dosing  Therapeutic Goal: -600 mg/L*hr  Current Dose / Plan:   Baseline SCr appears to be ~1.0-1.3 per chart review.  SCr 1.0 today.  Currently on 1250mg IV q12h.  Random level this AM = 16.5 mcg/mL; calculated AUC = 455 with < 80 % probability of reaching AUC > 400.  With improvement in SCr, will increase dose to 1500mg IV q12h.  Regimen predicts an AUC = 543 with trough = 14.5 mcg/mL.  Will plan for repeat level in ~2 days (or sooner if clinically indicated).  Will continue to monitor clinical condition and make adjustments to regimen as appropriate.    MRSA nasal PCR is negative - if PNA is only suspected source of sepsis, would consider d/c'ing Vancomycin.    Please call with questions--  Thanks--  Deepthi Marin, PharmD, BCPS, BCGP  [id number] ([facility])   12/6/2024 8:25 AM        Interval update:  Now afebrile with improved BPs.  On 5L O2 via NC.  Pt had MBS yesterday; now on dysphagia diet.    Subjective/Objective:   Ashutosh Donovan is a 53 y.o. male with a PMHx significant for CAD, COPD, BPH, HLD, anxiety, hypothyroidism, and recent admission in November s/p C3-5 anterior cervical discectomy and fusion who is admitted with multiple falls.  Overnight 12/4-->12/5, pt because febrile with tachycardia, soft BPs, and acute hypoxic respiratory failure - broad spectrum antibiotics were started for sepsis and pneumonia with possible aspiration.    Pharmacy is consulted to dose Vancomycin.    Ht Readings from Last 1 Encounters:   12/02/24 1.803 m (5' 11\")     Wt Readings from Last 1 Encounters:   12/04/24 93.9 kg (207 lb)     Current & Prior Antimicrobial  Regimen(s):  Cefepime (12/5-current)  Vancomycin - Pharmacy to dose  2000mg IV x1 12/5 AM  1250mg IV q12h (12/5-12/6)  1500mg IV q12h (12/6-current)    Vancomycin Level(s) / Doses:    Date Time Dose Type of Level / Level Interpretation   12/6 06:15 1250mg IV q12h Random = 16.5 mcg/mL Drawn ~4 hr after 2nd total dose finished infusing  AUC = 455; ssTr = 12.1  Increase to 1500mg q12h to achieve Pauc > 80%          Note: Serum levels collected for AUC-based dosing may be high if collected in close proximity to the dose administered. This is not necessarily indicative of toxicity.    Cultures & Sensitivities:    Date Site Micro Susceptibility / Result   12/5 Blood x2 sent    12/5 Sputum cx In process    12/5 MRSA nasal PCR Negative      Recent Labs     12/04/24  0507 12/05/24  0031 12/05/24  0421 12/05/24  0913 12/06/24  0615   CREATININE 1.3   < > 1.3 1.2 1.0   BUN 15   < > 15 16 10   WBC 7.0  --  23.7*  --  16.1*    < > = values in this interval not displayed.       Estimated Creatinine Clearance: 101 mL/min (based on SCr of 1 mg/dL).    Additional Lab Values / Findings of Note:    Recent Labs     12/05/24  0421 12/06/24  0615   PROCAL 0.04 1.59*

## 2024-12-06 NOTE — CARE COORDINATION
CM following for  d/c planning:     Precert was approved for SNF at  [facility]:  colin Leach [phone]  in admissions.   -  Pre cert  good through  12/08/2024.     [facility]  Services: Skilled Nursing  [address] [facility] [facility] [address] [phone]     Patient  will need HENS and transport .   IMM  completed: 12/05     Per MD  in  IDR rounds  pt  not medically cleared  . Wean O 2 , currently on 4 L nc  : 2/2 aspiration Pneumonia:  -   Recent DC from [facility] 11/24 followingg Cervical 3 - Cervical 5 Anterior Cervical Discectomy and Fusion on 11/22.    -  Activity Restrictions: [place] J collar at all times, ok to remove for hygiene. Up as tolerated     Cont to follow .       Electronically signed by Muriel Swanson RN on 12/6/2024 at 1:02 PM       Muriel Swanson RN Case Manager  [facility]  [address]  [facility] [address] [phone]  Fax [phone]

## 2024-12-06 NOTE — CONSULTS
Clinical Pharmacy Progress Note    Vancomycin has been discontinued. Will sign off pharmacy to dose Vancomycin consult.  If medication is restarted and pharmacy is to manage dosing, please re-consult at that time.    Please call with questions--  Thanks--  Janna GageD, BCPS, BCGP  [id number] ([facility])   12/6/2024 8:45 AM

## 2024-12-06 NOTE — PROGRESS NOTES
V2.0    [facility] [place]      Name:  Ashutosh Donovan /Age/Sex: 1970  (53 y.o. male)   MRN & CSN:  [id number] & [id number] Encounter Date/Time: 2024 8:37 AM EST   Location:  [facility]/6313- PCP: No primary care provider on file.     Attending:Issa Beasley MD       Hospital Day: 5    Assessment and Recommendations       Assessment/Plan:           Healthcare associated pneumonia:  Patient started on cefepime and vancomycin  While on vancomycin monitor renal function  Will check MRSA DNA nasal probe  There was some concerns about aspiration patient underwent a modified barium swallow and did well there was found to be some swelling around his incision site so his diet is been slightly modified  : I have discussed current antibiotics with pharmacy.  Patient MRSA DNA nasal probe was negative.  Will discontinue vancomycin and currently continue cefepime.    Acute hypoxic respiratory failure:  Secondary to above  Currently requiring 8 L nasal cannula at time of severe sepsis  Today respiratory status is improved and patient has been weaned down to 2.5 L     Status post ACDF: Status post C3-5  Reviewed note from neurosurgery  Wear [place] J collar at all times may remove for hygiene  CT cervical spine reviewed stable  P.o. pain medicine for control and muscle relaxant  Resume aspirin and Plavix tomorrow  Okay for Lovenox  Discussed with neurosurgery  patient is cleared for discharge   : Patient complaining of significant mount of pain from his surgical site adjusted his pain medication continue to monitor    Severe Sepsis:  Due to HAC     Multiple falls:  Multifactorial no additional workup is indicated at this time most primarily patient's been unable to care for self which is led to his falls and is going to a skilled nursing facility     Abnormal MRI LS spine:  Reviewed results with neurosurgery who explain these findings are chronic.    Patient will need to follow-up  outpatient.  Plan initially was to first take care of his cervical spine issue and then they will revisit his lumbar spine issue     CAD:  Patient may resume aspirin and Plavix on 12/1  Continue other home medication     COPD: Without exacerbation  Will continue with home medication at this time     BPH:  Continue with Flomax     Hyperlipidemia:  Continue home Lipitor 80 mg daily     Generalized anxiety:  Continue home Xanax as needed    GERD:  Reflux noted on modified barium swallow  Patient started on Protonix     Disposition: Reviewed with case management patient respiratory status remained stable overnight patient could possibly be discharge tomorrow.    [facility]       MDM  [x] High (any 2 of A, B, or C)    A. Problems (any 1)  [x] Acute/Chronic Illness/injury posing threat to life or bodily function:    [] Severe exacerbation of chronic illness:    ---------------------------------------------------------------------  B. Risk of Treatment (any 1)   [] IV ABX requiring serial renal monitoring for nephrotoxicity:     [] IV Narcotic analgesia for adverse drug reaction  [] IV diuresis requiring serial monitoring for renal impairment and electrolyte derangements  [] Critical electrolyte abnormalities requiring IV replacement and close serial monitoring  [] Insulin - monitoring serial FSBS for Hypoglycemic adverse drug reaction  [] Anticoagulation requiring serial monitoring of coagulation factors  [] IV/IM Controlled Substances order (any 1)   [] One-time Order including Drug Name/Route, Reason Ordered:   [] Scheduled Order including Drug Name/Route, Reason Ordered or Continued:   [] PRN Order including Drug Name/Route, Reason Ordered or Continued:  Other -   [] Decision to De-escalate Care this DOS due to change in treatment goals:  [] Decision to Escalate Care To:   [] Major Surgery/Procedure (any 1):   Elective with patient risk factors including Procedure Type and Risk Factors:   Emergent Procedure Type:

## 2024-12-06 NOTE — PLAN OF CARE
Problem: Safety - Adult  Goal: Free from fall injury  Outcome: Progressing  Note:  Remains free from falls, bed in low position, call light in reach, bed alarm monitoring for safety.     Problem: Pain  Goal: Verbalizes/displays adequate comfort level or baseline comfort level  Outcome: Progressing  Note:  1) Dilaudid 0.5 mg IV at 2025, 0121, and, 0507; 2) Oxycodone 10 mg PO at 2250; and, 3) Flexeril 5 mg PO at 0508 for c/o neck/face/head pain.     Problem: Respiratory - Adult  Goal: Achieves optimal ventilation and oxygenation  Outcome: Progressing  Note:  O2 98% on 5L O2 per NC.     Problem: Cardiovascular - Adult  Goal: Absence of cardiac dysrhythmias or at baseline  Outcome: Progressing  Note:  1) Sinus Rhythm with PQT rate 82 at 2203; and, 2) Sinus Rhythm with PQT rate 83 at 0511.

## 2024-12-07 VITALS
OXYGEN SATURATION: 94 % | TEMPERATURE: 98 F | WEIGHT: 211.64 LBS | HEART RATE: 85 BPM | SYSTOLIC BLOOD PRESSURE: 140 MMHG | RESPIRATION RATE: 16 BRPM | HEIGHT: 71 IN | DIASTOLIC BLOOD PRESSURE: 85 MMHG | BODY MASS INDEX: 29.63 KG/M2

## 2024-12-07 LAB
ALBUMIN SERPL-MCNC: 3.7 G/DL (ref 3.4–5)
ANION GAP SERPL CALCULATED.3IONS-SCNC: 9 MMOL/L (ref 3–16)
BACTERIA SPEC RESP CULT: NORMAL
BASOPHILS # BLD: 0.1 K/UL (ref 0–0.2)
BASOPHILS NFR BLD: 1.1 %
BUN SERPL-MCNC: 13 MG/DL (ref 7–20)
CALCIUM SERPL-MCNC: 9.7 MG/DL (ref 8.3–10.6)
CHLORIDE SERPL-SCNC: 107 MMOL/L (ref 99–110)
CO2 SERPL-SCNC: 24 MMOL/L (ref 21–32)
CREAT SERPL-MCNC: 1.1 MG/DL (ref 0.9–1.3)
DEPRECATED RDW RBC AUTO: 17.6 % (ref 12.4–15.4)
EOSINOPHIL # BLD: 0.1 K/UL (ref 0–0.6)
EOSINOPHIL NFR BLD: 1.3 %
GFR SERPLBLD CREATININE-BSD FMLA CKD-EPI: 80 ML/MIN/{1.73_M2}
GLUCOSE SERPL-MCNC: 104 MG/DL (ref 70–99)
GRAM STN SPEC: NORMAL
HCT VFR BLD AUTO: 33 % (ref 40.5–52.5)
HGB BLD-MCNC: 10.8 G/DL (ref 13.5–17.5)
LYMPHOCYTES # BLD: 1.9 K/UL (ref 1–5.1)
LYMPHOCYTES NFR BLD: 20.8 %
MCH RBC QN AUTO: 28.4 PG (ref 26–34)
MCHC RBC AUTO-ENTMCNC: 32.9 G/DL (ref 31–36)
MCV RBC AUTO: 86.4 FL (ref 80–100)
MONOCYTES # BLD: 0.8 K/UL (ref 0–1.3)
MONOCYTES NFR BLD: 8.9 %
NEUTROPHILS # BLD: 6.1 K/UL (ref 1.7–7.7)
NEUTROPHILS NFR BLD: 67.9 %
PHOSPHATE SERPL-MCNC: 2.8 MG/DL (ref 2.5–4.9)
PLATELET # BLD AUTO: 299 K/UL (ref 135–450)
PMV BLD AUTO: 9.3 FL (ref 5–10.5)
POTASSIUM SERPL-SCNC: 4.2 MMOL/L (ref 3.5–5.1)
RBC # BLD AUTO: 3.81 M/UL (ref 4.2–5.9)
SODIUM SERPL-SCNC: 140 MMOL/L (ref 136–145)
WBC # BLD AUTO: 8.9 K/UL (ref 4–11)

## 2024-12-07 PROCEDURE — 80069 RENAL FUNCTION PANEL: CPT

## 2024-12-07 PROCEDURE — 36415 COLL VENOUS BLD VENIPUNCTURE: CPT

## 2024-12-07 PROCEDURE — 6370000000 HC RX 637 (ALT 250 FOR IP): Performed by: INTERNAL MEDICINE

## 2024-12-07 PROCEDURE — 6360000002 HC RX W HCPCS: Performed by: INTERNAL MEDICINE

## 2024-12-07 PROCEDURE — 2580000003 HC RX 258: Performed by: INTERNAL MEDICINE

## 2024-12-07 PROCEDURE — 85025 COMPLETE CBC W/AUTO DIFF WBC: CPT

## 2024-12-07 PROCEDURE — 2580000003 HC RX 258: Performed by: FAMILY MEDICINE

## 2024-12-07 PROCEDURE — 6370000000 HC RX 637 (ALT 250 FOR IP): Performed by: FAMILY MEDICINE

## 2024-12-07 RX ORDER — GABAPENTIN 100 MG/1
100 CAPSULE ORAL 3 TIMES DAILY
Qty: 90 CAPSULE | Refills: 0
Start: 2024-12-07 | End: 2025-01-06

## 2024-12-07 RX ADMIN — PANTOPRAZOLE SODIUM 40 MG: 40 TABLET, DELAYED RELEASE ORAL at 05:30

## 2024-12-07 RX ADMIN — CEFEPIME 2000 MG: 2 INJECTION, POWDER, FOR SOLUTION INTRAVENOUS at 09:59

## 2024-12-07 RX ADMIN — SODIUM CHLORIDE, PRESERVATIVE FREE 10 ML: 5 INJECTION INTRAVENOUS at 09:40

## 2024-12-07 RX ADMIN — OXYCODONE 10 MG: 5 TABLET ORAL at 01:21

## 2024-12-07 RX ADMIN — HYDROMORPHONE HYDROCHLORIDE 0.5 MG: 1 INJECTION, SOLUTION INTRAMUSCULAR; INTRAVENOUS; SUBCUTANEOUS at 11:05

## 2024-12-07 RX ADMIN — HYDROMORPHONE HYDROCHLORIDE 0.5 MG: 1 INJECTION, SOLUTION INTRAMUSCULAR; INTRAVENOUS; SUBCUTANEOUS at 15:05

## 2024-12-07 RX ADMIN — CYCLOBENZAPRINE 5 MG: 10 TABLET, FILM COATED ORAL at 12:09

## 2024-12-07 RX ADMIN — HYDROMORPHONE HYDROCHLORIDE 0.5 MG: 1 INJECTION, SOLUTION INTRAMUSCULAR; INTRAVENOUS; SUBCUTANEOUS at 03:59

## 2024-12-07 RX ADMIN — SODIUM CHLORIDE, PRESERVATIVE FREE 10 ML: 5 INJECTION INTRAVENOUS at 09:49

## 2024-12-07 RX ADMIN — OXYCODONE 10 MG: 5 TABLET ORAL at 09:48

## 2024-12-07 RX ADMIN — HYDROMORPHONE HYDROCHLORIDE 0.5 MG: 1 INJECTION, SOLUTION INTRAMUSCULAR; INTRAVENOUS; SUBCUTANEOUS at 07:01

## 2024-12-07 RX ADMIN — ALPRAZOLAM 0.25 MG: 0.25 TABLET ORAL at 01:21

## 2024-12-07 RX ADMIN — RANOLAZINE 500 MG: 500 TABLET, FILM COATED, EXTENDED RELEASE ORAL at 09:48

## 2024-12-07 RX ADMIN — TAMSULOSIN HYDROCHLORIDE 0.4 MG: 0.4 CAPSULE ORAL at 09:47

## 2024-12-07 RX ADMIN — ASPIRIN 81 MG: 81 TABLET, COATED ORAL at 09:48

## 2024-12-07 RX ADMIN — ATORVASTATIN CALCIUM 80 MG: 80 TABLET, FILM COATED ORAL at 09:47

## 2024-12-07 RX ADMIN — METHOCARBAMOL TABLETS 750 MG: 500 TABLET, COATED ORAL at 12:08

## 2024-12-07 RX ADMIN — METHOCARBAMOL TABLETS 750 MG: 500 TABLET, COATED ORAL at 16:30

## 2024-12-07 RX ADMIN — LEVOTHYROXINE SODIUM 175 MCG: 0.17 TABLET ORAL at 05:30

## 2024-12-07 RX ADMIN — CLOPIDOGREL BISULFATE 75 MG: 75 TABLET ORAL at 09:47

## 2024-12-07 RX ADMIN — CEFEPIME 2000 MG: 2 INJECTION, POWDER, FOR SOLUTION INTRAVENOUS at 00:08

## 2024-12-07 RX ADMIN — ENOXAPARIN SODIUM 40 MG: 100 INJECTION SUBCUTANEOUS at 09:48

## 2024-12-07 RX ADMIN — AMLODIPINE BESYLATE 5 MG: 5 TABLET ORAL at 09:48

## 2024-12-07 RX ADMIN — GABAPENTIN 100 MG: 100 CAPSULE ORAL at 13:34

## 2024-12-07 RX ADMIN — HYDROMORPHONE HYDROCHLORIDE 0.5 MG: 1 INJECTION, SOLUTION INTRAMUSCULAR; INTRAVENOUS; SUBCUTANEOUS at 00:05

## 2024-12-07 RX ADMIN — METHOCARBAMOL TABLETS 750 MG: 500 TABLET, COATED ORAL at 09:46

## 2024-12-07 RX ADMIN — GABAPENTIN 100 MG: 100 CAPSULE ORAL at 09:47

## 2024-12-07 RX ADMIN — OXYCODONE 10 MG: 5 TABLET ORAL at 05:30

## 2024-12-07 RX ADMIN — OXYCODONE 10 MG: 5 TABLET ORAL at 13:33

## 2024-12-07 RX ADMIN — CARVEDILOL 3.12 MG: 3.12 TABLET, FILM COATED ORAL at 09:48

## 2024-12-07 RX ADMIN — ALPRAZOLAM 0.25 MG: 0.25 TABLET ORAL at 11:06

## 2024-12-07 ASSESSMENT — PAIN - FUNCTIONAL ASSESSMENT
PAIN_FUNCTIONAL_ASSESSMENT: ACTIVITIES ARE NOT PREVENTED

## 2024-12-07 ASSESSMENT — PAIN DESCRIPTION - ORIENTATION
ORIENTATION: RIGHT;LEFT;MID
ORIENTATION: RIGHT;LEFT
ORIENTATION: RIGHT;LEFT
ORIENTATION: RIGHT;LEFT;MID
ORIENTATION: RIGHT;LEFT;MID
ORIENTATION: RIGHT;LEFT
ORIENTATION: RIGHT;LEFT;MID
ORIENTATION: RIGHT;LEFT;MID
ORIENTATION: RIGHT;LEFT
ORIENTATION: RIGHT;LEFT;MID
ORIENTATION: RIGHT;LEFT
ORIENTATION: RIGHT;LEFT
ORIENTATION: RIGHT;LEFT;MID

## 2024-12-07 ASSESSMENT — PAIN DESCRIPTION - FREQUENCY
FREQUENCY: CONTINUOUS

## 2024-12-07 ASSESSMENT — PAIN DESCRIPTION - PAIN TYPE
TYPE: ACUTE PAIN;SURGICAL PAIN
TYPE: ACUTE PAIN

## 2024-12-07 ASSESSMENT — PAIN SCALES - GENERAL
PAINLEVEL_OUTOF10: 4
PAINLEVEL_OUTOF10: 4
PAINLEVEL_OUTOF10: 8
PAINLEVEL_OUTOF10: 6
PAINLEVEL_OUTOF10: 5
PAINLEVEL_OUTOF10: 8
PAINLEVEL_OUTOF10: 8
PAINLEVEL_OUTOF10: 9
PAINLEVEL_OUTOF10: 8
PAINLEVEL_OUTOF10: 6
PAINLEVEL_OUTOF10: 9
PAINLEVEL_OUTOF10: 8
PAINLEVEL_OUTOF10: 9
PAINLEVEL_OUTOF10: 7
PAINLEVEL_OUTOF10: 6

## 2024-12-07 ASSESSMENT — PAIN DESCRIPTION - DESCRIPTORS
DESCRIPTORS: ACHING;DISCOMFORT
DESCRIPTORS: ACHING
DESCRIPTORS: ACHING;DISCOMFORT
DESCRIPTORS: ACHING
DESCRIPTORS: ACHING;DISCOMFORT

## 2024-12-07 ASSESSMENT — PAIN DESCRIPTION - LOCATION
LOCATION: NECK;SHOULDER

## 2024-12-07 ASSESSMENT — PAIN DESCRIPTION - DIRECTION
RADIATING_TOWARDS: NO

## 2024-12-07 ASSESSMENT — PAIN DESCRIPTION - ONSET
ONSET: ON-GOING

## 2024-12-07 NOTE — PLAN OF CARE
Problem: Chronic Conditions and Co-morbidities  Goal: Patient's chronic conditions and co-morbidity symptoms are monitored and maintained or improved  Outcome: Adequate for Discharge     Problem: Discharge Planning  Goal: Discharge to home or other facility with appropriate resources  Outcome: Adequate for Discharge     Problem: Pain  Goal: Verbalizes/displays adequate comfort level or baseline comfort level  Outcome: Adequate for Discharge     Problem: Safety - Adult  Goal: Free from fall injury  Outcome: Adequate for Discharge     Problem: Skin/Tissue Integrity  Goal: Absence of new skin breakdown  Description: 1.  Monitor for areas of redness and/or skin breakdown  2.  Assess vascular access sites hourly  3.  Every 4-6 hours minimum:  Change oxygen saturation probe site  4.  Every 4-6 hours:  If on nasal continuous positive airway pressure, respiratory therapy assess nares and determine need for appliance change or resting period.  Outcome: Adequate for Discharge     Problem: Nutrition Deficit:  Goal: Optimize nutritional status  Outcome: Adequate for Discharge     Problem: Respiratory - Adult  Goal: Achieves optimal ventilation and oxygenation  Outcome: Adequate for Discharge     Problem: Cardiovascular - Adult  Goal: Absence of cardiac dysrhythmias or at baseline  Outcome: Adequate for Discharge

## 2024-12-07 NOTE — CARE COORDINATION
Transport to [facility]  Services: Skilled Nursing  [address] [address]  [phone]     S transport at 1700 today.  Nurse report: [phone]  Fax: [phone] (ANNA faxed)  Electronically signed by Sylvie Mejia RN on 12/7/2024 at 1:21 PM

## 2024-12-07 NOTE — PROGRESS NOTES
Pt is A&Ox4 and discharging to [facility]. Pt's Ivs and telemetry removed. Pt left with all personal belongings and discharge instructions. Pt transported to facility via EMS. Report called to facility.

## 2024-12-07 NOTE — CARE COORDINATION
Case Management Assessment            Discharge Note                    Date / Time of Note: 12/7/2024 1:14 PM                  Discharge Note Completed by: Sylvie Mejia RN    Patient Name: Ashutsoh Donovan   YOB: 1970  Diagnosis: Multiple falls [R29.6]  Fall, initial encounter [W19.XXXA]  HAP (hospital-acquired pneumonia) [J18.9, Y95]   Date / Time: 12/2/2024  9:46 AM    Current PCP: No primary care provider on file.  Clinic patient: No    Hospitalization in the last 30 days: No       Advance Directives:  Code Status: Full Code  [place] DNR form completed and on chart: Not Indicated    Financial:  Payor: [employer] MEDICARE / Plan: [employer] GOLD PLUS HMO / Product Type: *No Product type* /      Pharmacy:    [id number] [address] [facility] [phone] - F [phone]  [address] [facility] [address]  Phone: [phone] Fax: [phone]    [facility] [id number] [address] [phone] - F [phone]  [address] [facility] [address]  Phone: [phone] Fax: [phone]      Assistance purchasing medications?:    Assistance provided by Case Management: None at this time    Does patient want to participate in local refill/ meds to beds program?:      Meds To Beds General Rules:  1. Can ONLY be done Monday- Friday between 8:30am-5pm  2. Prescription(s) must be in pharmacy by 3pm to be filled same day  3.Copy of patient's insurance/ prescription drug card and patient face sheet must be sent along with the prescription(s)  4. Cost of Rx cannot be added to hospital bill. If financial assistance is needed, please contact unit  or ;  or  CANNOT provide pharmacy voucher for patients co-pays  5. Patients can then  the prescription on their way out of the hospital at discharge, or pharmacy can deliver to the bedside if staff is available. (payment due at time

## 2024-12-07 NOTE — PLAN OF CARE
Problem: Chronic Conditions and Co-morbidities  Goal: Patient's chronic conditions and co-morbidity symptoms are monitored and maintained or improved  Outcome: Progressing     Problem: Discharge Planning  Goal: Discharge to home or other facility with appropriate resources  Outcome: Progressing  Flowsheets (Taken 12/6/2024 1941)  Discharge to home or other facility with appropriate resources:   Identify barriers to discharge with patient and caregiver   Identify discharge learning needs (meds, wound care, etc)     Problem: Pain  Goal: Verbalizes/displays adequate comfort level or baseline comfort level  12/6/2024 1941 by Martha Rosa RN  Outcome: Progressing  Flowsheets (Taken 12/6/2024 1941)  Verbalizes/displays adequate comfort level or baseline comfort level:   Assess pain using appropriate pain scale   Encourage patient to monitor pain and request assistance   Administer analgesics based on type and severity of pain and evaluate response   Implement non-pharmacological measures as appropriate and evaluate response   Consider cultural and social influences on pain and pain management    Problem: Safety - Adult  Goal: Free from fall injury  12/6/2024 1941 by Martha Rosa RN  Outcome: Progressing  Flowsheets (Taken 12/6/2024 1941)  Free From Fall Injury:   Instruct family/caregiver on patient safety   Based on caregiver fall risk screen, instruct family/caregiver to ask for assistance with transferring infant if caregiver noted to have fall risk factors       Problem: Skin/Tissue Integrity  Goal: Absence of new skin breakdown  Description: 1.  Monitor for areas of redness and/or skin breakdown  2.  Assess vascular access sites hourly  3.  Every 4-6 hours minimum:  Change oxygen saturation probe site  4.  Every 4-6 hours:  If on nasal continuous positive airway pressure, respiratory therapy assess nares and determine need for appliance change or resting period.  Outcome: Progressing

## 2024-12-07 NOTE — PLAN OF CARE
Problem: Chronic Conditions and Co-morbidities  Goal: Patient's chronic conditions and co-morbidity symptoms are monitored and maintained or improved  12/6/2024 2201 by Domonique Brothers RN  Outcome: Progressing  12/6/2024 1941 by Martha Rosa RN  Outcome: Progressing     Problem: Discharge Planning  Goal: Discharge to home or other facility with appropriate resources  12/6/2024 2201 by Domonique Brothers RN  Outcome: Progressing  12/6/2024 1941 by Martha Rosa RN  Outcome: Progressing  Flowsheets (Taken 12/6/2024 1941)  Discharge to home or other facility with appropriate resources:   Identify barriers to discharge with patient and caregiver   Identify discharge learning needs (meds, wound care, etc)     Problem: Pain  Goal: Verbalizes/displays adequate comfort level or baseline comfort level  12/6/2024 2201 by Domonique Brothers RN  Outcome: Progressing  12/6/2024 1941 by Martha Rosa RN  Outcome: Progressing  Flowsheets (Taken 12/6/2024 1941)  Verbalizes/displays adequate comfort level or baseline comfort level:   Assess pain using appropriate pain scale   Encourage patient to monitor pain and request assistance   Administer analgesics based on type and severity of pain and evaluate response   Implement non-pharmacological measures as appropriate and evaluate response   Consider cultural and social influences on pain and pain management  12/6/2024 0929 by Rosibel Hankins RN  Outcome: Progressing     Problem: Safety - Adult  Goal: Free from fall injury  12/6/2024 2201 by Domonique Brothers RN  Outcome: Progressing  12/6/2024 1941 by Martha Rosa RN  Outcome: Progressing  Flowsheets (Taken 12/6/2024 1941)  Free From Fall Injury:   Instruct family/caregiver on patient safety   Based on caregiver fall risk screen, instruct family/caregiver to ask for assistance with transferring infant if caregiver noted to have fall risk factors  12/6/2024 0929 by Rosibel Hankins RN  Outcome: Progressing

## 2024-12-09 LAB
BACTERIA BLD CULT ORG #2: NORMAL
BACTERIA BLD CULT: NORMAL

## 2024-12-10 NOTE — ADT AUTH CERT
Utilization Reviews       12/6/24  by Delicia Pryor RN   Last updated by Delicia Pryor RN on 12/9/2024 1632     Review Status Created By   In Primary Delicia Pryor RN       Review Type   --      Criteria Review   DATE: 12/6/24   TYPE OF BED: Acute Care w/ tele        RELEVANT BASELINES: (lab values, vitals, o2 amount/delivery, etc.)  RA     PERTINENT UPDATES:  cefepime          2,000 mg        IntraVENous    Q8H  vancomycin HCl in Dextrose   1,250 mg        IntraVENous    Q12H  albuterol (PROVENTIL) (2.5 MG/3ML) 0.083% nebulizer solution 2.5 mg neb x2  HYDROmorphone HCl PF (DILAUDID) injection 0.5 mg IV x5  prochlorperazine (COMPAZINE) injection 10 mg IV x1  O2 @ 5L/HFNC weaned to 2.5L/NC ; Sats 94-99% ; RR 16-22     VITALS:98 (36.7)  22  84  143/89  O2 2 5L/HFNC 98%      ABNL/PERTINENT LABS/RADIOLOGY/DIAGNOSTIC STUDIES:     12/06/24 06:15  Glucose: 108 (H)  Procalcitonin: 1.59 (H)  WBC: 16.1 (H)  RBC: 3.72 (L)  Hemoglobin Quant: 10.3 (L)  Hematocrit: 32.1 (L)  RDW: 17.6 (H)  Neutrophils Absolute: 13.1 (H)        PHYSICAL EXAM:  Cardiovascular: Regular rate.  Respiratory: Decreased breath sounds with  Rales noted right posterior lung field  gastrointestinal: Soft, non tender        MD CONSULTS/ASSESSMENT AND PLAN:     IM     HCAP   I have discussed current antibiotics with pharmacy.  Patient MRSA DNA nasal probe was negative.  Will discontinue vancomycin and currently continue cefepime.     Acute hypoxic respiratory failure:  Secondary to above  Currently requiring 8 L nasal cannula at time of severe sepsis  Today respiratory status is improved and patient has been weaned down to 2.5 L     Status post ACDF: Status post C3-5   Patient complaining of significant mount of pain from his surgical site adjusted his pain medication continue to monitor     Severe Sepsis:  Due to HAC        MEDICATIONS:  Scheduled Medications   ·           cefepime          2,000 mg        IntraVENous    Q8H  ·
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
## 2025-01-10 ENCOUNTER — HOSPITAL ENCOUNTER (OUTPATIENT)
Age: 55
Setting detail: OBSERVATION
Discharge: HOME | End: 2025-01-10
Attending: EMERGENCY MEDICINE | Admitting: INTERNAL MEDICINE
Payer: MEDICARE

## 2025-01-10 ENCOUNTER — APPOINTMENT (OUTPATIENT)
Dept: CT IMAGING | Age: 55
End: 2025-01-10
Payer: MEDICARE

## 2025-01-10 VITALS
BODY MASS INDEX: 28.24 KG/M2 | RESPIRATION RATE: 18 BRPM | TEMPERATURE: 98.3 F | OXYGEN SATURATION: 95 % | HEART RATE: 100 BPM | DIASTOLIC BLOOD PRESSURE: 92 MMHG | SYSTOLIC BLOOD PRESSURE: 135 MMHG | HEIGHT: 71 IN | WEIGHT: 201.72 LBS

## 2025-01-10 DIAGNOSIS — R07.9 CHEST PAIN, UNSPECIFIED TYPE: Primary | ICD-10-CM

## 2025-01-10 PROBLEM — R07.89 ATYPICAL CHEST PAIN: Status: ACTIVE | Noted: 2025-01-10

## 2025-01-10 LAB
ALBUMIN SERPL-MCNC: 4.4 G/DL (ref 3.4–5)
ALBUMIN/GLOB SERPL: 1.6 {RATIO} (ref 1.1–2.2)
ALP SERPL-CCNC: 89 U/L (ref 40–129)
ALT SERPL-CCNC: 9 U/L (ref 10–40)
ANION GAP SERPL CALCULATED.3IONS-SCNC: 15 MMOL/L (ref 3–16)
AST SERPL-CCNC: 30 U/L (ref 15–37)
BASOPHILS # BLD: 0.1 K/UL (ref 0–0.2)
BASOPHILS NFR BLD: 0.5 %
BILIRUB SERPL-MCNC: <0.2 MG/DL (ref 0–1)
BUN SERPL-MCNC: 14 MG/DL (ref 7–20)
CALCIUM SERPL-MCNC: 10 MG/DL (ref 8.3–10.6)
CHLORIDE SERPL-SCNC: 101 MMOL/L (ref 99–110)
CO2 SERPL-SCNC: 21 MMOL/L (ref 21–32)
CREAT SERPL-MCNC: 1.3 MG/DL (ref 0.9–1.3)
DEPRECATED RDW RBC AUTO: 15.6 % (ref 12.4–15.4)
EKG ATRIAL RATE: 123 BPM
EKG DIAGNOSIS: NORMAL
EKG P AXIS: 46 DEGREES
EKG P-R INTERVAL: 120 MS
EKG Q-T INTERVAL: 330 MS
EKG QRS DURATION: 98 MS
EKG QTC CALCULATION (BAZETT): 472 MS
EKG R AXIS: 30 DEGREES
EKG T AXIS: 46 DEGREES
EKG VENTRICULAR RATE: 123 BPM
EOSINOPHIL # BLD: 0 K/UL (ref 0–0.6)
EOSINOPHIL NFR BLD: 0 %
GFR SERPLBLD CREATININE-BSD FMLA CKD-EPI: 66 ML/MIN/{1.73_M2}
GLUCOSE SERPL-MCNC: 191 MG/DL (ref 70–99)
HCT VFR BLD AUTO: 39.8 % (ref 40.5–52.5)
HGB BLD-MCNC: 12.9 G/DL (ref 13.5–17.5)
LYMPHOCYTES # BLD: 1.3 K/UL (ref 1–5.1)
LYMPHOCYTES NFR BLD: 12.9 %
MCH RBC QN AUTO: 27.7 PG (ref 26–34)
MCHC RBC AUTO-ENTMCNC: 32.5 G/DL (ref 31–36)
MCV RBC AUTO: 85.2 FL (ref 80–100)
MONOCYTES # BLD: 0.3 K/UL (ref 0–1.3)
MONOCYTES NFR BLD: 2.8 %
NEUTROPHILS # BLD: 8.6 K/UL (ref 1.7–7.7)
NEUTROPHILS NFR BLD: 83.8 %
NT-PROBNP SERPL-MCNC: 229 PG/ML (ref 0–124)
PLATELET # BLD AUTO: 307 K/UL (ref 135–450)
PMV BLD AUTO: 9.3 FL (ref 5–10.5)
POTASSIUM SERPL-SCNC: 4.3 MMOL/L (ref 3.5–5.1)
PROT SERPL-MCNC: 7.2 G/DL (ref 6.4–8.2)
RBC # BLD AUTO: 4.67 M/UL (ref 4.2–5.9)
SODIUM SERPL-SCNC: 137 MMOL/L (ref 136–145)
TROPONIN, HIGH SENSITIVITY: 13 NG/L (ref 0–22)
TROPONIN, HIGH SENSITIVITY: 19 NG/L (ref 0–22)
WBC # BLD AUTO: 10.3 K/UL (ref 4–11)

## 2025-01-10 PROCEDURE — 96375 TX/PRO/DX INJ NEW DRUG ADDON: CPT

## 2025-01-10 PROCEDURE — G0378 HOSPITAL OBSERVATION PER HR: HCPCS

## 2025-01-10 PROCEDURE — 71260 CT THORAX DX C+: CPT

## 2025-01-10 PROCEDURE — 6360000002 HC RX W HCPCS: Performed by: EMERGENCY MEDICINE

## 2025-01-10 PROCEDURE — 6360000002 HC RX W HCPCS: Performed by: INTERNAL MEDICINE

## 2025-01-10 PROCEDURE — 96374 THER/PROPH/DIAG INJ IV PUSH: CPT

## 2025-01-10 PROCEDURE — 93005 ELECTROCARDIOGRAM TRACING: CPT | Performed by: EMERGENCY MEDICINE

## 2025-01-10 PROCEDURE — 96372 THER/PROPH/DIAG INJ SC/IM: CPT

## 2025-01-10 PROCEDURE — 6370000000 HC RX 637 (ALT 250 FOR IP): Performed by: INTERNAL MEDICINE

## 2025-01-10 PROCEDURE — 94760 N-INVAS EAR/PLS OXIMETRY 1: CPT

## 2025-01-10 PROCEDURE — 99285 EMERGENCY DEPT VISIT HI MDM: CPT

## 2025-01-10 PROCEDURE — 6360000004 HC RX CONTRAST MEDICATION: Performed by: EMERGENCY MEDICINE

## 2025-01-10 PROCEDURE — 93010 ELECTROCARDIOGRAM REPORT: CPT | Performed by: INTERNAL MEDICINE

## 2025-01-10 RX ORDER — DULOXETIN HYDROCHLORIDE 60 MG/1
60 CAPSULE, DELAYED RELEASE ORAL DAILY
Status: DISCONTINUED | OUTPATIENT
Start: 2025-01-10 | End: 2025-01-10 | Stop reason: HOSPADM

## 2025-01-10 RX ORDER — POTASSIUM CHLORIDE 7.45 MG/ML
10 INJECTION INTRAVENOUS PRN
Status: DISCONTINUED | OUTPATIENT
Start: 2025-01-10 | End: 2025-01-10 | Stop reason: HOSPADM

## 2025-01-10 RX ORDER — CETIRIZINE HYDROCHLORIDE 10 MG/1
10 TABLET ORAL DAILY
Status: DISCONTINUED | OUTPATIENT
Start: 2025-01-10 | End: 2025-01-10 | Stop reason: HOSPADM

## 2025-01-10 RX ORDER — ISOSORBIDE MONONITRATE 60 MG/1
60 TABLET, EXTENDED RELEASE ORAL DAILY
COMMUNITY

## 2025-01-10 RX ORDER — CARVEDILOL 3.12 MG/1
3.12 TABLET ORAL 2 TIMES DAILY WITH MEALS
Status: DISCONTINUED | OUTPATIENT
Start: 2025-01-10 | End: 2025-01-10 | Stop reason: HOSPADM

## 2025-01-10 RX ORDER — FLUPHENAZINE HYDROCHLORIDE 5 MG/1
5 TABLET ORAL NIGHTLY
Status: DISCONTINUED | OUTPATIENT
Start: 2025-01-10 | End: 2025-01-10 | Stop reason: HOSPADM

## 2025-01-10 RX ORDER — SODIUM CHLORIDE 0.9 % (FLUSH) 0.9 %
5-40 SYRINGE (ML) INJECTION PRN
Status: DISCONTINUED | OUTPATIENT
Start: 2025-01-10 | End: 2025-01-10 | Stop reason: HOSPADM

## 2025-01-10 RX ORDER — GABAPENTIN 100 MG/1
100 CAPSULE ORAL 3 TIMES DAILY
Status: DISCONTINUED | OUTPATIENT
Start: 2025-01-10 | End: 2025-01-10

## 2025-01-10 RX ORDER — SODIUM CHLORIDE 0.9 % (FLUSH) 0.9 %
5-40 SYRINGE (ML) INJECTION EVERY 12 HOURS SCHEDULED
Status: DISCONTINUED | OUTPATIENT
Start: 2025-01-10 | End: 2025-01-10 | Stop reason: HOSPADM

## 2025-01-10 RX ORDER — ERGOCALCIFEROL 1.25 MG/1
50000 CAPSULE, LIQUID FILLED ORAL WEEKLY
Status: DISCONTINUED | OUTPATIENT
Start: 2025-01-17 | End: 2025-01-10 | Stop reason: HOSPADM

## 2025-01-10 RX ORDER — RANOLAZINE 500 MG/1
500 TABLET, EXTENDED RELEASE ORAL 2 TIMES DAILY
Status: DISCONTINUED | OUTPATIENT
Start: 2025-01-10 | End: 2025-01-10 | Stop reason: HOSPADM

## 2025-01-10 RX ORDER — MAGNESIUM HYDROXIDE/ALUMINUM HYDROXICE/SIMETHICONE 120; 1200; 1200 MG/30ML; MG/30ML; MG/30ML
30 SUSPENSION ORAL EVERY 6 HOURS PRN
Status: DISCONTINUED | OUTPATIENT
Start: 2025-01-10 | End: 2025-01-10 | Stop reason: HOSPADM

## 2025-01-10 RX ORDER — POTASSIUM CHLORIDE 1500 MG/1
40 TABLET, EXTENDED RELEASE ORAL PRN
Status: DISCONTINUED | OUTPATIENT
Start: 2025-01-10 | End: 2025-01-10 | Stop reason: HOSPADM

## 2025-01-10 RX ORDER — TAMSULOSIN HYDROCHLORIDE 0.4 MG/1
0.4 CAPSULE ORAL DAILY
Status: DISCONTINUED | OUTPATIENT
Start: 2025-01-10 | End: 2025-01-10 | Stop reason: HOSPADM

## 2025-01-10 RX ORDER — PANTOPRAZOLE SODIUM 40 MG/1
40 TABLET, DELAYED RELEASE ORAL
Status: DISCONTINUED | OUTPATIENT
Start: 2025-01-10 | End: 2025-01-10 | Stop reason: HOSPADM

## 2025-01-10 RX ORDER — MAGNESIUM SULFATE IN WATER 40 MG/ML
2000 INJECTION, SOLUTION INTRAVENOUS PRN
Status: DISCONTINUED | OUTPATIENT
Start: 2025-01-10 | End: 2025-01-10 | Stop reason: HOSPADM

## 2025-01-10 RX ORDER — ATORVASTATIN CALCIUM 80 MG/1
80 TABLET, FILM COATED ORAL DAILY
Status: DISCONTINUED | OUTPATIENT
Start: 2025-01-10 | End: 2025-01-10 | Stop reason: HOSPADM

## 2025-01-10 RX ORDER — MORPHINE SULFATE 4 MG/ML
4 INJECTION, SOLUTION INTRAMUSCULAR; INTRAVENOUS ONCE
Status: COMPLETED | OUTPATIENT
Start: 2025-01-10 | End: 2025-01-10

## 2025-01-10 RX ORDER — POLYETHYLENE GLYCOL 3350 17 G/17G
17 POWDER, FOR SOLUTION ORAL DAILY PRN
Status: DISCONTINUED | OUTPATIENT
Start: 2025-01-10 | End: 2025-01-10 | Stop reason: HOSPADM

## 2025-01-10 RX ORDER — FLUTICASONE PROPIONATE 50 MCG
2 SPRAY, SUSPENSION (ML) NASAL DAILY PRN
Status: DISCONTINUED | OUTPATIENT
Start: 2025-01-10 | End: 2025-01-10 | Stop reason: HOSPADM

## 2025-01-10 RX ORDER — ACETAMINOPHEN 325 MG/1
650 TABLET ORAL EVERY 6 HOURS PRN
Status: DISCONTINUED | OUTPATIENT
Start: 2025-01-10 | End: 2025-01-10 | Stop reason: HOSPADM

## 2025-01-10 RX ORDER — ONDANSETRON 4 MG/1
4 TABLET, ORALLY DISINTEGRATING ORAL EVERY 8 HOURS PRN
Status: DISCONTINUED | OUTPATIENT
Start: 2025-01-10 | End: 2025-01-10 | Stop reason: HOSPADM

## 2025-01-10 RX ORDER — PRAZOSIN HYDROCHLORIDE 5 MG/1
5 CAPSULE ORAL NIGHTLY
Status: DISCONTINUED | OUTPATIENT
Start: 2025-01-10 | End: 2025-01-10 | Stop reason: HOSPADM

## 2025-01-10 RX ORDER — IOPAMIDOL 755 MG/ML
75 INJECTION, SOLUTION INTRAVASCULAR
Status: COMPLETED | OUTPATIENT
Start: 2025-01-10 | End: 2025-01-10

## 2025-01-10 RX ORDER — SODIUM CHLORIDE 9 MG/ML
INJECTION, SOLUTION INTRAVENOUS PRN
Status: DISCONTINUED | OUTPATIENT
Start: 2025-01-10 | End: 2025-01-10 | Stop reason: HOSPADM

## 2025-01-10 RX ORDER — ASPIRIN 81 MG/1
81 TABLET ORAL DAILY
Status: DISCONTINUED | OUTPATIENT
Start: 2025-01-10 | End: 2025-01-10 | Stop reason: HOSPADM

## 2025-01-10 RX ORDER — CYCLOBENZAPRINE HCL 10 MG
5 TABLET ORAL 3 TIMES DAILY PRN
Status: DISCONTINUED | OUTPATIENT
Start: 2025-01-10 | End: 2025-01-10 | Stop reason: HOSPADM

## 2025-01-10 RX ORDER — ENOXAPARIN SODIUM 100 MG/ML
40 INJECTION SUBCUTANEOUS DAILY
Status: DISCONTINUED | OUTPATIENT
Start: 2025-01-10 | End: 2025-01-10 | Stop reason: HOSPADM

## 2025-01-10 RX ORDER — ONDANSETRON 2 MG/ML
4 INJECTION INTRAMUSCULAR; INTRAVENOUS EVERY 6 HOURS PRN
Status: DISCONTINUED | OUTPATIENT
Start: 2025-01-10 | End: 2025-01-10 | Stop reason: HOSPADM

## 2025-01-10 RX ORDER — ALBUTEROL SULFATE 90 UG/1
2 INHALANT RESPIRATORY (INHALATION) EVERY 6 HOURS PRN
Status: DISCONTINUED | OUTPATIENT
Start: 2025-01-10 | End: 2025-01-10 | Stop reason: HOSPADM

## 2025-01-10 RX ORDER — ACETAMINOPHEN 650 MG/1
650 SUPPOSITORY RECTAL EVERY 6 HOURS PRN
Status: DISCONTINUED | OUTPATIENT
Start: 2025-01-10 | End: 2025-01-10 | Stop reason: HOSPADM

## 2025-01-10 RX ORDER — AMLODIPINE BESYLATE 5 MG/1
5 TABLET ORAL DAILY
Status: DISCONTINUED | OUTPATIENT
Start: 2025-01-10 | End: 2025-01-10 | Stop reason: HOSPADM

## 2025-01-10 RX ORDER — METHOCARBAMOL 750 MG/1
750 TABLET, FILM COATED ORAL 4 TIMES DAILY PRN
Status: ON HOLD | COMMUNITY
End: 2025-01-10 | Stop reason: HOSPADM

## 2025-01-10 RX ORDER — ASPIRIN 81 MG/1
81 TABLET, CHEWABLE ORAL DAILY
Status: DISCONTINUED | OUTPATIENT
Start: 2025-01-11 | End: 2025-01-10

## 2025-01-10 RX ORDER — ONDANSETRON 2 MG/ML
4 INJECTION INTRAMUSCULAR; INTRAVENOUS ONCE
Status: COMPLETED | OUTPATIENT
Start: 2025-01-10 | End: 2025-01-10

## 2025-01-10 RX ORDER — CLOPIDOGREL BISULFATE 75 MG/1
75 TABLET ORAL DAILY
Status: DISCONTINUED | OUTPATIENT
Start: 2025-01-10 | End: 2025-01-10 | Stop reason: HOSPADM

## 2025-01-10 RX ADMIN — RANOLAZINE 500 MG: 500 TABLET, FILM COATED, EXTENDED RELEASE ORAL at 10:33

## 2025-01-10 RX ADMIN — CARVEDILOL 3.12 MG: 3.12 TABLET, FILM COATED ORAL at 10:32

## 2025-01-10 RX ADMIN — CETIRIZINE HYDROCHLORIDE 10 MG: 10 TABLET, FILM COATED ORAL at 10:32

## 2025-01-10 RX ADMIN — PANTOPRAZOLE SODIUM 40 MG: 40 TABLET, DELAYED RELEASE ORAL at 10:38

## 2025-01-10 RX ADMIN — ASPIRIN 81 MG: 81 TABLET, COATED ORAL at 11:03

## 2025-01-10 RX ADMIN — LEVOTHYROXINE SODIUM 175 MCG: 0.12 TABLET ORAL at 10:32

## 2025-01-10 RX ADMIN — AMLODIPINE BESYLATE 5 MG: 5 TABLET ORAL at 10:32

## 2025-01-10 RX ADMIN — MORPHINE SULFATE 4 MG: 4 INJECTION, SOLUTION INTRAMUSCULAR; INTRAVENOUS at 01:40

## 2025-01-10 RX ADMIN — CYCLOBENZAPRINE 5 MG: 10 TABLET, FILM COATED ORAL at 10:31

## 2025-01-10 RX ADMIN — IOPAMIDOL 75 ML: 755 INJECTION, SOLUTION INTRAVENOUS at 02:21

## 2025-01-10 RX ADMIN — ENOXAPARIN SODIUM 40 MG: 100 INJECTION SUBCUTANEOUS at 10:31

## 2025-01-10 RX ADMIN — CLOPIDOGREL BISULFATE 75 MG: 75 TABLET ORAL at 10:32

## 2025-01-10 RX ADMIN — ATORVASTATIN CALCIUM 80 MG: 80 TABLET, FILM COATED ORAL at 10:32

## 2025-01-10 RX ADMIN — TAMSULOSIN HYDROCHLORIDE 0.4 MG: 0.4 CAPSULE ORAL at 10:32

## 2025-01-10 RX ADMIN — ONDANSETRON 4 MG: 2 INJECTION, SOLUTION INTRAMUSCULAR; INTRAVENOUS at 01:39

## 2025-01-10 ASSESSMENT — PAIN DESCRIPTION - ORIENTATION: ORIENTATION: LEFT

## 2025-01-10 ASSESSMENT — PAIN DESCRIPTION - LOCATION: LOCATION: CHEST

## 2025-01-10 ASSESSMENT — PAIN DESCRIPTION - DESCRIPTORS: DESCRIPTORS: PRESSURE;STABBING

## 2025-01-10 ASSESSMENT — PAIN SCALES - GENERAL
PAINLEVEL_OUTOF10: 9
PAINLEVEL_OUTOF10: 10
PAINLEVEL_OUTOF10: 9

## 2025-01-10 ASSESSMENT — PAIN - FUNCTIONAL ASSESSMENT: PAIN_FUNCTIONAL_ASSESSMENT: 0-10

## 2025-01-10 NOTE — H&P
PRN   Or  potassium alternative oral replacement, 40 mEq, PRN   Or  potassium chloride, 10 mEq, PRN  magnesium sulfate, 2,000 mg, PRN  ondansetron, 4 mg, Q8H PRN   Or  ondansetron, 4 mg, Q6H PRN  acetaminophen, 650 mg, Q6H PRN   Or  acetaminophen, 650 mg, Q6H PRN  polyethylene glycol, 17 g, Daily PRN  aluminum & magnesium hydroxide-simethicone, 30 mL, Q6H PRN  albuterol sulfate HFA, 2 puff, Q6H PRN  cyclobenzaprine, 5 mg, PRN  fluticasone, 2 spray, Daily PRN        Labs      CBC:   Recent Labs     01/10/25  0124   WBC 10.3   HGB 12.9*        BMP:    Recent Labs     01/10/25  0124      K 4.3      CO2 21   BUN 14   CREATININE 1.3   GLUCOSE 191*     Hepatic:   Recent Labs     01/10/25  0124   AST 30   ALT 9*   BILITOT <0.2   ALKPHOS 89     Lipids:   Lab Results   Component Value Date/Time    CHOL 168 12/03/2024 05:26 AM    HDL 42 12/03/2024 05:26 AM    TRIG 101 12/03/2024 05:26 AM     Hemoglobin A1C:   Lab Results   Component Value Date/Time    LABA1C 6.0 12/03/2024 05:26 AM     TSH: No results found for: \"TSH\"  Troponin: No results found for: \"TROPONINT\"  Lactic Acid: No results for input(s): \"LACTA\" in the last 72 hours.  BNP:   Recent Labs     01/10/25  0124   PROBNP 229*     UA:  Lab Results   Component Value Date/Time    NITRU Negative 12/02/2024 10:54 AM    COLORU Yellow 12/02/2024 10:54 AM    PHUR 6.0 12/02/2024 10:54 AM    PHUR 6.0 11/19/2023 10:46 AM    WBCUA 3-5 12/02/2024 10:54 AM    RBCUA 0-2 12/02/2024 10:54 AM    MUCUS 2+ 12/02/2024 10:54 AM    BACTERIA 1+ 11/17/2024 09:43 PM    CLARITYU Clear 12/02/2024 10:54 AM    LEUKOCYTESUR Negative 12/02/2024 10:54 AM    UROBILINOGEN 0.2 12/02/2024 10:54 AM    BILIRUBINUR Negative 12/02/2024 10:54 AM    BLOODU Negative 12/02/2024 10:54 AM    GLUCOSEU Negative 12/02/2024 10:54 AM    GLUCOSEU NEGATIVE 08/05/2011 10:22 AM    KETUA Negative 12/02/2024 10:54 AM    AMORPHOUS 4+ 11/17/2024 09:43 PM     Urine Cultures: No results found for:

## 2025-01-10 NOTE — ED NOTES
Provided pt with starry and raul crackers  
Pt arrived via [facility] from home for c/o left-sided stabbing, pressure chest pain 10/10 onset 0030. Reports hx of 14 Mis with 10 stents placed. States he has not taken his blood thinners in about a week because his ex-girlfriend is keeping them from him. Pt arrives in c-collar after reporting spinal stenosis surgery roughly 3 weeks ago. Pt awake, alert, and oriented x3. Skin warm, dry, and color appropriate for ethnicity. Respirations easy and unlabored. Pt placed on cardiac monitor. EKG performed and given to MD Christ for assessment. 20g placed in RAC by ANNELISE Diaz. Blood collected, labeled, and sent to lab for analysis. Bed in lowest position, wheels locked, and both side rails up for safety. Pt instructed on how to use call light and left within reach. Door and curtain left open for visibility. Pt bundled with blankets for comfort. Pt denies further needs at this time.   
(TYLENOL) tablet 650 mg (has no administration in time range)     Or   acetaminophen (TYLENOL) suppository 650 mg (has no administration in time range)   polyethylene glycol (GLYCOLAX) packet 17 g (has no administration in time range)   aluminum & magnesium hydroxide-simethicone (MAALOX) 200-200-20 MG/5ML suspension 30 mL (has no administration in time range)   enoxaparin (LOVENOX) injection 40 mg (has no administration in time range)   aspirin EC tablet 81 mg (has no administration in time range)   albuterol sulfate HFA (PROVENTIL;VENTOLIN;PROAIR) 108 (90 Base) MCG/ACT inhaler 2 puff (has no administration in time range)   amLODIPine (NORVASC) tablet 5 mg (has no administration in time range)   atorvastatin (LIPITOR) tablet 80 mg (has no administration in time range)   carvedilol (COREG) tablet 3.125 mg (has no administration in time range)   clopidogrel (PLAVIX) tablet 75 mg (has no administration in time range)   cyclobenzaprine (FLEXERIL) tablet 5 mg (has no administration in time range)   DULoxetine (CYMBALTA) extended release capsule 60 mg (has no administration in time range)   vitamin D (ERGOCALCIFEROL) capsule 50,000 Units (has no administration in time range)   fluPHENAZine HCl (PROLIXIN) tablet 5 mg (has no administration in time range)   fluticasone (FLONASE) 50 MCG/ACT nasal spray 2 spray (has no administration in time range)   gabapentin (NEURONTIN) capsule 100 mg (has no administration in time range)   levothyroxine (SYNTHROID) tablet 175 mcg (has no administration in time range)   cetirizine (ZYRTEC) tablet 10 mg (has no administration in time range)   pantoprazole (PROTONIX) tablet 40 mg (has no administration in time range)   prazosin (MINIPRESS) capsule 5 mg (has no administration in time range)   ranolazine (RANEXA) extended release tablet 500 mg (has no administration in time range)   tamsulosin (FLOMAX) capsule 0.4 mg (has no administration in time range)   morphine (PF) injection 4 mg (4 mg

## 2025-01-10 NOTE — CONSULTS
Referring Physician: Dr. SHALONDA Prado  Reason for Consultation: \"Chest pain in patient with CAD\"  Chief Complaint: Chest pain    Subjective:   History of Present Illness:  Ashutosh Donovan is a 54 y.o. patient who presented to the hospital with complaints of recurrent chest pains.  The patient is quite drowsy and falls asleep in conversation.  While awake, he is answering questions appropriately.  He reports chronic recurrent chest pains.  He says the pain is similar to numerous prior episodes of chest pain.  It is primarily left-sided and radiates towards his left shoulder.  The pain was present for multiple hours before arriving in the emergency department.  His troponins remain normal.  Pain was not improved with nitroglycerin but states that morphine helped.  He has known CAD and had an angiogram <1-year ago with patent stents and no intervention.  He reports compliance with his medications.      Past Medical History:   has a past medical history of Arthritis, CAD (coronary artery disease), CHF (congestive heart failure) (HCC), Chronic pain, Heart attack (HCC), Hepatitis C, and Hypothyroid.    Surgical History:   has a past surgical history that includes Coronary stent placement; Cholecystectomy; Tonsillectomy; and cervical fusion (N/A, 11/22/2024).     Social History:   reports that he has been smoking cigarettes. He has never used smokeless tobacco. He reports current drug use. Drug: Marijuana (Weed). He reports that he does not drink alcohol.     Family History:  family history is not on file.    Home Medications:  Were reviewed and are listed in nursing record and/or below  Prior to Admission medications    Medication Sig Start Date End Date Taking? Authorizing Provider   gabapentin (NEURONTIN) 100 MG capsule Take 1 capsule by mouth 3 times daily for 30 days. 12/7/24 1/6/25  Issa Beasley MD   aspirin 81 MG EC tablet Take 1 tablet by mouth daily 11/24/24   J Luis Luis MD   ranolazine (RANEXA)

## 2025-01-10 NOTE — ED PROVIDER NOTES
[facility] EMERGENCY DEPARTMENT    CHIEF COMPLAINT  Chest Pain (Sudden onset stabbing, pressure left-sided chest pain 10/10 radiating to left shoulder and arm. Hx 14 Mis and 10 stents placed. Tooik 324 asa and 2 nitros at home which provided some temporary relief.)       HISTORY OF PRESENT ILLNESS  Ashutosh Donovan is a 54 y.o. male who presents to the ED  Patient presents for evaluation of chest pain.  Patient states that he has been having pain on the left side of his chest since last night.  He states that the pain radiates up into his shoulder and down his arm.  Patient states that he took 2 nitroglycerin at home as well as aspirin with little to no relief of his symptoms.  Patient states that he does have a history of coronary artery disease and has had stents placed.  Symptoms are described as moderate and unchanged arrival to the emergency department.    I have reviewed the following from the nursing documentation:    Past Medical History:   Diagnosis Date    Arthritis     CAD (coronary artery disease)     CHF (congestive heart failure) ([facility])     Chronic pain     twister vertebra and two collapsed disc, states injury happened at 49yo    Heart attack ([facility])     pt reports 10 heart attacks in 6 years, 8 stents placed    Hepatitis C     Hypothyroid      Past Surgical History:   Procedure Laterality Date    CERVICAL FUSION N/A 11/22/2024    Cervical 3 - Cervical 5 Anterior Cervical Discectomy and Fusion performed by Otilia Ocampo MD at [facility] OR    CHOLECYSTECTOMY      CORONARY STENT PLACEMENT      8 stents in 6 years    TONSILLECTOMY       History reviewed. No pertinent family history.  Social History     Socioeconomic History    Marital status: Single     Spouse name: Not on file    Number of children: 0    Years of education: 11    Highest education level: Not on file   Occupational History    Not on file   Tobacco Use    Smoking status: Every Day     Current packs/day: 0.50     Types:

## 2025-01-10 NOTE — PROGRESS NOTES
Pt. Will need help with getting a ride for discharge . He states his insurance can give him one , but he does not know how to facilitate that .

## 2025-01-10 NOTE — DISCHARGE INSTR - COC
Continuity of Care Form    Patient Name: Ashutosh Donovan   :  1970  MRN:  [id number]    Admit date:  1/10/2025  Discharge date:  ***    Code Status Order: Full Code   Advance Directives:   Advance Care Flowsheet Documentation             Admitting Physician:  Clary Prado MD  PCP: No primary care provider on file.    Discharging Nurse: ***  Discharging Hospital Unit/Room#: J6V-5924/3128-01  Discharging Unit Phone Number: ***    Emergency Contact:   Extended Emergency Contact Information  Primary Emergency Contact: Kaylin Wood  Address[address] [place], [place]  Home Phone: [phone]  Relation: Parent  Secondary Emergency Contact: ZionJuanjose           [place], [place]  Relation: Brother/Sister    Past Surgical History:  Past Surgical History:   Procedure Laterality Date    CERVICAL FUSION N/A 2024    Cervical 3 - Cervical 5 Anterior Cervical Discectomy and Fusion performed by Otilia Ocampo MD at [facility] OR    CHOLECYSTECTOMY      CORONARY STENT PLACEMENT      8 stents in 6 years    TONSILLECTOMY         Immunization History:     There is no immunization history on file for this patient.    Active Problems:  Patient Active Problem List   Diagnosis Code    Spondylolisthesis at L4-L5 level M43.16    Urinary incontinence without sensory awareness N39.42    Lumbar pain with radiation down both legs M54.50, M79.604, M79.605    DDD (degenerative disc disease), lumbosacral M51.379    LA (acute kidney injury) ([facility]) N17.9    Mood disorder ([facility]) F39    Moderate episode of recurrent major depressive disorder ([facility]) F33.1    PTSD (post-traumatic stress disorder) F43.10    Cocaine use disorder in remission F14.91    Opioid use disorder, mild, in early remission ([facility]) F11.11    Major depression, recurrent ([facility]) F33.9    Coronary artery disease involving native coronary artery of native heart without angina pectoris I25.10    Tobacco use Z72.0    Chronic obstructive

## 2025-01-10 NOTE — PROGRESS NOTES
Data- discharge order received, pt verbalized agreement to discharge, disposition to previous residence, no needs for HHC/DME.     Action- discharge instructions prepared and given to pt. , pt verbalized understanding. Medication information packet given r/t NEW and/or CHANGED prescriptions emphasizing name/purpose/side effects, pt verbalized understanding. Discharge instruction summary: Diet- regular , Activity- up with cane , Primary Care Physician as follows: advised to make a foolow-up appointment with cardiology in 7-14 days , immunizations reviewed and up to date , prescription medications filled none . Inpatient treatment reviewed.     Response- Pt belongings gathered, IV removed. Disposition is home (no HHC/DME needs), transported with RN , taken to lobby via w/c w/ RN case manager picked him up at the front door of hospital, no complications.

## 2025-01-10 NOTE — CARE COORDINATION
Discharge Planning:      (CM) reviewed the patient's chart to assess needs. Patient's Readmission Risk Score is  OBS . Patient's medical insurance is  Payor: [employer] MEDICARE / Plan: [employer] GOLD PLUS HMO / Product Type: *No Product type* / .  Patient's PCP is No primary care provider on file. .  No needs anticipated, at this time. CM team to follow. Staff to inform CM if additional discharge needs arise.    Pts preferred pharmacy is   [id number] - [place], [place] - [address] - P [phone] - F [phone]  [address] [address]  Phone: [phone] Fax: [phone]    [id number] [place], [place] - [address] -  [phone] - F [phone]  [address] [address]  Phone: [phone] Fax: [phone]    [facility] [id number] [place], [place] - [address] [phone] - F [phone]  [address] [address]  Phone: [phone] Fax: [phone]    Electronically signed by Velia Vega RN on 1/10/2025 at 12:19 PM

## 2025-01-10 NOTE — PROGRESS NOTES
Pt arrived to floor from ER at 0652 via stretcher to RM 3128. Pt oriented to room, call light, policies and procedures, the menu and ordering. Call light within reach. Bed in lowest position, bed alarm on, and wheels locked. Pt verbalized understanding. No complaints, questions or concerns at this time.  Electronically signed by Jesusita Puga RN on 1/10/2025 at 6:52 AM

## 2025-04-24 ENCOUNTER — TRANSCRIBE ORDERS (OUTPATIENT)
Dept: ADMINISTRATIVE | Age: 55
End: 2025-04-24

## 2025-04-24 DIAGNOSIS — M48.02 CERVICAL SPINAL STENOSIS: Primary | ICD-10-CM

## 2025-04-28 ENCOUNTER — HOSPITAL ENCOUNTER (OUTPATIENT)
Age: 55
Setting detail: OBSERVATION
Discharge: LEFT BEFORE DISCHARGE | End: 2025-04-29
Attending: STUDENT IN AN ORGANIZED HEALTH CARE EDUCATION/TRAINING PROGRAM | Admitting: HOSPITALIST
Payer: MEDICARE

## 2025-04-28 ENCOUNTER — APPOINTMENT (OUTPATIENT)
Dept: MRI IMAGING | Age: 55
End: 2025-04-28
Payer: MEDICARE

## 2025-04-28 ENCOUNTER — APPOINTMENT (OUTPATIENT)
Dept: CT IMAGING | Age: 55
End: 2025-04-28
Payer: MEDICARE

## 2025-04-28 ENCOUNTER — APPOINTMENT (OUTPATIENT)
Dept: GENERAL RADIOLOGY | Age: 55
End: 2025-04-28
Payer: MEDICARE

## 2025-04-28 DIAGNOSIS — R07.9 CHEST PAIN, UNSPECIFIED TYPE: Primary | ICD-10-CM

## 2025-04-28 DIAGNOSIS — R42 DIZZINESS: ICD-10-CM

## 2025-04-28 LAB
ALBUMIN SERPL-MCNC: 4.1 G/DL (ref 3.4–5)
ALBUMIN/GLOB SERPL: 1.5 {RATIO} (ref 1.1–2.2)
ALP SERPL-CCNC: 106 U/L (ref 40–129)
ALT SERPL-CCNC: <5 U/L (ref 10–40)
ANION GAP SERPL CALCULATED.3IONS-SCNC: 11 MMOL/L (ref 3–16)
AST SERPL-CCNC: 17 U/L (ref 15–37)
BASOPHILS # BLD: 0.1 K/UL (ref 0–0.2)
BASOPHILS NFR BLD: 0.8 %
BILIRUB SERPL-MCNC: 0.3 MG/DL (ref 0–1)
BUN SERPL-MCNC: 14 MG/DL (ref 7–20)
CALCIUM SERPL-MCNC: 10 MG/DL (ref 8.3–10.6)
CHLORIDE SERPL-SCNC: 103 MMOL/L (ref 99–110)
CHP ED QC CHECK: NORMAL
CO2 SERPL-SCNC: 24 MMOL/L (ref 21–32)
CREAT SERPL-MCNC: 1.5 MG/DL (ref 0.9–1.3)
DEPRECATED RDW RBC AUTO: 16.1 % (ref 12.4–15.4)
EOSINOPHIL # BLD: 0.2 K/UL (ref 0–0.6)
EOSINOPHIL NFR BLD: 2.8 %
GFR SERPLBLD CREATININE-BSD FMLA CKD-EPI: 55 ML/MIN/{1.73_M2}
GLUCOSE BLD-MCNC: 111 MG/DL
GLUCOSE BLD-MCNC: 111 MG/DL (ref 70–99)
GLUCOSE SERPL-MCNC: 105 MG/DL (ref 70–99)
HCT VFR BLD AUTO: 39.4 % (ref 40.5–52.5)
HGB BLD-MCNC: 13.6 G/DL (ref 13.5–17.5)
INR PPP: 0.91 (ref 0.85–1.15)
LYMPHOCYTES # BLD: 1.7 K/UL (ref 1–5.1)
LYMPHOCYTES NFR BLD: 21.9 %
MCH RBC QN AUTO: 27.1 PG (ref 26–34)
MCHC RBC AUTO-ENTMCNC: 34.5 G/DL (ref 31–36)
MCV RBC AUTO: 78.5 FL (ref 80–100)
MONOCYTES # BLD: 0.9 K/UL (ref 0–1.3)
MONOCYTES NFR BLD: 12 %
NEUTROPHILS # BLD: 4.7 K/UL (ref 1.7–7.7)
NEUTROPHILS NFR BLD: 62.5 %
PERFORMED ON: ABNORMAL
PLATELET # BLD AUTO: 207 K/UL (ref 135–450)
PMV BLD AUTO: 9.6 FL (ref 5–10.5)
POTASSIUM SERPL-SCNC: 4.2 MMOL/L (ref 3.5–5.1)
PROT SERPL-MCNC: 6.8 G/DL (ref 6.4–8.2)
PROTHROMBIN TIME: 12.5 SEC (ref 11.9–14.9)
RBC # BLD AUTO: 5.02 M/UL (ref 4.2–5.9)
SODIUM SERPL-SCNC: 138 MMOL/L (ref 136–145)
TROPONIN, HIGH SENSITIVITY: 13 NG/L (ref 0–22)
TROPONIN, HIGH SENSITIVITY: 14 NG/L (ref 0–22)
WBC # BLD AUTO: 7.6 K/UL (ref 4–11)

## 2025-04-28 PROCEDURE — 2580000003 HC RX 258

## 2025-04-28 PROCEDURE — G0378 HOSPITAL OBSERVATION PER HR: HCPCS

## 2025-04-28 PROCEDURE — 80053 COMPREHEN METABOLIC PANEL: CPT

## 2025-04-28 PROCEDURE — 72156 MRI NECK SPINE W/O & W/DYE: CPT

## 2025-04-28 PROCEDURE — 6360000002 HC RX W HCPCS: Performed by: HOSPITALIST

## 2025-04-28 PROCEDURE — 36415 COLL VENOUS BLD VENIPUNCTURE: CPT

## 2025-04-28 PROCEDURE — 96375 TX/PRO/DX INJ NEW DRUG ADDON: CPT

## 2025-04-28 PROCEDURE — 71260 CT THORAX DX C+: CPT

## 2025-04-28 PROCEDURE — 70450 CT HEAD/BRAIN W/O DYE: CPT

## 2025-04-28 PROCEDURE — A9579 GAD-BASE MR CONTRAST NOS,1ML: HCPCS | Performed by: HOSPITALIST

## 2025-04-28 PROCEDURE — 6370000000 HC RX 637 (ALT 250 FOR IP): Performed by: HOSPITALIST

## 2025-04-28 PROCEDURE — 2580000003 HC RX 258: Performed by: HOSPITALIST

## 2025-04-28 PROCEDURE — 70498 CT ANGIOGRAPHY NECK: CPT

## 2025-04-28 PROCEDURE — 6370000000 HC RX 637 (ALT 250 FOR IP)

## 2025-04-28 PROCEDURE — 96374 THER/PROPH/DIAG INJ IV PUSH: CPT

## 2025-04-28 PROCEDURE — 72125 CT NECK SPINE W/O DYE: CPT

## 2025-04-28 PROCEDURE — 93005 ELECTROCARDIOGRAM TRACING: CPT | Performed by: STUDENT IN AN ORGANIZED HEALTH CARE EDUCATION/TRAINING PROGRAM

## 2025-04-28 PROCEDURE — 2500000003 HC RX 250 WO HCPCS

## 2025-04-28 PROCEDURE — 71045 X-RAY EXAM CHEST 1 VIEW: CPT

## 2025-04-28 PROCEDURE — 99285 EMERGENCY DEPT VISIT HI MDM: CPT

## 2025-04-28 PROCEDURE — 6360000004 HC RX CONTRAST MEDICATION: Performed by: HOSPITALIST

## 2025-04-28 PROCEDURE — 96365 THER/PROPH/DIAG IV INF INIT: CPT

## 2025-04-28 PROCEDURE — 85025 COMPLETE CBC W/AUTO DIFF WBC: CPT

## 2025-04-28 PROCEDURE — 6360000004 HC RX CONTRAST MEDICATION

## 2025-04-28 PROCEDURE — 6360000002 HC RX W HCPCS

## 2025-04-28 PROCEDURE — 85610 PROTHROMBIN TIME: CPT

## 2025-04-28 PROCEDURE — 84484 ASSAY OF TROPONIN QUANT: CPT

## 2025-04-28 PROCEDURE — 94760 N-INVAS EAR/PLS OXIMETRY 1: CPT

## 2025-04-28 RX ORDER — POLYETHYLENE GLYCOL 3350 17 G/17G
17 POWDER, FOR SOLUTION ORAL DAILY PRN
Status: DISCONTINUED | OUTPATIENT
Start: 2025-04-28 | End: 2025-04-29 | Stop reason: HOSPADM

## 2025-04-28 RX ORDER — RANOLAZINE 500 MG/1
500 TABLET, EXTENDED RELEASE ORAL 2 TIMES DAILY
Status: DISCONTINUED | OUTPATIENT
Start: 2025-04-28 | End: 2025-04-29 | Stop reason: HOSPADM

## 2025-04-28 RX ORDER — ENOXAPARIN SODIUM 100 MG/ML
40 INJECTION SUBCUTANEOUS DAILY
Status: DISCONTINUED | OUTPATIENT
Start: 2025-04-28 | End: 2025-04-29 | Stop reason: HOSPADM

## 2025-04-28 RX ORDER — IOPAMIDOL 755 MG/ML
50 INJECTION, SOLUTION INTRAVASCULAR
Status: COMPLETED | OUTPATIENT
Start: 2025-04-28 | End: 2025-04-28

## 2025-04-28 RX ORDER — TAMSULOSIN HYDROCHLORIDE 0.4 MG/1
0.4 CAPSULE ORAL DAILY
Status: DISCONTINUED | OUTPATIENT
Start: 2025-04-29 | End: 2025-04-29 | Stop reason: HOSPADM

## 2025-04-28 RX ORDER — SODIUM CHLORIDE 0.9 % (FLUSH) 0.9 %
5-40 SYRINGE (ML) INJECTION EVERY 12 HOURS SCHEDULED
Status: DISCONTINUED | OUTPATIENT
Start: 2025-04-28 | End: 2025-04-29 | Stop reason: HOSPADM

## 2025-04-28 RX ORDER — ONDANSETRON 4 MG/1
4 TABLET, ORALLY DISINTEGRATING ORAL EVERY 8 HOURS PRN
Status: DISCONTINUED | OUTPATIENT
Start: 2025-04-28 | End: 2025-04-29 | Stop reason: HOSPADM

## 2025-04-28 RX ORDER — DULOXETIN HYDROCHLORIDE 60 MG/1
60 CAPSULE, DELAYED RELEASE ORAL DAILY
Status: DISCONTINUED | OUTPATIENT
Start: 2025-04-29 | End: 2025-04-29 | Stop reason: HOSPADM

## 2025-04-28 RX ORDER — SODIUM CHLORIDE 9 MG/ML
INJECTION, SOLUTION INTRAVENOUS PRN
Status: DISCONTINUED | OUTPATIENT
Start: 2025-04-28 | End: 2025-04-29 | Stop reason: HOSPADM

## 2025-04-28 RX ORDER — PANTOPRAZOLE SODIUM 40 MG/1
40 TABLET, DELAYED RELEASE ORAL DAILY
COMMUNITY

## 2025-04-28 RX ORDER — ALBUTEROL SULFATE 90 UG/1
2 INHALANT RESPIRATORY (INHALATION) EVERY 6 HOURS PRN
Status: DISCONTINUED | OUTPATIENT
Start: 2025-04-28 | End: 2025-04-29 | Stop reason: HOSPADM

## 2025-04-28 RX ORDER — OXYCODONE HYDROCHLORIDE 5 MG/1
5 TABLET ORAL EVERY 8 HOURS PRN
Refills: 0 | Status: DISCONTINUED | OUTPATIENT
Start: 2025-04-28 | End: 2025-04-29 | Stop reason: HOSPADM

## 2025-04-28 RX ORDER — ASPIRIN 81 MG/1
81 TABLET ORAL DAILY
Status: DISCONTINUED | OUTPATIENT
Start: 2025-04-29 | End: 2025-04-29 | Stop reason: HOSPADM

## 2025-04-28 RX ORDER — ACETAMINOPHEN 325 MG/1
650 TABLET ORAL EVERY 6 HOURS PRN
Status: DISCONTINUED | OUTPATIENT
Start: 2025-04-28 | End: 2025-04-29 | Stop reason: HOSPADM

## 2025-04-28 RX ORDER — IOPAMIDOL 755 MG/ML
75 INJECTION, SOLUTION INTRAVASCULAR
Status: COMPLETED | OUTPATIENT
Start: 2025-04-28 | End: 2025-04-28

## 2025-04-28 RX ORDER — MAGNESIUM SULFATE IN WATER 40 MG/ML
2000 INJECTION, SOLUTION INTRAVENOUS PRN
Status: DISCONTINUED | OUTPATIENT
Start: 2025-04-28 | End: 2025-04-29 | Stop reason: HOSPADM

## 2025-04-28 RX ORDER — ACETAMINOPHEN 650 MG/1
650 SUPPOSITORY RECTAL EVERY 6 HOURS PRN
Status: DISCONTINUED | OUTPATIENT
Start: 2025-04-28 | End: 2025-04-29 | Stop reason: HOSPADM

## 2025-04-28 RX ORDER — OXYCODONE HCL 10 MG/1
10 TABLET, FILM COATED, EXTENDED RELEASE ORAL ONCE
Refills: 0 | Status: COMPLETED | OUTPATIENT
Start: 2025-04-28 | End: 2025-04-28

## 2025-04-28 RX ORDER — CLOPIDOGREL BISULFATE 75 MG/1
75 TABLET ORAL DAILY
Status: DISCONTINUED | OUTPATIENT
Start: 2025-04-29 | End: 2025-04-29 | Stop reason: HOSPADM

## 2025-04-28 RX ORDER — ISOSORBIDE MONONITRATE 60 MG/1
60 TABLET, EXTENDED RELEASE ORAL DAILY
Status: DISCONTINUED | OUTPATIENT
Start: 2025-04-29 | End: 2025-04-29 | Stop reason: HOSPADM

## 2025-04-28 RX ORDER — PRAZOSIN HYDROCHLORIDE 5 MG/1
5 CAPSULE ORAL NIGHTLY
Status: DISCONTINUED | OUTPATIENT
Start: 2025-04-28 | End: 2025-04-29 | Stop reason: HOSPADM

## 2025-04-28 RX ORDER — ATORVASTATIN CALCIUM 80 MG/1
80 TABLET, FILM COATED ORAL DAILY
Status: DISCONTINUED | OUTPATIENT
Start: 2025-04-28 | End: 2025-04-29 | Stop reason: HOSPADM

## 2025-04-28 RX ORDER — SODIUM CHLORIDE 0.9 % (FLUSH) 0.9 %
5-40 SYRINGE (ML) INJECTION PRN
Status: DISCONTINUED | OUTPATIENT
Start: 2025-04-28 | End: 2025-04-29 | Stop reason: HOSPADM

## 2025-04-28 RX ORDER — SODIUM CHLORIDE 9 MG/ML
INJECTION, SOLUTION INTRAVENOUS CONTINUOUS
Status: ACTIVE | OUTPATIENT
Start: 2025-04-28 | End: 2025-04-29

## 2025-04-28 RX ORDER — FLUPHENAZINE HYDROCHLORIDE 5 MG/1
5 TABLET ORAL NIGHTLY
Status: DISCONTINUED | OUTPATIENT
Start: 2025-04-28 | End: 2025-04-29 | Stop reason: HOSPADM

## 2025-04-28 RX ORDER — ERGOCALCIFEROL 1.25 MG/1
50000 CAPSULE, LIQUID FILLED ORAL WEEKLY
Status: DISCONTINUED | OUTPATIENT
Start: 2025-04-28 | End: 2025-04-29 | Stop reason: HOSPADM

## 2025-04-28 RX ORDER — CARVEDILOL 3.12 MG/1
3.12 TABLET ORAL 2 TIMES DAILY WITH MEALS
Status: DISCONTINUED | OUTPATIENT
Start: 2025-04-28 | End: 2025-04-29 | Stop reason: HOSPADM

## 2025-04-28 RX ORDER — LORAZEPAM 2 MG/ML
1 INJECTION INTRAMUSCULAR
Status: COMPLETED | OUTPATIENT
Start: 2025-04-28 | End: 2025-04-28

## 2025-04-28 RX ORDER — POTASSIUM CHLORIDE 1500 MG/1
40 TABLET, EXTENDED RELEASE ORAL PRN
Status: DISCONTINUED | OUTPATIENT
Start: 2025-04-28 | End: 2025-04-29 | Stop reason: HOSPADM

## 2025-04-28 RX ORDER — AMLODIPINE BESYLATE 5 MG/1
5 TABLET ORAL DAILY
Status: DISCONTINUED | OUTPATIENT
Start: 2025-04-29 | End: 2025-04-29 | Stop reason: HOSPADM

## 2025-04-28 RX ORDER — POTASSIUM CHLORIDE 7.45 MG/ML
10 INJECTION INTRAVENOUS PRN
Status: DISCONTINUED | OUTPATIENT
Start: 2025-04-28 | End: 2025-04-29 | Stop reason: HOSPADM

## 2025-04-28 RX ORDER — ONDANSETRON 2 MG/ML
4 INJECTION INTRAMUSCULAR; INTRAVENOUS EVERY 6 HOURS PRN
Status: DISCONTINUED | OUTPATIENT
Start: 2025-04-28 | End: 2025-04-29 | Stop reason: HOSPADM

## 2025-04-28 RX ORDER — PANTOPRAZOLE SODIUM 40 MG/1
40 TABLET, DELAYED RELEASE ORAL DAILY
Status: DISCONTINUED | OUTPATIENT
Start: 2025-04-29 | End: 2025-04-29 | Stop reason: HOSPADM

## 2025-04-28 RX ORDER — LEVOFLOXACIN 500 MG/1
500 TABLET, FILM COATED ORAL EVERY 24 HOURS
Status: DISCONTINUED | OUTPATIENT
Start: 2025-04-29 | End: 2025-04-29 | Stop reason: HOSPADM

## 2025-04-28 RX ORDER — NICOTINE 21 MG/24HR
1 PATCH, TRANSDERMAL 24 HOURS TRANSDERMAL DAILY
Status: DISCONTINUED | OUTPATIENT
Start: 2025-04-28 | End: 2025-04-29 | Stop reason: HOSPADM

## 2025-04-28 RX ADMIN — FLUPHENAZINE HYDROCHLORIDE 5 MG: 5 TABLET ORAL at 20:46

## 2025-04-28 RX ADMIN — PRAZOSIN HYDROCHLORIDE 5 MG: 5 CAPSULE ORAL at 20:46

## 2025-04-28 RX ADMIN — RANOLAZINE 500 MG: 500 TABLET, FILM COATED, EXTENDED RELEASE ORAL at 20:46

## 2025-04-28 RX ADMIN — ATORVASTATIN CALCIUM 80 MG: 80 TABLET, FILM COATED ORAL at 20:53

## 2025-04-28 RX ADMIN — ERGOCALCIFEROL 50000 UNITS: 1.25 CAPSULE ORAL at 17:46

## 2025-04-28 RX ADMIN — IOPAMIDOL 50 ML: 755 INJECTION, SOLUTION INTRAVENOUS at 13:04

## 2025-04-28 RX ADMIN — LORAZEPAM 1 MG: 2 INJECTION INTRAMUSCULAR; INTRAVENOUS at 16:21

## 2025-04-28 RX ADMIN — WATER 1000 MG: 1 INJECTION INTRAMUSCULAR; INTRAVENOUS; SUBCUTANEOUS at 14:23

## 2025-04-28 RX ADMIN — OXYCODONE HYDROCHLORIDE 10 MG: 10 TABLET, FILM COATED, EXTENDED RELEASE ORAL at 12:37

## 2025-04-28 RX ADMIN — GADOTERIDOL 15 ML: 279.3 INJECTION, SOLUTION INTRAVENOUS at 17:24

## 2025-04-28 RX ADMIN — IOPAMIDOL 75 ML: 755 INJECTION, SOLUTION INTRAVENOUS at 12:59

## 2025-04-28 RX ADMIN — CARVEDILOL 3.12 MG: 3.12 TABLET, FILM COATED ORAL at 20:46

## 2025-04-28 RX ADMIN — SODIUM CHLORIDE: 0.9 INJECTION, SOLUTION INTRAVENOUS at 17:48

## 2025-04-28 RX ADMIN — AZITHROMYCIN MONOHYDRATE 500 MG: 500 INJECTION, POWDER, LYOPHILIZED, FOR SOLUTION INTRAVENOUS at 14:29

## 2025-04-28 RX ADMIN — OXYCODONE 5 MG: 5 TABLET ORAL at 16:20

## 2025-04-28 RX ADMIN — TIZANIDINE 4 MG: 4 TABLET ORAL at 20:01

## 2025-04-28 ASSESSMENT — PAIN DESCRIPTION - LOCATION
LOCATION: NECK
LOCATION: BACK;CHEST;NECK
LOCATION: NECK

## 2025-04-28 ASSESSMENT — PAIN SCALES - GENERAL
PAINLEVEL_OUTOF10: 9
PAINLEVEL_OUTOF10: 9
PAINLEVEL_OUTOF10: 8
PAINLEVEL_OUTOF10: 9
PAINLEVEL_OUTOF10: 7
PAINLEVEL_OUTOF10: 5

## 2025-04-28 ASSESSMENT — PAIN DESCRIPTION - DESCRIPTORS
DESCRIPTORS: ACHING;DISCOMFORT
DESCRIPTORS: ACHING
DESCRIPTORS: DISCOMFORT

## 2025-04-28 NOTE — ED NOTES
Pt wearing c-collar from neck surgery last year, pt does not know when he is suppose to take this off, states he was told when he is in bed or laying around he can remove collar. Pt can sit straight up in bed without any assistance

## 2025-04-28 NOTE — ED PROVIDER NOTES
[facility]  EMERGENCY DEPARTMENT ENCOUNTER        Pt Name: Ashutosh Donovan  MRN: [id number]  Birthdate 1970  Date of evaluation: 4/28/2025  Provider: Kortney Zee PA-C  PCP: No primary care provider on file.  Note Started: 2:06 PM EDT 4/28/25       I have seen and evaluated this patient with my supervising physician Horacio Larose MD.      CHIEF COMPLAINT       Chief Complaint   Patient presents with    Chest Pain     Intermittent chest pain for one awake but worse today.  Pt states he has a feeling of the room spinning.  Onset of spinning ~ 1100 am today.  Pt states his feeling of dizziness is intermittent and has been going on for about 7 days.   Pt states he feels dizzy when he stands up. Pt with n/v this am.         HISTORY OF PRESENT ILLNESS: 1 or more Elements     History From: Patient      Ashutosh Donovan is a 54 y.o. male who presents to the ED with a concern of a midsternal intermittent chest pain that started about a week ago but noticed being slightly worse today  Patient also mentioned having dizziness and slurred speech that started 7 days ago, states dizziness seems to be worse when standing up, also mentioned having upper and lower extremity numbness  Patient had a C3 C5 discectomy and fusion on November 2025, said he is supposed to follow-up with [facility] but has not's make an appointment, he arrives wearing a c-collar, said that he has been wearing it daily.  Denies headaches, blurry vision  Does not take anticoagulation    Nursing Notes were all reviewed and agreed with or any disagreements were addressed in the HPI.    REVIEW OF SYSTEMS :      Review of Systems    Positives and Pertinent negatives as per HPI.     SURGICAL HISTORY     Past Surgical History:   Procedure Laterality Date    CERVICAL FUSION N/A 11/22/2024    Cervical 3 - Cervical 5 Anterior Cervical Discectomy and Fusion performed by Otilia Ocampo MD at [facility] OR    CHOLECYSTECTOMY

## 2025-04-28 NOTE — ED PROVIDER NOTES
ED Attending Staffing Note  Patient was seen with the BLANE Zee. I was present for the significant portions of the H&P as well as performance and interpretation of procedures and EKGs.     I have personally performed a face to face evaluation on this patient. I have reviewed and agree with history and physical examination patient management and disposition. I have personally saw the patient and take full responsibility for patient management.     CHIEF COMPLAINT    Chest Pain (Intermittent chest pain for one awake but worse today.  Pt states he has a feeling of the room spinning.  Onset of spinning ~ 1100 am today.  Pt states his feeling of dizziness is intermittent and has been going on for about 7 days.   Pt states he feels dizzy when he stands up. Pt with n/v this am.  )      PAST MEDICAL HISTORY    Past Medical History:   Diagnosis Date    Arthritis     CAD (coronary artery disease)     CHF (congestive heart failure) (HCC)     Chronic pain     twister vertebra and two collapsed disc, states injury happened at 47yo    Heart attack (HCC)     pt reports 10 heart attacks in 6 years, 8 stents placed    Hepatitis C     Hypothyroid        Vitals  BP 97/73   Wt 89.4 kg (197 lb 1.5 oz)   SpO2 97%   BMI 27.49 kg/m²     HPI:  Ashutosh Donovan is a 54 y.o. male with history of CAD, HCV, CHF, cervical fusion who presents with chest pain, and vertigo. Briefly the salient points of the case are as follows:  Patient is here today with intermittent episodes of chest pain as well as room spinning sensation has been going for about 1 week, appears a very much intermittent in nature, it appears he worse never he stands  Otherwise denies falls or trauma  No fever no chills    MDM:  No acute distress, vital signs here are reassuring   This is a 54-year-old male here today with headache and dizziness and chest pain.  Will plan for CT of the head and neck, I will determine last known well the patient was.  Patient likely need
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
## 2025-04-28 NOTE — ED NOTES
Pt is moving all extremities reports numbness has been going on for a very long time, chest pain and head ache with dizziness for 2 wks, with worsening symptoms today.

## 2025-04-28 NOTE — ACP (ADVANCE CARE PLANNING)
Advance Care Planning     Advance Care Planning (ACP) Physician Conversation        Purpose of Encounter: Advanced care planning in light of hospital admission  Date of Conversation: 4/28/2025  Conducted with: Patient with Decision Making Capacity  Other persons present: None    Healthcare Decision Maker:   Primary Decision Maker: Zion RoweKaylin - Parent - [phone]    Secondary Decision Maker: Milligan,Cynthia - Girlfriend - [phone]     Today we documented Decision Maker(s) consistent with ACP documents on file.    Care Preferences:    Hospitalization:  \"If your health worsens and it becomes clear that your chance of recovery is unlikely, what would be your preference regarding hospitalization?\"  The patient would prefer hospitalization.    Ventilation:  \"If you were unable to breath on your own and your chance of recovery was unlikely, what would be your preference about the use of a ventilator (breathing machine) if it was available to you?\"  The patient would desire the use of a ventilator.    Resuscitation:  \"In the event your heart stopped as a result of an underlying serious health condition, would you want attempts made to restart your heart, or would you prefer a natural death?\"  Yes, attempt to resuscitate.    Additional topics discussed:treatment goals    Conversation Outcomes / Follow-Up Plan:  ACP in process - information provided, considering goals and options  Reviewed DNR/DNI and patient elects Full Code (Attempt Resuscitation)    Length of Voluntary ACP Conversation in minutes:  <16 minutes (Non-Billable)    Dee Angel MD             Please note that this document was prepared using a voice recognition software, please excuse any errors or inconsistencies in transcription.

## 2025-04-28 NOTE — ED NOTES
ED TO INPATIENT SBAR HANDOFF    Patient Name: Ashutosh Donovan   Preferred Name: Ashutosh  : 1970  54 y.o.   Family/Caregiver Present: no   Code Status Order: Prior  PO Status: NPO:No  Telemetry Order: No  C-SSRS: Risk of Suicide: No Risk  Sitter no  na  Restraints:     Sepsis Risk Score      Situation  Chief Complaint   Patient presents with    Chest Pain     Intermittent chest pain for one awake but worse today.  Pt states he has a feeling of the room spinning.  Onset of spinning ~ 1100 am today.  Pt states his feeling of dizziness is intermittent and has been going on for about 7 days.   Pt states he feels dizzy when he stands up. Pt with n/v this am.       Brief Description of Patient's Condition: multiple complaints, of chronic neck pain, dizziness, arm numbness with worsening symptoms past few days  Mental Status: oriented  Arrived from:Home  Imaging:   CTA HEAD NECK W CONTRAST   Final Result   1. Stable severe stenosis of the proximal left V4 segment.   2. Stable mild to moderate stenosis of the origin of the left vertebral   artery.   3. No significant stenosis of the cervical carotid arteries.         CT CHEST PULMONARY EMBOLISM W CONTRAST   Final Result   1. Subtle patchy ground-glass opacities in both lungs, predominantly in the lower lobes, possibly representing atypical pneumonia.   2. No evidence of pulmonary embolism.            CT HEAD WO CONTRAST   Final Result   No acute intracranial abnormality.         CT CERVICAL SPINE WO CONTRAST   Final Result   No acute abnormality of the cervical spine.         XR CHEST PORTABLE   Final Result   No acute process.           Abnormal labs:   Abnormal Labs Reviewed   CBC WITH AUTO DIFFERENTIAL - Abnormal; Notable for the following components:       Result Value    Hematocrit 39.4 (*)     MCV 78.5 (*)     RDW 16.1 (*)     All other components within normal limits   COMPREHENSIVE METABOLIC PANEL W/ REFLEX TO MG FOR LOW K - Abnormal; Notable for the

## 2025-04-29 VITALS
DIASTOLIC BLOOD PRESSURE: 61 MMHG | TEMPERATURE: 97.9 F | HEART RATE: 70 BPM | OXYGEN SATURATION: 96 % | SYSTOLIC BLOOD PRESSURE: 122 MMHG | RESPIRATION RATE: 16 BRPM | WEIGHT: 205.91 LBS | HEIGHT: 69 IN | BODY MASS INDEX: 30.5 KG/M2

## 2025-04-29 LAB
ANION GAP SERPL CALCULATED.3IONS-SCNC: 7 MMOL/L (ref 3–16)
BASOPHILS # BLD: 0.1 K/UL (ref 0–0.2)
BASOPHILS NFR BLD: 1.3 %
BUN SERPL-MCNC: 16 MG/DL (ref 7–20)
CALCIUM SERPL-MCNC: 9.4 MG/DL (ref 8.3–10.6)
CHLORIDE SERPL-SCNC: 106 MMOL/L (ref 99–110)
CO2 SERPL-SCNC: 26 MMOL/L (ref 21–32)
CREAT SERPL-MCNC: 1.2 MG/DL (ref 0.9–1.3)
CRP SERPL-MCNC: <3 MG/L (ref 0–5.1)
DEPRECATED RDW RBC AUTO: 16 % (ref 12.4–15.4)
EKG ATRIAL RATE: 82 BPM
EKG DIAGNOSIS: NORMAL
EKG P AXIS: 49 DEGREES
EKG P-R INTERVAL: 166 MS
EKG Q-T INTERVAL: 398 MS
EKG QRS DURATION: 114 MS
EKG QTC CALCULATION (BAZETT): 464 MS
EKG R AXIS: 45 DEGREES
EKG T AXIS: 49 DEGREES
EKG VENTRICULAR RATE: 82 BPM
EOSINOPHIL # BLD: 0.2 K/UL (ref 0–0.6)
EOSINOPHIL NFR BLD: 3.4 %
ERYTHROCYTE [SEDIMENTATION RATE] IN BLOOD BY WESTERGREN METHOD: 10 MM/HR (ref 0–20)
GFR SERPLBLD CREATININE-BSD FMLA CKD-EPI: 72 ML/MIN/{1.73_M2}
GLUCOSE SERPL-MCNC: 100 MG/DL (ref 70–99)
HCT VFR BLD AUTO: 37.6 % (ref 40.5–52.5)
HGB BLD-MCNC: 12.3 G/DL (ref 13.5–17.5)
LYMPHOCYTES # BLD: 1.9 K/UL (ref 1–5.1)
LYMPHOCYTES NFR BLD: 32.7 %
MCH RBC QN AUTO: 26.5 PG (ref 26–34)
MCHC RBC AUTO-ENTMCNC: 32.7 G/DL (ref 31–36)
MCV RBC AUTO: 81 FL (ref 80–100)
MONOCYTES # BLD: 0.8 K/UL (ref 0–1.3)
MONOCYTES NFR BLD: 13.3 %
NEUTROPHILS # BLD: 2.9 K/UL (ref 1.7–7.7)
NEUTROPHILS NFR BLD: 49.3 %
PLATELET # BLD AUTO: 201 K/UL (ref 135–450)
PMV BLD AUTO: 9.5 FL (ref 5–10.5)
POTASSIUM SERPL-SCNC: 4.5 MMOL/L (ref 3.5–5.1)
RBC # BLD AUTO: 4.64 M/UL (ref 4.2–5.9)
REPORT: NORMAL
RESP PATH DNA+RNA PNL L RESP NAA+NON-PRB: NORMAL
SODIUM SERPL-SCNC: 139 MMOL/L (ref 136–145)
WBC # BLD AUTO: 5.9 K/UL (ref 4–11)

## 2025-04-29 PROCEDURE — 97161 PT EVAL LOW COMPLEX 20 MIN: CPT

## 2025-04-29 PROCEDURE — 6370000000 HC RX 637 (ALT 250 FOR IP): Performed by: HOSPITALIST

## 2025-04-29 PROCEDURE — 36415 COLL VENOUS BLD VENIPUNCTURE: CPT

## 2025-04-29 PROCEDURE — 86140 C-REACTIVE PROTEIN: CPT

## 2025-04-29 PROCEDURE — 6370000000 HC RX 637 (ALT 250 FOR IP): Performed by: INTERNAL MEDICINE

## 2025-04-29 PROCEDURE — 6370000000 HC RX 637 (ALT 250 FOR IP)

## 2025-04-29 PROCEDURE — 97535 SELF CARE MNGMENT TRAINING: CPT

## 2025-04-29 PROCEDURE — 97166 OT EVAL MOD COMPLEX 45 MIN: CPT

## 2025-04-29 PROCEDURE — G0378 HOSPITAL OBSERVATION PER HR: HCPCS

## 2025-04-29 PROCEDURE — 94760 N-INVAS EAR/PLS OXIMETRY 1: CPT

## 2025-04-29 PROCEDURE — 85025 COMPLETE CBC W/AUTO DIFF WBC: CPT

## 2025-04-29 PROCEDURE — 87633 RESP VIRUS 12-25 TARGETS: CPT

## 2025-04-29 PROCEDURE — 93010 ELECTROCARDIOGRAM REPORT: CPT | Performed by: STUDENT IN AN ORGANIZED HEALTH CARE EDUCATION/TRAINING PROGRAM

## 2025-04-29 PROCEDURE — 6360000002 HC RX W HCPCS: Performed by: HOSPITALIST

## 2025-04-29 PROCEDURE — 80048 BASIC METABOLIC PNL TOTAL CA: CPT

## 2025-04-29 PROCEDURE — 85652 RBC SED RATE AUTOMATED: CPT

## 2025-04-29 PROCEDURE — 96372 THER/PROPH/DIAG INJ SC/IM: CPT

## 2025-04-29 PROCEDURE — 97530 THERAPEUTIC ACTIVITIES: CPT

## 2025-04-29 RX ORDER — DOXYCYCLINE HYCLATE 100 MG
100 TABLET ORAL EVERY 12 HOURS SCHEDULED
Status: DISCONTINUED | OUTPATIENT
Start: 2025-04-29 | End: 2025-04-29 | Stop reason: HOSPADM

## 2025-04-29 RX ADMIN — LEVOTHYROXINE SODIUM 175 MCG: 0.12 TABLET ORAL at 06:24

## 2025-04-29 RX ADMIN — DOXYCYCLINE HYCLATE 100 MG: 100 TABLET, FILM COATED ORAL at 09:48

## 2025-04-29 RX ADMIN — AMLODIPINE BESYLATE 5 MG: 5 TABLET ORAL at 08:28

## 2025-04-29 RX ADMIN — LEVOFLOXACIN 500 MG: 500 TABLET, FILM COATED ORAL at 06:23

## 2025-04-29 RX ADMIN — ASPIRIN 81 MG: 81 TABLET, COATED ORAL at 08:28

## 2025-04-29 RX ADMIN — Medication 6 MG: at 02:54

## 2025-04-29 RX ADMIN — RANOLAZINE 500 MG: 500 TABLET, FILM COATED, EXTENDED RELEASE ORAL at 08:27

## 2025-04-29 RX ADMIN — CLOPIDOGREL BISULFATE 75 MG: 75 TABLET, FILM COATED ORAL at 08:28

## 2025-04-29 RX ADMIN — CARVEDILOL 3.12 MG: 3.12 TABLET, FILM COATED ORAL at 08:28

## 2025-04-29 RX ADMIN — PANTOPRAZOLE SODIUM 40 MG: 40 TABLET, DELAYED RELEASE ORAL at 08:27

## 2025-04-29 RX ADMIN — DULOXETINE 60 MG: 60 CAPSULE, DELAYED RELEASE ORAL at 08:28

## 2025-04-29 RX ADMIN — TIZANIDINE 4 MG: 4 TABLET ORAL at 08:28

## 2025-04-29 RX ADMIN — ENOXAPARIN SODIUM 40 MG: 100 INJECTION SUBCUTANEOUS at 08:27

## 2025-04-29 RX ADMIN — TAMSULOSIN HYDROCHLORIDE 0.4 MG: 0.4 CAPSULE ORAL at 08:28

## 2025-04-29 RX ADMIN — OXYCODONE 5 MG: 5 TABLET ORAL at 02:18

## 2025-04-29 RX ADMIN — ISOSORBIDE MONONITRATE 60 MG: 60 TABLET, EXTENDED RELEASE ORAL at 08:28

## 2025-04-29 ASSESSMENT — PAIN DESCRIPTION - LOCATION: LOCATION: BACK;NECK

## 2025-04-29 ASSESSMENT — PAIN SCALES - GENERAL
PAINLEVEL_OUTOF10: 0
PAINLEVEL_OUTOF10: 9
PAINLEVEL_OUTOF10: 0

## 2025-04-29 ASSESSMENT — PAIN DESCRIPTION - DESCRIPTORS: DESCRIPTORS: ACHING

## 2025-04-29 NOTE — PROGRESS NOTES
V2.0    [facility]      Name:  Ashutosh Donovan /Age/Sex: 1970  (54 y.o. male)   MRN & CSN:  [id number] & [id number] Encounter Date/Time: 2025 8:50 AM EDT   Location:  [place] [address]-01 PCP: No primary care provider on file.     Attending:Uday Walls MD       Hospital Day: 2    Assessment and Recommendations   Ashutosh Donovan is a 54 y.o. male who presents with Dizziness      Plan:   Right shoulder numbness and proximal arms bilaterally, placed in observation pain management MRI cervical spine noted with instrumented fusion of C3 C5, degenerative disc disease, spinal canal narrowing moderate at C4-C5 mild to moderate C3-C4 and mild at C5-C6 minimal at C6-C7 mildly increased T2 signal in the cervical spinal cord at C3-C4 and C4-C5 postoperative changes.  Coronary artery disease no chest pain negative troponin  CKD stage II mild worsening of creatinine on admission  Possible atypical pneumonia will continue antibiotics      Diet ADULT DIET; Regular   DVT Prophylaxis [] Lovenox, []  Heparin, [] SCDs, [] Ambulation,  [] Eliquis, [] Xarelto  [] Coumadin   Code Status Full Code             Personally reviewed Lab Studies and Imaging         Medical Decision Making:  The following items were considered in medical decision making:  Discussion of patient care with other providers  Reviewed clinical lab tests  Reviewed radiology tests  Reviewed other diagnostic tests/interventions  Independent review of radiologic images  Microbiology cultures and other micro tests reviewed      Subjective:     Chief Complaint: Shoulder numbness    Ashutosh Donovan is a 54 y.o. male who presents with shoulder movements no fever chills having some cough no chest pain      Review of Systems:      Pertinent positives and negatives discussed in HPI    Objective:     Intake/Output Summary (Last 24 hours) at 2025 0850  Last data filed at 2025 2114  Gross per 24 hour   Intake 480 ml   Output -- 
  [facility] - Occupational Therapy   Phone: [phone]    Occupational Therapy  Facility/Department:[address] MED SURG    [x] Initial Evaluation            [] Daily Treatment Note         [] Discharge Summary      Patient: Ashutosh Donovan   : 1970   MRN: [id number]   Date of Service:  2025    Staff Mobility Recommendation: RW x1    AM-PAC Score:   Discharge Recommendations: home w/PRN assist & HHOT     Admitting Diagnosis:  Dizziness  Ordering Physician: Dee Angel MD   Current Admission Summary: \"54 y.o. male with a past medical history significant for cervical spinal stenosis, status post cervical fusion in 2024, follows with neurosurgery at [place] every 3 months or so, has a hard collar in place, which she takes off once he is resting.  Lately has been having worsening numbness of the right shoulder and the right proximal arm, that has gotten worse over the last few days it seems.  Is also gotten extremely weak he states, he has nausea, has been having dizzy spells, all of those symptoms led him to come to the ED.  In the ED stroke activation was called, he had a head CT, CTA of the head and neck all of which were essentially negative, currently patient does not have any neurologic deficits, has paresthesias which are not entirely new, patient also had some chest pain but that has been going on and off for a long time now acutely at this moment, he denies chest pain, his troponin is negative, his EKG without any ischemic changes, creatinine slightly higher than baseline, other labs unremarkable, will be placed in observation for further management\"    Past Medical History:  has a past medical history of Arthritis, CAD (coronary artery disease), CHF (congestive heart failure) (HCC), Chronic pain, Heart attack (HCC), Hepatitis C, and Hypothyroid.  Past Surgical History:  has a past surgical history that includes Coronary stent placement; Cholecystectomy; Tonsillectomy; and 
Medication Reconciliation    List of medications patient is currently taking is complete.     Source of information: 1. Conversation with patient at bedside                                      2. EPIC records      Notes regarding home medications:   1. Patient received all of his morning home medications prior to arrival to the ER today.  2. Patient is currently out of his Ergocalciferol - he is due for a weekly dose of this now.    Wai Truong RPH, PharmD, BCPS  4/28/2025 2:05 PM                
Patient refused morning labs.     
Pt arrived to the floor from the ED. Oriented to room and call light system. Resting in bed with call light in reach.  
Pt is refusing bed alarm. Pt is verbally aggressive to staff. Pt states he will fight staff if alarm is turned on. Electronically signed by Justin N. Schoenung, RN on 4/29/2025 at 11:37 AM    
Pt wanting to go outside, explained to him that he can't leave the floor per policy. Pt still adamant about going outside, requesting charge nurse to talk to him. Charge nurse also telling him he can't go outside, still claiming he is going. Security called to floor. Pt pacing floor looking for elevators. Pt sat in lobby area by elevators. Pt ended up going outside, security brought back to floor. Stated they would call the police if pt went back outside. Pt back in room for now.   
Pt. Insistent on leaving AMA to pay his bills. Educated on importance of continuing care in the hospital. Pt. Ripped IV out of arm. And signed AMA papers. Pt. Ordered [employer] and walked out.  
any)  Hearing  Hearing: Within functional limits    Observation:   No overt abnormalities  Posture:   Forward head  Forward shoulders  Sensation:   reports numbness and tingling in (R) UE, (L) UE  Coordination Testing:   WFL    ROM:   (B) LE AROM WFL  Strength:   (B) LE strength grossly +3  Therapist Clinical Decision Making (Complexity): low complexity  Clinical Presentation: stable      Functional Mobility  Bed Mobility:  Supine to Sit: supervision  Rolling Right: supervision    Transfers:  Sit to stand transfer: stand by assistance  Stand to sit transfer: stand by assistance    Ambulation:  Surface:level surface  Assistive Device: no device  Assistance: stand by assistance, contact guard assistance  Distance: 15', 25'  Gait Mechanics: Increased lateral sway  Unsteady throughout trial  Fast pace, impulsive, not aware of managing IV.  Comments: Fall risk.  Balance:  Static Sitting Balance: fair (+): maintains balance at SBA/supervision without use of UE support  Static Standing Balance: fair (+): maintains balance at SBA/supervision without use of UE support    Exercise:   No exercises performed this session.       Cognition  WFL  Orientation:    oriented to person, oriented to place, and oriented to time    Education  Barriers To Learning: none  Patient Education: patient educated on goals, PT role and benefits, plan of care, general safety, functional mobility training, proper use of assistive device/equipment, adaptive device training, energy conservation, orientation, disease specific education, injury prevention, transfer training, discharge recommendations  Learning Assessment:  patient will require reinforcement due to impulsive behavior  Safety Interventions: patient at risk for falls, call light within reach, patient left in chair, chair alarm in place, and nurse notified    Plan  Frequency: 2-3 x/week  Current Treatment Recommendations: strengthening, ROM, balance training, functional mobility training,

## 2025-04-29 NOTE — PLAN OF CARE
Problem: Chronic Conditions and Co-morbidities  Goal: Patient's chronic conditions and co-morbidity symptoms are monitored and maintained or improved  4/29/2025 0959 by Emy Holland RN  Outcome: Progressing  4/28/2025 2335 by Rosie Blue RN  Outcome: Progressing     Problem: Discharge Planning  Goal: Discharge to home or other facility with appropriate resources  4/29/2025 0959 by Emy Holland RN  Outcome: Progressing  4/28/2025 2335 by Rosie Blue RN  Outcome: Progressing     Problem: Pain  Goal: Verbalizes/displays adequate comfort level or baseline comfort level  4/29/2025 0959 by mEy Holland RN  Outcome: Progressing  4/28/2025 2335 by Rosie Blue RN  Outcome: Progressing     Problem: Safety - Adult  Goal: Free from fall injury  4/29/2025 0959 by Emy Holland RN  Outcome: Progressing  4/28/2025 2335 by Rosie Blue RN  Outcome: Progressing

## 2025-04-29 NOTE — PLAN OF CARE
Plan of care note    Consult received.  Labs and MRI ordered    Unfortunately patient left AMA per chart review.    Will have my office continue to try to reach out.  However, pt. Has been noncompliant with obtaining post op images as well as smoking cessation and follow up visits.     FELY Ocampo MD

## 2025-04-30 NOTE — DISCHARGE SUMMARY
Called by nurse stating patient left AMA  Patient was admitted with shoulder numbness and arm numbness  Diagnosis at time of leave    Right shoulder numbness a  Coronary artery disease  CKD stage II   Possible atypical pneumonia

## 2025-05-02 ENCOUNTER — APPOINTMENT (OUTPATIENT)
Dept: GENERAL RADIOLOGY | Age: 55
End: 2025-05-02
Payer: MEDICARE

## 2025-05-02 ENCOUNTER — HOSPITAL ENCOUNTER (OUTPATIENT)
Age: 55
Setting detail: OBSERVATION
Discharge: HOME | End: 2025-05-03
Attending: STUDENT IN AN ORGANIZED HEALTH CARE EDUCATION/TRAINING PROGRAM | Admitting: STUDENT IN AN ORGANIZED HEALTH CARE EDUCATION/TRAINING PROGRAM
Payer: MEDICARE

## 2025-05-02 PROBLEM — R07.9 CHEST PAIN: Status: ACTIVE | Noted: 2025-05-02

## 2025-05-02 LAB
ANION GAP SERPL CALCULATED.3IONS-SCNC: 8 MMOL/L (ref 3–16)
BASOPHILS # BLD: 0.2 K/UL (ref 0–0.2)
BASOPHILS NFR BLD: 2.2 %
BUN SERPL-MCNC: 14 MG/DL (ref 7–20)
CALCIUM SERPL-MCNC: 9.9 MG/DL (ref 8.3–10.6)
CHLORIDE SERPL-SCNC: 110 MMOL/L (ref 99–110)
CO2 SERPL-SCNC: 24 MMOL/L (ref 21–32)
CREAT SERPL-MCNC: 1.2 MG/DL (ref 0.9–1.3)
DEPRECATED RDW RBC AUTO: 16.5 % (ref 12.4–15.4)
EKG ATRIAL RATE: 74 BPM
EKG DIAGNOSIS: NORMAL
EKG P AXIS: -26 DEGREES
EKG P-R INTERVAL: 164 MS
EKG Q-T INTERVAL: 406 MS
EKG QRS DURATION: 120 MS
EKG QTC CALCULATION (BAZETT): 450 MS
EKG R AXIS: 40 DEGREES
EKG T AXIS: 25 DEGREES
EKG VENTRICULAR RATE: 74 BPM
EOSINOPHIL # BLD: 0.3 K/UL (ref 0–0.6)
EOSINOPHIL NFR BLD: 3.7 %
GFR SERPLBLD CREATININE-BSD FMLA CKD-EPI: 72 ML/MIN/{1.73_M2}
GLUCOSE SERPL-MCNC: 90 MG/DL (ref 70–99)
HCT VFR BLD AUTO: 39.1 % (ref 40.5–52.5)
HGB BLD-MCNC: 13.2 G/DL (ref 13.5–17.5)
LYMPHOCYTES # BLD: 1.8 K/UL (ref 1–5.1)
LYMPHOCYTES NFR BLD: 25.6 %
MAGNESIUM SERPL-MCNC: 2.23 MG/DL (ref 1.8–2.4)
MCH RBC QN AUTO: 26.8 PG (ref 26–34)
MCHC RBC AUTO-ENTMCNC: 33.6 G/DL (ref 31–36)
MCV RBC AUTO: 79.8 FL (ref 80–100)
MONOCYTES # BLD: 0.8 K/UL (ref 0–1.3)
MONOCYTES NFR BLD: 10.7 %
NEUTROPHILS # BLD: 4.1 K/UL (ref 1.7–7.7)
NEUTROPHILS NFR BLD: 57.8 %
NT-PROBNP SERPL-MCNC: 243 PG/ML (ref 0–124)
PLATELET # BLD AUTO: 216 K/UL (ref 135–450)
PMV BLD AUTO: 9.5 FL (ref 5–10.5)
POTASSIUM SERPL-SCNC: 3.8 MMOL/L (ref 3.5–5.1)
RBC # BLD AUTO: 4.9 M/UL (ref 4.2–5.9)
SODIUM SERPL-SCNC: 142 MMOL/L (ref 136–145)
TROPONIN, HIGH SENSITIVITY: 15 NG/L (ref 0–22)
WBC # BLD AUTO: 7.1 K/UL (ref 4–11)

## 2025-05-02 PROCEDURE — 93010 ELECTROCARDIOGRAM REPORT: CPT | Performed by: INTERNAL MEDICINE

## 2025-05-02 PROCEDURE — 83880 ASSAY OF NATRIURETIC PEPTIDE: CPT

## 2025-05-02 PROCEDURE — G0378 HOSPITAL OBSERVATION PER HR: HCPCS

## 2025-05-02 PROCEDURE — 85025 COMPLETE CBC W/AUTO DIFF WBC: CPT

## 2025-05-02 PROCEDURE — 96376 TX/PRO/DX INJ SAME DRUG ADON: CPT

## 2025-05-02 PROCEDURE — 6370000000 HC RX 637 (ALT 250 FOR IP): Performed by: STUDENT IN AN ORGANIZED HEALTH CARE EDUCATION/TRAINING PROGRAM

## 2025-05-02 PROCEDURE — 80048 BASIC METABOLIC PNL TOTAL CA: CPT

## 2025-05-02 PROCEDURE — 2500000003 HC RX 250 WO HCPCS: Performed by: STUDENT IN AN ORGANIZED HEALTH CARE EDUCATION/TRAINING PROGRAM

## 2025-05-02 PROCEDURE — 99285 EMERGENCY DEPT VISIT HI MDM: CPT

## 2025-05-02 PROCEDURE — 83735 ASSAY OF MAGNESIUM: CPT

## 2025-05-02 PROCEDURE — 36415 COLL VENOUS BLD VENIPUNCTURE: CPT

## 2025-05-02 PROCEDURE — 71045 X-RAY EXAM CHEST 1 VIEW: CPT

## 2025-05-02 PROCEDURE — 6360000002 HC RX W HCPCS: Performed by: STUDENT IN AN ORGANIZED HEALTH CARE EDUCATION/TRAINING PROGRAM

## 2025-05-02 PROCEDURE — 6370000000 HC RX 637 (ALT 250 FOR IP): Performed by: NURSE PRACTITIONER

## 2025-05-02 PROCEDURE — 93005 ELECTROCARDIOGRAM TRACING: CPT | Performed by: STUDENT IN AN ORGANIZED HEALTH CARE EDUCATION/TRAINING PROGRAM

## 2025-05-02 PROCEDURE — 84484 ASSAY OF TROPONIN QUANT: CPT

## 2025-05-02 PROCEDURE — 96374 THER/PROPH/DIAG INJ IV PUSH: CPT

## 2025-05-02 RX ORDER — POLYETHYLENE GLYCOL 3350 17 G/17G
17 POWDER, FOR SOLUTION ORAL DAILY PRN
Status: DISCONTINUED | OUTPATIENT
Start: 2025-05-02 | End: 2025-05-03 | Stop reason: HOSPADM

## 2025-05-02 RX ORDER — POTASSIUM CHLORIDE 1500 MG/1
40 TABLET, EXTENDED RELEASE ORAL PRN
Status: DISCONTINUED | OUTPATIENT
Start: 2025-05-02 | End: 2025-05-03 | Stop reason: HOSPADM

## 2025-05-02 RX ORDER — CLOPIDOGREL BISULFATE 75 MG/1
75 TABLET ORAL DAILY
Status: DISCONTINUED | OUTPATIENT
Start: 2025-05-03 | End: 2025-05-03 | Stop reason: HOSPADM

## 2025-05-02 RX ORDER — MORPHINE SULFATE 2 MG/ML
2 INJECTION, SOLUTION INTRAMUSCULAR; INTRAVENOUS ONCE
Status: COMPLETED | OUTPATIENT
Start: 2025-05-02 | End: 2025-05-02

## 2025-05-02 RX ORDER — PANTOPRAZOLE SODIUM 40 MG/1
40 TABLET, DELAYED RELEASE ORAL
Status: DISCONTINUED | OUTPATIENT
Start: 2025-05-03 | End: 2025-05-03 | Stop reason: HOSPADM

## 2025-05-02 RX ORDER — SODIUM CHLORIDE 0.9 % (FLUSH) 0.9 %
5-40 SYRINGE (ML) INJECTION PRN
Status: DISCONTINUED | OUTPATIENT
Start: 2025-05-02 | End: 2025-05-03 | Stop reason: HOSPADM

## 2025-05-02 RX ORDER — AMLODIPINE BESYLATE 5 MG/1
5 TABLET ORAL DAILY
Status: DISCONTINUED | OUTPATIENT
Start: 2025-05-03 | End: 2025-05-03 | Stop reason: HOSPADM

## 2025-05-02 RX ORDER — CETIRIZINE HYDROCHLORIDE 10 MG/1
10 TABLET ORAL DAILY
Status: DISCONTINUED | OUTPATIENT
Start: 2025-05-03 | End: 2025-05-03 | Stop reason: HOSPADM

## 2025-05-02 RX ORDER — ASPIRIN 81 MG/1
81 TABLET ORAL DAILY
Status: DISCONTINUED | OUTPATIENT
Start: 2025-05-03 | End: 2025-05-03 | Stop reason: HOSPADM

## 2025-05-02 RX ORDER — CARVEDILOL 3.12 MG/1
3.12 TABLET ORAL 2 TIMES DAILY WITH MEALS
Status: DISCONTINUED | OUTPATIENT
Start: 2025-05-02 | End: 2025-05-03 | Stop reason: HOSPADM

## 2025-05-02 RX ORDER — POTASSIUM CHLORIDE 7.45 MG/ML
10 INJECTION INTRAVENOUS PRN
Status: DISCONTINUED | OUTPATIENT
Start: 2025-05-02 | End: 2025-05-03 | Stop reason: HOSPADM

## 2025-05-02 RX ORDER — MAGNESIUM SULFATE IN WATER 40 MG/ML
2000 INJECTION, SOLUTION INTRAVENOUS PRN
Status: DISCONTINUED | OUTPATIENT
Start: 2025-05-02 | End: 2025-05-03 | Stop reason: HOSPADM

## 2025-05-02 RX ORDER — SODIUM CHLORIDE 9 MG/ML
INJECTION, SOLUTION INTRAVENOUS PRN
Status: DISCONTINUED | OUTPATIENT
Start: 2025-05-02 | End: 2025-05-03 | Stop reason: HOSPADM

## 2025-05-02 RX ORDER — RANOLAZINE 500 MG/1
500 TABLET, EXTENDED RELEASE ORAL 2 TIMES DAILY
Status: DISCONTINUED | OUTPATIENT
Start: 2025-05-02 | End: 2025-05-03 | Stop reason: HOSPADM

## 2025-05-02 RX ORDER — ONDANSETRON 2 MG/ML
4 INJECTION INTRAMUSCULAR; INTRAVENOUS EVERY 6 HOURS PRN
Status: DISCONTINUED | OUTPATIENT
Start: 2025-05-02 | End: 2025-05-03 | Stop reason: HOSPADM

## 2025-05-02 RX ORDER — MORPHINE SULFATE 2 MG/ML
2 INJECTION, SOLUTION INTRAMUSCULAR; INTRAVENOUS EVERY 4 HOURS PRN
Status: DISCONTINUED | OUTPATIENT
Start: 2025-05-02 | End: 2025-05-03 | Stop reason: HOSPADM

## 2025-05-02 RX ORDER — NICOTINE 21 MG/24HR
1 PATCH, TRANSDERMAL 24 HOURS TRANSDERMAL DAILY PRN
Status: DISCONTINUED | OUTPATIENT
Start: 2025-05-02 | End: 2025-05-03 | Stop reason: HOSPADM

## 2025-05-02 RX ORDER — ENOXAPARIN SODIUM 100 MG/ML
40 INJECTION SUBCUTANEOUS DAILY
Status: DISCONTINUED | OUTPATIENT
Start: 2025-05-03 | End: 2025-05-03 | Stop reason: HOSPADM

## 2025-05-02 RX ORDER — FLUPHENAZINE HYDROCHLORIDE 1 MG/1
5 TABLET ORAL NIGHTLY
Status: DISCONTINUED | OUTPATIENT
Start: 2025-05-02 | End: 2025-05-03 | Stop reason: HOSPADM

## 2025-05-02 RX ORDER — ATORVASTATIN CALCIUM 80 MG/1
80 TABLET, FILM COATED ORAL DAILY
Status: DISCONTINUED | OUTPATIENT
Start: 2025-05-03 | End: 2025-05-03 | Stop reason: HOSPADM

## 2025-05-02 RX ORDER — ALBUTEROL SULFATE 90 UG/1
2 INHALANT RESPIRATORY (INHALATION) EVERY 6 HOURS PRN
Status: DISCONTINUED | OUTPATIENT
Start: 2025-05-02 | End: 2025-05-03 | Stop reason: HOSPADM

## 2025-05-02 RX ORDER — NITROGLYCERIN 0.4 MG/1
0.4 TABLET SUBLINGUAL EVERY 5 MIN PRN
Status: DISCONTINUED | OUTPATIENT
Start: 2025-05-02 | End: 2025-05-02 | Stop reason: SDUPTHER

## 2025-05-02 RX ORDER — ERGOCALCIFEROL 1.25 MG/1
50000 CAPSULE, LIQUID FILLED ORAL WEEKLY
Status: DISCONTINUED | OUTPATIENT
Start: 2025-05-03 | End: 2025-05-03 | Stop reason: HOSPADM

## 2025-05-02 RX ORDER — DULOXETIN HYDROCHLORIDE 60 MG/1
60 CAPSULE, DELAYED RELEASE ORAL DAILY
Status: DISCONTINUED | OUTPATIENT
Start: 2025-05-03 | End: 2025-05-03 | Stop reason: HOSPADM

## 2025-05-02 RX ORDER — PRAZOSIN HYDROCHLORIDE 5 MG/1
5 CAPSULE ORAL NIGHTLY
Status: DISCONTINUED | OUTPATIENT
Start: 2025-05-02 | End: 2025-05-03 | Stop reason: HOSPADM

## 2025-05-02 RX ORDER — ONDANSETRON 4 MG/1
4 TABLET, ORALLY DISINTEGRATING ORAL EVERY 8 HOURS PRN
Status: DISCONTINUED | OUTPATIENT
Start: 2025-05-02 | End: 2025-05-03 | Stop reason: HOSPADM

## 2025-05-02 RX ORDER — SODIUM CHLORIDE 0.9 % (FLUSH) 0.9 %
5-40 SYRINGE (ML) INJECTION EVERY 12 HOURS SCHEDULED
Status: DISCONTINUED | OUTPATIENT
Start: 2025-05-02 | End: 2025-05-03 | Stop reason: HOSPADM

## 2025-05-02 RX ORDER — TAMSULOSIN HYDROCHLORIDE 0.4 MG/1
0.4 CAPSULE ORAL DAILY
Status: DISCONTINUED | OUTPATIENT
Start: 2025-05-03 | End: 2025-05-03 | Stop reason: HOSPADM

## 2025-05-02 RX ORDER — ISOSORBIDE MONONITRATE 60 MG/1
60 TABLET, EXTENDED RELEASE ORAL DAILY
Status: DISCONTINUED | OUTPATIENT
Start: 2025-05-03 | End: 2025-05-03 | Stop reason: HOSPADM

## 2025-05-02 RX ORDER — FLUTICASONE PROPIONATE 50 MCG
2 SPRAY, SUSPENSION (ML) NASAL DAILY PRN
Status: DISCONTINUED | OUTPATIENT
Start: 2025-05-02 | End: 2025-05-03 | Stop reason: HOSPADM

## 2025-05-02 RX ORDER — NITROGLYCERIN 0.4 MG/1
0.4 TABLET SUBLINGUAL EVERY 5 MIN PRN
Status: DISCONTINUED | OUTPATIENT
Start: 2025-05-02 | End: 2025-05-03 | Stop reason: HOSPADM

## 2025-05-02 RX ORDER — NITROGLYCERIN 0.4 MG/1
0.4 TABLET SUBLINGUAL ONCE
Status: COMPLETED | OUTPATIENT
Start: 2025-05-02 | End: 2025-05-02

## 2025-05-02 RX ADMIN — MORPHINE SULFATE 2 MG: 2 INJECTION, SOLUTION INTRAMUSCULAR; INTRAVENOUS at 14:28

## 2025-05-02 RX ADMIN — RANOLAZINE 500 MG: 500 TABLET, FILM COATED, EXTENDED RELEASE ORAL at 20:25

## 2025-05-02 RX ADMIN — FLUPHENAZINE HYDROCHLORIDE 5 MG: 1 TABLET ORAL at 20:25

## 2025-05-02 RX ADMIN — SODIUM CHLORIDE, PRESERVATIVE FREE 10 ML: 5 INJECTION INTRAVENOUS at 20:25

## 2025-05-02 RX ADMIN — CARVEDILOL 3.12 MG: 3.12 TABLET, FILM COATED ORAL at 17:37

## 2025-05-02 RX ADMIN — TIZANIDINE 4 MG: 4 TABLET ORAL at 18:43

## 2025-05-02 RX ADMIN — PRAZOSIN HYDROCHLORIDE 5 MG: 5 CAPSULE ORAL at 20:25

## 2025-05-02 RX ADMIN — ONDANSETRON 4 MG: 4 TABLET, ORALLY DISINTEGRATING ORAL at 18:43

## 2025-05-02 RX ADMIN — MORPHINE SULFATE 2 MG: 2 INJECTION, SOLUTION INTRAMUSCULAR; INTRAVENOUS at 21:33

## 2025-05-02 RX ADMIN — MORPHINE SULFATE 2 MG: 2 INJECTION, SOLUTION INTRAMUSCULAR; INTRAVENOUS at 17:37

## 2025-05-02 RX ADMIN — Medication 3 MG: at 21:46

## 2025-05-02 RX ADMIN — NITROGLYCERIN 0.4 MG: 0.4 TABLET SUBLINGUAL at 11:33

## 2025-05-02 ASSESSMENT — PAIN DESCRIPTION - ONSET: ONSET: GRADUAL

## 2025-05-02 ASSESSMENT — PAIN - FUNCTIONAL ASSESSMENT
PAIN_FUNCTIONAL_ASSESSMENT: 0-10
PAIN_FUNCTIONAL_ASSESSMENT: PREVENTS OR INTERFERES SOME ACTIVE ACTIVITIES AND ADLS

## 2025-05-02 ASSESSMENT — PAIN DESCRIPTION - DESCRIPTORS
DESCRIPTORS: PATIENT UNABLE TO DESCRIBE
DESCRIPTORS: PRESSURE;STABBING

## 2025-05-02 ASSESSMENT — PAIN DESCRIPTION - LOCATION
LOCATION: CHEST

## 2025-05-02 ASSESSMENT — PAIN SCALES - GENERAL
PAINLEVEL_OUTOF10: 3
PAINLEVEL_OUTOF10: 10
PAINLEVEL_OUTOF10: 9

## 2025-05-02 ASSESSMENT — PAIN DESCRIPTION - FREQUENCY: FREQUENCY: INTERMITTENT

## 2025-05-02 ASSESSMENT — PAIN DESCRIPTION - PAIN TYPE: TYPE: ACUTE PAIN

## 2025-05-02 ASSESSMENT — PAIN DESCRIPTION - ORIENTATION: ORIENTATION: LEFT

## 2025-05-02 ASSESSMENT — LIFESTYLE VARIABLES
HOW MANY STANDARD DRINKS CONTAINING ALCOHOL DO YOU HAVE ON A TYPICAL DAY: PATIENT DOES NOT DRINK
HOW OFTEN DO YOU HAVE A DRINK CONTAINING ALCOHOL: MONTHLY OR LESS
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
## 2025-05-02 NOTE — ED TRIAGE NOTES
Pt to  ed via ems from home c/o  substernal chest pain and SOB  that started an hour ago. Pt reports taking 1 nitro with no relief  Pt has extensive cardiac HX. EMS gave 324 ASA.

## 2025-05-02 NOTE — H&P
V2.0  History and Physical      Name:  Ashutosh Donovan /Age/Sex: 1970  (54 y.o. male)   MRN & CSN:  [id number] & [id number] Encounter Date/Time: 2025 12:33 PM EDT   Location:  [address] [place] [address] PCP: No primary care provider on file.       Hospital Day: 1    Assessment and Plan:   Ashutosh Donovan is a 54 y.o. male with a pertinent PMHx of CAD s/p PCI, CHF, hypothyroidism who presented complaining of severe substernal chest pain.    Chest pain  History of CAD  - Workup thus far reassuring with no acute changes on EKG and high-sensitivity troponin nonelevated x 2 which essentially rules out ACS, however he has several high risk characteristics and has frequent ED presentations complaining of chest pain  - He has had multiple echocardiograms within the last year with normal wall motion  - Had coronary angiography in May 2024 with mild CAD and patent stents  - I think he can likely be safely discharged without further ischemic workup and outpatient follow-up but will confirm with Cardiology  - Continue aspirin and Plavix  - Continue Imdur and ranolazine  - Continue carvedilol  - Continue atorvastatin    Essential hypertension  - Continue prazosin amlodipine and carvedilol    Hypothyroidism  - Continue Synthroid    Disposition:   Current Living situation: Home  Expected Disposition: Home  Estimated D/C: 5/3-    Diet No diet orders on file   DVT Prophylaxis [x] Lovenox, []  Heparin, [] SCDs, [] Ambulation,  [] Eliquis, [] Xarelto, [] Coumadin   Code Status Prior         Medical Decision Making:  The following items were considered in medical decision making:  Discussion of patient care with other providers, including ED provider via [facility] recommended inpatient mission  Reviewed clinical lab tests such as BMP, high-sensitivity troponin, CBC  Reviewed radiology tests  Reviewed other diagnostic tests/interventions including prior stress test, echocardiogram, and coronary angiography  Independent  CONTRAST  Result Date: 4/28/2025  EXAMINATION: CTA OF THE HEAD AND NECK WITH CONTRAST 4/28/2025 12:54 pm: TECHNIQUE: CTA of the head and neck was performed with the administration of intravenous contrast. Multiplanar reformatted images are provided for review.  MIP images are provided for review. Stenosis of the internal carotid arteries measured using NASCET criteria. Automated exposure control, iterative reconstruction, and/or weight based adjustment of the mA/kV was utilized to reduce the radiation dose to as low as reasonably achievable. COMPARISON: 11/10/2024 HISTORY: ORDERING SYSTEM PROVIDED HISTORY: dizziness >7 days TECHNOLOGIST PROVIDED HISTORY: Reason for exam:->dizziness >7 days Has a \"code stroke\" or \"stroke alert\" been called?->No Decision Support Exception - unselect if not a suspected or confirmed emergency medical condition->Emergency Medical Condition (MA) Reason for Exam: dizziness >7 days Additional signs and symptoms: recent cervical surgery FINDINGS: CTA NECK: AORTIC ARCH/ARCH VESSELS: No dissection or arterial injury.  No significant stenosis of the brachiocephalic or subclavian arteries. CAROTID ARTERIES: No dissection, arterial injury, or hemodynamically significant stenosis by NASCET criteria. VERTEBRAL ARTERIES: Mild to moderate stenosis is identified within the origin of the left vertebral artery not significantly changed. SOFT TISSUES: The lung apices are clear.  No cervical or superior mediastinal lymphadenopathy.  The larynx and pharynx are unremarkable.  No acute abnormality of the salivary and thyroid glands. BONES: No acute osseous abnormality.  Anterior cervical fusion is identified from C3 through C5. CTA HEAD: ANTERIOR CIRCULATION: No significant stenosis of the intracranial internal carotid, anterior cerebral, or middle cerebral arteries. No aneurysm. POSTERIOR CIRCULATION: Severe stenosis is identified at the proximal aspect of the left V4 segment.  No significant change is

## 2025-05-02 NOTE — PROGRESS NOTES
Medication Reconciliation    List of medications patient is currently taking is complete.     Source of information: 1. Conversation with patient at bedside                                      2. EPIC records          Demond Hinojosa [facility]   5/2/2025  2:28 PM

## 2025-05-02 NOTE — ED PROVIDER NOTES
EMERGENCY DEPARTMENT ENCOUNTER      CHIEF COMPLAINT    Chest Pain (Pt to  ed via ems from home c/o  substernal chest pain and SOB  that started an hour ago. Pt reports taking 1 nitro with no relief  Pt has extensive cardiac HX. EMS gave 324 ASA. )    HPI    Ashutosh Donovan is a 54 y.o. male with past medical history significant for CAD, CHF who presents chest pain  Patient with chest pain it is substernal chest pain associated with difficulty in breathing, it goes into the left arm he has extensive cardiac history tells me his pain is similar to previous episodes of MI he took nitroglycerin prior to arrival with some improvement of symptoms he was loaded with aspirin  Patient was recently mid to the hospital for vertiginous-like symptoms nonspecific chest pain he left AMA as he had to go home to pay his rent he is now back in he does not have to worry about paying his bills and is agreeable to mission    PAST MEDICAL HISTORY    Past Medical History:   Diagnosis Date    Arthritis     CAD (coronary artery disease)     CHF (congestive heart failure) (HCC)     Chronic pain     twister vertebra and two collapsed disc, states injury happened at 47yo    Heart attack (HCC)     pt reports 10 heart attacks in 6 years, 8 stents placed    Hepatitis C     Hypothyroid      SURGICAL HISTORY    Past Surgical History:   Procedure Laterality Date    CERVICAL FUSION N/A 11/22/2024    Cervical 3 - Cervical 5 Anterior Cervical Discectomy and Fusion performed by Otilia Ocampo MD at [facility] OR    CHOLECYSTECTOMY      CORONARY STENT PLACEMENT      8 stents in 6 years    TONSILLECTOMY       CURRENT MEDICATIONS    Current Outpatient Rx   Medication Sig Dispense Refill    nitroGLYCERIN (NITROSTAT) 0.4 MG SL tablet Place 1 tablet under the tongue every 5 minutes as needed for Chest pain up to max of 3 total doses. If no relief after 1 dose, call 911. 25 tablet 0    atorvastatin (LIPITOR) 80 MG tablet Take 1 tablet by mouth daily 30

## 2025-05-02 NOTE — ED NOTES
ED TO INPATIENT SBAR HANDOFF    Patient Name: Ashutosh Donovan   Preferred Name: Ashutosh  : 1970  54 y.o.   Family/Caregiver Present: no   Code Status Order: Prior  PO Status: NPO:No  Telemetry Order: Yes  C-SSRS: Risk of Suicide: No Risk  Sitter no     Restraints:     Sepsis Risk Score      Situation  Chief Complaint   Patient presents with    Chest Pain     Pt to  ed via ems from home c/o  substernal chest pain and SOB  that started an hour ago. Pt reports taking 1 nitro with no relief  Pt has extensive cardiac HX. EMS gave 324 ASA.      Brief Description of Patient's Condition: stable   Mental Status: oriented, alert, coherent, logical, thought processes intact, and able to concentrate and follow conversation  Arrived from:Home  Imaging:   XR CHEST 1 VIEW   Final Result   No acute cardiopulmonary findings           Abnormal labs:   Abnormal Labs Reviewed   CBC WITH AUTO DIFFERENTIAL - Abnormal; Notable for the following components:       Result Value    Hemoglobin 13.2 (*)     Hematocrit 39.1 (*)     MCV 79.8 (*)     RDW 16.5 (*)     All other components within normal limits   BRAIN NATRIURETIC PEPTIDE - Abnormal; Notable for the following components:    NT Pro- (*)     All other components within normal limits       Background  Allergies:   Allergies   Allergen Reactions    Latex Itching    Bupropion Seizure    Tramadol Hives and Angioedema    Ziprasidone Hcl Seizure     Pt reports seizures with Geodon    Ketorolac Tromethamine Hives and Nausea And Vomiting    Lithium Other (See Comments)     Thyroid issues    Olanzapine Swelling    Risperidone Swelling    Acetaminophen Other (See Comments)     Pt has hep c    Ibuprofen Nausea Only and Rash     History:   Past Medical History:   Diagnosis Date    Arthritis     CAD (coronary artery disease)     CHF (congestive heart failure) ([facility])     Chronic pain     twister vertebra and two collapsed disc, states injury happened at 47yo    Heart attack ([facility])

## 2025-05-03 VITALS
WEIGHT: 207.45 LBS | RESPIRATION RATE: 16 BRPM | OXYGEN SATURATION: 95 % | BODY MASS INDEX: 30.73 KG/M2 | DIASTOLIC BLOOD PRESSURE: 98 MMHG | HEART RATE: 63 BPM | HEIGHT: 69 IN | SYSTOLIC BLOOD PRESSURE: 116 MMHG | TEMPERATURE: 97.3 F

## 2025-05-03 LAB
ANION GAP SERPL CALCULATED.3IONS-SCNC: 10 MMOL/L (ref 3–16)
BUN SERPL-MCNC: 14 MG/DL (ref 7–20)
CALCIUM SERPL-MCNC: 9.5 MG/DL (ref 8.3–10.6)
CHLORIDE SERPL-SCNC: 106 MMOL/L (ref 99–110)
CHOLEST SERPL-MCNC: 156 MG/DL (ref 0–199)
CO2 SERPL-SCNC: 23 MMOL/L (ref 21–32)
CREAT SERPL-MCNC: 1 MG/DL (ref 0.9–1.3)
DEPRECATED RDW RBC AUTO: 16.5 % (ref 12.4–15.4)
GFR SERPLBLD CREATININE-BSD FMLA CKD-EPI: 90 ML/MIN/{1.73_M2}
GLUCOSE SERPL-MCNC: 107 MG/DL (ref 70–99)
HCT VFR BLD AUTO: 37.1 % (ref 40.5–52.5)
HDLC SERPL-MCNC: 38 MG/DL (ref 40–60)
HGB BLD-MCNC: 12.7 G/DL (ref 13.5–17.5)
LDLC SERPL CALC-MCNC: 86 MG/DL
MCH RBC QN AUTO: 27 PG (ref 26–34)
MCHC RBC AUTO-ENTMCNC: 34.1 G/DL (ref 31–36)
MCV RBC AUTO: 79.2 FL (ref 80–100)
PLATELET # BLD AUTO: 199 K/UL (ref 135–450)
PMV BLD AUTO: 9.5 FL (ref 5–10.5)
POTASSIUM SERPL-SCNC: 3.9 MMOL/L (ref 3.5–5.1)
RBC # BLD AUTO: 4.68 M/UL (ref 4.2–5.9)
SODIUM SERPL-SCNC: 139 MMOL/L (ref 136–145)
TRIGL SERPL-MCNC: 159 MG/DL (ref 0–150)
VLDLC SERPL CALC-MCNC: 32 MG/DL
WBC # BLD AUTO: 6.3 K/UL (ref 4–11)

## 2025-05-03 PROCEDURE — 94760 N-INVAS EAR/PLS OXIMETRY 1: CPT

## 2025-05-03 PROCEDURE — 96376 TX/PRO/DX INJ SAME DRUG ADON: CPT

## 2025-05-03 PROCEDURE — 6370000000 HC RX 637 (ALT 250 FOR IP): Performed by: STUDENT IN AN ORGANIZED HEALTH CARE EDUCATION/TRAINING PROGRAM

## 2025-05-03 PROCEDURE — 80048 BASIC METABOLIC PNL TOTAL CA: CPT

## 2025-05-03 PROCEDURE — 85027 COMPLETE CBC AUTOMATED: CPT

## 2025-05-03 PROCEDURE — 99223 1ST HOSP IP/OBS HIGH 75: CPT | Performed by: INTERNAL MEDICINE

## 2025-05-03 PROCEDURE — G0378 HOSPITAL OBSERVATION PER HR: HCPCS

## 2025-05-03 PROCEDURE — 2500000003 HC RX 250 WO HCPCS: Performed by: STUDENT IN AN ORGANIZED HEALTH CARE EDUCATION/TRAINING PROGRAM

## 2025-05-03 PROCEDURE — 6360000002 HC RX W HCPCS: Performed by: STUDENT IN AN ORGANIZED HEALTH CARE EDUCATION/TRAINING PROGRAM

## 2025-05-03 PROCEDURE — 36415 COLL VENOUS BLD VENIPUNCTURE: CPT

## 2025-05-03 PROCEDURE — 6370000000 HC RX 637 (ALT 250 FOR IP)

## 2025-05-03 PROCEDURE — 80061 LIPID PANEL: CPT

## 2025-05-03 PROCEDURE — 96375 TX/PRO/DX INJ NEW DRUG ADDON: CPT

## 2025-05-03 RX ADMIN — MORPHINE SULFATE 2 MG: 2 INJECTION, SOLUTION INTRAMUSCULAR; INTRAVENOUS at 09:54

## 2025-05-03 RX ADMIN — LIDOCAINE HYDROCHLORIDE: 20 SOLUTION ORAL at 08:07

## 2025-05-03 RX ADMIN — MORPHINE SULFATE 2 MG: 2 INJECTION, SOLUTION INTRAMUSCULAR; INTRAVENOUS at 01:41

## 2025-05-03 RX ADMIN — TIZANIDINE 4 MG: 4 TABLET ORAL at 05:03

## 2025-05-03 RX ADMIN — MORPHINE SULFATE 2 MG: 2 INJECTION, SOLUTION INTRAMUSCULAR; INTRAVENOUS at 05:50

## 2025-05-03 RX ADMIN — ONDANSETRON 4 MG: 2 INJECTION, SOLUTION INTRAMUSCULAR; INTRAVENOUS at 06:16

## 2025-05-03 RX ADMIN — SODIUM CHLORIDE, PRESERVATIVE FREE 10 ML: 5 INJECTION INTRAVENOUS at 09:59

## 2025-05-03 ASSESSMENT — PAIN DESCRIPTION - DESCRIPTORS
DESCRIPTORS: SHARP
DESCRIPTORS: STABBING;SHARP

## 2025-05-03 ASSESSMENT — PAIN SCALES - GENERAL
PAINLEVEL_OUTOF10: 8
PAINLEVEL_OUTOF10: 3
PAINLEVEL_OUTOF10: 9

## 2025-05-03 ASSESSMENT — PAIN DESCRIPTION - FREQUENCY
FREQUENCY: CONTINUOUS
FREQUENCY: CONTINUOUS

## 2025-05-03 ASSESSMENT — PAIN DESCRIPTION - PAIN TYPE
TYPE: ACUTE PAIN
TYPE: CHRONIC PAIN

## 2025-05-03 ASSESSMENT — PAIN DESCRIPTION - ONSET
ONSET: ON-GOING
ONSET: ON-GOING

## 2025-05-03 ASSESSMENT — PAIN DESCRIPTION - ORIENTATION
ORIENTATION: LEFT
ORIENTATION: MID

## 2025-05-03 ASSESSMENT — PAIN DESCRIPTION - LOCATION
LOCATION: NECK
LOCATION: NECK
LOCATION: CHEST
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
## 2025-05-03 NOTE — PROGRESS NOTES
Patient called and I answered his call light. Then he started saying me bitch and verbally abusive to me. This is not acceptable. Even if I'm nice and helping him he was abusive.     Electronically signed by Aylin Bernard RN on 5/3/2025 at 5:17 AM

## 2025-05-03 NOTE — PLAN OF CARE
Problem: Chronic Conditions and Co-morbidities  Goal: Patient's chronic conditions and co-morbidity symptoms are monitored and maintained or improved  Outcome: Progressing  Flowsheets (Taken 5/2/2025 2256)  Care Plan - Patient's Chronic Conditions and Co-Morbidity Symptoms are Monitored and Maintained or Improved:   Update acute care plan with appropriate goals if chronic or comorbid symptoms are exacerbated and prevent overall improvement and discharge   Collaborate with multidisciplinary team to address chronic and comorbid conditions and prevent exacerbation or deterioration   Monitor and assess patient's chronic conditions and comorbid symptoms for stability, deterioration, or improvement     Problem: Discharge Planning  Goal: Discharge to home or other facility with appropriate resources  Outcome: Progressing  Flowsheets (Taken 5/2/2025 2256)  Discharge to home or other facility with appropriate resources:   Identify discharge learning needs (meds, wound care, etc)   Refer to discharge planning if patient needs post-hospital services based on physician order or complex needs related to functional status, cognitive ability or social support system   Identify barriers to discharge with patient and caregiver   Arrange for needed discharge resources and transportation as appropriate     Problem: Pain  Goal: Verbalizes/displays adequate comfort level or baseline comfort level  Outcome: Progressing  Flowsheets (Taken 5/2/2025 2256)  Verbalizes/displays adequate comfort level or baseline comfort level:   Consider cultural and social influences on pain and pain management   Administer analgesics based on type and severity of pain and evaluate response   Encourage patient to monitor pain and request assistance   Assess pain using appropriate pain scale   Implement non-pharmacological measures as appropriate and evaluate response   Notify Licensed Independent Practitioner if interventions unsuccessful or patient

## 2025-05-03 NOTE — PLAN OF CARE
Problem: Chronic Conditions and Co-morbidities  Goal: Patient's chronic conditions and co-morbidity symptoms are monitored and maintained or improved  5/3/2025 1005 by Antoinette Chau RN  Outcome: Progressing  5/2/2025 2256 by Aylin Bernard RN  Outcome: Progressing  Flowsheets (Taken 5/2/2025 2256)  Care Plan - Patient's Chronic Conditions and Co-Morbidity Symptoms are Monitored and Maintained or Improved:   Update acute care plan with appropriate goals if chronic or comorbid symptoms are exacerbated and prevent overall improvement and discharge   Collaborate with multidisciplinary team to address chronic and comorbid conditions and prevent exacerbation or deterioration   Monitor and assess patient's chronic conditions and comorbid symptoms for stability, deterioration, or improvement     Problem: Discharge Planning  Goal: Discharge to home or other facility with appropriate resources  5/3/2025 1005 by Antoinette Chau RN  Outcome: Progressing  5/2/2025 2256 by Aylin Bernard RN  Outcome: Progressing  Flowsheets (Taken 5/2/2025 2256)  Discharge to home or other facility with appropriate resources:   Identify discharge learning needs (meds, wound care, etc)   Refer to discharge planning if patient needs post-hospital services based on physician order or complex needs related to functional status, cognitive ability or social support system   Identify barriers to discharge with patient and caregiver   Arrange for needed discharge resources and transportation as appropriate     Problem: Pain  Goal: Verbalizes/displays adequate comfort level or baseline comfort level  5/3/2025 1005 by Antoinette Chau RN  Outcome: Progressing  5/2/2025 2256 by Aylin Bernard RN  Outcome: Progressing  Flowsheets (Taken 5/2/2025 2256)  Verbalizes/displays adequate comfort level or baseline comfort level:   Consider cultural and social influences on pain and pain management   Administer analgesics based on type and severity of pain and evaluate  response   Encourage patient to monitor pain and request assistance   Assess pain using appropriate pain scale   Implement non-pharmacological measures as appropriate and evaluate response   Notify Licensed Independent Practitioner if interventions unsuccessful or patient reports new pain     Problem: Safety - Adult  Goal: Free from fall injury  5/3/2025 1005 by Antoinette Chau RN  Outcome: Progressing  Flowsheets (Taken 5/2/2025 2257 by Aylin Bernard RN)  Free From Fall Injury: Instruct family/caregiver on patient safety  5/2/2025 2256 by Aylin Bernard RN  Outcome: Progressing  Flowsheets (Taken 5/2/2025 2256)  Free From Fall Injury: Instruct family/caregiver on patient safety     Problem: Skin/Tissue Integrity  Goal: Skin integrity remains intact  Description: 1.  Monitor for areas of redness and/or skin breakdown2.  Assess vascular access sites hourly3.  Every 4-6 hours minimum:  Change oxygen saturation probe site4.  Every 4-6 hours:  If on nasal continuous positive airway pressure, respiratory therapy assess nares and determine need for appliance change or resting period  5/3/2025 1005 by Antoinette Chau RN  Outcome: Progressing  Flowsheets (Taken 5/2/2025 2257 by Aylin Bernard RN)  Skin Integrity Remains Intact: Monitor for areas of redness and/or skin breakdown  5/2/2025 2256 by Aylin Bernard RN  Outcome: Progressing  Flowsheets (Taken 5/2/2025 2256)  Skin Integrity Remains Intact: Monitor for areas of redness and/or skin breakdown     Problem: Risk for Elopement  Goal: Patient will not exit the unit/facility without proper excort  5/3/2025 1005 by Antoinette Chau RN  Outcome: Progressing  5/2/2025 2256 by Aylin Bernard RN  Outcome: Progressing

## 2025-05-03 NOTE — PROGRESS NOTES
Patient is alert and oriented x 4 in bed and up as needed. Vital, assessment and daily care given. Education and care plan updated. Medication given as per order. Fall precaution initiated, call light within reach, bed in low position and wheels locked. Patient moves as  stand by assist, room air. Addressed current patient's need. Continue to monitor.       Electronically signed by Aylin Bernard RN on 5/2/2025 at 11:01 PM

## 2025-05-03 NOTE — PROGRESS NOTES
Pt refusing to leave since DC instructions. Charge RN aware and called security to escort pt down. Pt walking out with security at this time. Pt being verbally aggressive with security upon escorting. Pt has AVS and bus passes. PIV removed prior

## 2025-05-03 NOTE — PROGRESS NOTES
RN informed pt that we no longer offer lyfts and can do a bus pass per the supervisor Yas, Pt told RN, ' you don't need to be ignorant' while explaining it to the pt and demanded two bus passes. Call placed to supervisor.

## 2025-05-03 NOTE — PROGRESS NOTES
Pt C/o epigastric pain, no PRN orders. Previous RN gave zofran with no effect. Message sent to team to see about a gi cocktail. Pt remains SR on tele.     0809: GI cocktail given per order.     0930: Pt resting in bed with eyes closed. Remains SR on CMU, denies any needs upon assessment.   Call light within reach, personal belongings within reach, fall precautions in place, pt repositions self in bed .

## 2025-05-03 NOTE — PROGRESS NOTES
Pt being Dcd at this time, AVS given, no questions regarding. Only concern is eating prior to leaving and needing a ride home. RN called  and they informed this RN that it needs to be set up via supervisor. RN to call after pt eats as he was NPO prior to DC. PIV removed for DC, CMU notified

## 2025-05-03 NOTE — CONSULTS
Cardiology Consultation   Date: 5/3/2025  Admit Date:  5/2/2025  Reason for Consultation: chest pain  Consult Requesting Physician: Arsenio Choi MD     Chief Complaint   Patient presents with    Chest Pain     Pt to  ed via ems from home c/o  substernal chest pain and SOB  that started an hour ago. Pt reports taking 1 nitro with no relief  Pt has extensive cardiac HX. EMS gave 324 ASA.      HPI: Ashutosh Donovan is a 54 y.o. with hx noted below, who left AMA during recent hospitalization for shoulder and arm numbness who came to the hospital via EMS with complains of substernal CP and SOB.     EKG shows no ischemic changes and hsTNT is negative x 2 which essentially rluled out ACS. Multiple echocardiograms las year showed normal LVEF. 5/2024 had LHC which showed mild CAD and patent stents.       Past Medical History:   Diagnosis Date    Arthritis     CAD (coronary artery disease)     CHF (congestive heart failure) (HCC)     Chronic pain     twister vertebra and two collapsed disc, states injury happened at 47yo    Emphysema lung (HCC)     Heart attack (HCC)     pt reports 10 heart attacks in 6 years, 8 stents placed    Hepatitis C     Hypothyroid     Kidney failure         Past Surgical History:   Procedure Laterality Date    CERVICAL FUSION N/A 11/22/2024    Cervical 3 - Cervical 5 Anterior Cervical Discectomy and Fusion performed by Otilia Ocampo MD at [facility] OR    CHOLECYSTECTOMY      CORONARY STENT PLACEMENT      8 stents in 6 years    TONSILLECTOMY         Allergies   Allergen Reactions    Latex Itching    Bupropion Seizure    Tramadol Hives and Angioedema    Ziprasidone Hcl Seizure     Pt reports seizures with Geodon    Ketorolac Tromethamine Hives and Nausea And Vomiting    Lithium Other (See Comments)     Thyroid issues    Olanzapine Swelling    Risperidone Swelling    Acetaminophen Other (See Comments)     Pt has hep c    Ibuprofen Nausea Only and Rash       Social History:  Reviewed.

## 2025-05-03 NOTE — DISCHARGE SUMMARY
Hospital Medicine Discharge Summary    Ashutosh Donovan  :  1970  MRN:  [id number]    Admit date:  2025  Discharge date: 5/3/2025    Admitting Physician:  Jose L SINGH DO  Primary Care Physician:  No primary care provider on file.  Code Status:   Prior  Status: Observation  Discharged Condition: Stable  Activity: Activity as tolerated  Diet:  No diet orders on file      Discharge Diagnoses:      Chest pain    Coronary artery disease     Smoker      Hospital Course:   54 y.o. male admitted with chest pain, history of CAD on aspirin and clopidogrel.  Angina treated with isosorbide mononitrate and ranolazine.  Reassuring workup with no EKG changes and hsTnT not elevated x 2.  Multiple echocardiograms within the last year with normal wall motion.     Chest pain: History of CAD on aspirin and clopidogrel.  Angina treated with isosorbide mononitrate and ranolazine.  Reassuring workup with no EKG changes and hsTnT not elevated x 2.  Seen by cardiology and patient able to be discharged.   CAD with recurrent LAD PCI-D1, mid LCx, mid-distal RCA, RPDA  with L->R collaterals on aspirin, clopidogrel, and high intensity atorvastatin as above.   Smoker.  Patient with history of medication noncompliance and left AMA in prior admission.  Cardiology encouraged him to take prescribed medications including antiplatelets, statin, antihypertensives and antianginals.       Vitals:  /98   Pulse 63   Temp 97.3 °F (36.3 °C) (Oral)   Resp 16   Ht 1.753 m (5' 9\")   Wt 94.1 kg (207 lb 7.3 oz)   SpO2 95% on RA  BMI 30.64 kg/m²   General appearance: alert and cooperative with exam  Lungs: clear to auscultation bilaterally  Heart: regular rate and rhythm, S1, S2 normal, no murmur, click, rub or gallop  Abdomen: soft, non-tender; bowel sounds normal; no masses,  no organomegaly  Extremities: extremities normal, atraumatic, no cyanosis or edema  Neurologic: No obvious focal neurologic deficits.    Discharge    PA CXR (5/2) No acute cardiopulmonary findings    Treatments:   observation, telemetry, medication adjustment    Disposition:   home with self care  Follow up with [facility] in 1-2 weeks.  Call [phone] for appointment       Signed:  KAVITA STAFFORD MD  5/3/2025, 4:38 PM

## 2025-05-03 NOTE — PROGRESS NOTES
Pt provide two bus passes as requested. Pt now not happy with demand being met, charge RN notified

## 2025-05-13 ENCOUNTER — APPOINTMENT (OUTPATIENT)
Dept: GENERAL RADIOLOGY | Age: 55
End: 2025-05-13
Payer: MEDICARE

## 2025-05-13 ENCOUNTER — HOSPITAL ENCOUNTER (EMERGENCY)
Age: 55
Discharge: ANOTHER HOSPITAL | End: 2025-05-14
Attending: EMERGENCY MEDICINE
Payer: MEDICARE

## 2025-05-13 ENCOUNTER — APPOINTMENT (OUTPATIENT)
Dept: CT IMAGING | Age: 55
End: 2025-05-13
Payer: MEDICARE

## 2025-05-13 DIAGNOSIS — R45.851 SUICIDAL IDEATION: Primary | ICD-10-CM

## 2025-05-13 DIAGNOSIS — R07.9 CHEST PAIN, UNSPECIFIED TYPE: ICD-10-CM

## 2025-05-13 LAB
ALBUMIN SERPL-MCNC: 4.4 G/DL (ref 3.4–5)
ALBUMIN/GLOB SERPL: 1.6 {RATIO} (ref 1.1–2.2)
ALP SERPL-CCNC: 126 U/L (ref 40–129)
ALT SERPL-CCNC: 13 U/L (ref 10–40)
AMPHETAMINES UR QL SCN>1000 NG/ML: ABNORMAL
ANION GAP SERPL CALCULATED.3IONS-SCNC: 10 MMOL/L (ref 3–16)
APAP SERPL-MCNC: <5 UG/ML (ref 10–30)
AST SERPL-CCNC: 19 U/L (ref 15–37)
BARBITURATES UR QL SCN>200 NG/ML: ABNORMAL
BASOPHILS # BLD: 0.1 K/UL (ref 0–0.2)
BASOPHILS NFR BLD: 0.9 %
BENZODIAZ UR QL SCN>200 NG/ML: ABNORMAL
BILIRUB SERPL-MCNC: 0.4 MG/DL (ref 0–1)
BUN SERPL-MCNC: 13 MG/DL (ref 7–20)
CALCIUM SERPL-MCNC: 9.7 MG/DL (ref 8.3–10.6)
CANNABINOIDS UR QL SCN>50 NG/ML: POSITIVE
CHLORIDE SERPL-SCNC: 106 MMOL/L (ref 99–110)
CO2 SERPL-SCNC: 23 MMOL/L (ref 21–32)
COCAINE UR QL SCN: ABNORMAL
CREAT SERPL-MCNC: 1.3 MG/DL (ref 0.9–1.3)
DEPRECATED RDW RBC AUTO: 16.1 % (ref 12.4–15.4)
DRUG SCREEN COMMENT UR-IMP: ABNORMAL
EOSINOPHIL # BLD: 0.1 K/UL (ref 0–0.6)
EOSINOPHIL NFR BLD: 1.6 %
ETHANOLAMINE SERPL-MCNC: NORMAL MG/DL (ref 0–0.08)
FENTANYL SCREEN, URINE: ABNORMAL
GFR SERPLBLD CREATININE-BSD FMLA CKD-EPI: 66 ML/MIN/{1.73_M2}
GLUCOSE SERPL-MCNC: 113 MG/DL (ref 70–99)
HCT VFR BLD AUTO: 41.8 % (ref 40.5–52.5)
HGB BLD-MCNC: 14 G/DL (ref 13.5–17.5)
LYMPHOCYTES # BLD: 2.7 K/UL (ref 1–5.1)
LYMPHOCYTES NFR BLD: 32.3 %
MAGNESIUM SERPL-MCNC: 2.1 MG/DL (ref 1.8–2.4)
MCH RBC QN AUTO: 27 PG (ref 26–34)
MCHC RBC AUTO-ENTMCNC: 33.5 G/DL (ref 31–36)
MCV RBC AUTO: 80.8 FL (ref 80–100)
METHADONE UR QL SCN>300 NG/ML: ABNORMAL
MONOCYTES # BLD: 0.7 K/UL (ref 0–1.3)
MONOCYTES NFR BLD: 8.2 %
NEUTROPHILS # BLD: 4.7 K/UL (ref 1.7–7.7)
NEUTROPHILS NFR BLD: 57 %
NT-PROBNP SERPL-MCNC: 54 PG/ML (ref 0–124)
OPIATES UR QL SCN>300 NG/ML: ABNORMAL
OXYCODONE UR QL SCN: ABNORMAL
PCP UR QL SCN>25 NG/ML: ABNORMAL
PH UR STRIP: 5 [PH]
PLATELET # BLD AUTO: 288 K/UL (ref 135–450)
PMV BLD AUTO: 9.5 FL (ref 5–10.5)
POTASSIUM SERPL-SCNC: 3.5 MMOL/L (ref 3.5–5.1)
PROT SERPL-MCNC: 7.1 G/DL (ref 6.4–8.2)
RBC # BLD AUTO: 5.17 M/UL (ref 4.2–5.9)
SALICYLATES SERPL-MCNC: <0.5 MG/DL (ref 15–30)
SODIUM SERPL-SCNC: 139 MMOL/L (ref 136–145)
TROPONIN, HIGH SENSITIVITY: 17 NG/L (ref 0–22)
TROPONIN, HIGH SENSITIVITY: 18 NG/L (ref 0–22)
WBC # BLD AUTO: 8.3 K/UL (ref 4–11)

## 2025-05-13 PROCEDURE — 93005 ELECTROCARDIOGRAM TRACING: CPT | Performed by: EMERGENCY MEDICINE

## 2025-05-13 PROCEDURE — 70450 CT HEAD/BRAIN W/O DYE: CPT

## 2025-05-13 PROCEDURE — 85025 COMPLETE CBC W/AUTO DIFF WBC: CPT

## 2025-05-13 PROCEDURE — 80053 COMPREHEN METABOLIC PANEL: CPT

## 2025-05-13 PROCEDURE — 80307 DRUG TEST PRSMV CHEM ANLYZR: CPT

## 2025-05-13 PROCEDURE — 83880 ASSAY OF NATRIURETIC PEPTIDE: CPT

## 2025-05-13 PROCEDURE — 99285 EMERGENCY DEPT VISIT HI MDM: CPT

## 2025-05-13 PROCEDURE — 36415 COLL VENOUS BLD VENIPUNCTURE: CPT

## 2025-05-13 PROCEDURE — 82077 ASSAY SPEC XCP UR&BREATH IA: CPT

## 2025-05-13 PROCEDURE — 84484 ASSAY OF TROPONIN QUANT: CPT

## 2025-05-13 PROCEDURE — 71045 X-RAY EXAM CHEST 1 VIEW: CPT

## 2025-05-13 PROCEDURE — 72125 CT NECK SPINE W/O DYE: CPT

## 2025-05-13 PROCEDURE — 6370000000 HC RX 637 (ALT 250 FOR IP): Performed by: PHYSICIAN ASSISTANT

## 2025-05-13 PROCEDURE — 80179 DRUG ASSAY SALICYLATE: CPT

## 2025-05-13 PROCEDURE — 80143 DRUG ASSAY ACETAMINOPHEN: CPT

## 2025-05-13 PROCEDURE — 83735 ASSAY OF MAGNESIUM: CPT

## 2025-05-13 RX ORDER — OXYCODONE HYDROCHLORIDE 5 MG/1
5 TABLET ORAL ONCE
Refills: 0 | Status: COMPLETED | OUTPATIENT
Start: 2025-05-13 | End: 2025-05-13

## 2025-05-13 RX ADMIN — OXYCODONE 5 MG: 5 TABLET ORAL at 23:32

## 2025-05-13 RX ADMIN — OXYCODONE 5 MG: 5 TABLET ORAL at 18:59

## 2025-05-13 ASSESSMENT — PAIN SCALES - GENERAL: PAINLEVEL_OUTOF10: 8

## 2025-05-13 ASSESSMENT — PAIN DESCRIPTION - DESCRIPTORS: DESCRIPTORS: OTHER (COMMENT)

## 2025-05-13 ASSESSMENT — PAIN DESCRIPTION - LOCATION: LOCATION: NECK
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
## 2025-05-14 ENCOUNTER — HOSPITAL ENCOUNTER (INPATIENT)
Age: 55
LOS: 1 days | Discharge: HOME | DRG: 885 | End: 2025-05-15
Attending: PSYCHIATRY & NEUROLOGY | Admitting: PSYCHIATRY & NEUROLOGY
Payer: MEDICARE

## 2025-05-14 VITALS
OXYGEN SATURATION: 98 % | SYSTOLIC BLOOD PRESSURE: 135 MMHG | RESPIRATION RATE: 26 BRPM | HEART RATE: 72 BPM | BODY MASS INDEX: 30.6 KG/M2 | DIASTOLIC BLOOD PRESSURE: 91 MMHG | WEIGHT: 207.23 LBS | TEMPERATURE: 97.6 F

## 2025-05-14 PROBLEM — F32.9 MDD (MAJOR DEPRESSIVE DISORDER), SINGLE EPISODE: Status: ACTIVE | Noted: 2025-05-14

## 2025-05-14 LAB
EKG ATRIAL RATE: 69 BPM
EKG DIAGNOSIS: NORMAL
EKG P AXIS: 46 DEGREES
EKG P-R INTERVAL: 166 MS
EKG Q-T INTERVAL: 406 MS
EKG QRS DURATION: 122 MS
EKG QTC CALCULATION (BAZETT): 435 MS
EKG R AXIS: 53 DEGREES
EKG T AXIS: 13 DEGREES
EKG VENTRICULAR RATE: 69 BPM
TROPONIN, HIGH SENSITIVITY: 16 NG/L (ref 0–22)
TSH SERPL DL<=0.005 MIU/L-ACNC: 15.4 UIU/ML (ref 0.27–4.2)

## 2025-05-14 PROCEDURE — 93010 ELECTROCARDIOGRAM REPORT: CPT | Performed by: INTERNAL MEDICINE

## 2025-05-14 PROCEDURE — 84443 ASSAY THYROID STIM HORMONE: CPT

## 2025-05-14 PROCEDURE — 6370000000 HC RX 637 (ALT 250 FOR IP): Performed by: PSYCHIATRY & NEUROLOGY

## 2025-05-14 PROCEDURE — 84484 ASSAY OF TROPONIN QUANT: CPT

## 2025-05-14 PROCEDURE — 83036 HEMOGLOBIN GLYCOSYLATED A1C: CPT

## 2025-05-14 PROCEDURE — GZHZZZZ GROUP PSYCHOTHERAPY: ICD-10-PCS | Performed by: PSYCHIATRY & NEUROLOGY

## 2025-05-14 PROCEDURE — 6370000000 HC RX 637 (ALT 250 FOR IP): Performed by: NURSE PRACTITIONER

## 2025-05-14 PROCEDURE — 6370000000 HC RX 637 (ALT 250 FOR IP)

## 2025-05-14 PROCEDURE — 36415 COLL VENOUS BLD VENIPUNCTURE: CPT

## 2025-05-14 PROCEDURE — 1240000000 HC EMOTIONAL WELLNESS R&B

## 2025-05-14 PROCEDURE — 93005 ELECTROCARDIOGRAM TRACING: CPT | Performed by: PSYCHIATRY & NEUROLOGY

## 2025-05-14 PROCEDURE — 80061 LIPID PANEL: CPT

## 2025-05-14 RX ORDER — ATORVASTATIN CALCIUM 40 MG/1
80 TABLET, FILM COATED ORAL NIGHTLY
Status: DISCONTINUED | OUTPATIENT
Start: 2025-05-14 | End: 2025-05-15 | Stop reason: HOSPADM

## 2025-05-14 RX ORDER — PRAZOSIN HYDROCHLORIDE 5 MG/1
5 CAPSULE ORAL ONCE
Status: COMPLETED | OUTPATIENT
Start: 2025-05-14 | End: 2025-05-14

## 2025-05-14 RX ORDER — MAGNESIUM HYDROXIDE/ALUMINUM HYDROXICE/SIMETHICONE 120; 1200; 1200 MG/30ML; MG/30ML; MG/30ML
30 SUSPENSION ORAL EVERY 6 HOURS PRN
Status: DISCONTINUED | OUTPATIENT
Start: 2025-05-14 | End: 2025-05-15 | Stop reason: HOSPADM

## 2025-05-14 RX ORDER — BENZTROPINE MESYLATE 1 MG/ML
2 INJECTION, SOLUTION INTRAMUSCULAR; INTRAVENOUS 2 TIMES DAILY PRN
Status: DISCONTINUED | OUTPATIENT
Start: 2025-05-14 | End: 2025-05-15 | Stop reason: HOSPADM

## 2025-05-14 RX ORDER — DULOXETIN HYDROCHLORIDE 60 MG/1
60 CAPSULE, DELAYED RELEASE ORAL DAILY
Status: DISCONTINUED | OUTPATIENT
Start: 2025-05-14 | End: 2025-05-15 | Stop reason: HOSPADM

## 2025-05-14 RX ORDER — POLYETHYLENE GLYCOL 3350 17 G
2 POWDER IN PACKET (EA) ORAL
Status: DISCONTINUED | OUTPATIENT
Start: 2025-05-14 | End: 2025-05-15 | Stop reason: HOSPADM

## 2025-05-14 RX ORDER — CARVEDILOL 6.25 MG/1
3.12 TABLET ORAL 2 TIMES DAILY WITH MEALS
Status: DISCONTINUED | OUTPATIENT
Start: 2025-05-14 | End: 2025-05-15 | Stop reason: HOSPADM

## 2025-05-14 RX ORDER — AMLODIPINE BESYLATE 5 MG/1
5 TABLET ORAL DAILY
Status: DISCONTINUED | OUTPATIENT
Start: 2025-05-14 | End: 2025-05-15 | Stop reason: HOSPADM

## 2025-05-14 RX ORDER — TAMSULOSIN HYDROCHLORIDE 0.4 MG/1
0.4 CAPSULE ORAL DAILY
Status: DISCONTINUED | OUTPATIENT
Start: 2025-05-14 | End: 2025-05-15 | Stop reason: HOSPADM

## 2025-05-14 RX ORDER — OLANZAPINE 10 MG/1
10 TABLET, FILM COATED ORAL EVERY 4 HOURS PRN
Status: DISCONTINUED | OUTPATIENT
Start: 2025-05-14 | End: 2025-05-15 | Stop reason: HOSPADM

## 2025-05-14 RX ORDER — NITROGLYCERIN 0.4 MG/1
0.4 TABLET SUBLINGUAL EVERY 5 MIN PRN
Status: DISCONTINUED | OUTPATIENT
Start: 2025-05-14 | End: 2025-05-15 | Stop reason: HOSPADM

## 2025-05-14 RX ORDER — PRAZOSIN HYDROCHLORIDE 5 MG/1
5 CAPSULE ORAL NIGHTLY
Status: DISCONTINUED | OUTPATIENT
Start: 2025-05-14 | End: 2025-05-15 | Stop reason: HOSPADM

## 2025-05-14 RX ORDER — RANOLAZINE 500 MG/1
500 TABLET, EXTENDED RELEASE ORAL 2 TIMES DAILY
Status: DISCONTINUED | OUTPATIENT
Start: 2025-05-14 | End: 2025-05-15 | Stop reason: HOSPADM

## 2025-05-14 RX ORDER — PANTOPRAZOLE SODIUM 40 MG/1
40 TABLET, DELAYED RELEASE ORAL
Status: DISCONTINUED | OUTPATIENT
Start: 2025-05-14 | End: 2025-05-15 | Stop reason: HOSPADM

## 2025-05-14 RX ORDER — ISOSORBIDE MONONITRATE 30 MG/1
60 TABLET, EXTENDED RELEASE ORAL DAILY
Status: DISCONTINUED | OUTPATIENT
Start: 2025-05-14 | End: 2025-05-15 | Stop reason: HOSPADM

## 2025-05-14 RX ORDER — NICOTINE 21 MG/24HR
1 PATCH, TRANSDERMAL 24 HOURS TRANSDERMAL DAILY
Status: DISCONTINUED | OUTPATIENT
Start: 2025-05-14 | End: 2025-05-15 | Stop reason: HOSPADM

## 2025-05-14 RX ORDER — TRAZODONE HYDROCHLORIDE 50 MG/1
50 TABLET ORAL NIGHTLY PRN
Status: DISCONTINUED | OUTPATIENT
Start: 2025-05-14 | End: 2025-05-15 | Stop reason: HOSPADM

## 2025-05-14 RX ORDER — FLUTICASONE PROPIONATE 50 MCG
2 SPRAY, SUSPENSION (ML) NASAL DAILY PRN
Status: DISCONTINUED | OUTPATIENT
Start: 2025-05-14 | End: 2025-05-15 | Stop reason: HOSPADM

## 2025-05-14 RX ORDER — NITROGLYCERIN 0.4 MG/1
0.4 TABLET SUBLINGUAL EVERY 5 MIN PRN
Status: DISCONTINUED | OUTPATIENT
Start: 2025-05-14 | End: 2025-05-14 | Stop reason: SDUPTHER

## 2025-05-14 RX ORDER — ERGOCALCIFEROL 1.25 MG/1
50000 CAPSULE, LIQUID FILLED ORAL WEEKLY
Status: DISCONTINUED | OUTPATIENT
Start: 2025-05-14 | End: 2025-05-15 | Stop reason: HOSPADM

## 2025-05-14 RX ORDER — HYDROXYZINE HYDROCHLORIDE 50 MG/1
50 TABLET, FILM COATED ORAL 3 TIMES DAILY PRN
Status: DISCONTINUED | OUTPATIENT
Start: 2025-05-14 | End: 2025-05-15 | Stop reason: HOSPADM

## 2025-05-14 RX ORDER — FLUPHENAZINE HYDROCHLORIDE 5 MG/1
5 TABLET ORAL NIGHTLY
Status: DISCONTINUED | OUTPATIENT
Start: 2025-05-14 | End: 2025-05-15 | Stop reason: HOSPADM

## 2025-05-14 RX ORDER — CLOPIDOGREL BISULFATE 75 MG/1
75 TABLET ORAL DAILY
Status: DISCONTINUED | OUTPATIENT
Start: 2025-05-14 | End: 2025-05-15 | Stop reason: HOSPADM

## 2025-05-14 RX ORDER — ACETAMINOPHEN 325 MG/1
650 TABLET ORAL EVERY 4 HOURS PRN
Status: DISCONTINUED | OUTPATIENT
Start: 2025-05-14 | End: 2025-05-14

## 2025-05-14 RX ORDER — CETIRIZINE HYDROCHLORIDE 10 MG/1
10 TABLET ORAL DAILY
Status: DISCONTINUED | OUTPATIENT
Start: 2025-05-14 | End: 2025-05-15 | Stop reason: HOSPADM

## 2025-05-14 RX ORDER — ASPIRIN 81 MG/1
81 TABLET ORAL DAILY
Status: DISCONTINUED | OUTPATIENT
Start: 2025-05-14 | End: 2025-05-15 | Stop reason: HOSPADM

## 2025-05-14 RX ADMIN — PRAZOSIN HYDROCHLORIDE 5 MG: 5 CAPSULE ORAL at 02:52

## 2025-05-14 RX ADMIN — CETIRIZINE HYDROCHLORIDE 10 MG: 10 TABLET ORAL at 10:50

## 2025-05-14 RX ADMIN — CLOPIDOGREL BISULFATE 75 MG: 75 TABLET ORAL at 10:50

## 2025-05-14 RX ADMIN — RANOLAZINE 500 MG: 500 TABLET, FILM COATED, EXTENDED RELEASE ORAL at 12:36

## 2025-05-14 RX ADMIN — TAMSULOSIN HYDROCHLORIDE 0.4 MG: 0.4 CAPSULE ORAL at 10:50

## 2025-05-14 RX ADMIN — CARVEDILOL 3.12 MG: 6.25 TABLET, FILM COATED ORAL at 10:59

## 2025-05-14 RX ADMIN — FLUPHENAZINE HYDROCHLORIDE 5 MG: 5 TABLET ORAL at 21:25

## 2025-05-14 RX ADMIN — LEVOTHYROXINE SODIUM 175 MCG: 0.15 TABLET ORAL at 10:49

## 2025-05-14 RX ADMIN — ASPIRIN 81 MG: 81 TABLET, COATED ORAL at 10:50

## 2025-05-14 RX ADMIN — RANOLAZINE 500 MG: 500 TABLET, FILM COATED, EXTENDED RELEASE ORAL at 21:25

## 2025-05-14 RX ADMIN — NITROGLYCERIN 0.4 MG: 0.4 TABLET SUBLINGUAL at 10:40

## 2025-05-14 RX ADMIN — TRAZODONE HYDROCHLORIDE 50 MG: 50 TABLET ORAL at 02:52

## 2025-05-14 RX ADMIN — PRAZOSIN HYDROCHLORIDE 5 MG: 5 CAPSULE ORAL at 20:02

## 2025-05-14 RX ADMIN — ISOSORBIDE MONONITRATE 60 MG: 30 TABLET, EXTENDED RELEASE ORAL at 10:49

## 2025-05-14 RX ADMIN — AMLODIPINE BESYLATE 5 MG: 5 TABLET ORAL at 10:50

## 2025-05-14 RX ADMIN — PANTOPRAZOLE SODIUM 40 MG: 40 TABLET, DELAYED RELEASE ORAL at 10:50

## 2025-05-14 RX ADMIN — DULOXETINE 60 MG: 60 CAPSULE, DELAYED RELEASE ORAL at 10:49

## 2025-05-14 RX ADMIN — CARVEDILOL 3.12 MG: 6.25 TABLET, FILM COATED ORAL at 15:59

## 2025-05-14 RX ADMIN — TRAZODONE HYDROCHLORIDE 50 MG: 50 TABLET ORAL at 21:25

## 2025-05-14 RX ADMIN — ATORVASTATIN CALCIUM 80 MG: 40 TABLET, FILM COATED ORAL at 21:25

## 2025-05-14 RX ADMIN — TIZANIDINE 4 MG: 4 TABLET ORAL at 12:36

## 2025-05-14 RX ADMIN — ERGOCALCIFEROL 50000 UNITS: 1.25 CAPSULE ORAL at 10:49

## 2025-05-14 RX ADMIN — OLANZAPINE 10 MG: 10 TABLET, FILM COATED ORAL at 20:02

## 2025-05-14 ASSESSMENT — PAIN SCALES - GENERAL
PAINLEVEL_OUTOF10: 4
PAINLEVEL_OUTOF10: 0
PAINLEVEL_OUTOF10: 8
PAINLEVEL_OUTOF10: 5
PAINLEVEL_OUTOF10: 0

## 2025-05-14 ASSESSMENT — PAIN DESCRIPTION - ORIENTATION: ORIENTATION: MID

## 2025-05-14 ASSESSMENT — SLEEP AND FATIGUE QUESTIONNAIRES
SLEEP PATTERN: UNABLE TO ASSESS
DO YOU HAVE DIFFICULTY SLEEPING: YES
DO YOU USE A SLEEP AID: NO
AVERAGE NUMBER OF SLEEP HOURS: 10
DO YOU HAVE DIFFICULTY SLEEPING: NO

## 2025-05-14 ASSESSMENT — PATIENT HEALTH QUESTIONNAIRE - PHQ9
SUM OF ALL RESPONSES TO PHQ QUESTIONS 1-9: 2
2. FEELING DOWN, DEPRESSED OR HOPELESS: SEVERAL DAYS
1. LITTLE INTEREST OR PLEASURE IN DOING THINGS: SEVERAL DAYS

## 2025-05-14 ASSESSMENT — PAIN - FUNCTIONAL ASSESSMENT: PAIN_FUNCTIONAL_ASSESSMENT: ACTIVITIES ARE NOT PREVENTED

## 2025-05-14 ASSESSMENT — PAIN DESCRIPTION - DESCRIPTORS: DESCRIPTORS: TIGHTNESS;CRUSHING

## 2025-05-14 ASSESSMENT — PAIN DESCRIPTION - LOCATION: LOCATION: NECK

## 2025-05-14 NOTE — DISCHARGE INSTRUCTIONS
Follow up with PCP for repeat TSH as it was elevated during admission.    Take you scheduled levothyroxine dose in the am on empty stomach.  No adjustments made as patient needs to be complaint with home regimen and follow-up with PCP for repeat.  If still elevated at this time, defer to PCP to make adjustments to his regimen.

## 2025-05-14 NOTE — ED NOTES
Patient ambulated well to the bathroom with sitter, patient was given a turkey sandwich and jello.   
Patient belongings placed in locker 4.   
Pt transported out via [employer] stretcher to be transferred to [facility]   
Report called to Luis Taylor. All questions answered.   
Report given to ANNELISE Brito to assume care at this time. All EDSBAR questions answered.   
This tech took over as sitter for this patient, also obtained EKG at this time.   
(nicotine withdrawal)   nitroGLYCERIN (NITROSTAT) 0.4 MG SL tablet   No No   Sig: Place 1 tablet under the tongue every 5 minutes as needed for Chest pain up to max of 3 total doses. If no relief after 1 dose, call 911.   pantoprazole (PROTONIX) 40 MG tablet   Yes No   Sig: Take 1 tablet by mouth daily   prazosin (MINIPRESS) 5 MG capsule   Yes No   Sig: Take 1 capsule by mouth nightly   ranolazine (RANEXA) 500 MG extended release tablet   No No   Sig: Take 1 tablet by mouth 2 times daily   tamsulosin (FLOMAX) 0.4 MG capsule   Yes No   Sig: Take 1 capsule by mouth daily   tiZANidine (ZANAFLEX) 4 MG tablet   Yes No   Sig: Take 1 tablet by mouth every 8 hours as needed (spasms)      Facility-Administered Medications: None          Additional Information  Isolation:      HIV Positive: unknown    Oxygen Use: No    Any Legal Issues (Current or Past): unknown    Does Patient have POA or Guardian: No    Current Living Situation:      Roommate Appropriate: Yes    Is this a special case or have any other considerations:  No  (pregnancy, autism, developmental disabled, etc.)    Does the patient have confirmed or suspected COVID-19 Symptoms: No  (if yes, test must be completed, if no test is not required)       Last COVID Lab SARS-CoV-2 RNA, RT PCR (no units)   Date Value   11/17/2024 NOT DETECTED              Immunization status:   There is no immunization history on file for this patient.

## 2025-05-14 NOTE — GROUP NOTE
Group Therapy Note    Date: 5/14/2025    Group Start Time: 1000  Group End Time: 1045  Group Topic: Cognitive Skills    Marge Barry        Group Therapy Note    Patient was unable to attend \"Cognitive Skills\" group.      Signature:  JAYLEEN BRYANT

## 2025-05-14 NOTE — PLAN OF CARE
Problem: Self Harm/Suicidality  Goal: Will have no self-injury during hospital stay  Description: INTERVENTIONS:1.  Ensure constant observer at bedside with Q15M safety checks2.  Maintain a safe environment3.  Secure patient belongings4.  Ensure family/visitors adhere to safety recommendations5.  Ensure safety tray has been added to patient's diet order6.  Every shift and PRN: Re-assess suicidal risk via Frequent Screener  Outcome: Completed     Problem: Anxiety  Goal: Will report anxiety at manageable levels  Description: INTERVENTIONS:1. Administer medication as ordered2. Teach and rehearse alternative coping skills3. Provide emotional support with 1:1 interaction with staff  Outcome: Progressing     Problem: Coping  Goal: Pt/Family able to verbalize concerns and demonstrate effective coping strategies  Description: INTERVENTIONS:1. Assist patient/family to identify coping skills, available support systems and cultural and spiritual values2. Provide emotional support, including active listening and acknowledgement of concerns of patient and caregivers3. Reduce environmental stimuli, as able4. Instruct patient/family in relaxation techniques, as appropriate5. Assess for spiritual pain/suffering and initiate Spiritual Care, Psychosocial Clinical Specialist consults as needed  Outcome: Progressing     Problem: Behavior  Goal: Pt/Family maintain appropriate behavior and adhere to behavioral management agreement, if implemented  Description: INTERVENTIONS:1. Assess patient/family's coping skills and  non-compliant behavior (including use of illegal substances)2. Notify security of behavior or suspected illegal substances which indicate the need for search of the family and/or belongings3. Encourage verbalization of thoughts and concerns in a socially appropriate manner4. Utilize positive, consistent limit setting strategies supporting safety of patient, staff and others5. Encourage participation in the decision

## 2025-05-14 NOTE — PLAN OF CARE
Patient is alert and oriented x4.  Fall precautions intact. Uses front wheeled walker. Takes medications whole with water and without issue. Answers questions appropriately and is able to voice wants and needs. Did not attend group. Patient is independent and continent. Calm and cooperative at times with staff and care. Is withdrawn to room. Only out for meals.  Denies AH/VH/SI/HI and no RTIS noted. Eating meals in dayroom. No signs or symptoms of distress. No signs of aggression, just irritable. Patient complained of chest pain this morning. Medical notified. Nitro ordered. Nitro given and was effective. EKG done, it was abnormal, but parallel to the last one done. Call light within reach.      Problem: Chronic Conditions and Co-morbidities  Goal: Patient's chronic conditions and co-morbidity symptoms are monitored and maintained or improved  Outcome: Progressing     Problem: Self Harm/Suicidality  Goal: Will have no self-injury during hospital stay  Description: INTERVENTIONS:1.  Ensure constant observer at bedside with Q15M safety checks2.  Maintain a safe environment3.  Secure patient belongings4.  Ensure family/visitors adhere to safety recommendations5.  Ensure safety tray has been added to patient's diet order6.  Every shift and PRN: Re-assess suicidal risk via Frequent Screener  5/14/2025 0527 by Claudia Giang RN  Outcome: Completed     Problem: Behavior  Goal: Pt/Family maintain appropriate behavior and adhere to behavioral management agreement, if implemented  Description: INTERVENTIONS:1. Assess patient/family's coping skills and  non-compliant behavior (including use of illegal substances)2. Notify security of behavior or suspected illegal substances which indicate the need for search of the family and/or belongings3. Encourage verbalization of thoughts and concerns in a socially appropriate manner4. Utilize positive, consistent limit setting strategies supporting safety of patient, staff and others5.

## 2025-05-14 NOTE — PLAN OF CARE
[facility]  Treatment Team Note  Review Date & Time: 05/14/25  1000    Patient was not in treatment team      Status EXAM:   Mental Status and Behavioral Exam  Normal: No  Level of Assistance: Independent/Self  Facial Expression: Sad, Worried  Affect: Congruent  Level of Consciousness: Alert  Frequency of Checks: 4 times per hour, close  Mood:Normal: No  Mood: Depressed, Anxious  Motor Activity:Normal: Yes  Eye Contact: Good  Observed Behavior: Cooperative, Friendly  Sexual Misconduct History: Current - no  Preception: [place] to place, [place] to situation, [place] to time, [place] to person  Attention:Normal: Yes  Thought Processes: Unremarkable  Thought Content:Normal: Yes  Depression Symptoms: Change in energy level, Feelings of hopelessess, Feelings of helplessness, Sleep disturbance, Isolative, Loss of interest, Increased irritability  Anxiety Symptoms: Generalized  Flora Symptoms: No problems reported or observed.  Hallucinations: None (Andre parry; not noted to be RTIS)  Delusions: No  Memory:Normal: Yes  Insight and Judgment: No  Insight and Judgment: Poor judgment, Poor insight      Suicide Risk CSSR-S:  1) Within the past month, have you wished you were dead or wished you could go to sleep and not wake up? : Yes  2) Have you actually had any thoughts of killing yourself? : Yes  3) Have you been thinking about how you might kill yourself? : Yes  5) Have you started to work out or worked out the details of how to kill yourself? Do you intend to carry out this plan? : No  6) Have you ever done anything, started to do anything, or prepared to do anything to end your life?: No      PLAN/TREATMENT RECOMMENDATIONS UPDATE:   Patient will take medication as prescribed, eat 75% of meals, attend groups, participate in milieu activities, participate in treatment team and care planning for discharge and follow up.           Maribell Zimmer RN
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
## 2025-05-14 NOTE — CARE COORDINATION
attempted to meet with pt for assessments. Pt initially woke up but then kept falling asleep during assessment after trying several times for pt to stay awake. Assessments completed via chart review and ty.          05/14/25 1152   Activities of Daily Living   Patient Requires assistance with daily self-care activities? No  (per chart review)   Leisure Activity 1   3 Favorite Leisure Activities per chart review play pool   Frequency several times a year  (per chart review)   Last time   (ty)   Barriers to participating    (per chart review no one to play with)   Leisure Activity 2   Favorite Leisure Activities  per chart review watch tv   Frequency  weekly  (per chart review)   Last time    (ty)   Barriers to participating    (ty)   Leisure Activity 3   Favorite Leisure Activities  ty   Social   Patient reports spending the majority of their free time with one other person  (per chart review)   Patient verbalizes a preference for spending free time with one other person  (per chart review)   Patient’s perception of support system negative/weak  (per chart review)   Patient’s perception of barriers to socializing with others include(s)   (ty)   Beliefs & Coping   Has difficulty dealing with feelings   Yes  (per chart review)   Internalizes feelings/Keeps feelings in Yes  (per chart review)   Externalizes feelings through aggressiveness or poor temper control  No  (per chart review)   Feels uncomfortable around others  Yes  (per chart review)   Has difficulty talking to others  Yes  (per chart review)   Depends on others for direction or decisions No  (per chart review)   Difficulty dealing with anger of others  Yes  (per chart review)   Difficulty dealing with own anger  Yes  (per chart review)   Difficulty managing stress Yes  (per chart review)   Frequently has difficulty with relationships  Yes  (per chart review)   which,who,where with friends/peers  (per chart review)   Has recently

## 2025-05-14 NOTE — PROGRESS NOTES
Home Medication Reconciliation Status          [] COMPLETE       Medication history has been reviewed and obtained from the following source(s):       [] patient/family verbal report             [] patient/family provided written list       [] external pharmacy   [] external facility list         []  Provider notified that home medication reconciliation is complete          [x] IN PROGRESS       Medication reconciliation marked in progress at this time due to:       [] patient/family poor historian      [] waiting arrival of family to clarify       [] waiting for accurate list        [x] external pharmacy needs called      * Follow up is needed.          [] UNABLE TO ASSESS       Medication reconciliation is incomplete and unable to assess at this time due to:       [] critical patient condition   [] patient is unresponsive        [] no family available                       [] unknown pharmacy       [] anonymous patient          * Follow up is needed.      [] Pharmacy consult placed for medication reconciliation assistance   Additional comments: Ashutosh reports that he is prescribed several medications, including cardiac medications, and is compliant with all medications. He reports that he uses \"[facility]\" to have his medications refilled.

## 2025-05-14 NOTE — PROGRESS NOTES
[facility]  Admission Note     Admission Type:   Admission Type: Voluntary    Reason for admission:  Reason for Admission: Suicidal ideation; Depression      Addictive Behavior:   Addictive Behavior  In the Past 3 Months, Have You Felt or Has Someone Told You That You Have a Problem With  : None    Medical Problems:   Past Medical History:   Diagnosis Date    Arthritis     CAD (coronary artery disease)     CHF (congestive heart failure) (HCC)     Chronic pain     twister vertebra and two collapsed disc, states injury happened at 49yo    Emphysema lung (HCC)     Heart attack (HCC)     pt reports 10 heart attacks in 6 years, 8 stents placed    Hepatitis C     Hypothyroid     Kidney failure        Status EXAM:  Mental Status and Behavioral Exam  Normal: No  Level of Assistance: Independent/Self  Facial Expression: Sad, Worried  Affect: Congruent  Level of Consciousness: Alert  Frequency of Checks: 4 times per hour, close  Mood:Normal: No  Mood: Depressed, Anxious  Motor Activity:Normal: Yes  Eye Contact: Good  Observed Behavior: Cooperative, Friendly  Sexual Misconduct History: Current - no  Preception: [place] to place, [place] to situation, [place] to time, [place] to person  Attention:Normal: Yes  Thought Processes: Unremarkable  Thought Content:Normal: Yes  Depression Symptoms: Change in energy level, Feelings of hopelessess, Feelings of helplessness, Sleep disturbance, Isolative, Loss of interest, Increased irritability  Anxiety Symptoms: Generalized  Flora Symptoms: No problems reported or observed.  Hallucinations: None (Andre denies; not noted to be RTIS)  Delusions: No  Memory:Normal: Yes  Insight and Judgment: No  Insight and Judgment: Poor judgment, Poor insight    Tobacco Screening:  Practical Counseling, on admission, rafa X, if applicable and completed (first 3 are required if patient doesn't refuse):            ( ) Recognizing danger situations (included triggers and roadblocks)
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
## 2025-05-14 NOTE — BH NOTE
Clinician called [facility] To inform them that the patient was on the unit and to obtain information about .  is Margot Myers [phone] and clinician left message with contact information to call back.

## 2025-05-14 NOTE — H&P
Hospital Medicine History & Physical      PCP: No primary care provider on file.    Date of Admission: 5/14/2025    Date of Service: Pt seen/examined on 05/15/25      Chief Complaint:  SI        History Of Present Illness:      The patient is a 54 y.o. male with PMH of CAD, chronic angina, CHF, chronic pain, COPD, hepatitis C, hypothyroidism, who presented to [facility] for SI.  Patient was seen and evaluated in the ED by the ED medical provider, patient was medically cleared for admission to [facility].  This note serves as an admission medical H&P.    Tobacco use: yes  ETOH use: denies  Illicit drug use: marijuana     Patient denies any current medical complaints.    Past Medical History:        Diagnosis Date    Arthritis     CAD (coronary artery disease)     CHF (congestive heart failure) (HCC)     Chronic pain     twister vertebra and two collapsed disc, states injury happened at 49yo    Emphysema lung (HCC)     Heart attack (HCC)     pt reports 10 heart attacks in 6 years, 8 stents placed    Hepatitis C     Hypothyroid     Kidney failure        Past Surgical History:        Procedure Laterality Date    CERVICAL FUSION N/A 11/22/2024    Cervical 3 - Cervical 5 Anterior Cervical Discectomy and Fusion performed by Otilia Ocampo MD at [facility] OR    CHOLECYSTECTOMY      CORONARY STENT PLACEMENT      8 stents in 6 years    TONSILLECTOMY         Medications Prior to Admission:    Prior to Admission medications    Medication Sig Start Date End Date Taking? Authorizing Provider   pantoprazole (PROTONIX) 40 MG tablet Take 1 tablet by mouth daily   Yes Zeeshan Green MD   tiZANidine (ZANAFLEX) 4 MG tablet Take 1 tablet by mouth every 8 hours as needed (spasms)   Yes Zeeshan Green MD   isosorbide mononitrate (IMDUR) 60 MG extended release tablet Take 1 tablet by mouth daily   Yes Zeeshan Green MD   aspirin 81 MG EC tablet Take 1 tablet by mouth daily 11/24/24  Yes J Luis Luis MD   ranolazine (RANEXA) 
Pt resting in bed.  Refused medical H&P at this time.  Discussed with nursing.  Did get x1 nitro for his chest pain which he takes at home.  Troponin negative and no acute changes in EKG.    Will attempt medical H&P tomorrow.     Please notify medical with any needs.    Kemi Hurtado, MALCOM - CNP   
stay: 2-5 days  Prognosis:  Fair  Criteria for Discharge:  Not suicidal, sleeping well, affect stable, depression improving, eating well, aftercare arranged.     Spent > 70 minutes evaluating and treating patient, more than 50 % of that time was spent counseling the patient on their symptoms, treatment, and expected goals.        ______  PLAN   1.  Admit to Adult Behavioral Unit / Senior Behavioral Unit  2.  Consult Internal Medicine to evaluate and treat medical conditions  3.  Adjust psychotropic medications to target symptoms  4.  Occupational Therapy, Physical Therapy, Group Psychotherapy as tolerated   5.  Reviewed treatment plan with patient including medication risks, benefits, side effects.  Obtained informed consent for treatment.     Katlyn Bruno, CATERINA-BC

## 2025-05-14 NOTE — PROGRESS NOTES
Ashutosh arrived on this unit from [facility] with two medical transporters and one security staff from this facility at 01:45. Ashutosh uses a cane to ambulate and is steady with that assistive device. He is also wearing a cervical collar on his neck. Security staff escorted Ashutosh through the security checkpoint before he entered the unit. A metal detector wand was utilized to detect contraband. Andre denies current suicidal and homicidal ideation as well as auditory, visual, and tactile hallucinations. This writer provided Ashutosh with snacks and a beverage as well as oriented him to this unit and his room. Ashutosh is agreeable to participate in the admission process.

## 2025-05-14 NOTE — PROGRESS NOTES
4 Eyes Skin Assessment     NAME:  Ashutosh Donovan  YOB: 1970  MEDICAL RECORD NUMBER:  [id number]    The patient is being assessed for  Admission    I agree that at least one RN has performed a thorough Head to Toe Skin Assessment on the patient. ALL assessment sites listed below have been assessed.      Areas assessed by both nurses:    Head, Face, Ears, Shoulders, Back, Chest, Arms, Elbows, Hands, Sacrum. Buttock, Coccyx, Ischium, and Legs. Feet and Heels        Does the Patient have a Wound? No noted wound(s)       Jovanny Prevention initiated by RN: No  Wound Care Orders initiated by RN: No    Pressure Injury (Stage 3,4, Unstageable, DTI, NWPT, and Complex wounds) if present, place Wound referral order by RN under : No    New Ostomies, if present place, Ostomy referral order under : No     Nurse 1 eSignature: Electronically signed by Claudia Giang RN on 5/14/25 at 5:48 AM EDT    **SHARE this note so that the co-signing nurse can place an eSignature**    Nurse 2 eSignature: Electronically signed by Murali Rodriguez RN on 5/14/25 at 6:32 AM EDT

## 2025-05-14 NOTE — PROGRESS NOTES
Behavioral Services  Medicare Certification Upon Admission    I certify that this patient's inpatient psychiatric hospital admission is medically necessary for:    [x] (1) Treatment which could reasonably be expected to improve this patient's condition,       [x] (2) Or for diagnostic study;     AND     [x](2) The inpatient psychiatric services are provided while the individual is under the care of a physician and are included in the individualized plan of care.    Estimated length of stay/service 2-5 days    Plan for post-hospital care outpatient support    Electronically signed by MALCOM Forrest CNP on 5/14/2025 at 10:35 AM

## 2025-05-15 VITALS
HEART RATE: 72 BPM | DIASTOLIC BLOOD PRESSURE: 84 MMHG | OXYGEN SATURATION: 98 % | WEIGHT: 207.23 LBS | SYSTOLIC BLOOD PRESSURE: 124 MMHG | HEIGHT: 69 IN | TEMPERATURE: 98.3 F | RESPIRATION RATE: 16 BRPM | BODY MASS INDEX: 30.69 KG/M2

## 2025-05-15 PROBLEM — E03.8 OTHER SPECIFIED HYPOTHYROIDISM: Status: ACTIVE | Noted: 2025-05-15

## 2025-05-15 PROBLEM — R79.89 ELEVATED TSH: Status: ACTIVE | Noted: 2025-05-15

## 2025-05-15 PROBLEM — I25.10 CORONARY ARTERY DISEASE INVOLVING NATIVE HEART: Status: ACTIVE | Noted: 2025-05-15

## 2025-05-15 PROBLEM — Z91.148 HX OF MEDICATION NONCOMPLIANCE: Status: ACTIVE | Noted: 2025-05-15

## 2025-05-15 LAB
CHOLEST SERPL-MCNC: 135 MG/DL (ref 0–199)
EKG ATRIAL RATE: 73 BPM
EKG DIAGNOSIS: NORMAL
EKG P AXIS: 38 DEGREES
EKG P-R INTERVAL: 176 MS
EKG Q-T INTERVAL: 400 MS
EKG QRS DURATION: 110 MS
EKG QTC CALCULATION (BAZETT): 440 MS
EKG R AXIS: 51 DEGREES
EKG T AXIS: 25 DEGREES
EKG VENTRICULAR RATE: 73 BPM
EST. AVERAGE GLUCOSE BLD GHB EST-MCNC: 125.5 MG/DL
HBA1C MFR BLD: 6 %
HDLC SERPL-MCNC: 32 MG/DL (ref 40–60)
LDLC SERPL CALC-MCNC: 74 MG/DL
TRIGL SERPL-MCNC: 143 MG/DL (ref 0–150)
VLDLC SERPL CALC-MCNC: 29 MG/DL

## 2025-05-15 PROCEDURE — 6370000000 HC RX 637 (ALT 250 FOR IP)

## 2025-05-15 PROCEDURE — 5130000000 HC BRIDGE APPOINTMENT

## 2025-05-15 PROCEDURE — 99221 1ST HOSP IP/OBS SF/LOW 40: CPT | Performed by: NURSE PRACTITIONER

## 2025-05-15 PROCEDURE — 6370000000 HC RX 637 (ALT 250 FOR IP): Performed by: NURSE PRACTITIONER

## 2025-05-15 PROCEDURE — 93010 ELECTROCARDIOGRAM REPORT: CPT | Performed by: INTERNAL MEDICINE

## 2025-05-15 PROCEDURE — 6370000000 HC RX 637 (ALT 250 FOR IP): Performed by: PSYCHIATRY & NEUROLOGY

## 2025-05-15 RX ORDER — FLUPHENAZINE HYDROCHLORIDE 5 MG/1
5 TABLET ORAL NIGHTLY
Qty: 30 TABLET | Refills: 0 | Status: SHIPPED | OUTPATIENT
Start: 2025-05-15

## 2025-05-15 RX ORDER — PRAZOSIN HYDROCHLORIDE 5 MG/1
5 CAPSULE ORAL NIGHTLY
Qty: 30 CAPSULE | Refills: 0 | Status: SHIPPED | OUTPATIENT
Start: 2025-05-15

## 2025-05-15 RX ORDER — ALBUTEROL SULFATE 90 UG/1
2 INHALANT RESPIRATORY (INHALATION) EVERY 6 HOURS PRN
Status: DISCONTINUED | OUTPATIENT
Start: 2025-05-15 | End: 2025-05-15 | Stop reason: HOSPADM

## 2025-05-15 RX ADMIN — CLOPIDOGREL BISULFATE 75 MG: 75 TABLET ORAL at 10:19

## 2025-05-15 RX ADMIN — CETIRIZINE HYDROCHLORIDE 10 MG: 10 TABLET ORAL at 10:19

## 2025-05-15 RX ADMIN — CARVEDILOL 3.12 MG: 6.25 TABLET, FILM COATED ORAL at 10:18

## 2025-05-15 RX ADMIN — AMLODIPINE BESYLATE 5 MG: 5 TABLET ORAL at 10:18

## 2025-05-15 RX ADMIN — TAMSULOSIN HYDROCHLORIDE 0.4 MG: 0.4 CAPSULE ORAL at 10:18

## 2025-05-15 RX ADMIN — PANTOPRAZOLE SODIUM 40 MG: 40 TABLET, DELAYED RELEASE ORAL at 06:36

## 2025-05-15 RX ADMIN — DULOXETINE 60 MG: 60 CAPSULE, DELAYED RELEASE ORAL at 10:19

## 2025-05-15 RX ADMIN — ASPIRIN 81 MG: 81 TABLET, COATED ORAL at 10:19

## 2025-05-15 RX ADMIN — ISOSORBIDE MONONITRATE 60 MG: 30 TABLET, EXTENDED RELEASE ORAL at 10:18

## 2025-05-15 RX ADMIN — LEVOTHYROXINE SODIUM 175 MCG: 0.15 TABLET ORAL at 06:36

## 2025-05-15 RX ADMIN — RANOLAZINE 500 MG: 500 TABLET, FILM COATED, EXTENDED RELEASE ORAL at 10:19

## 2025-05-15 ASSESSMENT — PAIN SCALES - GENERAL
PAINLEVEL_OUTOF10: 0
PAINLEVEL_OUTOF10: 0

## 2025-05-15 ASSESSMENT — PAIN DESCRIPTION - PAIN TYPE: TYPE: CHRONIC PAIN

## 2025-05-15 ASSESSMENT — PAIN DESCRIPTION - LOCATION: LOCATION: NECK

## 2025-05-15 ASSESSMENT — PAIN DESCRIPTION - ORIENTATION: ORIENTATION: MID

## 2025-05-15 ASSESSMENT — PAIN - FUNCTIONAL ASSESSMENT: PAIN_FUNCTIONAL_ASSESSMENT: ACTIVITIES ARE NOT PREVENTED

## 2025-05-15 ASSESSMENT — PAIN DESCRIPTION - ONSET: ONSET: ON-GOING

## 2025-05-15 ASSESSMENT — PAIN DESCRIPTION - DESCRIPTORS: DESCRIPTORS: TIGHTNESS

## 2025-05-15 ASSESSMENT — PAIN DESCRIPTION - FREQUENCY: FREQUENCY: CONTINUOUS
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
## 2025-05-15 NOTE — PLAN OF CARE
Problem: Anxiety  Goal: Will report anxiety at manageable levels  Description: INTERVENTIONS:1. Administer medication as ordered2. Teach and rehearse alternative coping skills3. Provide emotional support with 1:1 interaction with staff  5/15/2025 1317 by Gabriella Bennett RN  Outcome: Progressing     Problem: Depression/Self Harm  Goal: Effect of psychiatric condition will be minimized and patient will be protected from self harm  Description: INTERVENTIONS:1. Assess impact of patient's symptoms on level of functioning, self care needs and offer support as indicated2. Assess patient/family knowledge of depression, impact on illness and need for teaching3. Provide emotional support, presence and reassurance4. Assess for possible suicidal thoughts or ideation. If patient expresses suicidal thoughts or statements do not leave alone, initiate Suicide Precautions, move to a room close to the nursing station and obtain sitter5. Initiate consults as appropriate with Mental Health Professional, Spiritual Care, Psychosocial CNS, and consider a recommendation to the [facility]IP for a Psychiatric Consultation  Outcome: Progressing    Pt has been isolative to his room. He denies SI/HI/AVH and no RTIS noted. He has been medication compliant. Pt is wearing his neck brace when sitting. Pt denies wanting to harm self after discharged. Denies needs currently

## 2025-05-15 NOTE — PROGRESS NOTES
Bridge Appointment completed: Reviewed Discharge Instructions with patient.    Patient verbalizes understanding and agreement with the discharge plan using the teachback method.     Referral for Outpatient Tobacco Cessation Counseling, upon discharge (rafa X if applicable and completed):    ( )  Hospital staff assisted patient to call Quit Line or faxed referral                                   during hospitalization                  ( )  Recognizing danger situations (included triggers and roadblocks), if not completed on admission                    ( )  Coping skills (new ways to manage stress, exercise, relaxation techniques, changing routine, distraction), if not completed on admission                                                           ( )  Basic information about quitting (benefits of quitting, techniques in how to quit, available resources, if not completed on admission  ( ) [facility]   ( ) Patient refused referral  ( ) Patient refused counseling  ( X) Patient refused smoking cessation medication upon discharge    Vaccinations (rafa X if applicable and completed):  ( ) Patient states already received influenza vaccine elsewhere  ( ) Patient received influenza vaccine during this hospitalization  ( X) Patient refused influenza vaccine at this time
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
## 2025-05-15 NOTE — PROGRESS NOTES
[facility]  Discharge Note    Pt discharged with followings belongings:   Dental Appliances: None  Vision - Corrective Lenses: None  Hearing Aid: None  Jewelry: None  Body Piercings Removed: N/A  Clothing: Shirt, Belt, Pants, Footwear, Socks (1 shirt, 1 belt, 1 pair of jeans, 1 pair of socks, 1 pair of shoes)  Other Valuables: Cigarettes, Lighter/Matches (1 pack of cigs, 1 lighter, 1 wallet w/ debit/credit cards/ID (no cash) - all in the safe)   Valuables sent home withpt. Valuables retrieved from safe, Security envelope number:  n/a and returned to patient. Patient education on aftercare instructions: n/a  Information faxed to na by na Patient verbalize understanding of AVS:  yes.    Status EXAM upon discharge:  Mental Status and Behavioral Exam  Normal: No  Level of Assistance: Independent/Self  Facial Expression: Brightened  Affect: Congruent  Level of Consciousness: Alert  Frequency of Checks: 4 times per hour, close  Mood:Normal: Yes  Mood: Anxious  Motor Activity:Normal: No  Motor Activity: Decreased  Eye Contact: Good  Observed Behavior: Cooperative  Sexual Misconduct History: Current - no  Preception: Slater to person, Slater to time, Slater to place, Slater to situation  Attention:Normal: Yes  Thought Processes: Unremarkable  Thought Content:Normal: Yes  Depression Symptoms: No problems reported or observed.  Anxiety Symptoms: Generalized  Flora Symptoms: No problems reported or observed.  Hallucinations: None  Delusions: No  Memory:Normal: Yes  Insight and Judgment: No  Insight and Judgment: Poor judgment, Poor insight      Metabolic Screening:    Lab Results   Component Value Date    LABA1C 6.0 12/03/2024       Lab Results   Component Value Date    CHOL 135 05/14/2025    CHOL 156 05/03/2025    CHOL 168 12/03/2024    CHOL 165 11/12/2024    CHOL 232 (H) 05/10/2024    CHOL 289 (H) 04/13/2024    CHOL 161 08/19/2023     Lab Results   Component Value Date    TRIG 143 05/14/2025    TRIG 159 (H)

## 2025-05-15 NOTE — PROGRESS NOTES
Pt discharged with all belongings including phone, keys, and cane. AVS given to pt. Denies questions or concerns

## 2025-05-15 NOTE — DISCHARGE SUMMARY
1 capsule by mouth dailyHistorical Med      nitroGLYCERIN (NITROSTAT) 0.4 MG SL tablet Place 1 tablet under the tongue every 5 minutes as needed for Chest pain up to max of 3 total doses. If no relief after 1 dose, call 911., Disp-25 tablet, R-0Normal      nicotine (NICODERM CQ) 21 MG/24HR Place 1 patch onto the skin daily as needed (nicotine withdrawal)Historical Med                 Disposition - Residence     Follow Up:  See Discharge Instructions     Total time with patient was 40 minutes and more than 50 % of that time was spent counseling the patient on their symptoms, treatment and expected goals.     Katlyn Bruno - PMHNP-BC

## 2025-05-15 NOTE — GROUP NOTE
Group Therapy Note    Date: 5/15/2025    Group Start Time: 1000  Group End Time: 1045  Group Topic: Psychoeducation    [facility] [place] [place] [facility]    Louisa Robertson LISW        Group Therapy Note    Patient was invited to group but was unable to attend.    Attendees: 14    Signature:  MOHINDER Dominguez

## 2025-05-16 ENCOUNTER — FOLLOWUP TELEPHONE ENCOUNTER (OUTPATIENT)
Dept: PSYCHIATRY | Age: 55
End: 2025-05-16

## 2025-05-19 ENCOUNTER — FOLLOWUP TELEPHONE ENCOUNTER (OUTPATIENT)
Dept: PSYCHIATRY | Age: 55
End: 2025-05-19

## 2025-05-20 ENCOUNTER — FOLLOWUP TELEPHONE ENCOUNTER (OUTPATIENT)
Dept: PSYCHIATRY | Age: 55
End: 2025-05-20

## 2025-06-04 ENCOUNTER — CLINICAL DOCUMENTATION (OUTPATIENT)
Age: 55
End: 2025-06-04

## 2025-06-15 ENCOUNTER — APPOINTMENT (OUTPATIENT)
Dept: GENERAL RADIOLOGY | Age: 55
End: 2025-06-15
Payer: MEDICARE

## 2025-06-15 ENCOUNTER — HOSPITAL ENCOUNTER (OUTPATIENT)
Age: 55
Setting detail: OBSERVATION
Discharge: HOME | End: 2025-06-16
Attending: EMERGENCY MEDICINE
Payer: MEDICARE

## 2025-06-15 DIAGNOSIS — R07.9 CHEST PAIN, UNSPECIFIED TYPE: ICD-10-CM

## 2025-06-15 DIAGNOSIS — R45.851 SUICIDAL IDEATION: ICD-10-CM

## 2025-06-15 DIAGNOSIS — R07.9 ACUTE CHEST PAIN: Primary | ICD-10-CM

## 2025-06-15 LAB
ALBUMIN SERPL-MCNC: 4.5 G/DL (ref 3.4–5)
ALBUMIN/GLOB SERPL: 1.7 {RATIO} (ref 1.1–2.2)
ALP SERPL-CCNC: 93 U/L (ref 40–129)
ALT SERPL-CCNC: 6 U/L (ref 10–40)
ANION GAP SERPL CALCULATED.3IONS-SCNC: 10 MMOL/L (ref 3–16)
AST SERPL-CCNC: 18 U/L (ref 15–37)
BASOPHILS # BLD: 0.1 K/UL (ref 0–0.2)
BASOPHILS NFR BLD: 0.9 %
BILIRUB SERPL-MCNC: 0.3 MG/DL (ref 0–1)
BUN SERPL-MCNC: 17 MG/DL (ref 7–20)
CALCIUM SERPL-MCNC: 9.8 MG/DL (ref 8.3–10.6)
CHLORIDE SERPL-SCNC: 104 MMOL/L (ref 99–110)
CO2 SERPL-SCNC: 24 MMOL/L (ref 21–32)
CREAT SERPL-MCNC: 1.5 MG/DL (ref 0.9–1.3)
D-DIMER QUANTITATIVE: <0.27 UG/ML FEU (ref 0–0.6)
DEPRECATED RDW RBC AUTO: 16.5 % (ref 12.4–15.4)
EKG ATRIAL RATE: 82 BPM
EKG DIAGNOSIS: NORMAL
EKG P AXIS: 55 DEGREES
EKG P-R INTERVAL: 172 MS
EKG Q-T INTERVAL: 394 MS
EKG QRS DURATION: 108 MS
EKG QTC CALCULATION (BAZETT): 460 MS
EKG R AXIS: 47 DEGREES
EKG T AXIS: 50 DEGREES
EKG VENTRICULAR RATE: 82 BPM
EOSINOPHIL # BLD: 0.1 K/UL (ref 0–0.6)
EOSINOPHIL NFR BLD: 0.7 %
GFR SERPLBLD CREATININE-BSD FMLA CKD-EPI: 55 ML/MIN/{1.73_M2}
GLUCOSE SERPL-MCNC: 131 MG/DL (ref 70–99)
HCT VFR BLD AUTO: 39.1 % (ref 40.5–52.5)
HGB BLD-MCNC: 13.1 G/DL (ref 13.5–17.5)
LYMPHOCYTES # BLD: 2.5 K/UL (ref 1–5.1)
LYMPHOCYTES NFR BLD: 32 %
MCH RBC QN AUTO: 27.5 PG (ref 26–34)
MCHC RBC AUTO-ENTMCNC: 33.4 G/DL (ref 31–36)
MCV RBC AUTO: 82.5 FL (ref 80–100)
MONOCYTES # BLD: 0.7 K/UL (ref 0–1.3)
MONOCYTES NFR BLD: 8.7 %
NEUTROPHILS # BLD: 4.5 K/UL (ref 1.7–7.7)
NEUTROPHILS NFR BLD: 57.7 %
NT-PROBNP SERPL-MCNC: 99 PG/ML (ref 0–124)
PLATELET # BLD AUTO: 230 K/UL (ref 135–450)
PMV BLD AUTO: 9.7 FL (ref 5–10.5)
POTASSIUM SERPL-SCNC: 3.8 MMOL/L (ref 3.5–5.1)
PROT SERPL-MCNC: 7.2 G/DL (ref 6.4–8.2)
RBC # BLD AUTO: 4.74 M/UL (ref 4.2–5.9)
SODIUM SERPL-SCNC: 138 MMOL/L (ref 136–145)
TROPONIN, HIGH SENSITIVITY: 10 NG/L (ref 0–22)
TROPONIN, HIGH SENSITIVITY: 9 NG/L (ref 0–22)
WBC # BLD AUTO: 7.8 K/UL (ref 4–11)

## 2025-06-15 PROCEDURE — 94760 N-INVAS EAR/PLS OXIMETRY 1: CPT

## 2025-06-15 PROCEDURE — 99285 EMERGENCY DEPT VISIT HI MDM: CPT

## 2025-06-15 PROCEDURE — 96372 THER/PROPH/DIAG INJ SC/IM: CPT

## 2025-06-15 PROCEDURE — 6370000000 HC RX 637 (ALT 250 FOR IP)

## 2025-06-15 PROCEDURE — 87324 CLOSTRIDIUM AG IA: CPT

## 2025-06-15 PROCEDURE — 96376 TX/PRO/DX INJ SAME DRUG ADON: CPT

## 2025-06-15 PROCEDURE — 93010 ELECTROCARDIOGRAM REPORT: CPT | Performed by: INTERNAL MEDICINE

## 2025-06-15 PROCEDURE — 85379 FIBRIN DEGRADATION QUANT: CPT

## 2025-06-15 PROCEDURE — 87449 NOS EACH ORGANISM AG IA: CPT

## 2025-06-15 PROCEDURE — 2500000003 HC RX 250 WO HCPCS: Performed by: INTERNAL MEDICINE

## 2025-06-15 PROCEDURE — 83880 ASSAY OF NATRIURETIC PEPTIDE: CPT

## 2025-06-15 PROCEDURE — 93005 ELECTROCARDIOGRAM TRACING: CPT | Performed by: EMERGENCY MEDICINE

## 2025-06-15 PROCEDURE — G0378 HOSPITAL OBSERVATION PER HR: HCPCS

## 2025-06-15 PROCEDURE — 6370000000 HC RX 637 (ALT 250 FOR IP): Performed by: HOSPITALIST

## 2025-06-15 PROCEDURE — 85025 COMPLETE CBC W/AUTO DIFF WBC: CPT

## 2025-06-15 PROCEDURE — 36415 COLL VENOUS BLD VENIPUNCTURE: CPT

## 2025-06-15 PROCEDURE — 6360000002 HC RX W HCPCS: Performed by: EMERGENCY MEDICINE

## 2025-06-15 PROCEDURE — 80053 COMPREHEN METABOLIC PANEL: CPT

## 2025-06-15 PROCEDURE — 6360000002 HC RX W HCPCS

## 2025-06-15 PROCEDURE — 96375 TX/PRO/DX INJ NEW DRUG ADDON: CPT

## 2025-06-15 PROCEDURE — 84484 ASSAY OF TROPONIN QUANT: CPT

## 2025-06-15 PROCEDURE — 2500000003 HC RX 250 WO HCPCS

## 2025-06-15 PROCEDURE — 96374 THER/PROPH/DIAG INJ IV PUSH: CPT

## 2025-06-15 PROCEDURE — 71045 X-RAY EXAM CHEST 1 VIEW: CPT

## 2025-06-15 RX ORDER — ATORVASTATIN CALCIUM 40 MG/1
80 TABLET, FILM COATED ORAL NIGHTLY
Status: DISCONTINUED | OUTPATIENT
Start: 2025-06-15 | End: 2025-06-15

## 2025-06-15 RX ORDER — POTASSIUM CHLORIDE 7.45 MG/ML
10 INJECTION INTRAVENOUS PRN
Status: DISCONTINUED | OUTPATIENT
Start: 2025-06-15 | End: 2025-06-16 | Stop reason: HOSPADM

## 2025-06-15 RX ORDER — AMLODIPINE BESYLATE 5 MG/1
5 TABLET ORAL DAILY
Status: DISCONTINUED | OUTPATIENT
Start: 2025-06-15 | End: 2025-06-16 | Stop reason: HOSPADM

## 2025-06-15 RX ORDER — NICOTINE 21 MG/24HR
1 PATCH, TRANSDERMAL 24 HOURS TRANSDERMAL DAILY PRN
Status: DISCONTINUED | OUTPATIENT
Start: 2025-06-15 | End: 2025-06-16 | Stop reason: HOSPADM

## 2025-06-15 RX ORDER — LOPERAMIDE HYDROCHLORIDE 2 MG/1
2 CAPSULE ORAL 4 TIMES DAILY PRN
Status: DISCONTINUED | OUTPATIENT
Start: 2025-06-15 | End: 2025-06-16 | Stop reason: HOSPADM

## 2025-06-15 RX ORDER — MORPHINE SULFATE 2 MG/ML
2 INJECTION, SOLUTION INTRAMUSCULAR; INTRAVENOUS EVERY 4 HOURS PRN
Refills: 0 | Status: DISCONTINUED | OUTPATIENT
Start: 2025-06-15 | End: 2025-06-16

## 2025-06-15 RX ORDER — NITROGLYCERIN 0.4 MG/1
0.4 TABLET SUBLINGUAL PRN
Status: DISCONTINUED | OUTPATIENT
Start: 2025-06-15 | End: 2025-06-16 | Stop reason: HOSPADM

## 2025-06-15 RX ORDER — ONDANSETRON 4 MG/1
4 TABLET, ORALLY DISINTEGRATING ORAL EVERY 8 HOURS PRN
Status: DISCONTINUED | OUTPATIENT
Start: 2025-06-15 | End: 2025-06-16 | Stop reason: HOSPADM

## 2025-06-15 RX ORDER — CARVEDILOL 3.12 MG/1
3.12 TABLET ORAL 2 TIMES DAILY WITH MEALS
Status: DISCONTINUED | OUTPATIENT
Start: 2025-06-15 | End: 2025-06-16 | Stop reason: HOSPADM

## 2025-06-15 RX ORDER — PANTOPRAZOLE SODIUM 40 MG/1
40 TABLET, DELAYED RELEASE ORAL
Status: DISCONTINUED | OUTPATIENT
Start: 2025-06-15 | End: 2025-06-16 | Stop reason: HOSPADM

## 2025-06-15 RX ORDER — SODIUM CHLORIDE 9 MG/ML
INJECTION, SOLUTION INTRAVENOUS CONTINUOUS
Status: DISCONTINUED | OUTPATIENT
Start: 2025-06-15 | End: 2025-06-15

## 2025-06-15 RX ORDER — SODIUM CHLORIDE 0.9 % (FLUSH) 0.9 %
5-40 SYRINGE (ML) INJECTION EVERY 12 HOURS SCHEDULED
Status: DISCONTINUED | OUTPATIENT
Start: 2025-06-15 | End: 2025-06-16 | Stop reason: HOSPADM

## 2025-06-15 RX ORDER — ASPIRIN 81 MG/1
81 TABLET, CHEWABLE ORAL DAILY
Status: DISCONTINUED | OUTPATIENT
Start: 2025-06-16 | End: 2025-06-16 | Stop reason: HOSPADM

## 2025-06-15 RX ORDER — METOPROLOL TARTRATE 50 MG
50 TABLET ORAL PRN
Status: DISCONTINUED | OUTPATIENT
Start: 2025-06-15 | End: 2025-06-16 | Stop reason: HOSPADM

## 2025-06-15 RX ORDER — POLYETHYLENE GLYCOL 3350 17 G/17G
17 POWDER, FOR SOLUTION ORAL DAILY PRN
Status: DISCONTINUED | OUTPATIENT
Start: 2025-06-15 | End: 2025-06-16 | Stop reason: HOSPADM

## 2025-06-15 RX ORDER — SODIUM CHLORIDE 9 MG/ML
INJECTION, SOLUTION INTRAVENOUS PRN
Status: DISCONTINUED | OUTPATIENT
Start: 2025-06-15 | End: 2025-06-16 | Stop reason: HOSPADM

## 2025-06-15 RX ORDER — NITROGLYCERIN 0.4 MG/1
0.4 TABLET SUBLINGUAL EVERY 5 MIN PRN
Status: DISCONTINUED | OUTPATIENT
Start: 2025-06-15 | End: 2025-06-16 | Stop reason: HOSPADM

## 2025-06-15 RX ORDER — POTASSIUM CHLORIDE 1500 MG/1
40 TABLET, EXTENDED RELEASE ORAL PRN
Status: DISCONTINUED | OUTPATIENT
Start: 2025-06-15 | End: 2025-06-16 | Stop reason: HOSPADM

## 2025-06-15 RX ORDER — PRAZOSIN HYDROCHLORIDE 5 MG/1
5 CAPSULE ORAL NIGHTLY
Status: DISCONTINUED | OUTPATIENT
Start: 2025-06-15 | End: 2025-06-16 | Stop reason: HOSPADM

## 2025-06-15 RX ORDER — ONDANSETRON 2 MG/ML
4 INJECTION INTRAMUSCULAR; INTRAVENOUS EVERY 6 HOURS PRN
Status: DISCONTINUED | OUTPATIENT
Start: 2025-06-15 | End: 2025-06-16 | Stop reason: HOSPADM

## 2025-06-15 RX ORDER — METOPROLOL TARTRATE 50 MG
100 TABLET ORAL PRN
Status: DISCONTINUED | OUTPATIENT
Start: 2025-06-15 | End: 2025-06-16 | Stop reason: HOSPADM

## 2025-06-15 RX ORDER — ISOSORBIDE MONONITRATE 60 MG/1
60 TABLET, EXTENDED RELEASE ORAL DAILY
Status: DISCONTINUED | OUTPATIENT
Start: 2025-06-15 | End: 2025-06-16 | Stop reason: HOSPADM

## 2025-06-15 RX ORDER — METOPROLOL TARTRATE 1 MG/ML
5 INJECTION, SOLUTION INTRAVENOUS EVERY 5 MIN PRN
Status: DISCONTINUED | OUTPATIENT
Start: 2025-06-15 | End: 2025-06-16 | Stop reason: HOSPADM

## 2025-06-15 RX ORDER — PROCHLORPERAZINE EDISYLATE 5 MG/ML
10 INJECTION INTRAMUSCULAR; INTRAVENOUS EVERY 6 HOURS PRN
Status: DISCONTINUED | OUTPATIENT
Start: 2025-06-15 | End: 2025-06-16 | Stop reason: HOSPADM

## 2025-06-15 RX ORDER — METOPROLOL TARTRATE 50 MG
150 TABLET ORAL PRN
Status: DISCONTINUED | OUTPATIENT
Start: 2025-06-15 | End: 2025-06-16 | Stop reason: HOSPADM

## 2025-06-15 RX ORDER — ERGOCALCIFEROL 1.25 MG/1
50000 CAPSULE, LIQUID FILLED ORAL WEEKLY
Status: DISCONTINUED | OUTPATIENT
Start: 2025-06-16 | End: 2025-06-16 | Stop reason: HOSPADM

## 2025-06-15 RX ORDER — TAMSULOSIN HYDROCHLORIDE 0.4 MG/1
0.4 CAPSULE ORAL DAILY
Status: DISCONTINUED | OUTPATIENT
Start: 2025-06-15 | End: 2025-06-16 | Stop reason: HOSPADM

## 2025-06-15 RX ORDER — NITROGLYCERIN 0.4 MG/1
0.8 TABLET SUBLINGUAL PRN
Status: DISCONTINUED | OUTPATIENT
Start: 2025-06-15 | End: 2025-06-16 | Stop reason: HOSPADM

## 2025-06-15 RX ORDER — FLUPHENAZINE HYDROCHLORIDE 5 MG/1
5 TABLET ORAL NIGHTLY
Status: DISCONTINUED | OUTPATIENT
Start: 2025-06-15 | End: 2025-06-16 | Stop reason: HOSPADM

## 2025-06-15 RX ORDER — LEVOTHYROXINE SODIUM 100 UG/1
200 TABLET ORAL DAILY
Status: DISCONTINUED | OUTPATIENT
Start: 2025-06-15 | End: 2025-06-16 | Stop reason: HOSPADM

## 2025-06-15 RX ORDER — ASPIRIN 81 MG/1
81 TABLET ORAL DAILY
Status: DISCONTINUED | OUTPATIENT
Start: 2025-06-15 | End: 2025-06-15

## 2025-06-15 RX ORDER — DULOXETIN HYDROCHLORIDE 60 MG/1
60 CAPSULE, DELAYED RELEASE ORAL DAILY
Status: DISCONTINUED | OUTPATIENT
Start: 2025-06-15 | End: 2025-06-16 | Stop reason: HOSPADM

## 2025-06-15 RX ORDER — CLOPIDOGREL BISULFATE 75 MG/1
75 TABLET ORAL DAILY
Status: DISCONTINUED | OUTPATIENT
Start: 2025-06-15 | End: 2025-06-16 | Stop reason: HOSPADM

## 2025-06-15 RX ORDER — ENOXAPARIN SODIUM 100 MG/ML
40 INJECTION SUBCUTANEOUS DAILY
Status: DISCONTINUED | OUTPATIENT
Start: 2025-06-15 | End: 2025-06-16 | Stop reason: HOSPADM

## 2025-06-15 RX ORDER — MAGNESIUM SULFATE IN WATER 40 MG/ML
2000 INJECTION, SOLUTION INTRAVENOUS PRN
Status: DISCONTINUED | OUTPATIENT
Start: 2025-06-15 | End: 2025-06-16 | Stop reason: HOSPADM

## 2025-06-15 RX ORDER — CALCIUM CARBONATE 500 MG/1
500 TABLET, CHEWABLE ORAL 3 TIMES DAILY PRN
Status: DISCONTINUED | OUTPATIENT
Start: 2025-06-15 | End: 2025-06-16 | Stop reason: HOSPADM

## 2025-06-15 RX ORDER — ATORVASTATIN CALCIUM 80 MG/1
80 TABLET, FILM COATED ORAL DAILY
Status: DISCONTINUED | OUTPATIENT
Start: 2025-06-15 | End: 2025-06-16 | Stop reason: HOSPADM

## 2025-06-15 RX ORDER — SODIUM CHLORIDE 0.9 % (FLUSH) 0.9 %
5-40 SYRINGE (ML) INJECTION PRN
Status: DISCONTINUED | OUTPATIENT
Start: 2025-06-15 | End: 2025-06-16 | Stop reason: HOSPADM

## 2025-06-15 RX ORDER — RANOLAZINE 500 MG/1
500 TABLET, EXTENDED RELEASE ORAL 2 TIMES DAILY
Status: DISCONTINUED | OUTPATIENT
Start: 2025-06-15 | End: 2025-06-16 | Stop reason: HOSPADM

## 2025-06-15 RX ORDER — MORPHINE SULFATE 4 MG/ML
4 INJECTION, SOLUTION INTRAMUSCULAR; INTRAVENOUS ONCE
Refills: 0 | Status: COMPLETED | OUTPATIENT
Start: 2025-06-15 | End: 2025-06-15

## 2025-06-15 RX ADMIN — LOPERAMIDE HYDROCHLORIDE 2 MG: 2 CAPSULE ORAL at 16:53

## 2025-06-15 RX ADMIN — ISOSORBIDE MONONITRATE 60 MG: 60 TABLET, EXTENDED RELEASE ORAL at 08:01

## 2025-06-15 RX ADMIN — ANTACID TABLETS 500 MG: 500 TABLET, CHEWABLE ORAL at 22:27

## 2025-06-15 RX ADMIN — LEVOTHYROXINE SODIUM 200 MCG: 0.1 TABLET ORAL at 08:02

## 2025-06-15 RX ADMIN — SODIUM CHLORIDE, PRESERVATIVE FREE 10 ML: 5 INJECTION INTRAVENOUS at 08:09

## 2025-06-15 RX ADMIN — ONDANSETRON 4 MG: 2 INJECTION, SOLUTION INTRAMUSCULAR; INTRAVENOUS at 13:12

## 2025-06-15 RX ADMIN — ENOXAPARIN SODIUM 40 MG: 100 INJECTION SUBCUTANEOUS at 08:01

## 2025-06-15 RX ADMIN — MORPHINE SULFATE 2 MG: 2 INJECTION, SOLUTION INTRAMUSCULAR; INTRAVENOUS at 12:56

## 2025-06-15 RX ADMIN — MORPHINE SULFATE 2 MG: 2 INJECTION, SOLUTION INTRAMUSCULAR; INTRAVENOUS at 20:58

## 2025-06-15 RX ADMIN — SODIUM CHLORIDE, PRESERVATIVE FREE 10 ML: 5 INJECTION INTRAVENOUS at 20:58

## 2025-06-15 RX ADMIN — TAMSULOSIN HYDROCHLORIDE 0.4 MG: 0.4 CAPSULE ORAL at 08:02

## 2025-06-15 RX ADMIN — PRAZOSIN HYDROCHLORIDE 5 MG: 5 CAPSULE ORAL at 20:57

## 2025-06-15 RX ADMIN — CARVEDILOL 3.12 MG: 3.12 TABLET, FILM COATED ORAL at 16:53

## 2025-06-15 RX ADMIN — RANOLAZINE 500 MG: 500 TABLET, FILM COATED, EXTENDED RELEASE ORAL at 20:57

## 2025-06-15 RX ADMIN — FLUPHENAZINE HYDROCHLORIDE 5 MG: 5 TABLET ORAL at 20:57

## 2025-06-15 RX ADMIN — MORPHINE SULFATE 2 MG: 2 INJECTION, SOLUTION INTRAMUSCULAR; INTRAVENOUS at 08:01

## 2025-06-15 RX ADMIN — DULOXETINE 60 MG: 60 CAPSULE, DELAYED RELEASE ORAL at 08:02

## 2025-06-15 RX ADMIN — PANTOPRAZOLE SODIUM 40 MG: 40 TABLET, DELAYED RELEASE ORAL at 08:02

## 2025-06-15 RX ADMIN — ONDANSETRON 4 MG: 2 INJECTION, SOLUTION INTRAMUSCULAR; INTRAVENOUS at 21:27

## 2025-06-15 RX ADMIN — ATORVASTATIN CALCIUM 80 MG: 80 TABLET, FILM COATED ORAL at 08:01

## 2025-06-15 RX ADMIN — AMLODIPINE BESYLATE 5 MG: 5 TABLET ORAL at 08:01

## 2025-06-15 RX ADMIN — RANOLAZINE 500 MG: 500 TABLET, FILM COATED, EXTENDED RELEASE ORAL at 08:02

## 2025-06-15 RX ADMIN — PROCHLORPERAZINE EDISYLATE 10 MG: 5 INJECTION INTRAMUSCULAR; INTRAVENOUS at 23:25

## 2025-06-15 RX ADMIN — CLOPIDOGREL BISULFATE 75 MG: 75 TABLET, FILM COATED ORAL at 08:02

## 2025-06-15 RX ADMIN — SODIUM CHLORIDE, PRESERVATIVE FREE 10 ML: 5 INJECTION INTRAVENOUS at 21:12

## 2025-06-15 RX ADMIN — MORPHINE SULFATE 4 MG: 4 INJECTION, SOLUTION INTRAMUSCULAR; INTRAVENOUS at 03:39

## 2025-06-15 RX ADMIN — CARVEDILOL 3.12 MG: 3.12 TABLET, FILM COATED ORAL at 08:02

## 2025-06-15 RX ADMIN — ANTACID TABLETS 500 MG: 500 TABLET, CHEWABLE ORAL at 13:12

## 2025-06-15 RX ADMIN — MORPHINE SULFATE 2 MG: 2 INJECTION, SOLUTION INTRAMUSCULAR; INTRAVENOUS at 16:52

## 2025-06-15 ASSESSMENT — PAIN SCALES - GENERAL
PAINLEVEL_OUTOF10: 7
PAINLEVEL_OUTOF10: 7
PAINLEVEL_OUTOF10: 0
PAINLEVEL_OUTOF10: 9
PAINLEVEL_OUTOF10: 7
PAINLEVEL_OUTOF10: 0
PAINLEVEL_OUTOF10: 9
PAINLEVEL_OUTOF10: 9
PAINLEVEL_OUTOF10: 5
PAINLEVEL_OUTOF10: 9

## 2025-06-15 ASSESSMENT — PAIN DESCRIPTION - ORIENTATION
ORIENTATION: RIGHT
ORIENTATION: MID
ORIENTATION: RIGHT
ORIENTATION: MID
ORIENTATION: LEFT
ORIENTATION: MID

## 2025-06-15 ASSESSMENT — PAIN DESCRIPTION - LOCATION
LOCATION: RIB CAGE;CHEST
LOCATION: CHEST
LOCATION: CHEST;RIB CAGE
LOCATION: CHEST
LOCATION: CHEST
LOCATION: CHEST;BACK
LOCATION: CHEST

## 2025-06-15 ASSESSMENT — PAIN DESCRIPTION - PAIN TYPE
TYPE: ACUTE PAIN

## 2025-06-15 ASSESSMENT — PAIN DESCRIPTION - DESCRIPTORS
DESCRIPTORS: ACHING
DESCRIPTORS: ACHING
DESCRIPTORS: ACHING;DISCOMFORT
DESCRIPTORS: ACHING

## 2025-06-15 ASSESSMENT — PAIN SCALES - WONG BAKER: WONGBAKER_NUMERICALRESPONSE: NO HURT

## 2025-06-15 ASSESSMENT — PAIN DESCRIPTION - ONSET
ONSET: ON-GOING

## 2025-06-15 ASSESSMENT — PAIN DESCRIPTION - FREQUENCY
FREQUENCY: INTERMITTENT
FREQUENCY: INTERMITTENT
FREQUENCY: CONTINUOUS
FREQUENCY: CONTINUOUS

## 2025-06-15 ASSESSMENT — PAIN - FUNCTIONAL ASSESSMENT: PAIN_FUNCTIONAL_ASSESSMENT: 0-10

## 2025-06-15 NOTE — ED PROVIDER NOTES
[facility] EMERGENCY DEPARTMENT  EMERGENCY DEPARTMENT ENCOUNTER      Pt Name: Ashutosh Donovan  MRN: [id number]  Birthdate 1970  Date of evaluation: 6/15/2025  Provider: DMITRY HERNANDEZ DO    CHIEF COMPLAINT  Chief Complaint   Patient presents with    Chest Pain     Left sided chest pain from home.  Patient took nitro x 3 and (2) 324mg aspirin. Pt has thoughts of harming himself as well. His chest pain is a 9/10. Yes, he states he took 2 324mg of ASA. He took 2 lines of cocaine earlier today as well. Doesn't drink on a normal basis.          This patient is at risk for a communicable infection.  Therefore, personal protection equipment consisting of a mask was worn for the exam.    HPI  Ashutosh Donovan is a 54 y.o. male who presents with chest pain that started an hour and a half prior to arrival.  He described as a \"sharp heaviness.\"  He got short of breath with this.  He states has had \"14 heart attacks\" in the past.  His last one was a year and a half ago.  He had neck surgery 5 months ago and currently is wearing a cervical collar for that.  He states got short of breath with this.  States it feels like a heart attack.    REVIEW OF SYSTEMS  All systems negative except as noted in the HPI.  Reviewed Nurses' notes and concur.    No LMP for male patient.    PAST MEDICAL HISTORY  Past Medical History:   Diagnosis Date    Arthritis     CAD (coronary artery disease)     CHF (congestive heart failure) (HCC)     Chronic pain     twister vertebra and two collapsed disc, states injury happened at 47yo    Emphysema lung (HCC)     Heart attack (HCC)     pt reports 10 heart attacks in 6 years, 8 stents placed    Hepatitis C     Hypothyroid     Kidney failure        FAMILY HISTORY  No family history on file.    SOCIAL HISTORY   reports that he has been smoking cigarettes. He has never used smokeless tobacco. He reports current drug use. Drug: Marijuana (Weed). He reports that he does not drink

## 2025-06-15 NOTE — ED TRIAGE NOTES
Left sided chest pain from home.  Patient took nitro x 3 and (2) 324mg aspirin. Pt has thoughts of harming himself as well. His chest pain is a 9/10. Yes, he states he took 2 324mg of ASA. He took 2 lines of cocaine earlier today as well. Doesn't drink on a normal basis.

## 2025-06-15 NOTE — PROGRESS NOTES
Morning admission follow-up note    Patient seen and evaluated at bedside, sitter at bedside, somewhat sleepy, easily arousable, at this moment is unable to maintain a prolonged conversation but was just seen by telepsych, care discussed with telepsych team, they are recommending inpatient rehabilitation in the meanwhile he continues to complain of retrosternal chest pain somewhat atypical, reports having had a fall but again patient is not a great historian, labs and imaging reviewed    Vitals:    06/15/25 0857   BP:    Pulse:    Resp:    Temp:    SpO2: 95%     Conditions under treatment     # Chest pain  # Suicidal ideation  # History of substance abuse  # Mood disorder  # Acute kidney injury  # Hypothyroidism  # Hypertension  # Tobacco dependence    Plan:    Patient presents with chest pain, is not a great historian, has a known history of coronary artery disease, unfortunately continues to smoke, unfortunately continues to complain of chest pain which is not very typical, troponin is negative, EKG without any acute ischemic changes, will await cardiology recommendations.    Creatinine is 1.5, on IV fluids, recheck again tomorrow, avoid nephrotoxic medications    Monitor for withdrawal, patient has a history of polysubstance abuse    Patient with a history of substance-induced mood disorder, antisocial personality disorder, schizoaffective disorder, seen by psychiatry, they are recommending inpatient psychiatry and involuntary hold, for now continue with duloxetine, ergocalciferol, fluphenazine and prazosin
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
## 2025-06-15 NOTE — ED NOTES
ED TO INPATIENT SBAR HANDOFF    Patient Name: Ashutosh Donovan   Preferred Name: Ashutosh  : 1970  54 y.o.   Family/Caregiver Present: no   Code Status Order: Full Code  PO Status: NPO:Yes, clear liquid   Telemetry Order: Yes  C-SSRS:    Sitter YES  Restraints:     Sepsis Risk Score      Situation  Chief Complaint   Patient presents with    Chest Pain     Left sided chest pain from home.  Patient took nitro x 3 and (2) 324mg aspirin. Pt has thoughts of harming himself as well. His chest pain is a 9/10. Yes, he states he took 2 324mg of ASA. He took 2 lines of cocaine earlier today as well. Doesn't drink on a normal basis.      Brief Description of Patient's Condition:   A&Ox4. Ambulatory. SI, needs a sitter, and a psych consult. On a 72H hold as of now unless psych clears him   Mental Status: oriented, alert, coherent, logical, thought processes intact, and able to concentrate and follow conversation  Arrived from:Home  Imaging:   XR CHEST PORTABLE   Final Result   No acute airspace disease identified.           Abnormal labs:   Abnormal Labs Reviewed   COMPREHENSIVE METABOLIC PANEL W/ REFLEX TO MG FOR LOW K - Abnormal; Notable for the following components:       Result Value    Glucose 131 (*)     Creatinine 1.5 (*)     Est, Glom Filt Rate 55 (*)     ALT 6 (*)     All other components within normal limits   CBC WITH AUTO DIFFERENTIAL - Abnormal; Notable for the following components:    Hemoglobin 13.1 (*)     Hematocrit 39.1 (*)     RDW 16.5 (*)     All other components within normal limits       Background  Allergies:   Allergies   Allergen Reactions    Latex Itching    Bupropion Seizure    Tramadol Hives and Angioedema    Ziprasidone Hcl Seizure     Pt reports seizures with Geodon    Ketorolac Tromethamine Hives and Nausea And Vomiting    Lithium Other (See Comments)     Thyroid issues    Olanzapine Swelling    Risperidone Swelling    Acetaminophen Other (See Comments)     Pt has hep c    Ibuprofen

## 2025-06-15 NOTE — PROGRESS NOTES
Patient c/o several loose watery stools. Nurse notified doctor, doctor placed Iodium orders. Patient also c/o itching, MD aware. Patient also c/o indigestion, MD placed PRN orders for TUMS. Patient continue to c/o pain in back and chest, rating pain 9/10. PRN pain medication given as ordered. Sitter continue to be at bedside. Patient needs assessed and addressed, no other needs voiced at this time. Call light and bedside table within reach.

## 2025-06-15 NOTE — VIRTUAL HEALTH
Reason for Cancel: TelePsych Reason for Cancel: Patient already admitted     Discussed with Attending Michael - agree to defer psych eval to inpatient team.      [place] Consult to Tele-Psych  Consult performed by: Cate Morris LCSW  Consult ordered by: Bruce Rodriguez DO           Total time spent on this encounter: Not billed by time    --Cate Morris LCSW on 6/15/2025 at 5:41 AM    An electronic signature was used to authenticate this note.

## 2025-06-15 NOTE — PROGRESS NOTES
4 Eyes Admission Assessment     I agree as the admission nurse that 2 RN's have performed a thorough Head to Toe Skin Assessment on the patient. ALL assessment sites listed below have been assessed on admission.                 Areas assessed by both nurses:  John  [x]   Head, Face, and Ears   [x]   Shoulders, Back, and Chest  [x]   Arms, Elbows, and Hands   [x]   Coccyx, Sacrum, and Ischum  [x]   Legs, Feet, and Heels        Does the Patient have Skin Breakdown?  No         Jovanny Prevention initiated:  Yes   Wound Care Orders initiated:  NA      [facility] nurse consulted for Pressure Injury (Stage 3,4, Unstageable, DTI, NWPT, and Complex wounds):  NA      Nurse 1 eSignature: Electronically signed by John Khalil RN on 6/15/25 at 5:52 PM EDT    **SHARE this note so that the co-signing nurse is able to place an eSignature**    Nurse 2 eSignature: Electronically signed by Phuc Alfred RN on 6/15/25 at 6:03 PM EDT
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
## 2025-06-15 NOTE — CONSULTS
Referring Physician: * No referring provider recorded for this case *  Reason for Consultation: Chest pain, possible unstable angina  Chief Complaint: I have experiencing left-sided chest pain going down my left arm      Subjective:   History of Present Illness:  Ashutosh Donovan is a 54 y.o. patient with prior history of coronary artery disease and stents (unable to find any details in the chart ) CHF, hypothyroidism, hepatitis C and suicidal ideation who presented to the hospital with complaints of left-sided chest pain with pain radiating to left side of his arm.  Chest pain is sharp in nature and not necessarily gets worse with exertion.  He had no nausea vomiting associated with this.  He presented to the emergency room and he is workup including troponins have been negative.  Due to his previous cardiac history of stents cardiology consultation has been requested    I have been asked to provide consultation regarding further management and testing.    Review of Systems:   All 12 point review of symptoms completed. Pertinent positives identified in the HPI, all other review of symptoms negative as below.    Past Medical History:   has a past medical history of Arthritis, CAD (coronary artery disease), CHF (congestive heart failure) (HCC), Chronic pain, Emphysema lung (HCC), Heart attack (HCC), Hepatitis C, Hypothyroid, and Kidney failure.    Surgical History:   has a past surgical history that includes Coronary stent placement; Cholecystectomy; Tonsillectomy; and cervical fusion (N/A, 11/22/2024).     Social History:   reports that he has been smoking cigarettes. He has never used smokeless tobacco. He reports current drug use. Drug: Marijuana (Weed). He reports that he does not drink alcohol.     Family History:  family history is not on file.      Home Medications:  Were reviewed and are listed in nursing record and/or below  Prior to Admission medications    Medication Sig Start Date End Date Taking?  Lithium, Olanzapine, Risperidone, Acetaminophen, and Ibuprofen         Objective:   PHYSICAL EXAM:    Vitals:    06/15/25 1104   BP: (!) 123/59   Pulse: 55   Resp: 16   Temp: 97.3 °F (36.3 °C)   SpO2: 95%    Weight - Scale: 91 kg (200 lb 9.9 oz)         General Appearance:  Alert, cooperative, no distress, appears stated age.   Head:  Normocephalic, without obvious abnormality, atraumatic.   Eyes:  Pupils equal and round. No scleral icterus.   Mouth: Moist mucosa, no pharyngeal erythema.   Nose: Nares normal. No drainage or sinus tenderness.       Neck: Supple, symmetrical, trachea midline. No adenopathy. No tenderness/mass/nodules. No carotid bruit or JVD       Lungs:   Clear to auscultation bilaterally, respirations unlabored. No wheeze, rales, or rhonchi.   Chest Wall:  No tenderness or deformity.   Heart:  Regular rate, S1/ S2 normal. No murmur, rub, or gallop.   Abdomen:   Soft, non-tender, bowel sounds active.       Musculoskeletal: No muscle wasting or digital clubbing.   Extremities: Extremities normal, atraumatic. No cyanosis or edema.   Pulses: 2+ radial and pedal pulses, symmetric.   Skin: No rashes or lesions.   Pysch: Normal mood and affect. Alert and oriented x 4.   Neurologic: Normal gross motor and sensory exam.         Labs     CBC:   Lab Results   Component Value Date/Time    WBC 7.8 06/15/2025 02:46 AM    RBC 4.74 06/15/2025 02:46 AM    HGB 13.1 06/15/2025 02:46 AM    HCT 39.1 06/15/2025 02:46 AM    MCV 82.5 06/15/2025 02:46 AM    RDW 16.5 06/15/2025 02:46 AM     06/15/2025 02:46 AM     CMP:  Lab Results   Component Value Date/Time     06/15/2025 02:46 AM    K 3.8 06/15/2025 02:46 AM     06/15/2025 02:46 AM    CO2 24 06/15/2025 02:46 AM    BUN 17 06/15/2025 02:46 AM    CREATININE 1.5 06/15/2025 02:46 AM    GFRAA >60 10/30/2018 03:05 AM    GFRAA >60 08/05/2011 08:45 AM    AGRATIO 1.7 06/15/2025 02:46 AM    LABGLOM 55 06/15/2025 02:46 AM    LABGLOM 39 04/13/2024 05:50 AM

## 2025-06-16 VITALS
OXYGEN SATURATION: 96 % | TEMPERATURE: 98.2 F | HEIGHT: 70 IN | HEART RATE: 64 BPM | RESPIRATION RATE: 18 BRPM | DIASTOLIC BLOOD PRESSURE: 87 MMHG | BODY MASS INDEX: 28.22 KG/M2 | SYSTOLIC BLOOD PRESSURE: 128 MMHG | WEIGHT: 197.09 LBS

## 2025-06-16 LAB
ANION GAP SERPL CALCULATED.3IONS-SCNC: 9 MMOL/L (ref 3–16)
BUN SERPL-MCNC: 14 MG/DL (ref 7–20)
C DIFF TOX A+B STL QL IA: NORMAL
CALCIUM SERPL-MCNC: 9.4 MG/DL (ref 8.3–10.6)
CHLORIDE SERPL-SCNC: 107 MMOL/L (ref 99–110)
CHOLEST SERPL-MCNC: 105 MG/DL (ref 0–199)
CO2 SERPL-SCNC: 22 MMOL/L (ref 21–32)
CREAT SERPL-MCNC: 1.1 MG/DL (ref 0.9–1.3)
DEPRECATED RDW RBC AUTO: 16.1 % (ref 12.4–15.4)
GFR SERPLBLD CREATININE-BSD FMLA CKD-EPI: 80 ML/MIN/{1.73_M2}
GLUCOSE SERPL-MCNC: 114 MG/DL (ref 70–99)
HCT VFR BLD AUTO: 38.6 % (ref 40.5–52.5)
HDLC SERPL-MCNC: 48 MG/DL (ref 40–60)
HGB BLD-MCNC: 13.2 G/DL (ref 13.5–17.5)
LDLC SERPL CALC-MCNC: 46 MG/DL
MCH RBC QN AUTO: 28 PG (ref 26–34)
MCHC RBC AUTO-ENTMCNC: 34.1 G/DL (ref 31–36)
MCV RBC AUTO: 82.1 FL (ref 80–100)
PLATELET # BLD AUTO: 197 K/UL (ref 135–450)
PMV BLD AUTO: 9.6 FL (ref 5–10.5)
POTASSIUM SERPL-SCNC: 4.1 MMOL/L (ref 3.5–5.1)
POTASSIUM SERPL-SCNC: 4.1 MMOL/L (ref 3.5–5.1)
RBC # BLD AUTO: 4.71 M/UL (ref 4.2–5.9)
SODIUM SERPL-SCNC: 138 MMOL/L (ref 136–145)
TRIGL SERPL-MCNC: 54 MG/DL (ref 0–150)
VLDLC SERPL CALC-MCNC: 11 MG/DL
WBC # BLD AUTO: 6.8 K/UL (ref 4–11)

## 2025-06-16 PROCEDURE — 36415 COLL VENOUS BLD VENIPUNCTURE: CPT

## 2025-06-16 PROCEDURE — 85027 COMPLETE CBC AUTOMATED: CPT

## 2025-06-16 PROCEDURE — 6370000000 HC RX 637 (ALT 250 FOR IP): Performed by: HOSPITALIST

## 2025-06-16 PROCEDURE — 96372 THER/PROPH/DIAG INJ SC/IM: CPT

## 2025-06-16 PROCEDURE — 6360000002 HC RX W HCPCS

## 2025-06-16 PROCEDURE — 2500000003 HC RX 250 WO HCPCS

## 2025-06-16 PROCEDURE — 2500000003 HC RX 250 WO HCPCS: Performed by: INTERNAL MEDICINE

## 2025-06-16 PROCEDURE — 80061 LIPID PANEL: CPT

## 2025-06-16 PROCEDURE — G0378 HOSPITAL OBSERVATION PER HR: HCPCS

## 2025-06-16 PROCEDURE — 80048 BASIC METABOLIC PNL TOTAL CA: CPT

## 2025-06-16 PROCEDURE — 94760 N-INVAS EAR/PLS OXIMETRY 1: CPT

## 2025-06-16 PROCEDURE — 96376 TX/PRO/DX INJ SAME DRUG ADON: CPT

## 2025-06-16 PROCEDURE — 6370000000 HC RX 637 (ALT 250 FOR IP)

## 2025-06-16 RX ORDER — OXYCODONE HYDROCHLORIDE 5 MG/1
5 TABLET ORAL EVERY 4 HOURS PRN
Refills: 0 | Status: DISCONTINUED | OUTPATIENT
Start: 2025-06-16 | End: 2025-06-16 | Stop reason: HOSPADM

## 2025-06-16 RX ADMIN — OXYCODONE 5 MG: 5 TABLET ORAL at 16:50

## 2025-06-16 RX ADMIN — MORPHINE SULFATE 2 MG: 2 INJECTION, SOLUTION INTRAMUSCULAR; INTRAVENOUS at 02:49

## 2025-06-16 RX ADMIN — PANTOPRAZOLE SODIUM 40 MG: 40 TABLET, DELAYED RELEASE ORAL at 09:41

## 2025-06-16 RX ADMIN — TIZANIDINE 4 MG: 4 TABLET ORAL at 08:34

## 2025-06-16 RX ADMIN — TAMSULOSIN HYDROCHLORIDE 0.4 MG: 0.4 CAPSULE ORAL at 08:34

## 2025-06-16 RX ADMIN — ENOXAPARIN SODIUM 40 MG: 100 INJECTION SUBCUTANEOUS at 08:33

## 2025-06-16 RX ADMIN — DULOXETINE 60 MG: 60 CAPSULE, DELAYED RELEASE ORAL at 08:34

## 2025-06-16 RX ADMIN — LOPERAMIDE HYDROCHLORIDE 2 MG: 2 CAPSULE ORAL at 08:34

## 2025-06-16 RX ADMIN — ATORVASTATIN CALCIUM 80 MG: 80 TABLET, FILM COATED ORAL at 08:34

## 2025-06-16 RX ADMIN — LEVOTHYROXINE SODIUM 200 MCG: 0.1 TABLET ORAL at 08:34

## 2025-06-16 RX ADMIN — ONDANSETRON 4 MG: 2 INJECTION, SOLUTION INTRAMUSCULAR; INTRAVENOUS at 08:34

## 2025-06-16 RX ADMIN — ISOSORBIDE MONONITRATE 60 MG: 60 TABLET, EXTENDED RELEASE ORAL at 08:34

## 2025-06-16 RX ADMIN — CARVEDILOL 3.12 MG: 3.12 TABLET, FILM COATED ORAL at 16:51

## 2025-06-16 RX ADMIN — AMLODIPINE BESYLATE 5 MG: 5 TABLET ORAL at 08:34

## 2025-06-16 RX ADMIN — SODIUM CHLORIDE, PRESERVATIVE FREE 10 ML: 5 INJECTION INTRAVENOUS at 09:40

## 2025-06-16 RX ADMIN — CLOPIDOGREL BISULFATE 75 MG: 75 TABLET, FILM COATED ORAL at 08:33

## 2025-06-16 RX ADMIN — SODIUM CHLORIDE, PRESERVATIVE FREE 10 ML: 5 INJECTION INTRAVENOUS at 08:35

## 2025-06-16 RX ADMIN — ASPIRIN 81 MG: 81 TABLET, CHEWABLE ORAL at 08:34

## 2025-06-16 RX ADMIN — RANOLAZINE 500 MG: 500 TABLET, FILM COATED, EXTENDED RELEASE ORAL at 08:34

## 2025-06-16 RX ADMIN — ANTACID TABLETS 500 MG: 500 TABLET, CHEWABLE ORAL at 08:34

## 2025-06-16 RX ADMIN — OXYCODONE 5 MG: 5 TABLET ORAL at 12:43

## 2025-06-16 RX ADMIN — MORPHINE SULFATE 2 MG: 2 INJECTION, SOLUTION INTRAMUSCULAR; INTRAVENOUS at 08:34

## 2025-06-16 RX ADMIN — ERGOCALCIFEROL 50000 UNITS: 1.25 CAPSULE ORAL at 08:33

## 2025-06-16 ASSESSMENT — PAIN SCALES - GENERAL
PAINLEVEL_OUTOF10: 7
PAINLEVEL_OUTOF10: 6
PAINLEVEL_OUTOF10: 9
PAINLEVEL_OUTOF10: 6
PAINLEVEL_OUTOF10: 8
PAINLEVEL_OUTOF10: 8
PAINLEVEL_OUTOF10: 9

## 2025-06-16 ASSESSMENT — PAIN DESCRIPTION - FREQUENCY
FREQUENCY: INTERMITTENT

## 2025-06-16 ASSESSMENT — PAIN DESCRIPTION - PAIN TYPE
TYPE: ACUTE PAIN
TYPE: ACUTE PAIN;CHRONIC PAIN
TYPE: ACUTE PAIN
TYPE: ACUTE PAIN;CHRONIC PAIN

## 2025-06-16 ASSESSMENT — PAIN DESCRIPTION - ONSET
ONSET: ON-GOING

## 2025-06-16 ASSESSMENT — PAIN - FUNCTIONAL ASSESSMENT: PAIN_FUNCTIONAL_ASSESSMENT: PREVENTS OR INTERFERES SOME ACTIVE ACTIVITIES AND ADLS

## 2025-06-16 ASSESSMENT — PAIN DESCRIPTION - ORIENTATION
ORIENTATION: MID

## 2025-06-16 ASSESSMENT — PAIN DESCRIPTION - DESCRIPTORS
DESCRIPTORS: ACHING
DESCRIPTORS: SHARP

## 2025-06-16 ASSESSMENT — PAIN DESCRIPTION - LOCATION
LOCATION: BACK;CHEST
LOCATION: CHEST
LOCATION: BACK;CHEST
LOCATION: CHEST
LOCATION: CHEST
LOCATION: BACK;CHEST

## 2025-06-16 NOTE — PROGRESS NOTES
Patient in bed, awake, a&ox4. Patient tolerating PO intake, nausea and vomiting improving, patient did have antiemetic this morning. Patient took pills whole this morning. Patient IV flushed and locked. Patient c/o pain in back and chest. PRN pain medication given as ordered. Patient continues to be evaluate for suicide risk and states that he does not have any thoughts or plans. Patient voiding without any issues. Patient able to make needs known, no other needs voiced at this time. Call light and bedside table within reach

## 2025-06-16 NOTE — PLAN OF CARE
with patient and caregiver   Arrange for needed discharge resources and transportation as appropriate   Identify discharge learning needs (meds, wound care, etc)  Taken 6/15/2025 1230  Discharge to home or other facility with appropriate resources:   Identify barriers to discharge with patient and caregiver   Arrange for needed discharge resources and transportation as appropriate   Identify discharge learning needs (meds, wound care, etc)     Problem: Pain  Goal: Verbalizes/displays adequate comfort level or baseline comfort level  6/16/2025 0400 by Domonique Munguia RN  Outcome: Progressing  6/15/2025 1618 by John Khalil RN  Outcome: Progressing  Flowsheets (Taken 6/15/2025 1618)  Verbalizes/displays adequate comfort level or baseline comfort level:   Assess pain using appropriate pain scale   Encourage patient to monitor pain and request assistance   Administer analgesics based on type and severity of pain and evaluate response   Implement non-pharmacological measures as appropriate and evaluate response     Problem: Safety - Adult  Goal: Free from fall injury  Outcome: Progressing     Problem: ABCDS Injury Assessment  Goal: Absence of physical injury  Outcome: Progressing     Problem: Risk for Elopement  Goal: Patient will not exit the unit/facility without proper excort  Outcome: Progressing  Flowsheets  Taken 6/15/2025 2106 by Domonique Munguia, RN  Nursing Interventions for Elopement Risk:   Assist with personal care needs such as toileting, eating, dressing, as needed to reduce the risk of wandering   Collaborate with family members/caregivers to mitigate the elopement risk  Taken 6/15/2025 1700 by John Khalil, RN  Nursing Interventions for Elopement Risk:   Reduce environmental triggers   Place patient in room far away from exits and stairways   Make sure patient has all necessary personal care items   Collaborate with treatment team for nicotine replacement   Assist with personal care needs  support, presence and reassurance  4. Assess for possible suicidal thoughts or ideation. If patient expresses suicidal thoughts or statements do not leave alone, initiate Suicide Precautions, move to a room close to the nursing station and obtain sitter  5. Initiate consults as appropriate with Mental Health Professional, Spiritual Care, Psychosocial CNS, and consider a recommendation to the [facility] for a Psychiatric Consultation  Outcome: Progressing

## 2025-06-16 NOTE — VIRTUAL HEALTH
Ashutosh Donovan, was evaluated through a synchronous (real-time) audio-video encounter. The patient (and/or guardian if applicable) is aware that this is a billable service, which includes applicable co-pays. This virtual visit was conducted with patient's (and/or legal guardian's) consent. Patient identification was verified, and a caregiver was present when appropriate.  The patient was located at Facility (Appt Department): [facility] 3W ORTHOPEDICS  [address] [address]  Loc: [phone]  The provider was located at Home (City/State): [place], [place]  Confirm you are appropriately licensed, registered, or certified to deliver care in the state where the patient is located as indicated above. If you are not or unsure, please re-schedule the visit: Yes, I confirm.   Yocha Dehe Consult to Tele-pysch Provider  Consult performed by: Luna Garvin APRN - CNP  Consult ordered by: Dee Angle MD  Reason for consult: Suicidal ideation      Total time spent on this encounter: Not billed by time    --MALCOM Madrid CNP on 6/16/2025 at 4:18 PM    An electronic signature was used to authenticate this note.    Ashutosh Donovan  [id number]  1970       INPATIENT TELEPSYCHIATRY REASSESSMENT    06/16/25    History  From: Primary RN, EHR, patient, sitter    Chief Complaint:  “Suicidal ideation”    HPI:   This is a 54 y.o. male patient who is being seen for a reassessment.    He was evaluated yesterday by psychiatry for difficulty controlling his mood and thoughts.  He was having suicidal and homicidal ideation though denied any intent to act on those thoughts.  He was minimally interactive in yesterday's evaluation, nodding off throughout the interview.  The recommendation was to reevaluate given his minimal engagement and inability to reliably assess plan or intent to harm himself or others.    Ashutosh was evaluated virtually today, he is seated upright bedside  recurrent 08/23/2024    H/O major depression 08/23/2024    CVA (cerebral vascular accident) ([facility]) 11/11/2024    Essential hypertension 11/11/2024    Acute CVA (cerebrovascular accident) ([facility]) 11/11/2024    Chronic kidney disease 11/11/2024    Cauda equina compression ([facility]) 11/12/2024    Lower extremity numbness 11/12/2024    Closed displaced fracture of proximal phalanx of right little finger 11/23/2024    Multiple falls 12/02/2024    HAP (hospital-acquired pneumonia) 12/05/2024    Atypical chest pain 01/10/2025    Dizziness 04/28/2025    Chest pain 05/02/2025    MDD (major depressive disorder), single episode 05/14/2025    Other specified hypothyroidism 05/15/2025    Elevated TSH 05/15/2025    Hx of medication noncompliance 05/15/2025    Coronary artery disease involving native heart 05/15/2025     Resolved Ambulatory Problems     Diagnosis Date Noted    Chest pain 04/12/2024    Fall 12/03/2024     Past Medical History:   Diagnosis Date    Arthritis     CAD (coronary artery disease)     CHF (congestive heart failure) ([facility])     Chronic pain     Emphysema lung ([facility])     Heart attack ([facility])     Hepatitis C     Hypothyroid     Kidney failure        Problem List:   Principal Problem:    Chest pain, rule out acute myocardial infarction  Resolved Problems:    * No resolved hospital problems. *       Allergies:  Allergies   Allergen Reactions    Latex Itching    Bupropion Seizure    Tramadol Hives and Angioedema    Ziprasidone Hcl Seizure     Pt reports seizures with Geodon    Ketorolac Tromethamine Hives and Nausea And Vomiting    Lithium Other (See Comments)     Thyroid issues    Olanzapine Swelling    Risperidone Swelling    Acetaminophen Other (See Comments)     Pt has hep c    Ibuprofen Nausea Only and Rash        Current medications:    Current Facility-Administered Medications:     oxyCODONE (ROXICODONE) immediate release tablet 5 mg, 5 mg, Oral, Q4H PRN, Dee Angel MD, 5 mg at 06/16/25 1243    amLODIPine (NORVASC)
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
## 2025-06-16 NOTE — CARE COORDINATION
Case Management Assessment  Initial Evaluation    Date/Time of Evaluation: 6/16/2025 3:28 PM  Assessment Completed by: MOHINDER Dyson    If patient is discharged prior to next notation, then this note serves as note for discharge by case management.    Patient Name: Ashutosh Donovan                   YOB: 1970  Diagnosis: Suicidal ideation [R45.851]  Acute chest pain [R07.9]  Chest pain, rule out acute myocardial infarction [R07.9]  Chest pain, unspecified type [R07.9]                   Date / Time: 6/15/2025  2:13 AM    Patient Admission Status: Observation   Readmission Risk (Low < 19, Mod (19-27), High > 27): Readmission Risk Score: 18.4    Current PCP: No primary care provider on file.  PCP verified by CM? Yes ([facility])    Chart Reviewed: Yes      History Provided by: Patient, Medical Record  Patient Orientation: Alert and Oriented    Patient Cognition: Alert    Hospitalization in the last 30 days (Readmission):  No    If yes, Readmission Assessment in CM Navigator will be completed.    Advance Directives:      Code Status: Full Code   Patient's Primary Decision Maker is: Legal Next of Kin    Primary Decision Maker: Kaylin Wood - Parent - [phone]    Secondary Decision Maker (Active): Milligan,Cynthia - Girlfriend - [phone]    Discharge Planning:    Patient lives with: Family Members Type of Home: Apartment  Primary Care Giver: Self  Patient Support Systems include: Family Members   Current Financial resources: Medicare, Medicaid  Current community resources: None  Current services prior to admission: None            Current DME:              Type of Home Care services:  None    ADLS  Prior functional level: Independent in ADLs/IADLs  Current functional level: Independent in ADLs/IADLs    PT AM-PAC:   /24  OT AM-PAC:   /24    Family can provide assistance at DC: No  Would you like Case Management to discuss the discharge plan with any other family  members/significant others, and if so, who? No  Plans to Return to Present Housing: Yes  Other Identified Issues/Barriers to RETURNING to current housing: none  Potential Assistance needed at discharge: N/A            Potential DME:  no  Patient expects to discharge to: Apartment  Plan for transportation at discharge: Other (see comment) (requesting his insurance company be called to arrange ride)    Financial    Payor: [facility] MEDICARE / Plan: [employer] DUAL COMPLETE / Product Type: *No Product type* /     Does insurance require precert for SNF: Yes    Potential assistance Purchasing Medications: No  Meds-to-Beds request: Yes      [id number] [address] [facility] [phone] - F [phone]  [address] [facility] [address]  Phone: [phone] Fax: [phone]    [facility] [id number] [facility] [address] [facility] [phone] - F [phone]  [address] [facility] [address]  Phone: [phone] Fax: [phone]    [facility] [id number] [facility] [address] [facility] [phone] - F [phone]  [address] [facility] [address]  Phone: [phone] Fax: [phone]      Notes:    Factors facilitating achievement of predicted outcomes: Cooperative    Barriers to discharge: none    Additional Case Management Notes: Spoke with patient regarding discharge needs. He reported that he goes to [facility] for PCP. He has been staying with his brother and can return there but [facility]. Explained to patient that the [facility] requires that patient have an acute medical issue and no place to go.     Provided patient with housing list. Patient stated he has a  through [facility]. Encouraged him to contact his  to assist him with housing.  Patient stated he would need a ride from his insurance company at discharge. Confirmed patient's address,
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
## 2025-06-16 NOTE — PROGRESS NOTES
[x] Discussed risk factors for leaving hospital against medical advice, up to and including risk of death  [x] Patient is NOT on an involuntary hold  [x] Patient is alert and oriented  [x] Attempt made to identify patient's reason for wanting to leave AMA Patient states he needed some air and he's feeling better.    [x] Attempt made to reduce any barriers to patient remaining inpatient; Patient wanted to go out and there was no one to take patient out, patient also wanted a cigarette, patient educated this is a smoke-free facility   [x] Patient verbalizes understanding that he/she is leaving prior to completion of treatment   [x] Patient's provider  Dr. Angel has been notified of patient's desire to leave AMA  [x] Provider Dr. Angel is not providing prescriptions  [x] AMA form signed  [x] IV accesses discontinued as appropriate  [x] Patient is invited to return to the emergency department if further treatment if needed   [x] Charge nurse/nursing supervisor has been informed            Electronically signed by John Khalil RN on 6/16/2025 at 5:47 PM

## 2025-06-24 ENCOUNTER — HOSPITAL ENCOUNTER (INPATIENT)
Age: 55
LOS: 2 days | Discharge: HOME | DRG: 287 | End: 2025-06-26
Attending: STUDENT IN AN ORGANIZED HEALTH CARE EDUCATION/TRAINING PROGRAM | Admitting: HOSPITALIST
Payer: MEDICARE

## 2025-06-24 ENCOUNTER — APPOINTMENT (OUTPATIENT)
Dept: GENERAL RADIOLOGY | Age: 55
DRG: 287 | End: 2025-06-24
Attending: STUDENT IN AN ORGANIZED HEALTH CARE EDUCATION/TRAINING PROGRAM
Payer: MEDICARE

## 2025-06-24 ENCOUNTER — APPOINTMENT (OUTPATIENT)
Dept: CT IMAGING | Age: 55
DRG: 287 | End: 2025-06-24
Payer: MEDICARE

## 2025-06-24 DIAGNOSIS — R07.89 ATYPICAL CHEST PAIN: Primary | ICD-10-CM

## 2025-06-24 DIAGNOSIS — J44.1 COPD WITH ACUTE EXACERBATION (HCC): ICD-10-CM

## 2025-06-24 DIAGNOSIS — R07.9 CHEST PAIN: ICD-10-CM

## 2025-06-24 DIAGNOSIS — R07.9 CHEST PAIN, RULE OUT ACUTE MYOCARDIAL INFARCTION: ICD-10-CM

## 2025-06-24 LAB
ANION GAP SERPL CALCULATED.3IONS-SCNC: 10 MMOL/L (ref 3–16)
APTT BLD: 25.3 SEC (ref 22.8–35.8)
BASOPHILS # BLD: 0.1 K/UL (ref 0–0.2)
BASOPHILS NFR BLD: 1.1 %
BUN SERPL-MCNC: 18 MG/DL (ref 7–20)
CALCIUM SERPL-MCNC: 9.9 MG/DL (ref 8.3–10.6)
CHLORIDE SERPL-SCNC: 106 MMOL/L (ref 99–110)
CK SERPL-CCNC: 113 U/L (ref 39–308)
CO2 SERPL-SCNC: 23 MMOL/L (ref 21–32)
CREAT SERPL-MCNC: 1.6 MG/DL (ref 0.9–1.3)
DEPRECATED RDW RBC AUTO: 16.5 % (ref 12.4–15.4)
EOSINOPHIL # BLD: 0.1 K/UL (ref 0–0.6)
EOSINOPHIL NFR BLD: 0.8 %
GFR SERPLBLD CREATININE-BSD FMLA CKD-EPI: 51 ML/MIN/{1.73_M2}
GLUCOSE SERPL-MCNC: 163 MG/DL (ref 70–99)
HCT VFR BLD AUTO: 41.1 % (ref 40.5–52.5)
HGB BLD-MCNC: 13.7 G/DL (ref 13.5–17.5)
LYMPHOCYTES # BLD: 2.3 K/UL (ref 1–5.1)
LYMPHOCYTES NFR BLD: 29.4 %
MAGNESIUM SERPL-MCNC: 2.29 MG/DL (ref 1.8–2.4)
MCH RBC QN AUTO: 27.6 PG (ref 26–34)
MCHC RBC AUTO-ENTMCNC: 33.3 G/DL (ref 31–36)
MCV RBC AUTO: 83 FL (ref 80–100)
MONOCYTES # BLD: 0.9 K/UL (ref 0–1.3)
MONOCYTES NFR BLD: 10.9 %
NEUTROPHILS # BLD: 4.6 K/UL (ref 1.7–7.7)
NEUTROPHILS NFR BLD: 57.8 %
PLATELET # BLD AUTO: 241 K/UL (ref 135–450)
PMV BLD AUTO: 9 FL (ref 5–10.5)
POTASSIUM SERPL-SCNC: 3.2 MMOL/L (ref 3.5–5.1)
RBC # BLD AUTO: 4.96 M/UL (ref 4.2–5.9)
SODIUM SERPL-SCNC: 139 MMOL/L (ref 136–145)
TROPONIN, HIGH SENSITIVITY: 12 NG/L (ref 0–22)
TROPONIN, HIGH SENSITIVITY: 13 NG/L (ref 0–22)
WBC # BLD AUTO: 7.9 K/UL (ref 4–11)

## 2025-06-24 PROCEDURE — 36415 COLL VENOUS BLD VENIPUNCTURE: CPT

## 2025-06-24 PROCEDURE — 6370000000 HC RX 637 (ALT 250 FOR IP): Performed by: STUDENT IN AN ORGANIZED HEALTH CARE EDUCATION/TRAINING PROGRAM

## 2025-06-24 PROCEDURE — 2500000003 HC RX 250 WO HCPCS: Performed by: STUDENT IN AN ORGANIZED HEALTH CARE EDUCATION/TRAINING PROGRAM

## 2025-06-24 PROCEDURE — 71045 X-RAY EXAM CHEST 1 VIEW: CPT

## 2025-06-24 PROCEDURE — 6370000000 HC RX 637 (ALT 250 FOR IP): Performed by: REGISTERED NURSE

## 2025-06-24 PROCEDURE — 6360000002 HC RX W HCPCS: Performed by: STUDENT IN AN ORGANIZED HEALTH CARE EDUCATION/TRAINING PROGRAM

## 2025-06-24 PROCEDURE — 99285 EMERGENCY DEPT VISIT HI MDM: CPT

## 2025-06-24 PROCEDURE — 93005 ELECTROCARDIOGRAM TRACING: CPT | Performed by: STUDENT IN AN ORGANIZED HEALTH CARE EDUCATION/TRAINING PROGRAM

## 2025-06-24 PROCEDURE — 6370000000 HC RX 637 (ALT 250 FOR IP): Performed by: HOSPITALIST

## 2025-06-24 PROCEDURE — 83735 ASSAY OF MAGNESIUM: CPT

## 2025-06-24 PROCEDURE — 1200000000 HC SEMI PRIVATE

## 2025-06-24 PROCEDURE — 85730 THROMBOPLASTIN TIME PARTIAL: CPT

## 2025-06-24 PROCEDURE — 2580000003 HC RX 258: Performed by: STUDENT IN AN ORGANIZED HEALTH CARE EDUCATION/TRAINING PROGRAM

## 2025-06-24 PROCEDURE — 80048 BASIC METABOLIC PNL TOTAL CA: CPT

## 2025-06-24 PROCEDURE — 96375 TX/PRO/DX INJ NEW DRUG ADDON: CPT

## 2025-06-24 PROCEDURE — 96374 THER/PROPH/DIAG INJ IV PUSH: CPT

## 2025-06-24 PROCEDURE — 94760 N-INVAS EAR/PLS OXIMETRY 1: CPT

## 2025-06-24 PROCEDURE — 85025 COMPLETE CBC W/AUTO DIFF WBC: CPT

## 2025-06-24 PROCEDURE — 82550 ASSAY OF CK (CPK): CPT

## 2025-06-24 PROCEDURE — 6360000002 HC RX W HCPCS: Performed by: REGISTERED NURSE

## 2025-06-24 PROCEDURE — 84484 ASSAY OF TROPONIN QUANT: CPT

## 2025-06-24 RX ORDER — METOPROLOL TARTRATE 50 MG
50 TABLET ORAL PRN
Status: COMPLETED | OUTPATIENT
Start: 2025-06-24 | End: 2025-06-24

## 2025-06-24 RX ORDER — METOPROLOL TARTRATE 50 MG
150 TABLET ORAL PRN
Status: COMPLETED | OUTPATIENT
Start: 2025-06-24 | End: 2025-06-24

## 2025-06-24 RX ORDER — METHYLPREDNISOLONE SODIUM SUCCINATE 125 MG/2ML
125 INJECTION INTRAMUSCULAR; INTRAVENOUS ONCE
Status: COMPLETED | OUTPATIENT
Start: 2025-06-24 | End: 2025-06-24

## 2025-06-24 RX ORDER — NICOTINE 21 MG/24HR
1 PATCH, TRANSDERMAL 24 HOURS TRANSDERMAL DAILY
Status: DISCONTINUED | OUTPATIENT
Start: 2025-06-24 | End: 2025-06-26 | Stop reason: HOSPADM

## 2025-06-24 RX ORDER — ONDANSETRON 4 MG/1
4 TABLET, ORALLY DISINTEGRATING ORAL EVERY 8 HOURS PRN
Status: DISCONTINUED | OUTPATIENT
Start: 2025-06-24 | End: 2025-06-26 | Stop reason: HOSPADM

## 2025-06-24 RX ORDER — ACETAMINOPHEN 325 MG/1
650 TABLET ORAL EVERY 6 HOURS PRN
Status: DISCONTINUED | OUTPATIENT
Start: 2025-06-24 | End: 2025-06-26 | Stop reason: HOSPADM

## 2025-06-24 RX ORDER — RANOLAZINE 500 MG/1
500 TABLET, EXTENDED RELEASE ORAL 2 TIMES DAILY
Status: DISCONTINUED | OUTPATIENT
Start: 2025-06-24 | End: 2025-06-26 | Stop reason: HOSPADM

## 2025-06-24 RX ORDER — ACETAMINOPHEN 650 MG/1
650 SUPPOSITORY RECTAL EVERY 6 HOURS PRN
Status: DISCONTINUED | OUTPATIENT
Start: 2025-06-24 | End: 2025-06-26 | Stop reason: HOSPADM

## 2025-06-24 RX ORDER — MORPHINE SULFATE 2 MG/ML
2 INJECTION, SOLUTION INTRAMUSCULAR; INTRAVENOUS ONCE
Refills: 0 | Status: COMPLETED | OUTPATIENT
Start: 2025-06-24 | End: 2025-06-24

## 2025-06-24 RX ORDER — POTASSIUM CHLORIDE 1500 MG/1
40 TABLET, EXTENDED RELEASE ORAL ONCE
Status: COMPLETED | OUTPATIENT
Start: 2025-06-24 | End: 2025-06-24

## 2025-06-24 RX ORDER — SODIUM CHLORIDE 0.9 % (FLUSH) 0.9 %
5-40 SYRINGE (ML) INJECTION PRN
Status: DISCONTINUED | OUTPATIENT
Start: 2025-06-24 | End: 2025-06-25

## 2025-06-24 RX ORDER — TAMSULOSIN HYDROCHLORIDE 0.4 MG/1
0.4 CAPSULE ORAL DAILY
Status: DISCONTINUED | OUTPATIENT
Start: 2025-06-24 | End: 2025-06-26 | Stop reason: HOSPADM

## 2025-06-24 RX ORDER — SODIUM CHLORIDE 9 MG/ML
INJECTION, SOLUTION INTRAVENOUS PRN
Status: DISCONTINUED | OUTPATIENT
Start: 2025-06-24 | End: 2025-06-25

## 2025-06-24 RX ORDER — NITROGLYCERIN 0.4 MG/1
0.8 TABLET SUBLINGUAL PRN
Status: DISCONTINUED | OUTPATIENT
Start: 2025-06-24 | End: 2025-06-26 | Stop reason: HOSPADM

## 2025-06-24 RX ORDER — METOPROLOL TARTRATE 1 MG/ML
5 INJECTION, SOLUTION INTRAVENOUS EVERY 5 MIN PRN
Status: DISCONTINUED | OUTPATIENT
Start: 2025-06-24 | End: 2025-06-26 | Stop reason: HOSPADM

## 2025-06-24 RX ORDER — CARVEDILOL 3.12 MG/1
3.12 TABLET ORAL 2 TIMES DAILY WITH MEALS
Status: DISCONTINUED | OUTPATIENT
Start: 2025-06-24 | End: 2025-06-26 | Stop reason: HOSPADM

## 2025-06-24 RX ORDER — PRAZOSIN HYDROCHLORIDE 5 MG/1
5 CAPSULE ORAL NIGHTLY
Status: DISCONTINUED | OUTPATIENT
Start: 2025-06-24 | End: 2025-06-26 | Stop reason: HOSPADM

## 2025-06-24 RX ORDER — MORPHINE SULFATE 2 MG/ML
2 INJECTION, SOLUTION INTRAMUSCULAR; INTRAVENOUS EVERY 4 HOURS PRN
Refills: 0 | Status: DISCONTINUED | OUTPATIENT
Start: 2025-06-24 | End: 2025-06-26 | Stop reason: HOSPADM

## 2025-06-24 RX ORDER — SODIUM CHLORIDE 9 MG/ML
INJECTION, SOLUTION INTRAVENOUS CONTINUOUS
Status: DISCONTINUED | OUTPATIENT
Start: 2025-06-24 | End: 2025-06-26

## 2025-06-24 RX ORDER — POTASSIUM CHLORIDE 7.45 MG/ML
10 INJECTION INTRAVENOUS PRN
Status: DISCONTINUED | OUTPATIENT
Start: 2025-06-24 | End: 2025-06-26 | Stop reason: HOSPADM

## 2025-06-24 RX ORDER — MORPHINE SULFATE 2 MG/ML
1 INJECTION, SOLUTION INTRAMUSCULAR; INTRAVENOUS EVERY 4 HOURS PRN
Status: DISCONTINUED | OUTPATIENT
Start: 2025-06-24 | End: 2025-06-26 | Stop reason: HOSPADM

## 2025-06-24 RX ORDER — ONDANSETRON 2 MG/ML
4 INJECTION INTRAMUSCULAR; INTRAVENOUS EVERY 6 HOURS PRN
Status: DISCONTINUED | OUTPATIENT
Start: 2025-06-24 | End: 2025-06-24

## 2025-06-24 RX ORDER — SODIUM CHLORIDE 0.9 % (FLUSH) 0.9 %
5-40 SYRINGE (ML) INJECTION EVERY 12 HOURS SCHEDULED
Status: DISCONTINUED | OUTPATIENT
Start: 2025-06-24 | End: 2025-06-25

## 2025-06-24 RX ORDER — ENOXAPARIN SODIUM 100 MG/ML
40 INJECTION SUBCUTANEOUS DAILY
Status: DISCONTINUED | OUTPATIENT
Start: 2025-06-25 | End: 2025-06-26 | Stop reason: HOSPADM

## 2025-06-24 RX ORDER — CLOPIDOGREL BISULFATE 75 MG/1
75 TABLET ORAL DAILY
Status: DISCONTINUED | OUTPATIENT
Start: 2025-06-24 | End: 2025-06-26 | Stop reason: HOSPADM

## 2025-06-24 RX ORDER — PANTOPRAZOLE SODIUM 40 MG/1
40 TABLET, DELAYED RELEASE ORAL DAILY
Status: DISCONTINUED | OUTPATIENT
Start: 2025-06-24 | End: 2025-06-26 | Stop reason: HOSPADM

## 2025-06-24 RX ORDER — METOPROLOL TARTRATE 50 MG
100 TABLET ORAL PRN
Status: COMPLETED | OUTPATIENT
Start: 2025-06-24 | End: 2025-06-24

## 2025-06-24 RX ORDER — ASPIRIN 81 MG/1
81 TABLET, CHEWABLE ORAL DAILY
Status: DISCONTINUED | OUTPATIENT
Start: 2025-06-24 | End: 2025-06-26 | Stop reason: HOSPADM

## 2025-06-24 RX ORDER — LEVOTHYROXINE SODIUM 100 UG/1
200 TABLET ORAL DAILY
Status: DISCONTINUED | OUTPATIENT
Start: 2025-06-24 | End: 2025-06-26 | Stop reason: HOSPADM

## 2025-06-24 RX ORDER — NITROGLYCERIN 0.4 MG/1
0.4 TABLET SUBLINGUAL PRN
Status: DISCONTINUED | OUTPATIENT
Start: 2025-06-24 | End: 2025-06-26 | Stop reason: HOSPADM

## 2025-06-24 RX ORDER — MAGNESIUM SULFATE IN WATER 40 MG/ML
2000 INJECTION, SOLUTION INTRAVENOUS PRN
Status: DISCONTINUED | OUTPATIENT
Start: 2025-06-24 | End: 2025-06-26 | Stop reason: HOSPADM

## 2025-06-24 RX ORDER — ONDANSETRON 2 MG/ML
4 INJECTION INTRAMUSCULAR; INTRAVENOUS EVERY 6 HOURS PRN
Status: DISCONTINUED | OUTPATIENT
Start: 2025-06-24 | End: 2025-06-26 | Stop reason: HOSPADM

## 2025-06-24 RX ORDER — IPRATROPIUM BROMIDE AND ALBUTEROL SULFATE 2.5; .5 MG/3ML; MG/3ML
1 SOLUTION RESPIRATORY (INHALATION) ONCE
Status: COMPLETED | OUTPATIENT
Start: 2025-06-24 | End: 2025-06-24

## 2025-06-24 RX ORDER — POTASSIUM CHLORIDE 1500 MG/1
40 TABLET, EXTENDED RELEASE ORAL PRN
Status: DISCONTINUED | OUTPATIENT
Start: 2025-06-24 | End: 2025-06-26 | Stop reason: HOSPADM

## 2025-06-24 RX ORDER — POLYETHYLENE GLYCOL 3350 17 G/17G
17 POWDER, FOR SOLUTION ORAL DAILY PRN
Status: DISCONTINUED | OUTPATIENT
Start: 2025-06-24 | End: 2025-06-26 | Stop reason: HOSPADM

## 2025-06-24 RX ORDER — ALBUTEROL SULFATE 0.83 MG/ML
2.5 SOLUTION RESPIRATORY (INHALATION) EVERY 6 HOURS PRN
Status: DISCONTINUED | OUTPATIENT
Start: 2025-06-24 | End: 2025-06-26 | Stop reason: HOSPADM

## 2025-06-24 RX ORDER — DULOXETIN HYDROCHLORIDE 60 MG/1
60 CAPSULE, DELAYED RELEASE ORAL DAILY
Status: DISCONTINUED | OUTPATIENT
Start: 2025-06-24 | End: 2025-06-26 | Stop reason: HOSPADM

## 2025-06-24 RX ORDER — ISOSORBIDE MONONITRATE 60 MG/1
60 TABLET, EXTENDED RELEASE ORAL DAILY
Status: DISCONTINUED | OUTPATIENT
Start: 2025-06-24 | End: 2025-06-26 | Stop reason: HOSPADM

## 2025-06-24 RX ORDER — CALCIUM CARBONATE 500 MG/1
500 TABLET, CHEWABLE ORAL 2 TIMES DAILY PRN
Status: DISCONTINUED | OUTPATIENT
Start: 2025-06-24 | End: 2025-06-26 | Stop reason: HOSPADM

## 2025-06-24 RX ORDER — ATORVASTATIN CALCIUM 80 MG/1
80 TABLET, FILM COATED ORAL DAILY
Status: DISCONTINUED | OUTPATIENT
Start: 2025-06-24 | End: 2025-06-26 | Stop reason: HOSPADM

## 2025-06-24 RX ORDER — AMLODIPINE BESYLATE 5 MG/1
5 TABLET ORAL DAILY
Status: DISCONTINUED | OUTPATIENT
Start: 2025-06-24 | End: 2025-06-26 | Stop reason: HOSPADM

## 2025-06-24 RX ADMIN — SODIUM CHLORIDE, PRESERVATIVE FREE 10 ML: 5 INJECTION INTRAVENOUS at 22:05

## 2025-06-24 RX ADMIN — ASPIRIN 81 MG: 81 TABLET, CHEWABLE ORAL at 08:49

## 2025-06-24 RX ADMIN — LEVOTHYROXINE SODIUM 200 MCG: 0.1 TABLET ORAL at 13:38

## 2025-06-24 RX ADMIN — RANOLAZINE 500 MG: 500 TABLET, FILM COATED, EXTENDED RELEASE ORAL at 22:09

## 2025-06-24 RX ADMIN — MORPHINE SULFATE 2 MG: 2 INJECTION, SOLUTION INTRAMUSCULAR; INTRAVENOUS at 02:39

## 2025-06-24 RX ADMIN — SODIUM CHLORIDE, PRESERVATIVE FREE 10 ML: 5 INJECTION INTRAVENOUS at 10:55

## 2025-06-24 RX ADMIN — ATORVASTATIN CALCIUM 80 MG: 80 TABLET, FILM COATED ORAL at 13:39

## 2025-06-24 RX ADMIN — METHYLPREDNISOLONE SODIUM SUCCINATE 125 MG: 125 INJECTION, POWDER, LYOPHILIZED, FOR SOLUTION INTRAMUSCULAR; INTRAVENOUS at 02:40

## 2025-06-24 RX ADMIN — MORPHINE SULFATE 2 MG: 2 INJECTION, SOLUTION INTRAMUSCULAR; INTRAVENOUS at 19:52

## 2025-06-24 RX ADMIN — DULOXETINE 60 MG: 60 CAPSULE, DELAYED RELEASE ORAL at 13:40

## 2025-06-24 RX ADMIN — SODIUM CHLORIDE: 0.9 INJECTION, SOLUTION INTRAVENOUS at 18:07

## 2025-06-24 RX ADMIN — MORPHINE SULFATE 2 MG: 2 INJECTION, SOLUTION INTRAMUSCULAR; INTRAVENOUS at 23:35

## 2025-06-24 RX ADMIN — AMLODIPINE BESYLATE 5 MG: 5 TABLET ORAL at 13:38

## 2025-06-24 RX ADMIN — RANOLAZINE 500 MG: 500 TABLET, FILM COATED, EXTENDED RELEASE ORAL at 13:38

## 2025-06-24 RX ADMIN — IPRATROPIUM BROMIDE AND ALBUTEROL SULFATE 1 DOSE: .5; 2.5 SOLUTION RESPIRATORY (INHALATION) at 02:44

## 2025-06-24 RX ADMIN — CLOPIDOGREL BISULFATE 75 MG: 75 TABLET, FILM COATED ORAL at 13:38

## 2025-06-24 RX ADMIN — MORPHINE SULFATE 2 MG: 2 INJECTION, SOLUTION INTRAMUSCULAR; INTRAVENOUS at 15:32

## 2025-06-24 RX ADMIN — MORPHINE SULFATE 2 MG: 2 INJECTION, SOLUTION INTRAMUSCULAR; INTRAVENOUS at 11:32

## 2025-06-24 RX ADMIN — PANTOPRAZOLE SODIUM 40 MG: 40 TABLET, DELAYED RELEASE ORAL at 13:40

## 2025-06-24 RX ADMIN — ISOSORBIDE MONONITRATE 60 MG: 60 TABLET, EXTENDED RELEASE ORAL at 13:38

## 2025-06-24 RX ADMIN — POTASSIUM CHLORIDE 40 MEQ: 1500 TABLET, EXTENDED RELEASE ORAL at 18:07

## 2025-06-24 RX ADMIN — ANTACID TABLETS 500 MG: 500 TABLET, CHEWABLE ORAL at 22:04

## 2025-06-24 RX ADMIN — CARVEDILOL 3.12 MG: 3.12 TABLET, FILM COATED ORAL at 17:25

## 2025-06-24 RX ADMIN — METOPROLOL TARTRATE 150 MG: 50 TABLET, FILM COATED ORAL at 13:40

## 2025-06-24 RX ADMIN — ONDANSETRON 4 MG: 2 INJECTION, SOLUTION INTRAMUSCULAR; INTRAVENOUS at 22:04

## 2025-06-24 RX ADMIN — PRAZOSIN HYDROCHLORIDE 5 MG: 5 CAPSULE ORAL at 23:21

## 2025-06-24 RX ADMIN — TAMSULOSIN HYDROCHLORIDE 0.4 MG: 0.4 CAPSULE ORAL at 13:38

## 2025-06-24 ASSESSMENT — PAIN DESCRIPTION - ORIENTATION
ORIENTATION: MID

## 2025-06-24 ASSESSMENT — PAIN DESCRIPTION - LOCATION
LOCATION: CHEST

## 2025-06-24 ASSESSMENT — PAIN DESCRIPTION - DESCRIPTORS
DESCRIPTORS: STABBING
DESCRIPTORS: STABBING
DESCRIPTORS: ACHING

## 2025-06-24 ASSESSMENT — PAIN SCALES - GENERAL
PAINLEVEL_OUTOF10: 9
PAINLEVEL_OUTOF10: 4
PAINLEVEL_OUTOF10: 9
PAINLEVEL_OUTOF10: 8
PAINLEVEL_OUTOF10: 9
PAINLEVEL_OUTOF10: 4
PAINLEVEL_OUTOF10: 9
PAINLEVEL_OUTOF10: 8
PAINLEVEL_OUTOF10: 5
PAINLEVEL_OUTOF10: 8
PAINLEVEL_OUTOF10: 9

## 2025-06-24 ASSESSMENT — HEART SCORE: ECG: NON-SPECIFC REPOLARIZATION DISTURBANCE/LBTB/PM

## 2025-06-24 ASSESSMENT — PAIN - FUNCTIONAL ASSESSMENT
PAIN_FUNCTIONAL_ASSESSMENT: ACTIVITIES ARE NOT PREVENTED
PAIN_FUNCTIONAL_ASSESSMENT: ACTIVITIES ARE NOT PREVENTED

## 2025-06-24 NOTE — PROGRESS NOTES
4 Eyes Skin Assessment     NAME:  Ashutosh Donovan  YOB: 1970  MEDICAL RECORD NUMBER:  [id number]    The patient is being assessed for  Admission    I agree that at least one RN has performed a thorough Head to Toe Skin Assessment on the patient. ALL assessment sites listed below have been assessed.      Areas assessed by both nurses:    Head, Face, Ears, Shoulders, Back, Chest, Arms, Elbows, Hands, Sacrum. Buttock, Coccyx, Ischium, Legs. Feet and Heels, and Under Medical Devices         Does the Patient have a Wound? No noted wound(s)       Jovanny Prevention initiated by RN: No  Wound Care Orders initiated by RN: No    Pressure Injury (Stage 3,4, Unstageable, DTI, NWPT, and Complex wounds) if present, place Wound referral order by RN under : No    New Ostomies, if present place, Ostomy referral order under : No     Nurse 1 eSignature: Electronically signed by Devin Ortiz RN on 6/24/25 at 10:06 AM EDT    **SHARE this note so that the co-signing nurse can place an eSignature**    Nurse 2 eSignature: Electronically signed by Yasmin Elise RN on 6/24/25 at 10:06 AM EDT

## 2025-06-24 NOTE — PROGRESS NOTES
Unable to perform Coronary CTA this afternoon. Patient's HR was as low as 74 with a breath hold 1 hour post-sliding scale metoprolol. Resting HR was mid-80s. This HR is too fast to adequately perform the scan. We will attempt the scan again tomorrow with hopefully a lower HR.     Jb Garcia RN

## 2025-06-24 NOTE — PROGRESS NOTES
Advance Care Planning     Advance Care Planning Inpatient Note  [facility] Department    Today's Date: 6/24/2025  Unit: [facility] 5W PROGRESSIVE CARE    Received request from Other: Nursing.  Upon review of chart and communication with care team, patient's decision making abilities are not in question..       Goals of ACP Conversation:  Discuss advance care planning documents  Facilitate a discussion related to patient's goals of care as they align with the patient's values and beliefs.    Health Care Decision Makers:       Primary Decision Maker: Kaylin Wood - Parent - [phone]    Secondary Decision Maker (Active): Milligan,Cynthia - Girlfriend - [phone]    Interventions:   attempted visit with pt. Pt leaving floor. SCO will follow up.    Care Preferences Communicated:   No    Outcomes/Plan:  ACP Discussion: Postponed  PT not available at time of attempt. SCO will follow up.    Electronically signed by CAROLYNN Pace on 6/24/2025 at 2:00 PM

## 2025-06-24 NOTE — H&P
V2.0  History and Physical      Name:  Ashutosh Donovan /Age/Sex: 1970  (54 y.o. male)   MRN & CSN:  [id number] & [id number] Encounter Date/Time: 2025 10:10 AM EDT   Location:  N2K-4993/5132-01 PCP: No primary care provider on file.       Hospital Day: 1    Assessment and Plan:   Ashutosh Donovan is a 54 y.o. male with a pmh of CAD, tobacco abuse who presents with Chest pain    Hospital Problems           Last Modified POA    * (Principal) Chest pain 2025 Yes       Plan:  Recurrent chest pain in setting of CAD  -multiple hospitalizations for same, patient frequently leaves AMA  -during last presentation ~10d ago, pt recommended CTA coronary by cardiologist. Will proceed as previously planned  - Symptoms atypical for cardiac pain, but patient reporting that they are similar to symptoms before his stent  - Continue Imdur, aspirin, statin, Plavix, Ranexa  LA  - IV fluid  - Hold nephrotoxic medications  Anxiety/depression  - History of suicidal ideation.  Denies current suicidal ideation  - Continue duloxetine  - Recommend outpatient psych follow-up  COPD  - Does not appear in exacerbation  Tobacco use  - Counseled smoking cessation  - Nicotine patch    Disposition:   Current Living situation: Home  Expected Disposition: Home   Estimated D/C: 1 to 2 days    Diet ADULT ORAL NUTRITION SUPPLEMENT; Breakfast, Lunch, Dinner; Standard High Calorie/High Protein Oral Supplement  ADULT DIET; Regular; No Added Salt (3-4 gm)   DVT Prophylaxis [x] Lovenox, []  Heparin, [] SCDs, [] Ambulation,  [] Eliquis, [] Xarelto, [] Coumadin   Code Status Full Code   Surrogate Decision Maker/ POA Mother, then brother     Personally reviewed Lab Studies and Imaging     EKG interpreted personally and results sinus tachycardia    Home meds independently reviewed    Drugs that require monitoring for toxicity include Lovenox and the method of monitoring was CBC        History from:     patient    History of Present

## 2025-06-24 NOTE — ED TRIAGE NOTES
C/0 chest pain that started two hours ago following argument. Pain constant with rest or activity. Radiates to arm and back.  3 nitro taken by pt. History of MI with three stents placed.

## 2025-06-24 NOTE — ED NOTES
Entered room to find patient sitting on edge of bed with aspen collar in place.  Patient states he had neck surgery 6 months ago and has worn it since.  [facility] transport team here.  Report given.

## 2025-06-24 NOTE — ED PROVIDER NOTES
[facility] EMERGENCY DEPARTMENT     EMERGENCY DEPARTMENT ENCOUNTER            Pt Name: Ashutosh Donovan   MRN: [id number]   Birthdate 1970   Date of evaluation: 6/24/2025   Provider: Luba Osorio MD   PCP: No primary care provider on file.   Note Started: 2:33 AM EDT 6/24/25          CHIEF COMPLAINT     Chief Complaint   Patient presents with    Chest Pain     C/0 chest pain that started two hours ago following argument. Pain constant with rest or activity. Radiates to arm and back.  3 nitro taken by pt. History of MI with three stents placed.              HISTORY OF PRESENT ILLNESS:           Ashutosh Donovan is a 54 y.o. male who presents with chest pain.  He states it is in his left upper chest and radiating into his shoulder, neck, and shoulder blade.  He has had the same symptoms in the past, and was recently admitted for them, but he reports he had to leave because he had a family emergency at home.  He states he is now willing to stay and complete the testing that was recommended by cardiology at that time.  He states that he lives in a very stressful situation and has been feeling depressed lately.  He denies any active suicidal ideation but states he wishes to talk to psychiatry.  In addition, he states that today he developed significant shortness of breath when out in the hot weather.  He reports a history of emphysema for which he has a home inhaler but does not have a nebulizer machine.  He states he has felt very hot today.    Nursing Notes were all reviewed and agreed with, or any disagreements were addressed in the HPI.     REVIEW OF SYSTEMS :    Positives and Pertinent negatives as per HPI.      MEDICAL HISTORY   has a past medical history of Arthritis, CAD (coronary artery disease), CHF (congestive heart failure) (HCC), Chronic pain, Emphysema lung (HCC), Heart attack (HCC), Hepatitis C, Hypothyroid, and Kidney failure.    Past Surgical History:   Procedure Laterality

## 2025-06-24 NOTE — ED NOTES
Patient sleeping.  Awakens to name.  Aware of transfer to [facility].  Patient requests granola bars to eat.  Patient provided with request and drink.

## 2025-06-24 NOTE — PROGRESS NOTES
Other RN closed patients door. Patient began yelling and threw tray lid at door. This RN was already on the way to open door back up. Education given to not throw things and to use call light. Patient stated other RN was \"ignorant\".     Returned tray lid to patient, made sure call light was in reach, and door remained open.

## 2025-06-24 NOTE — PROGRESS NOTES
Called inpatient RN (Yasmin) regarding pre-procedure guidelines for the patient's afternoon Coronary CTA study today. RN stated that she will give the sliding-scale metoprolol order per the patient's HR at 1300 this afternoon. RN also stated that she will get additional IV access in the form of a 20g diffusics catheter in the patient's open Left AC (Right AC is currently occupied by another, smaller IV). Patient is scheduled for the scan at 1400.

## 2025-06-25 ENCOUNTER — APPOINTMENT (OUTPATIENT)
Dept: CT IMAGING | Age: 55
DRG: 287 | End: 2025-06-25
Attending: STUDENT IN AN ORGANIZED HEALTH CARE EDUCATION/TRAINING PROGRAM
Payer: MEDICARE

## 2025-06-25 LAB
ANION GAP SERPL CALCULATED.3IONS-SCNC: 10 MMOL/L (ref 3–16)
BASOPHILS # BLD: 0.2 K/UL (ref 0–0.2)
BASOPHILS NFR BLD: 1 %
BUN SERPL-MCNC: 19 MG/DL (ref 7–20)
C DIFF TOX A+B STL QL IA: NORMAL
CALCIUM SERPL-MCNC: 9.7 MG/DL (ref 8.3–10.6)
CHLORIDE SERPL-SCNC: 107 MMOL/L (ref 99–110)
CO2 SERPL-SCNC: 23 MMOL/L (ref 21–32)
CREAT SERPL-MCNC: 1.2 MG/DL (ref 0.9–1.3)
DEPRECATED RDW RBC AUTO: 16.2 % (ref 12.4–15.4)
EKG ATRIAL RATE: 102 BPM
EKG DIAGNOSIS: NORMAL
EKG P AXIS: 52 DEGREES
EKG P-R INTERVAL: 156 MS
EKG Q-T INTERVAL: 362 MS
EKG QRS DURATION: 116 MS
EKG QTC CALCULATION (BAZETT): 471 MS
EKG R AXIS: 38 DEGREES
EKG T AXIS: 40 DEGREES
EKG VENTRICULAR RATE: 102 BPM
EOSINOPHIL # BLD: 0 K/UL (ref 0–0.6)
EOSINOPHIL NFR BLD: 0 %
GFR SERPLBLD CREATININE-BSD FMLA CKD-EPI: 72 ML/MIN/{1.73_M2}
GLUCOSE SERPL-MCNC: 119 MG/DL (ref 70–99)
HCT VFR BLD AUTO: 35.3 % (ref 40.5–52.5)
HGB BLD-MCNC: 11.7 G/DL (ref 13.5–17.5)
LYMPHOCYTES # BLD: 1.5 K/UL (ref 1–5.1)
LYMPHOCYTES NFR BLD: 8 %
MCH RBC QN AUTO: 27.6 PG (ref 26–34)
MCHC RBC AUTO-ENTMCNC: 33.3 G/DL (ref 31–36)
MCV RBC AUTO: 82.7 FL (ref 80–100)
MONOCYTES # BLD: 1 K/UL (ref 0–1.3)
MONOCYTES NFR BLD: 5 %
NEUTROPHILS # BLD: 16.5 K/UL (ref 1.7–7.7)
NEUTROPHILS NFR BLD: 84 %
NEUTS BAND NFR BLD MANUAL: 2 % (ref 0–7)
PLATELET # BLD AUTO: 213 K/UL (ref 135–450)
PLATELET BLD QL SMEAR: ADEQUATE
PMV BLD AUTO: 9.6 FL (ref 5–10.5)
POTASSIUM SERPL-SCNC: 3.9 MMOL/L (ref 3.5–5.1)
RBC # BLD AUTO: 4.26 M/UL (ref 4.2–5.9)
SLIDE REVIEW: ABNORMAL
SODIUM SERPL-SCNC: 140 MMOL/L (ref 136–145)
WBC # BLD AUTO: 19.2 K/UL (ref 4–11)

## 2025-06-25 PROCEDURE — 75574 CT ANGIO HRT W/3D IMAGE: CPT | Performed by: INTERNAL MEDICINE

## 2025-06-25 PROCEDURE — 36415 COLL VENOUS BLD VENIPUNCTURE: CPT

## 2025-06-25 PROCEDURE — 87324 CLOSTRIDIUM AG IA: CPT

## 2025-06-25 PROCEDURE — 6360000004 HC RX CONTRAST MEDICATION: Performed by: STUDENT IN AN ORGANIZED HEALTH CARE EDUCATION/TRAINING PROGRAM

## 2025-06-25 PROCEDURE — 80048 BASIC METABOLIC PNL TOTAL CA: CPT

## 2025-06-25 PROCEDURE — 6370000000 HC RX 637 (ALT 250 FOR IP): Performed by: STUDENT IN AN ORGANIZED HEALTH CARE EDUCATION/TRAINING PROGRAM

## 2025-06-25 PROCEDURE — 6370000000 HC RX 637 (ALT 250 FOR IP): Performed by: REGISTERED NURSE

## 2025-06-25 PROCEDURE — 85025 COMPLETE CBC W/AUTO DIFF WBC: CPT

## 2025-06-25 PROCEDURE — 2580000003 HC RX 258: Performed by: STUDENT IN AN ORGANIZED HEALTH CARE EDUCATION/TRAINING PROGRAM

## 2025-06-25 PROCEDURE — 1200000000 HC SEMI PRIVATE

## 2025-06-25 PROCEDURE — 94760 N-INVAS EAR/PLS OXIMETRY 1: CPT

## 2025-06-25 PROCEDURE — 87449 NOS EACH ORGANISM AG IA: CPT

## 2025-06-25 PROCEDURE — 6370000000 HC RX 637 (ALT 250 FOR IP)

## 2025-06-25 PROCEDURE — 6360000002 HC RX W HCPCS: Performed by: STUDENT IN AN ORGANIZED HEALTH CARE EDUCATION/TRAINING PROGRAM

## 2025-06-25 PROCEDURE — 2500000003 HC RX 250 WO HCPCS: Performed by: STUDENT IN AN ORGANIZED HEALTH CARE EDUCATION/TRAINING PROGRAM

## 2025-06-25 PROCEDURE — 75574 CT ANGIO HRT W/3D IMAGE: CPT

## 2025-06-25 PROCEDURE — 6370000000 HC RX 637 (ALT 250 FOR IP): Performed by: HOSPITALIST

## 2025-06-25 PROCEDURE — 93010 ELECTROCARDIOGRAM REPORT: CPT | Performed by: INTERNAL MEDICINE

## 2025-06-25 PROCEDURE — 94640 AIRWAY INHALATION TREATMENT: CPT

## 2025-06-25 RX ORDER — LOPERAMIDE HYDROCHLORIDE 2 MG/1
2 CAPSULE ORAL 3 TIMES DAILY PRN
Status: DISCONTINUED | OUTPATIENT
Start: 2025-06-25 | End: 2025-06-26 | Stop reason: HOSPADM

## 2025-06-25 RX ORDER — MULTIVITAMIN WITH IRON
1 TABLET ORAL DAILY
Status: DISCONTINUED | OUTPATIENT
Start: 2025-06-25 | End: 2025-06-26 | Stop reason: HOSPADM

## 2025-06-25 RX ORDER — METOPROLOL TARTRATE 50 MG
150 TABLET ORAL PRN
Status: COMPLETED | OUTPATIENT
Start: 2025-06-25 | End: 2025-06-25

## 2025-06-25 RX ORDER — METOPROLOL TARTRATE 50 MG
50 TABLET ORAL PRN
Status: COMPLETED | OUTPATIENT
Start: 2025-06-25 | End: 2025-06-25

## 2025-06-25 RX ORDER — SODIUM CHLORIDE 9 MG/ML
INJECTION, SOLUTION INTRAVENOUS CONTINUOUS
Status: DISCONTINUED | OUTPATIENT
Start: 2025-06-25 | End: 2025-06-25

## 2025-06-25 RX ORDER — SODIUM CHLORIDE 0.9 % (FLUSH) 0.9 %
5-40 SYRINGE (ML) INJECTION EVERY 12 HOURS SCHEDULED
Status: DISCONTINUED | OUTPATIENT
Start: 2025-06-25 | End: 2025-06-26 | Stop reason: HOSPADM

## 2025-06-25 RX ORDER — PREDNISONE 20 MG/1
40 TABLET ORAL DAILY
Status: DISCONTINUED | OUTPATIENT
Start: 2025-06-25 | End: 2025-06-26 | Stop reason: HOSPADM

## 2025-06-25 RX ORDER — IOPAMIDOL 755 MG/ML
100 INJECTION, SOLUTION INTRAVASCULAR
Status: COMPLETED | OUTPATIENT
Start: 2025-06-25 | End: 2025-06-25

## 2025-06-25 RX ORDER — METOPROLOL TARTRATE 50 MG
100 TABLET ORAL PRN
Status: COMPLETED | OUTPATIENT
Start: 2025-06-25 | End: 2025-06-25

## 2025-06-25 RX ORDER — TRAZODONE HYDROCHLORIDE 50 MG/1
50 TABLET ORAL ONCE
Status: COMPLETED | OUTPATIENT
Start: 2025-06-25 | End: 2025-06-25

## 2025-06-25 RX ADMIN — MORPHINE SULFATE 2 MG: 2 INJECTION, SOLUTION INTRAMUSCULAR; INTRAVENOUS at 22:58

## 2025-06-25 RX ADMIN — ISOSORBIDE MONONITRATE 60 MG: 60 TABLET, EXTENDED RELEASE ORAL at 09:04

## 2025-06-25 RX ADMIN — IOPAMIDOL 100 ML: 755 INJECTION, SOLUTION INTRAVENOUS at 15:07

## 2025-06-25 RX ADMIN — ASPIRIN 81 MG: 81 TABLET, CHEWABLE ORAL at 09:04

## 2025-06-25 RX ADMIN — ALBUTEROL SULFATE 2.5 MG: 0.83 SOLUTION RESPIRATORY (INHALATION) at 17:04

## 2025-06-25 RX ADMIN — ENOXAPARIN SODIUM 40 MG: 100 INJECTION SUBCUTANEOUS at 09:05

## 2025-06-25 RX ADMIN — MORPHINE SULFATE 2 MG: 2 INJECTION, SOLUTION INTRAMUSCULAR; INTRAVENOUS at 08:11

## 2025-06-25 RX ADMIN — LOPERAMIDE HYDROCHLORIDE 2 MG: 2 CAPSULE ORAL at 09:08

## 2025-06-25 RX ADMIN — TIZANIDINE 4 MG: 4 TABLET ORAL at 13:03

## 2025-06-25 RX ADMIN — CLOPIDOGREL BISULFATE 75 MG: 75 TABLET, FILM COATED ORAL at 09:03

## 2025-06-25 RX ADMIN — TAMSULOSIN HYDROCHLORIDE 0.4 MG: 0.4 CAPSULE ORAL at 09:03

## 2025-06-25 RX ADMIN — AMLODIPINE BESYLATE 5 MG: 5 TABLET ORAL at 09:04

## 2025-06-25 RX ADMIN — PREDNISONE 40 MG: 20 TABLET ORAL at 13:03

## 2025-06-25 RX ADMIN — SODIUM CHLORIDE: 0.9 INJECTION, SOLUTION INTRAVENOUS at 03:47

## 2025-06-25 RX ADMIN — PRAZOSIN HYDROCHLORIDE 5 MG: 5 CAPSULE ORAL at 20:43

## 2025-06-25 RX ADMIN — THERA TABS 1 TABLET: TAB at 13:01

## 2025-06-25 RX ADMIN — CARVEDILOL 3.12 MG: 3.12 TABLET, FILM COATED ORAL at 09:04

## 2025-06-25 RX ADMIN — METOPROLOL TARTRATE 50 MG: 50 TABLET, FILM COATED ORAL at 13:03

## 2025-06-25 RX ADMIN — ATORVASTATIN CALCIUM 80 MG: 80 TABLET, FILM COATED ORAL at 09:04

## 2025-06-25 RX ADMIN — TRAZODONE HYDROCHLORIDE 50 MG: 50 TABLET ORAL at 01:15

## 2025-06-25 RX ADMIN — MORPHINE SULFATE 2 MG: 2 INJECTION, SOLUTION INTRAMUSCULAR; INTRAVENOUS at 18:23

## 2025-06-25 RX ADMIN — TIZANIDINE 4 MG: 4 TABLET ORAL at 20:43

## 2025-06-25 RX ADMIN — MORPHINE SULFATE 2 MG: 2 INJECTION, SOLUTION INTRAMUSCULAR; INTRAVENOUS at 03:53

## 2025-06-25 RX ADMIN — MORPHINE SULFATE 2 MG: 2 INJECTION, SOLUTION INTRAMUSCULAR; INTRAVENOUS at 13:38

## 2025-06-25 RX ADMIN — LEVOTHYROXINE SODIUM 200 MCG: 0.1 TABLET ORAL at 06:35

## 2025-06-25 RX ADMIN — SODIUM CHLORIDE, PRESERVATIVE FREE 10 ML: 5 INJECTION INTRAVENOUS at 09:05

## 2025-06-25 RX ADMIN — SODIUM CHLORIDE, PRESERVATIVE FREE 10 ML: 5 INJECTION INTRAVENOUS at 20:45

## 2025-06-25 RX ADMIN — DULOXETINE 60 MG: 60 CAPSULE, DELAYED RELEASE ORAL at 09:03

## 2025-06-25 RX ADMIN — CARVEDILOL 3.12 MG: 3.12 TABLET, FILM COATED ORAL at 16:51

## 2025-06-25 RX ADMIN — RANOLAZINE 500 MG: 500 TABLET, FILM COATED, EXTENDED RELEASE ORAL at 09:04

## 2025-06-25 RX ADMIN — RANOLAZINE 500 MG: 500 TABLET, FILM COATED, EXTENDED RELEASE ORAL at 20:43

## 2025-06-25 RX ADMIN — LOPERAMIDE HYDROCHLORIDE 2 MG: 2 CAPSULE ORAL at 06:36

## 2025-06-25 RX ADMIN — PANTOPRAZOLE SODIUM 40 MG: 40 TABLET, DELAYED RELEASE ORAL at 09:04

## 2025-06-25 RX ADMIN — ALBUTEROL SULFATE 2.5 MG: 0.83 SOLUTION RESPIRATORY (INHALATION) at 02:36

## 2025-06-25 ASSESSMENT — PAIN DESCRIPTION - DESCRIPTORS
DESCRIPTORS: STABBING
DESCRIPTORS: ACHING
DESCRIPTORS: ACHING;STABBING
DESCRIPTORS: ACHING

## 2025-06-25 ASSESSMENT — PAIN SCALES - GENERAL
PAINLEVEL_OUTOF10: 5
PAINLEVEL_OUTOF10: 5
PAINLEVEL_OUTOF10: 0
PAINLEVEL_OUTOF10: 6
PAINLEVEL_OUTOF10: 8
PAINLEVEL_OUTOF10: 4
PAINLEVEL_OUTOF10: 8
PAINLEVEL_OUTOF10: 4
PAINLEVEL_OUTOF10: 8
PAINLEVEL_OUTOF10: 6
PAINLEVEL_OUTOF10: 4
PAINLEVEL_OUTOF10: 5

## 2025-06-25 ASSESSMENT — PAIN DESCRIPTION - LOCATION
LOCATION: CHEST
LOCATION: ABDOMEN

## 2025-06-25 ASSESSMENT — PAIN DESCRIPTION - ORIENTATION
ORIENTATION: MID
ORIENTATION: LEFT;MID
ORIENTATION: MID;LEFT
ORIENTATION: MID

## 2025-06-25 NOTE — NURSE NAVIGATOR
Continues to be anxious    perfect served  np garzon  three times this shift  got orders for  zofran..,,tums ,, nicotine patch,, trazodone

## 2025-06-25 NOTE — PROGRESS NOTES
V2.0  [facility] Hospitalist Progress Note      Name:  Ashutosh Donovan /Age/Sex: 1970  (54 y.o. male)   MRN & CSN:  [id number] & [id number] Encounter Date/Time: 2025 8:33 AM EDT    Location:  T1Z-1380/5132-01 PCP: No primary care provider on file.       Hospital Day: 2    Assessment and Plan:   Ashutosh Donovan is a 54 y.o. male with PMH CAD p/w chest pain      Plan:  Recurrent chest pain in setting of CAD  -multiple hospitalizations for same, patient frequently leaves AMA  -during last presentation ~10d ago, pt recommended CTA coronary by cardiologist. Will proceed as previously planned  - Symptoms atypical for cardiac pain, but patient reporting that they are similar to symptoms before his stent  - Continue Imdur, aspirin, statin, Plavix, Ranexa  -CTA could not be performed yesterday due to being above target HR. Re-attempting today  -CTA read late in day was inconclusive. Discussed with patient getting inpatient cardiology evaluation vs. Outpatient and he was amenable to staying additional night for cardiology to see.  LA - improved  - IV fluid  - Hold nephrotoxic medications  Leukocytosis  -due to high dose steroids given in ED yesterday  Anxiety/depression  - History of suicidal ideation.  Denies current suicidal ideation  - Continue duloxetine  - Recommend outpatient psych follow-up  COPD  - Does not appear in exacerbation  Tobacco use  - Counseled smoking cessation  - Nicotine patch    Ppx: lovenox  Dispo: Home    Subjective:     Chief Complaint: Chest Pain (C/0 chest pain that started two hours ago following argument. Pain constant with rest or activity. Radiates to arm and back.  3 nitro taken by pt. History of MI with three stents placed. )     Interval Hx:  Pt sleeping on physician arrival. When awoken pt reporting continued chest pain, shortness of breath. Goes to sleep again shortly thereafter.    Review of Systems:    Review of Systems    10 point ROS negative except as stated above

## 2025-06-25 NOTE — NURSE NAVIGATOR
C/o chest pain level 9  , lying flat in bed on right side,  respirations easy and even ,,  skin warm and dry ,,calmly waiting for morphine  , holding onto iv port so morphine can be administered ,,  refused  NTG  stated that it does not work well as morphine . Temp 97.7  oral   .  Pulse 78  bp  126/80  resp  16

## 2025-06-25 NOTE — PROGRESS NOTES
Comprehensive Nutrition Assessment    Type and Reason for Visit:  Positive nutrition screen, Initial    Nutrition Recommendations/Plan:   Continue EVERETT (3-4gm) diet   Discontinue Ensure Plus ONS TID   Start Glucerna ONS (strawberry)  Encourage and monitor po intake   Monitor weight status      Malnutrition Assessment:  Malnutrition Status:  Moderate malnutrition (06/25/25 0759)    Context:  Acute Illness     Findings of the 6 clinical characteristics of malnutrition:  Energy Intake:  No decrease in energy intake  Weight Loss:  Greater than 5% over 1 month     Body Fat Loss:  Mild body fat loss Triceps   Muscle Mass Loss:  Mild muscle mass loss Temples (temporalis), Clavicles (pectoralis & deltoids)  Fluid Accumulation:  No fluid accumulation     Strength:  Not Performed    Nutrition Assessment:    Triggers for nutritional risk with MST score of 2 for unintended weight loss. Patient presents to ED for chest pain. PMH of LA, COPD, HTN, CVA, and CAD. Patient seems to have good appetite with % intake of most meals. Patient has significant weight loss of 13# (6.3%) in past month; patient unable to recall reason for weight loss. Discontinue Ensure Plus ONS d/t flavor preference no longer available. Start Glucerna ONS (strawberry) to help increase po intake.Encourage patient to increase po intake as tolerated. Last BM 6/25; experiencing diarrhea. Encouraged patient to increase fluid intake to help prevent dehyration. Will continue to monitor.    Nutrition Related Findings:    No BM noted. No edema noted. Wound Type: None       Current Nutrition Intake & Therapies:    Average Meal Intake: %  Average Supplements Intake: Unable to assess  ADULT ORAL NUTRITION SUPPLEMENT; Breakfast, Lunch, Dinner; Standard High Calorie/High Protein Oral Supplement  ADULT DIET; Regular; No Added Salt (3-4 gm)    Anthropometric Measures:  Height: 176.5 cm (5' 9.49\")  Ideal Body Weight (IBW): 163 lbs (74 kg)    Admission Body

## 2025-06-25 NOTE — NURSE NAVIGATOR
Refused bed alarm was up walking in [place] by self ,,.  Signed fall contract ,stated would call if  dizzy or weak if he felt that he needs held .  Pt alert and oriented

## 2025-06-25 NOTE — ACP (ADVANCE CARE PLANNING)
Advance Care Planning   Healthcare Decision Maker:    Primary Decision Maker: Zion RoweKaylin - Parent - [phone]    Secondary Decision Maker: Juanjose Donovan - Brother/Sister - [phone]    Today we documented Decision Maker(s) consistent with ACP documents on file.     #[phone]  Electronically signed by Ivonne Del Cid RN on 6/25/2025 at 12:03 PM

## 2025-06-25 NOTE — CARE COORDINATION
06/25/25 1152   Readmission Assessment   Number of Days since last admission? 8-30 days   Previous Disposition Home with Family  (brothers)   Who is being Interviewed Patient   What was the patient's/caregiver's perception as to why they think they needed to return back to the hospital? Other (Comment)  (chest pain)   Did you visit your Primary Care Physician after you left the hospital, before you returned this time? No   Why weren't you able to visit your PCP? Did not have an appointment   Did you see a specialist, such as Cardiac, Pulmonary, Orthopedic Physician, etc. after you left the hospital? No   Who advised the patient to return to the hospital? Self-referral   Does the patient report anything that got in the way of taking their medications? No   In our efforts to provide the best possible care to you and others like you, can you think of anything that we could have done to help you after you left the hospital the first time, so that you might not have needed to return so soon? Other (Comment)  (no)     #[phone]  Electronically signed by Ivonne Del Cid RN on 6/25/2025 at 11:57 AM

## 2025-06-25 NOTE — CARE COORDINATION
Case Management Assessment  Initial Evaluation    Date/Time of Evaluation: 6/25/2025 12:04 PM  Assessment Completed by: Ivonne Del Cid RN    If patient is discharged prior to next notation, then this note serves as note for discharge by case management.    Patient Name: Ashutosh Donovan                   YOB: 1970  Diagnosis: Chest pain [R07.9]  Atypical chest pain [R07.89]  COPD with acute exacerbation (HCC) [J44.1]                   Date / Time: 6/24/2025  1:14 AM    Patient Admission Status: Inpatient   Readmission Risk (Low < 19, Mod (19-27), High > 27): Readmission Risk Score: 23.4    Current PCP: No primary care provider on file.  PCP verified by ? Yes (per patient he goes to [facility] when needed)    Chart Reviewed: Yes      History Provided by: Patient, Medical Record  Patient Orientation: Alert and Oriented    Patient Cognition: Alert    Hospitalization in the last 30 days (Readmission):  Yes    If yes, Readmission Assessment in CM Navigator will be completed.    Advance Directives:      Code Status: Full Code   Patient's Primary Decision Maker is:      Primary Decision Maker: Zion RoweKaylin - Parent - [phone]    Secondary Decision Maker: Juanjose Donovan - Brother/Sister - [phone]    Discharge Planning:    Patient lives with: Family Members (brother) Type of Home: Apartment  Primary Care Giver: Self  Patient Support Systems include: Family Members   Current Financial resources: Medicare, Medicaid  Current community resources: None  Current services prior to admission: None            Current DME:  None            Type of Home Care services:  None    ADLS  Prior functional level: Independent in ADLs/IADLs  Current functional level: Independent in ADLs/IADLs    No current pt/ot orders    Family can provide assistance at DC: No  Would you like Case Management to discuss the discharge plan with any other family members/significant others, and if so, who?

## 2025-06-26 VITALS
TEMPERATURE: 98.1 F | HEART RATE: 98 BPM | SYSTOLIC BLOOD PRESSURE: 155 MMHG | BODY MASS INDEX: 29.47 KG/M2 | WEIGHT: 199 LBS | HEIGHT: 69 IN | RESPIRATION RATE: 20 BRPM | OXYGEN SATURATION: 100 % | DIASTOLIC BLOOD PRESSURE: 97 MMHG

## 2025-06-26 LAB
ANION GAP SERPL CALCULATED.3IONS-SCNC: 11 MMOL/L (ref 3–16)
ANION GAP SERPL CALCULATED.3IONS-SCNC: 8 MMOL/L (ref 3–16)
BUN SERPL-MCNC: 14 MG/DL (ref 7–20)
BUN SERPL-MCNC: 16 MG/DL (ref 7–20)
CALCIUM SERPL-MCNC: 10 MG/DL (ref 8.3–10.6)
CALCIUM SERPL-MCNC: 9.9 MG/DL (ref 8.3–10.6)
CHLORIDE SERPL-SCNC: 111 MMOL/L (ref 99–110)
CHLORIDE SERPL-SCNC: 113 MMOL/L (ref 99–110)
CO2 SERPL-SCNC: 22 MMOL/L (ref 21–32)
CO2 SERPL-SCNC: 22 MMOL/L (ref 21–32)
CREAT SERPL-MCNC: 1 MG/DL (ref 0.9–1.3)
CREAT SERPL-MCNC: 1 MG/DL (ref 0.9–1.3)
DEPRECATED RDW RBC AUTO: 16.9 % (ref 12.4–15.4)
GFR SERPLBLD CREATININE-BSD FMLA CKD-EPI: 90 ML/MIN/{1.73_M2}
GFR SERPLBLD CREATININE-BSD FMLA CKD-EPI: 90 ML/MIN/{1.73_M2}
GLUCOSE SERPL-MCNC: 101 MG/DL (ref 70–99)
GLUCOSE SERPL-MCNC: 93 MG/DL (ref 70–99)
HCT VFR BLD AUTO: 38.6 % (ref 40.5–52.5)
HGB BLD-MCNC: 13 G/DL (ref 13.5–17.5)
MCH RBC QN AUTO: 27.7 PG (ref 26–34)
MCHC RBC AUTO-ENTMCNC: 33.6 G/DL (ref 31–36)
MCV RBC AUTO: 82.4 FL (ref 80–100)
PLATELET # BLD AUTO: 216 K/UL (ref 135–450)
PMV BLD AUTO: 9.6 FL (ref 5–10.5)
POTASSIUM SERPL-SCNC: 4 MMOL/L (ref 3.5–5.1)
POTASSIUM SERPL-SCNC: 4.2 MMOL/L (ref 3.5–5.1)
RBC # BLD AUTO: 4.69 M/UL (ref 4.2–5.9)
SODIUM SERPL-SCNC: 143 MMOL/L (ref 136–145)
SODIUM SERPL-SCNC: 144 MMOL/L (ref 136–145)
WBC # BLD AUTO: 10.8 K/UL (ref 4–11)

## 2025-06-26 PROCEDURE — 99223 1ST HOSP IP/OBS HIGH 75: CPT | Performed by: INTERNAL MEDICINE

## 2025-06-26 PROCEDURE — 99152 MOD SED SAME PHYS/QHP 5/>YRS: CPT | Performed by: INTERNAL MEDICINE

## 2025-06-26 PROCEDURE — 4A023N7 MEASUREMENT OF CARDIAC SAMPLING AND PRESSURE, LEFT HEART, PERCUTANEOUS APPROACH: ICD-10-PCS | Performed by: INTERNAL MEDICINE

## 2025-06-26 PROCEDURE — 6370000000 HC RX 637 (ALT 250 FOR IP): Performed by: STUDENT IN AN ORGANIZED HEALTH CARE EDUCATION/TRAINING PROGRAM

## 2025-06-26 PROCEDURE — 2709999900 HC NON-CHARGEABLE SUPPLY: Performed by: INTERNAL MEDICINE

## 2025-06-26 PROCEDURE — 2500000003 HC RX 250 WO HCPCS: Performed by: INTERNAL MEDICINE

## 2025-06-26 PROCEDURE — 2500000003 HC RX 250 WO HCPCS: Performed by: STUDENT IN AN ORGANIZED HEALTH CARE EDUCATION/TRAINING PROGRAM

## 2025-06-26 PROCEDURE — 6360000004 HC RX CONTRAST MEDICATION: Performed by: INTERNAL MEDICINE

## 2025-06-26 PROCEDURE — C1894 INTRO/SHEATH, NON-LASER: HCPCS | Performed by: INTERNAL MEDICINE

## 2025-06-26 PROCEDURE — 6370000000 HC RX 637 (ALT 250 FOR IP): Performed by: HOSPITALIST

## 2025-06-26 PROCEDURE — 85027 COMPLETE CBC AUTOMATED: CPT

## 2025-06-26 PROCEDURE — 93454 CORONARY ARTERY ANGIO S&I: CPT | Performed by: INTERNAL MEDICINE

## 2025-06-26 PROCEDURE — 6370000000 HC RX 637 (ALT 250 FOR IP): Performed by: REGISTERED NURSE

## 2025-06-26 PROCEDURE — 2580000003 HC RX 258: Performed by: INTERNAL MEDICINE

## 2025-06-26 PROCEDURE — B2111ZZ FLUOROSCOPY OF MULTIPLE CORONARY ARTERIES USING LOW OSMOLAR CONTRAST: ICD-10-PCS | Performed by: INTERNAL MEDICINE

## 2025-06-26 PROCEDURE — 36415 COLL VENOUS BLD VENIPUNCTURE: CPT

## 2025-06-26 PROCEDURE — 94760 N-INVAS EAR/PLS OXIMETRY 1: CPT

## 2025-06-26 PROCEDURE — 6360000002 HC RX W HCPCS: Performed by: INTERNAL MEDICINE

## 2025-06-26 PROCEDURE — 6370000000 HC RX 637 (ALT 250 FOR IP)

## 2025-06-26 PROCEDURE — 6360000002 HC RX W HCPCS: Performed by: STUDENT IN AN ORGANIZED HEALTH CARE EDUCATION/TRAINING PROGRAM

## 2025-06-26 PROCEDURE — 80048 BASIC METABOLIC PNL TOTAL CA: CPT

## 2025-06-26 PROCEDURE — C1769 GUIDE WIRE: HCPCS | Performed by: INTERNAL MEDICINE

## 2025-06-26 RX ORDER — MIRTAZAPINE 15 MG/1
15 TABLET, FILM COATED ORAL ONCE
Status: COMPLETED | OUTPATIENT
Start: 2025-06-26 | End: 2025-06-26

## 2025-06-26 RX ORDER — PREDNISONE 20 MG/1
40 TABLET ORAL DAILY
Qty: 4 TABLET | Refills: 0 | Status: SHIPPED | OUTPATIENT
Start: 2025-06-27 | End: 2025-06-29

## 2025-06-26 RX ORDER — SODIUM CHLORIDE 0.9 % (FLUSH) 0.9 %
5-40 SYRINGE (ML) INJECTION PRN
Status: DISCONTINUED | OUTPATIENT
Start: 2025-06-26 | End: 2025-06-26 | Stop reason: HOSPADM

## 2025-06-26 RX ORDER — OXYCODONE HYDROCHLORIDE 5 MG/1
5 TABLET ORAL EVERY 4 HOURS PRN
Refills: 0 | Status: DISCONTINUED | OUTPATIENT
Start: 2025-06-26 | End: 2025-06-26 | Stop reason: HOSPADM

## 2025-06-26 RX ORDER — OXYCODONE HYDROCHLORIDE 10 MG/1
10 TABLET ORAL EVERY 4 HOURS PRN
Refills: 0 | Status: DISCONTINUED | OUTPATIENT
Start: 2025-06-26 | End: 2025-06-26 | Stop reason: HOSPADM

## 2025-06-26 RX ORDER — SODIUM CHLORIDE 9 MG/ML
INJECTION, SOLUTION INTRAVENOUS PRN
Status: DISCONTINUED | OUTPATIENT
Start: 2025-06-26 | End: 2025-06-26 | Stop reason: HOSPADM

## 2025-06-26 RX ORDER — FENTANYL CITRATE 50 UG/ML
INJECTION, SOLUTION INTRAMUSCULAR; INTRAVENOUS PRN
Status: DISCONTINUED | OUTPATIENT
Start: 2025-06-26 | End: 2025-06-26 | Stop reason: HOSPADM

## 2025-06-26 RX ORDER — SODIUM CHLORIDE 9 MG/ML
INJECTION, SOLUTION INTRAVENOUS CONTINUOUS PRN
Status: COMPLETED | OUTPATIENT
Start: 2025-06-26 | End: 2025-06-26

## 2025-06-26 RX ORDER — SODIUM CHLORIDE 0.9 % (FLUSH) 0.9 %
5-40 SYRINGE (ML) INJECTION EVERY 12 HOURS SCHEDULED
Status: DISCONTINUED | OUTPATIENT
Start: 2025-06-26 | End: 2025-06-26 | Stop reason: HOSPADM

## 2025-06-26 RX ORDER — IOPAMIDOL 755 MG/ML
INJECTION, SOLUTION INTRAVASCULAR PRN
Status: DISCONTINUED | OUTPATIENT
Start: 2025-06-26 | End: 2025-06-26 | Stop reason: HOSPADM

## 2025-06-26 RX ORDER — MIDAZOLAM HYDROCHLORIDE 1 MG/ML
INJECTION, SOLUTION INTRAMUSCULAR; INTRAVENOUS PRN
Status: DISCONTINUED | OUTPATIENT
Start: 2025-06-26 | End: 2025-06-26 | Stop reason: HOSPADM

## 2025-06-26 RX ORDER — OXYCODONE HYDROCHLORIDE 5 MG/1
5 TABLET ORAL EVERY 6 HOURS PRN
Qty: 12 TABLET | Refills: 0 | Status: SHIPPED | OUTPATIENT
Start: 2025-06-26 | End: 2025-06-29

## 2025-06-26 RX ADMIN — SODIUM CHLORIDE, PRESERVATIVE FREE 10 ML: 5 INJECTION INTRAVENOUS at 11:27

## 2025-06-26 RX ADMIN — DULOXETINE 60 MG: 60 CAPSULE, DELAYED RELEASE ORAL at 11:19

## 2025-06-26 RX ADMIN — SODIUM CHLORIDE: 0.9 INJECTION, SOLUTION INTRAVENOUS at 13:50

## 2025-06-26 RX ADMIN — ATORVASTATIN CALCIUM 80 MG: 80 TABLET, FILM COATED ORAL at 11:31

## 2025-06-26 RX ADMIN — TAMSULOSIN HYDROCHLORIDE 0.4 MG: 0.4 CAPSULE ORAL at 11:20

## 2025-06-26 RX ADMIN — ISOSORBIDE MONONITRATE 60 MG: 60 TABLET, EXTENDED RELEASE ORAL at 11:19

## 2025-06-26 RX ADMIN — AMLODIPINE BESYLATE 5 MG: 5 TABLET ORAL at 11:19

## 2025-06-26 RX ADMIN — CLOPIDOGREL BISULFATE 75 MG: 75 TABLET, FILM COATED ORAL at 11:20

## 2025-06-26 RX ADMIN — CARVEDILOL 3.12 MG: 3.12 TABLET, FILM COATED ORAL at 15:57

## 2025-06-26 RX ADMIN — CARVEDILOL 3.12 MG: 3.12 TABLET, FILM COATED ORAL at 11:31

## 2025-06-26 RX ADMIN — MORPHINE SULFATE 2 MG: 2 INJECTION, SOLUTION INTRAMUSCULAR; INTRAVENOUS at 04:37

## 2025-06-26 RX ADMIN — LOPERAMIDE HYDROCHLORIDE 2 MG: 2 CAPSULE ORAL at 04:34

## 2025-06-26 RX ADMIN — MORPHINE SULFATE 2 MG: 2 INJECTION, SOLUTION INTRAMUSCULAR; INTRAVENOUS at 11:28

## 2025-06-26 RX ADMIN — RANOLAZINE 500 MG: 500 TABLET, FILM COATED, EXTENDED RELEASE ORAL at 11:18

## 2025-06-26 RX ADMIN — PREDNISONE 40 MG: 20 TABLET ORAL at 11:18

## 2025-06-26 RX ADMIN — MIRTAZAPINE 15 MG: 15 TABLET, FILM COATED ORAL at 01:05

## 2025-06-26 RX ADMIN — TIZANIDINE 4 MG: 4 TABLET ORAL at 04:37

## 2025-06-26 RX ADMIN — THERA TABS 1 TABLET: TAB at 11:18

## 2025-06-26 RX ADMIN — PANTOPRAZOLE SODIUM 40 MG: 40 TABLET, DELAYED RELEASE ORAL at 11:18

## 2025-06-26 RX ADMIN — LOPERAMIDE HYDROCHLORIDE 2 MG: 2 CAPSULE ORAL at 11:20

## 2025-06-26 RX ADMIN — ASPIRIN 81 MG: 81 TABLET, CHEWABLE ORAL at 11:20

## 2025-06-26 RX ADMIN — LOPERAMIDE HYDROCHLORIDE 2 MG: 2 CAPSULE ORAL at 18:53

## 2025-06-26 RX ADMIN — MORPHINE SULFATE 2 MG: 2 INJECTION, SOLUTION INTRAMUSCULAR; INTRAVENOUS at 15:57

## 2025-06-26 RX ADMIN — RANOLAZINE 500 MG: 500 TABLET, FILM COATED, EXTENDED RELEASE ORAL at 20:10

## 2025-06-26 RX ADMIN — TIZANIDINE 4 MG: 4 TABLET ORAL at 20:10

## 2025-06-26 ASSESSMENT — PAIN DESCRIPTION - DESCRIPTORS
DESCRIPTORS: ACHING
DESCRIPTORS: ACHING

## 2025-06-26 ASSESSMENT — PAIN DESCRIPTION - ORIENTATION
ORIENTATION: MID
ORIENTATION: MID

## 2025-06-26 ASSESSMENT — PAIN SCALES - GENERAL
PAINLEVEL_OUTOF10: 7
PAINLEVEL_OUTOF10: 0
PAINLEVEL_OUTOF10: 9
PAINLEVEL_OUTOF10: 9
PAINLEVEL_OUTOF10: 0
PAINLEVEL_OUTOF10: 0
PAINLEVEL_OUTOF10: 6

## 2025-06-26 ASSESSMENT — PAIN DESCRIPTION - LOCATION
LOCATION: CHEST
LOCATION: NECK

## 2025-06-26 NOTE — CARE COORDINATION
Case Management Discharge Note          Date / Time of Note: 6/26/2025 3:52 PM                  Patient Name: Ashutosh Donovan   YOB: 1970  Diagnosis: Chest pain [R07.9]  Atypical chest pain [R07.89]  COPD with acute exacerbation (HCC) [J44.1]   Date / Time: 6/24/2025  1:14 AM    Financial:  Payor: [facility] MEDICARE / Plan: [employer] DUAL COMPLETE / Product Type: *No Product type* /      Pharmacy:    [id number] [place], [place] - [address] [phone] - F [phone]  [address] [address]  Phone: [phone] Fax: [phone]    [facility] [id number] [place], [place] - [address] [phone] - F [phone]  [address] [address]  Phone: [phone] Fax: [phone]    [facility] [id number] [facility] [address] [phone] - F [phone]  [address] [facility] [address]  Phone: [phone] Fax: [phone]      Assistance purchasing medications?: Potential Assistance Purchasing Medications: No  Assistance provided by Case Management: None at this time    DISCHARGE Disposition: Home- No Services Needed    Transportation:  Transportation PLAN for discharge: insurance transport   Mode of Transport: Lyft  Time of Transport: patient will call [phone] when ready for discharge    IMM Completed:   Yes, Case management has presented and reviewed IMM letter #2.       IMM Letter date given:: 06/24/25   .   Patient and/or family/POA verbalized understanding of their medicare rights and appeal process if needed. Patient and/or family/POA has signed, initialed and placed the date and time on IMM letter #2 on the the appropriate lines. Copy of letter offered and they are aware that the original copy of IMM letter #2 is available prior to discharge from the paper chart on the unit.  Electronic documentation has been entered into epic for IMM letter #2 and original paper copy has been added

## 2025-06-26 NOTE — PROGRESS NOTES
Patient attempted to walk out of hospital to smoke cigarette with heart monitor, PIV, and TR band still in place.

## 2025-06-26 NOTE — SEDATION DOCUMENTATION
Sedation Assessment      Ashutosh Donovan  1970  Cath Pool Room/PL      [id number]  2:34 PM    Planned Procedure: Cardiac Catheterization Procedure    Post Procedure Plan: Return to same level of care    Vital Signs:  BP (!) 150/90   Pulse 81   Temp 98.1 °F (36.7 °C) (Oral)   Resp 19   Ht 1.753 m (5' 9\")   Wt 90.3 kg (199 lb)   SpO2 98%   BMI 29.39 kg/m²     Allergies:  Allergies   Allergen Reactions    Latex Itching    Bupropion Seizure    Tramadol Hives and Angioedema    Ziprasidone Hcl Seizure     Pt reports seizures with Geodon    Ketorolac Tromethamine Hives and Nausea And Vomiting    Lithium Other (See Comments)     Thyroid issues    Olanzapine Swelling    Risperidone Swelling    Acetaminophen Other (See Comments)     Pt has hep c    Ibuprofen Nausea Only and Rash       Past Medical History:  Past Medical History:   Diagnosis Date    Arthritis     CAD (coronary artery disease)     CHF (congestive heart failure) (HCC)     Chronic pain     twister vertebra and two collapsed disc, states injury happened at 47yo    Emphysema lung (HCC)     Heart attack (HCC)     pt reports 10 heart attacks in 6 years, 8 stents placed    Hepatitis C     Hypothyroid     Kidney failure          Surgical History:  Past Surgical History:   Procedure Laterality Date    CERVICAL FUSION N/A 11/22/2024    Cervical 3 - Cervical 5 Anterior Cervical Discectomy and Fusion performed by Otilia Ocampo MD at [facility] OR    CHOLECYSTECTOMY      CORONARY STENT PLACEMENT      8 stents in 6 years    TONSILLECTOMY           Medications:        Pre-Sedation:    Pre-Sedation Documentation and Exam:  I have personally completed a history, physical exam & review of systems for this patient (see notes).    Prior History of Anesthesia Complications:   none    Modified Mallampati:  II (soft palate, uvula, fauces visible)    ASA Classification:  Class 3 - A patient with severe systemic disease that limits activity but is not

## 2025-06-26 NOTE — CONSULTS
Not on file   Tobacco Use    Smoking status: Every Day     Current packs/day: 0.50     Types: Cigarettes    Smokeless tobacco: Never   Vaping Use    Vaping status: Never Used   Substance and Sexual Activity    Alcohol use: No    Drug use: Yes     Types: Marijuana (Weed)     Comment: medical MJ - daily    Sexual activity: Yes     Partners: Female   Other Topics Concern    Not on file   Social History Narrative    Not on file     Social Drivers of Health     Financial Resource Strain: Not on file   Food Insecurity: Food Insecurity Present (6/24/2025)    Hunger Vital Sign     Worried About Running Out of Food in the Last Year: Often true     Ran Out of Food in the Last Year: Often true   Transportation Needs: Unmet Transportation Needs (6/24/2025)    PRAPARE - Transportation     Lack of Transportation (Medical): Yes     Lack of Transportation (Non-Medical): Yes   Physical Activity: Not on file   Stress: Not on file   Social Connections: Unknown (10/12/2023)    [facility]    Family and Community Support     Help with Day-to-Day Activities: Not on file     Lonely or Isolated: Not on file   Intimate Partner Violence: Unknown (10/6/2024)    Received from [facility]    Abuse Screen     Unsafe at Home or Work/School: Not on file     Feels Threatened by Someone?: Not on file     Does Anyone Keep You from Contacting Others or Doint Things Outside the Home?: Not on file     Physical Sign of Abuse Present: Not on file   Housing Stability: Low Risk  (6/24/2025)    Housing Stability Vital Sign     Unable to Pay for Housing in the Last Year: No     Number of Times Moved in the Last Year: 0     Homeless in the Last Year: No   Recent Concern: Housing Stability - High Risk (6/15/2025)    Housing Stability Vital Sign     Unable to Pay for Housing in the Last Year: Yes     Number of Times Moved in the Last Year: 1     Homeless in the Last Year: No        Family

## 2025-06-26 NOTE — PROGRESS NOTES
Pt continues to refused bed alarm. Pt stated he can ambulated without assistance. Pt requested to go out side and became verbally aggressive stating if he do not go outside soon he will leave. Call security to help nurse with pt to go outside. Pt then tried to smoke cigarette. Informed pt of policy and he decided to not to smoke. Pt was then escorted back to room and request sleeping medication and for his wallet to go to security. Security came back to bedside to placed wallet in secured bag. Pt then stated he hit his head on closet hard and now his head hurt a small abrasion on top of his head was noted. informed NP on call  new orders placed.  Pt was asked for bed alarm to be placed on bed and allow staff to help him to the bathroom and he agreed. Pt then began to adjust IV pump settings new order for virtual  was .  Pt then woke up out of sleep and bed alarm was triggered pt then became verbally aggressive with PCA. Nurse tried to deescalate the situation and  pt continue with verbal agressiveness. security was call again. Pt then started adjusting IV pump settings. Nurse turned pump off and attempted  to disconnect IV pump the pt requested charge nurse and refused to allow IV to be disconnected. Charge nurse attempted as well pt refused . Security was at bed side and was able to deescalated the situation. Nurse was able to disconnect IV informed MD New order placed to D/C fluids. Pt is now clam in room.

## 2025-06-26 NOTE — PROGRESS NOTES
Physician Progress Note      PATIENT:               ANIBAL MCDONALD  CSN #:                  [id number]  :                       1970  ADMIT DATE:       2025 1:14 AM  DISCH DATE:  RESPONDING  PROVIDER #:        Hiro Hart MD          QUERY TEXT:    Internal Medicine,    Please clarify the patient?s nutritional status:    -Dietitian assessment: Malnutrition Assessment: Malnutrition Status:  Moderate   malnutrition, No decrease in energy intake, Weight Loss:  Greater than 5%   over 1 month,   Mild body fat land  muscle mass loss Continue EVERETT (3-4gm)   diet, Start Glucerna ONS (strawberry), Encourage and monitor po intake,    Monitor weight status    Thank you    The clinical indicators include:  Options provided:  -- Moderate malnutrition  -- Other - I will add my own diagnosis  -- Disagree - Not applicable / Not valid  -- Disagree - Clinically unable to determine / Unknown  -- Refer to Clinical Documentation Reviewer    PROVIDER RESPONSE TEXT:    This patient has moderate protein malnutrition    Query created by: Dary Hardin on 2025 3:56 PM      Electronically signed by:  Hiro Hart MD 2025 8:15 AM

## 2025-06-26 NOTE — BRIEF OP NOTE
Brief Postoperative Note      Patient: Ashutosh Donovan  YOB: 1970  MRN: [id number]    Date of Procedure: 6/26/2025    Pre-Op Diagnosis Codes:      * Chest pain [R07.9]    Post-Op Diagnosis: Same       Procedure(s):  Left heart cath / coronary angiography    Surgeon(s):  Cesar Asencio MD    Assistant:  * No surgical staff found *    Anesthesia: * No anesthesia type entered *    Estimated Blood Loss (mL): Minimal    Complications: None    Specimens:   * No specimens in log *    Implants:  * No implants in log *      Drains: * No LDAs found *    Findings:  Nonobstructive CAD   All prior stents patent  Medical therapy     Electronically signed by Cesar Asencio MD on 6/26/2025 at 3:39 PM

## 2025-06-26 NOTE — PROGRESS NOTES
Pca entered the room to find the patients bed alarm was set off after multiple attempts at telling him to call before getting up. Patient was tangled in his iv pole with his iv about to be ripped out. Pca then helped him to the bathroom where he started cursing saying \" Why the fuck are you watching me take a shit\" pca was standing at the computer at this time. The rn then walks in the room and the patient starts to scream \" You guys are fucking cunts\". Rn, charge nurse, and pca in the room the patient becomes more verbally aggressive screaming curse words left and right. Security called and talked to the patient about his behavior.

## 2025-06-27 NOTE — PROGRESS NOTES
Patient discharged to home. Monitor off, IV out. Patient taken downstairs via W/C by NA and security. A lyft ride was being provoded by his insurance. Patient alert, oriented with no complaints of chest pain noted. Given discharge instructions earlier but reminded to  stop smoking and follow up with cardiology. When nursing assistant returned to floor, she told this RN that patient had immediately pulled out a cigarette and lighted it as soon as he was out the door, even though both she and the  told him he could not smoke in the hospital entrance. Patient's response was \"I don't care\" and continued to smoke. Security called the police. Patient was argumentative with both security and police when the  arrived. It was decided to not pursue any other actions and patient was placed in the lyft and sent home.

## 2025-06-27 NOTE — DISCHARGE SUMMARY
on file. within 2 weeks  Follow-up with cardiology at [facility] in 2 weeks  Diet: regular diet   Activity: activity as tolerated  Disposition: Discharged to:   [x]Home, []HHC, []SNF, []Acute Rehab, []Hospice   Condition on discharge: Stable  Labs and Tests to be Followed up as an outpatient by PCP or Specialist:     Discharge Medications:        Medication List        START taking these medications      oxyCODONE 5 MG immediate release tablet  Commonly known as: Roxicodone  Take 1 tablet by mouth every 6 hours as needed for Pain for up to 3 days. Intended supply: 3 days. Take lowest dose possible to manage pain Max Daily Amount: 20 mg     predniSONE 20 MG tablet  Commonly known as: DELTASONE  Take 2 tablets by mouth daily for 2 doses  Start taking on: June 27, 2025            CONTINUE taking these medications      amLODIPine 5 MG tablet  Commonly known as: NORVASC  Take 1 tablet by mouth daily     aspirin 81 MG EC tablet  Take 1 tablet by mouth daily     atorvastatin 80 MG tablet  Commonly known as: LIPITOR  Take 1 tablet by mouth daily     carvedilol 3.125 MG tablet  Commonly known as: COREG  Take 1 tablet by mouth 2 times daily (with meals)     clopidogrel 75 MG tablet  Commonly known as: PLAVIX  Take 1 tablet by mouth daily     DULoxetine 60 MG extended release capsule  Commonly known as: CYMBALTA     ergocalciferol 1.25 MG (52630 UT) capsule  Commonly known as: ERGOCALCIFEROL     fluticasone 50 MCG/ACT nasal spray  Commonly known as: FLONASE     isosorbide mononitrate 60 MG extended release tablet  Commonly known as: IMDUR     levothyroxine 200 MCG tablet  Commonly known as: SYNTHROID     loratadine 10 MG tablet  Commonly known as: CLARITIN     nicotine 21 MG/24HR  Commonly known as: NICODERM CQ     nitroGLYCERIN 0.4 MG SL tablet  Commonly known as: NITROSTAT  Place 1 tablet under the tongue every 5 minutes as needed for Chest pain up to max of 3 total doses. If no relief after 1 dose, call 911.     pantoprazole

## 2025-06-28 LAB — ECHO BSA: 2.1 M2

## 2025-07-18 ENCOUNTER — HOSPITAL ENCOUNTER (EMERGENCY)
Age: 55
Discharge: HOME | End: 2025-07-18
Attending: STUDENT IN AN ORGANIZED HEALTH CARE EDUCATION/TRAINING PROGRAM
Payer: MEDICARE

## 2025-07-18 ENCOUNTER — APPOINTMENT (OUTPATIENT)
Dept: CT IMAGING | Age: 55
End: 2025-07-18
Attending: STUDENT IN AN ORGANIZED HEALTH CARE EDUCATION/TRAINING PROGRAM
Payer: MEDICARE

## 2025-07-18 ENCOUNTER — APPOINTMENT (OUTPATIENT)
Dept: GENERAL RADIOLOGY | Age: 55
End: 2025-07-18
Payer: MEDICARE

## 2025-07-18 VITALS
HEIGHT: 69 IN | OXYGEN SATURATION: 97 % | DIASTOLIC BLOOD PRESSURE: 92 MMHG | BODY MASS INDEX: 29.65 KG/M2 | RESPIRATION RATE: 24 BRPM | WEIGHT: 200.18 LBS | HEART RATE: 91 BPM | TEMPERATURE: 98.1 F | SYSTOLIC BLOOD PRESSURE: 157 MMHG
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
## 2025-07-18 DIAGNOSIS — R42 LIGHTHEADEDNESS: Primary | ICD-10-CM

## 2025-07-18 DIAGNOSIS — M54.2 NECK PAIN: ICD-10-CM

## 2025-07-18 DIAGNOSIS — R07.9 CHEST PAIN, UNSPECIFIED TYPE: ICD-10-CM

## 2025-07-18 LAB
ALBUMIN SERPL-MCNC: 4 G/DL (ref 3.4–5)
ALBUMIN/GLOB SERPL: 1.5 {RATIO} (ref 1.1–2.2)
ALP SERPL-CCNC: 98 U/L (ref 40–129)
ALT SERPL-CCNC: 10 U/L (ref 10–40)
ANION GAP SERPL CALCULATED.3IONS-SCNC: 12 MMOL/L (ref 3–16)
AST SERPL-CCNC: 27 U/L (ref 15–37)
BASOPHILS # BLD: 0.1 K/UL (ref 0–0.2)
BASOPHILS NFR BLD: 0.9 %
BILIRUB SERPL-MCNC: 0.4 MG/DL (ref 0–1)
BUN SERPL-MCNC: 17 MG/DL (ref 7–20)
CALCIUM SERPL-MCNC: 9.9 MG/DL (ref 8.3–10.6)
CHLORIDE SERPL-SCNC: 106 MMOL/L (ref 99–110)
CO2 SERPL-SCNC: 21 MMOL/L (ref 21–32)
CREAT SERPL-MCNC: 1.1 MG/DL (ref 0.9–1.3)
DEPRECATED RDW RBC AUTO: 17.4 % (ref 12.4–15.4)
EOSINOPHIL # BLD: 0.1 K/UL (ref 0–0.6)
EOSINOPHIL NFR BLD: 1.4 %
FLUAV + FLUBV AG NOSE IA.RAPID: NOT DETECTED
FLUAV + FLUBV AG NOSE IA.RAPID: NOT DETECTED
GFR SERPLBLD CREATININE-BSD FMLA CKD-EPI: 80 ML/MIN/{1.73_M2}
GLUCOSE SERPL-MCNC: 101 MG/DL (ref 70–99)
HCT VFR BLD AUTO: 37.6 % (ref 40.5–52.5)
HGB BLD-MCNC: 12.3 G/DL (ref 13.5–17.5)
LYMPHOCYTES # BLD: 1.9 K/UL (ref 1–5.1)
LYMPHOCYTES NFR BLD: 26.1 %
MCH RBC QN AUTO: 27.7 PG (ref 26–34)
MCHC RBC AUTO-ENTMCNC: 32.7 G/DL (ref 31–36)
MCV RBC AUTO: 84.5 FL (ref 80–100)
MONOCYTES # BLD: 0.8 K/UL (ref 0–1.3)
MONOCYTES NFR BLD: 10.4 %
NEUTROPHILS # BLD: 4.5 K/UL (ref 1.7–7.7)
NEUTROPHILS NFR BLD: 61.2 %
NT-PROBNP SERPL-MCNC: 135 PG/ML (ref 0–124)
PLATELET # BLD AUTO: 285 K/UL (ref 135–450)
PMV BLD AUTO: 9 FL (ref 5–10.5)
POTASSIUM SERPL-SCNC: 4.2 MMOL/L (ref 3.5–5.1)
PROT SERPL-MCNC: 6.7 G/DL (ref 6.4–8.2)
RBC # BLD AUTO: 4.45 M/UL (ref 4.2–5.9)
SARS-COV-2 RDRP RESP QL NAA+PROBE: NOT DETECTED
SODIUM SERPL-SCNC: 139 MMOL/L (ref 136–145)
TROPONIN, HIGH SENSITIVITY: 19 NG/L (ref 0–22)
TROPONIN, HIGH SENSITIVITY: 19 NG/L (ref 0–22)
WBC # BLD AUTO: 7.4 K/UL (ref 4–11)

## 2025-07-18 PROCEDURE — 87502 INFLUENZA DNA AMP PROBE: CPT

## 2025-07-18 PROCEDURE — 83880 ASSAY OF NATRIURETIC PEPTIDE: CPT

## 2025-07-18 PROCEDURE — 71045 X-RAY EXAM CHEST 1 VIEW: CPT

## 2025-07-18 PROCEDURE — 70450 CT HEAD/BRAIN W/O DYE: CPT

## 2025-07-18 PROCEDURE — 93005 ELECTROCARDIOGRAM TRACING: CPT | Performed by: STUDENT IN AN ORGANIZED HEALTH CARE EDUCATION/TRAINING PROGRAM

## 2025-07-18 PROCEDURE — 36415 COLL VENOUS BLD VENIPUNCTURE: CPT

## 2025-07-18 PROCEDURE — 87635 SARS-COV-2 COVID-19 AMP PRB: CPT

## 2025-07-18 PROCEDURE — 84484 ASSAY OF TROPONIN QUANT: CPT

## 2025-07-18 PROCEDURE — 80053 COMPREHEN METABOLIC PANEL: CPT

## 2025-07-18 PROCEDURE — 72125 CT NECK SPINE W/O DYE: CPT

## 2025-07-18 PROCEDURE — 85025 COMPLETE CBC W/AUTO DIFF WBC: CPT

## 2025-07-18 PROCEDURE — 99285 EMERGENCY DEPT VISIT HI MDM: CPT

## 2025-07-18 RX ORDER — LIDOCAINE 50 MG/G
1 PATCH TOPICAL DAILY
Qty: 10 PATCH | Refills: 0 | Status: SHIPPED | OUTPATIENT
Start: 2025-07-18 | End: 2025-07-28

## 2025-07-18 RX ORDER — LIDOCAINE 4 G/G
1 PATCH TOPICAL ONCE
Status: DISCONTINUED | OUTPATIENT
Start: 2025-07-18 | End: 2025-07-18 | Stop reason: HOSPADM

## 2025-07-18 RX ORDER — METHOCARBAMOL 500 MG/1
1000 TABLET, FILM COATED ORAL ONCE
Status: DISCONTINUED | OUTPATIENT
Start: 2025-07-18 | End: 2025-07-18 | Stop reason: HOSPADM

## 2025-07-18 RX ORDER — CYCLOBENZAPRINE HCL 10 MG
10 TABLET ORAL EVERY 8 HOURS
Qty: 21 TABLET | Refills: 0 | Status: SHIPPED | OUTPATIENT
Start: 2025-07-18 | End: 2025-07-25

## 2025-07-18 ASSESSMENT — PAIN - FUNCTIONAL ASSESSMENT
PAIN_FUNCTIONAL_ASSESSMENT: PREVENTS OR INTERFERES SOME ACTIVE ACTIVITIES AND ADLS
PAIN_FUNCTIONAL_ASSESSMENT: 0-10

## 2025-07-18 ASSESSMENT — PAIN DESCRIPTION - LOCATION: LOCATION: HEAD;NECK

## 2025-07-18 ASSESSMENT — PAIN DESCRIPTION - DESCRIPTORS: DESCRIPTORS: ACHING

## 2025-07-18 ASSESSMENT — PAIN SCALES - GENERAL: PAINLEVEL_OUTOF10: 8

## 2025-07-18 NOTE — DISCHARGE INSTRUCTIONS
Please follow-up cardiology.  Please come back to the emergency department if you have worsening symptoms.

## 2025-07-18 NOTE — ED NOTES
Pt discharged at this time. Discharge instructions and medications reviewed,  Questions were answered. PT verbalized understanding. Pt remains hypertensive, provider aware, asymptomatic, other VSS, Afebrile.  Follow up appointments were discussed.  Pt requesting a bus pass and one was provided.

## 2025-07-18 NOTE — ED PROVIDER NOTES
[facility] EMERGENCY DEPARTMENT    CHIEF COMPLAINT  Dizziness (C/o dizziness, weakness, starting yesterday, fell yesterday hit head, chest pain (left axilla area starting 1 hour ago). Hx spinal surgery, hx 14 heart attacks per pt. From shelter on [address]  Snorted cocaine yesterday.)       HISTORY OF PRESENT ILLNESS  Ashutosh Donovan is a 54 y.o. male presenting to the ED for lightheadedness.  Patient reports yesterday he fell and hit his head after having a syncopal event.  Patient denies vertigo but states he felt lightheaded at that time.  Patient does state he has a past medical history of CAD.  Patient also reports he has chronic neck pain at baseline and he wears a Aspen collar at baseline for neck support.  Patient also complains of generalized bodyaches.  Patient also stated 1 hour before ER arrival he developed chest pain.  Patient points to pain near the left side of his ribs and states the pain radiates to his left shoulder and arm.  Patient denies shortness of breath, fever, cough, runny nose, urinary symptoms, abdominal pain, nausea, vomiting.  Patient also states yesterday he snorted cocaine.  Patient also endorses marijuana use.    - History obtained from: Patient   - Limitations to history: none    I have reviewed the following from the nursing documentation:    Past Medical History:   Diagnosis Date    Arthritis     CAD (coronary artery disease)     CHF (congestive heart failure) (HCC)     Chronic pain     twister vertebra and two collapsed disc, states injury happened at 47yo    Emphysema lung (HCC)     Heart attack (HCC)     pt reports 10 heart attacks in 6 years, 8 stents placed    Hepatitis C     Hypothyroid     Kidney failure      Past Surgical History:   Procedure Laterality Date    CARDIAC PROCEDURE N/A 6/26/2025    Left heart cath / coronary angiography performed by Cesar Asencio MD at [facility] CARDIAC CATH LAB    CERVICAL FUSION N/A 11/22/2024    Cervical 3 - Cervical 5 Anterior  Interpersonal Safety     Do you feel physically or emotionally unsafe where you currently live?: Yes     Within the past 12 months, have you been hit, slapped, kicked or otherwise physically hurt by anyone? : No     Within the past 12 months, have you been humiliated or emotionally abused by anyone? : No   Housing Stability: At Risk (7/1/2025)    Received from [facility]    Housing/Utilities     Are you worried about losing your housing? : No     Within the past 12 months, have you ever stayed: outside, in a car, in a tent, in an overnight shelter, or temporarily in someone else's home (i.e. couch-surfing)?: Yes     Within the past 12 months, have you been unable to get utilities (heat, electricity) when it was really needed?: No     No current facility-administered medications for this encounter.     Current Outpatient Medications   Medication Sig Dispense Refill    cyclobenzaprine (FLEXERIL) 10 MG tablet Take 1 tablet by mouth every 8 (eight) hours for 7 days 21 tablet 0    lidocaine (LIDODERM) 5 % Place 1 patch onto the skin daily for 10 days 12 hours on, 12 hours off. 10 patch 0    prazosin (MINIPRESS) 5 MG capsule Take 1 capsule by mouth nightly 30 capsule 0    fluPHENAZine HCl (PROLIXIN) 5 MG tablet Take 1 tablet by mouth at bedtime (Patient not taking: Reported on 6/24/2025) 30 tablet 0    pantoprazole (PROTONIX) 40 MG tablet Take 1 tablet by mouth daily      tiZANidine (ZANAFLEX) 4 MG tablet Take 1 tablet by mouth every 8 hours as needed (spasms)      isosorbide mononitrate (IMDUR) 60 MG extended release tablet Take 1 tablet by mouth daily      aspirin 81 MG EC tablet Take 1 tablet by mouth daily 30 tablet 0    ranolazine (RANEXA) 500 MG extended release tablet Take 1 tablet by mouth 2 times daily 60 tablet 0    nitroGLYCERIN (NITROSTAT) 0.4 MG SL tablet Place 1 tablet under the tongue every 5 minutes as needed for Chest pain up to max of 3 total doses. If no relief after 1

## 2025-07-18 NOTE — ED TRIAGE NOTES
C/o dizziness, weakness, starting yesterday, fell yesterday hit head, chest pain (left axilla area starting 1 hour ago). Hx spinal surgery, hx 14 heart attacks per pt. From shelter on [address], snorted cocaine yesterday

## 2025-07-18 NOTE — CARE COORDINATION
Pt left prior to SW intervention, [facility] on [address], given bus pass to get there by Nursing.

## 2025-07-19 LAB
EKG ATRIAL RATE: 91 BPM
EKG DIAGNOSIS: NORMAL
EKG P AXIS: 61 DEGREES
EKG P-R INTERVAL: 154 MS
EKG Q-T INTERVAL: 382 MS
EKG QRS DURATION: 108 MS
EKG QTC CALCULATION (BAZETT): 469 MS
EKG R AXIS: 47 DEGREES
EKG T AXIS: 70 DEGREES
EKG VENTRICULAR RATE: 91 BPM

## 2025-07-19 PROCEDURE — 93010 ELECTROCARDIOGRAM REPORT: CPT | Performed by: INTERNAL MEDICINE

## (undated) DEVICE — 3M™ TEGADERM™ CHG CHLORHEXIDINE GLUCONATE GEL PAD 1664, 25 EACH/CARTON, 4 CARTONS/CASE: Brand: 3M™ TEGADERM™

## (undated) DEVICE — GLOVE SURG SZ 65 THK91MIL LTX FREE SYN POLYISOPRENE

## (undated) DEVICE — SUTURE MONOCRYL + SZ 4-0 L27IN ABSRB UD L19MM PS-2 3/8 CIR MCP426H

## (undated) DEVICE — KIT EVAC 400CC DIA1/8IN H PAT 12.5IN 3 SPR RND SHP PVC DRN

## (undated) DEVICE — TOOL MR8-14MH30 MR8 14CM MATCH 3MM: Brand: MIDAS REX MR8

## (undated) DEVICE — STAPLER SKIN LEG L3.9MM DIA0.53MM WIDE ROT HD FOR WND CLSR

## (undated) DEVICE — SUTURE VICRYL + SZ 2-0 L18IN ABSRB UD CT1 L36MM 1/2 CIR VCP839D

## (undated) DEVICE — COUNTER NDL 40 COUNT HLD 70 NUM FOAM BLK SGL MAG W BLDE REMV

## (undated) DEVICE — SHEET,T,THYROID,STERILE: Brand: MEDLINE

## (undated) DEVICE — SUTURE VICRYL + SZ 3-0 L27IN ABSRB UD L26MM SH 1/2 CIR VCP416H

## (undated) DEVICE — GLOVE SURG SZ 65 L12IN FNGR THK79MIL GRN LTX FREE

## (undated) DEVICE — LIQUIBAND RAPID ADHESIVE 36/CS 0.8ML: Brand: MEDLINE

## (undated) DEVICE — PROTECTOR ULN NRV PUR FOAM HK LOOP STRP ANATOMICALLY

## (undated) DEVICE — SPONGE,PEANUT,XRAY,ST,SM,3/8",5/CARD: Brand: MEDLINE INDUSTRIES, INC.

## (undated) DEVICE — CUFF RESTRN WRST OR ANK 45FT AD FOAM

## (undated) DEVICE — SHEET,DRAPE,53X77,STERILE: Brand: MEDLINE

## (undated) DEVICE — ELECTRODE PT RET AD L9FT HI MOIST COND ADH HYDRGEL CORDED

## (undated) DEVICE — SUTURE PERMAHAND SZ 2-0 L12X18IN NONABSORBABLE BLK SILK A185H

## (undated) DEVICE — LAMINECTOMY: Brand: MEDLINE INDUSTRIES, INC.

## (undated) DEVICE — TOWEL,STOP FLAG GOLD N-W: Brand: MEDLINE

## (undated) DEVICE — NEPTUNE E-SEP SMOKE EVACUATION PENCIL, COATED, 70MM BLADE, PUSH BUTTON SWITCH: Brand: NEPTUNE E-SEP

## (undated) DEVICE — DISSECTOR SPNG PNUT HOLDER 3/8 IN XR DETECTABLE STRL

## (undated) DEVICE — SPONGE,NEURO,0.5"X0.5",XR,STRL,10/PK: Brand: MEDLINE

## (undated) DEVICE — BLANKET WRM W40.2XL55.9IN IORT LO BODY + MISTRAL AIR

## (undated) DEVICE — DRAPE MICSCP W54XL150IN W/ 4 BINOC GLS LENS LEICA

## (undated) DEVICE — COVER LT HNDL CAM BLU DISP W/ SURG CTRL

## (undated) DEVICE — AGENT HEMOSTATIC SURGIFLOW MATRIX KIT W/THROMBIN

## (undated) DEVICE — SOLUTION IV 1000ML 0.9% SOD CHL

## (undated) DEVICE — TRAP FLUID

## (undated) DEVICE — EYE PROTECTOR FOAM MEDICHOICE